# Patient Record
Sex: MALE | Race: WHITE | Employment: OTHER | URBAN - METROPOLITAN AREA
[De-identification: names, ages, dates, MRNs, and addresses within clinical notes are randomized per-mention and may not be internally consistent; named-entity substitution may affect disease eponyms.]

---

## 2019-07-13 ENCOUNTER — HOSPITAL ENCOUNTER (EMERGENCY)
Age: 84
Discharge: ANOTHER HOSPITAL | End: 2019-07-13
Attending: EMERGENCY MEDICINE
Payer: MEDICARE

## 2019-07-13 ENCOUNTER — APPOINTMENT (OUTPATIENT)
Dept: GENERAL RADIOLOGY | Age: 84
End: 2019-07-13
Attending: EMERGENCY MEDICINE
Payer: MEDICARE

## 2019-07-13 ENCOUNTER — HOSPITAL ENCOUNTER (INPATIENT)
Age: 84
LOS: 5 days | Discharge: HOME | DRG: 246 | End: 2019-07-18
Attending: INTERNAL MEDICINE | Admitting: INTERNAL MEDICINE
Payer: MEDICARE

## 2019-07-13 VITALS
BODY MASS INDEX: 21 KG/M2 | HEIGHT: 71 IN | OXYGEN SATURATION: 97 % | RESPIRATION RATE: 26 BRPM | SYSTOLIC BLOOD PRESSURE: 143 MMHG | HEART RATE: 78 BPM | WEIGHT: 150 LBS | DIASTOLIC BLOOD PRESSURE: 79 MMHG | TEMPERATURE: 97.5 F

## 2019-07-13 DIAGNOSIS — I21.4 NSTEMI (NON-ST ELEVATED MYOCARDIAL INFARCTION) (HCC): Primary | ICD-10-CM

## 2019-07-13 LAB
ANION GAP SERPL CALC-SCNC: 10 MMOL/L (ref 7–16)
APTT PPP: 62.9 SEC (ref 24.7–39.8)
BASOPHILS # BLD: 0 K/UL (ref 0–0.2)
BASOPHILS NFR BLD: 0 % (ref 0–2)
BUN SERPL-MCNC: 14 MG/DL (ref 8–23)
CALCIUM SERPL-MCNC: 9.3 MG/DL (ref 8.3–10.4)
CHLORIDE SERPL-SCNC: 110 MMOL/L (ref 98–107)
CO2 SERPL-SCNC: 25 MMOL/L (ref 21–32)
CREAT SERPL-MCNC: 1.21 MG/DL (ref 0.8–1.5)
DIFFERENTIAL METHOD BLD: ABNORMAL
EOSINOPHIL # BLD: 0.2 K/UL (ref 0–0.8)
EOSINOPHIL NFR BLD: 2 % (ref 0.5–7.8)
ERYTHROCYTE [DISTWIDTH] IN BLOOD BY AUTOMATED COUNT: 14.3 % (ref 11.9–14.6)
GLUCOSE SERPL-MCNC: 109 MG/DL (ref 65–100)
HCT VFR BLD AUTO: 38.1 % (ref 41.1–50.3)
HGB BLD-MCNC: 12.4 G/DL (ref 13.6–17.2)
IMM GRANULOCYTES # BLD AUTO: 0.2 K/UL (ref 0–0.5)
IMM GRANULOCYTES NFR BLD AUTO: 2 % (ref 0–5)
LYMPHOCYTES # BLD: 1.7 K/UL (ref 0.5–4.6)
LYMPHOCYTES NFR BLD: 21 % (ref 13–44)
MCH RBC QN AUTO: 32.8 PG (ref 26.1–32.9)
MCHC RBC AUTO-ENTMCNC: 32.5 G/DL (ref 31.4–35)
MCV RBC AUTO: 100.8 FL (ref 79.6–97.8)
MONOCYTES # BLD: 0.6 K/UL (ref 0.1–1.3)
MONOCYTES NFR BLD: 7 % (ref 4–12)
NEUTS SEG # BLD: 5.2 K/UL (ref 1.7–8.2)
NEUTS SEG NFR BLD: 67 % (ref 43–78)
NRBC # BLD: 0 K/UL (ref 0–0.2)
PLATELET # BLD AUTO: 252 K/UL (ref 150–450)
PMV BLD AUTO: 10.1 FL (ref 9.4–12.3)
POTASSIUM SERPL-SCNC: 3.8 MMOL/L (ref 3.5–5.1)
RBC # BLD AUTO: 3.78 M/UL (ref 4.23–5.6)
SODIUM SERPL-SCNC: 145 MMOL/L (ref 136–145)
TROPONIN I BLD-MCNC: 0.43 NG/ML (ref 0.02–0.05)
TROPONIN I SERPL-MCNC: 0.62 NG/ML (ref 0.02–0.05)
WBC # BLD AUTO: 7.8 K/UL (ref 4.3–11.1)

## 2019-07-13 PROCEDURE — 65660000000 HC RM CCU STEPDOWN

## 2019-07-13 PROCEDURE — 71045 X-RAY EXAM CHEST 1 VIEW: CPT

## 2019-07-13 PROCEDURE — 99285 EMERGENCY DEPT VISIT HI MDM: CPT | Performed by: EMERGENCY MEDICINE

## 2019-07-13 PROCEDURE — 77010033678 HC OXYGEN DAILY

## 2019-07-13 PROCEDURE — 93005 ELECTROCARDIOGRAM TRACING: CPT | Performed by: EMERGENCY MEDICINE

## 2019-07-13 PROCEDURE — 85730 THROMBOPLASTIN TIME PARTIAL: CPT

## 2019-07-13 PROCEDURE — 80048 BASIC METABOLIC PNL TOTAL CA: CPT

## 2019-07-13 PROCEDURE — 84484 ASSAY OF TROPONIN QUANT: CPT

## 2019-07-13 PROCEDURE — 74011250636 HC RX REV CODE- 250/636: Performed by: EMERGENCY MEDICINE

## 2019-07-13 PROCEDURE — 74011250637 HC RX REV CODE- 250/637: Performed by: EMERGENCY MEDICINE

## 2019-07-13 PROCEDURE — 94760 N-INVAS EAR/PLS OXIMETRY 1: CPT

## 2019-07-13 PROCEDURE — 96365 THER/PROPH/DIAG IV INF INIT: CPT | Performed by: EMERGENCY MEDICINE

## 2019-07-13 PROCEDURE — 85025 COMPLETE CBC W/AUTO DIFF WBC: CPT

## 2019-07-13 PROCEDURE — 74011250637 HC RX REV CODE- 250/637: Performed by: PHYSICIAN ASSISTANT

## 2019-07-13 PROCEDURE — 36415 COLL VENOUS BLD VENIPUNCTURE: CPT

## 2019-07-13 PROCEDURE — 74011250636 HC RX REV CODE- 250/636: Performed by: NURSE PRACTITIONER

## 2019-07-13 RX ORDER — NITROGLYCERIN 0.4 MG/1
0.4 TABLET SUBLINGUAL
Status: DISCONTINUED | OUTPATIENT
Start: 2019-07-13 | End: 2019-07-18 | Stop reason: HOSPADM

## 2019-07-13 RX ORDER — PANTOPRAZOLE SODIUM 40 MG/1
40 TABLET, DELAYED RELEASE ORAL
Status: DISCONTINUED | OUTPATIENT
Start: 2019-07-14 | End: 2019-07-18 | Stop reason: HOSPADM

## 2019-07-13 RX ORDER — SODIUM CHLORIDE 0.9 % (FLUSH) 0.9 %
5-40 SYRINGE (ML) INJECTION EVERY 8 HOURS
Status: DISCONTINUED | OUTPATIENT
Start: 2019-07-13 | End: 2019-07-18 | Stop reason: SDUPTHER

## 2019-07-13 RX ORDER — MORPHINE SULFATE 2 MG/ML
2 INJECTION, SOLUTION INTRAMUSCULAR; INTRAVENOUS
Status: DISCONTINUED | OUTPATIENT
Start: 2019-07-13 | End: 2019-07-18 | Stop reason: HOSPADM

## 2019-07-13 RX ORDER — ATORVASTATIN CALCIUM 10 MG/1
20 TABLET, FILM COATED ORAL
Status: DISCONTINUED | OUTPATIENT
Start: 2019-07-13 | End: 2019-07-18 | Stop reason: HOSPADM

## 2019-07-13 RX ORDER — SODIUM CHLORIDE 0.9 % (FLUSH) 0.9 %
5-40 SYRINGE (ML) INJECTION AS NEEDED
Status: DISCONTINUED | OUTPATIENT
Start: 2019-07-13 | End: 2019-07-18 | Stop reason: SDUPTHER

## 2019-07-13 RX ORDER — LISINOPRIL 5 MG/1
2.5 TABLET ORAL DAILY
Status: DISCONTINUED | OUTPATIENT
Start: 2019-07-13 | End: 2019-07-18 | Stop reason: HOSPADM

## 2019-07-13 RX ORDER — GUAIFENESIN 100 MG/5ML
324 LIQUID (ML) ORAL
Status: COMPLETED | OUTPATIENT
Start: 2019-07-13 | End: 2019-07-13

## 2019-07-13 RX ORDER — HEPARIN SODIUM 5000 [USP'U]/100ML
12-25 INJECTION, SOLUTION INTRAVENOUS
Status: DISCONTINUED | OUTPATIENT
Start: 2019-07-13 | End: 2019-07-13 | Stop reason: HOSPADM

## 2019-07-13 RX ORDER — HEPARIN SODIUM 5000 [USP'U]/100ML
12-25 INJECTION, SOLUTION INTRAVENOUS
Status: DISCONTINUED | OUTPATIENT
Start: 2019-07-13 | End: 2019-07-16

## 2019-07-13 RX ORDER — HEPARIN SODIUM 5000 [USP'U]/ML
60 INJECTION, SOLUTION INTRAVENOUS; SUBCUTANEOUS
Status: COMPLETED | OUTPATIENT
Start: 2019-07-13 | End: 2019-07-13

## 2019-07-13 RX ORDER — GUAIFENESIN 100 MG/5ML
81 LIQUID (ML) ORAL DAILY
Status: DISCONTINUED | OUTPATIENT
Start: 2019-07-14 | End: 2019-07-18 | Stop reason: HOSPADM

## 2019-07-13 RX ORDER — ONDANSETRON 2 MG/ML
4 INJECTION INTRAMUSCULAR; INTRAVENOUS
Status: DISCONTINUED | OUTPATIENT
Start: 2019-07-13 | End: 2019-07-18 | Stop reason: HOSPADM

## 2019-07-13 RX ORDER — METOPROLOL SUCCINATE 25 MG/1
12.5 TABLET, EXTENDED RELEASE ORAL 2 TIMES DAILY
Status: DISCONTINUED | OUTPATIENT
Start: 2019-07-13 | End: 2019-07-18 | Stop reason: HOSPADM

## 2019-07-13 RX ORDER — HEPARIN SODIUM 5000 [USP'U]/ML
35 INJECTION, SOLUTION INTRAVENOUS; SUBCUTANEOUS ONCE
Status: COMPLETED | OUTPATIENT
Start: 2019-07-13 | End: 2019-07-13

## 2019-07-13 RX ADMIN — HEPARIN SODIUM 2400 UNITS: 5000 INJECTION INTRAVENOUS; SUBCUTANEOUS at 22:11

## 2019-07-13 RX ADMIN — ATORVASTATIN CALCIUM 20 MG: 10 TABLET, FILM COATED ORAL at 22:13

## 2019-07-13 RX ADMIN — Medication 10 ML: at 22:17

## 2019-07-13 RX ADMIN — LISINOPRIL 2.5 MG: 5 TABLET ORAL at 18:05

## 2019-07-13 RX ADMIN — METOPROLOL SUCCINATE 12.5 MG: 25 TABLET, EXTENDED RELEASE ORAL at 22:12

## 2019-07-13 RX ADMIN — ASPIRIN 81 MG 324 MG: 81 TABLET ORAL at 14:26

## 2019-07-13 RX ADMIN — HEPARIN SODIUM 12 UNITS/KG/HR: 5000 INJECTION, SOLUTION INTRAVENOUS at 14:30

## 2019-07-13 RX ADMIN — HEPARIN SODIUM 4100 UNITS: 5000 INJECTION INTRAVENOUS; SUBCUTANEOUS at 14:27

## 2019-07-13 NOTE — PROGRESS NOTES
Pt arrived on unit, MD Mona Kuhn made aware of patient arrival. Patient oriented to room, denies any chest pain or shortness of breath. No signs of distress, patient oriented x4. Pt on 2L O2 for chest pain protocol and heparin gtt infusing. Call light in reach. Paged Gaurav MATAMOROS per MD Mona Kuhn request notify her of patient arrival.     Dual skin assessment completed on admission. Sacrum and heels intact. Scattered bruises noted. No other impairments.

## 2019-07-13 NOTE — PROGRESS NOTES
Bedside and Verbal shift change report given to Too Alonzo RN (oncoming nurse) by self Mike Andra nurse). Report included the following information SBAR, Kardex and MAR. Heparin gtt verified with oncoming RN.

## 2019-07-13 NOTE — PROGRESS NOTES
Verbal bedside report received from Augustine Ramos RN. Assumed care of patient. Heparin at 12 units/kg/hr IV drip verified at bedside with outgoing RN.

## 2019-07-13 NOTE — ED TRIAGE NOTES
Pt c/o chest pain yesterday that lasted 30 minutes. Pt states it occurred while he was playing tennis yesterday. Pt states he has a hiatal hernia. Pt states he took tums yesterday and the pain went away. Pt denies any current chest pain.

## 2019-07-13 NOTE — ED NOTES
TRANSFER - OUT REPORT:    Verbal report given to [address] [place] [address] on [address]  being transferred to [facility] for routine progression of care       Report consisted of patients Situation, Background, Assessment and   Recommendations(SBAR). Information from the following report(s) SBAR was reviewed with the receiving nurse. Lines:   Peripheral IV 07/13/19 Right Antecubital (Active)       Peripheral IV 07/13/19 Right Forearm (Active)   Site Assessment Clean, dry, & intact 7/13/2019  2:38 PM   Phlebitis Assessment 0 7/13/2019  2:38 PM   Infiltration Assessment 0 7/13/2019  2:38 PM        Opportunity for questions and clarification was provided.       Patient transported with:   ALS transport

## 2019-07-13 NOTE — PROGRESS NOTES
TRANSFER - IN REPORT:    Verbal report received from [facility]) on [employer]  being received from  ED(unit) for routine progression of care      Report consisted of patients Situation, Background, Assessment and   Recommendations(SBAR). Information from the following report(s) SBAR, Kardex and MAR was reviewed with the receiving nurse. Opportunity for questions and clarification was provided. Assessment completed upon patients arrival to unit and care assumed.

## 2019-07-13 NOTE — ED PROVIDER NOTES
HPI:  71-year-old male with no chronic medical problem other than hiatal hernia seen with chest pain yesterday. Stated he had just played tennis earlier that morning. [employer] [place] get some food. While he was eating he started having severe pain in the midsternal with nausea without vomiting. This feels very different than previous symptom of his hiatal hernia. It went away after approximate 45 minutes. He also took some Rolaids. Since then has not had recurrent symptoms. Denies any fever, cough. No legs swelling, recent travel. ROS  Constitutional: No fever, no chills  Skin: no rash  Eye: No vision changes  ENMT: No sore throat  Respiratory: No shortness of breath, no cough  Cardiovascular: + chest pain, no palpitations  Gastrointestinal: No vomiting, no nausea, no diarrhea, no abdominal pain  : No dysuria  MSK: No back pain, no muscle pain, no joint pain  Neuro: No headache, no change in mental status, no numbness, no tingling, no weakness  Psych:   Endocrine:   All other review of systems positive per history of present illness and the above otherwise negative or noncontributory. Visit Vitals  /85 (BP 1 Location: Left arm, BP Patient Position: Sitting)   Pulse 83   Temp 97.5 °F (36.4 °C)   Resp 17   Ht 5' 10.5\" (1.791 m)   Wt 68 kg (150 lb)   SpO2 97%   BMI 21.22 kg/m²     Past Medical History:   Diagnosis Date    Colon cancer ([facility] [address] 01/2012    Hiatal hernia      History reviewed. No pertinent surgical history. None         Adult Exam   General: alert, no acute distress  Head: normocephalic, atraumatic  ENT: moist mucous membranes  Neck: supple, non-tender; full range of motion  Cardiovascular: regular rate and rhythm, normal peripheral perfusion, no edema.  Equal radial pulses   Chest wall: no reproducible pain  Respiratory:  normal respirations; no wheezing, rales or rhonchi  Gastrointestinal: soft, non-tender; no rebound or guarding, no peritoneal signs, no distension  Back: non-tender, full range of motion  Musculoskeletal: normal range of motion, normal strength, no gross deformities  Neurological: alert and oriented x 4, no gross focal deficits; normal speech  Psychiatric: cooperative; appropriate mood and affect    MDM:  Lungs are clear. We'll obtain chest x-ray. EKG rate of 85 sinus rhythm with premature atrial complex with normal axis and interval.  No acute STEMI noted  No chest pain at this time. No prior heart disease. Low suspicion for dissection, PE, pneumonia, pneumothorax. Troponin is elevated at 0.43. We'll start on heparin bolus and heparin drip. We'll need to speak with cardiology for transfer to [facility] for further management. 2:27 PM  Spoke with Dr. Cory Benavides about the case. Agree with heparin. Would like patient to be transferred to [facility] as soon as possible for further management. Recent Results (from the past 12 hour(s))   CBC WITH AUTOMATED DIFF    Collection Time: 07/13/19  1:41 PM   Result Value Ref Range    WBC 7.8 4.3 - 11.1 K/uL    RBC 3.78 (L) 4.23 - 5.6 M/uL    HGB 12.4 (L) 13.6 - 17.2 g/dL    HCT 38.1 (L) 41.1 - 50.3 %    .8 (H) 79.6 - 97.8 FL    MCH 32.8 26.1 - 32.9 PG    MCHC 32.5 31.4 - 35.0 g/dL    RDW 14.3 11.9 - 14.6 %    PLATELET 264 747 - 996 K/uL    MPV 10.1 9.4 - 12.3 FL    ABSOLUTE NRBC 0.00 0.0 - 0.2 K/uL    DF AUTOMATED      NEUTROPHILS 67 43 - 78 %    LYMPHOCYTES 21 13 - 44 %    MONOCYTES 7 4.0 - 12.0 %    EOSINOPHILS 2 0.5 - 7.8 %    BASOPHILS 0 0.0 - 2.0 %    IMMATURE GRANULOCYTES 2 0.0 - 5.0 %    ABS. NEUTROPHILS 5.2 1.7 - 8.2 K/UL    ABS. LYMPHOCYTES 1.7 0.5 - 4.6 K/UL    ABS. MONOCYTES 0.6 0.1 - 1.3 K/UL    ABS. EOSINOPHILS 0.2 0.0 - 0.8 K/UL    ABS. BASOPHILS 0.0 0.0 - 0.2 K/UL    ABS. IMM.  GRANS. 0.2 0.0 - 0.5 K/UL   METABOLIC PANEL, BASIC    Collection Time: 07/13/19  1:41 PM   Result Value Ref Range    Sodium 145 136 - 145 mmol/L    Potassium 3.8 3.5 - 5.1 mmol/L    Chloride 110 (H) 98 - 107 mmol/L    CO2 25 21 - 32 mmol/L    Anion gap 10 7 - 16 mmol/L    Glucose 109 (H) 65 - 100 mg/dL    BUN 14 8 - 23 MG/DL    Creatinine 1.21 0.8 - 1.5 MG/DL    GFR est AA >60 >60 ml/min/1.73m2    GFR est non-AA >60 >60 ml/min/1.73m2    Calcium 9.3 8.3 - 10.4 MG/DL   POC TROPONIN-I    Collection Time: 07/13/19  1:47 PM   Result Value Ref Range    Troponin-I (POC) 0.43 (H) 0.02 - 0.05 ng/ml           Dragon voice recognition software was used to create this note. Although the note has been reviewed and corrected where necessary, additional errors may have been overlooked and remain in the text.

## 2019-07-13 NOTE — H&P
[facility] Cardiology History & Physical      Date of  Admission: 7/13/2019  3:40 PM     Primary Cardiologist: none  Referring Physician: Dr. Kris Love [facility] MD)  Admitting Physician: Dr. Bob Ríos    CC: Chest pain, elevated troponin    HPI:  Tammy Francois is a 80 y.o. WM with h/o hiatal hernia and hypertriglyceridemia who takes no medications daily. He occasionally has nausea with his hiatal hernia but otherwise has no complaints until yesterday while eating lunch. He developed nausea and pain in the center of his chest that lasted 30-40 minutes before he took tums and got relief. He denies radiation of CP, denies SOB, palpitations, vomiting, diaphoresis or syncope. He felt fine after that but after telling his son today, his son insisted he come to ER for evaluation. In the ER, his troponin was elevated at 0.43 and cardiology was asked to evaluate. Pt was started on heparin and transferred to [facility]. On arrival, Pt continues to deny CP today. He feels this pain yesterday was from his hiatal hernia. Past Medical History:   Diagnosis Date    Colon cancer ([facility] [address] 01/2012    Hiatal hernia       No past surgical history on file.     No Known Allergies   Social History     Socioeconomic History    Marital status:      Spouse name: Not on file    Number of children: Not on file    Years of education: Not on file    Highest education level: Not on file   Occupational History    Not on file   Social Needs    Financial resource strain: Not on file    Food insecurity:     Worry: Not on file     Inability: Not on file    Transportation needs:     Medical: Not on file     Non-medical: Not on file   Tobacco Use    Smoking status: Never Smoker    Smokeless tobacco: Never Used   Substance and Sexual Activity    Alcohol use: Never     Frequency: Never    Drug use: Never    Sexual activity: Not on file   Lifestyle    Physical activity:     Days per week: Not on file     Minutes per session: Not on file  Stress: Not on file   Relationships    Social connections:     Talks on phone: Not on file     Gets together: Not on file     Attends Jain service: Not on file     Active member of club or organization: Not on file     Attends meetings of clubs or organizations: Not on file     Relationship status: Not on file    Intimate partner violence:     Fear of current or ex partner: Not on file     Emotionally abused: Not on file     Physically abused: Not on file     Forced sexual activity: Not on file   Other Topics Concern    Not on file   Social History Narrative    Not on file     Review of symptoms:  General: no recent weight loss/gain, weakness, fatigue, fever or chills   Skin: no rashes, lumps, or other skin changes   HEENT: no headache, dizziness, lightheadedness, vision changes, hearing changes, tinnitus, vertigo, sinus pressure/pain, bleeding gums, sore throat, or hoarseness   Neck: no swollen glands, goiter, pain or stiffness   Respiratory: no cough, sputum, hemoptysis, dyspnea, wheezing   Cardiovascular: +chest pain or discomfort, no palpitations, dyspnea, orthopnea, paroxysmal nocturnal dyspnea, peripheral edema   Gastrointestinal: no trouble swallowing, heartburn, change of appetite, +nausea w/o vomiting, change in bowel habits, pain with defecation, rectal bleeding or black/tarry stools, hemorrhoids, constipation, diarrhea, abdominal pain, jaundice, liver or gallbladder problems   Urinary: no frequency, urgency , hematuria, burning/pain with urination, recent flank pain, polyuria, nocturia, or difficulty urinating   Peripheral Vascular: no claudication, leg cramps, prior DVTs, swelling of calves, legs, or feet, color change, or swelling with redness or tenderness   Musculoskeletal: no muscle or joint pain/stiffness, joint swelling, erythema of joints, or back pain   Psychiatric: no depression, mental disorders, or excessive stress   Neurological: no history of CVA, dizziness, no sensory or motor loss, seizures, syncope, tremors, numbness, tingling, no changes in mood, attention, or speech, no changes in orientation, memory, insight, or judgment. no headache, vertigo. Hematologic: no anemia, easy bruising or bleeding   Endocrine: no diabetes, thyroid problems, heat or cold intolerance, excessive sweating, polyuria, polydipsia      Subjective:   Physical Exam    Visit Vitals  /77 (BP 1 Location: Left arm, BP Patient Position: At rest)   Pulse 69   Temp 97.5 °F (36.4 °C)   Resp 20   Ht 5' 10.5\" (1.791 m)   Wt 68 kg (150 lb)   SpO2 99%   BMI 21.22 kg/m²     General Appearance:  Well developed, well nourished,alert and oriented x 3, and individual in no acute distress. Ears/Nose/Mouth/Throat:   Hearing grossly normal.         Neck: Supple. Chest:   Lungs clear to auscultation bilaterally. Cardiovascular:  Regular rate and rhythm, S1, S2 normal, no murmur. Abdomen:   Soft, non-tender, bowel sounds are active. Extremities: No edema bilaterally. Skin: Warm and dry.            Labs:   Recent Results (from the past 24 hour(s))   EKG, 12 LEAD, INITIAL    Collection Time: 07/13/19  1:08 PM   Result Value Ref Range    Ventricular Rate 85 BPM    Atrial Rate 85 BPM    P-R Interval 144 ms    QRS Duration 80 ms    Q-T Interval 366 ms    QTC Calculation (Bezet) 435 ms    Calculated P Axis 54 degrees    Calculated R Axis 17 degrees    Calculated T Axis 30 degrees    Diagnosis       Sinus rhythm with Premature atrial complexes  Nonspecific ST abnormality  Abnormal ECG  No previous ECGs available     CBC WITH AUTOMATED DIFF    Collection Time: 07/13/19  1:41 PM   Result Value Ref Range    WBC 7.8 4.3 - 11.1 K/uL    RBC 3.78 (L) 4.23 - 5.6 M/uL    HGB 12.4 (L) 13.6 - 17.2 g/dL    HCT 38.1 (L) 41.1 - 50.3 %    .8 (H) 79.6 - 97.8 FL    MCH 32.8 26.1 - 32.9 PG    MCHC 32.5 31.4 - 35.0 g/dL    RDW 14.3 11.9 - 14.6 %    PLATELET 850 436 - 065 K/uL    MPV 10.1 9.4 - 12.3 FL    ABSOLUTE NRBC 0.00 0.0 - 0.2 K/uL    DF AUTOMATED      NEUTROPHILS 67 43 - 78 %    LYMPHOCYTES 21 13 - 44 %    MONOCYTES 7 4.0 - 12.0 %    EOSINOPHILS 2 0.5 - 7.8 %    BASOPHILS 0 0.0 - 2.0 %    IMMATURE GRANULOCYTES 2 0.0 - 5.0 %    ABS. NEUTROPHILS 5.2 1.7 - 8.2 K/UL    ABS. LYMPHOCYTES 1.7 0.5 - 4.6 K/UL    ABS. MONOCYTES 0.6 0.1 - 1.3 K/UL    ABS. EOSINOPHILS 0.2 0.0 - 0.8 K/UL    ABS. BASOPHILS 0.0 0.0 - 0.2 K/UL    ABS. IMM.  GRANS. 0.2 0.0 - 0.5 K/UL   METABOLIC PANEL, BASIC    Collection Time: 07/13/19  1:41 PM   Result Value Ref Range    Sodium 145 136 - 145 mmol/L    Potassium 3.8 3.5 - 5.1 mmol/L    Chloride 110 (H) 98 - 107 mmol/L    CO2 25 21 - 32 mmol/L    Anion gap 10 7 - 16 mmol/L    Glucose 109 (H) 65 - 100 mg/dL    BUN 14 8 - 23 MG/DL    Creatinine 1.21 0.8 - 1.5 MG/DL    GFR est AA >60 >60 ml/min/1.73m2    GFR est non-AA >60 >60 ml/min/1.73m2    Calcium 9.3 8.3 - 10.4 MG/DL   POC TROPONIN-I    Collection Time: 07/13/19  1:47 PM   Result Value Ref Range    Troponin-I (POC) 0.43 (H) 0.02 - 0.05 ng/ml       Pt has been seen and examined by Dr. Anette Lopez and he agrees with the following assessment and plan:     Assessment/Plan:      1) Chest pain with elevated troponin concerning for NSTEMI- admit to telemetry, serial CE's, ASA, heparin, start low dose BB, ACE-I as BP tolerates, plan LHC and echo    2) Hiatal hernia- PPI    3) H/o hypertriglyceridemia- check lipid profile, start statin       Neeta Ferguson PA-C

## 2019-07-14 LAB
ANION GAP SERPL CALC-SCNC: 8 MMOL/L (ref 7–16)
APTT PPP: 123.9 SEC (ref 24.7–39.8)
APTT PPP: 56.4 SEC (ref 24.7–39.8)
APTT PPP: 64.8 SEC (ref 24.7–39.8)
ATRIAL RATE: 85 BPM
BUN SERPL-MCNC: 12 MG/DL (ref 8–23)
CALCIUM SERPL-MCNC: 8.5 MG/DL (ref 8.3–10.4)
CALCULATED P AXIS, ECG09: 54 DEGREES
CALCULATED R AXIS, ECG10: 17 DEGREES
CALCULATED T AXIS, ECG11: 30 DEGREES
CHLORIDE SERPL-SCNC: 110 MMOL/L (ref 98–107)
CHOLEST SERPL-MCNC: 152 MG/DL
CO2 SERPL-SCNC: 26 MMOL/L (ref 21–32)
CREAT SERPL-MCNC: 1.12 MG/DL (ref 0.8–1.5)
DIAGNOSIS, 93000: NORMAL
ERYTHROCYTE [DISTWIDTH] IN BLOOD BY AUTOMATED COUNT: 14.1 % (ref 11.9–14.6)
GLUCOSE SERPL-MCNC: 102 MG/DL (ref 65–100)
HCT VFR BLD AUTO: 35 % (ref 41.1–50.3)
HDLC SERPL-MCNC: 31 MG/DL (ref 40–60)
HDLC SERPL: 4.9 {RATIO}
HGB BLD-MCNC: 11.6 G/DL (ref 13.6–17.2)
LDLC SERPL CALC-MCNC: 98.2 MG/DL
LIPID PROFILE,FLP: ABNORMAL
MCH RBC QN AUTO: 33.1 PG (ref 26.1–32.9)
MCHC RBC AUTO-ENTMCNC: 33.1 G/DL (ref 31.4–35)
MCV RBC AUTO: 100 FL (ref 79.6–97.8)
NRBC # BLD: 0 K/UL (ref 0–0.2)
P-R INTERVAL, ECG05: 144 MS
PLATELET # BLD AUTO: 223 K/UL (ref 150–450)
PMV BLD AUTO: 10.2 FL (ref 9.4–12.3)
POTASSIUM SERPL-SCNC: 3.6 MMOL/L (ref 3.5–5.1)
Q-T INTERVAL, ECG07: 366 MS
QRS DURATION, ECG06: 80 MS
QTC CALCULATION (BEZET), ECG08: 435 MS
RBC # BLD AUTO: 3.5 M/UL (ref 4.23–5.6)
SODIUM SERPL-SCNC: 144 MMOL/L (ref 136–145)
TRIGL SERPL-MCNC: 114 MG/DL (ref 35–150)
TROPONIN I SERPL-MCNC: 0.5 NG/ML (ref 0.02–0.05)
TROPONIN I SERPL-MCNC: 0.58 NG/ML (ref 0.02–0.05)
VENTRICULAR RATE, ECG03: 85 BPM
VLDLC SERPL CALC-MCNC: 22.8 MG/DL (ref 6–23)
WBC # BLD AUTO: 6.7 K/UL (ref 4.3–11.1)

## 2019-07-14 PROCEDURE — 84484 ASSAY OF TROPONIN QUANT: CPT

## 2019-07-14 PROCEDURE — 65660000000 HC RM CCU STEPDOWN

## 2019-07-14 PROCEDURE — 85730 THROMBOPLASTIN TIME PARTIAL: CPT

## 2019-07-14 PROCEDURE — 74011250636 HC RX REV CODE- 250/636: Performed by: PHYSICIAN ASSISTANT

## 2019-07-14 PROCEDURE — 74011250637 HC RX REV CODE- 250/637: Performed by: PHYSICIAN ASSISTANT

## 2019-07-14 PROCEDURE — 93306 TTE W/DOPPLER COMPLETE: CPT

## 2019-07-14 PROCEDURE — 85027 COMPLETE CBC AUTOMATED: CPT

## 2019-07-14 PROCEDURE — 80048 BASIC METABOLIC PNL TOTAL CA: CPT

## 2019-07-14 PROCEDURE — 80061 LIPID PANEL: CPT

## 2019-07-14 PROCEDURE — 74011250636 HC RX REV CODE- 250/636: Performed by: INTERNAL MEDICINE

## 2019-07-14 PROCEDURE — 74011250636 HC RX REV CODE- 250/636: Performed by: NURSE PRACTITIONER

## 2019-07-14 PROCEDURE — 36415 COLL VENOUS BLD VENIPUNCTURE: CPT

## 2019-07-14 RX ORDER — HEPARIN SODIUM 5000 [USP'U]/ML
35 INJECTION, SOLUTION INTRAVENOUS; SUBCUTANEOUS ONCE
Status: COMPLETED | OUTPATIENT
Start: 2019-07-14 | End: 2019-07-14

## 2019-07-14 RX ADMIN — LISINOPRIL 2.5 MG: 5 TABLET ORAL at 08:50

## 2019-07-14 RX ADMIN — Medication 10 ML: at 21:21

## 2019-07-14 RX ADMIN — METOPROLOL SUCCINATE 12.5 MG: 25 TABLET, EXTENDED RELEASE ORAL at 08:48

## 2019-07-14 RX ADMIN — ATORVASTATIN CALCIUM 20 MG: 10 TABLET, FILM COATED ORAL at 21:20

## 2019-07-14 RX ADMIN — HEPARIN SODIUM 2350 UNITS: 5000 INJECTION INTRAVENOUS; SUBCUTANEOUS at 12:47

## 2019-07-14 RX ADMIN — HEPARIN SODIUM 14 UNITS/KG/HR: 5000 INJECTION, SOLUTION INTRAVENOUS at 19:29

## 2019-07-14 RX ADMIN — ASPIRIN 81 MG 81 MG: 81 TABLET ORAL at 08:48

## 2019-07-14 RX ADMIN — HEPARIN SODIUM 2350 UNITS: 5000 INJECTION INTRAVENOUS; SUBCUTANEOUS at 21:18

## 2019-07-14 RX ADMIN — METOPROLOL SUCCINATE 12.5 MG: 25 TABLET, EXTENDED RELEASE ORAL at 21:20

## 2019-07-14 RX ADMIN — Medication 10 ML: at 05:18

## 2019-07-14 RX ADMIN — PANTOPRAZOLE SODIUM 40 MG: 40 TABLET, DELAYED RELEASE ORAL at 08:50

## 2019-07-14 NOTE — PROGRESS NOTES
Verbal bedside report received from Frankie Fermin RN. Assumed care of patient. Heparin at 14 units/kg/hr IV drip verified at bedside with outgoing RN.

## 2019-07-14 NOTE — PROGRESS NOTES
Verbal bedside report given to chu Gomez RN. Patient's situation, background, assessment and recommendations provided. Opportunity for questions provided. Oncoming RN assumed care of patient. Heparin IV drip verified at bedside with oncoming RN.

## 2019-07-14 NOTE — PROGRESS NOTES
Bedside and Verbal shift change report given to self (oncoming nurse) by Too Alonzo RN (offgoing nurse). Report included the following information SBAR, Kardex and MAR. Heparin gtt verified, pt not complaining of any chest pain/shortness of breath.

## 2019-07-14 NOTE — PROGRESS NOTES
Problem: Falls - Risk of  Goal: *Absence of Falls  Description  Document Amanda Roberts Fall Risk and appropriate interventions in the flowsheet.   Outcome: Progressing Towards Goal  Note:   Fall Risk Interventions:  Mobility Interventions: Communicate number of staff needed for ambulation/transfer, Patient to call before getting OOB         Medication Interventions: Evaluate medications/consider consulting pharmacy, Patient to call before getting OOB    Elimination Interventions: Call light in reach, Urinal in reach

## 2019-07-14 NOTE — PROGRESS NOTES
Bedside and Verbal shift change report given to Rose Marie Stack RN (oncoming nurse) by self Can Primes nurse). Report included the following information SBAR, Kardex and MAR. Heparin Gtt verified.

## 2019-07-14 NOTE — PROGRESS NOTES
[facility] CARDIOLOGY PROGRESS NOTE           7/14/2019 9:22 AM    Admit Date: 7/13/2019      Subjective:    pain free     Review of Systems   Constitutional: Negative for fever. Respiratory: Negative for cough. Cardiovascular: Negative for chest pain. Genitourinary: Negative for dysuria. Musculoskeletal: Negative for myalgias. Skin: Negative for rash. Neurological: Positive for dizziness. Objective:      Vitals:    07/13/19 2212 07/14/19 0021 07/14/19 0534 07/14/19 0848   BP: 125/70 113/65 110/64 114/61   Pulse: 67 75 69 85   Resp:  18 18 16   Temp:  97.9 °F (36.6 °C) 98.3 °F (36.8 °C) 97.7 °F (36.5 °C)   SpO2:  96% 97% 97%   Weight:   66.7 kg (147 lb 1.6 oz)    Height:             Physical Exam   Constitutional: He is oriented to person, place, and time. He appears well-developed. HENT:   Head: Normocephalic and atraumatic. Eyes: Pupils are equal, round, and reactive to light. Neck: Normal range of motion. Cardiovascular: Normal rate. No murmur heard. Pulmonary/Chest: Effort normal.   Abdominal: Soft. He exhibits no distension. There is no tenderness. Musculoskeletal: He exhibits no edema. Neurological: He is alert and oriented to person, place, and time. No cranial nerve deficit. Skin: Skin is warm and dry. No rash noted. No erythema. Psychiatric: He has a normal mood and affect. His behavior is normal.       Data Review:   Recent Labs     07/14/19  0355 07/13/19  2338  07/13/19  1341     --   --  145   K 3.6  --   --  3.8   BUN 12  --   --  14   CREA 1.12  --   --  1.21   *  --   --  109*   WBC 6.7  --   --  7.8   HGB 11.6*  --   --  12.4*   HCT 35.0*  --   --  38.1*     --   --  252   TROIQ 0.50* 0.58*   < >  --    CHOL 152  --   --   --    LDLC 98.2  --   --   --    HDL 31*  --   --   --     < > = values in this interval not displayed.          Intake/Output Summary (Last 24 hours) at 7/14/2019 0922  Last data filed at 7/14/2019 5703  Gross per 24 hour   Intake 75 ml   Output 925 ml   Net -850 ml     Current Facility-Administered Medications   Medication Dose Route Frequency    sodium chloride (NS) flush 5-40 mL  5-40 mL IntraVENous Q8H    sodium chloride (NS) flush 5-40 mL  5-40 mL IntraVENous PRN    aspirin chewable tablet 81 mg  81 mg Oral DAILY    nitroglycerin (NITROSTAT) tablet 0.4 mg  0.4 mg SubLINGual Q5MIN PRN    morphine injection 2 mg  2 mg IntraVENous Q4H PRN    ondansetron (ZOFRAN) injection 4 mg  4 mg IntraVENous Q4H PRN    sodium chloride (NS) flush 5-40 mL  5-40 mL IntraVENous Q8H    sodium chloride (NS) flush 5-40 mL  5-40 mL IntraVENous PRN    pantoprazole (PROTONIX) tablet 40 mg  40 mg Oral ACB    atorvastatin (LIPITOR) tablet 20 mg  20 mg Oral QHS    metoprolol succinate (TOPROL-XL) XL tablet 12.5 mg  12.5 mg Oral BID    lisinopril (PRINIVIL, ZESTRIL) tablet 2.5 mg  2.5 mg Oral DAILY    heparin 25,000 units in dextrose 500 mL infusion  12-25 Units/kg/hr IntraVENous TITRATE           Assessment/Plan:     80-year-old male with hiatal hernia admitted for non-ST elevation myocardial infarction  1. Hypertension controlled  2. Non-ST elevated myocardial infarction currently on aspirin and statin and beta blocker heparin drip. Echocardiogram pending.             Mercedes Barbosa MD  7/14/2019 9:22 AM

## 2019-07-15 LAB
ANION GAP SERPL CALC-SCNC: 6 MMOL/L (ref 7–16)
APTT PPP: 158.5 SEC (ref 24.7–39.8)
BUN SERPL-MCNC: 17 MG/DL (ref 8–23)
CALCIUM SERPL-MCNC: 8 MG/DL (ref 8.3–10.4)
CHLORIDE SERPL-SCNC: 108 MMOL/L (ref 98–107)
CO2 SERPL-SCNC: 29 MMOL/L (ref 21–32)
CREAT SERPL-MCNC: 1.25 MG/DL (ref 0.8–1.5)
GLUCOSE SERPL-MCNC: 108 MG/DL (ref 65–100)
POTASSIUM SERPL-SCNC: 3.7 MMOL/L (ref 3.5–5.1)
SODIUM SERPL-SCNC: 143 MMOL/L (ref 136–145)

## 2019-07-15 PROCEDURE — 80048 BASIC METABOLIC PNL TOTAL CA: CPT

## 2019-07-15 PROCEDURE — 36415 COLL VENOUS BLD VENIPUNCTURE: CPT

## 2019-07-15 PROCEDURE — 4A023N7 MEASUREMENT OF CARDIAC SAMPLING AND PRESSURE, LEFT HEART, PERCUTANEOUS APPROACH: ICD-10-PCS | Performed by: INTERNAL MEDICINE

## 2019-07-15 PROCEDURE — 85730 THROMBOPLASTIN TIME PARTIAL: CPT

## 2019-07-15 PROCEDURE — 74011636320 HC RX REV CODE- 636/320: Performed by: INTERNAL MEDICINE

## 2019-07-15 PROCEDURE — B2111ZZ FLUOROSCOPY OF MULTIPLE CORONARY ARTERIES USING LOW OSMOLAR CONTRAST: ICD-10-PCS | Performed by: INTERNAL MEDICINE

## 2019-07-15 PROCEDURE — 99152 MOD SED SAME PHYS/QHP 5/>YRS: CPT

## 2019-07-15 PROCEDURE — 77030020263 HC SOL INJ SOD CL0.9% LFCR 1000ML

## 2019-07-15 PROCEDURE — 74011000250 HC RX REV CODE- 250: Performed by: INTERNAL MEDICINE

## 2019-07-15 PROCEDURE — 74011250636 HC RX REV CODE- 250/636: Performed by: INTERNAL MEDICINE

## 2019-07-15 PROCEDURE — 74011250636 HC RX REV CODE- 250/636

## 2019-07-15 PROCEDURE — 94760 N-INVAS EAR/PLS OXIMETRY 1: CPT

## 2019-07-15 PROCEDURE — 74011250637 HC RX REV CODE- 250/637: Performed by: PHYSICIAN ASSISTANT

## 2019-07-15 PROCEDURE — B2151ZZ FLUOROSCOPY OF LEFT HEART USING LOW OSMOLAR CONTRAST: ICD-10-PCS | Performed by: INTERNAL MEDICINE

## 2019-07-15 PROCEDURE — 93458 L HRT ARTERY/VENTRICLE ANGIO: CPT

## 2019-07-15 PROCEDURE — 65660000000 HC RM CCU STEPDOWN

## 2019-07-15 RX ORDER — SODIUM CHLORIDE 9 MG/ML
250 INJECTION, SOLUTION INTRAVENOUS CONTINUOUS
Status: DISPENSED | OUTPATIENT
Start: 2019-07-15 | End: 2019-07-15

## 2019-07-15 RX ORDER — LIDOCAINE HYDROCHLORIDE 10 MG/ML
1-20 INJECTION INFILTRATION; PERINEURAL ONCE
Status: COMPLETED | OUTPATIENT
Start: 2019-07-15 | End: 2019-07-15

## 2019-07-15 RX ORDER — HEPARIN SODIUM 200 [USP'U]/100ML
3 INJECTION, SOLUTION INTRAVENOUS CONTINUOUS
Status: DISCONTINUED | OUTPATIENT
Start: 2019-07-15 | End: 2019-07-18 | Stop reason: HOSPADM

## 2019-07-15 RX ORDER — MIDAZOLAM HYDROCHLORIDE 1 MG/ML
.5-5 INJECTION, SOLUTION INTRAMUSCULAR; INTRAVENOUS
Status: DISCONTINUED | OUTPATIENT
Start: 2019-07-15 | End: 2019-07-17 | Stop reason: SDUPTHER

## 2019-07-15 RX ORDER — FENTANYL CITRATE 50 UG/ML
25-100 INJECTION, SOLUTION INTRAMUSCULAR; INTRAVENOUS
Status: DISCONTINUED | OUTPATIENT
Start: 2019-07-15 | End: 2019-07-17 | Stop reason: SDUPTHER

## 2019-07-15 RX ORDER — SODIUM CHLORIDE 0.9 % (FLUSH) 0.9 %
5-40 SYRINGE (ML) INJECTION AS NEEDED
Status: DISCONTINUED | OUTPATIENT
Start: 2019-07-15 | End: 2019-07-18 | Stop reason: SDUPTHER

## 2019-07-15 RX ORDER — SODIUM CHLORIDE 0.9 % (FLUSH) 0.9 %
5-40 SYRINGE (ML) INJECTION EVERY 8 HOURS
Status: DISCONTINUED | OUTPATIENT
Start: 2019-07-15 | End: 2019-07-16

## 2019-07-15 RX ADMIN — SODIUM CHLORIDE 250 ML/HR: 900 INJECTION, SOLUTION INTRAVENOUS at 20:24

## 2019-07-15 RX ADMIN — FENTANYL CITRATE 50 MCG: 50 INJECTION, SOLUTION INTRAMUSCULAR; INTRAVENOUS at 13:27

## 2019-07-15 RX ADMIN — LIDOCAINE HYDROCHLORIDE 3 ML: 10 INJECTION, SOLUTION INFILTRATION; PERINEURAL at 13:28

## 2019-07-15 RX ADMIN — Medication 10 ML: at 05:16

## 2019-07-15 RX ADMIN — PANTOPRAZOLE SODIUM 40 MG: 40 TABLET, DELAYED RELEASE ORAL at 08:15

## 2019-07-15 RX ADMIN — Medication 5 ML: at 21:53

## 2019-07-15 RX ADMIN — METOPROLOL SUCCINATE 12.5 MG: 25 TABLET, EXTENDED RELEASE ORAL at 21:51

## 2019-07-15 RX ADMIN — LISINOPRIL 2.5 MG: 5 TABLET ORAL at 08:15

## 2019-07-15 RX ADMIN — HEPARIN SODIUM 2 ML: 10000 INJECTION, SOLUTION INTRAVENOUS; SUBCUTANEOUS at 13:41

## 2019-07-15 RX ADMIN — METOPROLOL SUCCINATE 12.5 MG: 25 TABLET, EXTENDED RELEASE ORAL at 08:15

## 2019-07-15 RX ADMIN — ASPIRIN 81 MG 81 MG: 81 TABLET ORAL at 08:15

## 2019-07-15 RX ADMIN — IOPAMIDOL 90 ML: 755 INJECTION, SOLUTION INTRAVENOUS at 13:52

## 2019-07-15 RX ADMIN — HEPARIN SODIUM 3 ML/HR: 200 INJECTION, SOLUTION INTRAVENOUS at 13:27

## 2019-07-15 RX ADMIN — ATORVASTATIN CALCIUM 20 MG: 10 TABLET, FILM COATED ORAL at 21:51

## 2019-07-15 RX ADMIN — MIDAZOLAM HYDROCHLORIDE 2 MG: 1 INJECTION, SOLUTION INTRAMUSCULAR; INTRAVENOUS at 13:27

## 2019-07-15 NOTE — PROGRESS NOTES
TRANSFER - IN REPORT:    Verbal report received from Unique Cohen RN on [address] being received from [address] [place] [address] for routine progression of care      Report consisted of patients Situation, Background, Assessment and Recommendations(SBAR). Information from the following report(s) SBAR, Kardex, Procedure Summary, Intake/Output, MAR, Recent Results, Med Rec Status and Cardiac Rhythm SR was reviewed with the receiving nurse. Opportunity for questions and clarification was provided. Assessment completed upon patients arrival to unit and care assumed.

## 2019-07-15 NOTE — PROGRESS NOTES
[facility] CARDIOLOGY PROGRESS NOTE           7/15/2019 7:42 AM    Admit Date: 7/13/2019      Subjective:   No CP or SOB, states needs to go home later today after cath bc needs to be in [facility] tomorrow for business, cath lab asked pt be put as priority per schedule but pt aware that schedule busy and dependent upon emergent cases. ROS:  Cardiovascular:  As noted above    Objective:      Vitals:    07/14/19 1637 07/14/19 2102 07/15/19 0103 07/15/19 0534   BP: 120/70 105/56 100/49 100/56   Pulse: 80 75 67 65   Resp: 16 16 16 16   Temp: 98.2 °F (36.8 °C) 98.2 °F (36.8 °C) 98 °F (36.7 °C) 97.9 °F (36.6 °C)   SpO2: 96% 97% 91% 94%   Weight:    66.5 kg (146 lb 11.2 oz)   Height:           Physical Exam:  General-No Acute Distress, RA   Neck- supple, no JVD  CV- regular rate and rhythm   Lung- clear bilaterally  Abd- soft, nontender, nondistended  Ext- no edema bilaterally. Skin- warm and dry    Data Review:   Recent Labs     07/15/19  0346 07/14/19  0355 07/13/19  2338  07/13/19  1341    144  --   --  145   K 3.7 3.6  --   --  3.8   BUN 17 12  --   --  14   CREA 1.25 1.12  --   --  1.21   * 102*  --   --  109*   WBC  --  6.7  --   --  7.8   HGB  --  11.6*  --   --  12.4*   HCT  --  35.0*  --   --  38.1*   PLT  --  223  --   --  252   TROIQ  --  0.50* 0.58*   < >  --    CHOL  --  152  --   --   --    LDLC  --  98.2  --   --   --    HDL  --  31*  --   --   --     < > = values in this interval not displayed. Echo:  -  Left ventricle: Systolic function was normal. Ejection fraction was  estimated in the range of 55 % to 60 %. There were no regional wall motion  abnormalities. -  Aortic valve: There was mild stenosis. Di: (0.57)    -  Mitral valve: There was mild annular calcification. There was mild  regurgitation. -  Tricuspid valve: There was mild regurgitation.     Assessment/Plan:   NSTEMI (non-ST elevated myocardial infarction) ([address] (7/13/2019)- [facility] today, echo w nml EF, trop max . 62 and decreased to .50, cont ASA, heparin, statin, ACE-I, BB    Miguel Baxter [place], [place]  7/15/2019 7:42 AM

## 2019-07-15 NOTE — PROCEDURES
[address] [facility]  CARDIAC CATH    Name:  Paula Maldonado  MR#:  [id number]  :  1931  ACCOUNT #:  [de-identified]  DATE OF SERVICE:  07/15/2019    PRIMARY CARDIOLOGIST:  None. PRIMARY CARE PHYSICIAN:  None. BRIEF HISTORY:  The patient is an 71-year-old gentleman with no prior significant past medical history who presented with non-ST-elevation myocardial infarction for cardiac catheterization. PREOPERATIVE DIAGNOSIS:  Non-ST-elevation myocardial infarction. POSTOPERATIVE DIAGNOSIS:  Severe three-vessel atherosclerotic coronary artery disease. PROCEDURES PERFORMED:  Bilateral selective coronary angiography and left ventriculography. SURGEON:  Jessica Hawley MD    ASSISTANT:  None. COMPLICATIONS:  None. ANESTHESIA:  Conscious sedation. Start time 22 [id number], end time 1351. MEDICATIONS:  2 mg Versed and 50 mcg fentanyl. MONITORING RN:  Estelle San    ESTIMATED BLOOD LOSS:  5cc    SPECIMENS REMOVED:  none    IMPLANTS:  none    PROCEDURE:  After informed consent, he was prepped and draped in usual sterile fashion. Right wrist was infiltrated with lidocaine. Right radial artery was accessed. A 6-Rwandan sheath was advanced. A 5-Rwandan Tiger catheter was utilized for left and right coronary artery injection, a 5-Rwandan angled pigtail for left ventriculography. Isovue contrast utilized. FINDINGS:  1. Left ventricle:  Normal left ventricular size. Normal left ventricular systolic function. Ejection fraction is 50% to 55%. There is no significant mitral regurgitation. No aortic valve gradient. Left ventricular end-diastolic pressure is measured at 16 mmHg. 2.  Left main:  Left main is large in size, has a 60% to 70% distal concentric stenosis, bifurcates into LAD and circumflex systems. 3.  Left anterior descending coronary artery: It is heavily calcified proximally. There is a large mid-vessel diagonal with a 70% to 80% stenosis just beyond that.   The LAD is diffusely diseased on the order of 70% to 90% then becomes moderately diffusely diseased in the apical portion on the order of 30% to 50%. 4.  Left circumflex coronary artery: It is a large dominant vessel. It has a 30% ostial stenosis. The mid left circumflex has an ulcerated plaque with associated 70% stenosis. Just prior to the first obtuse marginal, there is a concentric 50% stenosis. There is a 50% to 70% stenosis in the first obtuse marginal.  The ongoing left circumflex gives rise to a moderate-sized bifurcating second obtuse marginal with a 90% stenosis and a 95% to 99% stenosis in a small left posterior descending branch. 5.  Right coronary artery: It is a small nondominant vessel with a 99% proximal stenosis, fills by left-to-right collaterals and right-to-right collaterals. Successful hemostasis with pneumatic radial band. CONCLUSIONS:  1. Low-normal left ventricular systolic function. 2.  Complex three-vessel atherosclerotic coronary artery disease with associated bradycardia and mild to moderate hypotension upon engaging the left main. High-risk percutaneous coronary intervention is entertained. Would recommend use of Impella. Thank you for allowing us to participate in the care of this patient. For any questions or concerns, please feel free to contact me.       Jessica Gautam MD      [id number]  D:  07/15/2019 14:02  T:  07/15/2019 14:06  JOB #:  [id number]

## 2019-07-15 NOTE — PROGRESS NOTES
TRANSFER - OUT REPORT:    Verbal report given to keyana rn(name) on Alejandra Adjutant Sr  being transferred to cpru(unit) for routine progression of care       Report consisted of patients Situation, Background, Assessment and   Recommendations(SBAR). Information from the following report(s) SBAR was reviewed with the receiving nurse. Lines:   Peripheral IV 07/13/19 Right Antecubital (Active)   Site Assessment Clean, dry, & intact 7/15/2019 12:10 PM   Phlebitis Assessment 0 7/15/2019 12:10 PM   Infiltration Assessment 0 7/15/2019 12:10 PM   Dressing Status Clean, dry, & intact 7/15/2019 12:10 PM   Dressing Type Transparent;Tape 7/15/2019 12:10 PM   Hub Color/Line Status Flushed 7/15/2019  4:29 AM   Alcohol Cap Used No 7/15/2019  4:29 AM       Peripheral IV 07/13/19 Right Forearm (Active)   Site Assessment Clean, dry, & intact 7/15/2019 12:10 PM   Phlebitis Assessment 0 7/15/2019 12:10 PM   Infiltration Assessment 0 7/15/2019 12:10 PM   Dressing Status Clean, dry, & intact 7/15/2019 12:10 PM   Dressing Type Transparent;Tape 7/15/2019 12:10 PM   Hub Color/Line Status Infusing 7/15/2019  4:29 AM   Alcohol Cap Used No 7/15/2019  4:29 AM        Opportunity for questions and clarification was provided.       Patient transported with:   Registered Nurse   [facility] Dr Windy Maguire  cv consult  Right wrist tr band 11ml  No bleeding or hematoma  Versed 2mg  Fentanyl 50mcg

## 2019-07-15 NOTE — PROGRESS NOTES
Care Management Interventions  PCP Verified by CM: No(has gone to [place] clinic and provided with list of [facility] physicians for review)  Palliative Care Criteria Met (RRAT>21 & CHF Dx)?: No(RRAT 10 Dx NSTEMI )  Transition of Care Consult (CM Consult): Discharge Planning  Discharge Durable Medical Equipment: No(B/P cuff)  Physical Therapy Consult: No  Occupational Therapy Consult: No  Speech Therapy Consult: No  Current Support Network: Relative's Home(lives with his son )  Confirm Follow Up Transport: Self  Plan discussed with Pt/Family/Caregiver: Yes  Freedom of Choice Offered: Yes  Discharge Location  Discharge Placement: Home  Met with patient for d/c planning. Patient alert and oriented x 3, independent of ADL's and lives with his son in an apartment. He has B/P cuff but requires no DME for ambulation. He has needed transportation and is able to drive. He has [employer] and is a [place] [employer] and obtains medications through [place] or at [facility]. He states lost his insurance information and drivers license. CM notified Booker Prado from Patient Relations of information. He states does not have PCP and given copy of [facility] physicians and he also states has gone to [place] x 1. He states he is to have surgery for hernia soon. He voices no concerns for d/c and plans to return home with his son when medically stable. CM following.

## 2019-07-15 NOTE — PROGRESS NOTES
Bedside and Verbal shift change report given to self, RN (oncoming nurse) by Richa Caballero RN (offgoing nurse). Report included the following information SBAR, Kardex, Intake/Output, MAR and Recent Results. Right radial cath site visualized with oncoming RN. Dressing CDI.

## 2019-07-15 NOTE — CONSULTS
Hubbard Holter. MD Jody Ceron. MD Ellen Mariscal          7/13/2019 6/1/1931    REFERRING PHYSICIAN:  Dr. Vikki Mackay:  The patient is a 80 y.o. male who was admitted for NSTEMI (non-ST elevated myocardial infarction) ([facility] [I21.4]. He had some substernal chest pain that he attributed to hiatal hernia. He took 1 antacid and noted improvement of symptoms. On arrival to the ER, his troponin was elevated at 0.43. He was transferred to [facility]. He underwent cardiac catheterization today that showed severe multivessel disease including left main stenosis. He denies any episodes of chest pain prior to the one he had before presentation. He denies any dyspnea on exertion. He plays tennis routinely. ** No history of stroke, TIA, prior cardiac surgery, prior vascular surgery, anesthetic complication, lung disease, DVT or PE, GI bleeding   He had prior colon cancer and underwent partial colectomy. He states he recovered well from this. Past Medical History:   Diagnosis Date    Colon cancer ([facility] 01/2012    Hiatal hernia        No family history on file.     Social History     Socioeconomic History    Marital status:      Spouse name: Not on file    Number of children: Not on file    Years of education: Not on file    Highest education level: Not on file   Occupational History    Not on file   Social Needs    Financial resource strain: Not on file    Food insecurity:     Worry: Not on file     Inability: Not on file    Transportation needs:     Medical: Not on file     Non-medical: Not on file   Tobacco Use    Smoking status: Never Smoker    Smokeless tobacco: Never Used   Substance and Sexual Activity    Alcohol use: Never     Frequency: Never    Drug use: Never    Sexual activity: Not on file   Lifestyle    Physical activity:     Days per week: Not on file     Minutes per session: Not on file    Stress: Not on file Relationships    Social connections:     Talks on phone: Not on file     Gets together: Not on file     Attends Buddhist service: Not on file     Active member of club or organization: Not on file     Attends meetings of clubs or organizations: Not on file     Relationship status: Not on file    Intimate partner violence:     Fear of current or ex partner: Not on file     Emotionally abused: Not on file     Physically abused: Not on file     Forced sexual activity: Not on file   Other Topics Concern    Not on file   Social History Narrative    Not on file       No Known Allergies    No current facility-administered medications on file prior to encounter. No current outpatient medications on file prior to encounter. REVIEW OF SYSTEMS:  Review of Systems   Constitution: Negative for chills, fever, malaise/fatigue, weight gain and weight loss. HENT: Negative for ear pain, hearing loss, nosebleeds, sore throat and tinnitus. Eyes: Negative for blurred vision, vision loss in left eye and vision loss in right eye. Cardiovascular: Positive for chest pain. Negative for dyspnea on exertion, leg swelling, near-syncope, orthopnea, palpitations, paroxysmal nocturnal dyspnea and syncope. Respiratory: Negative for cough, hemoptysis, shortness of breath, sputum production and wheezing. Endocrine: Negative for cold intolerance, heat intolerance and polydipsia. Hematologic/Lymphatic: Does not bruise/bleed easily. Skin: Negative for color change and rash. Musculoskeletal: Negative for back pain, joint pain, joint swelling and myalgias. Gastrointestinal: Negative for abdominal pain, constipation, diarrhea, dysphagia, heartburn, hematemesis, melena, nausea and vomiting. Genitourinary: Negative for dysuria, frequency, hematuria and urgency. Neurological: Negative for difficulty with concentration, dizziness, headaches, light-headedness, numbness, paresthesias, seizures, vertigo and weakness. Psychiatric/Behavioral: Negative for altered mental status and depression. Physical Exam  Vitals:    07/15/19 1420 07/15/19 1430 07/15/19 1445 07/15/19 1500   BP: 120/65 113/57 111/59 130/58   Pulse: 74 65 61 (!) 56   Resp: 18      Temp:       SpO2: 95% 94% 92% 92%   Weight:       Height:           Physical Exam:  General: Well Developed, Well Nourished, No Acute Distress  HEENT: Normocephalic, pupils equal and round, no scleral icterus  Neck: supple, no JVD  Chest wall: No deformity  Heart: S1S2 with RRR without murmurs or gallops  Lungs: Clear throughout auscultation bilaterally without adventitious sounds  Vascular: Pulses are full and equal. There is no venous stasis disease. Abd: soft, nontender, nondistended, with good bowel sounds, no pulsatile masses  Ext: warm, no edema, calves supple/nontender, pulses 2+ bilaterally  Skin: warm and dry, no rashes, cyanosis, jaundice, ecchymoses or evidence of skin breakdown  Psychiatric: Normal mood and affect  Neurologic: Alert and oriented X 3, no focal deficit noted    Labs:   Recent Labs     07/15/19  0346 07/14/19  0355 07/13/19  2338  07/13/19  1347 07/13/19  1341    144  --   --   --  145   K 3.7 3.6  --   --   --  3.8   BUN 17 12  --   --   --  14   CREA 1.25 1.12  --   --   --  1.21   * 102*  --   --   --  109*   WBC  --  6.7  --   --   --  7.8   HGB  --  11.6*  --   --   --  12.4*   HCT  --  35.0*  --   --   --  38.1*   PLT  --  223  --   --   --  252   CHOL  --  152  --   --   --   --    HDL  --  31*  --   --   --   --    CHHD  --  4.9  --   --   --   --    LDLC  --  98.2  --   --   --   --    VLDL  --  22.8  --   --   --   --    TNIPOC  --   --   --   --  0.43*  --    TROIQ  --  0.50* 0.58*   < >  --   --     < > = values in this interval not displayed.        Lab Results   Component Value Date/Time    Cholesterol, total 152 07/14/2019 03:55 AM    HDL Cholesterol 31 (L) 07/14/2019 03:55 AM    LDL, calculated 98.2 07/14/2019 03:55 AM VLDL, calculated 22.8 07/14/2019 03:55 AM    Triglyceride 114 07/14/2019 03:55 AM    CHOL/HDL Ratio 4.9 07/14/2019 03:55 AM       Recent Labs     07/14/19  0355 07/13/19  2338 07/13/19  1820 07/13/19  1347   TNIPOC  --   --   --  0.43*   TROIQ 0.50* 0.58* 0.62*  --        Assessment:     Principal Problem:    NSTEMI (non-ST elevated myocardial infarction) ([facility] (7/13/2019)    CAD    History of colon cancer     Hiatal hernia    Plan:     Lokesh Quiles is to see preoperative teaching film that thoroughly discusses procedure, risks, and possible complications. Risks, benefits, and alternatives were discussed to include, but not limited to:     1. Bleeding  2. Arrhythmia   3. Infection including mediastinitis  4. Myocardial infarction  5. Need for reoperation  6. Renal failure  7. Respiratory failure  8. Stroke  9. Potential death    Dr. Viviana Cheney has personally viewed the cardiac catheterization films, echocardiogram, and labs. The mortality risk for CABG surgery is 2.02%. Pt would like to discuss with family and take more time to make a decision regarding surgery.        Piotr Rojas PA-C

## 2019-07-15 NOTE — PROGRESS NOTES
Problem: Falls - Risk of  Goal: *Absence of Falls  Description  Document Andrein Waiandrés Fall Risk and appropriate interventions in the flowsheet.   Outcome: Progressing Towards Goal  Note:   Fall Risk Interventions:  Mobility Interventions: Patient to call before getting OOB         Medication Interventions: Evaluate medications/consider consulting pharmacy, Patient to call before getting OOB    Elimination Interventions: Patient to call for help with toileting needs, Urinal in reach

## 2019-07-15 NOTE — PROGRESS NOTES
TRANSFER - IN REPORT:    Verbal report received from [facility] on [employer] Sr  being received from cath lab(unit) for routine progression of care      Report consisted of patients Situation, Background, Assessment and   Recommendations(SBAR). Information from the following report(s) Procedure Summary was reviewed with the receiving nurse. Opportunity for questions and clarification was provided. Assessment completed upon patients arrival to unit and care assumed.

## 2019-07-15 NOTE — PROGRESS NOTES
Bedside and Verbal shift change report given to self (oncoming nurse) by Sunil Bryan (offgoing nurse).  Report included the following information SBAR, Kardex, Intake/Output, Accordion, Recent Results, Med Rec Status and Cardiac Rhythm SR.

## 2019-07-15 NOTE — PROCEDURES
Brief Cardiac Procedure Note    Patient: Alba Marinelli MRN: [id number]  SSN: [id number]    YOB: 1931  Age: 80 y.o. Sex: male      Date of Procedure: 7/15/2019     Pre-procedure Diagnosis: NSTEMI    Post-procedure Diagnosis: Coronary Artery Disease    Procedure: Left Heart Catheterization    Brief Description of Procedure: See note    Performed By: Gordon Santos MD     Assistants: None    Anesthesia: Moderate Sedation    Estimated Blood Loss: Less than 10 mL      Specimens: None    Implants: None    Findings:   LV:  EF 50%  LM:  60% distal  LAD:  Diffuse 70% mid and distal, 90% large D1  LCx:  Dominant, 70% ulcerated plaque in mid, 80% OM1, 90% OM1, 99% (L)PDAS  RCA:  99% non-dominant     Complications: None    Recommendations: Continue medical therapy.     Signed By: Gordon Santos MD     July 15, 2019

## 2019-07-15 NOTE — PROGRESS NOTES
TRANSFER - OUT REPORT:    Verbal report given to [employer]) on [place] Agnieszka Sr  being transferred to tele(unit) for routine progression of care       Report consisted of patients Situation, Background, Assessment and   Recommendations(SBAR). Information from the following report(s) Procedure Summary was reviewed with the receiving nurse. Lines:   Peripheral IV 07/13/19 Right Antecubital (Active)   Site Assessment Clean, dry, & intact 7/15/2019 12:10 PM   Phlebitis Assessment 0 7/15/2019 12:10 PM   Infiltration Assessment 0 7/15/2019 12:10 PM   Dressing Status Clean, dry, & intact 7/15/2019 12:10 PM   Dressing Type Transparent;Tape 7/15/2019 12:10 PM   Hub Color/Line Status Flushed 7/15/2019  4:29 AM   Alcohol Cap Used No 7/15/2019  4:29 AM       Peripheral IV 07/13/19 Right Forearm (Active)   Site Assessment Clean, dry, & intact 7/15/2019 12:10 PM   Phlebitis Assessment 0 7/15/2019 12:10 PM   Infiltration Assessment 0 7/15/2019 12:10 PM   Dressing Status Clean, dry, & intact 7/15/2019 12:10 PM   Dressing Type Transparent;Tape 7/15/2019 12:10 PM   Hub Color/Line Status Infusing 7/15/2019  4:29 AM   Alcohol Cap Used No 7/15/2019  4:29 AM        Opportunity for questions and clarification was provided.       Patient transported with:   tech

## 2019-07-15 NOTE — PROGRESS NOTES
Verbal bedside report given to chu Ac RN. Patient's situation, background, assessment and recommendations provided. Opportunity for questions provided. Oncoming RN assumed care of patient. Heparin gtt at 14 units/kg/hr.

## 2019-07-16 LAB
ANION GAP SERPL CALC-SCNC: 5 MMOL/L (ref 7–16)
APTT PPP: 101.6 SEC (ref 24.7–39.8)
APTT PPP: 63.8 SEC (ref 24.7–39.8)
APTT PPP: 97.7 SEC (ref 24.7–39.8)
BUN SERPL-MCNC: 15 MG/DL (ref 8–23)
CALCIUM SERPL-MCNC: 8.5 MG/DL (ref 8.3–10.4)
CHLORIDE SERPL-SCNC: 110 MMOL/L (ref 98–107)
CO2 SERPL-SCNC: 28 MMOL/L (ref 21–32)
CREAT SERPL-MCNC: 1.17 MG/DL (ref 0.8–1.5)
GLUCOSE SERPL-MCNC: 102 MG/DL (ref 65–100)
POTASSIUM SERPL-SCNC: 3.8 MMOL/L (ref 3.5–5.1)
SODIUM SERPL-SCNC: 143 MMOL/L (ref 136–145)

## 2019-07-16 PROCEDURE — 77030020263 HC SOL INJ SOD CL0.9% LFCR 1000ML

## 2019-07-16 PROCEDURE — 74011250636 HC RX REV CODE- 250/636: Performed by: INTERNAL MEDICINE

## 2019-07-16 PROCEDURE — 74011250637 HC RX REV CODE- 250/637: Performed by: INTERNAL MEDICINE

## 2019-07-16 PROCEDURE — 36415 COLL VENOUS BLD VENIPUNCTURE: CPT

## 2019-07-16 PROCEDURE — 80048 BASIC METABOLIC PNL TOTAL CA: CPT

## 2019-07-16 PROCEDURE — 74011250637 HC RX REV CODE- 250/637: Performed by: PHYSICIAN ASSISTANT

## 2019-07-16 PROCEDURE — 74011250636 HC RX REV CODE- 250/636: Performed by: PHYSICIAN ASSISTANT

## 2019-07-16 PROCEDURE — 85730 THROMBOPLASTIN TIME PARTIAL: CPT

## 2019-07-16 PROCEDURE — 65660000000 HC RM CCU STEPDOWN

## 2019-07-16 RX ORDER — SODIUM CHLORIDE 9 MG/ML
50 INJECTION, SOLUTION INTRAVENOUS CONTINUOUS
Status: DISCONTINUED | OUTPATIENT
Start: 2019-07-16 | End: 2019-07-18 | Stop reason: HOSPADM

## 2019-07-16 RX ORDER — CLOPIDOGREL BISULFATE 75 MG/1
600 TABLET ORAL ONCE
Status: COMPLETED | OUTPATIENT
Start: 2019-07-16 | End: 2019-07-16

## 2019-07-16 RX ORDER — HEPARIN SODIUM 5000 [USP'U]/ML
35 INJECTION, SOLUTION INTRAVENOUS; SUBCUTANEOUS ONCE
Status: COMPLETED | OUTPATIENT
Start: 2019-07-16 | End: 2019-07-16

## 2019-07-16 RX ORDER — CLOPIDOGREL BISULFATE 75 MG/1
75 TABLET ORAL DAILY
Status: DISCONTINUED | OUTPATIENT
Start: 2019-07-17 | End: 2019-07-18 | Stop reason: HOSPADM

## 2019-07-16 RX ADMIN — Medication 5 ML: at 06:00

## 2019-07-16 RX ADMIN — METOPROLOL SUCCINATE 12.5 MG: 25 TABLET, EXTENDED RELEASE ORAL at 21:21

## 2019-07-16 RX ADMIN — METOPROLOL SUCCINATE 12.5 MG: 25 TABLET, EXTENDED RELEASE ORAL at 09:38

## 2019-07-16 RX ADMIN — ASPIRIN 81 MG 81 MG: 81 TABLET ORAL at 09:38

## 2019-07-16 RX ADMIN — CLOPIDOGREL BISULFATE 600 MG: 75 TABLET ORAL at 16:09

## 2019-07-16 RX ADMIN — ATORVASTATIN CALCIUM 20 MG: 10 TABLET, FILM COATED ORAL at 21:21

## 2019-07-16 RX ADMIN — PANTOPRAZOLE SODIUM 40 MG: 40 TABLET, DELAYED RELEASE ORAL at 09:38

## 2019-07-16 RX ADMIN — Medication 10 ML: at 14:28

## 2019-07-16 RX ADMIN — Medication 5 ML: at 21:23

## 2019-07-16 RX ADMIN — LISINOPRIL 2.5 MG: 5 TABLET ORAL at 09:39

## 2019-07-16 RX ADMIN — SODIUM CHLORIDE 50 ML/HR: 900 INJECTION, SOLUTION INTRAVENOUS at 16:09

## 2019-07-16 RX ADMIN — HEPARIN SODIUM 2350 UNITS: 5000 INJECTION, SOLUTION INTRAVENOUS; SUBCUTANEOUS at 10:28

## 2019-07-16 NOTE — PROGRESS NOTES
Bedside report completed and Iv checked completed and planned PTT at 0830. Will continue to monitor. Awaiting MD for plan of care.

## 2019-07-16 NOTE — PROGRESS NOTES
Bedside and Verbal shift change report given to self, RN (oncoming nurse) by Giorgio Rossi RN (offgoing nurse). Report included the following information SBAR, Kardex, Intake/Output, MAR and Recent Results. Family at bedside.  No needs voiced

## 2019-07-16 NOTE — PROGRESS NOTES
[facility] CARDIOLOGY PROGRESS NOTE           7/16/2019 2:20 PM    Admit Date: 7/13/2019      Subjective:   Patient denies CP. He has been seen by CTS and discussed risks. Patient is apprehensive about CABG. He wishes to consider PCI and medical therapy. ROS:  Cardiovascular:  As noted above    Objective:      Vitals:    07/16/19 0030 07/16/19 0442 07/16/19 0824 07/16/19 1245   BP: 107/65 104/57 112/65 129/72   Pulse: 65 66 73 79   Resp: 18 18 18 18   Temp: 98.6 °F (37 °C) 97.6 °F (36.4 °C) 98.4 °F (36.9 °C) 97.4 °F (36.3 °C)   SpO2: 94% 96% 94% 99%   Weight:  67.4 kg (148 lb 8 oz)     Height:           Physical Exam:  General-No Acute Distress  Neck- supple, no JVD  CV- regular rate and rhythm no MRG  Lung- clear bilaterally  Abd- soft, nontender, nondistended  Ext- no edema bilaterally. Skin- warm and dry    Data Review:   Recent Labs     07/16/19  0136 07/15/19  0346 07/14/19  0355  07/13/19  2338    143 144   < >  --    K 3.8 3.7 3.6   < >  --    BUN 15 17 12   < >  --    CREA 1.17 1.25 1.12   < >  --    * 108* 102*   < >  --    WBC  --   --  6.7  --   --    HGB  --   --  11.6*  --   --    HCT  --   --  35.0*  --   --    PLT  --   --  223  --   --    TROIQ  --   --  0.50*  --  0.58*   CHOL  --   --  152  --   --    LDLC  --   --  98.2  --   --    HDL  --   --  31*  --   --     < > = values in this interval not displayed. Assessment/Plan:     Principal Problem:    NSTEMI (non-ST elevated myocardial infarction) ([facility] (7/13/2019)    Will make NPO and plan PCI LM tomorrow. Will attempt medical therapy for diffuse pattern CAD on the LAD and Lcx. Will load with clopidogrel now. DC heparin. Plan RFA access and LFA acces in the event an impella is necessary. Patient is aware of risks and he is willing to proceed.         Sven Jordan MD  7/16/2019 2:20 PM

## 2019-07-16 NOTE — PROGRESS NOTES
Pt instructed to call for assistance . Reviewed diet limitations and family involved in conversations.

## 2019-07-16 NOTE — PROGRESS NOTES
Problem: Falls - Risk of  Goal: *Absence of Falls  Description  Document Kostas Prasad Fall Risk and appropriate interventions in the flowsheet.   Outcome: Progressing Towards Goal     Problem: Pain  Goal: *Control of Pain  Outcome: Progressing Towards Goal     Problem: Cath Lab Procedures: Post-Cath Day 1  Goal: Activity/Safety  Outcome: Progressing Towards Goal

## 2019-07-16 NOTE — PROGRESS NOTES
Bedside and Verbal shift change report given to Augustine Stephens RN (oncoming nurse) by self, RN (offgoing nurse). Report included the following information SBAR, Kardex, Intake/Output, MAR and Recent Results. Heparin gtt verified on MAR with oncoming RN. Right radial cath site visualized with oncoming RN. Dressing CDI.

## 2019-07-17 LAB
ANION GAP SERPL CALC-SCNC: 5 MMOL/L (ref 7–16)
ATRIAL RATE: 71 BPM
BASOPHILS # BLD: 0 K/UL (ref 0–0.2)
BASOPHILS NFR BLD: 1 % (ref 0–2)
BUN SERPL-MCNC: 13 MG/DL (ref 8–23)
CALCIUM SERPL-MCNC: 8.9 MG/DL (ref 8.3–10.4)
CALCULATED P AXIS, ECG09: 37 DEGREES
CALCULATED R AXIS, ECG10: 35 DEGREES
CALCULATED T AXIS, ECG11: 49 DEGREES
CHLORIDE SERPL-SCNC: 110 MMOL/L (ref 98–107)
CO2 SERPL-SCNC: 29 MMOL/L (ref 21–32)
CREAT SERPL-MCNC: 1.25 MG/DL (ref 0.8–1.5)
DIAGNOSIS, 93000: NORMAL
DIFFERENTIAL METHOD BLD: ABNORMAL
EOSINOPHIL # BLD: 0.2 K/UL (ref 0–0.8)
EOSINOPHIL NFR BLD: 3 % (ref 0.5–7.8)
ERYTHROCYTE [DISTWIDTH] IN BLOOD BY AUTOMATED COUNT: 14.2 % (ref 11.9–14.6)
GLUCOSE SERPL-MCNC: 106 MG/DL (ref 65–100)
HCT VFR BLD AUTO: 33.4 % (ref 41.1–50.3)
HGB BLD-MCNC: 11 G/DL (ref 13.6–17.2)
IMM GRANULOCYTES # BLD AUTO: 0.2 K/UL (ref 0–0.5)
IMM GRANULOCYTES NFR BLD AUTO: 2 % (ref 0–5)
LYMPHOCYTES # BLD: 1.6 K/UL (ref 0.5–4.6)
LYMPHOCYTES NFR BLD: 20 % (ref 13–44)
MCH RBC QN AUTO: 33 PG (ref 26.1–32.9)
MCHC RBC AUTO-ENTMCNC: 32.9 G/DL (ref 31.4–35)
MCV RBC AUTO: 100.3 FL (ref 79.6–97.8)
MONOCYTES # BLD: 0.7 K/UL (ref 0.1–1.3)
MONOCYTES NFR BLD: 9 % (ref 4–12)
NEUTS SEG # BLD: 5.1 K/UL (ref 1.7–8.2)
NEUTS SEG NFR BLD: 65 % (ref 43–78)
NRBC # BLD: 0.02 K/UL (ref 0–0.2)
P-R INTERVAL, ECG05: 142 MS
PLATELET # BLD AUTO: 233 K/UL (ref 150–450)
PMV BLD AUTO: 9.6 FL (ref 9.4–12.3)
POTASSIUM SERPL-SCNC: 4.2 MMOL/L (ref 3.5–5.1)
Q-T INTERVAL, ECG07: 378 MS
QRS DURATION, ECG06: 76 MS
QTC CALCULATION (BEZET), ECG08: 410 MS
RBC # BLD AUTO: 3.33 M/UL (ref 4.23–5.6)
SODIUM SERPL-SCNC: 144 MMOL/L (ref 136–145)
VENTRICULAR RATE, ECG03: 71 BPM
WBC # BLD AUTO: 7.8 K/UL (ref 4.3–11.1)

## 2019-07-17 PROCEDURE — 74011250637 HC RX REV CODE- 250/637: Performed by: INTERNAL MEDICINE

## 2019-07-17 PROCEDURE — 74011000250 HC RX REV CODE- 250: Performed by: INTERNAL MEDICINE

## 2019-07-17 PROCEDURE — C1769 GUIDE WIRE: HCPCS

## 2019-07-17 PROCEDURE — B240ZZ3 ULTRASONOGRAPHY OF SINGLE CORONARY ARTERY, INTRAVASCULAR: ICD-10-PCS | Performed by: INTERNAL MEDICINE

## 2019-07-17 PROCEDURE — C1894 INTRO/SHEATH, NON-LASER: HCPCS

## 2019-07-17 PROCEDURE — 93005 ELECTROCARDIOGRAM TRACING: CPT | Performed by: INTERNAL MEDICINE

## 2019-07-17 PROCEDURE — 74011250636 HC RX REV CODE- 250/636

## 2019-07-17 PROCEDURE — 74011250637 HC RX REV CODE- 250/637: Performed by: PHYSICIAN ASSISTANT

## 2019-07-17 PROCEDURE — C1725 CATH, TRANSLUMIN NON-LASER: HCPCS

## 2019-07-17 PROCEDURE — C1753 CATH, INTRAVAS ULTRASOUND: HCPCS

## 2019-07-17 PROCEDURE — 92928 PRQ TCAT PLMT NTRAC ST 1 LES: CPT

## 2019-07-17 PROCEDURE — 77030004559 HC CATH ANGI DX SUPT CARD -B

## 2019-07-17 PROCEDURE — 74011000258 HC RX REV CODE- 258: Performed by: INTERNAL MEDICINE

## 2019-07-17 PROCEDURE — 74011250636 HC RX REV CODE- 250/636: Performed by: INTERNAL MEDICINE

## 2019-07-17 PROCEDURE — 80048 BASIC METABOLIC PNL TOTAL CA: CPT

## 2019-07-17 PROCEDURE — 99152 MOD SED SAME PHYS/QHP 5/>YRS: CPT

## 2019-07-17 PROCEDURE — 92920 PRQ TRLUML C ANGIOP 1ART&/BR: CPT

## 2019-07-17 PROCEDURE — C1874 STENT, COATED/COV W/DEL SYS: HCPCS

## 2019-07-17 PROCEDURE — 65660000000 HC RM CCU STEPDOWN

## 2019-07-17 PROCEDURE — 99153 MOD SED SAME PHYS/QHP EA: CPT

## 2019-07-17 PROCEDURE — 77030013687 HC GD NDL BARD -B

## 2019-07-17 PROCEDURE — 36415 COLL VENOUS BLD VENIPUNCTURE: CPT

## 2019-07-17 PROCEDURE — 027137Z DILATION OF CORONARY ARTERY, TWO ARTERIES WITH FOUR OR MORE DRUG-ELUTING INTRALUMINAL DEVICES, PERCUTANEOUS APPROACH: ICD-10-PCS | Performed by: INTERNAL MEDICINE

## 2019-07-17 PROCEDURE — 74011636320 HC RX REV CODE- 636/320: Performed by: INTERNAL MEDICINE

## 2019-07-17 PROCEDURE — C1887 CATHETER, GUIDING: HCPCS

## 2019-07-17 PROCEDURE — 92978 ENDOLUMINL IVUS OCT C 1ST: CPT

## 2019-07-17 PROCEDURE — 77030012468 HC VLV BLEEDBK CNTRL ABBT -B

## 2019-07-17 PROCEDURE — C1760 CLOSURE DEV, VASC: HCPCS

## 2019-07-17 PROCEDURE — 85025 COMPLETE CBC W/AUTO DIFF WBC: CPT

## 2019-07-17 RX ORDER — SODIUM CHLORIDE 9 MG/ML
75 INJECTION, SOLUTION INTRAVENOUS CONTINUOUS
Status: DISCONTINUED | OUTPATIENT
Start: 2019-07-17 | End: 2019-07-18 | Stop reason: HOSPADM

## 2019-07-17 RX ORDER — CLOPIDOGREL BISULFATE 75 MG/1
300 TABLET ORAL ONCE
Status: DISCONTINUED | OUTPATIENT
Start: 2019-07-17 | End: 2019-07-17

## 2019-07-17 RX ORDER — MIDAZOLAM HYDROCHLORIDE 1 MG/ML
.5-2 INJECTION, SOLUTION INTRAMUSCULAR; INTRAVENOUS
Status: DISCONTINUED | OUTPATIENT
Start: 2019-07-17 | End: 2019-07-18 | Stop reason: HOSPADM

## 2019-07-17 RX ORDER — FENTANYL CITRATE 50 UG/ML
25-100 INJECTION, SOLUTION INTRAMUSCULAR; INTRAVENOUS
Status: DISCONTINUED | OUTPATIENT
Start: 2019-07-17 | End: 2019-07-18 | Stop reason: HOSPADM

## 2019-07-17 RX ORDER — LORAZEPAM 1 MG/1
1 TABLET ORAL
Status: DISCONTINUED | OUTPATIENT
Start: 2019-07-17 | End: 2019-07-18 | Stop reason: HOSPADM

## 2019-07-17 RX ORDER — HEPARIN SODIUM 200 [USP'U]/100ML
2 INJECTION, SOLUTION INTRAVENOUS CONTINUOUS
Status: ACTIVE | OUTPATIENT
Start: 2019-07-17 | End: 2019-07-17

## 2019-07-17 RX ORDER — BIVALIRUDIN 250 MG/5ML
0.75 INJECTION, POWDER, LYOPHILIZED, FOR SOLUTION INTRAVENOUS ONCE
Status: COMPLETED | OUTPATIENT
Start: 2019-07-17 | End: 2019-07-17

## 2019-07-17 RX ORDER — ACETAMINOPHEN 325 MG/1
650 TABLET ORAL
Status: DISCONTINUED | OUTPATIENT
Start: 2019-07-17 | End: 2019-07-18 | Stop reason: HOSPADM

## 2019-07-17 RX ORDER — SODIUM CHLORIDE 0.9 % (FLUSH) 0.9 %
5-40 SYRINGE (ML) INJECTION EVERY 8 HOURS
Status: DISCONTINUED | OUTPATIENT
Start: 2019-07-17 | End: 2019-07-18 | Stop reason: HOSPADM

## 2019-07-17 RX ORDER — SODIUM CHLORIDE 0.9 % (FLUSH) 0.9 %
5-40 SYRINGE (ML) INJECTION AS NEEDED
Status: DISCONTINUED | OUTPATIENT
Start: 2019-07-17 | End: 2019-07-18 | Stop reason: HOSPADM

## 2019-07-17 RX ORDER — MAG HYDROX/ALUMINUM HYD/SIMETH 200-200-20
30 SUSPENSION, ORAL (FINAL DOSE FORM) ORAL AS NEEDED
Status: DISCONTINUED | OUTPATIENT
Start: 2019-07-17 | End: 2019-07-18 | Stop reason: HOSPADM

## 2019-07-17 RX ORDER — LIDOCAINE HYDROCHLORIDE 10 MG/ML
2-20 INJECTION INFILTRATION; PERINEURAL
Status: DISCONTINUED | OUTPATIENT
Start: 2019-07-17 | End: 2019-07-18 | Stop reason: HOSPADM

## 2019-07-17 RX ADMIN — LISINOPRIL 2.5 MG: 5 TABLET ORAL at 08:46

## 2019-07-17 RX ADMIN — METOPROLOL SUCCINATE 12.5 MG: 25 TABLET, EXTENDED RELEASE ORAL at 21:26

## 2019-07-17 RX ADMIN — PANTOPRAZOLE SODIUM 40 MG: 40 TABLET, DELAYED RELEASE ORAL at 08:46

## 2019-07-17 RX ADMIN — CLOPIDOGREL BISULFATE 75 MG: 75 TABLET ORAL at 08:46

## 2019-07-17 RX ADMIN — LIDOCAINE HYDROCHLORIDE 5 ML: 10 INJECTION, SOLUTION INFILTRATION; PERINEURAL at 11:44

## 2019-07-17 RX ADMIN — IOPAMIDOL 160 ML: 755 INJECTION, SOLUTION INTRAVENOUS at 12:31

## 2019-07-17 RX ADMIN — Medication 5 ML: at 05:44

## 2019-07-17 RX ADMIN — METOPROLOL SUCCINATE 12.5 MG: 25 TABLET, EXTENDED RELEASE ORAL at 08:46

## 2019-07-17 RX ADMIN — Medication 5 ML: at 21:29

## 2019-07-17 RX ADMIN — BIVALIRUDIN 1.75 MG/KG/HR: 250 INJECTION, POWDER, LYOPHILIZED, FOR SOLUTION INTRAVENOUS at 11:49

## 2019-07-17 RX ADMIN — LIDOCAINE HYDROCHLORIDE 4 ML: 10 INJECTION, SOLUTION INFILTRATION; PERINEURAL at 11:47

## 2019-07-17 RX ADMIN — NITROGLYCERIN 0.1 MG: 200 INJECTION, SOLUTION INTRAVENOUS at 12:25

## 2019-07-17 RX ADMIN — HEPARIN SODIUM 2 ML/HR: 200 INJECTION, SOLUTION INTRAVENOUS at 11:40

## 2019-07-17 RX ADMIN — FENTANYL CITRATE 50 MCG: 50 INJECTION, SOLUTION INTRAMUSCULAR; INTRAVENOUS at 11:43

## 2019-07-17 RX ADMIN — BIVALIRUDIN 49 MG: 250 INJECTION, POWDER, LYOPHILIZED, FOR SOLUTION INTRAVENOUS at 11:49

## 2019-07-17 RX ADMIN — MIDAZOLAM HYDROCHLORIDE 2 MG: 1 INJECTION, SOLUTION INTRAMUSCULAR; INTRAVENOUS at 11:43

## 2019-07-17 RX ADMIN — ASPIRIN 81 MG 81 MG: 81 TABLET ORAL at 08:46

## 2019-07-17 RX ADMIN — Medication 10 ML: at 14:03

## 2019-07-17 RX ADMIN — ATORVASTATIN CALCIUM 20 MG: 10 TABLET, FILM COATED ORAL at 21:25

## 2019-07-17 NOTE — PROGRESS NOTES
TRANSFER - OUT REPORT:    Verbal report given to Belinda BERMUDEZ(name) on Denese Lesch Sr  being transferred to [facility](unit) for routine progression of care       Report consisted of patients Situation, Background, Assessment and   Recommendations(SBAR). Information from the following report(s) Procedure Summary was reviewed with the receiving nurse. Lines:   Peripheral IV 07/13/19 Right Antecubital (Active)   Site Assessment Clean, dry, & intact 7/17/2019  8:46 AM   Phlebitis Assessment 0 7/17/2019  8:46 AM   Infiltration Assessment 0 7/17/2019  8:46 AM   Dressing Status Clean, dry, & intact 7/17/2019  8:46 AM   Dressing Type Tape;Transparent 7/17/2019  8:46 AM   Hub Color/Line Status Patent 7/17/2019  8:46 AM   Alcohol Cap Used No 7/15/2019  4:29 AM       Peripheral IV 07/17/19 Left;Distal;Lower Forearm (Active)   Site Assessment Clean, dry, & intact 7/17/2019  8:46 AM   Phlebitis Assessment 0 7/17/2019  8:46 AM   Infiltration Assessment 0 7/17/2019  8:46 AM   Dressing Status Clean, dry, & intact 7/17/2019  8:46 AM   Dressing Type Tape;Transparent 7/17/2019  8:46 AM   Hub Color/Line Status Patent 7/17/2019  8:46 AM        Opportunity for questions and clarification was provided.       Patient transported with:   Registered Nurse

## 2019-07-17 NOTE — PROGRESS NOTES
TRANSFER - OUT REPORT:    Verbal report given to keyana rn(name) on Corina Ruiz  being transferred to [facility](unit) for routine progression of care       Report consisted of patients Situation, Background, Assessment and   Recommendations(SBAR). Information from the following report(s) Procedure Summary, MAR and Cardiac Rhythm NSR was reviewed with the receiving nurse. Lines:   Peripheral IV 07/13/19 Right Antecubital (Active)   Site Assessment Clean, dry, & intact 7/17/2019  8:46 AM   Phlebitis Assessment 0 7/17/2019  8:46 AM   Infiltration Assessment 0 7/17/2019  8:46 AM   Dressing Status Clean, dry, & intact 7/17/2019  8:46 AM   Dressing Type Tape;Transparent 7/17/2019  8:46 AM   Hub Color/Line Status Patent 7/17/2019  8:46 AM   Alcohol Cap Used No 7/15/2019  4:29 AM       Peripheral IV 07/17/19 Left;Distal;Lower Forearm (Active)   Site Assessment Clean, dry, & intact 7/17/2019  8:46 AM   Phlebitis Assessment 0 7/17/2019  8:46 AM   Infiltration Assessment 0 7/17/2019  8:46 AM   Dressing Status Clean, dry, & intact 7/17/2019  8:46 AM   Dressing Type Tape;Transparent 7/17/2019  8:46 AM   Hub Color/Line Status Patent 7/17/2019  8:46 AM        Opportunity for questions and clarification was provided.       Patient transported with:   Registered Nurse    [facility] PCI Dr Gloria Hassan  IVUS/POBA/Stent x1 L Main  Stent x3 LAD  Right femoral access - 7 fr sheath -8 fr angioseal placed - site C/D/I  Left femoral access - 6 fr sheath - 6 fr angioseal placed - site C/D/I  Angiomax on @ [phone]   Angiomax off @ [phone]

## 2019-07-17 NOTE — PROGRESS NOTES
TRANSFER - IN REPORT:    Verbal report received from Rashida Goins RN(name) on [employer]  being received from cath lab(unit) for routine progression of care      Report consisted of patients Situation, Background, Assessment and   Recommendations(SBAR). Information from the following report(s) Procedure Summary was reviewed with the receiving nurse. Opportunity for questions and clarification was provided. Assessment completed upon patients arrival to unit and care assumed.

## 2019-07-17 NOTE — PROCEDURES
[facility]  CARDIAC CATH    Name:  Stefanie Hernandez  MR#:  [id number]  :  1931  ACCOUNT #:  [de-identified]  DATE OF SERVICE:  2019    PRIMARY CARDIOLOGIST:  None. PRIMARY CARE PHYSICIAN:  None. BRIEF HISTORY:  The patient is an 80-year-old gentleman who was admitted with non-ST-elevation myocardial infarction, found to have severe distal left main diffuse pattern LAD disease, focal disease within the small second obtuse marginal, left dominant system demonstrating also a small subtotally occluded distal left PDA and a subtotally occluded small nondominant right coronary artery. He was evaluated for surgical revascularization and was felt to have poor targets due to severe diffuse LAD disease as well as small vessel circ disease and is now referred for high-risk PCI and stenting of the left main and consideration of PCI of the LAD. PREOPERATIVE DIAGNOSIS:  Non-ST-elevation myocardial infarction. POSTOPERATIVE DIAGNOSIS:  Non-ST-elevation myocardial infarction. PROCEDURES PERFORMED:  1. IVUS-guided PCI and stenting of left main. 2.  PCI and stenting of LAD. SURGEON:  Emily Galindo MD    ASSISTANT:  None. ANESTHESIA:  Conscious sedation. Start time 11:40, end time 12:40. MEDICATIONS:  2 mg of Versed and 50 mcg of fentanyl. MONITORING RN:  Jeanie Zaragoza    SPECIMENS REMOVED:  None. IMPLANTS:  1. Left main:  A 3.5 x 18 [place] drug-eluting stent post dilated to 4.0 mm.  2.  LAD:  A 2.25 x 38, a 2.25 x 38, a 2.75 x 18 [place] drug-eluting stents. COMPLICATIONS:  None. ESTIMATED BLOOD LOSS:  Less than 10 mL. PROCEDURE:  After informed consent, he was prepped and draped in usual sterile fashion. The right groin was infiltrated with lidocaine. The right femoral artery was accessed under ultrasound guidance and a 7-Romanian sheath was advanced.   Utilizing a 6-Romanian LIMA catheter, a left iliofemoral runoff was performed, and under direct angiographic imaging, the left femoral artery was accessed and a 6-Hungarian sheath was placed. This was placed in the event the patient developed any hemodynamic instability, we would proceed with left ventricular assist device implantation via the left femoral artery. Imaging demonstrated a large iliofemoral system adequate for Impella implantation. Following completion of access, a 7-Hungarian XB3.5 guide was utilized for intervention of the LAD and left main. CONTRAST:  Isovue. PERCUTANEOUS CORONARY INTERVENTION:  Lesion #1:  Left main. Pre-stenosis 80%, post stenosis 0%. DETAILS:  The patient was anticoagulated with Angiomax. A 7-Hungarian XB3.5 guide was utilized. This was associated with extensive dampening upon engagement of the left main and essentially no flow around the 7-Hungarian guide. A [place] wire was advanced down the LAD and a Runthrough wire into the left circumflex. Following this, the guide was backed out and IVUS was performed to assess size and degree of left main severity given the unusual nature angiographically. IVUS was performed and this demonstrated minimal luminal area of 5.0 mm2 suggesting clinically significant left main disease and we proceeded with intervention. On completion of IVUS, a 3.5 x 18 [place] drug-eluting stent was positioned from the proximal LAD into the left main extending near the ostium. This was inflated up to 16 atmospheres. This was post dilated with a 4.0 x 12 noncompliant Euphora balloon. Following this, IVUS was performed demonstrating excellent stent apposition throughout the LAD and left main. There was stenosis and significant compromise of the left circumflex and the Runthrough wire was then withdrawn. Repeat imaging demonstrates excellent stent apposition. IVUS confirms stent expansion and no compromise of the left circumflex system. Lesion #2:  LAD. Pre-stenosis 95%, post stenosis 0%.     DETAILS:  The patient had been anticoagulated with Angiomax. Utilizing the same guide and the Prowater wire, imaging was performed demonstrating severe diffuse mid, distal, and apical LAD disease. There appeared to be an area of normalcy in the transition from distal to apical and a 2.25 x 38 [place] drug-eluting stent was positioned from this point extending proximally followed by an additional 2.25 x 38 and a 2.75 x 18 [place] drug-eluting stents. This extended all the way back to the mid LAD just beyond the major diagonal.  The distal portions were post dilated with a 2.25, the mid portions with a 2.5, and the proximal portions with a 2.75. This yielded excellent angiographic result. The wire was removed and orthogonal views were obtained. Successful hemostasis of the 6-South African left femoral access with Angio-Seal closure device. Successful hemostasis of the 7-South African right femoral access with an 8-South African Angio-Seal closure device. CONCLUSIONS:  1. Successful PCI and stenting utilizing IVUS guidance to left main as described above. 2.  Extensive reconstruction of almost the entire LAD utilizing Simón drug-eluting stent x3 as described above. Thank you for allowing us to participate in the care of this patient. For any questions or concerns, please feel free to contact me.       MD MATHIEU [email or web address]  D:  07/17/2019 12:47  T:  07/17/2019 12:54  JOB #:  [id number]

## 2019-07-17 NOTE — PROGRESS NOTES
[facility] CARDIOLOGY PROGRESS NOTE           7/17/2019   Admit Date: 7/13/2019      Subjective:   Patient denies CP. He has been seen by CTS and discussed risks. Patient decided against CABG. He wishes to proceed with PCI and medical therapy. ROS:  Cardiovascular:  As noted above    Objective:      Vitals:    07/16/19 1632 07/16/19 2108 07/17/19 0031 07/17/19 0515   BP: 108/51 103/60 90/53 104/62   Pulse: 70 78 67 72   Resp: 18 18 18 18   Temp: 98 °F (36.7 °C) 98.2 °F (36.8 °C) 98.6 °F (37 °C) 98.4 °F (36.9 °C)   SpO2:  98% 97% 97%   Weight:    65.2 kg (143 lb 12.8 oz)   Height:           Physical Exam:  General-No Acute Distress  Neck- supple, no JVD  CV- regular rate and rhythm no MRG  Lung- clear bilaterally  Abd- soft, nontender, nondistended  Ext- no edema bilaterally. Skin- warm and dry    Data Review:   Recent Labs     07/17/19  0419 07/16/19  0136    143   K 4.2 3.8   BUN 13 15   CREA 1.25 1.17   * 102*   WBC 7.8  --    HGB 11.0*  --    HCT 33.4*  --      --        Assessment/Plan:     Principal Problem:    NSTEMI (non-ST elevated myocardial infarction) ([facility] (7/13/2019)  Plan PCI LM today and attempt medical therapy for diffuse pattern CAD on the LAD and Lcx. Plan RFA access and LFA acces in the event an impella is necessary. Patient is aware of risks and he is willing to proceed.         Sarina Wright MD  7/17/2019

## 2019-07-17 NOTE — PROGRESS NOTES
Problem: Falls - Risk of  Goal: *Absence of Falls  Description  Document [place]man Fall Risk and appropriate interventions in the flowsheet.   Outcome: Progressing Towards Goal     Problem: Pain  Goal: *Control of Pain  Outcome: Progressing Towards Goal     Problem: Cath Lab Procedures: Discharge Outcomes  Goal: *Stable cardiac rhythm  Outcome: Progressing Towards Goal

## 2019-07-17 NOTE — PROCEDURES
Brief Cardiac Procedure Note    Patient: Bowen Harrison "Dixie" [address] MRN: [id number]  SSN: [id number]    YOB: 1931  Age: 80 y.o. Sex: male      Date of Procedure: 7/17/2019     Pre-procedure Diagnosis: NSTEMI    Post-procedure Diagnosis: Coronary Artery Disease    Procedure: Left Heart Catheterization with Percutaneous Coronary Intervention    Brief Description of Procedure: See note    Performed By: Sarina Wright MD     Assistants: None    Anesthesia: Moderate Sedation    Estimated Blood Loss: Less than 10 mL      Specimens: None    Implants: None    Findings:   PCI LM 80-0%   IVUS - MLA 5.0mm2   3.5x18 [place]   4.0x12 NC Euphora   IVUS    PCI m/d LAD 95-0%   2.25x38 Simón   2.25x38 Simón   2.75x18 [place]   2.5x30 NC Euphora    Complications: None    Recommendations: Continue medical therapy.     Signed By: Sarina Wright MD     July 17, 2019

## 2019-07-17 NOTE — PROGRESS NOTES
TRANSFER - OUT REPORT:    Verbal report given to cath lab, RN on [address] "Dixie[address]  being transferred to cath lab for ordered procedure       Report consisted of patients Situation, Background, Assessment and Recommendations(SBAR). Information from the following report(s) SBAR, Kardex, Intake/Output, MAR and Recent Results was reviewed with the receiving nurse. Opportunity for questions and clarification was provided.

## 2019-07-17 NOTE — PROGRESS NOTES
Heart catheretization consent form signed by patient and placed in paper chart. 2nd Iv access obtained. Patient has been NPO since midnight. Patient voices understanding of NPO for ordered procedure today. Will continue to monitor.

## 2019-07-17 NOTE — PROGRESS NOTES
TRANSFER - IN REPORT:    Verbal report received from Hafsa Mcclain RN on [address] "Dixie[address] being received from cath lab for routine post - op      Report consisted of patients Situation, Background, Assessment and Recommendations(SBAR). Information from the following report(s) SBAR, Kardex, Procedure Summary, Intake/Output, MAR and Recent Results was reviewed with the receiving nurse. Opportunity for questions and clarification was provided. Assessment completed upon patients arrival to unit and care assumed. Bilateral groin sites assessed. Patient placed on tele and eagle monitor, blood pressure cycling every 15 minutes. Educated on bedrest and limited movement on legs, patient voiced understanding. Bed low and locked, call light within reach, son at bedside. With exception on bilateral groin sites skin integrity remains unchanged.

## 2019-07-17 NOTE — PROGRESS NOTES
Bedside and Verbal shift change report given to Shady Dominguez RN (oncoming nurse) by self, RN (offgoing nurse). Report included the following information SBAR, Kardex, Intake/Output, MAR and Recent Results.

## 2019-07-18 VITALS
HEIGHT: 71 IN | BODY MASS INDEX: 20.2 KG/M2 | RESPIRATION RATE: 17 BRPM | OXYGEN SATURATION: 94 % | TEMPERATURE: 97.7 F | HEART RATE: 71 BPM | SYSTOLIC BLOOD PRESSURE: 118 MMHG | DIASTOLIC BLOOD PRESSURE: 66 MMHG | WEIGHT: 144.3 LBS

## 2019-07-18 PROBLEM — I25.10 CAD (CORONARY ARTERY DISEASE): Status: ACTIVE | Noted: 2019-07-18

## 2019-07-18 LAB
ANION GAP SERPL CALC-SCNC: 8 MMOL/L (ref 7–16)
ATRIAL RATE: 69 BPM
BASOPHILS # BLD: 0 K/UL (ref 0–0.2)
BASOPHILS NFR BLD: 1 % (ref 0–2)
BUN SERPL-MCNC: 12 MG/DL (ref 8–23)
CALCIUM SERPL-MCNC: 8.8 MG/DL (ref 8.3–10.4)
CALCULATED P AXIS, ECG09: 68 DEGREES
CALCULATED R AXIS, ECG10: 64 DEGREES
CALCULATED T AXIS, ECG11: 64 DEGREES
CHLORIDE SERPL-SCNC: 109 MMOL/L (ref 98–107)
CO2 SERPL-SCNC: 25 MMOL/L (ref 21–32)
CREAT SERPL-MCNC: 1.12 MG/DL (ref 0.8–1.5)
DIAGNOSIS, 93000: NORMAL
DIFFERENTIAL METHOD BLD: ABNORMAL
EOSINOPHIL # BLD: 0.2 K/UL (ref 0–0.8)
EOSINOPHIL NFR BLD: 3 % (ref 0.5–7.8)
ERYTHROCYTE [DISTWIDTH] IN BLOOD BY AUTOMATED COUNT: 14.4 % (ref 11.9–14.6)
GLUCOSE SERPL-MCNC: 101 MG/DL (ref 65–100)
HCT VFR BLD AUTO: 33.8 % (ref 41.1–50.3)
HGB BLD-MCNC: 11.2 G/DL (ref 13.6–17.2)
IMM GRANULOCYTES # BLD AUTO: 0.2 K/UL (ref 0–0.5)
IMM GRANULOCYTES NFR BLD AUTO: 2 % (ref 0–5)
LYMPHOCYTES # BLD: 1.5 K/UL (ref 0.5–4.6)
LYMPHOCYTES NFR BLD: 19 % (ref 13–44)
MCH RBC QN AUTO: 33.3 PG (ref 26.1–32.9)
MCHC RBC AUTO-ENTMCNC: 33.1 G/DL (ref 31.4–35)
MCV RBC AUTO: 100.6 FL (ref 79.6–97.8)
MONOCYTES # BLD: 0.6 K/UL (ref 0.1–1.3)
MONOCYTES NFR BLD: 8 % (ref 4–12)
NEUTS SEG # BLD: 5.4 K/UL (ref 1.7–8.2)
NEUTS SEG NFR BLD: 68 % (ref 43–78)
NRBC # BLD: 0 K/UL (ref 0–0.2)
P-R INTERVAL, ECG05: 150 MS
PLATELET # BLD AUTO: 245 K/UL (ref 150–450)
PMV BLD AUTO: 10.5 FL (ref 9.4–12.3)
POTASSIUM SERPL-SCNC: 3.7 MMOL/L (ref 3.5–5.1)
Q-T INTERVAL, ECG07: 384 MS
QRS DURATION, ECG06: 74 MS
QTC CALCULATION (BEZET), ECG08: 411 MS
RBC # BLD AUTO: 3.36 M/UL (ref 4.23–5.6)
SODIUM SERPL-SCNC: 142 MMOL/L (ref 136–145)
VENTRICULAR RATE, ECG03: 69 BPM
WBC # BLD AUTO: 8 K/UL (ref 4.3–11.1)

## 2019-07-18 PROCEDURE — 74011250637 HC RX REV CODE- 250/637: Performed by: INTERNAL MEDICINE

## 2019-07-18 PROCEDURE — 93005 ELECTROCARDIOGRAM TRACING: CPT | Performed by: INTERNAL MEDICINE

## 2019-07-18 PROCEDURE — 85025 COMPLETE CBC W/AUTO DIFF WBC: CPT

## 2019-07-18 PROCEDURE — 74011250637 HC RX REV CODE- 250/637: Performed by: PHYSICIAN ASSISTANT

## 2019-07-18 PROCEDURE — 36415 COLL VENOUS BLD VENIPUNCTURE: CPT

## 2019-07-18 PROCEDURE — 80048 BASIC METABOLIC PNL TOTAL CA: CPT

## 2019-07-18 RX ORDER — NITROGLYCERIN 0.4 MG/1
0.4 TABLET SUBLINGUAL
Qty: 1 BOTTLE | Refills: 11 | Status: SHIPPED | OUTPATIENT
Start: 2019-07-18 | End: 2021-04-01 | Stop reason: SDUPTHER

## 2019-07-18 RX ORDER — METOPROLOL SUCCINATE 25 MG/1
12.5 TABLET, EXTENDED RELEASE ORAL 2 TIMES DAILY
Qty: 60 TAB | Refills: 11 | Status: SHIPPED | OUTPATIENT
Start: 2019-07-18 | End: 2019-07-23 | Stop reason: ALTCHOICE

## 2019-07-18 RX ORDER — LISINOPRIL 2.5 MG/1
2.5 TABLET ORAL DAILY
Qty: 30 TAB | Refills: 11 | Status: SHIPPED | OUTPATIENT
Start: 2019-07-18 | End: 2019-07-23 | Stop reason: ALTCHOICE

## 2019-07-18 RX ORDER — GUAIFENESIN 100 MG/5ML
81 LIQUID (ML) ORAL DAILY
Status: SHIPPED | COMMUNITY
Start: 2019-07-18 | End: 2021-02-22 | Stop reason: SDUPTHER

## 2019-07-18 RX ORDER — CLOPIDOGREL BISULFATE 75 MG/1
75 TABLET ORAL DAILY
Qty: 30 TAB | Refills: 11 | Status: SHIPPED | OUTPATIENT
Start: 2019-07-18 | End: 2019-07-18 | Stop reason: SDUPTHER

## 2019-07-18 RX ORDER — ATORVASTATIN CALCIUM 20 MG/1
20 TABLET, FILM COATED ORAL
Qty: 30 TAB | Refills: 11 | Status: SHIPPED | OUTPATIENT
Start: 2019-07-18 | End: 2021-04-01 | Stop reason: SDUPTHER

## 2019-07-18 RX ORDER — PANTOPRAZOLE SODIUM 40 MG/1
40 TABLET, DELAYED RELEASE ORAL
Qty: 30 TAB | Refills: 0 | Status: SHIPPED | OUTPATIENT
Start: 2019-07-18 | End: 2021-04-01 | Stop reason: SDUPTHER

## 2019-07-18 RX ORDER — CLOPIDOGREL BISULFATE 75 MG/1
75 TABLET ORAL DAILY
Qty: 30 TAB | Refills: 0 | Status: SHIPPED | OUTPATIENT
Start: 2019-07-18 | End: 2021-04-01 | Stop reason: SDUPTHER

## 2019-07-18 RX ADMIN — METOPROLOL SUCCINATE 12.5 MG: 25 TABLET, EXTENDED RELEASE ORAL at 08:28

## 2019-07-18 RX ADMIN — PANTOPRAZOLE SODIUM 40 MG: 40 TABLET, DELAYED RELEASE ORAL at 08:27

## 2019-07-18 RX ADMIN — ASPIRIN 81 MG 81 MG: 81 TABLET ORAL at 08:28

## 2019-07-18 RX ADMIN — CLOPIDOGREL BISULFATE 75 MG: 75 TABLET ORAL at 08:27

## 2019-07-18 RX ADMIN — Medication 10 ML: at 05:48

## 2019-07-18 RX ADMIN — LISINOPRIL 2.5 MG: 5 TABLET ORAL at 08:26

## 2019-07-18 NOTE — PROGRESS NOTES
Verbal bedside report received from Alejandrina Pacheco RN. Assumed care of patient. Bilateral groin sites visualized. Sites CDI with no bleeding or hematoma. Pt currently off bedrest, had ambulated and voided appropriately. Pt educated on the importance of limiting movements with bilateral legs and to call for assistance for ambulation.

## 2019-07-18 NOTE — PROGRESS NOTES
Cardiac Rehab: Spoke with patient regarding referral to cardiac rehab. Patient meets admission criteria based on NSTEMI with PCI (07/17/19). Written information about Cardiac Rehab given and reviewed with patient. Discussed lifestyle modifications to promote cardiac wellness. Patient indicated that he does not want to participate in the cardiac rehab program at this time stating he is moving to [place]. He is aware that we will forward his referral to a Cardiac Rehab of his choice in [facility]. He is aware that with his VA benefits, he could pursue VA authorization for Cardiac Rehab and participate at no cost him.  His  Cardiologist is Dr. Julian Pacheco.      Thank you,  LISET Covarrubias, RN  Cardiopulmonary Rehabilitation Nurse Liaison  [facility]

## 2019-07-18 NOTE — PROGRESS NOTES
Verbal bedside report given to chu Arrington RN. Patient's situation, background, assessment and recommendations provided. Opportunity for questions provided. Oncoming RN assumed care of patient. Bilateral groin sites visualized. Sites CDI with no bleeding or hematoma.

## 2019-07-18 NOTE — PROGRESS NOTES
[facility] CARDIOLOGY PROGRESS NOTE    7/18/2019 8:07 AM    Admit Date: 7/13/2019    Admit Diagnosis: NSTEMI (non-ST elevated myocardial infarction) ([facility] [I21.4]      Subjective:   Stable overnight without angina, CHF, or palpitations. Vitals stable and controlled. No other complaints overnight. Tolerating meds well. Labs stable, wants to go home. Objective:      Vitals:    07/17/19 2058 07/18/19 0057 07/18/19 0457 07/18/19 0801   BP: 102/58 116/61 105/68 118/66   Pulse: 82 65 81 71   Resp: 18 17 17 17   Temp: 98 °F (36.7 °C) 98.1 °F (36.7 °C) 97.9 °F (36.6 °C) 97.7 °F (36.5 °C)   SpO2: 92% 95% 93% 94%   Weight:   65.5 kg (144 lb 4.8 oz)    Height:           Physical Exam:  Neck- supple, no JVD  CV- regular rate and rhythm no MRG  Lung- clear bilaterally  Abd- soft, nontender, nondistended  Ext- no edema, RIGHT groin CDI  Skin- warm and dry    Data Review:   Recent Labs     07/18/19  0419 07/17/19  0419    144   K 3.7 4.2   BUN 12 13   CREA 1.12 1.25   * 106*   WBC 8.0 7.8   HGB 11.2* 11.0*   HCT 33.8* 33.4*    233       Assessment and Plan:     Principal Problem:    NSTEMI (non-ST elevated myocardial infarction) ([facility] (7/13/2019)- resolved, s/p PCI, doing well, ambulating room without angina, labs stable, ready to go home on DAPT and current meds. TC visit in outpatient setting 7-10 days. CAD- improved, s/p PCI- doing well, The importance of compliance with dual antiplatelet therapy was emphasized. The risk of non-compliance, including stent thrombosis, acute MI, acute LV systolic dysfunction, and death was discussed and understood. Dyslipidemia- statin with outpatient surveillance    Hypertension- stable, controlled, continue meds        A.  Tara Santoyo MD  [facility] Cardiology  Pager [phone]

## 2019-07-18 NOTE — DISCHARGE INSTRUCTIONS
DISCHARGE SUMMARY from Nurse    PATIENT INSTRUCTIONS:    After general anesthesia or intravenous sedation, for 24 hours or while taking prescription Narcotics:  · Limit your activities  · Do not drive and operate hazardous machinery  · Do not make important personal or business decisions  · Do  not drink alcoholic beverages  · If you have not urinated within 8 hours after discharge, please contact your surgeon on call. Report the following to your surgeon:  · Excessive pain, swelling, redness or odor of or around the surgical area  · Temperature over 100.5  · Nausea and vomiting lasting longer than 4 hours or if unable to take medications  · Any signs of decreased circulation or nerve impairment to extremity: change in color, persistent  numbness, tingling, coldness or increase pain  · Any questions    What to do at Home:  Recommended activity: you are being sent home on a low saturated fat, low cholesterol and low salt diet. advance activities as tolerated to the limit of fatigue or shortness of breath. avoid all heavy lifting, straining, stooping or squatting for 3-5 days. watch the cath site for bleeding/oozing; if seen, apply firm pressure with a clean cloth and call [facility] Cardiology at [phone]. watch for signs of infection which include: increasing area of redness, fever/hot to touch or purulent drainage at the catheterization site. Do not soak in a bathtub for 7-10 days, but please shower. Do not hesitate to call the office or return to the ER for immediate evaluation for any shortness of breath or chest pain not relieved by NTG. *  Please give a list of your current medications to your Primary Care Provider. *  Please update this list whenever your medications are discontinued, doses are      changed, or new medications (including over-the-counter products) are added. *  Please carry medication information at all times in case of emergency situations.     These are general instructions for a healthy lifestyle:    No smoking/ No tobacco products/ Avoid exposure to second hand smoke  Surgeon General's Warning:  Quitting smoking now greatly reduces serious risk to your health. Obesity, smoking, and sedentary lifestyle greatly increases your risk for illness    A healthy diet, regular physical exercise & weight monitoring are important for maintaining a healthy lifestyle    You may be retaining fluid if you have a history of heart failure or if you experience any of the following symptoms:  Weight gain of 3 pounds or more overnight or 5 pounds in a week, increased swelling in our hands or feet or shortness of breath while lying flat in bed. Please call your doctor as soon as you notice any of these symptoms; do not wait until your next office visit. The discharge information has been reviewed with the patient. The patient verbalized understanding. Discharge medications reviewed with the patient and appropriate educational materials and side effects teaching were provided. ___________________________________________________________________________________________________________________________________    Patient Education        Heart Attack: Care Instructions  Your Care Instructions    A heart attack (myocardial infarction, or MI) occurs when one or more of the coronary arteries, which supply the heart with oxygen-rich blood, is blocked. A blockage usually occurs when plaque inside the artery breaks open and a blood clot forms in the artery. After a heart attack, you may be worried about your future. Over the next several weeks, your heart will start to heal. Though it can be hard to break old habits, you can prevent another heart attack by making some lifestyle changes and by taking medicines. You may use this information for ideas about what to do at home to speed your recovery. Follow-up care is a key part of your treatment and safety.  Be sure to make and go to all appointments, and call your doctor if you are having problems. It's also a good idea to know your test results and keep a list of the medicines you take. How can you care for yourself at home? Activity    · Until your doctor says it is okay, do not do strenuous exercise. And do not lift, pull, or push anything heavy. Ask your doctor what types of activities are safe for you.     · If your doctor has not set you up with a cardiac rehabilitation (rehab) program, talk to him or her about whether that is right for you. Cardiac rehab includes supervised exercise. It also includes help with diet and lifestyle changes and emotional support. It may reduce your risk of future heart problems.     · Increase your activities slowly. Take short rest breaks when you get tired.     · If your doctor recommends it, get more exercise. Walking is a good choice. Bit by bit, increase the amount you walk every day. Try for at least 30 minutes on most days of the week. You also may want to swim, bike, or do other activities. Talk with your doctor before you start an exercise program to make sure it is safe for you.     · Ask your doctor when you can drive, go back to work, and do other daily activities again.     · You can have sex as soon as you feel ready for it. Often this means when you can easily walk around or climb stairs. Talk with your doctor if you have any concerns. If you are taking nitroglycerin, do not take erection-enhancing medicine such as sildenafil (Viagra), tadalafil (Cialis), or vardenafil (Levitra) .    Lifestyle changes    · Do not smoke. Smoking increases your risk of another heart attack. If you need help quitting, talk to your doctor about stop-smoking programs and medicines. These can increase your chances of quitting for good.     · Eat a heart-healthy diet that is low in saturated fat and salt, and is full of fruits, vegetables and whole-grains. Eat at least two servings of fish each week.  You may get more details about how to eat healthy. But these tips can help you get started.     · Stay at a healthy weight, or lose weight if you need to. Medicines    · Be safe with medicines. Take your medicines exactly as prescribed. Call your doctor if you think you are having a problem with your medicine. You will get more details on the specific medicines your doctor prescribes. Do not stop taking your medicine unless your doctor tells you to. Not taking your medicine might raise your risk of having another heart attack.     · You may need several medicines to help lower your risk of another heart attack. These include:  ? Blood pressure medicines such as angiotensin-converting enzyme (ACE) inhibitors, ARBs (angiotensin II receptor blockers), and beta-blockers. ? Cholesterol medicine called statins. ? Aspirin and other blood thinners. These prevent blood clots that can cause a heart attack.     · If your doctor has given you nitroglycerin, keep it with you at all times. If you have angina symptoms, such as chest pain or pressure, sit down and rest. Take the first dose of nitroglycerin as directed. If symptoms get worse or are not getting better within 5 minutes, call 911 right away. Stay on the phone. The emergency  will tell you what to do.     · Do not take any over-the-counter medicines, vitamins, or herbal products without talking to your doctor first.    Staying healthy    · Manage other health conditions such as high blood pressure and diabetes.     · Avoid colds and flu. Get a pneumococcal vaccine shot. If you have had one before, ask your doctor whether you need another dose. Get the flu vaccine every year. If you must be around people with colds or flu, wash your hands often.     · Be sure to tell your doctor about any angina symptoms you have had, even if they went away. Pay attention to your angina symptoms. Know what is typical for you and learn how to control it.  Know when to call for help.     · Talk to your family, friends, or a counselor about your feelings. It is normal to feel frightened, angry, hopeless, helpless, and even guilty. Talking openly about bad feelings can help you cope. If you have symptoms of depression, talk to your doctor. When should you call for help? Call 911 anytime you think you may need emergency care. For example, call if:    · You have symptoms of a heart attack. These may include:  ? Chest pain or pressure, or a strange feeling in the chest.  ? Sweating. ? Shortness of breath. ? Nausea or vomiting. ? Pain, pressure, or a strange feeling in the back, neck, jaw, or upper belly or in one or both shoulders or arms. ? Lightheadedness or sudden weakness. ? A fast or irregular heartbeat. After you call 911, the  may tell you to chew 1 adult-strength or 2 to 4 low-dose aspirin. Wait for an ambulance. Do not try to drive yourself.     · You have angina symptoms (such as chest pain or pressure) that do not go away with rest or are not getting better within 5 minutes after you take a dose of nitroglycerin.     · You passed out (lost consciousness).     · You feel like you are having another heart attack.    Call your doctor now or seek immediate medical care if:    · You are having angina symptoms, such as chest pain or pressure, more often than usual, or the symptoms are different or worse than usual.     · You have new or increased shortness of breath.     · You are dizzy or lightheaded, or you feel like you may faint.    Watch closely for changes in your health, and be sure to contact your doctor if you have any problems. Where can you learn more? Go to http://jessica-myra.info/. Enter [id number] in the search box to learn more about \"Heart Attack: Care Instructions. \"  Current as of: July 22, 2018  Content Version: 11.9  © 3790-4079 [employer].  Care instructions adapted under license by [employer] (which disclaims liability or warranty for this information). If you have questions about a medical condition or this instruction, always ask your healthcare professional. [place] [address] any warranty or liability for your use of this information.

## 2019-07-18 NOTE — PROGRESS NOTES
Patient given DC paperwork and [facility] paperwork. Patient unsure if he will be permanently living in [address] [place] [address] or [facility]. This RN stressed importance of taking plavix to patient and getting filled immediately. Patient and son verbalized understanding of DC instructions. All questions answered.   RXs and implant cards provided at time of discharge

## 2019-07-18 NOTE — DISCHARGE SUMMARY
[address] [facility] 121 Cardiology Discharge Summary     Patient ID:  Meri Espinal Sr  [id number]  57 y.o.  6/1/1931    Admit date: 7/13/2019    Discharge date:  7/18/2019    Admitting Physician: Angel Dillard MD     Discharge Physician: SATURNINO Emanuel/Dr. Alpa Knutson    Admission Diagnoses: NSTEMI (non-ST elevated myocardial infarction) [facility]) [I21.4]    Discharge Diagnoses:    Diagnosis    CAD (coronary artery disease)    NSTEMI (non-ST elevated myocardial infarction) [facility])     Transitional care follow up with [address] Julian Pacheco July 23 at 10:30[facility] [place] office    Cardiology Procedures this admission:  Diagnostic LHC, Left heart catheterization with PCI, echo   Consults: Cardiac Surgery    Hospital Course: Patient presented to the emergency department of [facility] with complaints of chest pain with nausea. Patient was evaluated and subsequently admitted for further cardiac evaluation and treatment. Cardiac enzymes were . 43. He was started on heparin. LHC was planned for 7-15-19. Patient underwent cardiac catheterization by Dr. Chacho Zamudio which showed multivessel disease. Patient tolerated the procedure well and returned to the telemetry floor for recovery. An echocardiogram was performed with report as follows:     -  Left ventricle: Systolic function was normal. Ejection fraction was  estimated in the range of 55 % to 60 %. There were no regional wall motion  abnormalities. -  Aortic valve: There was mild stenosis. Di: (0.57)    -  Mitral valve: There was mild annular calcification. There was mild  regurgitation. -  Tricuspid valve: There was mild regurgitation. Pt was seen by CTS and CABG discussed but pt elected to proceed with PCI. On 7-17-19 pt taken back to the cath lab with Dr Chacho Zamudio  and was found to have a 80% stenosis of the L main that was stented with a 3.5 x 18 Simón drug-eluting stent with 0% residual stenosis.   He had 95% stenosis of LAD stented with a 2.25 x 38 Orient drug-eluting stent was positioned from this point extending proximally followed by an additional 2.25 x 38 and a 2.75 x 18 Orient drug-eluting stents w 0% post stenosis. The morning of 7/18/2019 patient was up feeling well without any complaints of chest pain or shortness of breath. Patient's right femoral and R radial cath sites were clean, dry and intact without hematoma or bruit. Patient's labs were WNL w stable hgb 11.2 and no signs of acute bleeding. Patient was seen and examined by Dr. Eve Garcia and determined stable and ready for discharge. Patient was instructed on the importance of medication compliance including taking aspirin and plavix everyday without missing a dose. After receiving drug eluting stents, the patient will need to be on dual anti-platelet therapy for at least 1 year. Indication for treatment and risk of dual antiplatelet therapy was discussed with the patient and he/she understands to seek medical care immediately if any signs of bleeding. They were advised to minimize use of additional NSAIDS and use tylenol as needed for minor pain. For maximized medical therapy of CAD, patient will continue use of BB, ACE-I, and statin as well. The patient will have close transitional care follow up with [address] [place] [address] [address] Card[address] Dr. Versie Leventhal July 23 at 10:30[facility] office and has been referred to cardiac rehab. He was given script for plavix to get filled locally until [facility]. DISPOSITION: The patient is being discharged home in stable condition on a low saturated fat, low cholesterol and low salt diet. The patient is instructed to advance activities as tolerated to the limit of fatigue or shortness of breath. The patient is instructed to avoid all heavy lifting, straining, stooping or squatting for 3-5 days.  The patient is instructed to watch the cath site for bleeding/oozing; if seen, the patient is instructed to apply firm pressure with a clean cloth and call [address] Cardiology at [phone]. The patient is instructed to watch for signs of infection which include: increasing area of redness, fever/hot to touch or purulent drainage at the catheterization site. The patient is instructed not to soak in a bathtub for 7-10 days, but is cleared to shower. The patient is instructed to call the office or return to the ER for immediate evaluation for any shortness of breath or chest pain not relieved by NTG. Discharge Exam:   Visit Vitals  /66   Pulse 71   Temp 97.7 °F (36.5 °C)   Resp 17   Ht 5' 10.5\" (1.791 m)   Wt 65.5 kg (144 lb 4.8 oz)   SpO2 94%   BMI 20.41 kg/m²     Patient has been seen by Dr. Braden Kline: see his progress note for exam details.     Recent Results (from the past 24 hour(s))   EKG, 12 LEAD, INITIAL    Collection Time: 07/17/19  1:05 PM   Result Value Ref Range    Ventricular Rate 71 BPM    Atrial Rate 71 BPM    P-R Interval 142 ms    QRS Duration 76 ms    Q-T Interval 378 ms    QTC Calculation (Bezet) 410 ms    Calculated P Axis 37 degrees    Calculated R Axis 35 degrees    Calculated T Axis 49 degrees    Diagnosis       Sinus rhythm with Premature atrial complexes  Otherwise normal ECG  When compared with ECG of 13-JUL-2019 13:08,  No significant change was found  Confirmed by ELIZABET MATTHEWS (), HUONG SNEED ([id number]) on 7/17/2019 1:20:33 PM     CBC WITH AUTOMATED DIFF    Collection Time: 07/18/19  4:19 AM   Result Value Ref Range    WBC 8.0 4.3 - 11.1 K/uL    RBC 3.36 (L) 4.23 - 5.6 M/uL    HGB 11.2 (L) 13.6 - 17.2 g/dL    HCT 33.8 (L) 41.1 - 50.3 %    .6 (H) 79.6 - 97.8 FL    MCH 33.3 (H) 26.1 - 32.9 PG    MCHC 33.1 31.4 - 35.0 g/dL    RDW 14.4 11.9 - 14.6 %    PLATELET 884 576 - 419 K/uL    MPV 10.5 9.4 - 12.3 FL    ABSOLUTE NRBC 0.00 0.0 - 0.2 K/uL    DF AUTOMATED      NEUTROPHILS 68 43 - 78 %    LYMPHOCYTES 19 13 - 44 %    MONOCYTES 8 4.0 - 12.0 %    EOSINOPHILS 3 0.5 - 7.8 %    BASOPHILS 1 0.0 - 2.0 %    IMMATURE GRANULOCYTES 2 0.0 - 5.0 %    ABS. NEUTROPHILS 5.4 1.7 - 8.2 K/UL    ABS. LYMPHOCYTES 1.5 0.5 - 4.6 K/UL    ABS. MONOCYTES 0.6 0.1 - 1.3 K/UL    ABS. EOSINOPHILS 0.2 0.0 - 0.8 K/UL    ABS. BASOPHILS 0.0 0.0 - 0.2 K/UL    ABS. IMM. GRANS. 0.2 0.0 - 0.5 K/UL   METABOLIC PANEL, BASIC    Collection Time: 07/18/19  4:19 AM   Result Value Ref Range    Sodium 142 136 - 145 mmol/L    Potassium 3.7 3.5 - 5.1 mmol/L    Chloride 109 (H) 98 - 107 mmol/L    CO2 25 21 - 32 mmol/L    Anion gap 8 7 - 16 mmol/L    Glucose 101 (H) 65 - 100 mg/dL    BUN 12 8 - 23 MG/DL    Creatinine 1.12 0.8 - 1.5 MG/DL    GFR est AA >60 >60 ml/min/1.73m2    GFR est non-AA >60 >60 ml/min/1.73m2    Calcium 8.8 8.3 - 10.4 MG/DL   EKG, 12 LEAD, INITIAL    Collection Time: 07/18/19  6:46 AM   Result Value Ref Range    Ventricular Rate 69 BPM    Atrial Rate 69 BPM    P-R Interval 150 ms    QRS Duration 74 ms    Q-T Interval 384 ms    QTC Calculation (Bezet) 411 ms    Calculated P Axis 68 degrees    Calculated R Axis 64 degrees    Calculated T Axis 64 degrees    Diagnosis       Normal sinus rhythm  Normal ECG  When compared with ECG of 17-JUL-2019 13:05,  Premature atrial complexes are no longer Present  Confirmed by Isabella Herrmann ([id number]) on 7/18/2019 7:19:13 AM           Patient Instructions:   Current Discharge Medication List      START taking these medications    Details   aspirin 81 mg chewable tablet Take 1 Tab by mouth daily. clopidogrel (PLAVIX) 75 mg tab Take 1 Tab by mouth daily. Qty: 30 Tab, Refills: 11      atorvastatin (LIPITOR) 20 mg tablet Take 1 Tab by mouth nightly. Qty: 30 Tab, Refills: 11      lisinopril (PRINIVIL, ZESTRIL) 2.5 mg tablet Take 1 Tab by mouth daily. Qty: 30 Tab, Refills: 11      metoprolol succinate (TOPROL-XL) 25 mg XL tablet Take 0.5 Tabs by mouth two (2) times a day. Qty: 60 Tab, Refills: 11      nitroglycerin (NITROSTAT) 0.4 mg SL tablet 1 Tab by SubLINGual route every five (5) minutes as needed for Chest Pain.  Up to 3 doses.  Qty: 1 Bottle, Refills: 11      pantoprazole (PROTONIX) 40 mg tablet Take 1 Tab by mouth Daily (before breakfast).   Qty: 30 Tab, Refills: 0               Signed:  Augie Alfonso PA-C  7/18/2019  8:02 AM

## 2019-07-18 NOTE — PROGRESS NOTES
Problem: Falls - Risk of  Goal: *Absence of Falls  Description  Document Marta Sawyer Fall Risk and appropriate interventions in the flowsheet. Outcome: Progressing Towards Goal  Note:   Fall Risk Interventions:  Mobility Interventions: Communicate number of staff needed for ambulation/transfer, Patient to call before getting OOB         Medication Interventions: Teach patient to arise slowly, Patient to call before getting OOB    Elimination Interventions: Call light in reach, Urinal in reach, Patient to call for help with toileting needs          Problem: Cath Lab Procedures: Discharge Outcomes  Goal: *Pulses palpable, skin color within defined limits, skin temperature warm  Outcome: Progressing Towards Goal     Bilateral groin sites CDI with no bleeding or hematoma. Pedal pulses palpable.

## 2019-07-18 NOTE — PROGRESS NOTES
Care Management Interventions  PCP Verified by CM: No(has gone to [place] clinic and provided with list of [facility] physicians for review)  Palliative Care Criteria Met (RRAT>21 & CHF Dx)?: No(RRAT 10 Dx NSTEMI )  Mode of Transport at Discharge: Other (see comment)(son)  Transition of Care Consult (CM Consult): Discharge Planning  Discharge Durable Medical Equipment: No(B/P cuff)  Physical Therapy Consult: No  Occupational Therapy Consult: No  Speech Therapy Consult: No  Current Support Network: Relative's Home(lives with his son )  Confirm Follow Up Transport: Self  Plan discussed with Pt/Family/Caregiver: Yes  Freedom of Choice Offered: Yes  Discharge Location  Discharge Placement: Home  Patient ready for d/c today. He was seen prior to d/c and voices no concerns or needs. He declined home health states that he does not need it and is considering cardiac rehab. Patient to d/c home with his son.

## 2019-12-02 ENCOUNTER — APPOINTMENT (OUTPATIENT)
Dept: CT IMAGING | Age: 84
DRG: 871 | End: 2019-12-02
Attending: HOSPITALIST
Payer: MEDICARE

## 2019-12-02 ENCOUNTER — APPOINTMENT (OUTPATIENT)
Dept: GENERAL RADIOLOGY | Age: 84
DRG: 871 | End: 2019-12-02
Attending: EMERGENCY MEDICINE
Payer: MEDICARE

## 2019-12-02 ENCOUNTER — HOSPITAL ENCOUNTER (INPATIENT)
Age: 84
LOS: 35 days | Discharge: NURSING, REHAB OR LONG-TERM CARE | DRG: 871 | End: 2020-01-06
Attending: EMERGENCY MEDICINE | Admitting: HOSPITALIST
Payer: MEDICARE

## 2019-12-02 ENCOUNTER — APPOINTMENT (OUTPATIENT)
Dept: ULTRASOUND IMAGING | Age: 84
DRG: 871 | End: 2019-12-02
Attending: HOSPITALIST
Payer: MEDICARE

## 2019-12-02 ENCOUNTER — APPOINTMENT (OUTPATIENT)
Dept: CT IMAGING | Age: 84
DRG: 871 | End: 2019-12-02
Attending: EMERGENCY MEDICINE
Payer: MEDICARE

## 2019-12-02 DIAGNOSIS — R09.02 HYPOXIA: ICD-10-CM

## 2019-12-02 DIAGNOSIS — J18.9 PNEUMONIA OF BOTH LOWER LOBES DUE TO INFECTIOUS ORGANISM: ICD-10-CM

## 2019-12-02 DIAGNOSIS — S20.212A CONTUSION OF RIB ON LEFT SIDE, INITIAL ENCOUNTER: Primary | ICD-10-CM

## 2019-12-02 PROBLEM — R53.81 DEBILITY: Status: ACTIVE | Noted: 2019-12-02

## 2019-12-02 PROBLEM — A41.9 SEPSIS (HCC): Status: ACTIVE | Noted: 2019-12-02

## 2019-12-02 PROBLEM — N30.00 ACUTE CYSTITIS WITHOUT HEMATURIA: Status: ACTIVE | Noted: 2019-12-02

## 2019-12-02 PROBLEM — R65.10 SIRS (SYSTEMIC INFLAMMATORY RESPONSE SYNDROME) (HCC): Status: ACTIVE | Noted: 2019-12-02

## 2019-12-02 PROBLEM — N17.9 ACUTE RENAL FAILURE (ARF) (HCC): Status: ACTIVE | Noted: 2019-12-02

## 2019-12-02 PROBLEM — E86.0 DEHYDRATION: Status: ACTIVE | Noted: 2019-12-02

## 2019-12-02 LAB
ANION GAP SERPL CALC-SCNC: 7 MMOL/L (ref 7–16)
APPEARANCE UR: ABNORMAL
ATRIAL RATE: 227 BPM
BACTERIA URNS QL MICRO: ABNORMAL /HPF
BILIRUB UR QL: NEGATIVE
BUN SERPL-MCNC: 21 MG/DL (ref 8–23)
CALCIUM SERPL-MCNC: 9.9 MG/DL (ref 8.3–10.4)
CALCULATED R AXIS, ECG10: 26 DEGREES
CALCULATED T AXIS, ECG11: 59 DEGREES
CASTS URNS QL MICRO: ABNORMAL /LPF
CHLORIDE SERPL-SCNC: 105 MMOL/L (ref 98–107)
CK SERPL-CCNC: 98 U/L (ref 21–215)
CO2 SERPL-SCNC: 27 MMOL/L (ref 21–32)
COLOR UR: ABNORMAL
CREAT SERPL-MCNC: 1.58 MG/DL (ref 0.8–1.5)
D DIMER PPP FEU-MCNC: 1.37 UG/ML(FEU)
DIAGNOSIS, 93000: NORMAL
EPI CELLS #/AREA URNS HPF: 0 /HPF
ERYTHROCYTE [DISTWIDTH] IN BLOOD BY AUTOMATED COUNT: 16.8 % (ref 11.9–14.6)
GLUCOSE SERPL-MCNC: 128 MG/DL (ref 65–100)
GLUCOSE UR STRIP.AUTO-MCNC: NEGATIVE MG/DL
HCT VFR BLD AUTO: 42.6 % (ref 41.1–50.3)
HGB BLD-MCNC: 13.6 G/DL (ref 13.6–17.2)
HGB UR QL STRIP: ABNORMAL
KETONES UR QL STRIP.AUTO: NEGATIVE MG/DL
LACTATE BLD-SCNC: 1.22 MMOL/L (ref 0.5–1.9)
LEUKOCYTE ESTERASE UR QL STRIP.AUTO: ABNORMAL
MCH RBC QN AUTO: 30.8 PG (ref 26.1–32.9)
MCHC RBC AUTO-ENTMCNC: 31.9 G/DL (ref 31.4–35)
MCV RBC AUTO: 96.4 FL (ref 79.6–97.8)
NITRITE UR QL STRIP.AUTO: POSITIVE
NRBC # BLD: 0 K/UL (ref 0–0.2)
PH UR STRIP: 5 [PH] (ref 5–9)
PLATELET # BLD AUTO: 321 K/UL (ref 150–450)
PMV BLD AUTO: 10 FL (ref 9.4–12.3)
POTASSIUM SERPL-SCNC: 4.3 MMOL/L (ref 3.5–5.1)
PROCALCITONIN SERPL-MCNC: 0.07 NG/ML
PROT UR STRIP-MCNC: 30 MG/DL
Q-T INTERVAL, ECG07: 286 MS
QRS DURATION, ECG06: 76 MS
QTC CALCULATION (BEZET), ECG08: 407 MS
RBC # BLD AUTO: 4.42 M/UL (ref 4.23–5.6)
RBC #/AREA URNS HPF: ABNORMAL /HPF
SODIUM SERPL-SCNC: 139 MMOL/L (ref 136–145)
SP GR UR REFRACTOMETRY: 1.02 (ref 1–1.02)
TROPONIN I BLD-MCNC: 0.02 NG/ML (ref 0.02–0.05)
TROPONIN I BLD-MCNC: 0.03 NG/ML (ref 0.02–0.05)
TROPONIN I SERPL-MCNC: <0.02 NG/ML (ref 0.02–0.05)
TROPONIN I SERPL-MCNC: <0.02 NG/ML (ref 0.02–0.05)
UROBILINOGEN UR QL STRIP.AUTO: 0.2 EU/DL (ref 0.2–1)
VENTRICULAR RATE, ECG03: 122 BPM
WBC # BLD AUTO: 19.4 K/UL (ref 4.3–11.1)
WBC URNS QL MICRO: ABNORMAL /HPF

## 2019-12-02 PROCEDURE — 93005 ELECTROCARDIOGRAM TRACING: CPT | Performed by: HOSPITALIST

## 2019-12-02 PROCEDURE — 84484 ASSAY OF TROPONIN QUANT: CPT

## 2019-12-02 PROCEDURE — 85379 FIBRIN DEGRADATION QUANT: CPT

## 2019-12-02 PROCEDURE — 74011250636 HC RX REV CODE- 250/636: Performed by: EMERGENCY MEDICINE

## 2019-12-02 PROCEDURE — 87040 BLOOD CULTURE FOR BACTERIA: CPT

## 2019-12-02 PROCEDURE — 87186 SC STD MICRODIL/AGAR DIL: CPT

## 2019-12-02 PROCEDURE — 93005 ELECTROCARDIOGRAM TRACING: CPT | Performed by: EMERGENCY MEDICINE

## 2019-12-02 PROCEDURE — 84145 PROCALCITONIN (PCT): CPT

## 2019-12-02 PROCEDURE — 74011000258 HC RX REV CODE- 258: Performed by: EMERGENCY MEDICINE

## 2019-12-02 PROCEDURE — 70450 CT HEAD/BRAIN W/O DYE: CPT

## 2019-12-02 PROCEDURE — 96365 THER/PROPH/DIAG IV INF INIT: CPT | Performed by: EMERGENCY MEDICINE

## 2019-12-02 PROCEDURE — 36415 COLL VENOUS BLD VENIPUNCTURE: CPT

## 2019-12-02 PROCEDURE — 81001 URINALYSIS AUTO W/SCOPE: CPT

## 2019-12-02 PROCEDURE — 85027 COMPLETE CBC AUTOMATED: CPT

## 2019-12-02 PROCEDURE — 74011250637 HC RX REV CODE- 250/637: Performed by: HOSPITALIST

## 2019-12-02 PROCEDURE — 96375 TX/PRO/DX INJ NEW DRUG ADDON: CPT | Performed by: EMERGENCY MEDICINE

## 2019-12-02 PROCEDURE — 71250 CT THORAX DX C-: CPT

## 2019-12-02 PROCEDURE — 83605 ASSAY OF LACTIC ACID: CPT

## 2019-12-02 PROCEDURE — 87086 URINE CULTURE/COLONY COUNT: CPT

## 2019-12-02 PROCEDURE — 73030 X-RAY EXAM OF SHOULDER: CPT

## 2019-12-02 PROCEDURE — 65270000029 HC RM PRIVATE

## 2019-12-02 PROCEDURE — 99285 EMERGENCY DEPT VISIT HI MDM: CPT | Performed by: EMERGENCY MEDICINE

## 2019-12-02 PROCEDURE — 93970 EXTREMITY STUDY: CPT

## 2019-12-02 PROCEDURE — 80048 BASIC METABOLIC PNL TOTAL CA: CPT

## 2019-12-02 PROCEDURE — 87088 URINE BACTERIA CULTURE: CPT

## 2019-12-02 PROCEDURE — 96361 HYDRATE IV INFUSION ADD-ON: CPT | Performed by: EMERGENCY MEDICINE

## 2019-12-02 PROCEDURE — 82550 ASSAY OF CK (CPK): CPT

## 2019-12-02 PROCEDURE — 74011250636 HC RX REV CODE- 250/636: Performed by: HOSPITALIST

## 2019-12-02 PROCEDURE — 74011000302 HC RX REV CODE- 302: Performed by: HOSPITALIST

## 2019-12-02 PROCEDURE — 86580 TB INTRADERMAL TEST: CPT | Performed by: HOSPITALIST

## 2019-12-02 RX ORDER — SODIUM CHLORIDE 0.9 % (FLUSH) 0.9 %
5-40 SYRINGE (ML) INJECTION AS NEEDED
Status: DISCONTINUED | OUTPATIENT
Start: 2019-12-02 | End: 2020-01-06 | Stop reason: HOSPADM

## 2019-12-02 RX ORDER — NALOXONE HYDROCHLORIDE 0.4 MG/ML
0.4 INJECTION, SOLUTION INTRAMUSCULAR; INTRAVENOUS; SUBCUTANEOUS AS NEEDED
Status: DISCONTINUED | OUTPATIENT
Start: 2019-12-02 | End: 2020-01-06 | Stop reason: HOSPADM

## 2019-12-02 RX ORDER — HYDROMORPHONE HYDROCHLORIDE 1 MG/ML
0.2 INJECTION, SOLUTION INTRAMUSCULAR; INTRAVENOUS; SUBCUTANEOUS
Status: DISCONTINUED | OUTPATIENT
Start: 2019-12-02 | End: 2019-12-17

## 2019-12-02 RX ORDER — PANTOPRAZOLE SODIUM 40 MG/1
40 TABLET, DELAYED RELEASE ORAL
Status: DISCONTINUED | OUTPATIENT
Start: 2019-12-03 | End: 2019-12-04

## 2019-12-02 RX ORDER — SODIUM CHLORIDE 0.9 % (FLUSH) 0.9 %
5-40 SYRINGE (ML) INJECTION EVERY 8 HOURS
Status: DISCONTINUED | OUTPATIENT
Start: 2019-12-02 | End: 2020-01-06 | Stop reason: HOSPADM

## 2019-12-02 RX ORDER — NITROGLYCERIN 0.4 MG/1
0.4 TABLET SUBLINGUAL
Status: DISCONTINUED | OUTPATIENT
Start: 2019-12-02 | End: 2020-01-06 | Stop reason: HOSPADM

## 2019-12-02 RX ORDER — SODIUM CHLORIDE, SODIUM LACTATE, POTASSIUM CHLORIDE, CALCIUM CHLORIDE 600; 310; 30; 20 MG/100ML; MG/100ML; MG/100ML; MG/100ML
75 INJECTION, SOLUTION INTRAVENOUS CONTINUOUS
Status: DISPENSED | OUTPATIENT
Start: 2019-12-02 | End: 2019-12-04

## 2019-12-02 RX ORDER — ADHESIVE BANDAGE
30 BANDAGE TOPICAL DAILY PRN
Status: DISCONTINUED | OUTPATIENT
Start: 2019-12-02 | End: 2020-01-06 | Stop reason: HOSPADM

## 2019-12-02 RX ORDER — ACETAMINOPHEN 325 MG/1
650 TABLET ORAL
Status: DISCONTINUED | OUTPATIENT
Start: 2019-12-02 | End: 2020-01-06 | Stop reason: HOSPADM

## 2019-12-02 RX ORDER — GUAIFENESIN 100 MG/5ML
81 LIQUID (ML) ORAL DAILY
Status: DISCONTINUED | OUTPATIENT
Start: 2019-12-03 | End: 2019-12-27 | Stop reason: SDUPTHER

## 2019-12-02 RX ORDER — ATORVASTATIN CALCIUM 10 MG/1
20 TABLET, FILM COATED ORAL
Status: DISCONTINUED | OUTPATIENT
Start: 2019-12-02 | End: 2020-01-06 | Stop reason: HOSPADM

## 2019-12-02 RX ORDER — OXYCODONE AND ACETAMINOPHEN 7.5; 325 MG/1; MG/1
1 TABLET ORAL
Status: DISCONTINUED | OUTPATIENT
Start: 2019-12-02 | End: 2020-01-06 | Stop reason: HOSPADM

## 2019-12-02 RX ORDER — CLOPIDOGREL BISULFATE 75 MG/1
75 TABLET ORAL DAILY
Status: DISCONTINUED | OUTPATIENT
Start: 2019-12-03 | End: 2020-01-06 | Stop reason: HOSPADM

## 2019-12-02 RX ORDER — SODIUM CHLORIDE 0.9 % (FLUSH) 0.9 %
5-40 SYRINGE (ML) INJECTION EVERY 8 HOURS
Status: DISCONTINUED | OUTPATIENT
Start: 2019-12-02 | End: 2019-12-03

## 2019-12-02 RX ORDER — HEPARIN SODIUM 5000 [USP'U]/ML
5000 INJECTION, SOLUTION INTRAVENOUS; SUBCUTANEOUS EVERY 12 HOURS
Status: DISCONTINUED | OUTPATIENT
Start: 2019-12-02 | End: 2020-01-06 | Stop reason: HOSPADM

## 2019-12-02 RX ORDER — ONDANSETRON 2 MG/ML
4 INJECTION INTRAMUSCULAR; INTRAVENOUS
Status: DISCONTINUED | OUTPATIENT
Start: 2019-12-02 | End: 2020-01-06 | Stop reason: HOSPADM

## 2019-12-02 RX ORDER — ONDANSETRON 2 MG/ML
4 INJECTION INTRAMUSCULAR; INTRAVENOUS
Status: COMPLETED | OUTPATIENT
Start: 2019-12-02 | End: 2019-12-02

## 2019-12-02 RX ORDER — MORPHINE SULFATE 4 MG/ML
4 INJECTION INTRAVENOUS
Status: COMPLETED | OUTPATIENT
Start: 2019-12-02 | End: 2019-12-02

## 2019-12-02 RX ADMIN — TUBERCULIN PURIFIED PROTEIN DERIVATIVE 5 UNITS: 5 INJECTION, SOLUTION INTRADERMAL at 19:21

## 2019-12-02 RX ADMIN — AZITHROMYCIN 500 MG: 500 INJECTION, POWDER, LYOPHILIZED, FOR SOLUTION INTRAVENOUS at 14:40

## 2019-12-02 RX ADMIN — Medication 10 ML: at 22:31

## 2019-12-02 RX ADMIN — CEFTRIAXONE 1 G: 1 INJECTION, POWDER, FOR SOLUTION INTRAMUSCULAR; INTRAVENOUS at 13:31

## 2019-12-02 RX ADMIN — SODIUM CHLORIDE 500 ML: 900 INJECTION, SOLUTION INTRAVENOUS at 09:40

## 2019-12-02 RX ADMIN — ATORVASTATIN CALCIUM 20 MG: 10 TABLET, FILM COATED ORAL at 22:31

## 2019-12-02 RX ADMIN — MORPHINE SULFATE 4 MG: 4 INJECTION INTRAVENOUS at 09:41

## 2019-12-02 RX ADMIN — SODIUM CHLORIDE, SODIUM LACTATE, POTASSIUM CHLORIDE, AND CALCIUM CHLORIDE 75 ML/HR: 600; 310; 30; 20 INJECTION, SOLUTION INTRAVENOUS at 14:47

## 2019-12-02 RX ADMIN — ONDANSETRON 4 MG: 2 INJECTION INTRAMUSCULAR; INTRAVENOUS at 09:40

## 2019-12-02 RX ADMIN — HEPARIN SODIUM 5000 UNITS: 5000 INJECTION INTRAVENOUS; SUBCUTANEOUS at 19:21

## 2019-12-02 RX ADMIN — SODIUM CHLORIDE 1000 ML: 900 INJECTION, SOLUTION INTRAVENOUS at 13:00

## 2019-12-02 NOTE — PROGRESS NOTES
12/02/19 1708   Dual Skin Pressure Injury Assessment   Dual Skin Pressure Injury Assessment WDL   Second Care Provider (Based on [address] [place] [address]) Frederick Wood RN   Skin Integumentary   Skin Integumentary (WDL) WDL   Skin Color Appropriate for ethnicity   Skin Condition/Temp Dry; Warm   Skin Integrity Other (comment)  (scattered red rash to groin/ bilateral hips)   Turgor Epidermis thin w/ loss of subcut tissue   Hair Growth Present   Varicosities Absent    Pressure  Injury Documentation No Pressure Injury Noted-Pressure Ulcer Prevention Initiated   Wound Prevention and Protection Methods   Orientation of Wound Prevention Posterior   Location of Wound Prevention Sacrum/Coccyx   Dressing Present  No   Wound Offloading (Prevention Methods) Bed, pressure reduction mattress

## 2019-12-02 NOTE — PROGRESS NOTES
Urinalysis suggestive of UTI  Urine positive for nitrites and leukocyte esterase with 4+ bacteria   Urine culture ordered  Pt does meet sepsis criteria now.

## 2019-12-02 NOTE — H&P
HOSPITALIST H&P/CONSULT  NAME:  Caridad Curling Sr   Age:  80 y.o.  :   1931   MRN:   [id number]  PCP: None  Consulting MD:  Treatment Team: Attending Provider: Wyatt Meza MD; Primary Nurse: Javier Rodriguez RN  HPI:   Patient is an 80years old male with hx of hiatal hernia, colon cancer, dyslipidemia, CAD s/p DAPT presented to ER with left sided chest pain for past 3 days following a fall. Pt reported that he fell hitting his left chest on a cardboard box. Since the fall, he has noticed left sided chest pain, difficulty in using left arm due to pain in left shoulder. He denies heart burn, nausea/vomiting, head trauma, seizure like episode, urinary symptoms, diarrhea, chills, fever, abdominal pain, headache, palpitations. Pain is left sided, retrosternal, 10/10 severity, dull, intermittent, no aggravating or alleviating factors. ER work up showed WBC 19K, Cr 1.58 (baseline 1.12-1.2), HR >110 with sinus tachycardia on EKG. CT chest showed moderate sized hiatal hernia with minimal basilar atelectasis. Complete ROS done and is as stated in HPI or otherwise negative  Past Medical History:   Diagnosis Date    Colon cancer ([facility] [address] 2012    Hiatal hernia       Past surgical history:  Reviewed, none available. Prior to Admission Medications   Prescriptions Last Dose Informant Patient Reported? Taking?   aspirin 81 mg chewable tablet   Yes No   Sig: Take 1 Tab by mouth daily. atorvastatin (LIPITOR) 20 mg tablet   No No   Sig: Take 1 Tab by mouth nightly. clopidogrel (PLAVIX) 75 mg tab   No No   Sig: Take 1 Tab by mouth daily. nitroglycerin (NITROSTAT) 0.4 mg SL tablet   No No   Si Tab by SubLINGual route every five (5) minutes as needed for Chest Pain. Up to 3 doses. pantoprazole (PROTONIX) 40 mg tablet   No No   Sig: Take 1 Tab by mouth Daily (before breakfast).       Facility-Administered Medications: None     No Known Allergies   Social History     Tobacco Use    Smoking status: Never Smoker    Smokeless tobacco: Never Used   Substance Use Topics    Alcohol use: Never     Frequency: Never      No family history on file. Objective:     Visit Vitals  /56   Pulse (!) 103   Temp 97.8 °F (36.6 °C)   Resp 25   Ht 5' 11\" (1.803 m)   Wt 68.9 kg (152 lb)   SpO2 98%   BMI 21.20 kg/m²      Temp (24hrs), Av.8 °F (36.6 °C), Min:97.8 °F (36.6 °C), Max:97.8 °F (36.6 °C)    Oxygen Therapy  O2 Sat (%): 98 % (19 1344)  Pulse via Oximetry: 104 beats per minute (19 1344)  O2 Device: Nasal cannula (19 0950)  O2 Flow Rate (L/min): 2 l/min (19 0950)  Physical Exam:  General:    Alert, cooperative, no distress, appears stated age. Head:   Normocephalic, without obvious abnormality, atraumatic. Nose:  Nares normal. No drainage or sinus tenderness. Lungs:   Clear to auscultation bilaterally. No Wheezing or Rhonchi. No rales. Heart:   Regular rate and rhythm,  no murmur, rub or gallop. Abdomen:   Soft, non-tender. Not distended. Bowel sounds normal.   Extremities: No cyanosis. No edema. No clubbing  Skin:     Texture, turgor normal. No rashes or lesions.   Not Jaundiced  Neurologic: Alert and oriented x 3, no focal deficits   Data Review:   Recent Results (from the past 24 hour(s))   EKG, 12 LEAD, INITIAL    Collection Time: 19  9:34 AM   Result Value Ref Range    Ventricular Rate 122 BPM    Atrial Rate 227 BPM    QRS Duration 76 ms    Q-T Interval 286 ms    QTC Calculation (Bezet) 407 ms    Calculated R Axis 26 degrees    Calculated T Axis 59 degrees    Diagnosis       !! AGE AND GENDER SPECIFIC ECG ANALYSIS !!  !!! Poor data quality, interpretation may be adversely affected  Sinus tachycardia   Nonspecific ST abnormality  Abnormal ECG    Confirmed by ELIZABET MATTHEWS (), HUONG SNEED ([id number]) on 2019 12:39:53 PM     CBC W/O DIFF    Collection Time: 19  9:37 AM   Result Value Ref Range    WBC 19.4 (H) 4.3 - 11.1 K/uL    RBC 4.42 4.23 - 5.6 M/uL    HGB 13.6 13.6 - 17.2 g/dL    HCT 42.6 41.1 - 50.3 %    MCV 96.4 79.6 - 97.8 FL    MCH 30.8 26.1 - 32.9 PG    MCHC 31.9 31.4 - 35.0 g/dL    RDW 16.8 (H) 11.9 - 14.6 %    PLATELET 828 270 - 078 K/uL    MPV 10.0 9.4 - 12.3 FL    ABSOLUTE NRBC 0.00 0.0 - 0.2 K/uL   METABOLIC PANEL, BASIC    Collection Time: 12/02/19  9:37 AM   Result Value Ref Range    Sodium 139 136 - 145 mmol/L    Potassium 4.3 3.5 - 5.1 mmol/L    Chloride 105 98 - 107 mmol/L    CO2 27 21 - 32 mmol/L    Anion gap 7 7 - 16 mmol/L    Glucose 128 (H) 65 - 100 mg/dL    BUN 21 8 - 23 MG/DL    Creatinine 1.58 (H) 0.8 - 1.5 MG/DL    GFR est AA 54 (L) >60 ml/min/1.73m2    GFR est non-AA 44 (L) >60 ml/min/1.73m2    Calcium 9.9 8.3 - 10.4 MG/DL   TROPONIN I    Collection Time: 12/02/19  9:37 AM   Result Value Ref Range    Troponin-I, Qt. <0.02 (L) 0.02 - 0.05 NG/ML   POC TROPONIN-I    Collection Time: 12/02/19 12:50 PM   Result Value Ref Range    Troponin-I (POC) 0.02 0.02 - 0.05 ng/ml   POC TROPONIN-I    Collection Time: 12/02/19 12:54 PM   Result Value Ref Range    Troponin-I (POC) 0.03 0.02 - 0.05 ng/ml   POC LACTIC ACID    Collection Time: 12/02/19  1:17 PM   Result Value Ref Range    Lactic Acid (POC) 1.22 0.5 - 1.9 mmol/L     Imaging /Procedures /Studies   CT chest w contrast:    Impression:     1. Minimal bibasilar infiltrate/atelectasis. 2. Moderate sized hiatal hernia. Assessment and Plan: Active Hospital Problems    Diagnosis Date Noted    Dehydration 12/02/2019    Debility 12/02/2019    Acute renal failure (ARF) ([facility] [address] 12/02/2019    Sepsis ([facility] [address] 12/02/2019    Acute cystitis without hematuria 12/02/2019       PLAN  · Admit as inpatient for SIRS and acute renal failure  · No clear source of infection. Check PCT  · F/u with blood cultures, urinalysis  · Given IV rocephin and azithro in ER. Hold further antibiotics until infectious work up is done. LA normal in ER. · Left sided chest pain, musculoskeletal in nature, reproducible.  12 lead EKG negative for acute ischemic changes. Check troponin x 3 sets  · Pain control with prn opioids  · CT chest shows minimal atelectatic changes in bases L>>R, no obvious parenchymal infiltrates seen. Will encourage Incentive spirometry. · Check ECHO, dimer, venous doppler  · Start xopenex nebs prn  · IV LR @ 75 /hr. Recheck BMP in AM. YAMILEX likely pre renal from dehydration. · Continue other home meds as reconciled in [facility]  · Tachycardia, likely pain driven and dehydration or infection. Treat the underlying etiology.   · PT/OT eval  · DVT prophylaxis with heparin    Code Status: full  High risk    Anticipated discharge: >2MN    Signed By: Mello De Santiago MD     December 2, 2019

## 2019-12-02 NOTE — ED TRIAGE NOTES
Pt reports having left sided chest and back pain. Pt reports falling 3 days ago and hitting his left side on a cardboard box. Pt reports not being able to move his left arm normally. Pt has 10/10 pain in his left side. Pt has a hx of heart attack. Denies taking any pain meds for his pain. Denies any other cardiac hx.

## 2019-12-02 NOTE — ED NOTES
Pt placed in a gown, this RN placed pt on 2 L 02 NC per Dr. Augustine Rosario orders. Family at bedside, this RN will continue to monitor.

## 2019-12-02 NOTE — PROGRESS NOTES
TRANSFER - IN REPORT:    Verbal report received from [address] (name) on Janet Juarez  being received from ED(unit) for routine progression of care      Report consisted of patients Situation, Background, Assessment and   Recommendations(SBAR). Information from the following report(s) SBAR was reviewed with the receiving nurse. Opportunity for questions and clarification was provided. Assessment completed upon patients arrival to unit and care assumed.

## 2019-12-02 NOTE — ED PROVIDER NOTES
80-year-old gentleman presents with concerns about pain in his left shoulder, left posterior ribs, and upper back. He says it started about 3 days ago when he fell and hit a box. He said his pain was a little improved yesterday but then got worse again today. He says is not really able to move his left arm well. 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 He says this does not feel anything like his heart related issues. He said it is painful when he tries to take a deep breath or if he tries to move or reposition. No other associated symptoms. Elements of this note were created using speech recognition software. As such, errors of speech recognition may be present. Past Medical History:   Diagnosis Date    [facility]    Hiatal hernia        No past surgical history on file. No family history on file.     Social History     Socioeconomic History    Marital status:      Spouse name: Not on file    Number of children: Not on file    Years of education: Not on file    Highest education level: Not on file   Occupational History    Not on file   Social Needs    Financial resource strain: Not on file    Food insecurity:     Worry: Not on file     Inability: Not on file    Transportation needs:     Medical: Not on file     Non-medical: Not on file   Tobacco Use    Smoking status: Never Smoker    Smokeless tobacco: Never Used   Substance and Sexual Activity    Alcohol use: Never     Frequency: Never    Drug use: Never    Sexual activity: Not on file   Lifestyle    Physical activity:     Days per week: Not on file     Minutes per session: Not on file    Stress: Not on file   Relationships    Social connections:     Talks on phone: Not on file     Gets together: Not on file     Attends Anabaptist service: Not on file     Active member of club or organization: Not on file     Attends meetings of clubs or organizations: Not on file     Relationship status: Not on file    Intimate partner violence:     Fear of current or ex partner: Not on file     Emotionally abused: Not on file     Physically abused: Not on file     Forced sexual activity: Not on file   Other Topics Concern    Not on file   Social History Narrative    Not on file         ALLERGIES: Patient has no known allergies. Review of Systems   Constitutional: Negative for chills, diaphoresis and fever. HENT: Negative for congestion, rhinorrhea and sore throat. Eyes: Negative for redness and visual disturbance. Respiratory: Positive for shortness of breath. Negative for cough, chest tightness and wheezing. Cardiovascular: Negative for chest pain and palpitations. Gastrointestinal: Negative for abdominal pain, blood in stool, diarrhea, nausea and vomiting. Endocrine: Negative for polydipsia and polyuria. Genitourinary: Negative for dysuria and hematuria. Musculoskeletal: Positive for arthralgias and myalgias. Negative for neck stiffness. Skin: Positive for color change. Negative for rash. Allergic/Immunologic: Negative for environmental allergies and food allergies. Neurological: Negative for dizziness, weakness and headaches. Hematological: Negative for adenopathy. Does not bruise/bleed easily. Psychiatric/Behavioral: Negative for confusion and sleep disturbance. The patient is not nervous/anxious. Vitals:    12/02/19 0930   BP: 155/86   Pulse: (!) 126   Resp: 16   Temp: 97.8 °F (36.6 °C)   SpO2: 93%   Weight: 68.9 kg (152 lb)   Height: 5' 11\" (1.803 m)            Physical Exam  Vitals signs and nursing note reviewed. Constitutional:       General: He is not in acute distress. Appearance: He is well-developed. He is not toxic-appearing. HENT:      Head: Normocephalic and atraumatic. Eyes:      General: No scleral icterus. Right eye: No discharge. Left eye: No discharge.       Conjunctiva/sclera: Conjunctivae normal. Pupils: Pupils are equal, round, and reactive to light. Neck:      Musculoskeletal: Normal range of motion. No neck rigidity. Cardiovascular:      Rate and Rhythm: Normal rate and regular rhythm. Heart sounds: Normal heart sounds. Pulmonary:      Effort: Pulmonary effort is normal. No respiratory distress. Breath sounds: Normal breath sounds. No wheezing or rales. Chest:      Chest wall: No tenderness. Abdominal:      General: Bowel sounds are normal. There is no distension. Palpations: Abdomen is soft. Tenderness: There is no guarding or rebound. Musculoskeletal: Normal range of motion. General: No tenderness. Comments: Limited range of motion in the left shoulder   Lymphadenopathy:      Cervical: No cervical adenopathy. Skin:     General: Skin is warm and dry. Comments: Multiple bruises along his left anterior and left midline ribs   Neurological:      General: No focal deficit present. Mental Status: He is alert and oriented to person, place, and time. Psychiatric:         Mood and Affect: Mood normal.         Behavior: Behavior normal.          MDM  Number of Diagnoses or Management Options  Diagnosis management comments: I will get a CT of his chest to evaluate for rib fractures, pulmonary contusion, or other occult injury. I will also check a troponin and an EKG. We will get a left shoulder x-ray to look for shoulder or clavicle injury. I will treat his pain with some morphine. Chest CT shows a questionable small infiltrate but no significant pulmonary contusion or rib injury. Shoulder x-ray is negative. His blood work has been otherwise unremarkable. I will take him off of oxygen and monitor his oxygen level and check a repeat troponin. Patient's repeat troponin and lactic acid are negative. His oxygen level dropped to 88 and 89% while on room air.   Given that and his persistent tachycardia I think he needs to be admitted for antibiotics, oxygen, and pulmonary rehab. I will discussed the case with the hospitalist.    1:46 PM  I spoke with the hospitalist who kindly agreed to see the patient.          Procedures

## 2019-12-02 NOTE — ED NOTES
TRANSFER - OUT REPORT:    Verbal report given to Stefania Escobar RN (name) on [address]  being transferred to room 628 (unit) for routine progression of care       Report consisted of patients Situation, Background, Assessment and   Recommendations(SBAR). Information from the following report(s) ED Summary was reviewed with the receiving nurse. Lines:   Peripheral IV 12/02/19 Right Antecubital (Active)   Site Assessment Clean, dry, & intact 12/2/2019  9:51 AM   Phlebitis Assessment 0 12/2/2019  9:51 AM   Infiltration Assessment 0 12/2/2019  9:51 AM   Dressing Status Clean, dry, & intact 12/2/2019  9:51 AM   Dressing Type 4 X 4 12/2/2019  9:51 AM       Peripheral IV 12/02/19 Right Wrist (Active)   Site Assessment Clean, dry, & intact 12/2/2019  2:48 PM   Phlebitis Assessment 0 12/2/2019  2:48 PM   Infiltration Assessment 0 12/2/2019  2:48 PM   Dressing Status Clean, dry, & intact 12/2/2019  2:48 PM   Hub Color/Line Status Pink 12/2/2019  2:48 PM        Opportunity for questions and clarification was provided.       Patient transported with:   pt belongings, fluids, zithromax

## 2019-12-03 LAB
ANION GAP SERPL CALC-SCNC: 5 MMOL/L (ref 7–16)
ATRIAL RATE: 357 BPM
BASOPHILS # BLD: 0 K/UL (ref 0–0.2)
BASOPHILS NFR BLD: 0 % (ref 0–2)
BUN SERPL-MCNC: 19 MG/DL (ref 8–23)
CALCIUM SERPL-MCNC: 8.6 MG/DL (ref 8.3–10.4)
CALCULATED R AXIS, ECG10: 42 DEGREES
CALCULATED T AXIS, ECG11: 40 DEGREES
CHLORIDE SERPL-SCNC: 107 MMOL/L (ref 98–107)
CO2 SERPL-SCNC: 27 MMOL/L (ref 21–32)
CREAT SERPL-MCNC: 1.33 MG/DL (ref 0.8–1.5)
DIAGNOSIS, 93000: NORMAL
DIFFERENTIAL METHOD BLD: ABNORMAL
EOSINOPHIL # BLD: 0.1 K/UL (ref 0–0.8)
EOSINOPHIL NFR BLD: 1 % (ref 0.5–7.8)
ERYTHROCYTE [DISTWIDTH] IN BLOOD BY AUTOMATED COUNT: 16.6 % (ref 11.9–14.6)
GLUCOSE SERPL-MCNC: 124 MG/DL (ref 65–100)
HCT VFR BLD AUTO: 34.1 % (ref 41.1–50.3)
HGB BLD-MCNC: 11 G/DL (ref 13.6–17.2)
IMM GRANULOCYTES # BLD AUTO: 0.3 K/UL (ref 0–0.5)
IMM GRANULOCYTES NFR BLD AUTO: 2 % (ref 0–5)
LYMPHOCYTES # BLD: 1.3 K/UL (ref 0.5–4.6)
LYMPHOCYTES NFR BLD: 10 % (ref 13–44)
MCH RBC QN AUTO: 31 PG (ref 26.1–32.9)
MCHC RBC AUTO-ENTMCNC: 32.3 G/DL (ref 31.4–35)
MCV RBC AUTO: 96.1 FL (ref 79.6–97.8)
MM INDURATION POC: NORMAL (ref 0–5)
MONOCYTES # BLD: 1.1 K/UL (ref 0.1–1.3)
MONOCYTES NFR BLD: 8 % (ref 4–12)
NEUTS SEG # BLD: 10 K/UL (ref 1.7–8.2)
NEUTS SEG NFR BLD: 79 % (ref 43–78)
NRBC # BLD: 0 K/UL (ref 0–0.2)
PLATELET # BLD AUTO: 240 K/UL (ref 150–450)
PMV BLD AUTO: 10 FL (ref 9.4–12.3)
POTASSIUM SERPL-SCNC: 4.2 MMOL/L (ref 3.5–5.1)
PPD POC: NORMAL
Q-T INTERVAL, ECG07: 324 MS
QRS DURATION, ECG06: 84 MS
QTC CALCULATION (BEZET), ECG08: 400 MS
RBC # BLD AUTO: 3.55 M/UL (ref 4.23–5.6)
SODIUM SERPL-SCNC: 139 MMOL/L (ref 136–145)
TROPONIN I SERPL-MCNC: 0.02 NG/ML (ref 0.02–0.05)
TROPONIN I SERPL-MCNC: <0.02 NG/ML (ref 0.02–0.05)
VENTRICULAR RATE, ECG03: 92 BPM
WBC # BLD AUTO: 12.7 K/UL (ref 4.3–11.1)

## 2019-12-03 PROCEDURE — 77030020255 HC SOL INJ LR 1000ML BG

## 2019-12-03 PROCEDURE — 97161 PT EVAL LOW COMPLEX 20 MIN: CPT

## 2019-12-03 PROCEDURE — 85025 COMPLETE CBC W/AUTO DIFF WBC: CPT

## 2019-12-03 PROCEDURE — 93306 TTE W/DOPPLER COMPLETE: CPT

## 2019-12-03 PROCEDURE — 74011250636 HC RX REV CODE- 250/636: Performed by: HOSPITALIST

## 2019-12-03 PROCEDURE — 97530 THERAPEUTIC ACTIVITIES: CPT

## 2019-12-03 PROCEDURE — 74011000258 HC RX REV CODE- 258: Performed by: HOSPITALIST

## 2019-12-03 PROCEDURE — 80048 BASIC METABOLIC PNL TOTAL CA: CPT

## 2019-12-03 PROCEDURE — 36415 COLL VENOUS BLD VENIPUNCTURE: CPT

## 2019-12-03 PROCEDURE — 65270000029 HC RM PRIVATE

## 2019-12-03 PROCEDURE — 74011250637 HC RX REV CODE- 250/637: Performed by: HOSPITALIST

## 2019-12-03 PROCEDURE — 84484 ASSAY OF TROPONIN QUANT: CPT

## 2019-12-03 RX ADMIN — Medication 10 ML: at 05:15

## 2019-12-03 RX ADMIN — HEPARIN SODIUM 5000 UNITS: 5000 INJECTION INTRAVENOUS; SUBCUTANEOUS at 05:14

## 2019-12-03 RX ADMIN — Medication 10 ML: at 21:20

## 2019-12-03 RX ADMIN — HEPARIN SODIUM 5000 UNITS: 5000 INJECTION INTRAVENOUS; SUBCUTANEOUS at 16:59

## 2019-12-03 RX ADMIN — CEFTRIAXONE 2 G: 2 INJECTION, POWDER, FOR SOLUTION INTRAMUSCULAR; INTRAVENOUS at 12:11

## 2019-12-03 RX ADMIN — ATORVASTATIN CALCIUM 20 MG: 10 TABLET, FILM COATED ORAL at 21:19

## 2019-12-03 RX ADMIN — OXYCODONE HYDROCHLORIDE AND ACETAMINOPHEN 1 TABLET: 7.5; 325 TABLET ORAL at 05:21

## 2019-12-03 RX ADMIN — PANTOPRAZOLE SODIUM 40 MG: 40 TABLET, DELAYED RELEASE ORAL at 05:14

## 2019-12-03 RX ADMIN — ASPIRIN 81 MG 81 MG: 81 TABLET ORAL at 08:24

## 2019-12-03 RX ADMIN — CLOPIDOGREL BISULFATE 75 MG: 75 TABLET, FILM COATED ORAL at 08:24

## 2019-12-03 RX ADMIN — OXYCODONE HYDROCHLORIDE AND ACETAMINOPHEN 1 TABLET: 7.5; 325 TABLET ORAL at 16:59

## 2019-12-03 NOTE — PROGRESS NOTES
Interdisciplinary Rounds completed 12/3/19. Nursing, Case Management, Physician and PT present. Plan of care reviewed and updated. Pt family requesting short to long term placement. Care Management following closely.

## 2019-12-03 NOTE — PROGRESS NOTES
Hourly rounds done. Pt c/o pain, medicated per MAR. Denies nausea, vomiting. Weaned from 2 to 1 L O2 NC. All needs met at this time.

## 2019-12-03 NOTE — PROGRESS NOTES
Hospitalist Progress Note    Subjective:   Daily Progress Note: 12/3/2019 1:00 PM    Patient presented to ER 12/2 with complaints of left shoulder, posterior ribs, back and chest pain after falling and striking left side on a cardboard box three days PTA. Reports he is unable to move left arm normally and pain is 10/10. Increased pain with inspiration. Tachy to 126 on arrival.  Hypoxic to 88% requiring oxygen. WBC: 19, creatinine: 1.58 with baseline of 1.12-1.2. CT chest with moderate size hiatal hernia with basilar atelectasis. Found with UTI, meets sepsis criteria. Cultures pending, rocephin begun. 12/3:  Up in chair, confused. Repeats same questions. PT in. CM with referral for PHP, currently a patient at the [place]. Lives alone, per son is unsafe, son requesting short to long term placement. Complains of intense left posterior shoulder pain and generalized arthritic type pain. ROM left shoulder decreased with guarding. Concern for tear. Xray without acute findings.      ADDITIONAL HISTORY:  Colon cancer, hiatal hernia, MI, hyperlipidemia, dementia     Current Facility-Administered Medications   Medication Dose Route Frequency    sodium chloride (NS) flush 5-40 mL  5-40 mL IntraVENous Q8H    sodium chloride (NS) flush 5-40 mL  5-40 mL IntraVENous PRN    aspirin chewable tablet 81 mg  81 mg Oral DAILY    atorvastatin (LIPITOR) tablet 20 mg  20 mg Oral QHS    clopidogrel (PLAVIX) tablet 75 mg  75 mg Oral DAILY    nitroglycerin (NITROSTAT) tablet 0.4 mg  0.4 mg SubLINGual Q5MIN PRN    pantoprazole (PROTONIX) tablet 40 mg  40 mg Oral ACB    sodium chloride (NS) flush 5-40 mL  5-40 mL IntraVENous Q8H    sodium chloride (NS) flush 5-40 mL  5-40 mL IntraVENous PRN    tuberculin injection 5 Units  5 Units IntraDERMal ONCE    acetaminophen (TYLENOL) tablet 650 mg  650 mg Oral Q6H PRN    oxyCODONE-acetaminophen (PERCOCET 7.5) 7.5-325 mg per tablet 1 Tab  1 Tab Oral Q6H PRN    HYDROmorphone (PF) (DILAUDID) injection 0.2 mg  0.2 mg IntraVENous Q4H PRN    naloxone (NARCAN) injection 0.4 mg  0.4 mg IntraVENous PRN    ondansetron (ZOFRAN) injection 4 mg  4 mg IntraVENous Q4H PRN    magnesium hydroxide (MILK OF MAGNESIA) 400 mg/5 mL oral suspension 30 mL  30 mL Oral DAILY PRN    heparin (porcine) injection 5,000 Units  5,000 Units SubCUTAneous Q12H    lactated Ringers infusion  75 mL/hr IntraVENous CONTINUOUS    cefTRIAXone (ROCEPHIN) 2 g in 0.9% sodium chloride (MBP/ADV) 50 mL  2 g IntraVENous Q24H        Review of Systems  Patient is unable     Objective:     Visit Vitals  /65   Pulse 93   Temp 97.7 °F (36.5 °C)   Resp 18   Ht 5' 11\" (1.803 m)   Wt 68.9 kg (152 lb)   SpO2 90%   BMI 21.20 kg/m²    O2 Flow Rate (L/min): 1 l/min O2 Device: Room air    Temp (24hrs), Av.3 °F (36.8 °C), Min:97.7 °F (36.5 °C), Max:98.9 °F (37.2 °C)     1901 -  0700  In: 1050 [I.V.:1050]  Out: 450 [Urine:450]    General appearance: Awake, alert, confused. oriented to self and place. Testy, but remains basically cooperative. Complains of left shoulder pain, generalized arthritic pain. Anxious. Head: Normocephalic, without obvious abnormality, atraumatic  Neck: supple, symmetrical, trachea midline, no JVD  Lungs: clear to auscultation bilaterally. Complains of left upper posterior rib pain with palp. No abnormality visualized. Heart: Irregular rate and rhythm, S1, S2 normal, no murmur, click, rub or gallop  Abdomen: soft, non-tender. Bowel sounds normal. No masses,  no organomegaly  Extremities: left shoulder with pain and limited ROM, guarding. Pain worse with palp to posterior aspect. No skin disruption or ecchymosis. All other extremities normal, arthritic joints, otherwise atraumatic, no cyanosis or edema  Skin: Skin color, texture, turgor normal. No rashes or lesions  Neurologic: Grossly normal, no unilateral deficit.      Additional comments: Notes,orders, test results, vitals reviewed    Data Review  Recent Results (from the past 24 hour(s))   POC LACTIC ACID    Collection Time: 12/02/19  1:17 PM   Result Value Ref Range    Lactic Acid (POC) 1.22 0.5 - 1.9 mmol/L   URINALYSIS W/ RFLX MICROSCOPIC    Collection Time: 12/02/19  2:24 PM   Result Value Ref Range    Color RONALDO      Appearance CLOUDY      Specific gravity 1.023 1.001 - 1.023      pH (UA) 5.0 5.0 - 9.0      Protein 30 (A) NEG mg/dL    Glucose NEGATIVE  mg/dL    Ketone NEGATIVE  NEG mg/dL    Bilirubin NEGATIVE  NEG      Blood TRACE (A) NEG      Urobilinogen 0.2 0.2 - 1.0 EU/dL    Nitrites POSITIVE (A) NEG      Leukocyte Esterase MODERATE (A) NEG      WBC  0 /hpf    RBC 0-3 0 /hpf    Epithelial cells 0 0 /hpf    Bacteria 4+ (H) 0 /hpf    Casts 10-20 0 /lpf   CULTURE, URINE    Collection Time: 12/02/19  2:24 PM   Result Value Ref Range    Special Requests: NO SPECIAL REQUESTS      Culture result: (A)       >100,000 COLONIES/mL GRAM NEGATIVE RODS SUBCULTURE IN PROGRESS   CULTURE, BLOOD    Collection Time: 12/02/19  2:37 PM   Result Value Ref Range    Special Requests: RIGHT  FOREARM        Culture result: NO GROWTH AFTER 17 HOURS     CULTURE, BLOOD    Collection Time: 12/02/19  2:38 PM   Result Value Ref Range    Special Requests: RIGHT  FOREARM        Culture result: NO GROWTH AFTER 17 HOURS     EKG, 12 LEAD, SUBSEQUENT    Collection Time: 12/02/19  4:03 PM   Result Value Ref Range    Ventricular Rate 92 BPM    Atrial Rate 357 BPM    QRS Duration 84 ms    Q-T Interval 324 ms    QTC Calculation (Bezet) 400 ms    Calculated R Axis 42 degrees    Calculated T Axis 40 degrees    Diagnosis       !! AGE AND GENDER SPECIFIC ECG ANALYSIS !!   Atrial fibrillation  Low voltage QRS  Septal infarct , age undetermined  Abnormal ECG    Confirmed by ELIZABET MATTHEWS (), Lalitha Peñaloza ([id number]) on 12/3/2019 7:33:24 AM     TROPONIN I    Collection Time: 12/02/19  7:58 PM   Result Value Ref Range    Troponin-I, Qt. <0.02 (L) 0.02 - 0.05 NG/ML   CK Collection Time: 12/02/19  7:58 PM   Result Value Ref Range    CK 98 21 - 962 U/L   METABOLIC PANEL, BASIC    Collection Time: 12/03/19 12:28 AM   Result Value Ref Range    Sodium 139 136 - 145 mmol/L    Potassium 4.2 3.5 - 5.1 mmol/L    Chloride 107 98 - 107 mmol/L    CO2 27 21 - 32 mmol/L    Anion gap 5 (L) 7 - 16 mmol/L    Glucose 124 (H) 65 - 100 mg/dL    BUN 19 8 - 23 MG/DL    Creatinine 1.33 0.8 - 1.5 MG/DL    GFR est AA >60 >60 ml/min/1.73m2    GFR est non-AA 54 (L) >60 ml/min/1.73m2    Calcium 8.6 8.3 - 10.4 MG/DL   TROPONIN I    Collection Time: 12/03/19 12:28 AM   Result Value Ref Range    Troponin-I, Qt. <0.02 (L) 0.02 - 0.05 NG/ML   CBC WITH AUTOMATED DIFF    Collection Time: 12/03/19  5:40 AM   Result Value Ref Range    WBC 12.7 (H) 4.3 - 11.1 K/uL    RBC 3.55 (L) 4.23 - 5.6 M/uL    HGB 11.0 (L) 13.6 - 17.2 g/dL    HCT 34.1 (L) 41.1 - 50.3 %    MCV 96.1 79.6 - 97.8 FL    MCH 31.0 26.1 - 32.9 PG    MCHC 32.3 31.4 - 35.0 g/dL    RDW 16.6 (H) 11.9 - 14.6 %    PLATELET 070 162 - 878 K/uL    MPV 10.0 9.4 - 12.3 FL    ABSOLUTE NRBC 0.00 0.0 - 0.2 K/uL    DF AUTOMATED      NEUTROPHILS 79 (H) 43 - 78 %    LYMPHOCYTES 10 (L) 13 - 44 %    MONOCYTES 8 4.0 - 12.0 %    EOSINOPHILS 1 0.5 - 7.8 %    BASOPHILS 0 0.0 - 2.0 %    IMMATURE GRANULOCYTES 2 0.0 - 5.0 %    ABS. NEUTROPHILS 10.0 (H) 1.7 - 8.2 K/UL    ABS. LYMPHOCYTES 1.3 0.5 - 4.6 K/UL    ABS. MONOCYTES 1.1 0.1 - 1.3 K/UL    ABS. EOSINOPHILS 0.1 0.0 - 0.8 K/UL    ABS. BASOPHILS 0.0 0.0 - 0.2 K/UL    ABS. IMM. GRANS. 0.3 0.0 - 0.5 K/UL   TROPONIN I    Collection Time: 12/03/19  5:40 AM   Result Value Ref Range    Troponin-I, Qt. 0.02 0.02 - 0.05 NG/ML      12/2:  CT HEAD: No acute intracranial abnormality. Other incidental findings as above. 12/2:  LOWER EXTREMITY DOPPLER:  No evidence of lower extremity DVT. 12/2:  LEFT  SHOULDER XRAY: Chronic degenerative changes. 12/2:  CT CHEST:  Minimal bibasilar infiltrate/atelectasis.   Moderate sized hiatal hernia. 12/2:  ECHOCARDIOGRAM:     -  Left ventricle: Systolic function was normal. Ejection fraction was estimated in the range of 55 % to 60 %. There were no regional wall motion abnormalities.    -  Right ventricle: Estimated peak pressure was in the range of 30-35 mmHg.   -  Aortic valve: Leaflets exhibited calcification and reduced mobility. There was mild stenosis. The aortic valve area by VTI was 1.21 cm2 .   -  Mitral valve: There was moderate thickening of the anterior leaflet. There was mild regurgitation.   -  Tricuspid valve: There was mild regurgitation.   -  Aorta, systemic arteries: There was mild dilatation of the ascending aorta.   -  Pericardium: There was no pericardial effusion. 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    PT on board    History of colon cancer  History of MI     Care Plan discussed with: Patient and Nurse, no family here at present    Signed By: Lisset Cullen NP     December 3, 2019

## 2019-12-03 NOTE — PROGRESS NOTES
[employer] for [employer] received request to offer assistance with [address]. Spoke with nurse to ensure that patient was sufficiently alert and oriented to proceed with consult. Reviewed information with patient. Mr. Moncho Vizcaino declined needing assistance completing the Advance Medical Directive. Most of the visit was spent offering spiritual interventions, including empathic listening, affirmation of emotions & jamir, exploration of coping skills and bereavement support. I provided contact information in the event patient has further questions or needs assistance with the HCPOA. Chaplains remain available for support. Rev.  Dea Oglesby MDiv, BS  Board Certified

## 2019-12-03 NOTE — PROGRESS NOTES
Problem: Mobility Impaired (Adult and Pediatric)  Goal: *Acute Goals and Plan of Care (Insert Text)  Description  LTG:  (1.)Mr. Vahe Pfeiffer will move from supine to sit and sit to supine, scoot up and down and roll side to side INDEPENDENTLY with bed flat within 7 treatment day(s). (2.)Mr. Vahe Pfeiffer will transfer from bed to chair and chair to bed wt INDEPENDENTLY within 7 treatment day(s). (3.)Mr. Elinor Castro will ambulate INDEPENDENTLY for 500+ feet within 7 treatment day(s). (4.)Mr. Vahe Pfeiffer will ascend and descend 15 steps with SUPERVISION using handrail(s) within 7 days. ________________________________________________________________________________________________   Outcome: Progressing Towards Goal     PHYSICAL THERAPY: Initial Assessment and AM 12/3/2019  INPATIENT: PT Visit Days : 1  Payor: Betzy Farrell / Plan: [facility] [employer] MEDICARE CHOICE PPO/PFFS / Product Type: [employer] Mobility Care Medicare /       NAME/AGE/GENDER: Hudson Yusuf is a 80 y.o. male   PRIMARY DIAGNOSIS: SIRS (systemic inflammatory response syndrome) ([facility]) [R65.10]  Acute renal failure (ARF) ([facility] [address] [N17.9]  Dehydration [E86.0]  Debility [R53.81] Sepsis ([facility] [address] Sepsis ([facility] [address]        ICD-10: Treatment Diagnosis:    Generalized Muscle Weakness (M62.81)  Difficulty in walking, Not elsewhere classified (R26.2)  Other abnormalities of gait and mobility (R26.89)  History of falling (Z91.81)   Precaution/Allergies:  Patient has no known allergies. ASSESSMENT:     Mr. Vahe Pfeiffer is a pleasant 80year old male admitted from home for SIRS, acute renal failure, debility, sepsis who is seen for initial therapy evaluation this morning. He lives at home alone and is typically independent/active without use of any DME. Pt presents in supine; he is quite talkative and seems a little confused. Performs bed mobility and transfer to sitting with supervision. Fair+ to good seated balance at edge of bed.  LE assessment shows AROM WFL bilaterally with min weakness noted. SBA for sit-stand transfer. Demonstrates fair to fair+ standing balance during functional mobility and activities in room, then ambulates for an additional 250 ft in hallway with CGA/SBA. Demonstrates narrow base of support, lateral trunk sway, and decreased safety awareness, no major loss of balance noted or assistance needed. Verbal cues provided for gait safety (body mechanism, posture, speed, base of support). Returned to room and up to chair after activity with chair alarm on, needs in reach. Sukh Brown Sr appears to be functioning below his typical independent baseline with mobility, strength, balance, and activity tolerance and will need continued therapy during hospital stay to address deficits/maximize safety and independence with mobility. Dc needs TBD pending progress- possible rehab vs home health PT? This section established at most recent assessment   PROBLEM LIST (Impairments causing functional limitations):  Decreased Strength  Decreased ADL/Functional Activities  Decreased Transfer Abilities  Decreased Ambulation Ability/Technique  Decreased Balance  Increased Pain  Decreased Activity Tolerance  Decreased Cognition   INTERVENTIONS PLANNED: (Benefits and precautions of physical therapy have been discussed with the patient.)  Balance Exercise  Bed Mobility  Gait Training  Home Exercise Program (HEP)  Therapeutic Activites  Therapeutic Exercise/Strengthening  Transfer Training     TREATMENT PLAN: Frequency/Duration: 3 times a week for duration of hospital stay  Rehabilitation Potential For Stated Goals: Good     REHAB RECOMMENDATIONS (at time of discharge pending progress):    Placement: It is my opinion, based on this patient's performance to date, that Mr. Candace Broderick may benefit from participating in 1-2 additional therapy sessions in order to continue to assess for rehab potential and then make recommendation for disposition at discharge.  STR vs HH PT pending progress. Equipment:   tbd               HISTORY:   History of Present Injury/Illness (Reason for Referral):  Per H&P, \"Pt reported that he fell hitting his left chest on a cardboard box. Since the fall, he has noticed left sided chest pain, difficulty in using left arm due to pain in left shoulder. He denies heart burn, nausea/vomiting, head trauma, seizure like episode, urinary symptoms, diarrhea, chills, fever, abdominal pain, headache, palpitations. Pain is left sided, retrosternal, 10/10 severity, dull, intermittent, no aggravating or alleviating factors. ER work up showed WBC 19K, Cr 1.58 (baseline 1.12-1.2), HR >110 with sinus tachycardia on EKG. CT chest showed moderate sized hiatal hernia with minimal basilar atelectasis\"    Past Medical History/Comorbidities:   Mr. Elvin Lyman  has a past medical history of Colon cancer ([facility] [address] (01/2012) and Hiatal hernia. Mr. Elvin Lyman  has no past surgical history on file. Social History/Living Environment:   Home Environment: Private residence  One/Two Story Residence: Two story  # of Interior Steps: 24  Lift Chair Available: No  Living Alone: Yes  Support Systems: Child(margi), Family member(s)  Patient Expects to be Discharged to[de-identified] Private residence  Current DME Used/Available at Home: None  Tub or Shower Type: Shower  Prior Level of Function/Work/Activity:  Lives alone in two story home. Independent, active at baseline without use of any DME. Does not drive. Denies falls. Number of Personal Factors/Comorbidities that affect the Plan of Care: 1-2: MODERATE COMPLEXITY   EXAMINATION:   Most Recent Physical Functioning:   Gross Assessment:  AROM: Within functional limits  Strength: Generally decreased, functional  Coordination: Within functional limits               Posture:  Posture (WDL): Exceptions to WDL  Posture Assessment:  Forward head, Rounded shoulders  Balance:  Sitting: Impaired  Sitting - Static: Good (unsupported)  Sitting - Dynamic: Fair (occasional)(+)  Standing: Impaired  Standing - Static: Fair  Standing - Dynamic : Fair Bed Mobility:  Rolling: Supervision  Supine to Sit: Supervision  Scooting: Supervision  Wheelchair Mobility:     Transfers:  Sit to Stand: Contact guard assistance  Stand to Sit: Contact guard assistance  Bed to Chair: Contact guard assistance  Interventions: Verbal cues; Safety awareness training  Duration: 10 Minutes  Gait:     Base of Support: Narrowed  Speed/Estrellita: Pace decreased (<100 feet/min); Slow  Step Length: Left shortened;Right shortened  Gait Abnormalities: Trunk sway increased;Decreased step clearance  Distance (ft): 250 Feet (ft)  Assistive Device: (none)  Ambulation - Level of Assistance: Contact guard assistance  Interventions: Verbal cues; Safety awareness training      Body Structures Involved:  Muscles Body Functions Affected: Movement Related Activities and Participation Affected:  General Tasks and Demands  Mobility  Domestic Life  Community, Social and [place]   Number of elements that affect the Plan of Care: 4+: HIGH COMPLEXITY   CLINICAL PRESENTATION:   Presentation: Stable and uncomplicated: LOW COMPLEXITY   CLINICAL DECISION MAKIN [facility] Inpatient Short Form  How much difficulty does the patient currently have. .. Unable A Lot A Little None   1. Turning over in bed (including adjusting bedclothes, sheets and blankets)? [] 1   [] 2   [] 3   [x] 4   2. Sitting down on and standing up from a chair with arms ( e.g., wheelchair, bedside commode, etc.)   [] 1   [] 2   [] 3   [x] 4   3. Moving from lying on back to sitting on the side of the bed? [] 1   [] 2   [] 3   [x] 4   How much help from another person does the patient currently need. .. Total A Lot A Little None   4. Moving to and from a bed to a chair (including a wheelchair)? [] 1   [] 2   [x] 3   [] 4   5. Need to walk in hospital room? [] 1   [] 2   [x] 3   [] 4   6.   Climbing 3-5 steps with a railing? [] 1   [] 2   [x] 3   [] 4   © 2007, Trustees of [address] [place] [address] [address] [address], under license to [email or web address]. All rights reserved      Score:  Initial: 21 Most Recent: X (Date: -- )    Interpretation of Tool:  Represents activities that are increasingly more difficult (i.e. Bed mobility, Transfers, Gait). Medical Necessity:     Patient demonstrates   good   rehab potential due to higher previous functional level. Reason for Services/Other Comments:  Patient   continues to demonstrate capacity to improve strength, balance, activity tolerance which will   increase independence, decrease amount of assistance required from caregiver, and increase safety  . Use of outcome tool(s) and clinical judgement create a POC that gives a: Clear prediction of patient's progress: LOW COMPLEXITY            TREATMENT:   (In addition to Assessment/Re-Assessment sessions the following treatments were rendered)   Pre-treatment Symptoms/Complaints:  \"thank you\"  Pain: Initial:   Pain Intensity 1: 2  Pain Location 1: Arm, Flank  Pain Orientation 1: Left  Pain Intervention(s) 1: Repositioned  Post Session:  0/10     Therapeutic Activity: (  10 Minutes ):  Therapeutic activities including Bed transfers, Chair transfers, Ambulation on level ground, and standing functional activities (static/dynamic) to improve mobility, strength, balance, and activity tolerance . Required minimal Verbal cues; Safety awareness training to promote static and dynamic balance in standing and promote motor control of bilateral, lower extremity(s).      Braces/Orthotics/Lines/Etc:   IV  O2 Device: Room air  Treatment/Session Assessment:    Response to Treatment:  pt performs mobility with CGA/min A  Interdisciplinary Collaboration:   Physical Therapist  Registered Nurse  After treatment position/precautions:   Up in chair  Bed alarm/tab alert on  Bed/Chair-wheels locked  Bed in low position  Call light within reach   Compliance with Program/Exercises: Will assess as treatment progresses  Recommendations/Intent for next treatment session: \"Next visit will focus on advancements to more challenging activities and reduction in assistance provided\".   Total Treatment Duration:  PT Patient Time In/Time Out  Time In: 1030  Time Out: [address], DPT

## 2019-12-03 NOTE — PROGRESS NOTES
Hourly rounds completed, pt denies needs at this time. Pain meds given once. Pt still with some confusion.

## 2019-12-03 NOTE — PROGRESS NOTES
CM met with pt and pt's son to complete assessment. Pt's son verified address, emergency contact, and insurance. Pt does not currently have a PCP, CM has sent referral for pt to connect with a PCP. Pt has been scheduled for apt for primary care @11:00am with Luciano Woodruff NP @ [facility]. Pt is able to complete some ADL's such as bathing, cooking, and cleaning, however, son provides supportive care when he is able to. Pt's son confirmed no DME. Pt has no difficulty obtaining medications in the community. Pt's son provides transportation when needed. Pt's son expressed concerns about pt's safety due to pt's mobility and not having family support locally to assist with daily needs. Pt's son feels that pt's memory is also a  concern when completing ADL's. CM asked son if the pt is able to live with family or in his home due to concerns, however, pt's son states he is unable to provide the care he needs due to his daily job schedule and traveling. Pt's son feels that STR will be beneficial to assisting with discharge needs and would prefer referral sent to [facility] for STR. CM provided pt's son with a list of facilities insured by Medicare and provided contact information for VA long term care facility and VA contact number. CM informed pt's son that contacting facility can begin process of pursing long term care and inform [facility] of pt's needs. CM will follow therapy recommendations and send referral.     Care Management Interventions  PCP Verified by CM: No(Pt does not currently have a PCP. CM sent referral. )  Mode of Transport at Discharge: Other (see comment)  Transition of Care Consult (CM Consult): Discharge Planning  Discharge Durable Medical Equipment: No  Physical Therapy Consult: Yes  Occupational Therapy Consult: Yes  Speech Therapy Consult: No  Current Support Network:  Other(Pt lives with a friend. )  Confirm Follow Up Transport: Family(Son provides transportation when needed. )  Plan discussed with Pt/Family/Caregiver: Yes  Freedom of Choice Offered: Yes  [address] Provided?: Yes(CM provided contact information for [facility]  Discharge Location  Discharge Placement: Unable to determine at this time

## 2019-12-04 LAB
ANION GAP SERPL CALC-SCNC: 5 MMOL/L (ref 7–16)
BUN SERPL-MCNC: 18 MG/DL (ref 8–23)
CALCIUM SERPL-MCNC: 8.9 MG/DL (ref 8.3–10.4)
CHLORIDE SERPL-SCNC: 106 MMOL/L (ref 98–107)
CO2 SERPL-SCNC: 27 MMOL/L (ref 21–32)
CREAT SERPL-MCNC: 1.24 MG/DL (ref 0.8–1.5)
GLUCOSE SERPL-MCNC: 106 MG/DL (ref 65–100)
MAGNESIUM SERPL-MCNC: 2 MG/DL (ref 1.8–2.4)
MM INDURATION POC: 0 MM (ref 0–5)
POTASSIUM SERPL-SCNC: 4.2 MMOL/L (ref 3.5–5.1)
PPD POC: NORMAL
SODIUM SERPL-SCNC: 138 MMOL/L (ref 136–145)

## 2019-12-04 PROCEDURE — 65270000029 HC RM PRIVATE

## 2019-12-04 PROCEDURE — 36415 COLL VENOUS BLD VENIPUNCTURE: CPT

## 2019-12-04 PROCEDURE — 74011000258 HC RX REV CODE- 258: Performed by: HOSPITALIST

## 2019-12-04 PROCEDURE — 83735 ASSAY OF MAGNESIUM: CPT

## 2019-12-04 PROCEDURE — 74011250637 HC RX REV CODE- 250/637: Performed by: NURSE PRACTITIONER

## 2019-12-04 PROCEDURE — 80048 BASIC METABOLIC PNL TOTAL CA: CPT

## 2019-12-04 PROCEDURE — 74011250636 HC RX REV CODE- 250/636: Performed by: HOSPITALIST

## 2019-12-04 PROCEDURE — 74011250637 HC RX REV CODE- 250/637: Performed by: HOSPITALIST

## 2019-12-04 RX ORDER — CALCIUM CARBONATE 200(500)MG
200 TABLET,CHEWABLE ORAL
Status: DISCONTINUED | OUTPATIENT
Start: 2019-12-05 | End: 2020-01-06 | Stop reason: HOSPADM

## 2019-12-04 RX ORDER — FAMOTIDINE 20 MG/1
20 TABLET, FILM COATED ORAL 2 TIMES DAILY
Status: DISCONTINUED | OUTPATIENT
Start: 2019-12-04 | End: 2019-12-05

## 2019-12-04 RX ADMIN — OXYCODONE HYDROCHLORIDE AND ACETAMINOPHEN 1 TABLET: 7.5; 325 TABLET ORAL at 21:21

## 2019-12-04 RX ADMIN — ASPIRIN 81 MG 81 MG: 81 TABLET ORAL at 08:30

## 2019-12-04 RX ADMIN — ATORVASTATIN CALCIUM 20 MG: 10 TABLET, FILM COATED ORAL at 21:21

## 2019-12-04 RX ADMIN — PANTOPRAZOLE SODIUM 40 MG: 40 TABLET, DELAYED RELEASE ORAL at 05:42

## 2019-12-04 RX ADMIN — Medication 10 ML: at 05:41

## 2019-12-04 RX ADMIN — Medication 10 ML: at 13:17

## 2019-12-04 RX ADMIN — CLOPIDOGREL BISULFATE 75 MG: 75 TABLET, FILM COATED ORAL at 08:30

## 2019-12-04 RX ADMIN — HEPARIN SODIUM 5000 UNITS: 5000 INJECTION INTRAVENOUS; SUBCUTANEOUS at 05:41

## 2019-12-04 RX ADMIN — HEPARIN SODIUM 5000 UNITS: 5000 INJECTION INTRAVENOUS; SUBCUTANEOUS at 17:25

## 2019-12-04 RX ADMIN — Medication 10 ML: at 21:23

## 2019-12-04 RX ADMIN — CEFTRIAXONE 2 G: 2 INJECTION, POWDER, FOR SOLUTION INTRAMUSCULAR; INTRAVENOUS at 13:13

## 2019-12-04 RX ADMIN — FAMOTIDINE 20 MG: 20 TABLET ORAL at 21:21

## 2019-12-04 NOTE — PROGRESS NOTES
Interdisciplinary Rounds completed 12/4/19. Nursing, Case Management, Physician and PT present. Plan of care reviewed and updated. Care Management following closely to find a safe discharge.

## 2019-12-04 NOTE — PROGRESS NOTES
Nutrition  Reason for assessment: Consult for general nutrition management and supplements (Dai Toney NP)    Assessment:   Food/Nutrition Patient History:  Admitted with sepsis (UTI), dehydration, ARF, acute cystitis, AMS. PMH remarkable for HH, colon ca, DLP, CAD and mild dementia. Pt is alert, oriented to person, place and time at my visit. He is very conversant however rarely answers any questions presented to him. He speaks in circular segments about playing tennis, his shoulder pain, having a \"heart attack at the mall\" after which his son picked him up and he took some tums but \"needed surgery. \"  He lives alone but is unable to provide me with any quantifiable nutrition history and changes the subject when asked about the frequency of eating at home and/or meal prep. He does indicate he doesn't eat pork or beef. He consumed the majority of his am meal today without any noted difficulties per nursing. DIET REGULAR      Anthropometrics:Height: 5' 11\" (180.3 cm),  Weight: 68.9 kg (152 lb), source not specified , Body mass index is 21.2 kg/m². BMI class of Underweight (Age >65 and BMI <22)     Macronutrient needs: 69 kg Listed body weight  EER:  5527-7450 kcal /day (25-30 kcal/kg)  EPR:  69-86 grams protein/day (1-1.25 grams/kg)    Intake/Comparative Standards: Recorded meal(s): 100, 75, 35% plus report of ~75% this am. If maintained this pattern will potentially meet % of kcal and ~100% of protein needs    Nutrition Diagnosis:   Predicted inadequate oral intake related to poor recollection as evidenced by pt admitted with AMS, lives alone and unable to recall any quantifiable nutrition history. Intervention:  Meals and snacks: Continue current diet. Pt appears to be able to meet needs when foods are prepared and presented to him. Noted plan for SNF, anticipate pt would have more consistent po intake at a facility. Food prefs entered in nutrition services software.    Nutrition Supplement Therapy: Declines at this time. Coordination of Nutrition Care: Discussed with Angela Suresh RN. Discharge Nutrition Plan: Too soon to determine.      [place], [address], [place] [address]

## 2019-12-04 NOTE — PROGRESS NOTES
Hospitalist Progress Note    Subjective:   Daily Progress Note: 12/4/2019 6:45 PM    Patient presented to ER 12/2 with complaints of left shoulder, posterior ribs, back and chest pain after falling and striking left side on a cardboard box three days PTA. Reports he is unable to move left arm normally and pain is 10/10. Increased pain with inspiration. Tachy to 126 on arrival.  Hypoxic to 88% requiring oxygen. WBC: 19, creatinine: 1.58 with baseline of 1.12-1.2. CT chest with moderate size hiatal hernia with basilar atelectasis. Found with UTI, meets sepsis criteria. Cultures pending, rocephin begun.    12/3:  Up in chair, confused, argumentive and demanding. Forgetful. Repeats same questions. PT in. CM with referral for PHP, currently a patient at the [place]. Lives alone, per son is unsafe, son requesting short to long term placement. Complains of intense left posterior shoulder pain and generalized arthritic type pain. ROM left shoulder decreased with guarding. Concern for tear. Xray without acute findings.      12/4:  Continues to threaten to leave. States we are not doing any thing for him. Repeatedly informed he has a UTI we are treating. Dismisses this and wants his heartburn addressed. Continues to complain of left shoulder pain and inability to articulate it normally. Guarding same. Unable vs unwilling to straight arm raise, side arm raise or straighten.   Keeps elbow flexed at 90 degrees close to chest.     Current Facility-Administered Medications   Medication Dose Route Frequency    sodium chloride (NS) flush 5-40 mL  5-40 mL IntraVENous Q8H    sodium chloride (NS) flush 5-40 mL  5-40 mL IntraVENous PRN    aspirin chewable tablet 81 mg  81 mg Oral DAILY    atorvastatin (LIPITOR) tablet 20 mg  20 mg Oral QHS    clopidogrel (PLAVIX) tablet 75 mg  75 mg Oral DAILY    nitroglycerin (NITROSTAT) tablet 0.4 mg  0.4 mg SubLINGual Q5MIN PRN    pantoprazole (PROTONIX) tablet 40 mg  40 mg Oral ACB  sodium chloride (NS) flush 5-40 mL  5-40 mL IntraVENous PRN    acetaminophen (TYLENOL) tablet 650 mg  650 mg Oral Q6H PRN    oxyCODONE-acetaminophen (PERCOCET 7.5) 7.5-325 mg per tablet 1 Tab  1 Tab Oral Q6H PRN    HYDROmorphone (PF) (DILAUDID) injection 0.2 mg  0.2 mg IntraVENous Q4H PRN    naloxone (NARCAN) injection 0.4 mg  0.4 mg IntraVENous PRN    ondansetron (ZOFRAN) injection 4 mg  4 mg IntraVENous Q4H PRN    magnesium hydroxide (MILK OF MAGNESIA) 400 mg/5 mL oral suspension 30 mL  30 mL Oral DAILY PRN    heparin (porcine) injection 5,000 Units  5,000 Units SubCUTAneous Q12H    cefTRIAXone (ROCEPHIN) 2 g in 0.9% sodium chloride (MBP/ADV) 50 mL  2 g IntraVENous Q24H        Review of Systems  A comprehensive review of systems was negative except for that written in the HPI. Objective:     Visit Vitals  /79 (BP 1 Location: Right arm, BP Patient Position: At rest)   Pulse (!) 109   Temp 99.3 °F (37.4 °C)   Resp 16   Ht 5' 11\" (1.803 m)   Wt 68.9 kg (152 lb)   SpO2 92%   BMI 21.20 kg/m²    O2 Flow Rate (L/min): 1 l/min O2 Device: Room air    Temp (24hrs), Av.3 °F (36.8 °C), Min:97.8 °F (36.6 °C), Max:99.3 °F (37.4 °C)     1901 -  0700  In: 680 [P.O.:680]  Out: 800 [Urine:800]    General appearance: Awake, alert, confused. oriented to self and place. Testy, but remains basically cooperative. Complains of continued left shoulder pain, generalized arthritic pain. Anxious. Head: Normocephalic, without obvious abnormality, atraumatic  Neck: supple, symmetrical, trachea midline, no JVD  Lungs: clear to auscultation bilaterally. Complains of left upper posterior rib pain with palp. No abnormality visualized. Heart: Irregular rate and rhythm, S1, S2 normal, no murmur, click, rub or gallop  Abdomen: soft, non-tender. Bowel sounds normal. No masses,  no organomegaly  Extremities: left shoulder with pain and limited ROM, guarding. Pain worse with palp to posterior aspect.   No skin disruption or ecchymosis. All other extremities normal, arthritic joints, otherwise atraumatic, no cyanosis or edema  Skin: Skin color, texture, turgor normal. No rashes or lesions  Neurologic: Grossly normal, no unilateral deficit. Additional comments: Notes,orders, test results, vitals reviewed    Data Review  Recent Results (from the past 24 hour(s))   METABOLIC PANEL, BASIC    Collection Time: 12/04/19  6:02 AM   Result Value Ref Range    Sodium 138 136 - 145 mmol/L    Potassium 4.2 3.5 - 5.1 mmol/L    Chloride 106 98 - 107 mmol/L    CO2 27 21 - 32 mmol/L    Anion gap 5 (L) 7 - 16 mmol/L    Glucose 106 (H) 65 - 100 mg/dL    BUN 18 8 - 23 MG/DL    Creatinine 1.24 0.8 - 1.5 MG/DL    GFR est AA >60 >60 ml/min/1.73m2    GFR est non-AA 58 (L) >60 ml/min/1.73m2    Calcium 8.9 8.3 - 10.4 MG/DL   MAGNESIUM    Collection Time: 12/04/19  6:02 AM   Result Value Ref Range    Magnesium 2.0 1.8 - 2.4 mg/dL   PLEASE READ & DOCUMENT PPD TEST IN 48 HRS    Collection Time: 12/04/19  6:28 PM   Result Value Ref Range    PPD      mm Induration 0 0 - 5 mm     URINE CULTURE: PRELIM: >100,000 COLONIES GRAM NEGATIVE RODS. BLOOD CULTURES: 2/2:   NO GROWTH X 2 DAYS    12/2:  CT HEAD: No acute intracranial abnormality.  Other incidental findings as above.     12/2:  LOWER EXTREMITY DOPPLER:  No evidence of lower extremity DVT.    12/2:  LEFT  SHOULDER XRAY: Chronic degenerative changes.     12/2:  CT CHEST:  Minimal bibasilar infiltrate/atelectasis. Moderate sized hiatal hernia. 12/2:  ECHOCARDIOGRAM:     -  Left ventricle: Systolic function was normal. Ejection fraction was estimated in the range of 55 % to 60 %. There were no regional wall motion abnormalities.    -  Right ventricle: Estimated peak pressure was in the range of 30-35 mmHg.   -  Aortic valve: Leaflets exhibited calcification and reduced mobility. There was mild stenosis. The aortic valve area by VTI was 1.21 cm2 .   -  Mitral valve:  There was moderate thickening of the anterior leaflet. There was mild regurgitation.   -  Tricuspid valve: There was mild regurgitation.   -  Aorta, systemic arteries: There was mild dilatation of the ascending aorta.   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  Will get  MRI prior               PT on board     History of colon cancer  History of MI     Care Plan discussed with: Patient and Nurse  Have not seen son     Signed By: Yashira Lozano NP     December 4, 2019

## 2019-12-04 NOTE — PROGRESS NOTES
CM received a call from pt's son Colin Orr [phone] requesting an update regarding pt's discharge plan. CM informed pt's son awaiting insurance approval for pt to go to [facility]. CM will inform family when update has been received.

## 2019-12-04 NOTE — PROGRESS NOTES
Problem: Falls - Risk of  Goal: *Absence of Falls  Description  Document Дмитрий Garcia Fall Risk and appropriate interventions in the flowsheet. Outcome: Progressing Towards Goal  Note: Fall Risk Interventions:  Mobility Interventions: Bed/chair exit alarm, OT consult for ADLs, Patient to call before getting OOB              Elimination Interventions: Bed/chair exit alarm, Call light in reach, Stay With Me (per policy)    History of Falls Interventions: Bed/chair exit alarm, Consult care management for discharge planning         Problem: Patient Education: Go to Patient Education Activity  Goal: Patient/Family Education  Outcome: Progressing Towards Goal     Problem: Pressure Injury - Risk of  Goal: *Prevention of pressure injury  Description  Document Tyler Scale and appropriate interventions in the flowsheet.   Outcome: Progressing Towards Goal  Note: Pressure Injury Interventions:  Sensory Interventions: Assess changes in LOC    Moisture Interventions: Absorbent underpads    Activity Interventions: Increase time out of bed    Mobility Interventions: Float heels    Nutrition Interventions: Document food/fluid/supplement intake    Friction and Shear Interventions: Apply protective barrier, creams and emollients                Problem: Patient Education: Go to Patient Education Activity  Goal: Patient/Family Education  Outcome: Progressing Towards Goal     Problem: Urinary Tract Infection - Adult  Goal: *Absence of infection signs and symptoms  Outcome: Progressing Towards Goal     Problem: Patient Education: Go to Patient Education Activity  Goal: Patient/Family Education  Outcome: Progressing Towards Goal

## 2019-12-04 NOTE — PROGRESS NOTES
Hourly rounds completed. Patient with periodic confusion, but able to reorient. Having circular conversations. Remains in bed this shift. Matthews draining clear yellow urine. No needs at this time. Will continue to monitor and give report to oncoming RN.

## 2019-12-05 ENCOUNTER — APPOINTMENT (OUTPATIENT)
Dept: MRI IMAGING | Age: 84
DRG: 871 | End: 2019-12-05
Attending: EMERGENCY MEDICINE
Payer: MEDICARE

## 2019-12-05 LAB
ANION GAP SERPL CALC-SCNC: 5 MMOL/L (ref 7–16)
BACTERIA SPEC CULT: ABNORMAL
BASOPHILS # BLD: 0 K/UL (ref 0–0.2)
BASOPHILS NFR BLD: 0 % (ref 0–2)
BUN SERPL-MCNC: 15 MG/DL (ref 8–23)
CALCIUM SERPL-MCNC: 8.9 MG/DL (ref 8.3–10.4)
CHLORIDE SERPL-SCNC: 104 MMOL/L (ref 98–107)
CO2 SERPL-SCNC: 30 MMOL/L (ref 21–32)
CREAT SERPL-MCNC: 1.28 MG/DL (ref 0.8–1.5)
DIFFERENTIAL METHOD BLD: ABNORMAL
EOSINOPHIL # BLD: 0 K/UL (ref 0–0.8)
EOSINOPHIL NFR BLD: 0 % (ref 0.5–7.8)
ERYTHROCYTE [DISTWIDTH] IN BLOOD BY AUTOMATED COUNT: 15.9 % (ref 11.9–14.6)
GLUCOSE SERPL-MCNC: 130 MG/DL (ref 65–100)
HCT VFR BLD AUTO: 34.5 % (ref 41.1–50.3)
HGB BLD-MCNC: 11.1 G/DL (ref 13.6–17.2)
IMM GRANULOCYTES # BLD AUTO: 0.2 K/UL (ref 0–0.5)
IMM GRANULOCYTES NFR BLD AUTO: 2 % (ref 0–5)
LYMPHOCYTES # BLD: 0.6 K/UL (ref 0.5–4.6)
LYMPHOCYTES NFR BLD: 6 % (ref 13–44)
MCH RBC QN AUTO: 30.7 PG (ref 26.1–32.9)
MCHC RBC AUTO-ENTMCNC: 32.2 G/DL (ref 31.4–35)
MCV RBC AUTO: 95.3 FL (ref 79.6–97.8)
MM INDURATION POC: 0 MM (ref 0–5)
MONOCYTES # BLD: 0.7 K/UL (ref 0.1–1.3)
MONOCYTES NFR BLD: 6 % (ref 4–12)
NEUTS SEG # BLD: 9.9 K/UL (ref 1.7–8.2)
NEUTS SEG NFR BLD: 86 % (ref 43–78)
NRBC # BLD: 0 K/UL (ref 0–0.2)
PLATELET # BLD AUTO: 298 K/UL (ref 150–450)
PMV BLD AUTO: 10 FL (ref 9.4–12.3)
POTASSIUM SERPL-SCNC: 4.1 MMOL/L (ref 3.5–5.1)
PPD POC: NEGATIVE NEGATIVE
RBC # BLD AUTO: 3.62 M/UL (ref 4.23–5.6)
SERVICE CMNT-IMP: ABNORMAL
SODIUM SERPL-SCNC: 139 MMOL/L (ref 136–145)
WBC # BLD AUTO: 11.5 K/UL (ref 4.3–11.1)

## 2019-12-05 PROCEDURE — 97165 OT EVAL LOW COMPLEX 30 MIN: CPT

## 2019-12-05 PROCEDURE — 97530 THERAPEUTIC ACTIVITIES: CPT

## 2019-12-05 PROCEDURE — 73221 MRI JOINT UPR EXTREM W/O DYE: CPT

## 2019-12-05 PROCEDURE — 80048 BASIC METABOLIC PNL TOTAL CA: CPT

## 2019-12-05 PROCEDURE — 74011250636 HC RX REV CODE- 250/636: Performed by: HOSPITALIST

## 2019-12-05 PROCEDURE — 74011250637 HC RX REV CODE- 250/637: Performed by: HOSPITALIST

## 2019-12-05 PROCEDURE — 65270000029 HC RM PRIVATE

## 2019-12-05 PROCEDURE — 85025 COMPLETE CBC W/AUTO DIFF WBC: CPT

## 2019-12-05 PROCEDURE — 74011250637 HC RX REV CODE- 250/637: Performed by: NURSE PRACTITIONER

## 2019-12-05 PROCEDURE — 36415 COLL VENOUS BLD VENIPUNCTURE: CPT

## 2019-12-05 PROCEDURE — 74011000258 HC RX REV CODE- 258: Performed by: HOSPITALIST

## 2019-12-05 RX ORDER — HALOPERIDOL 5 MG/ML
2.5 INJECTION INTRAMUSCULAR
Status: DISCONTINUED | OUTPATIENT
Start: 2019-12-05 | End: 2020-01-06 | Stop reason: HOSPADM

## 2019-12-05 RX ORDER — FAMOTIDINE 10 MG/1
20 TABLET ORAL DAILY
Status: DISCONTINUED | OUTPATIENT
Start: 2019-12-06 | End: 2020-01-06 | Stop reason: HOSPADM

## 2019-12-05 RX ADMIN — CLOPIDOGREL BISULFATE 75 MG: 75 TABLET, FILM COATED ORAL at 09:05

## 2019-12-05 RX ADMIN — Medication 10 ML: at 21:22

## 2019-12-05 RX ADMIN — OXYCODONE HYDROCHLORIDE AND ACETAMINOPHEN 1 TABLET: 7.5; 325 TABLET ORAL at 21:21

## 2019-12-05 RX ADMIN — HEPARIN SODIUM 5000 UNITS: 5000 INJECTION INTRAVENOUS; SUBCUTANEOUS at 17:20

## 2019-12-05 RX ADMIN — CALCIUM CARBONATE (ANTACID) CHEW TAB 500 MG 200 MG: 500 CHEW TAB at 17:20

## 2019-12-05 RX ADMIN — Medication 10 ML: at 05:18

## 2019-12-05 RX ADMIN — ASPIRIN 81 MG 81 MG: 81 TABLET ORAL at 09:05

## 2019-12-05 RX ADMIN — ATORVASTATIN CALCIUM 20 MG: 10 TABLET, FILM COATED ORAL at 21:21

## 2019-12-05 RX ADMIN — FAMOTIDINE 20 MG: 20 TABLET ORAL at 09:05

## 2019-12-05 RX ADMIN — CEFTRIAXONE 2 G: 2 INJECTION, POWDER, FOR SOLUTION INTRAMUSCULAR; INTRAVENOUS at 12:03

## 2019-12-05 RX ADMIN — CALCIUM CARBONATE (ANTACID) CHEW TAB 500 MG 200 MG: 500 CHEW TAB at 12:03

## 2019-12-05 RX ADMIN — HEPARIN SODIUM 5000 UNITS: 5000 INJECTION INTRAVENOUS; SUBCUTANEOUS at 05:16

## 2019-12-05 RX ADMIN — CALCIUM CARBONATE (ANTACID) CHEW TAB 500 MG 200 MG: 500 CHEW TAB at 09:05

## 2019-12-05 NOTE — PROGRESS NOTES
Interdisciplinary Rounds completed 12/5/19. Nursing, Case Management, Physician and PT present. Plan of care reviewed and updated. Peer to Peer review.

## 2019-12-05 NOTE — PROGRESS NOTES
Physical Therapy Note:    Attempted to see patient  for physical therapy treatment session. Patient currently off of the floor (MRI). Will follow and re-attempt as schedule permits/patient available.     Thank you,  Markell Arambula, PT, DPT

## 2019-12-05 NOTE — PROGRESS NOTES
Hourly rounds completed. Patient is confused. Has tried to get out of bed unassisted a few times this shift. Bed alarm is activated. Patient is resting in bed at this time eating dinner, calm. No needs at this time. Will continue to monitor and give report to oncoming RN.

## 2019-12-05 NOTE — PROGRESS NOTES
Hospitalist Progress Note    Subjective:   Daily Progress Note: 12/5/2019 5:04 PM    Patient presented to ER 12/2 with complaints of left shoulder, posterior ribs, back and chest pain after falling and striking left side on a cardboard box three days PTA.  Reports he is unable to move left arm normally and pain is 10/10.  Increased pain with inspiration. Tachy to 126 on arrival.  Hypoxic to 88% requiring oxygen. WBC: 19, creatinine: 1.58 with baseline of 1.12-1. 2.  CT chest with moderate size hiatal hernia with basilar atelectasis. Found with UTI, meets sepsis criteria.  Cultures pending, rocephin begun.    12/3: Jessi Lasso in chair, confused, argumentive and demanding. Forgetful. Repeats same questions.   PT in. CM with referral for PHP, currently a patient at the [facility] alone, per son is unsafe, son requesting short to long term placement.  Complains of intense left posterior shoulder pain and generalized arthritic type pain. ROM left shoulder decreased with guarding.  Concern for tear. Xray without acute findings.    12/4:  Continues to threaten to leave. States we are not doing any thing for him. Repeatedly informed he has a UTI we are treating. Dismisses this and wants his heartburn addressed. Continues to complain of left shoulder pain and inability to articulate it normally. Guarding same. Unable vs unwilling to straight arm raise, side arm raise or straighten. Keeps elbow flexed at 90 degrees close to chest.     12/5:  Hematuria while in MRI. MRI left shoulder with chronic findings only. Ortho consult cancelled, can see OP given nothing acute. CM informs me patient's insurance denied STR. CM notifying son for alternate discharge plan. Patient is not able to care for himself. Patient remains sour but a little more cooperative. Have not seen son since assuming care for patient. Patient believes he is going home tomorrow. A little more oriented today, unclear baseline.      Current Facility-Administered Medications   Medication Dose Route Frequency    [START ON 2019] famotidine (PEPCID) tablet 20 mg  20 mg Oral DAILY    calcium carbonate (TUMS) chewable tablet 200 mg [elemental]  200 mg Oral TID WITH MEALS    sodium chloride (NS) flush 5-40 mL  5-40 mL IntraVENous Q8H    sodium chloride (NS) flush 5-40 mL  5-40 mL IntraVENous PRN    aspirin chewable tablet 81 mg  81 mg Oral DAILY    atorvastatin (LIPITOR) tablet 20 mg  20 mg Oral QHS    clopidogrel (PLAVIX) tablet 75 mg  75 mg Oral DAILY    nitroglycerin (NITROSTAT) tablet 0.4 mg  0.4 mg SubLINGual Q5MIN PRN    sodium chloride (NS) flush 5-40 mL  5-40 mL IntraVENous PRN    acetaminophen (TYLENOL) tablet 650 mg  650 mg Oral Q6H PRN    oxyCODONE-acetaminophen (PERCOCET 7.5) 7.5-325 mg per tablet 1 Tab  1 Tab Oral Q6H PRN    HYDROmorphone (PF) (DILAUDID) injection 0.2 mg  0.2 mg IntraVENous Q4H PRN    naloxone (NARCAN) injection 0.4 mg  0.4 mg IntraVENous PRN    ondansetron (ZOFRAN) injection 4 mg  4 mg IntraVENous Q4H PRN    magnesium hydroxide (MILK OF MAGNESIA) 400 mg/5 mL oral suspension 30 mL  30 mL Oral DAILY PRN    heparin (porcine) injection 5,000 Units  5,000 Units SubCUTAneous Q12H    cefTRIAXone (ROCEPHIN) 2 g in 0.9% sodium chloride (MBP/ADV) 50 mL  2 g IntraVENous Q24H        Review of Systems  Patient is unable. Objective:     Visit Vitals  /75 (BP 1 Location: Right arm, BP Patient Position: At rest)   Pulse 93   Temp 98.7 °F (37.1 °C)   Resp 17   Ht 5' 11\" (1.803 m)   Wt 68.9 kg (152 lb)   SpO2 92%   BMI 21.20 kg/m²    O2 Flow Rate (L/min): 1 l/min O2 Device: Room air    Temp (24hrs), Av.4 °F (36.9 °C), Min:97.7 °F (36.5 °C), Max:98.9 °F (37.2 °C)    General appearance: Awake, alert, less confused today. Seems to be oriented to self, place and situation. Testy with multiple complaints, but remains basically cooperative.  Complains of continued left shoulder pain, generalized arthritic pain.  Anxious.     Head: Normocephalic, without obvious abnormality, atraumatic  Neck: supple, symmetrical, trachea midline, no JVD  Lungs: clear to auscultation bilaterally.  Complains of left upper posterior rib pain with palp.  No abnormality visualized.    Heart: Irregular rate and rhythm, S1, S2 normal, no murmur, click, rub or gallop  Abdomen: soft, non-tender. Bowel sounds normal. No masses,  no organomegaly  Extremities: left shoulder with pain and limited ROM, guarding.  Pain worse with palp to posterior aspect.  No skin disruption or ecchymosis.  All other extremities normal, arthritic joints, otherwise atraumatic, no cyanosis or edema. Generalized weakness. Skin: Skin color, texture, turgor normal. No rashes or lesions  Neurologic: Grossly normal, no unilateral deficit.     Additional comments: Notes,orders, test results, vitals reviewed    Data Review  Recent Results (from the past 24 hour(s))   PLEASE READ & DOCUMENT PPD TEST IN 48 HRS    Collection Time: 12/04/19  6:28 PM   Result Value Ref Range    PPD      mm Induration 0 0 - 5 mm   CBC WITH AUTOMATED DIFF    Collection Time: 12/05/19  5:19 AM   Result Value Ref Range    WBC 11.5 (H) 4.3 - 11.1 K/uL    RBC 3.62 (L) 4.23 - 5.6 M/uL    HGB 11.1 (L) 13.6 - 17.2 g/dL    HCT 34.5 (L) 41.1 - 50.3 %    MCV 95.3 79.6 - 97.8 FL    MCH 30.7 26.1 - 32.9 PG    MCHC 32.2 31.4 - 35.0 g/dL    RDW 15.9 (H) 11.9 - 14.6 %    PLATELET 573 570 - 708 K/uL    MPV 10.0 9.4 - 12.3 FL    ABSOLUTE NRBC 0.00 0.0 - 0.2 K/uL    DF AUTOMATED      NEUTROPHILS 86 (H) 43 - 78 %    LYMPHOCYTES 6 (L) 13 - 44 %    MONOCYTES 6 4.0 - 12.0 %    EOSINOPHILS 0 (L) 0.5 - 7.8 %    BASOPHILS 0 0.0 - 2.0 %    IMMATURE GRANULOCYTES 2 0.0 - 5.0 %    ABS. NEUTROPHILS 9.9 (H) 1.7 - 8.2 K/UL    ABS. LYMPHOCYTES 0.6 0.5 - 4.6 K/UL    ABS. MONOCYTES 0.7 0.1 - 1.3 K/UL    ABS. EOSINOPHILS 0.0 0.0 - 0.8 K/UL    ABS. BASOPHILS 0.0 0.0 - 0.2 K/UL    ABS. IMM.  GRANS. 0.2 0.0 - 0.5 K/UL   METABOLIC PANEL, BASIC    Collection Time: 12/05/19  5:19 AM   Result Value Ref Range    Sodium 139 136 - 145 mmol/L    Potassium 4.1 3.5 - 5.1 mmol/L    Chloride 104 98 - 107 mmol/L    CO2 30 21 - 32 mmol/L    Anion gap 5 (L) 7 - 16 mmol/L    Glucose 130 (H) 65 - 100 mg/dL    BUN 15 8 - 23 MG/DL    Creatinine 1.28 0.8 - 1.5 MG/DL    GFR est AA >60 >60 ml/min/1.73m2    GFR est non-AA 56 (L) >60 ml/min/1.73m2    Calcium 8.9 8.3 - 10.4 MG/DL      12/2:  CT HEAD: No acute intracranial abnormality.  Other incidental findings as above.     12/2:  LOWER EXTREMITY DOPPLER:  No evidence of lower extremity DVT.    12/2:  LEFT  SHOULDER XRAY: Chronic degenerative changes.     12/2:  CT CHEST:  Minimal bibasilar infiltrate/atelectasis.  Moderate sized hiatal hernia.     12/5:  MRI SHOULDER LEFT:   Advanced osteoarthritis of the glenohumeral joint. The rotator cuff tendon is intact. Mild degenerative hypertrophy of the [facility] joint with a mild  subacromial/subdeltoid bursitis.     12/2:  ECHOCARDIOGRAM:     -  Left ventricle: Systolic function was normal. Ejection fraction was estimated in the range of 55 % to 60 %. There were no regional wall motion abnormalities.    -  Right ventricle: Estimated peak pressure was in the range of 30-35 mmHg.   -  Aortic valve: Leaflets exhibited calcification and reduced mobility. There was mild stenosis. The aortic valve area by VTI was 1.21 cm2 .   -  Mitral valve: There was moderate thickening of the anterior leaflet. There was mild regurgitation.   -  Tricuspid valve: There was mild regurgitation.   -  Aorta, systemic arteries:  There was mild dilatation of the ascending aorta.   -  Pericardium: There was no pericardial effusion    URINE CULTURE:    Culture result: Abnormal       Final   >100,000 COLONIES/mL ESCHERICHIA COLI    Susceptibility     Escherichia coli   Antibiotic Interpretation Value Method Comment   Trimeth-Sulfamethoxa Resistant >2/38 ug/mL LILI    Nitrofurantoin Susceptible <=16 ug/mL LILI Ampicillin ($) Resistant >16 ug/mL LILI    Gentamicin ($) Susceptible <=1 ug/mL LILI    Tobramycin ($) Susceptible 1 ug/mL LILI    Ampicillin/sulbactam ($) Resistant >16/8 ug/mL LILI    Cefazolin ($) Susceptible 8 ug/mL LILI    Ceftriaxone ($) Susceptible <=0.5 ug/mL LILI    Cefepime ($$) Susceptible <=0.5 ug/mL LILI    Piperacillin/Tazobac ($) Susceptible <=2/4 ug/mL LILI    Aztreonam ($$$$) Susceptible <=1 ug/mL LILI    Cefoxitin Susceptible <=4 ug/mL LILI      BLOOD CULTURES:  2/2:  NGTD    Assessment/Plan:   Sepsis due to E COLI UTI:  Resolved               Continue sepsis protocol              Continue rocephin, de-escalate to po on discharge              Final Blood and urine cultures above      Altered mental status:  Multifactorial              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 on head CT              Not safe to live alone per son              HV on board for short to long term placement/rehab              PPD, OT, PT consults      Dehydration:  Poor nutrition and illness              Continue IVF              Nutrition consult     Debility:  As above     Acute renal failure:  Monitor              IVF     Acute cystitis without hematuria              Continue rocephin              Monitor output     Fall with blunt trauma left upper ribs and left shoulder:              IPVWZ negative              Guarding left shoulder with limited arm           movement:   MRI as above with chronic findings              PT on board     History of colon cancer  History of MI      Care Plan discussed with: Patient, CM and Nurse    Signed By: Yves Smart NP     December 5, 2019

## 2019-12-05 NOTE — PROGRESS NOTES
Problem: Self Care Deficits Care Plan (Adult)  Goal: *Acute Goals and Plan of Care (Insert Text)  Description  1. Patient will complete lower body bathing and dressing with supervision and adaptive equipment as needed. 2. Patient will complete toileting with supervision. 3. Patient will tolerate 30 minutes of OT treatment with 2-3 rest breaks to increase activity tolerance for ADLs. 4. Patient will complete functional transfers with supervision and adaptive equipment as needed. 5. Patient will complete functional mobility for household distances with supervision and safe use of adaptive device to decrease risk for falls. 6. Patient will participate in 10 minutes of therapeutic exercises to increase strength for ADL/functional transfers. Timeframe: 7 visits      Outcome: Progressing Towards Goal     OCCUPATIONAL THERAPY: Initial Assessment, Daily Note, and AM 12/5/2019  INPATIENT: OT Visit Days: 1  Payor: Giorgi Kendrick / Plan: [facility] [employer] MEDICARE CHOICE PPO/PFFS / Product Type: Managed Care Medicare /      NAME/AGE/GENDER: Jameson Watt is a 80 y.o. male   PRIMARY DIAGNOSIS:  SIRS (systemic inflammatory response syndrome) ([facility] [address] [R65.10]  Acute renal failure (ARF) ([facility] [address] [N17.9]  Dehydration [E86.0]  Debility [R53.81] Sepsis ([facility] [address] Sepsis ([facility] [address]        ICD-10: Treatment Diagnosis:    Generalized Muscle Weakness (M62.81)  Other lack of cordination (R27.8)  History of falling (Z91.81)   Precautions/Allergies:     Patient has no known allergies. ASSESSMENT:     Mr. Brigida Nathan presents to the hospital with SIRS, ARF, dehydration, and debility. Pt lives alone at baseline and reports that typically he is independent with ADL and functional mobility. Pt reports that he recently had a fall at home over a tennis ball falling on his L side with pain in his L shoulder, ribcage, and L LE. Pt did not have any major complaints of shoulder pain and had it reclined behind his head in the bed.  Pt is confused with conversation. Pt thinks he is in [place], [place] and states he is 80years old. Pt is very talkative and likes to tell many stories. He was very pleasant throughout but lacks insight to safety and deficits. Pt needed additional cueing and minimal assistance for bed mobility. Once standing pt is very unsteady and a high fall risk. Pt holds to walls and had multiple losses of balance. Pt denies using a walker initially, but then eventually when placed a walker in front of him he did use it and appears slightly more steady. Pt will likely benefit from a rehab stay at discharge due to patient's confusion and high fall risk. Pt will benefit from OT services to address stated goals and plan of care. This section established at most recent assessment   PROBLEM LIST (Impairments causing functional limitations):  Decreased Strength  Decreased ADL/Functional Activities  Decreased Transfer Abilities  Decreased Ambulation Ability/Technique  Decreased Balance  Decreased Activity Tolerance  Increased Fatigue  Decreased Flexibility/Joint Mobility  Decreased [place] with Home Exercise Program  Decreased Cognition   INTERVENTIONS PLANNED: (Benefits and precautions of occupational therapy have been discussed with the patient.)  Activities of daily living training  Adaptive equipment training  Balance training  Clothing management  Cognitive training  Donning&doffing training  Neuromuscular re-eduation  Therapeutic activity  Therapeutic exercise     TREATMENT PLAN: Frequency/Duration: Follow patient 3 times per week to address above goals. Rehabilitation Potential For Stated Goals: Good     REHAB RECOMMENDATIONS (at time of discharge pending progress):    Placement:   It is my opinion, based on this patient's performance to date, that Mr. Tamika Salazar may benefit from intensive therapy at a [address] [place] [address] after discharge due to the functional deficits listed above that are likely to improve with skilled rehabilitation and concerns that he/she may be unsafe to be unsupervised at home due to risk for falls. Pt may also benefit from [facility]. Equipment:   TBD               OCCUPATIONAL PROFILE AND HISTORY:   History of Present Injury/Illness (Reason for Referral):  See H&P  Past Medical History/Comorbidities:   Mr. Tamika Salazar  has a past medical history of Colon cancer ([address] (01/2012) and Hiatal hernia. Mr. Tamika Salazar  has no past surgical history on file. Social History/Living Environment:   Home Environment: Private residence  One/Two Story Residence: Two story  # of Interior Steps: 24  Lift Chair Available: No  Living Alone: Yes  Support Systems: Child(margi), Family member(s)  Patient Expects to be Discharged to[de-identified] Private residence  Current DME Used/Available at Home: None  Tub or Shower Type: Shower  Prior Level of Function/Work/Activity:  Pt lives alone at baseline and reports that typically he is independent with ADL and functional mobility. Pt reports that he recently had a fall at home over a tennis ball falling on his L side with pain in his L shoulder, ribcage, and L LE  Personal Factors:          Social Background:  Lives alone        Past/Current Experience:  hx of falls        Overall Behavior:  confused; decrease insight to deficits; poor safety. Number of Personal Factors/Comorbidities that affect the Plan of Care: Extensive review of physical, cognitive, and psychosocial performance (3+):  HIGH COMPLEXITY   ASSESSMENT OF OCCUPATIONAL PERFORMANCE[de-identified]   Activities of Daily Living:   Basic ADLs (From Assessment) Complex ADLs (From Assessment)   Feeding: Supervision  Oral Facial Hygiene/Grooming: Stand-by assistance  Bathing: Moderate assistance  Upper Body Dressing: Minimum assistance  Lower Body Dressing: Moderate assistance  Toileting: Moderate assistance Instrumental ADL  Meal Preparation: Total assistance  Homemaking:  Total assistance   Grooming/Bathing/Dressing Activities of Daily Living Cognitive Retraining  Safety/Judgement: Decreased awareness of need for assistance;Decreased awareness of need for safety;Decreased insight into deficits; Fall prevention;Home safety                       Bed/Mat Mobility  Rolling: Minimum assistance  Supine to Sit: Minimum assistance  Sit to Stand: Minimum assistance  Stand to Sit: Minimum assistance  Bed to Chair: Moderate assistance  Scooting: Minimum assistance     Most Recent Physical Functioning:   Gross Assessment:  AROM: Generally decreased, functional(pt holding L shoulder behind his head in reclined position)  Strength: Generally decreased, functional  Coordination: Generally decreased, functional               Posture:  Posture (WDL): Exceptions to WDL  Posture Assessment:  Forward head, Rounded shoulders  Balance:  Sitting: Impaired  Sitting - Static: Good (unsupported)  Sitting - Dynamic: Fair (occasional)  Standing: Impaired  Standing - Static: Fair  Standing - Dynamic : Poor Bed Mobility:  Rolling: Minimum assistance  Supine to Sit: Minimum assistance  Scooting: Minimum assistance  Wheelchair Mobility:     Transfers:  Sit to Stand: Minimum assistance  Stand to Sit: Minimum assistance  Bed to Chair: Moderate assistance            Patient Vitals for the past 6 hrs:   BP BP Patient Position SpO2 Pulse   12/05/19 0732 160/81 At rest 93 % 95   12/05/19 1124 136/79 Sitting 93 % (!) 101       Mental Status  Neurologic State: Alert, Confused  Orientation Level: Disoriented to place, Disoriented to situation, Disoriented to time, Oriented to person  Cognition: Impaired decision making, Impulsive, Memory loss, Poor safety awareness  Perception: Appears intact  Perseveration: Perseverates during conversation  Safety/Judgement: Decreased awareness of need for assistance, Decreased awareness of need for safety, Decreased insight into deficits, Fall prevention, Home safety                          Physical Skills Involved:  Range of Motion  Balance  Strength  Activity Tolerance Cognitive Skills Affected (resulting in the inability to perform in a timely and safe manner):  Executive Function  Short Term Recall  Sustained Attention Psychosocial Skills Affected:  Habits/Routines  Self-Awareness   Number of elements that affect the Plan of Care: 5+:  HIGH COMPLEXITY   CLINICAL DECISION MAKIN[address] [place] [address] AM-PAC 6 Clicks   Daily Activity Inpatient Short Form  How much help from another person does the patient currently need. .. Total A Lot A Little None   1. Putting on and taking off regular lower body clothing? [] 1   [x] 2   [] 3   [] 4   2. Bathing (including washing, rinsing, drying)? [] 1   [x] 2   [] 3   [] 4   3. Toileting, which includes using toilet, bedpan or urinal?   [] 1   [x] 2   [] 3   [] 4   4. Putting on and taking off regular upper body clothing? [] 1   [] 2   [x] 3   [] 4   5. Taking care of personal grooming such as brushing teeth? [] 1   [] 2   [x] 3   [] 4   6. Eating meals? [] 1   [] 2   [x] 3   [] 4   © , Trustees of [address] [place] [address], under license to [employer]. All rights reserved      Score:  Initial: 15 Most Recent: X (Date: -- )    Interpretation of Tool:  Represents activities that are increasingly more difficult (i.e. Bed mobility, Transfers, Gait). Medical Necessity:     Patient demonstrates   good   rehab potential due to higher previous functional level. Reason for Services/Other Comments:  Patient continues to require skilled intervention due to   Decreased independence with ADL/functional transfers   . Use of outcome tool(s) and clinical judgement create a POC that gives a: LOW COMPLEXITY         TREATMENT:   (In addition to Assessment/Re-Assessment sessions the following treatments were rendered)     Pre-treatment Symptoms/Complaints:    Pain: Initial:   Pain Intensity 1: 0  Post Session:  0/10     Therapeutic Activity: (    10 minutes):   Therapeutic activities including Bed transfers, Chair transfers, and Ambulation on level ground to improve mobility, strength, balance, and coordination. Required moderate assistance   to promote static and dynamic balance in standing. N/a     Braces/Orthotics/Lines/Etc:   O2 Device: Room air  Treatment/Session Assessment:    Response to Treatment:  Pt very unsteady with activity. Interdisciplinary Collaboration:   Occupational Therapist  Registered Nurse  After treatment position/precautions:   Up in chair  Bed alarm/tab alert on  Bed/Chair-wheels locked  Call light within reach  RN notified   Compliance with Program/Exercises: Will assess as treatment progresses. Recommendations/Intent for next treatment session: \"Next visit will focus on advancements to more challenging activities and reduction in assistance provided\".   Total Treatment Duration:  OT Patient Time In/Time Out  Time In: 1040  Time Out: Ashish 145, OT

## 2019-12-05 NOTE — PROGRESS NOTES
CM was notified by BRAYAN PICKERING [facility] with [facility], the pt has been denied for STR at a SNF. CM will notify family and discuss discharge plan.

## 2019-12-05 NOTE — PROGRESS NOTES
Attempted to call nurse 3 times, unable to reach 6th floor.  Patient had incidence of hematuria in MRI when using urinal.

## 2019-12-05 NOTE — PROGRESS NOTES
RAVINDER received a call from Kane angulo with [facility] requesting end date for IV medication. RAVINDER spoke with Amy Reese NP, who informed CM the pt's end date for IV mediation is this day @ 1pm. RAVINDER informed Kane angulo of this information. Kane angulo stated that if a peer to peer would like to be done to support reasoning of pt needing SNF, it will need to be completed by 2:00pm [place] Time [phone] option 5.  RAVINDER will notify NP.

## 2019-12-05 NOTE — PROGRESS NOTES
Problem: Falls - Risk of  Goal: *Absence of Falls  Description  Document Dixon Engle Fall Risk and appropriate interventions in the flowsheet. Outcome: Progressing Towards Goal  Note: Fall Risk Interventions:  Mobility Interventions: Bed/chair exit alarm, OT consult for ADLs, Patient to call before getting OOB              Elimination Interventions: Bed/chair exit alarm, Call light in reach, Patient to call for help with toileting needs    History of Falls Interventions: Bed/chair exit alarm, Consult care management for discharge planning         Problem: Patient Education: Go to Patient Education Activity  Goal: Patient/Family Education  Outcome: Progressing Towards Goal     Problem: Pressure Injury - Risk of  Goal: *Prevention of pressure injury  Description  Document Tyler Scale and appropriate interventions in the flowsheet.   Outcome: Progressing Towards Goal  Note: Pressure Injury Interventions:  Sensory Interventions: Assess changes in LOC, Assess need for specialty bed    Moisture Interventions: Absorbent underpads, Apply protective barrier, creams and emollients    Activity Interventions: Increase time out of bed, Pressure redistribution bed/mattress(bed type)    Mobility Interventions: Float heels, HOB 30 degrees or less, Pressure redistribution bed/mattress (bed type)    Nutrition Interventions: Document food/fluid/supplement intake, Offer support with meals,snacks and hydration    Friction and Shear Interventions: Apply protective barrier, creams and emollients, Foam dressings/transparent film/skin sealants                Problem: Patient Education: Go to Patient Education Activity  Goal: Patient/Family Education  Outcome: Progressing Towards Goal     Problem: Urinary Tract Infection - Adult  Goal: *Absence of infection signs and symptoms  Outcome: Progressing Towards Goal     Problem: Patient Education: Go to Patient Education Activity  Goal: Patient/Family Education  Outcome: Progressing Towards Goal

## 2019-12-06 LAB
ANION GAP SERPL CALC-SCNC: 6 MMOL/L (ref 7–16)
BASOPHILS # BLD: 0.1 K/UL (ref 0–0.2)
BASOPHILS NFR BLD: 1 % (ref 0–2)
BUN SERPL-MCNC: 16 MG/DL (ref 8–23)
CALCIUM SERPL-MCNC: 9.1 MG/DL (ref 8.3–10.4)
CHLORIDE SERPL-SCNC: 103 MMOL/L (ref 98–107)
CO2 SERPL-SCNC: 29 MMOL/L (ref 21–32)
CREAT SERPL-MCNC: 1.3 MG/DL (ref 0.8–1.5)
DIFFERENTIAL METHOD BLD: ABNORMAL
EOSINOPHIL # BLD: 0.1 K/UL (ref 0–0.8)
EOSINOPHIL NFR BLD: 1 % (ref 0.5–7.8)
ERYTHROCYTE [DISTWIDTH] IN BLOOD BY AUTOMATED COUNT: 15.9 % (ref 11.9–14.6)
GLUCOSE SERPL-MCNC: 108 MG/DL (ref 65–100)
HCT VFR BLD AUTO: 33.9 % (ref 41.1–50.3)
HGB BLD-MCNC: 11 G/DL (ref 13.6–17.2)
IMM GRANULOCYTES # BLD AUTO: 0.4 K/UL (ref 0–0.5)
IMM GRANULOCYTES NFR BLD AUTO: 3 % (ref 0–5)
LYMPHOCYTES # BLD: 1.1 K/UL (ref 0.5–4.6)
LYMPHOCYTES NFR BLD: 8 % (ref 13–44)
MCH RBC QN AUTO: 31 PG (ref 26.1–32.9)
MCHC RBC AUTO-ENTMCNC: 32.4 G/DL (ref 31.4–35)
MCV RBC AUTO: 95.5 FL (ref 79.6–97.8)
MONOCYTES # BLD: 0.9 K/UL (ref 0.1–1.3)
MONOCYTES NFR BLD: 7 % (ref 4–12)
NEUTS SEG # BLD: 10.5 K/UL (ref 1.7–8.2)
NEUTS SEG NFR BLD: 81 % (ref 43–78)
NRBC # BLD: 0 K/UL (ref 0–0.2)
PLATELET # BLD AUTO: 315 K/UL (ref 150–450)
PMV BLD AUTO: 10.1 FL (ref 9.4–12.3)
POTASSIUM SERPL-SCNC: 4 MMOL/L (ref 3.5–5.1)
RBC # BLD AUTO: 3.55 M/UL (ref 4.23–5.6)
SODIUM SERPL-SCNC: 138 MMOL/L (ref 136–145)
WBC # BLD AUTO: 13 K/UL (ref 4.3–11.1)

## 2019-12-06 PROCEDURE — 74011250637 HC RX REV CODE- 250/637: Performed by: NURSE PRACTITIONER

## 2019-12-06 PROCEDURE — 74011250636 HC RX REV CODE- 250/636: Performed by: NURSE PRACTITIONER

## 2019-12-06 PROCEDURE — 74011000250 HC RX REV CODE- 250: Performed by: NURSE PRACTITIONER

## 2019-12-06 PROCEDURE — 74011250637 HC RX REV CODE- 250/637: Performed by: HOSPITALIST

## 2019-12-06 PROCEDURE — 80048 BASIC METABOLIC PNL TOTAL CA: CPT

## 2019-12-06 PROCEDURE — 74011250637 HC RX REV CODE- 250/637: Performed by: INTERNAL MEDICINE

## 2019-12-06 PROCEDURE — 97530 THERAPEUTIC ACTIVITIES: CPT

## 2019-12-06 PROCEDURE — 85025 COMPLETE CBC W/AUTO DIFF WBC: CPT

## 2019-12-06 PROCEDURE — 74011000258 HC RX REV CODE- 258: Performed by: HOSPITALIST

## 2019-12-06 PROCEDURE — 65270000029 HC RM PRIVATE

## 2019-12-06 PROCEDURE — 36415 COLL VENOUS BLD VENIPUNCTURE: CPT

## 2019-12-06 PROCEDURE — 74011250636 HC RX REV CODE- 250/636: Performed by: HOSPITALIST

## 2019-12-06 RX ORDER — FAMOTIDINE 20 MG/1
20 TABLET, FILM COATED ORAL DAILY
Qty: 30 TAB | Refills: 0 | Status: SHIPPED | OUTPATIENT
Start: 2019-12-07

## 2019-12-06 RX ORDER — CEFPODOXIME PROXETIL 100 MG/1
100 TABLET, FILM COATED ORAL 2 TIMES DAILY
Qty: 6 TAB | Refills: 0 | Status: SHIPPED | OUTPATIENT
Start: 2019-12-07 | End: 2019-12-07 | Stop reason: SDUPTHER

## 2019-12-06 RX ORDER — LIDOCAINE 4 G/100G
1 PATCH TOPICAL EVERY 24 HOURS
Status: DISCONTINUED | OUTPATIENT
Start: 2019-12-06 | End: 2020-01-06 | Stop reason: HOSPADM

## 2019-12-06 RX ADMIN — ASPIRIN 81 MG 81 MG: 81 TABLET ORAL at 08:25

## 2019-12-06 RX ADMIN — Medication 10 ML: at 14:15

## 2019-12-06 RX ADMIN — HEPARIN SODIUM 5000 UNITS: 5000 INJECTION INTRAVENOUS; SUBCUTANEOUS at 14:46

## 2019-12-06 RX ADMIN — HALOPERIDOL LACTATE 2.5 MG: 5 INJECTION INTRAMUSCULAR at 14:47

## 2019-12-06 RX ADMIN — CEFTRIAXONE 2 G: 2 INJECTION, POWDER, FOR SOLUTION INTRAMUSCULAR; INTRAVENOUS at 12:20

## 2019-12-06 RX ADMIN — CALCIUM CARBONATE (ANTACID) CHEW TAB 500 MG 200 MG: 500 CHEW TAB at 17:10

## 2019-12-06 RX ADMIN — CALCIUM CARBONATE (ANTACID) CHEW TAB 500 MG 200 MG: 500 CHEW TAB at 07:47

## 2019-12-06 RX ADMIN — ATORVASTATIN CALCIUM 20 MG: 10 TABLET, FILM COATED ORAL at 21:05

## 2019-12-06 RX ADMIN — Medication 10 ML: at 21:05

## 2019-12-06 RX ADMIN — CALCIUM CARBONATE (ANTACID) CHEW TAB 500 MG 200 MG: 500 CHEW TAB at 11:22

## 2019-12-06 RX ADMIN — HEPARIN SODIUM 5000 UNITS: 5000 INJECTION INTRAVENOUS; SUBCUTANEOUS at 05:23

## 2019-12-06 RX ADMIN — Medication 10 ML: at 05:23

## 2019-12-06 RX ADMIN — CLOPIDOGREL BISULFATE 75 MG: 75 TABLET, FILM COATED ORAL at 08:25

## 2019-12-06 RX ADMIN — FAMOTIDINE 20 MG: 20 TABLET, FILM COATED ORAL at 09:00

## 2019-12-06 NOTE — PROGRESS NOTES
Interdisciplinary Rounds completed 12/6/19. Nursing, Case Management, Physician and PT present. Plan of care reviewed and updated. Appeal discharge.

## 2019-12-06 NOTE — PROGRESS NOTES
Hospitalist Progress Note    Subjective:   Daily Progress Note: 12/6/2019 2:57 PM    Patient presented to ER 12/2 with complaints of left shoulder, posterior ribs, back and chest pain after falling and striking left side on a cardboard box three days PTA.  Reports he is unable to move left arm normally and pain is 10/10.  Increased pain with inspiration. Tachy to 126 on arrival.  Hypoxic to 88% requiring oxygen. WBC: 19, creatinine: 1.58 with baseline of 1.12-1. 2.  CT chest with moderate size hiatal hernia with basilar atelectasis. Found with UTI, meets sepsis criteria.  Cultures pending, rocephin begun.    12/3: Mike Brain in chair, confused, argumentive and demanding. Forgetful. Repeats same questions.   PT in. CM with referral for PHP, currently a patient at the [address] alone, per son is unsafe, son requesting short to long term placement.  Complains of intense left posterior shoulder pain and generalized arthritic type pain. ROM left shoulder decreased with guarding.  Concern for tear. Xray without acute findings.    12/4:  Continues to threaten to leave.  States we are not doing any thing for him. Repeatedly informed he has a UTI we are treating.  Dismisses this and wants his heartburn addressed. Continues to complain of left shoulder pain and inability to articulate it normally.  Guarding same.  Unable vs unwilling to straight arm raise, side arm raise or straighten.  Keeps elbow flexed at 90 degrees close to chest.    12/5:  Hematuria while in MRI. MRI left shoulder with chronic findings only. rtho consult cancelled, can see OP given nothing acute. CM informs me patient's insurance denied STR. CM notifying son for alternate discharge plan. Patient remains sour but a little more cooperative. Have not seen son since assuming care for patient. Patient believes he is going home tomorrow.  A little more oriented today, unclear baseline    12/6:  Son is appealing STR denial. In holding pattern due to insurance at this point. Urine culture below, will finish course of rocephin tomorrow. Current Facility-Administered Medications   Medication Dose Route Frequency    famotidine (PEPCID) tablet 20 mg  20 mg Oral DAILY    haloperidol lactate (HALDOL) injection 2.5 mg  2.5 mg IntraVENous Q6H PRN    calcium carbonate (TUMS) chewable tablet 200 mg [elemental]  200 mg Oral TID WITH MEALS    sodium chloride (NS) flush 5-40 mL  5-40 mL IntraVENous Q8H    sodium chloride (NS) flush 5-40 mL  5-40 mL IntraVENous PRN    aspirin chewable tablet 81 mg  81 mg Oral DAILY    atorvastatin (LIPITOR) tablet 20 mg  20 mg Oral QHS    clopidogrel (PLAVIX) tablet 75 mg  75 mg Oral DAILY    nitroglycerin (NITROSTAT) tablet 0.4 mg  0.4 mg SubLINGual Q5MIN PRN    sodium chloride (NS) flush 5-40 mL  5-40 mL IntraVENous PRN    acetaminophen (TYLENOL) tablet 650 mg  650 mg Oral Q6H PRN    oxyCODONE-acetaminophen (PERCOCET 7.5) 7.5-325 mg per tablet 1 Tab  1 Tab Oral Q6H PRN    HYDROmorphone (PF) (DILAUDID) injection 0.2 mg  0.2 mg IntraVENous Q4H PRN    naloxone (NARCAN) injection 0.4 mg  0.4 mg IntraVENous PRN    ondansetron (ZOFRAN) injection 4 mg  4 mg IntraVENous Q4H PRN    magnesium hydroxide (MILK OF MAGNESIA) 400 mg/5 mL oral suspension 30 mL  30 mL Oral DAILY PRN    heparin (porcine) injection 5,000 Units  5,000 Units SubCUTAneous Q12H    cefTRIAXone (ROCEPHIN) 2 g in 0.9% sodium chloride (MBP/ADV) 50 mL  2 g IntraVENous Q24H        Review of Systems  A comprehensive review of systems was negative except for that written in the HPI.     Objective:     Visit Vitals  /65 (BP 1 Location: Right arm, BP Patient Position: At rest)   Pulse (!) 106   Temp 98.1 °F (36.7 °C)   Resp 18   Ht 5' 11\" (1.803 m)   Wt 68.9 kg (152 lb)   SpO2 92%   BMI 21.20 kg/m²    O2 Flow Rate (L/min): 1 l/min O2 Device: Room air    Temp (24hrs), Av.6 °F (37 °C), Min:97.9 °F (36.6 °C), Max:99.1 °F (37.3 °C)    701 - 1900  In: 480 [P.O.:480]  Out: -     General appearance: Awake, alert, less confused today. Seems to be oriented to self, place and situation. Testy with multiple complaints, but remains basically cooperative.  Complains of continued left shoulder pain, generalized arthritic pain.  Anxious.  Wants to go home. Head: Normocephalic, without obvious abnormality, atraumatic  Neck: supple, symmetrical, trachea midline, no JVD  Lungs: clear to auscultation bilaterally.  Complains of left upper posterior rib pain with palp.  No abnormality visualized.    Heart: Irregular rate and rhythm, S1, S2 normal, no murmur, click, rub or gallop  Abdomen: soft, non-tender. Bowel sounds normal. No masses,  no organomegaly  Extremities: left shoulder with pain and limited ROM, guarding.  Pain worse with palp to posterior aspect.  No skin disruption or ecchymosis.  All other extremities normal, arthritic joints, otherwise atraumatic, no cyanosis or edema. Generalized weakness. Skin: Skin color, texture, turgor normal. No rashes or lesions  Neurologic: Grossly normal, no unilateral deficit.     Additional comments: Notes,orders, test results, vitals reviewed    Data Review  Recent Results (from the past 24 hour(s))   CBC WITH AUTOMATED DIFF    Collection Time: 12/06/19  5:49 AM   Result Value Ref Range    WBC 13.0 (H) 4.3 - 11.1 K/uL    RBC 3.55 (L) 4.23 - 5.6 M/uL    HGB 11.0 (L) 13.6 - 17.2 g/dL    HCT 33.9 (L) 41.1 - 50.3 %    MCV 95.5 79.6 - 97.8 FL    MCH 31.0 26.1 - 32.9 PG    MCHC 32.4 31.4 - 35.0 g/dL    RDW 15.9 (H) 11.9 - 14.6 %    PLATELET 494 723 - 661 K/uL    MPV 10.1 9.4 - 12.3 FL    ABSOLUTE NRBC 0.00 0.0 - 0.2 K/uL    DF AUTOMATED      NEUTROPHILS 81 (H) 43 - 78 %    LYMPHOCYTES 8 (L) 13 - 44 %    MONOCYTES 7 4.0 - 12.0 %    EOSINOPHILS 1 0.5 - 7.8 %    BASOPHILS 1 0.0 - 2.0 %    IMMATURE GRANULOCYTES 3 0.0 - 5.0 %    ABS. NEUTROPHILS 10.5 (H) 1.7 - 8.2 K/UL    ABS. LYMPHOCYTES 1.1 0.5 - 4.6 K/UL    ABS.  MONOCYTES 0.9 0.1 - 1.3 K/UL    ABS. EOSINOPHILS 0.1 0.0 - 0.8 K/UL    ABS. BASOPHILS 0.1 0.0 - 0.2 K/UL    ABS. IMM. GRANS. 0.4 0.0 - 0.5 K/UL   METABOLIC PANEL, BASIC    Collection Time: 12/06/19  5:49 AM   Result Value Ref Range    Sodium 138 136 - 145 mmol/L    Potassium 4.0 3.5 - 5.1 mmol/L    Chloride 103 98 - 107 mmol/L    CO2 29 21 - 32 mmol/L    Anion gap 6 (L) 7 - 16 mmol/L    Glucose 108 (H) 65 - 100 mg/dL    BUN 16 8 - 23 MG/DL    Creatinine 1.30 0.8 - 1.5 MG/DL    GFR est AA >60 >60 ml/min/1.73m2    GFR est non-AA 55 (L) >60 ml/min/1.73m2    Calcium 9.1 8.3 - 10.4 MG/DL       12/2:  CT HEAD: No acute intracranial abnormality.  Other incidental findings as above.     12/2:  LOWER EXTREMITY DOPPLER:  No evidence of lower extremity DVT.    12/2:  LEFT  SHOULDER XRAY: Chronic degenerative changes.     12/2:  CT CHEST:  Minimal bibasilar infiltrate/atelectasis.  Moderate sized hiatal hernia.     12/5:  MRI SHOULDER LEFT:   Advanced osteoarthritis of the glenohumeral joint. The rotator cuff tendon is intact. Mild degenerative hypertrophy of the [facility] joint with a mild  subacromial/subdeltoid bursitis.      12/2:  ECHOCARDIOGRAM:     -  Left ventricle: Systolic function was normal. Ejection fraction was estimated in the range of 55 % to 60 %. There were no regional wall motion abnormalities.    -  Right ventricle: Estimated peak pressure was in the range of 30-35 mmHg.   -  Aortic valve: Leaflets exhibited calcification and reduced mobility. There was mild stenosis. The aortic valve area by VTI was 1.21 cm2 .   -  Mitral valve: There was moderate thickening of the anterior leaflet. There was mild regurgitation.   -  Tricuspid valve: There was mild regurgitation.   -  Aorta, systemic arteries:  There was mild dilatation of the ascending aorta.   -  Pericardium: There was no pericardial effusion     URINE CULTURE:    Culture result: Abnormal          Final   >100,000 COLONIES/mL ESCHERICHIA COLI    Susceptibility      Escherichia coli Antibiotic Interpretation Value Method Comment   Trimeth-Sulfamethoxa Resistant >2/38 ug/mL LILI     Nitrofurantoin Susceptible <=16 ug/mL LILI     Ampicillin ($) Resistant >16 ug/mL LILI     Gentamicin ($) Susceptible <=1 ug/mL LILI     Tobramycin ($) Susceptible 1 ug/mL LILI     Ampicillin/sulbactam ($) Resistant >16/8 ug/mL LILI     Cefazolin ($) Susceptible 8 ug/mL LILI     Ceftriaxone ($) Susceptible <=0.5 ug/mL LILI     Cefepime ($$) Susceptible <=0.5 ug/mL LILI     Piperacillin/Tazobac ($) Susceptible <=2/4 ug/mL LILI     Aztreonam ($$$$) Susceptible <=1 ug/mL LILI     Cefoxitin Susceptible <=4 ug/mL LILI        BLOOD CULTURES:  2/2:  NGTD    Assessment/Plan:   Sepsis due to E COLI UTI:  Resolved               Continue sepsis protocol              Rocephin, last dose 1 pm tomorrow              Final Blood and urine cultures above    Continued leukocytosis:  Unclear etiology     Altered mental status:  Multifactorial              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 on head CT              Not safe to live alone per son              CM on board:  Refused for STR, son appealing    Discharge delay due to above               PPD, OT, PT consults      Dehydration:  Poor nutrition and illness:  Improved               encourage po intake               Nutrition consult     Debility:  As above     Acute renal failure:  Monitor              IVF     Acute cystitis with hematuria              Rocephin last dose 12/7              Monitor output     Fall with blunt trauma left upper ribs and left shoulder:              XIAMK negative              Guarding left shoulder with limited arm                   movement:   MRI as above with chronic findings              lido patches   Follow up with Ortho OP   PT on board     History of colon cancer  [facility]: Patient, care team, and Nurse    Signed By: [facility], NP     December 6, 2019

## 2019-12-06 NOTE — CONSULTS
Consult    Patient: Loreta Baez MRN: [id number]  SSN: [id number]    YOB: 1931  Age: 80 y.o. Sex: male      Subjective:      Loreta Baez is a 80 y.o. male who is complaining of left shoulder pain. He fell onto his left shoulder. He says he slipped on a tennis ball and fell onto some cardboard boxes. Feeling much better today. Yesterday he was unable to really use it much at all and he says he can move it around much better. He localizes most of the pain over the anterior superior aspect of the shoulder near the acromioclavicular joint. He has no previous history of surgery on his shoulder and no other problems. He denies any problems with his right upper extremity or bilateral lower extremities. Past Medical History:   Diagnosis Date    Colon cancer ([facility] [address] 01/2012    Hiatal hernia      No past surgical history on file.    FAMHX -No history of inflammatory arthritis   Social History     Tobacco Use    Smoking status: Never Smoker    Smokeless tobacco: Never Used   Substance Use Topics    Alcohol use: Never     Frequency: Never      Current Facility-Administered Medications   Medication Dose Route Frequency Provider Last Rate Last Dose    famotidine (PEPCID) tablet 20 mg  20 mg Oral DAILY Armen Mendoza MD        haloperidol lactate (HALDOL) injection 2.5 mg  2.5 mg IntraVENous Q6H PRN Yonas Peterson NP        calcium carbonate (TUMS) chewable tablet 200 mg [elemental]  200 mg Oral TID WITH MEALS Opal Peterson NP   200 mg at 12/06/19 0747    sodium chloride (NS) flush 5-40 mL  5-40 mL IntraVENous Q8H Troy Sanford MD   10 mL at 12/06/19 0523    sodium chloride (NS) flush 5-40 mL  5-40 mL IntraVENous PRN Troy Sanford MD        aspirin chewable tablet 81 mg  81 mg Oral DAILY Alexei Juares MD   81 mg at 12/05/19 0905    atorvastatin (LIPITOR) tablet 20 mg  20 mg Oral QHS Alexei Juares MD   20 mg at 12/05/19 2121    clopidogrel (PLAVIX) tablet 75 mg  75 mg Oral DAILY Brayden Mcdaniel MD   75 mg at 12/05/19 0905    nitroglycerin (NITROSTAT) tablet 0.4 mg  0.4 mg SubLINGual Q5MIN PRN Brayden Mcdaniel MD        sodium chloride (NS) flush 5-40 mL  5-40 mL IntraVENous PRN Brayden Mcdaniel MD        acetaminophen (TYLENOL) tablet 650 mg  650 mg Oral Q6H PRN Brayden Mcdaniel MD        oxyCODONE-acetaminophen (PERCOCET 7.5) 7.5-325 mg per tablet 1 Tab  1 Tab Oral Q6H PRN Brayden Mcdaniel MD   1 Tab at 12/05/19 2121    HYDROmorphone (PF) (DILAUDID) injection 0.2 mg  0.2 mg IntraVENous Q4H PRN Brayden Mcdaniel MD        [facility]) injection 0.4 mg  0.4 mg IntraVENous PRN Brayden Mcdaniel MD        ondansetron [facility]) injection 4 mg  4 mg IntraVENous Q4H PRN Brayden Mcdaniel MD        magnesium hydroxide (MILK OF MAGNESIA) 400 mg/5 mL oral suspension 30 mL  30 mL Oral DAILY PRN Brayden Mcdaniel MD        heparin (porcine) injection 5,000 Units  5,000 Units SubCUTAneous Q12H Brayden Mcdaniel MD   5,000 Units at 12/06/19 0523    cefTRIAXone (ROCEPHIN) 2 g in 0.9% sodium chloride (MBP/ADV) 50 mL  2 g IntraVENous Q24H Brayden Mcdaniel  mL/hr at 12/05/19 1203 2 g at 12/05/19 1203        No Known Allergies    Review of Systems:  A comprehensive review of systems was negative except for that written in the History of Present Illness. Objective:     Vitals:    12/05/19 1938 12/05/19 2304 12/06/19 0400 12/06/19 0729   BP: 126/70 128/68 122/74 158/85   Pulse: (!) 102 99 100 93   Resp: 18 18 18 18   Temp: 99.1 °F (37.3 °C) 98.7 °F (37.1 °C) 98.8 °F (37.1 °C) 97.9 °F (36.6 °C)   SpO2: 93% 95% 94% 91%   Weight:       Height:            Physical Exam:  Physical Exam:  General:  Alert, cooperative, no distress, appears stated age. Orientation he is alert and oriented to person at least   Eyes:  Conjunctivae/corneas clear. PERRL, EOMs intact. Fundi benign   Ears:  Normal TMs and external ear canals both ears.    Nose: Nares normal. Septum midline. Mucosa normal. No drainage or sinus tenderness. Mouth/Throat: Lips, mucosa, and tongue normal. Teeth and gums normal.   Neck: Supple, symmetrical, trachea midline, no adenopathy, thyroid: no enlargment/tenderness/nodules, no carotid bruit and no JVD. Back:   Symmetric, no curvature. ROM normal. No CVA tenderness. Lungs:   Clear to auscultation bilaterally. Heart:  Regular rate and rhythm, S1, S2 normal, no murmur, click, rub or gallop. Abdomen:   Soft, non-tender. Bowel sounds normal. No masses,  No organomegaly. No lymphadenopathy in all 4 extremities  Alignment-near normal alignment of the upper extremity and equal to opposite extremity  Range of motion- he has full range of motion of his left shoulder he does have some mild pain with extremes of range of motion  Vascular-distal pulses palpable in the upper extremity  Sensory/motor-deep tendon reflexes normal left upper extremity. Motor and sensory function intact. Stability- there is no evidence of any instability left shoulder  Tenderness to palpation over the acromial clavicular joint of his left shoulder  Skin- no open wounds  Gait-gait    Assessment:     Hospital Problems  Date Reviewed: 7/23/2019          Codes Class Noted POA    Dehydration ICD-10-CM: E86.0  ICD-9-CM: 276.51  12/2/2019 Unknown        Debility ICD-10-CM: R53.81  ICD-9-CM: 799.3  12/2/2019 Unknown        Acute renal failure (ARF) ([facility] ICD-10-CM: N17.9  ICD-9-CM: 584.9  12/2/2019 Unknown        * (Principal) Sepsis ([facility] ICD-10-CM: A41.9  ICD-9-CM: 038.9, 995.91  12/2/2019         Acute cystitis without hematuria ICD-10-CM: N30.00  ICD-9-CM: 595.0  12/2/2019 Unknown            Mild left acromial clavicular joint separation    Xrays and or studies:    X-rays of the left shoulder show no acute fractures or dislocations  Plan: This does appear to be something that can be treated nonoperatively.   I would offer him a sling but he seems to be using his shoulder very well I do not think he really needs this since he seems to be feeling so much better.   I do think it be reasonable if he wishes to see 1 of the sports medicine physicians for his left shoulder as an outpatient    Signed By: Vladislav Fuentes MD     December 6, 2019

## 2019-12-06 NOTE — PROGRESS NOTES
Problem: Mobility Impaired (Adult and Pediatric)  Goal: *Acute Goals and Plan of Care (Insert Text)  Description  LTG:  (1.)Mr. Arnold Leiva will move from supine to sit and sit to supine, scoot up and down and roll side to side INDEPENDENTLY with bed flat within 7 treatment day(s). (2.)Mr. Arnold Leiva will transfer from bed to chair and chair to bed wth INDEPENDENTLY within 7 treatment day(s). (3.)Mr. Elinor Castro will ambulate INDEPENDENTLY for 500+ feet within 7 treatment day(s). (4.)Mr. Arnold Leiva will ascend and descend 15 steps with SUPERVISION using handrail(s) within 7 days. ________________________________________________________________________________________________   Outcome: Progressing Towards Goal     PHYSICAL THERAPY: Daily Note and PM 12/6/2019  INPATIENT: PT Visit Days : 2  Payor: Zina Bravo / Plan: [facility] [employer] MEDICARE CHOICE PPO/PFFS / Product Type: [employer] Care Medicare /       NAME/AGE/GENDER: Dania White is a 80 y.o. male   PRIMARY DIAGNOSIS: SIRS (systemic inflammatory response syndrome) (HCC) [R65.10]  Acute renal failure (ARF) ([facility] [address] [N17.9]  Dehydration [E86.0]  Debility [R53.81] Sepsis ([facility] [address] Sepsis ([facility] [address]       ICD-10: Treatment Diagnosis:    · Generalized Muscle Weakness (M62.81)  · Difficulty in walking, Not elsewhere classified (R26.2)  · Other abnormalities of gait and mobility (R26.89)  · History of falling (Z91.81)   Precaution/Allergies:  Patient has no known allergies. ASSESSMENT:     Mr. Arnold Leiva is supine on arrival, confused in conversation. Pt oriented to self only. Pt on room air, states pain in L shoulder only when touching joint. Pt required MIN A to CGA for bed mobility and supine ot sit. Pt MIN A for transfers. Pt with unsteady static standing balance, required increased assist this date with transfers and ambulation into hallway. Pt used RW for ambulation 120', fluctuations in yvon, R knee buckling with ambulation at times.  Pt with decreased awareness for safety and use of RW, unsteady gait and need for MIN A with gait belt use. Pt returned to room, seated in chair with alarm donned, needs in reach. Pt fall risk at this time and safety concerns for returning home alone. PT to cont to follow for acute care needs. Pt would benefit from STR at discharge to continue to address balance, strength and ambulation due to functioning well below baseline level of independent. Per initial: a pleasant 80year old male admitted from home for SIRS, acute renal failure, debility, sepsis who is seen for initial therapy evaluation this morning. He lives at home alone and is typically independent/active without use of any DME. This section established at most recent assessment   PROBLEM LIST (Impairments causing functional limitations):  1. Decreased Strength  2. Decreased ADL/Functional Activities  3. Decreased Transfer Abilities  4. Decreased Ambulation Ability/Technique  5. Decreased Balance  6. Increased Pain  7. Decreased Activity Tolerance  8. Decreased Cognition   INTERVENTIONS PLANNED: (Benefits and precautions of physical therapy have been discussed with the patient.)  1. Balance Exercise  2. Bed Mobility  3. Gait Training  4. Home Exercise Program (HEP)  5. Therapeutic Activites  6. Therapeutic Exercise/Strengthening  7. Transfer Training     TREATMENT PLAN: Frequency/Duration: 3 times a week for duration of hospital stay  Rehabilitation Potential For Stated Goals: Good     REHAB RECOMMENDATIONS (at time of discharge pending progress):    Placement: It is my opinion, based on this patient's performance to date, that Mr. Keira Bess may benefit from intensive therapy at a [address] [place] [address] after discharge due to the functional deficits listed above that are likely to improve with skilled rehabilitation and concerns that he/she may be unsafe to be unsupervised at home due to safety concerns for home, fall risk with mobility.   Equipment:    tbd HISTORY:   History of Present Injury/Illness (Reason for Referral):  Per H&P, \"Pt reported that he fell hitting his left chest on a cardboard box. Since the fall, he has noticed left sided chest pain, difficulty in using left arm due to pain in left shoulder. He denies heart burn, nausea/vomiting, head trauma, seizure like episode, urinary symptoms, diarrhea, chills, fever, abdominal pain, headache, palpitations. Pain is left sided, retrosternal, 10/10 severity, dull, intermittent, no aggravating or alleviating factors. ER work up showed WBC 19K, Cr 1.58 (baseline 1.12-1.2), HR >110 with sinus tachycardia on EKG. CT chest showed moderate sized hiatal hernia with minimal basilar atelectasis\"    Past Medical History/Comorbidities:   Mr. Lovely Alejo  has a past medical history of Colon cancer ([facility] [address] (01/2012) and Hiatal hernia. Mr. Lovely Alejo  has no past surgical history on file. Social History/Living Environment:   Home Environment: Private residence  One/Two Story Residence: Two story  # of Interior Steps: 24  Lift Chair Available: No  Living Alone: Yes  Support Systems: Child(margi), Family member(s)  Patient Expects to be Discharged to[de-identified] Private residence  Current DME Used/Available at Home: None  Tub or Shower Type: Shower  Prior Level of Function/Work/Activity:  Lives alone in two story home. Independent, active at baseline without use of any DME. Does not drive. Denies falls. Number of Personal Factors/Comorbidities that affect the Plan of Care: 1-2: MODERATE COMPLEXITY   EXAMINATION:   Most Recent Physical Functioning:   Gross Assessment:                  Posture:  Posture Assessment: Forward head, Rounded shoulders  Balance:  Sitting: Impaired  Sitting - Static: Good (unsupported)  Sitting - Dynamic: Fair (occasional)  Standing: Impaired  Standing - Static: Constant support; Fair  Standing - Dynamic : Constant support;Poor Bed Mobility:  Supine to Sit: Minimum assistance  Sit to Supine: (left up in chair)  Scooting: Contact guard assistance  Wheelchair Mobility:     Transfers:  Sit to Stand: Minimum assistance  Stand to Sit: Minimum assistance  Bed to Chair: Minimum assistance  Gait:     Base of Support: Narrowed  Speed/Estrellita: Fluctuations  Step Length: Left shortened;Right shortened  Gait Abnormalities: Decreased step clearance(very unsteady, safety concerns)  Distance (ft): 120 Feet (ft)  Assistive Device: Gait belt;Walker, rolling  Ambulation - Level of Assistance: Minimal assistance  Interventions: Safety awareness training;Verbal cues      Body Structures Involved:  1. Muscles Body Functions Affected:  1. Movement Related Activities and Participation Affected:  1. General Tasks and Demands  2. Mobility  3. Domestic Life  4. Community, Social and [place] [place]   Number of elements that affect the Plan of Care: 4+: HIGH COMPLEXITY   CLINICAL PRESENTATION:   Presentation: Stable and uncomplicated: LOW COMPLEXITY   CLINICAL DECISION MAKIN [facility] Inpatient Short Form  How much difficulty does the patient currently have. .. Unable A Lot A Little None   1. Turning over in bed (including adjusting bedclothes, sheets and blankets)? [] 1   [] 2   [] 3   [x] 4   2. Sitting down on and standing up from a chair with arms ( e.g., wheelchair, bedside commode, etc.)   [] 1   [] 2   [] 3   [x] 4   3. Moving from lying on back to sitting on the side of the bed? [] 1   [] 2   [] 3   [x] 4   How much help from another person does the patient currently need. .. Total A Lot A Little None   4. Moving to and from a bed to a chair (including a wheelchair)? [] 1   [] 2   [x] 3   [] 4   5. Need to walk in hospital room? [] 1   [] 2   [x] 3   [] 4   6. Climbing 3-5 steps with a railing? [] 1   [] 2   [x] 3   [] 4   © , Trustees of [address] [place] [address] [address] [id number], under license to [employer].  All rights reserved      Score:  Initial: 21 Most Recent: X (Date: -- ) Interpretation of Tool:  Represents activities that are increasingly more difficult (i.e. Bed mobility, Transfers, Gait). Medical Necessity:     · Patient demonstrates   · good  ·  rehab potential due to higher previous functional level. Reason for Services/Other Comments:  · Patient   · continues to demonstrate capacity to improve strength, balance, activity tolerance which will   · increase independence, decrease amount of assistance required from caregiver, and increase safety  · . Use of outcome tool(s) and clinical judgement create a POC that gives a: Clear prediction of patient's progress: LOW COMPLEXITY            TREATMENT:   (In addition to Assessment/Re-Assessment sessions the following treatments were rendered)   Pre-treatment Symptoms/Complaints:  \"I am going home tomorrow\"  Pain: Initial:   Pain Intensity 1: 0(reports shoulder pain only when touching)  Post Session:  0/10 in chair     Therapeutic Activity: (   15 min ):  Therapeutic activities including Bed transfers, Chair transfers, Ambulation on level ground, posture, walker safety to improve mobility, strength, balance, and activity tolerance . Required minimal Safety awareness training;Verbal cues to promote static and dynamic balance in standing and promote motor control of bilateral, lower extremity(s). Braces/Orthotics/Lines/Etc:   · IV  · O2 Device: Room air  Treatment/Session Assessment:    · Response to Treatment:  pt performs mobility with CGA/min A, unsteady, fall risk  · Interdisciplinary Collaboration:   o Physical Therapist  o Registered Nurse  · After treatment position/precautions:   o Up in chair  o Bed alarm/tab alert on  o Bed/Chair-wheels locked  o Bed in low position  o Call light within reach   · Compliance with Program/Exercises: Will assess as treatment progresses  · Recommendations/Intent for next treatment session:   \"Next visit will focus on advancements to more challenging activities and reduction in assistance provided\".   Total Treatment Duration:  PT Patient Time In/Time Out  Time In: 1325  Time Out: [address] [place],

## 2019-12-06 NOTE — PROGRESS NOTES
CM contacted [facility] to determine if pt will receive denial documents to move forward with appeal process. [employer] Representative informed CM, the pt is able to contact [phone] to begin appeal process and is able to fax documents to [phone]. CM informed pt's son of this information and if pt's appeal with insurance has been denied, pt could potentially be responsible for services accrued from original discharge date. Pt's son verbalized understanding. Pt's son stated pt will wait until appeal process has been completed.

## 2019-12-07 PROBLEM — G93.41 ACUTE METABOLIC ENCEPHALOPATHY: Status: ACTIVE | Noted: 2019-12-07

## 2019-12-07 LAB
BACTERIA SPEC CULT: NORMAL
BACTERIA SPEC CULT: NORMAL
BASOPHILS # BLD: 0.1 K/UL (ref 0–0.2)
BASOPHILS NFR BLD: 1 % (ref 0–2)
DIFFERENTIAL METHOD BLD: ABNORMAL
EOSINOPHIL # BLD: 0.2 K/UL (ref 0–0.8)
EOSINOPHIL NFR BLD: 1 % (ref 0.5–7.8)
ERYTHROCYTE [DISTWIDTH] IN BLOOD BY AUTOMATED COUNT: 16.3 % (ref 11.9–14.6)
HCT VFR BLD AUTO: 30.6 % (ref 41.1–50.3)
HGB BLD-MCNC: 10.1 G/DL (ref 13.6–17.2)
IMM GRANULOCYTES # BLD AUTO: 0.5 K/UL (ref 0–0.5)
IMM GRANULOCYTES NFR BLD AUTO: 5 % (ref 0–5)
LYMPHOCYTES # BLD: 0.9 K/UL (ref 0.5–4.6)
LYMPHOCYTES NFR BLD: 8 % (ref 13–44)
MCH RBC QN AUTO: 31.3 PG (ref 26.1–32.9)
MCHC RBC AUTO-ENTMCNC: 33 G/DL (ref 31.4–35)
MCV RBC AUTO: 94.7 FL (ref 79.6–97.8)
MONOCYTES # BLD: 0.9 K/UL (ref 0.1–1.3)
MONOCYTES NFR BLD: 8 % (ref 4–12)
NEUTS SEG # BLD: 9.1 K/UL (ref 1.7–8.2)
NEUTS SEG NFR BLD: 79 % (ref 43–78)
NRBC # BLD: 0 K/UL (ref 0–0.2)
PLATELET # BLD AUTO: 323 K/UL (ref 150–450)
PMV BLD AUTO: 10 FL (ref 9.4–12.3)
RBC # BLD AUTO: 3.23 M/UL (ref 4.23–5.6)
SERVICE CMNT-IMP: NORMAL
SERVICE CMNT-IMP: NORMAL
WBC # BLD AUTO: 11.7 K/UL (ref 4.3–11.1)

## 2019-12-07 PROCEDURE — 74011250637 HC RX REV CODE- 250/637: Performed by: NURSE PRACTITIONER

## 2019-12-07 PROCEDURE — 85025 COMPLETE CBC W/AUTO DIFF WBC: CPT

## 2019-12-07 PROCEDURE — 65270000029 HC RM PRIVATE

## 2019-12-07 PROCEDURE — 94761 N-INVAS EAR/PLS OXIMETRY MLT: CPT

## 2019-12-07 PROCEDURE — 74011250636 HC RX REV CODE- 250/636: Performed by: HOSPITALIST

## 2019-12-07 PROCEDURE — 74011250637 HC RX REV CODE- 250/637: Performed by: INTERNAL MEDICINE

## 2019-12-07 PROCEDURE — 36415 COLL VENOUS BLD VENIPUNCTURE: CPT

## 2019-12-07 PROCEDURE — 74011250636 HC RX REV CODE- 250/636: Performed by: NURSE PRACTITIONER

## 2019-12-07 PROCEDURE — 74011000250 HC RX REV CODE- 250: Performed by: NURSE PRACTITIONER

## 2019-12-07 PROCEDURE — 74011250637 HC RX REV CODE- 250/637: Performed by: HOSPITALIST

## 2019-12-07 RX ORDER — NYSTATIN 100000 [USP'U]/G
POWDER TOPICAL 2 TIMES DAILY
Status: DISCONTINUED | OUTPATIENT
Start: 2019-12-07 | End: 2020-01-06 | Stop reason: HOSPADM

## 2019-12-07 RX ORDER — CEFPODOXIME PROXETIL 200 MG/1
200 TABLET, FILM COATED ORAL EVERY 12 HOURS
Status: COMPLETED | OUTPATIENT
Start: 2019-12-07 | End: 2019-12-09

## 2019-12-07 RX ADMIN — FAMOTIDINE 20 MG: 20 TABLET, FILM COATED ORAL at 08:00

## 2019-12-07 RX ADMIN — Medication 10 ML: at 05:22

## 2019-12-07 RX ADMIN — NYSTATIN: 100000 POWDER TOPICAL at 23:11

## 2019-12-07 RX ADMIN — CALCIUM CARBONATE (ANTACID) CHEW TAB 500 MG 200 MG: 500 CHEW TAB at 11:46

## 2019-12-07 RX ADMIN — ASPIRIN 81 MG 81 MG: 81 TABLET ORAL at 08:00

## 2019-12-07 RX ADMIN — CLOPIDOGREL BISULFATE 75 MG: 75 TABLET, FILM COATED ORAL at 08:00

## 2019-12-07 RX ADMIN — ATORVASTATIN CALCIUM 20 MG: 10 TABLET, FILM COATED ORAL at 22:55

## 2019-12-07 RX ADMIN — HALOPERIDOL LACTATE 2.5 MG: 5 INJECTION INTRAMUSCULAR at 02:00

## 2019-12-07 RX ADMIN — CALCIUM CARBONATE (ANTACID) CHEW TAB 500 MG 200 MG: 500 CHEW TAB at 07:59

## 2019-12-07 RX ADMIN — CALCIUM CARBONATE (ANTACID) CHEW TAB 500 MG 200 MG: 500 CHEW TAB at 16:43

## 2019-12-07 RX ADMIN — CEFPODOXIME PROXETIL 200 MG: 200 TABLET, FILM COATED ORAL at 10:32

## 2019-12-07 RX ADMIN — HEPARIN SODIUM 5000 UNITS: 5000 INJECTION INTRAVENOUS; SUBCUTANEOUS at 16:44

## 2019-12-07 RX ADMIN — HALOPERIDOL LACTATE 2.5 MG: 5 INJECTION INTRAMUSCULAR at 08:02

## 2019-12-07 RX ADMIN — Medication 10 ML: at 14:45

## 2019-12-07 RX ADMIN — CEFPODOXIME PROXETIL 200 MG: 200 TABLET, FILM COATED ORAL at 22:55

## 2019-12-07 RX ADMIN — HEPARIN SODIUM 5000 UNITS: 5000 INJECTION INTRAVENOUS; SUBCUTANEOUS at 05:22

## 2019-12-07 NOTE — PROGRESS NOTES
Hospitalist Progress Note    Patient: Brian Armenta MRN: [id number]  SSN: [id number]    YOB: 1931  Age: 80 y.o. Sex: male      Admit Date: 12/2/2019    LOS: 5 days     Subjective:     80year old CM with a PMH of CAD admitted on 12/2/19 with chest pain after a fall. He was found to have sepsis due to a UTI. Orthopedics looked at his shoulder and recommended PT/OT. He was evaluated by PT and SNF was recommended. He has had intermittent confusion during his stay. 12/7 - He is oriented, but seems mildly confused today. States he has to get home to complete some work. Denies pain. Denies CP/SOB. Review of systems negative except stated above. Objective:     Visit Vitals  /68 (BP 1 Location: Right arm, BP Patient Position: At rest)   Pulse 97   Temp 98 °F (36.7 °C)   Resp 16   Ht 5' 11\" (1.803 m)   Wt 68.9 kg (152 lb)   SpO2 93%   BMI 21.20 kg/m²      Oxygen Therapy  O2 Sat (%): 93 % (12/07/19 0757)  Pulse via Oximetry: 116 beats per minute (12/02/19 1432)  O2 Device: Room air (12/06/19 1325)  O2 Flow Rate (L/min): 1 l/min (12/03/19 0516)      Intake and Output:     Intake/Output Summary (Last 24 hours) at 12/7/2019 1044  Last data filed at 12/7/2019 0757  Gross per 24 hour   Intake 955 ml   Output --   Net 955 ml         Physical Exam:   GENERAL: alert, cooperative, no distress, appears stated age  EYE: conjunctivae/corneas clear. PERRL. THROAT & NECK: normal and no erythema or exudates noted. LUNG: clear to auscultation bilaterally  HEART: regular rate and rhythm, S1S2, no murmur, no JVD  ABDOMEN: soft, non-tender, non-distended. Bowel sounds normal.   EXTREMITIES:  No edema, 2+ pedal/radial pulses bilaterally  SKIN: no rash or abnormalities  NEUROLOGIC: A&Ox3, seems mildly confused. Cranial nerves 2-12 grossly intact.     Lab/Data Review:  Recent Results (from the past 24 hour(s))   CBC WITH AUTOMATED DIFF    Collection Time: 12/07/19  6:01 AM   Result Value Ref Range WBC 11.7 (H) 4.3 - 11.1 K/uL    RBC 3.23 (L) 4.23 - 5.6 M/uL    HGB 10.1 (L) 13.6 - 17.2 g/dL    HCT 30.6 (L) 41.1 - 50.3 %    MCV 94.7 79.6 - 97.8 FL    MCH 31.3 26.1 - 32.9 PG    MCHC 33.0 31.4 - 35.0 g/dL    RDW 16.3 (H) 11.9 - 14.6 %    PLATELET 215 059 - 196 K/uL    MPV 10.0 9.4 - 12.3 FL    ABSOLUTE NRBC 0.00 0.0 - 0.2 K/uL    DF AUTOMATED      NEUTROPHILS 79 (H) 43 - 78 %    LYMPHOCYTES 8 (L) 13 - 44 %    MONOCYTES 8 4.0 - 12.0 %    EOSINOPHILS 1 0.5 - 7.8 %    BASOPHILS 1 0.0 - 2.0 %    IMMATURE GRANULOCYTES 5 0.0 - 5.0 %    ABS. NEUTROPHILS 9.1 (H) 1.7 - 8.2 K/UL    ABS. LYMPHOCYTES 0.9 0.5 - 4.6 K/UL    ABS. MONOCYTES 0.9 0.1 - 1.3 K/UL    ABS. EOSINOPHILS 0.2 0.0 - 0.8 K/UL    ABS. BASOPHILS 0.1 0.0 - 0.2 K/UL    ABS. IMM. GRANS. 0.5 0.0 - 0.5 K/UL       Imaging:  Xr Shoulder Lt Ap/lat Min 2 V    Result Date: 12/2/2019  Clinical History: The patient is a 80years year old Male presenting with symptoms of pain following fall. Comparison:  none Findings: 3 views of the left shoulder were obtained. No fracture or dislocation is identified. There are chronic degenerative changes with glenohumeral joint space loss. Shallow respiratory changes are seen at the left lung base     Impression: Chronic degenerative changes. Mri Shoulder Lt Wo Cont    Result Date: 12/5/2019  MRI OF THE LEFT SHOULDER WITHOUT CONTRAST. Clinical Indication: Left shoulder pain after a fall Procedure: Multiplanar and multisequence MR images of the left shoulder were performed without gadolinium contrast. Comparison: No prior Findings: AC joint: Mild degenerative hypertrophy is noted of the AC joint. There is trace fluid signal the subacromial/subdeltoid bursa. Rotator cuff: The supraspinatus, infraspinatus, teres minor, and subscapularis tendons are intact. No rotator cuff tendon tear evident. Biceps labral complex: The long head of biceps tendon is intact. There is degenerative signal changes in the superior labrum.  The joint capsule in the axillary recess is intact. Glenohumeral joint: Marginal osteophytes are noted off the medial aspect of the humeral head-neck junction. There is full-thickness degenerative chondral loss diffusely over the articular surface the glenoid and chondral thinning along the humeral head. Evaluation of the labrum is limited by marked motion degradation on the axial sequence. There is no joint effusion. No abnormal soft tissue mass or fatty atrophy evident. IMPRESSION: 1. Advanced osteoarthritis of the glenohumeral joint. 2. The rotator cuff tendon is intact. 3. Mild degenerative [facility] with a mild subacromial/subdeltoid bursitis. Ct Head Wo Cont    Result Date: 12/2/2019  Examination: CT scan of the brain without contrast. History: rule out any intracranial pathology, 80 years Male  Patient with fall x 3 days ago Technique: 5 mm axial imaging of the brain from the posterior fossa to the vertex. All CT scans at this location are performed using dose modulation techniques as appropriate to a performed exam including the following: automated exposure control; adjustment of the mA and/or kV according to patient's size (this includes techniques or standardized protocols for targeted exams where dose is matched to indication/reason for exam; i.e. extremities or head); use of iterative reconstruction technique. Comparison:  None available Findings: Probably severe microvascular disease. The ventricles, sulci are age-appropriate. No intracranial hemorrhage or extra-axial collection is identified. No evidence of acute infarct. No mass effect or midline shift is present. Basal cisterns are intact. The visualized paranasal sinuses and mastoid air cells are clear. The orbits, bones, and soft tissues are normal in appearance. Impression:  No acute intracranial abnormality. Other incidental findings as above.     Ct Chest Wo Cont    Result Date: 12/2/2019  Noncontrast CT of the chest Clinical History: The patient is a 80years year old Male presenting with symptoms of Left rib pain, shortness of breath, multiple contusions after a fall Technique: Thin section axial images were obtained through the chest without intravenous contrast.  Coronal reformatted images were also provided for review. All CT scans at this facility are performed using dose reduction/dose modulation techniques, as appropriate the performed exam, including the following: Automated Exposure Control; Adjustment of the mA and/or kV according to patient size (this includes techniques or standardized protocols for targeted exams where dose is matched to indication/reason for exam); and Use of Iterative Reconstruction Technique. Total radiation dose: 371 mGy-cm Comparison:  None. Findings: Images through the lungs demonstrate no evidence of pulmonary nodule or mass. No effusions are identified. There is minimal bibasilar infiltrate/atelectasis. There is coronary artery atherosclerotic disease. No evidence of aortic aneurysmal dilatation is seen. The heart and mediastinum are grossly unremarkable. No significant hilar or mediastinal lymphadenopathy is demonstrated. There is a moderate-sized hiatal hernia Images chest wall structures as well as visualized portions of the upper abdomen are unremarkable in appearance. There are no acute osseous abnormalities. Impression: 1. Minimal bibasilar infiltrate/atelectasis. 2. Moderate sized hiatal hernia. Duplex Lower Ext Venous Bilat    Result Date: 12/2/2019  Clinical History: The patient is a 80years year old Male presenting with symptoms of r/o DVT Comparisons:  None Findings: The bilateral common femoral, superficial femoral, popliteal, and visualized calf veins are patent demonstrating normal compressibility, normal color flow, and normal response to distal augmentation. There is no evidence of DVT. Impression: No evidence of lower extremity DVT.  CPT code(s) U6888542     Results for orders placed or performed during the hospital encounter of 19   2D ECHO COMPLETE ADULT (TTE) W OR [address] [facility]  [address] [facility], [address] [facility]  [phone]    Transthoracic Echocardiogram  2D, M-mode, Doppler, and Color Doppler    Patient: Maribell Son  MR #: [id number]  : 1931  Age: 80 years  Gender: Male  Study date: 03-Dec-2019  Account #: [de-identified]  Height: 71 in  Weight: 151.6 lb  BSA: 1.88 mï¾²  Status:Routine  Location: 628  BP: 117/ 56    Allergies: NO KNOWN ALLERGENS    Sonographer:  DORON Rizo  Group:  [id number] Cardiology  Referring Physician:  Meir Wilson MD  Reading Physician:  Mychal Albert MD [facility]    INDICATIONS: Evaluate for worsening RVSP  *Patient scanned supine. Patient refused to lie on left side due to arm pain. PROCEDURE: This was a routine study. A transthoracic echocardiogram was  performed. The study included complete 2D imaging, M-mode, complete spectral  Doppler, and color Doppler. Image quality was adequate. LEFT VENTRICLE: Size was normal. Systolic function was normal. Ejection  fraction was estimated in the range of 55 % to 60 %. There were no regional  wall motion abnormalities. Wall thickness was normal. Left ventricular  diastolic function is indeterminate based on assessment criteria. Avg E/e':  15.7. RIGHT VENTRICLE: The size was normal. Systolic function was normal. Estimated  peak pressure was in the range of 30-35 mmHg. LEFT ATRIUM: Size was normal.    RIGHT ATRIUM: Not well visualized. SYSTEMIC VEINS: IVC: The inferior vena cava was not well visualized. AORTIC VALVE: Leaflets exhibited calcification and reduced mobility. The   aortic  valve area by VTI was 1.21 cm2. The peak velocity was 2.2 m/s. The mean  pressure gradient was 9 mmHg. The peak pressure gradient was 20 mmHg. DI:   0.48. There was mild stenosis.  There was trivial regurgitation. MITRAL VALVE: There was moderate thickening of the anterior leaflet. There   was  no evidence for stenosis. There was mild regurgitation. TRICUSPID VALVE: The valve structure was normal. There was no evidence for  stenosis. There was mild regurgitation. PULMONIC VALVE: Not well visualized. There was no evidence for stenosis. There  was no insufficiency. PERICARDIUM: There was no pericardial effusion. AORTA: The root exhibited normal size. There was mild dilatation of the  ascending aorta. SUMMARY:    -  Left ventricle: Systolic function was normal. Ejection fraction was  estimated in the range of 55 % to 60 %. There were no regional wall motion  abnormalities. -  Right ventricle: Estimated peak pressure was in the range of 30-35 mmHg. -  Aortic valve: Leaflets exhibited calcification and reduced mobility. There  was mild stenosis. The aortic valve area by VTI was 1.21 cm2.    -  Mitral valve: There was moderate thickening of the anterior leaflet. There  was mild regurgitation.    -  Tricuspid valve: There was mild regurgitation.    -  Aorta, systemic arteries: There was mild dilatation of the ascending   aorta. -  Pericardium: There was no pericardial effusion. SYSTEM MEASUREMENT TABLES    2D mode  AoR Diam (2D): 3.3 cm  LA Dimension (2D): 2.7 cm  Left Atrium Systolic Volume Index; Method of Disks, Biplane; 2D mode;: 18.1  ml/m2  IVS/LVPW (2D): 1  IVSd (2D): 1 cm  LVIDd (2D): 4.1 cm  LVIDs (2D): 2.5 cm  LVOT Area (2D): 2.5 cm2  LVPWd (2D): 1 cm  RVIDd (2D): 2.6 cm    Tissue Doppler Imaging  LV Peak [place] Tissue Maninder; Lateral MA (TDI): 4.4 cm/s  LV Peak [place] Tissue Maninder; Medial MA (TDI): 4.7 cm/s    Unspecified Scan Mode  Peak Grad; Mean; Antegrade Flow: 16 mm[Hg]  Vmax;  Antegrade Flow: 222 cm/s  LVOT Diam: 1.8 cm  MV Peak Maninder/LV Peak Tissue Maninder E-Wave; Lateral MA: 16.2  MV Peak Maninder/LV Peak Tissue Maninder E-Wave; Medial MA: 15.1    Prepared and signed by    Shekhar Marti Edith Heaton MD [facility]  Signed 03-Dec-2019 12:58:48         Cultures:   All Micro Results     Procedure Component Value Units Date/Time    CULTURE, BLOOD [[id number]] Collected:  12/02/19 1437    Order Status:  Completed Specimen:  Blood Updated:  12/07/19 0957     Special Requests: --        RIGHT  FOREARM       Culture result: NO GROWTH 5 DAYS       CULTURE, BLOOD [[id number]] Collected:  12/02/19 1438    Order Status:  Completed Specimen:  Blood Updated:  12/07/19 0957     Special Requests: --        RIGHT  FOREARM       Culture result: NO GROWTH 5 DAYS       CULTURE, URINE [[id number]]  (Abnormal)  (Susceptibility) Collected:  12/02/19 1424    Order Status:  Completed Specimen:  Cath Urine Updated:  12/05/19 0733     Special Requests: NO SPECIAL REQUESTS        Culture result:       >100,000 COLONIES/mL ESCHERICHIA COLI                Assessment/Plan:     Principal Problem:    Sepsis ([facility] [address] (12/2/2019)  - Slowly resolving --> HR >90 and WBC 11.7  - Change Ceftriaxone to Vantin for UTI  - Blood cultures NGTD    Active Problems:    Acute cystitis without hematuria (12/2/2019)  - Urine culture with E. Coli  - Change Ceftriaxone to Vantin based on sensitivities      Acute renal failure (ARF) ([facility] [address] (12/2/2019)  - Resolved  - Due to dehydration and UTI      Dehydration (12/2/2019)  - Now tolerated liquids and food  - Monitor for changes      Acute metabolic encephalopathy (61/8/4560)  - Seems to be slowly improving  - Due to UTI  - Still with mild confusion and little insight into acute illness      CAD (coronary artery disease) (7/18/2019)  - No acute CP  - Continue ASA/Plavix  - Continue Lipitor      Debility (12/2/2019)  - Patient weak and confused  - PT recommends SNF    Dispo - Awaiting approval for SNF due to weakness and intermittent confusion since patient lives alone    DIET REGULAR    DVT Prophylaxis: Heparin      Signed By: Evelina Noland DO     December 7, 2019

## 2019-12-07 NOTE — PROGRESS NOTES
Oxygen Qualifier       Room air: SpO2 with O2 and liter flow   Resting SpO2  94% Not needed   Ambulating SpO2  92% Not needed       Completed by:    Jus Sarabia, RT

## 2019-12-07 NOTE — PROGRESS NOTES
ORTH FRACTURE PROGRESS NOTE    2019  Admit Date:   2019    Post Op day: * No surgery found *    Subjective:    Abby Finn Sr No complaint of shoulder pain left    PT/OT:   Gait:  Gait  Base of Support: Narrowed  Speed/Estrellita: Fluctuations  Step Length: Left shortened, Right shortened  Gait Abnormalities: Decreased step clearance(very unsteady, safety concerns)  Ambulation - Level of Assistance: Minimal assistance  Distance (ft): 120 Feet (ft)  Assistive Device: Gait belt, Walker, rolling  Interventions: Safety awareness training, Verbal cues            Interventions: Safety awareness training, Verbal cues    Vital Signs:    Patient Vitals for the past 8 hrs:   BP Temp Pulse Resp SpO2   19 1202 126/74 98 °F (36.7 °C) 98 16 94 %   19 0757 116/68 98 °F (36.7 °C) 97 16 93 %     Temp (24hrs), Av.2 °F (36.8 °C), Min:98 °F (36.7 °C), Max:98.6 °F (37 °C)      Pain Control:   Pain Assessment  Pain Scale 1: Numeric (0 - 10)  Pain Intensity 1: 0  Pain Location 1: Back, Flank  Pain Orientation 1: Left  Pain Description 1: Aching  Pain Intervention(s) 1: Medication (see MAR)    Meds:    Current Facility-Administered Medications   Medication Dose Route Frequency    cefpodoxime (VANTIN) tablet 200 mg  200 mg Oral Q12H    lidocaine 4 % patch 1 Patch  1 Patch TransDERmal Q24H    famotidine (PEPCID) tablet 20 mg  20 mg Oral DAILY    haloperidol lactate (HALDOL) injection 2.5 mg  2.5 mg IntraVENous Q6H PRN    calcium carbonate (TUMS) chewable tablet 200 mg [elemental]  200 mg Oral TID WITH MEALS    sodium chloride (NS) flush 5-40 mL  5-40 mL IntraVENous Q8H    sodium chloride (NS) flush 5-40 mL  5-40 mL IntraVENous PRN    aspirin chewable tablet 81 mg  81 mg Oral DAILY    atorvastatin (LIPITOR) tablet 20 mg  20 mg Oral QHS    clopidogrel (PLAVIX) tablet 75 mg  75 mg Oral DAILY    nitroglycerin (NITROSTAT) tablet 0.4 mg  0.4 mg SubLINGual Q5MIN PRN    sodium chloride (NS) flush 5-40 mL  5-40 mL IntraVENous PRN    acetaminophen (TYLENOL) tablet 650 mg  650 mg Oral Q6H PRN    oxyCODONE-acetaminophen (PERCOCET 7.5) 7.5-325 mg per tablet 1 Tab  1 Tab Oral Q6H PRN    HYDROmorphone (PF) (DILAUDID) injection 0.2 mg  0.2 mg IntraVENous Q4H PRN    naloxone (NARCAN) injection 0.4 mg  0.4 mg IntraVENous PRN    ondansetron (ZOFRAN) injection 4 mg  4 mg IntraVENous Q4H PRN    magnesium hydroxide (MILK OF MAGNESIA) 400 mg/5 mL oral suspension 30 mL  30 mL Oral DAILY PRN    heparin (porcine) injection 5,000 Units  5,000 Units SubCUTAneous Q12H       LAB:    Recent Labs     12/07/19  0601   HCT 30.6*   HGB 10.1*       24 Hour Assessment Issues:    Disoriented (baseline)    Discharge Planning: SNF    Transfuse PRBC's:      Assessment & Physician's Comment:  Shoulder with little pain. 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 Consider steroid injection if symptoms worsen again. No specific treatment indicated.   Available if further treatment desired    Cony Gomez DO

## 2019-12-08 LAB
ALBUMIN SERPL-MCNC: 2.3 G/DL (ref 3.2–4.6)
ALBUMIN/GLOB SERPL: 0.5 {RATIO} (ref 1.2–3.5)
ALP SERPL-CCNC: 318 U/L (ref 50–136)
ALT SERPL-CCNC: 44 U/L (ref 12–65)
ANION GAP SERPL CALC-SCNC: 6 MMOL/L (ref 7–16)
AST SERPL-CCNC: 52 U/L (ref 15–37)
BILIRUB SERPL-MCNC: 1.2 MG/DL (ref 0.2–1.1)
BUN SERPL-MCNC: 19 MG/DL (ref 8–23)
CALCIUM SERPL-MCNC: 9 MG/DL (ref 8.3–10.4)
CHLORIDE SERPL-SCNC: 101 MMOL/L (ref 98–107)
CO2 SERPL-SCNC: 29 MMOL/L (ref 21–32)
CREAT SERPL-MCNC: 1.35 MG/DL (ref 0.8–1.5)
ERYTHROCYTE [DISTWIDTH] IN BLOOD BY AUTOMATED COUNT: 16.7 % (ref 11.9–14.6)
GLOBULIN SER CALC-MCNC: 4.5 G/DL (ref 2.3–3.5)
GLUCOSE SERPL-MCNC: 135 MG/DL (ref 65–100)
HCT VFR BLD AUTO: 33 % (ref 41.1–50.3)
HGB BLD-MCNC: 10.7 G/DL (ref 13.6–17.2)
MCH RBC QN AUTO: 30.9 PG (ref 26.1–32.9)
MCHC RBC AUTO-ENTMCNC: 32.4 G/DL (ref 31.4–35)
MCV RBC AUTO: 95.4 FL (ref 79.6–97.8)
NRBC # BLD: 0.02 K/UL (ref 0–0.2)
PLATELET # BLD AUTO: 403 K/UL (ref 150–450)
PMV BLD AUTO: 10 FL (ref 9.4–12.3)
POTASSIUM SERPL-SCNC: 4.1 MMOL/L (ref 3.5–5.1)
PROT SERPL-MCNC: 6.8 G/DL (ref 6.3–8.2)
RBC # BLD AUTO: 3.46 M/UL (ref 4.23–5.6)
SODIUM SERPL-SCNC: 136 MMOL/L (ref 136–145)
T4 FREE SERPL-MCNC: 1.3 NG/DL (ref 0.9–1.8)
TSH SERPL DL<=0.005 MIU/L-ACNC: 2.46 UIU/ML (ref 0.36–3.74)
VIT B12 SERPL-MCNC: 446 PG/ML (ref 193–986)
WBC # BLD AUTO: 14.9 K/UL (ref 4.3–11.1)

## 2019-12-08 PROCEDURE — 82607 VITAMIN B-12: CPT

## 2019-12-08 PROCEDURE — 74011250637 HC RX REV CODE- 250/637: Performed by: NURSE PRACTITIONER

## 2019-12-08 PROCEDURE — 85027 COMPLETE CBC AUTOMATED: CPT

## 2019-12-08 PROCEDURE — 74011250637 HC RX REV CODE- 250/637: Performed by: INTERNAL MEDICINE

## 2019-12-08 PROCEDURE — 74011000250 HC RX REV CODE- 250: Performed by: NURSE PRACTITIONER

## 2019-12-08 PROCEDURE — 84439 ASSAY OF FREE THYROXINE: CPT

## 2019-12-08 PROCEDURE — 65270000029 HC RM PRIVATE

## 2019-12-08 PROCEDURE — 74011250637 HC RX REV CODE- 250/637: Performed by: HOSPITALIST

## 2019-12-08 PROCEDURE — 84443 ASSAY THYROID STIM HORMONE: CPT

## 2019-12-08 PROCEDURE — 36415 COLL VENOUS BLD VENIPUNCTURE: CPT

## 2019-12-08 PROCEDURE — 80053 COMPREHEN METABOLIC PANEL: CPT

## 2019-12-08 PROCEDURE — 74011250636 HC RX REV CODE- 250/636: Performed by: HOSPITALIST

## 2019-12-08 RX ADMIN — NYSTATIN: 100000 POWDER TOPICAL at 09:00

## 2019-12-08 RX ADMIN — HEPARIN SODIUM 5000 UNITS: 5000 INJECTION INTRAVENOUS; SUBCUTANEOUS at 16:28

## 2019-12-08 RX ADMIN — HEPARIN SODIUM 5000 UNITS: 5000 INJECTION INTRAVENOUS; SUBCUTANEOUS at 08:37

## 2019-12-08 RX ADMIN — Medication 10 ML: at 21:14

## 2019-12-08 RX ADMIN — CALCIUM CARBONATE (ANTACID) CHEW TAB 500 MG 200 MG: 500 CHEW TAB at 08:32

## 2019-12-08 RX ADMIN — NYSTATIN: 100000 POWDER TOPICAL at 16:29

## 2019-12-08 RX ADMIN — CLOPIDOGREL BISULFATE 75 MG: 75 TABLET, FILM COATED ORAL at 08:32

## 2019-12-08 RX ADMIN — CALCIUM CARBONATE (ANTACID) CHEW TAB 500 MG 200 MG: 500 CHEW TAB at 11:42

## 2019-12-08 RX ADMIN — CEFPODOXIME PROXETIL 200 MG: 200 TABLET, FILM COATED ORAL at 21:15

## 2019-12-08 RX ADMIN — FAMOTIDINE 20 MG: 20 TABLET, FILM COATED ORAL at 08:32

## 2019-12-08 RX ADMIN — ATORVASTATIN CALCIUM 20 MG: 10 TABLET, FILM COATED ORAL at 21:15

## 2019-12-08 RX ADMIN — ASPIRIN 81 MG 81 MG: 81 TABLET ORAL at 08:32

## 2019-12-08 RX ADMIN — CEFPODOXIME PROXETIL 200 MG: 200 TABLET, FILM COATED ORAL at 08:32

## 2019-12-08 RX ADMIN — CALCIUM CARBONATE (ANTACID) CHEW TAB 500 MG 200 MG: 500 CHEW TAB at 17:00

## 2019-12-08 NOTE — PROGRESS NOTES
Patient continues with confusion. Pulled out IV at beginning of shift and attempting to get out of bed \"to get out of here\" New IV placed and one dose of Haldol given. Was helpful. Patient continued to be restless. Did not sleep this shift. Hourly rounds and needs met.  Report and Care transferred to [facility] KAPIL MAURICIO RN

## 2019-12-08 NOTE — PROGRESS NOTES
Hospitalist Progress Note    Patient: Maricarmen Shelby MRN: [id number]  SSN: [id number]    YOB: 1931  Age: 80 y.o. Sex: male      Admit Date: 12/2/2019    LOS: 6 days     Subjective:     80year old CM with a PMH of CAD admitted on 12/2/19 with chest pain after a fall. He was found to have sepsis due to a UTI. Orthopedics looked at his shoulder and recommended PT/OT. He was evaluated by PT and SNF was recommended. He has had intermittent confusion during his stay. 12/8 - He seems more confused today. Still asking to go home. Denies pain. Denies CP/SOB. Review of systems negative except stated above. Objective:     Visit Vitals  /75 (BP 1 Location: Right arm, BP Patient Position: At rest)   Pulse 86   Temp 98.2 °F (36.8 °C)   Resp 18   Ht 5' 11\" (1.803 m)   Wt 68.9 kg (152 lb)   SpO2 93%   BMI 21.20 kg/m²      Oxygen Therapy  O2 Sat (%): 93 % (12/08/19 1115)  Pulse via Oximetry: 116 beats per minute (12/02/19 1432)  O2 Device: Room air (12/07/19 1434)  O2 Flow Rate (L/min): 1 l/min (12/03/19 0516)      Intake and Output:   No intake or output data in the 24 hours ending 12/08/19 1339      Physical Exam:   GENERAL: alert, cooperative, no distress, appears stated age  EYE: conjunctivae/corneas clear. PERRL. THROAT & NECK: normal and no erythema or exudates noted. LUNG: clear to auscultation bilaterally  HEART: regular rate and rhythm, S1S2, no murmur, no JVD  ABDOMEN: soft, non-tender, non-distended. Bowel sounds normal.   EXTREMITIES:  No edema, 2+ pedal/radial pulses bilaterally  SKIN: no rash or abnormalities  NEUROLOGIC: A&Ox1-2, more confused. Cranial nerves 2-12 grossly intact.     Lab/Data Review:  Recent Results (from the past 24 hour(s))   CBC W/O DIFF    Collection Time: 12/08/19 12:17 PM   Result Value Ref Range    WBC 14.9 (H) 4.3 - 11.1 K/uL    RBC 3.46 (L) 4.23 - 5.6 M/uL    HGB 10.7 (L) 13.6 - 17.2 g/dL    HCT 33.0 (L) 41.1 - 50.3 %    MCV 95.4 79.6 - 97.8 FL MCH 30.9 26.1 - 32.9 PG    MCHC 32.4 31.4 - 35.0 g/dL    RDW 16.7 (H) 11.9 - 14.6 %    PLATELET 042 459 - 664 K/uL    MPV 10.0 9.4 - 12.3 FL    ABSOLUTE NRBC 0.02 0.0 - 0.2 K/uL   METABOLIC PANEL, COMPREHENSIVE    Collection Time: 12/08/19 12:17 PM   Result Value Ref Range    Sodium 136 136 - 145 mmol/L    Potassium 4.1 3.5 - 5.1 mmol/L    Chloride 101 98 - 107 mmol/L    CO2 29 21 - 32 mmol/L    Anion gap 6 (L) 7 - 16 mmol/L    Glucose 135 (H) 65 - 100 mg/dL    BUN 19 8 - 23 MG/DL    Creatinine 1.35 0.8 - 1.5 MG/DL    GFR est AA >60 >60 ml/min/1.73m2    GFR est non-AA 53 (L) >60 ml/min/1.73m2    Calcium 9.0 8.3 - 10.4 MG/DL    Bilirubin, total PENDING MG/DL    ALT (SGPT) PENDING U/L    AST (SGOT) 52 (H) 15 - 37 U/L    Alk. phosphatase PENDING U/L    Protein, total PENDING g/dL    Albumin 2.3 (L) 3.2 - 4.6 g/dL    Globulin PENDING g/dL    A-G Ratio PENDING         Imaging:  Xr Shoulder Lt Ap/lat Min 2 V    Result Date: 12/2/2019  Clinical History: The patient is a 80years year old Male presenting with symptoms of pain following fall. Comparison:  none Findings: 3 views of the left shoulder were obtained. No fracture or dislocation is identified. There are chronic degenerative changes with glenohumeral joint space loss. Shallow respiratory changes are seen at the left lung base     Impression: Chronic degenerative changes. Mri Shoulder Lt Wo Cont    Result Date: 12/5/2019  MRI OF THE LEFT SHOULDER WITHOUT CONTRAST. Clinical Indication: Left shoulder pain after a fall Procedure: Multiplanar and multisequence MR images of the left shoulder were performed without gadolinium contrast. Comparison: No prior Findings: AC joint: Mild degenerative hypertrophy is noted of the AC joint. There is trace fluid signal the subacromial/subdeltoid bursa. Rotator cuff: The supraspinatus, infraspinatus, teres minor, and subscapularis tendons are intact. No rotator cuff tendon tear evident. Biceps labral complex:  The long head of biceps tendon is intact. There is degenerative signal changes in the superior labrum. The joint capsule in the axillary recess is intact. Glenohumeral joint: Marginal osteophytes are noted off the medial aspect of the humeral head-neck junction. There is full-thickness degenerative chondral loss diffusely over the articular surface the glenoid and chondral thinning along the humeral head. Evaluation of the labrum is limited by marked motion degradation on the axial sequence. There is no joint effusion. No abnormal soft tissue mass or fatty atrophy evident. IMPRESSION: 1. Advanced osteoarthritis of the glenohumeral joint. 2. The rotator cuff tendon is intact. 3. Mild degenerative hypertrophy of the [facility] joint with a mild subacromial/subdeltoid bursitis. Ct Head Wo Cont    Result Date: 12/2/2019  Examination: CT scan of the brain without contrast. History: rule out any intracranial pathology, 80 years Male  Patient with fall x 3 days ago Technique: 5 mm axial imaging of the brain from the posterior fossa to the vertex. All CT scans at this location are performed using dose modulation techniques as appropriate to a performed exam including the following: automated exposure control; adjustment of the mA and/or kV according to patient's size (this includes techniques or standardized protocols for targeted exams where dose is matched to indication/reason for exam; i.e. extremities or head); use of iterative reconstruction technique. Comparison:  None available Findings: Probably severe microvascular disease. The ventricles, sulci are age-appropriate. No intracranial hemorrhage or extra-axial collection is identified. No evidence of acute infarct. No mass effect or midline shift is present. Basal cisterns are intact. The visualized paranasal sinuses and mastoid air cells are clear. The orbits, bones, and soft tissues are normal in appearance. Impression:  No acute intracranial abnormality.   Other incidental findings as above. Ct Chest Wo Cont    Result Date: 12/2/2019  Noncontrast CT of the chest Clinical History: The patient is a 80years year old Male presenting with symptoms of Left rib pain, shortness of breath, multiple contusions after a fall Technique: Thin section axial images were obtained through the chest without intravenous contrast.  Coronal reformatted images were also provided for review. All CT scans at this facility are performed using dose reduction/dose modulation techniques, as appropriate the performed exam, including the following: Automated Exposure Control; Adjustment of the mA and/or kV according to patient size (this includes techniques or standardized protocols for targeted exams where dose is matched to indication/reason for exam); and Use of Iterative Reconstruction Technique. Total radiation dose: 371 mGy-cm Comparison:  None. Findings: Images through the lungs demonstrate no evidence of pulmonary nodule or mass. No effusions are identified. There is minimal bibasilar infiltrate/atelectasis. There is coronary artery atherosclerotic disease. No evidence of aortic aneurysmal dilatation is seen. The heart and mediastinum are grossly unremarkable. No significant hilar or mediastinal lymphadenopathy is demonstrated. There is a moderate-sized hiatal hernia Images chest wall structures as well as visualized portions of the upper abdomen are unremarkable in appearance. There are no acute osseous abnormalities. Impression: 1. Minimal bibasilar infiltrate/atelectasis. 2. Moderate sized hiatal hernia. Duplex Lower Ext Venous Bilat    Result Date: 12/2/2019  Clinical History: The patient is a 80years year old Male presenting with symptoms of r/o DVT Comparisons:  None Findings: The bilateral common femoral, superficial femoral, popliteal, and visualized calf veins are patent demonstrating normal compressibility, normal color flow, and normal response to distal augmentation.  There is no evidence of DVT. Impression: No evidence of lower extremity DVT. CPT code(s) 53377     Results for orders placed or performed during the hospital encounter of 19   2D ECHO COMPLETE ADULT (TTE) W OR [address] [facility]  [address] [facility], [address] [facility] [place]  [phone]    Transthoracic Echocardiogram  2D, M-mode, Doppler, and Color Doppler    Patient: Rebecca Ch  MR #: [id number]  : 1931  Age: 80 years  Gender: Male  Study date: 03-Dec-2019  Account #: [de-identified]  Height: 71 in  Weight: 151.6 lb  BSA: 1.88 mï¾²  Status:Routine  Location: 628  BP: 117/ 56    Allergies: NO KNOWN ALLERGENS    Sonographer:  DORON Padilla  Group:  [facility]  Referring Physician:  Ana Ram MD  Reading Physician:  James Gaytan. MD [facility]    INDICATIONS: Evaluate for worsening RVSP  *Patient scanned supine. Patient refused to lie on left side due to arm pain. PROCEDURE: This was a routine study. A transthoracic echocardiogram was  performed. The study included complete 2D imaging, M-mode, complete spectral  Doppler, and color Doppler. Image quality was adequate. LEFT VENTRICLE: Size was normal. Systolic function was normal. Ejection  fraction was estimated in the range of 55 % to 60 %. There were no regional  wall motion abnormalities. Wall thickness was normal. Left ventricular  diastolic function is indeterminate based on assessment criteria. Avg E/e':  15.7. RIGHT VENTRICLE: The size was normal. Systolic function was normal. Estimated  peak pressure was in the range of 30-35 mmHg. LEFT ATRIUM: Size was normal.    RIGHT ATRIUM: Not well visualized. SYSTEMIC VEINS: IVC: The inferior vena cava was not well visualized. AORTIC VALVE: Leaflets exhibited calcification and reduced mobility. The   aortic  valve area by VTI was 1.21 cm2. The peak velocity was 2.2 m/s. The mean  pressure gradient was 9 mmHg.  The peak pressure gradient was 20 mmHg. DI:   0.48. There was mild stenosis. There was trivial regurgitation. MITRAL VALVE: There was moderate thickening of the anterior leaflet. There   was  no evidence for stenosis. There was mild regurgitation. TRICUSPID VALVE: The valve structure was normal. There was no evidence for  stenosis. There was mild regurgitation. PULMONIC VALVE: Not well visualized. There was no evidence for stenosis. There  was no insufficiency. PERICARDIUM: There was no pericardial effusion. AORTA: The root exhibited normal size. There was mild dilatation of the  ascending aorta. SUMMARY:    -  Left ventricle: Systolic function was normal. Ejection fraction was  estimated in the range of 55 % to 60 %. There were no regional wall motion  abnormalities. -  Right ventricle: Estimated peak pressure was in the range of 30-35 mmHg. -  Aortic valve: Leaflets exhibited calcification and reduced mobility. There  was mild stenosis. The aortic valve area by VTI was 1.21 cm2.    -  Mitral valve: There was moderate thickening of the anterior leaflet. There  was mild regurgitation.    -  Tricuspid valve: There was mild regurgitation.    -  Aorta, systemic arteries: There was mild dilatation of the ascending   aorta. -  Pericardium: There was no pericardial effusion. SYSTEM MEASUREMENT TABLES    2D mode  AoR Diam (2D): 3.3 cm  LA Dimension (2D): 2.7 cm  Left Atrium Systolic Volume Index; Method of Disks, Biplane; 2D mode;: 18.1  ml/m2  IVS/LVPW (2D): 1  IVSd (2D): 1 cm  LVIDd (2D): 4.1 cm  LVIDs (2D): 2.5 cm  LVOT Area (2D): 2.5 cm2  LVPWd (2D): 1 cm  RVIDd (2D): 2.6 cm    Tissue Doppler Imaging  LV Peak [place] Tissue Maninder; Lateral MA (TDI): 4.4 cm/s  LV Peak [place] Tissue Maninder; Medial MA (TDI): 4.7 cm/s    Unspecified Scan Mode  Peak Grad; Mean; Antegrade Flow: 16 mm[Hg]  Vmax;  Antegrade Flow: 222 cm/s  LVOT Diam: 1.8 cm  MV Peak Maninder/LV Peak Tissue Maninder E-Wave; Lateral MA: 16.2  MV Peak Maninder/LV Peak Tissue Maninder E-Wave; Medial MA: 15.1    Prepared and signed by    Evangelist Syed. MD [facility] [place]  Signed 03-Dec-2019 12:58:48         Cultures:   All Micro Results     Procedure Component Value Units Date/Time    CULTURE, BLOOD [[id number]] Collected:  12/02/19 1437    Order Status:  Completed Specimen:  Blood Updated:  12/07/19 0957     Special Requests: --        RIGHT  FOREARM       Culture result: NO GROWTH 5 DAYS       CULTURE, BLOOD [[id number]] Collected:  12/02/19 1438    Order Status:  Completed Specimen:  Blood Updated:  12/07/19 0957     Special Requests: --        RIGHT  FOREARM       Culture result: NO GROWTH 5 DAYS       CULTURE, URINE [[id number]]  (Abnormal)  (Susceptibility) Collected:  12/02/19 1424    Order Status:  Completed Specimen:  Cath Urine Updated:  12/05/19 0733     Special Requests: NO SPECIAL REQUESTS        Culture result:       >100,000 COLONIES/mL ESCHERICHIA COLI                Assessment/Plan:     Principal Problem:    Sepsis ([facility] [address] (12/2/2019)  - Slowly resolving --> HR 86 and WBC 11.7  - Continue Vantin for UTI  - Blood cultures NGTD    Active Problems:    Acute cystitis without hematuria (12/2/2019)  - Urine culture with E. Coli  - Continue Vantin for UTI      Acute renal failure (ARF) ([facility] [address] (12/2/2019)  - Resolved  - Due to dehydration and UTI      Dehydration (12/2/2019)  - Now tolerated liquids and food  - Monitor for changes      Acute metabolic encephalopathy (69/9/8284)  - Seems to be slowly improving  - Due to UTI  - Still with mild confusion and little insight into acute illness      CAD (coronary artery disease) (7/18/2019)  - No acute CP  - Continue ASA/Plavix  - Continue Lipitor      Debility (12/2/2019)  - Patient weak and confused  - PT recommends SNF    Dispo - Awaiting approval for SNF due to weakness and intermittent confusion since patient lives alone    DIET REGULAR    DVT Prophylaxis: Heparin      Signed By: Wellington Vivar DO     December 8, 2019

## 2019-12-09 ENCOUNTER — APPOINTMENT (OUTPATIENT)
Dept: GENERAL RADIOLOGY | Age: 84
DRG: 871 | End: 2019-12-09
Attending: INTERNAL MEDICINE
Payer: MEDICARE

## 2019-12-09 LAB
APPEARANCE UR: ABNORMAL
BACTERIA URNS QL MICRO: ABNORMAL /HPF
BILIRUB UR QL: ABNORMAL
CASTS URNS QL MICRO: ABNORMAL /LPF
COLOR UR: ABNORMAL
ERYTHROCYTE [DISTWIDTH] IN BLOOD BY AUTOMATED COUNT: 16.5 % (ref 11.9–14.6)
GLUCOSE UR STRIP.AUTO-MCNC: NEGATIVE MG/DL
HCT VFR BLD AUTO: 33.1 % (ref 41.1–50.3)
HGB BLD-MCNC: 10.9 G/DL (ref 13.6–17.2)
HGB UR QL STRIP: NEGATIVE
KETONES UR QL STRIP.AUTO: NEGATIVE MG/DL
LEUKOCYTE ESTERASE UR QL STRIP.AUTO: NEGATIVE
MCH RBC QN AUTO: 31.3 PG (ref 26.1–32.9)
MCHC RBC AUTO-ENTMCNC: 32.9 G/DL (ref 31.4–35)
MCV RBC AUTO: 95.1 FL (ref 79.6–97.8)
NITRITE UR QL STRIP.AUTO: NEGATIVE
NRBC # BLD: 0 K/UL (ref 0–0.2)
OTHER OBSERVATIONS,UCOM: ABNORMAL
PH UR STRIP: 5.5 [PH] (ref 5–9)
PLATELET # BLD AUTO: 417 K/UL (ref 150–450)
PMV BLD AUTO: 9.7 FL (ref 9.4–12.3)
PROT UR STRIP-MCNC: 30 MG/DL
RBC # BLD AUTO: 3.48 M/UL (ref 4.23–5.6)
RBC #/AREA URNS HPF: ABNORMAL /HPF
SP GR UR REFRACTOMETRY: 1.02 (ref 1–1.02)
UROBILINOGEN UR QL STRIP.AUTO: 0.2 EU/DL (ref 0.2–1)
WBC # BLD AUTO: 15.7 K/UL (ref 4.3–11.1)
WBC URNS QL MICRO: ABNORMAL /HPF

## 2019-12-09 PROCEDURE — 81001 URINALYSIS AUTO W/SCOPE: CPT

## 2019-12-09 PROCEDURE — 36415 COLL VENOUS BLD VENIPUNCTURE: CPT

## 2019-12-09 PROCEDURE — 71045 X-RAY EXAM CHEST 1 VIEW: CPT

## 2019-12-09 PROCEDURE — 74011000250 HC RX REV CODE- 250: Performed by: NURSE PRACTITIONER

## 2019-12-09 PROCEDURE — 65270000029 HC RM PRIVATE

## 2019-12-09 PROCEDURE — 74011250637 HC RX REV CODE- 250/637: Performed by: NURSE PRACTITIONER

## 2019-12-09 PROCEDURE — 97530 THERAPEUTIC ACTIVITIES: CPT

## 2019-12-09 PROCEDURE — 74011250637 HC RX REV CODE- 250/637: Performed by: INTERNAL MEDICINE

## 2019-12-09 PROCEDURE — 85027 COMPLETE CBC AUTOMATED: CPT

## 2019-12-09 PROCEDURE — 74011250636 HC RX REV CODE- 250/636: Performed by: HOSPITALIST

## 2019-12-09 PROCEDURE — 74011250637 HC RX REV CODE- 250/637: Performed by: HOSPITALIST

## 2019-12-09 PROCEDURE — 74011250636 HC RX REV CODE- 250/636: Performed by: NURSE PRACTITIONER

## 2019-12-09 RX ADMIN — CEFPODOXIME PROXETIL 200 MG: 200 TABLET, FILM COATED ORAL at 21:25

## 2019-12-09 RX ADMIN — CALCIUM CARBONATE (ANTACID) CHEW TAB 500 MG 200 MG: 500 CHEW TAB at 17:41

## 2019-12-09 RX ADMIN — CLOPIDOGREL BISULFATE 75 MG: 75 TABLET, FILM COATED ORAL at 08:00

## 2019-12-09 RX ADMIN — ASPIRIN 81 MG 81 MG: 81 TABLET ORAL at 08:00

## 2019-12-09 RX ADMIN — NYSTATIN: 100000 POWDER TOPICAL at 17:42

## 2019-12-09 RX ADMIN — CEFPODOXIME PROXETIL 200 MG: 200 TABLET, FILM COATED ORAL at 08:00

## 2019-12-09 RX ADMIN — HALOPERIDOL LACTATE 2.5 MG: 5 INJECTION INTRAMUSCULAR at 18:56

## 2019-12-09 RX ADMIN — ATORVASTATIN CALCIUM 20 MG: 10 TABLET, FILM COATED ORAL at 21:25

## 2019-12-09 RX ADMIN — CALCIUM CARBONATE (ANTACID) CHEW TAB 500 MG 200 MG: 500 CHEW TAB at 08:00

## 2019-12-09 RX ADMIN — Medication 10 ML: at 15:22

## 2019-12-09 RX ADMIN — FAMOTIDINE 20 MG: 20 TABLET, FILM COATED ORAL at 08:00

## 2019-12-09 RX ADMIN — Medication 10 ML: at 21:26

## 2019-12-09 RX ADMIN — HEPARIN SODIUM 5000 UNITS: 5000 INJECTION INTRAVENOUS; SUBCUTANEOUS at 05:27

## 2019-12-09 RX ADMIN — HEPARIN SODIUM 5000 UNITS: 5000 INJECTION INTRAVENOUS; SUBCUTANEOUS at 17:41

## 2019-12-09 RX ADMIN — NYSTATIN: 100000 POWDER TOPICAL at 09:50

## 2019-12-09 RX ADMIN — Medication 10 ML: at 05:28

## 2019-12-09 NOTE — PROGRESS NOTES
Patient consistently trying to jump out of bed all throughout shift. Patient up to recliner majority of the day but forgetful to call for assistance. Patient is very weak and requires assistance x1. Patient alert but confused this shift. Fall precautions in place. UA sent this shift. Hourly rounds performed this shift. Bed lowered and locked, side rails x2, and call light in reach. All patient needs are met at this time. Bedside shift report given to oncoming nurse.

## 2019-12-09 NOTE — PROGRESS NOTES
Interdisciplinary Rounds completed 12/9/19. Nursing, Case Management, Physician and PT present. Plan of care reviewed and updated. Hopefully insurance will approve rehab at discharge. Pt very weak.

## 2019-12-09 NOTE — PROGRESS NOTES
Problem: Mobility Impaired (Adult and Pediatric)  Goal: *Acute Goals and Plan of Care (Insert Text)  Description  LTG:  (1.)Mr. Griffin Schwartz will move from supine to sit and sit to supine, scoot up and down and roll side to side INDEPENDENTLY with bed flat within 7 treatment day(s). (2.)Mr. Griffin Schwartz will transfer from bed to chair and chair to bed wth INDEPENDENTLY within 7 treatment day(s). (3.)Mr. Elinor Castro will ambulate INDEPENDENTLY for 500+ feet within 7 treatment day(s). (4.)Mr. Griffin Schwartz will ascend and descend 15 steps with SUPERVISION using handrail(s) within 7 days. ________________________________________________________________________________________________   Outcome: Progressing Towards Goal     PHYSICAL THERAPY: Daily Note and PM 12/9/2019  INPATIENT: PT Visit Days : 3  Payor: Annette Simmonds / Plan: [facility] [employer] MEDICARE CHOICE PPO/PFFS / Product Type: [employer] Medicare /       NAME/AGE/GENDER: Sanjay Prajapati is a 80 y.o. male   PRIMARY DIAGNOSIS: SIRS (systemic inflammatory response syndrome) (HCC) [R65.10]  Acute renal failure (ARF) ([facility] [address] [N17.9]  Dehydration [E86.0]  Debility [R53.81] Sepsis ([facility] [address] Sepsis ([facility] [address]       ICD-10: Treatment Diagnosis:    · Generalized Muscle Weakness (M62.81)  · Difficulty in walking, Not elsewhere classified (R26.2)  · Other abnormalities of gait and mobility (R26.89)  · History of falling (Z91.81)   Precaution/Allergies:  Patient has no known allergies. ASSESSMENT:     Mr. Griffin Schwartz is in chair on arrival, chair alarm set, lap belt undone and on floor by pt. Pt with fall reported this am, pt recalls falling but unable to tell PT how he fell. Pt oriented to self only this afternoon, unable to state year or place, is able to state correct month. Pt required mod A for donning tennis shoes for ambulation, demo fair to fair - dynamic sitting balance in recliner with mobility. Pt MIN A transfers.  Pt with fluctuations in yvon with ambulation using RW. Pt unsteady gait, poor static and dynamic standing balance. Pt with R knee buckling at times during gait, steppage/shuffled gait today. Pt occasionally walking \"stiff leg\" on L, states pain in L foot with full stance phase but \"feels better\" when not allowing L LE to bend. When questioned regarding pain in L LE pt states no pain. Pt remains fall risk, safety concerns for returning home. Pt with decreased balance, transfers, ambulation, requiring increased assist with ADLs since hospital admission. Pt would benefit from discharge to STR to continue to address deficits, pt functioning well below baseline level of activity. Per initial: a pleasant 80year old male admitted from home for SIRS, acute renal failure, debility, sepsis who is seen for initial therapy evaluation this morning. He lives at home alone and is typically independent/active without use of any DME. This section established at most recent assessment   PROBLEM LIST (Impairments causing functional limitations):  1. Decreased Strength  2. Decreased ADL/Functional Activities  3. Decreased Transfer Abilities  4. Decreased Ambulation Ability/Technique  5. Decreased Balance  6. Increased Pain  7. Decreased Activity Tolerance  8. Decreased Cognition   INTERVENTIONS PLANNED: (Benefits and precautions of physical therapy have been discussed with the patient.)  1. Balance Exercise  2. Bed Mobility  3. Gait Training  4. Home Exercise Program (HEP)  5. Therapeutic Activites  6. Therapeutic Exercise/Strengthening  7. Transfer Training     TREATMENT PLAN: Frequency/Duration: 3 times a week for duration of hospital stay  Rehabilitation Potential For Stated Goals: Good     REHAB RECOMMENDATIONS (at time of discharge pending progress):    Placement:   It is my opinion, based on this patient's performance to date, that Mr. Janel Estrada may benefit from intensive therapy at [address] after discharge due to the functional deficits listed above that are likely to improve with skilled rehabilitation and concerns that he/she may be unsafe to be unsupervised at home due to safety concerns for home, fall risk with mobility. Equipment:    tbd               HISTORY:   History of Present Injury/Illness (Reason for Referral):  Per H&P, \"Pt reported that he fell hitting his left chest on a cardboard box. Since the fall, he has noticed left sided chest pain, difficulty in using left arm due to pain in left shoulder. He denies heart burn, nausea/vomiting, head trauma, seizure like episode, urinary symptoms, diarrhea, chills, fever, abdominal pain, headache, palpitations. Pain is left sided, retrosternal, 10/10 severity, dull, intermittent, no aggravating or alleviating factors. ER work up showed WBC 19K, Cr 1.58 (baseline 1.12-1.2), HR >110 with sinus tachycardia on EKG. CT chest showed moderate sized hiatal hernia with minimal basilar atelectasis\"    Past Medical History/Comorbidities:   Mr. Wynonia Spurling  has a past medical history of Colon cancer ([facility] [address] (01/2012) and Hiatal hernia. Mr. Wynonia Spurling  has no past surgical history on file. Social History/Living Environment:   Home Environment: Private residence  One/Two Story Residence: Two story  # of Interior Steps: 24  Lift Chair Available: No  Living Alone: Yes  Support Systems: Child(margi), Family member(s)  Patient Expects to be Discharged to[de-identified] Private residence  Current DME Used/Available at Home: None  Tub or Shower Type: Shower  Prior Level of Function/Work/Activity:  Lives alone in two story home. Independent, active at baseline without use of any DME. Does not drive. Denies falls. Number of Personal Factors/Comorbidities that affect the Plan of Care: 1-2: MODERATE COMPLEXITY   EXAMINATION:   Most Recent Physical Functioning:   Gross Assessment:                  Posture:  Posture Assessment:  Forward head, Rounded shoulders  Balance:  Sitting: Impaired  Sitting - Static: Good (unsupported)  Sitting - Dynamic: Fair (occasional)  Standing: Impaired  Standing - Static: Constant support; Fair  Standing - Dynamic : Constant support;Fair;Poor Bed Mobility:  Rolling: (up in chair)  Sit to Supine: (left up in chair)  Scooting: Stand-by assistance  Wheelchair Mobility:     Transfers:  Sit to Stand: Minimum assistance(from recliner)  Stand to Sit: Minimum assistance  Gait:     Base of Support: Narrowed  Speed/Estrellita: Fluctuations  Step Length: Left shortened;Right shortened  Gait Abnormalities: Decreased step clearance; Steppage gait(R knee buckling at times, L LE \"stiff leg\" walking at times)  Distance (ft): 200 Feet (ft)(unsteady, poor balance, fall risk, directional cues)  Assistive Device: Gait belt;Walker, rolling  Ambulation - Level of Assistance: Minimal assistance  Interventions: Safety awareness training; Tactile cues; Verbal cues      Body Structures Involved:  1. Muscles Body Functions Affected:  1. Movement Related Activities and Participation Affected:  1. General Tasks and Demands  2. Mobility  3. Domestic Life  4. Community, Social and [place]   Number of elements that affect the Plan of Care: 4+: HIGH COMPLEXITY   CLINICAL PRESENTATION:   Presentation: Stable and uncomplicated: LOW COMPLEXITY   CLINICAL DECISION MAKIN [facility] Mobility Inpatient Short Form  How much difficulty does the patient currently have. .. Unable A Lot A Little None   1. Turning over in bed (including adjusting bedclothes, sheets and blankets)? [] 1   [] 2   [] 3   [x] 4   2. Sitting down on and standing up from a chair with arms ( e.g., wheelchair, bedside commode, etc.)   [] 1   [] 2   [] 3   [x] 4   3. Moving from lying on back to sitting on the side of the bed? [] 1   [] 2   [] 3   [x] 4   How much help from another person does the patient currently need. .. Total A Lot A Little None   4. Moving to and from a bed to a chair (including a wheelchair)? [] 1   [] 2   [x] 3   [] 4   5. Need to walk in hospital room? [] 1   [] 2   [x] 3   [] 4   6. Climbing 3-5 steps with a railing? [] 1   [] 2   [x] 3   [] 4   © 2007, Trustees of [address] [place] [address] [address] [id number], under license to [employer]. All rights reserved      Score:  Initial: 21 Most Recent: X (Date: -- )    Interpretation of Tool:  Represents activities that are increasingly more difficult (i.e. Bed mobility, Transfers, Gait). Medical Necessity:     · Patient demonstrates   · good  ·  rehab potential due to higher previous functional level. Reason for Services/Other Comments:  · Patient   · continues to demonstrate capacity to improve strength, balance, activity tolerance which will   · increase independence, decrease amount of assistance required from caregiver, and increase safety  · . Use of outcome tool(s) and clinical judgement create a POC that gives a: Clear prediction of patient's progress: LOW COMPLEXITY            TREATMENT:   (In addition to Assessment/Re-Assessment sessions the following treatments were rendered)   Pre-treatment Symptoms/Complaints:  \"I am at [facility]\"  Pain: Initial:   Pain Intensity 1: 0  Post Session:  0/10 in chair     Therapeutic Activity: (   23 min ):  Therapeutic activities including Bed transfers, Chair transfers, Ambulation on level ground, posture, walker safety, donning B shoes with mod A support to improve mobility, strength, balance, and activity tolerance . Required minimal Safety awareness training; Tactile cues; Verbal cues to promote static and dynamic balance in standing and promote motor control of bilateral, lower extremity(s).      Braces/Orthotics/Lines/Etc:   · IV  · O2 Device: Room air  Treatment/Session Assessment:    · Response to Treatment:  unsteady, fall risk, confused  · Interdisciplinary Collaboration:   o Physical Therapist  o Registered Nurse  · After treatment position/precautions:   o Up in chair  o Bed alarm/tab alert on  o Bed/Chair-wheels locked  o Bed in low position  o Call light within reach  o lap belt donned   · Compliance with Program/Exercises: Will assess as treatment progresses  · Recommendations/Intent for next treatment session: \"Next visit will focus on advancements to more challenging activities and reduction in assistance provided\".   Total Treatment Duration:  PT Patient Time In/Time Out  Time In: 1317  Time Out: [address] [place],

## 2019-12-09 NOTE — PROGRESS NOTES
Spoke with Patient's son, Harrison Covington in regards to patient's fall this morning. Son verbalized concern that patient is not stable on his feet as he was prior to admission and asking about patient going to rehab. Case management on board.

## 2019-12-09 NOTE — PROGRESS NOTES
Post Fall Documentation      Gonsalo Fontanez witnessed/unwitnessed fall occurred on 12/9/19 at [facility]. The answers to the following questions summarize the fall: In the patient's own words,:  · What were you attempting to do when you fell? Walking out of bathroom  · Do you know why you fell? To weak  · Do you have any pain/discomfort or any other complaints? no  · Which part of your body made contact with the floor or other object?  hips    Nurse:  Modesto Callejas Was this an assisted fall? yes   Was fall witnessed? No   If witnessed, what part of the body made contact with the floor or other object?  n/a   Patients mental status after the fall/when found: Alert and oriented   Any apparent injury:  No apparent injury   Immediate interventions for injury/suspected injury? No interventions needed   Patient assisted back to bed? Assist X2   Name of provider notified and time, any comments? Dr Brenda Meza Name of family member notified and time: . .. Immediate VS and physical assessment documented in flow sheets. Neuro assessment every hour x 4 (for potential head injury or unwitnessed fall) documented in flow sheets.       Loi Berg RN

## 2019-12-09 NOTE — PROGRESS NOTES
Hospitalist Progress Note    Patient: Sheila Garcia MRN: [id number]  SSN: [id number]    YOB: 1931  Age: 80 y.o. Sex: male      Admit Date: 12/2/2019    LOS: 7 days     Subjective:     80year old CM with a PMH of CAD admitted on 12/2/19 with chest pain after a fall. He was found to have sepsis due to a UTI. Orthopedics looked at his shoulder and recommended PT/OT. He was evaluated by PT and SNF was recommended. He has had intermittent confusion during his stay. 12/9 - He seems more confused today. Maureen Shan coming out of bathroom this AM. Denies pain. Denies CP/SOB. Review of systems negative except stated above. Objective:     Visit Vitals  /80 (BP 1 Location: Right arm, BP Patient Position: Sitting)   Pulse 98   Temp 97.5 °F (36.4 °C)   Resp 15   Ht 5' 11\" (1.803 m)   Wt 68.9 kg (152 lb)   SpO2 94%   BMI 21.20 kg/m²      Oxygen Therapy  O2 Sat (%): 94 % (12/09/19 0746)  Pulse via Oximetry: 116 beats per minute (12/02/19 1432)  O2 Device: Room air (12/07/19 1434)  O2 Flow Rate (L/min): 1 l/min (12/03/19 0516)      Intake and Output:   No intake or output data in the 24 hours ending 12/09/19 0944      Physical Exam:   GENERAL: alert, cooperative, no distress, appears stated age  EYE: conjunctivae/corneas clear. PERRL. THROAT & NECK: normal and no erythema or exudates noted. LUNG: clear to auscultation bilaterally  HEART: regular rate and rhythm, S1S2, no murmur, no JVD  ABDOMEN: soft, non-tender, non-distended. Bowel sounds normal.   EXTREMITIES:  No edema, 2+ pedal/radial pulses bilaterally  SKIN: no rash or abnormalities  NEUROLOGIC: A&Ox3, confused about situation and previous conversations. Cranial nerves 2-12 grossly intact.     Lab/Data Review:  Recent Results (from the past 24 hour(s))   VITAMIN B12    Collection Time: 12/08/19 12:17 PM   Result Value Ref Range    Vitamin B12 446 193 - 986 pg/mL   TSH 3RD GENERATION    Collection Time: 12/08/19 12:17 PM   Result Value Ref Range    TSH 2.460 0.358 - 3.740 uIU/mL   T4, FREE    Collection Time: 12/08/19 12:17 PM   Result Value Ref Range    T4, Free 1.3 0.9 - 1.8 NG/DL   CBC W/O DIFF    Collection Time: 12/08/19 12:17 PM   Result Value Ref Range    WBC 14.9 (H) 4.3 - 11.1 K/uL    RBC 3.46 (L) 4.23 - 5.6 M/uL    HGB 10.7 (L) 13.6 - 17.2 g/dL    HCT 33.0 (L) 41.1 - 50.3 %    MCV 95.4 79.6 - 97.8 FL    MCH 30.9 26.1 - 32.9 PG    MCHC 32.4 31.4 - 35.0 g/dL    RDW 16.7 (H) 11.9 - 14.6 %    PLATELET 600 283 - 875 K/uL    MPV 10.0 9.4 - 12.3 FL    ABSOLUTE NRBC 0.02 0.0 - 0.2 K/uL   METABOLIC PANEL, COMPREHENSIVE    Collection Time: 12/08/19 12:17 PM   Result Value Ref Range    Sodium 136 136 - 145 mmol/L    Potassium 4.1 3.5 - 5.1 mmol/L    Chloride 101 98 - 107 mmol/L    CO2 29 21 - 32 mmol/L    Anion gap 6 (L) 7 - 16 mmol/L    Glucose 135 (H) 65 - 100 mg/dL    BUN 19 8 - 23 MG/DL    Creatinine 1.35 0.8 - 1.5 MG/DL    GFR est AA >60 >60 ml/min/1.73m2    GFR est non-AA 53 (L) >60 ml/min/1.73m2    Calcium 9.0 8.3 - 10.4 MG/DL    Bilirubin, total 1.2 (H) 0.2 - 1.1 MG/DL    ALT (SGPT) 44 12 - 65 U/L    AST (SGOT) 52 (H) 15 - 37 U/L    Alk. phosphatase 318 (H) 50 - 136 U/L    Protein, total 6.8 6.3 - 8.2 g/dL    Albumin 2.3 (L) 3.2 - 4.6 g/dL    Globulin 4.5 (H) 2.3 - 3.5 g/dL    A-G Ratio 0.5 (L) 1.2 - 3.5     CBC W/O DIFF    Collection Time: 12/09/19  8:09 AM   Result Value Ref Range    WBC 15.7 (H) 4.3 - 11.1 K/uL    RBC 3.48 (L) 4.23 - 5.6 M/uL    HGB 10.9 (L) 13.6 - 17.2 g/dL    HCT 33.1 (L) 41.1 - 50.3 %    MCV 95.1 79.6 - 97.8 FL    MCH 31.3 26.1 - 32.9 PG    MCHC 32.9 31.4 - 35.0 g/dL    RDW 16.5 (H) 11.9 - 14.6 %    PLATELET 086 032 - 931 K/uL    MPV 9.7 9.4 - 12.3 FL    ABSOLUTE NRBC 0.00 0.0 - 0.2 K/uL       Imaging:  Xr Shoulder Lt Ap/lat Min 2 V    Result Date: 12/2/2019  Clinical History: The patient is a 80years year old Male presenting with symptoms of pain following fall.  Comparison:  none Findings: 3 views of the left shoulder were obtained. No fracture or dislocation is identified. There are chronic degenerative changes with glenohumeral joint space loss. Shallow respiratory changes are seen at the left lung base     Impression: Chronic degenerative changes. Mri Shoulder Lt Wo Cont    Result Date: 12/5/2019  MRI OF THE LEFT SHOULDER WITHOUT CONTRAST. Clinical Indication: Left shoulder pain after a fall Procedure: Multiplanar and multisequence MR images of the left shoulder were performed without gadolinium contrast. Comparison: No prior Findings: AC joint: Mild degenerative hypertrophy is noted of the AC joint. There is trace fluid signal the subacromial/subdeltoid bursa. Rotator cuff: The supraspinatus, infraspinatus, teres minor, and subscapularis tendons are intact. No rotator cuff tendon tear evident. Biceps labral complex: The long head of biceps tendon is intact. There is degenerative signal changes in the superior labrum. The joint capsule in the axillary recess is intact. Glenohumeral joint: Marginal osteophytes are noted off the medial aspect of the humeral head-neck junction. There is full-thickness degenerative chondral loss diffusely over the articular surface the glenoid and chondral thinning along the humeral head. Evaluation of the labrum is limited by marked motion degradation on the axial sequence. There is no joint effusion. No abnormal soft tissue mass or fatty atrophy evident. IMPRESSION: 1. Advanced osteoarthritis of the glenohumeral joint. 2. The rotator cuff tendon is intact. 3. Mild degenerative hypertrophy of the [facility] joint with a mild subacromial/subdeltoid bursitis. Ct Head Wo Cont    Result Date: 12/2/2019  Examination: CT scan of the brain without contrast. History: rule out any intracranial pathology, 80 years Male  Patient with fall x 3 days ago Technique: 5 mm axial imaging of the brain from the posterior fossa to the vertex.   All CT scans at this location are performed using dose modulation techniques as appropriate to a performed exam including the following: automated exposure control; adjustment of the mA and/or kV according to patient's size (this includes techniques or standardized protocols for targeted exams where dose is matched to indication/reason for exam; i.e. extremities or head); use of iterative reconstruction technique. Comparison:  None available Findings: Probably severe microvascular disease. The ventricles, sulci are age-appropriate. No intracranial hemorrhage or extra-axial collection is identified. No evidence of acute infarct. No mass effect or midline shift is present. Basal cisterns are intact. The visualized paranasal sinuses and mastoid air cells are clear. The orbits, bones, and soft tissues are normal in appearance. Impression:  No acute intracranial abnormality. Other incidental findings as above. Ct Chest Wo Cont    Result Date: 12/2/2019  Noncontrast CT of the chest Clinical History: The patient is a 80years year old Male presenting with symptoms of Left rib pain, shortness of breath, multiple contusions after a fall Technique: Thin section axial images were obtained through the chest without intravenous contrast.  Coronal reformatted images were also provided for review. All CT scans at this facility are performed using dose reduction/dose modulation techniques, as appropriate the performed exam, including the following: Automated Exposure Control; Adjustment of the mA and/or kV according to patient size (this includes techniques or standardized protocols for targeted exams where dose is matched to indication/reason for exam); and Use of Iterative Reconstruction Technique. Total radiation dose: 371 mGy-cm Comparison:  None. Findings: Images through the lungs demonstrate no evidence of pulmonary nodule or mass. No effusions are identified. There is minimal bibasilar infiltrate/atelectasis. There is coronary artery atherosclerotic disease.  No evidence of aortic aneurysmal dilatation is seen. The heart and mediastinum are grossly unremarkable. No significant hilar or mediastinal lymphadenopathy is demonstrated. There is a moderate-sized hiatal hernia Images chest wall structures as well as visualized portions of the upper abdomen are unremarkable in appearance. There are no acute osseous abnormalities. Impression: 1. Minimal bibasilar infiltrate/atelectasis. 2. Moderate sized hiatal hernia. Duplex Lower Ext Venous Bilat    Result Date: 2019  Clinical History: The patient is a 80years year old Male presenting with symptoms of r/o DVT Comparisons:  None Findings: The bilateral common femoral, superficial femoral, popliteal, and visualized calf veins are patent demonstrating normal compressibility, normal color flow, and normal response to distal augmentation. There is no evidence of DVT. Impression: No evidence of lower extremity DVT. CPT code(s) 37016     Results for orders placed or performed during the hospital encounter of 19   2D ECHO COMPLETE ADULT (TTE) W OR [address] [facility] [address] [place] [address] [facility]  [phone]    Transthoracic Echocardiogram  2D, M-mode, Doppler, and Color Doppler    Patient: Nicky Cabezas  MR #: [id number]  : 1931  Age: 80 years  Gender: Male  Study date: 03-Dec-2019  Account #: [de-identified]  Height: 71 in  Weight: 151.6 lb  BSA: 1.88 mï¾²  Status:Routine  Location: [facility]  BP: 117/ 56    Allergies: NO KNOWN ALLERGENS    Sonographer:  DORON Brumfield  Group:  [facility]  Referring Physician:  Erendira Leiva MD  Reading Physician:  Adam De Los Santos. MD [facility] [place]    INDICATIONS: Evaluate for worsening RVSP  *Patient scanned supine. Patient refused to lie on left side due to arm pain. PROCEDURE: This was a routine study. A transthoracic echocardiogram was  performed.  The study included complete 2D imaging, M-mode, complete spectral  Doppler, and color Doppler. Image quality was adequate. LEFT VENTRICLE: Size was normal. Systolic function was normal. Ejection  fraction was estimated in the range of 55 % to 60 %. There were no regional  wall motion abnormalities. Wall thickness was normal. Left ventricular  diastolic function is indeterminate based on assessment criteria. Avg E/e':  15.7. RIGHT VENTRICLE: The size was normal. Systolic function was normal. Estimated  peak pressure was in the range of 30-35 mmHg. LEFT ATRIUM: Size was normal.    RIGHT ATRIUM: Not well visualized. SYSTEMIC VEINS: IVC: The inferior vena cava was not well visualized. AORTIC VALVE: Leaflets exhibited calcification and reduced mobility. The   aortic  valve area by VTI was 1.21 cm2. The peak velocity was 2.2 m/s. The mean  pressure gradient was 9 mmHg. The peak pressure gradient was 20 mmHg. DI:   0.48. There was mild stenosis. There was trivial regurgitation. MITRAL VALVE: There was moderate thickening of the anterior leaflet. There   was  no evidence for stenosis. There was mild regurgitation. TRICUSPID VALVE: The valve structure was normal. There was no evidence for  stenosis. There was mild regurgitation. PULMONIC VALVE: Not well visualized. There was no evidence for stenosis. There  was no insufficiency. PERICARDIUM: There was no pericardial effusion. AORTA: The root exhibited normal size. There was mild dilatation of the  ascending aorta. SUMMARY:    -  Left ventricle: Systolic function was normal. Ejection fraction was  estimated in the range of 55 % to 60 %. There were no regional wall motion  abnormalities. -  Right ventricle: Estimated peak pressure was in the range of 30-35 mmHg. -  Aortic valve: Leaflets exhibited calcification and reduced mobility. There  was mild stenosis. The aortic valve area by VTI was 1.21 cm2.    -  Mitral valve: There was moderate thickening of the anterior leaflet. There  was mild regurgitation.    -  Tricuspid valve: There was mild regurgitation.    -  Aorta, systemic arteries: There was mild dilatation of the ascending   aorta. -  Pericardium: There was no pericardial effusion. SYSTEM MEASUREMENT TABLES    2D mode  AoR Diam (2D): 3.3 cm  LA Dimension (2D): 2.7 cm  Left Atrium Systolic Volume Index; Method of Disks, Biplane; 2D mode;: 18.1  ml/m2  IVS/LVPW (2D): 1  IVSd (2D): 1 cm  LVIDd (2D): 4.1 cm  LVIDs (2D): 2.5 cm  LVOT Area (2D): 2.5 cm2  LVPWd (2D): 1 cm  RVIDd (2D): 2.6 cm    Tissue Doppler Imaging  LV Peak [place] Tissue Maninder; Lateral MA (TDI): 4.4 cm/s  LV Peak [place] Tissue Maninder; Medial MA (TDI): 4.7 cm/s    Unspecified Scan Mode  Peak Grad; Mean; Antegrade Flow: 16 mm[Hg]  Vmax; Antegrade Flow: 222 cm/s  LVOT Diam: 1.8 cm  MV Peak Maninder/LV Peak Tissue Maninder E-Wave; Lateral MA: 16.2  MV Peak Maninder/LV Peak Tissue Maninder E-Wave; Medial MA: 15.1    Prepared and signed by    Memo Farah. MD [facility]  Signed 03-Dec-2019 12:58:48         Cultures:   All Micro Results     Procedure Component Value Units Date/Time    CULTURE, BLOOD [[id number]] Collected:  12/02/19 1437    Order Status:  Completed Specimen:  Blood Updated:  12/07/19 0957     Special Requests: --        RIGHT  FOREARM       Culture result: NO GROWTH 5 DAYS       CULTURE, BLOOD [[id number]] Collected:  12/02/19 1438    Order Status:  Completed Specimen:  Blood Updated:  12/07/19 0957     Special Requests: --        RIGHT  FOREARM       Culture result: NO GROWTH 5 DAYS       CULTURE, URINE [[id number]]  (Abnormal)  (Susceptibility) Collected:  12/02/19 1424    Order Status:  Completed Specimen:  Cath Urine Updated:  12/05/19 0733     Special Requests: NO SPECIAL REQUESTS        Culture result:       >100,000 COLONIES/mL ESCHERICHIA COLI                Assessment/Plan:     Principal Problem:    Sepsis ([address] (12/2/2019)  - WBC going up x 2 days  - Will repeat CXR and UA  - Continue Vantin for UTI - EOT 12/16 (14 days course for complicated male UTI)  - Blood cultures NGTD    Active Problems:    Acute cystitis without hematuria (12/2/2019)  - Urine culture with E. Coli  - Continue Vantin for UTI - EOT 12/16      Acute renal failure (ARF) ([facility] [address] [address] (12/2/2019)  - Resolved  - Due to dehydration and UTI      Dehydration (12/2/2019)  - Now tolerated liquids and food  - Monitor for changes      Acute metabolic encephalopathy (67/3/3641)  - Is waxing and waning  - A&Ox3 this AM, but confused to situation  - ? Due to UTI vs underlying undiagnosed dementia  - Still with mild confusion and little insight into acute illness      CAD (coronary artery disease) (7/18/2019)  - No acute CP  - Continue ASA/Plavix  - Continue Lipitor      Debility (12/2/2019)  - Patient weak and confused  - PT recommends SNF    Dispo - Awaiting approval for SNF due to weakness and intermittent confusion since patient lives alone. I personally do not feel he would be safe at home at this time, and neither does PT.     DIET REGULAR    DVT Prophylaxis: Heparin      Signed By: Yris Jain DO     December 9, 2019

## 2019-12-10 PROCEDURE — 74011250636 HC RX REV CODE- 250/636: Performed by: HOSPITALIST

## 2019-12-10 PROCEDURE — 74011000250 HC RX REV CODE- 250: Performed by: NURSE PRACTITIONER

## 2019-12-10 PROCEDURE — 74011250637 HC RX REV CODE- 250/637: Performed by: NURSE PRACTITIONER

## 2019-12-10 PROCEDURE — 65270000029 HC RM PRIVATE

## 2019-12-10 PROCEDURE — 74011250637 HC RX REV CODE- 250/637: Performed by: HOSPITALIST

## 2019-12-10 PROCEDURE — 97530 THERAPEUTIC ACTIVITIES: CPT

## 2019-12-10 PROCEDURE — 74011250637 HC RX REV CODE- 250/637: Performed by: INTERNAL MEDICINE

## 2019-12-10 RX ADMIN — HEPARIN SODIUM 5000 UNITS: 5000 INJECTION INTRAVENOUS; SUBCUTANEOUS at 05:03

## 2019-12-10 RX ADMIN — HEPARIN SODIUM 5000 UNITS: 5000 INJECTION INTRAVENOUS; SUBCUTANEOUS at 17:39

## 2019-12-10 RX ADMIN — ATORVASTATIN CALCIUM 20 MG: 10 TABLET, FILM COATED ORAL at 21:43

## 2019-12-10 RX ADMIN — NYSTATIN: 100000 POWDER TOPICAL at 09:06

## 2019-12-10 RX ADMIN — Medication 10 ML: at 21:43

## 2019-12-10 RX ADMIN — NYSTATIN: 100000 POWDER TOPICAL at 17:41

## 2019-12-10 RX ADMIN — ASPIRIN 81 MG 81 MG: 81 TABLET ORAL at 08:56

## 2019-12-10 RX ADMIN — CLOPIDOGREL BISULFATE 75 MG: 75 TABLET, FILM COATED ORAL at 08:56

## 2019-12-10 RX ADMIN — CALCIUM CARBONATE (ANTACID) CHEW TAB 500 MG 200 MG: 500 CHEW TAB at 09:06

## 2019-12-10 RX ADMIN — Medication 10 ML: at 05:02

## 2019-12-10 RX ADMIN — CALCIUM CARBONATE (ANTACID) CHEW TAB 500 MG 200 MG: 500 CHEW TAB at 17:38

## 2019-12-10 RX ADMIN — Medication 10 ML: at 13:35

## 2019-12-10 RX ADMIN — FAMOTIDINE 20 MG: 20 TABLET, FILM COATED ORAL at 08:56

## 2019-12-10 RX ADMIN — CALCIUM CARBONATE (ANTACID) CHEW TAB 500 MG 200 MG: 500 CHEW TAB at 13:34

## 2019-12-10 NOTE — PROGRESS NOTES
Progress Note    Patient: Jameson Watt MRN: [id number]  SSN: [id number]    YOB: 1931  Age: 80 y.o. Sex: male      Admit Date: 12/2/2019    LOS: 8 days     Subjective:     PMH of CAD     Admitted due to chest pain after a fall. No acute cardiac events. Found to have sepsis with UTI. Patient is not deemed safe to be discharged to live by himself. Son is trying to work with [employer] to find placement solution for patient. Objective:     Vitals:    12/09/19 2325 12/10/19 0400 12/10/19 0803 12/10/19 1155   BP: 121/69 99/63 97/59 115/66   Pulse: 94 85 88 84   Resp: 18 18 18 17   Temp: 98.7 °F (37.1 °C) 98.6 °F (37 °C) 98.6 °F (37 °C) 98.8 °F (37.1 °C)   SpO2: 92% 91% 92% 92%   Weight:       Height:            Intake and Output:  Current Shift: No intake/output data recorded. Last three shifts: 12/08 1901 - 12/10 0700  In: 5 [P.O.:720]  Out: 100 [Urine:100]    Physical Exam:     General:                    The patient is a pleasantly confused male in no acute distress. Head:                                   Normocephalic/atraumatic. Eyes:                                   No palpebral pallor or scleral icterus. ENT:                                    External auricular and nasal exam within normal limits. Mucous membranes are moist.  Neck:                                   Supple, non-tender, no JVD. Lungs:                       Clear to auscultation bilaterally without wheezes or crackles. No respiratory distress or accessory muscle use. Heart:                                  Regular rate and rhythm, without murmurs, rubs, or gallops. Abdomen:                  Soft, non-tender, non-distended with normoactive bowel sounds. Genitourinary:           No tenderness over the bladder or bilateral CVAs. Extremities:               Without clubbing, cyanosis, or edema.   Skin: Normal color, texture, and turgor. No rashes, lesions, or jaundice. Pulses:                      Radial and dorsalis pedis pulses present 2+ bilaterally. Capillary refill <2s. Neurologic:                CN II-XII grossly intact and symmetrical.                                               Moving all four extremities well with no focal deficits. Psychiatric:               pleasantly confused. Lab/Data Review:  CBC W/O DIFF [[id number]] (Order [id number])   Lab   Date: 12/9/2019 Department: [facility] Released By/Authorizing: Demond Bullard DO (auto-released)   Component Value Flag Ref Range Units Status   WBC 15.7  High   4.3 - 11.1 K/uL Final   RBC 3.48  Low   4.23 - 5.6 M/uL Final   HGB 10.9  Low   13.6 - 17.2 g/dL Final   HCT 33.1  Low   41.1 - 50.3 % Final   MCV 95.1   79.6 - 97.8 FL Final   MCH 31.3   26.1 - 32.9 PG Final   MCHC 32.9   31.4 - 35.0 g/dL Final   RDW 16.5  High   11.9 - 14.6 % Final   PLATELET 932   914 - 450 K/uL Final   MPV 9.7   9.4 - 12.3 FL Final   ABSOLUTE NRBC 0.00   0.0 - 0.2 K/uL Final   Comment:   **Note: Absolute NRBC parameter is now reported with Hemogram**     METABOLIC PANEL, COMPREHENSIVE [[id number]] (Order [id number])   Lab   Date: 12/8/2019 Department: [facility] Released By/Authorizing: Demond Bullard DO (auto-released)   Component Value Flag Ref Range Units Status   Sodium 136   136 - 145 mmol/L Final   Potassium 4.1   3.5 - 5.1 mmol/L Final   Chloride 101   98 - 107 mmol/L Final   CO2 29   21 - 32 mmol/L Final   Anion gap 6  Low   7 - 16 mmol/L Final   Glucose 135  High   65 - 100 mg/dL Final   Comment:   47 - 60 mg/dl Consistent with, but not fully diagnostic of hypoglycemia.    101 - 125 mg/dl Impaired fasting glucose/consistent with pre-diabetes mellitus   > 126 mg/dl Fasting glucose consistent with overt diabetes mellitus    BUN 19   8 - 23 MG/DL Final   Creatinine 1.35   0.8 - 1.5 MG/DL Final   GFR est AA >60   >60 ml/min/1.73m2 Final   GFR est non-AA 53  Low   >60 ml/min/1.73m2 Final   Comment:   (NOTE)   Estimated GFR is calculated using the Modification of Diet in Renal   Disease (MDRD) Study equation, reported for both  Americans   (GFRAA) and non- Americans (GFRNA), and normalized to 1.73m2   body surface area. The physician must decide which value applies to   the patient. The MDRD study equation should only be used in   individuals age 25 or older. It has not been validated for the   following: pregnant women, patients with serious comorbid conditions,   or on certain medications, or persons with extremes of body size,   muscle mass, or nutritional status. Calcium 9.0   8.3 - 10.4 MG/DL Final   Bilirubin, total 1.2  High   0.2 - 1.1 MG/DL Final   ALT (SGPT) 44   12 - 65 U/L Final   AST (SGOT) 52  High   15 - 37 U/L Final   Alk.  phosphatase 318  High   50 - 136 U/L Final   Protein, total 6.8   6.3 - 8.2 g/dL Final   Albumin 2.3  Low   3.2 - 4.6 g/dL Final   Globulin 4.5  High   2.3 - 3.5 g/dL Final   A-G Ratio 0.5  Low   1.2 - 3.5   Final     XR chest   12-9-2019  Impressions: Stable portable chest       Current Facility-Administered Medications:     [START ON 12/12/2019] tuberculin injection 5 Units, 5 Units, IntraDERMal, ONCE, Lila Cordero MD    nystatin (MYCOSTATIN) 100,000 unit/gram powder, , Topical, BID, Milton Bautista E, DO    lidocaine 4 % patch 1 Patch, 1 Patch, TransDERmal, Q24H, Opal Peterson NP, 1 Patch at 12/09/19 1743    famotidine (PEPCID) tablet 20 mg, 20 mg, Oral, DAILY, Armen Mendoza MD, 20 mg at 12/10/19 0949    haloperidol lactate (HALDOL) injection 2.5 mg, 2.5 mg, IntraVENous, Q6H PRN, Opal Peterson NP, 2.5 mg at 12/09/19 1856    calcium carbonate (TUMS) chewable tablet 200 mg [elemental], 200 mg, Oral, TID WITH MEALS, Opal Peterson NP, 200 mg at 12/10/19 1334    sodium chloride (NS) flush 5-40 mL, 5-40 mL, IntraVENous, Q8H, Nae Sainz MD, 10 mL at 12/10/19 1335    sodium chloride (NS) flush 5-40 mL, 5-40 mL, IntraVENous, PRN, Hyacinth Hassan MD    aspirin chewable tablet 81 mg, 81 mg, Oral, DAILY, Tim Beebe MD, 81 mg at 12/10/19 0856    atorvastatin (LIPITOR) tablet 20 mg, 20 mg, Oral, QHS, Tim Beebe MD, 20 mg at 12/09/19 2125    clopidogrel (PLAVIX) tablet 75 mg, 75 mg, Oral, DAILY, Tim Beebe MD, 75 mg at 12/10/19 0856    nitroglycerin (NITROSTAT) tablet 0.4 mg, 0.4 mg, SubLINGual, Q5MIN PRN, Tim Beebe MD    sodium chloride (NS) flush 5-40 mL, 5-40 mL, IntraVENous, PRN, Tim Beebe MD    acetaminophen (TYLENOL) tablet 650 mg, 650 mg, Oral, Q6H PRN, Tim Beebe MD    oxyCODONE-acetaminophen (PERCOCET 7.5) 7.5-325 mg per tablet 1 Tab, 1 Tab, Oral, Q6H PRN, Tim Beebe MD, 1 Tab at 12/05/19 2121    HYDROmorphone (PF) (DILAUDID) injection 0.2 mg, 0.2 mg, IntraVENous, Q4H PRN, Tim Beebe MD    [facility]) injection 0.4 mg, 0.4 mg, IntraVENous, PRN, Tim Beebe MD    ondansetron [facility]) injection 4 mg, 4 mg, IntraVENous, Q4H PRN, Tim Beebe MD    magnesium hydroxide (MILK OF MAGNESIA) 400 mg/5 mL oral suspension 30 mL, 30 mL, Oral, DAILY PRN, Tim Beebe MD    heparin (porcine) injection 5,000 Units, 5,000 Units, SubCUTAneous, Q12H, Tim Beebe MD, 5,000 Units at 12/10/19 0503      Assessment:     Principal Problem:    Sepsis ([address] (12/2/2019)    Active Problems:    CAD (coronary artery disease) (7/18/2019)      Dehydration (12/2/2019)      Debility (12/2/2019)      Acute renal failure (ARF) ([facility] [address] (12/2/2019)      Acute cystitis without hematuria (12/2/2019)      Acute metabolic encephalopathy (96/9/0440)        Plan:     Sepsis   UTI   Continue current Vantin until 12-. Urine culture with E. Coli. Acute metabolic encephalopathy   With confusion still. UTI aggravating dementia?      Debility Continue physical therapy    CAD   Continue aspirin, Lipitor. I have discussed the plan of care with patient and care team.    DVT prophylaxis : heparin SC     Disposition plan : CM is helping with placement.        Signed By: Walter Smith MD     December 10, 2019

## 2019-12-10 NOTE — PROGRESS NOTES
CM contacted [facility] and spoke with Pricila Dunham, who informed CM of fax number to send clinical documents. CM fax updated clinical documents to support reasoning of pt needing STR. CM also had MD complete documents requested by pt's son. CM will notify pt's daughter.

## 2019-12-10 NOTE — PROGRESS NOTES
December 10, 2019             Admit Date: 2019  Admit Diagnosis: SIRS (systemic inflammatory response syndrome) ([facility]) [R65.10]  Acute renal failure (ARF) ([facility] [N17.9]  Dehydration [E86.0]  Debility [R53.81]       Principle Problem: Sepsis ([facility] Subjective: Both shoulders are doing better now, No SOB, No Chest Pain, No Nausea or Vomiting     Objective:   Vital Signs are Stable, No Acute Distress, Alert and Oriented,  Neurovascular exam is normal.     Assessment / Plan :  Patient Active Problem List   Diagnosis Code    NSTEMI (non-ST elevated myocardial infarction) ([facility] I21.4    CAD (coronary artery disease) I25.10    Dehydration E86.0    Debility R53.81    Acute renal failure (ARF) ([facility] N17.9    Sepsis ([facility] A41.9    Acute cystitis without hematuria N30.00    Acute metabolic encephalopathy L74.07      Patient Vitals for the past 8 hrs:   BP Temp Pulse Resp SpO2   12/10/19 0803 97/59 98.6 °F (37 °C) 88 18 92 %   12/10/19 0400 99/63 98.6 °F (37 °C) 85 18 91 %    Temp (24hrs), Av.4 °F (36.9 °C), Min:97.7 °F (36.5 °C), Max:98.8 °F (37.1 °C)    Body mass index is 21.2 kg/m². Lab Results   Component Value Date/Time    HGB 10.9 (L) 2019 08:09 AM      No orthopedic surgery indicated at this time. Ortho Trauma service will sign off.  Patient can follow up with one of the shoulder providers at [address] By: SATURNINO Varner  12/10/2019,  9:43 AM

## 2019-12-10 NOTE — PROGRESS NOTES
CM spoke with Brayan Plummer with the [facility], who informed CM the  with [place], who is assisting this pt. Document has been completed to assist pt with [facility] with the [facility]. CM fax referral to [employer]. Awaiting a response from pt's appeal. Awaiting response from [place] services. CM will provide pt's son with document.

## 2019-12-10 NOTE — PROGRESS NOTES
Problem: Self Care Deficits Care Plan (Adult)  Goal: *Acute Goals and Plan of Care (Insert Text)  Description  1. Patient will complete lower body bathing and dressing with supervision and adaptive equipment as needed. 2. Patient will complete toileting with supervision. 3. Patient will tolerate 30 minutes of OT treatment with 2-3 rest breaks to increase activity tolerance for ADLs. 4. Patient will complete functional transfers with supervision and adaptive equipment as needed. 5. Patient will complete functional mobility for household distances with supervision and safe use of adaptive device to decrease risk for falls. 6. Patient will participate in 10 minutes of therapeutic exercises to increase strength for ADL/functional transfers. Timeframe: 7 visits      Outcome: Progressing Towards Goal     OCCUPATIONAL THERAPY: Daily Note and PM 12/10/2019  INPATIENT: OT Visit Days: 2  Payor: Codey Dela Cruz / Plan: [facility] [employer] MEDICARE CHOICE PPO/PFFS / Product Type: Managed Care Medicare /      NAME/AGE/GENDER: Arthur Vincent is a 80 y.o. male   PRIMARY DIAGNOSIS:  SIRS (systemic inflammatory response syndrome) ([facility] [R65.10]  Acute renal failure (ARF) ([facility] [N17.9]  Dehydration [E86.0]  Debility [R53.81] Sepsis ([facility] Sepsis ([facility]       ICD-10: Treatment Diagnosis:    · Generalized Muscle Weakness (M62.81)  · Other lack of cordination (R27.8)  · History of falling (Z91.81)   Precautions/Allergies:     Patient has no known allergies. ASSESSMENT:     Mr. Tamika Salazar presents supine in the bed with lap belt in place. Pt has been confused and sustained a fall yesterday coming out the bathroom. Pt is alert and pleasant this afternoon. Pt was performing \"exercies\" with his legs in the bed upon arrival. Pt remains a little confused but appears slightly less confused than previous visit. Pt did follow commands better today to slow down and was agreeable to using the walker today.  Pt required minimal assistance to sit edge of the bed and was able to don shoes edge of bed and tie shoelaces with minimal assistance. Pt stood with minimal assistance and completed functional mobility in the room and out into the hallway with CGA to minimal assistance. Pt didn't experience any buckling or major losses of balance with use of the walker today. Pt followed cues for walker management and looking forward with functional mobility. Pt has no c/o pain in his LEs and pt states his shoulder is \"much better\". Pt was left supine in the bed with lap belt back in place and alarm on. Pt demonstrated some improvement from initial evaluation. Pt to continue per plan of care. This section established at most recent assessment   PROBLEM LIST (Impairments causing functional limitations):  1. Decreased Strength  2. Decreased ADL/Functional Activities  3. Decreased Transfer Abilities  4. Decreased Ambulation Ability/Technique  5. Decreased Balance  6. Decreased Activity Tolerance  7. Increased Fatigue  8. Decreased Flexibility/Joint Mobility  9. Decreased [place] with Home Exercise Program  10. Decreased Cognition   INTERVENTIONS PLANNED: (Benefits and precautions of occupational therapy have been discussed with the patient.)  1. Activities of daily living training  2. Adaptive equipment training  3. Balance training  4. Clothing management  5. Cognitive training  6. Donning&doffing training  7. Neuromuscular re-eduation  8. Therapeutic activity  9. Therapeutic exercise     TREATMENT PLAN: Frequency/Duration: Follow patient 3 times per week to address above goals. Rehabilitation Potential For Stated Goals: Good     REHAB RECOMMENDATIONS (at time of discharge pending progress):    Placement:   It is my opinion, based on this patient's performance to date, that Mr. Hilaria Torres may benefit from intensive therapy at a [address] [place] [address] after discharge due to the functional deficits listed above that are likely to improve with skilled rehabilitation and concerns that he/she may be unsafe to be unsupervised at home due to risk for falls. Pt may also benefit from [facility]. Equipment:    TBD               OCCUPATIONAL PROFILE AND HISTORY:   History of Present Injury/Illness (Reason for Referral):  See H&P  Past Medical History/Comorbidities:   Mr. Kenneth Steen  has a past medical history of Colon cancer ([address] (01/2012) and Hiatal hernia. Mr. Kenneth Steen  has no past surgical history on file. Social History/Living Environment:   Home Environment: Private residence  One/Two Story Residence: Two story  # of Interior Steps: 24  Lift Chair Available: No  Living Alone: Yes  Support Systems: Child(margi), Family member(s)  Patient Expects to be Discharged to[de-identified] Private residence  Current DME Used/Available at Home: None  Tub or Shower Type: Shower  Prior Level of Function/Work/Activity:  Pt lives alone at baseline and reports that typically he is independent with ADL and functional mobility. Pt reports that he recently had a fall at home over a tennis ball falling on his L side with pain in his L shoulder, ribcage, and L LE  Personal Factors:          Social Background:  Lives alone        Past/Current Experience:  hx of falls        Overall Behavior:  confused; decrease insight to deficits; poor safety. Number of Personal Factors/Comorbidities that affect the Plan of Care: Extensive review of physical, cognitive, and psychosocial performance (3+):  HIGH COMPLEXITY   ASSESSMENT OF OCCUPATIONAL PERFORMANCE[de-identified]   Activities of Daily Living:   Basic ADLs (From Assessment) Complex ADLs (From Assessment)   Feeding: Supervision  Oral Facial Hygiene/Grooming: Stand-by assistance  Bathing: Moderate assistance  Upper Body Dressing: Minimum assistance  Lower Body Dressing: Moderate assistance  Toileting: Moderate assistance Instrumental ADL  Meal Preparation: Total assistance  Homemaking:  Total assistance   Grooming/Bathing/Dressing Activities of Daily Living     Cognitive Retraining  Safety/Judgement: Fall prevention                     Lower Body Dressing Assistance  Shoes with Cloth Laces: Minimum assistance Bed/Mat Mobility  Rolling: Supervision  Supine to Sit: Minimum assistance  Sit to Supine: Stand-by assistance  Sit to Stand: Minimum assistance  Stand to Sit: Minimum assistance  Bed to Chair: Contact guard assistance  Scooting: Contact guard assistance  Cues: Tactile cues provided;Verbal cues provided;Visual cues provided     Most Recent Physical Functioning:   Gross Assessment:  AROM: Generally decreased, functional(pt holding L shoulder behind his head in reclined position)  Strength: Generally decreased, functional  Coordination: Generally decreased, functional               Posture:  Posture (WDL): Exceptions to WDL  Posture Assessment: Forward head, Rounded shoulders  Balance:  Sitting: Impaired  Sitting - Static: Good (unsupported)  Sitting - Dynamic: Fair (occasional)(+)  Standing: Impaired; With support  Standing - Static: Fair  Standing - Dynamic : Fair Bed Mobility:  Rolling: Supervision  Supine to Sit: Minimum assistance  Sit to Supine: Stand-by assistance  Scooting: Contact guard assistance  Wheelchair Mobility:     Transfers:  Sit to Stand: Minimum assistance  Stand to Sit: Minimum assistance  Bed to Chair: Contact guard assistance            Patient Vitals for the past 6 hrs:   BP BP Patient Position SpO2 Pulse   12/10/19 1155 115/66 At rest 92 % 84       Mental Status  Neurologic State: Alert  Orientation Level: Disoriented to situation, Disoriented to time, Oriented to person  Cognition: Decreased attention/concentration, Follows commands, Poor safety awareness  Perception: Appears intact  Perseveration: No perseveration noted  Safety/Judgement: Fall prevention                          Physical Skills Involved:  1. Range of Motion  2. Balance  3. Strength  4.  Activity Tolerance Cognitive Skills Affected (resulting in the inability to perform in a timely and safe manner):  1. Executive Function  2. Short Term Recall  3. Sustained Attention Psychosocial Skills Affected:  1. Habits/Routines  2. Self-Awareness   Number of elements that affect the Plan of Care: 5+:  HIGH COMPLEXITY   CLINICAL DECISION MAKIN[address] [place] [address] AM-PAC 6 Clicks   Daily Activity Inpatient Short Form  How much help from another person does the patient currently need. .. Total A Lot A Little None   1. Putting on and taking off regular lower body clothing? [] 1   [x] 2   [] 3   [] 4   2. Bathing (including washing, rinsing, drying)? [] 1   [x] 2   [] 3   [] 4   3. Toileting, which includes using toilet, bedpan or urinal?   [] 1   [x] 2   [] 3   [] 4   4. Putting on and taking off regular upper body clothing? [] 1   [] 2   [x] 3   [] 4   5. Taking care of personal grooming such as brushing teeth? [] 1   [] 2   [x] 3   [] 4   6. Eating meals? [] 1   [] 2   [x] 3   [] 4   © , Trustees of [address] [place] [address], under license to [employer]. All rights reserved      Score:  Initial: 15 Most Recent: X (Date: -- )    Interpretation of Tool:  Represents activities that are increasingly more difficult (i.e. Bed mobility, Transfers, Gait). Medical Necessity:     · Patient demonstrates   · good  ·  rehab potential due to higher previous functional level. Reason for Services/Other Comments:  · Patient continues to require skilled intervention due to   · Decreased independence with ADL/functional transfers   · . Use of outcome tool(s) and clinical judgement create a POC that gives a: LOW COMPLEXITY         TREATMENT:   (In addition to Assessment/Re-Assessment sessions the following treatments were rendered)     Pre-treatment Symptoms/Complaints:    Pain: Initial:   Pain Intensity 1: 0  Post Session:  0/10   Therapeutic Activity: (21 minutes):   Therapeutic activities including Bed transfers, Chair transfers and Ambulation on level ground to improve mobility, strength, balance and coordination. Required minimal assistance   to promote static and dynamic balance in standing. Braces/Orthotics/Lines/Etc:   · O2 Device: Room air  Treatment/Session Assessment:    · Response to Treatment:  Pt very unsteady with activity. · Interdisciplinary Collaboration:   o Occupational Therapist  o Registered Nurse  · After treatment position/precautions:   o Supine in bed  o Bed alarm/tab alert on  o Bed/Chair-wheels locked  o Call light within reach  o RN notified   · Compliance with Program/Exercises: Will assess as treatment progresses. · Recommendations/Intent for next treatment session: \"Next visit will focus on advancements to more challenging activities and reduction in assistance provided\".   Total Treatment Duration:  OT Patient Time In/Time Out  Time In: 1523  Time Out: [address], OT

## 2019-12-11 PROCEDURE — 74011250637 HC RX REV CODE- 250/637: Performed by: INTERNAL MEDICINE

## 2019-12-11 PROCEDURE — 74011250636 HC RX REV CODE- 250/636: Performed by: HOSPITALIST

## 2019-12-11 PROCEDURE — 74011250637 HC RX REV CODE- 250/637: Performed by: NURSE PRACTITIONER

## 2019-12-11 PROCEDURE — 74011000250 HC RX REV CODE- 250: Performed by: NURSE PRACTITIONER

## 2019-12-11 PROCEDURE — 65270000029 HC RM PRIVATE

## 2019-12-11 PROCEDURE — 74011250637 HC RX REV CODE- 250/637: Performed by: HOSPITALIST

## 2019-12-11 RX ORDER — CEFPODOXIME PROXETIL 200 MG/1
200 TABLET, FILM COATED ORAL EVERY 12 HOURS
Status: COMPLETED | OUTPATIENT
Start: 2019-12-11 | End: 2019-12-16

## 2019-12-11 RX ADMIN — HEPARIN SODIUM 5000 UNITS: 5000 INJECTION INTRAVENOUS; SUBCUTANEOUS at 17:48

## 2019-12-11 RX ADMIN — CEFPODOXIME PROXETIL 200 MG: 200 TABLET, FILM COATED ORAL at 11:14

## 2019-12-11 RX ADMIN — HEPARIN SODIUM 5000 UNITS: 5000 INJECTION INTRAVENOUS; SUBCUTANEOUS at 05:24

## 2019-12-11 RX ADMIN — Medication 10 ML: at 05:25

## 2019-12-11 RX ADMIN — NYSTATIN: 100000 POWDER TOPICAL at 18:00

## 2019-12-11 RX ADMIN — ASPIRIN 81 MG 81 MG: 81 TABLET ORAL at 08:51

## 2019-12-11 RX ADMIN — CLOPIDOGREL BISULFATE 75 MG: 75 TABLET, FILM COATED ORAL at 08:51

## 2019-12-11 RX ADMIN — ATORVASTATIN CALCIUM 20 MG: 10 TABLET, FILM COATED ORAL at 21:25

## 2019-12-11 RX ADMIN — Medication 10 ML: at 14:00

## 2019-12-11 RX ADMIN — Medication 10 ML: at 21:25

## 2019-12-11 RX ADMIN — FAMOTIDINE 20 MG: 20 TABLET, FILM COATED ORAL at 08:51

## 2019-12-11 RX ADMIN — CALCIUM CARBONATE (ANTACID) CHEW TAB 500 MG 200 MG: 500 CHEW TAB at 17:47

## 2019-12-11 RX ADMIN — CALCIUM CARBONATE (ANTACID) CHEW TAB 500 MG 200 MG: 500 CHEW TAB at 11:18

## 2019-12-11 RX ADMIN — CALCIUM CARBONATE (ANTACID) CHEW TAB 500 MG 200 MG: 500 CHEW TAB at 08:51

## 2019-12-11 RX ADMIN — NYSTATIN: 100000 POWDER TOPICAL at 09:00

## 2019-12-11 RX ADMIN — CEFPODOXIME PROXETIL 200 MG: 200 TABLET, FILM COATED ORAL at 21:25

## 2019-12-11 RX ADMIN — Medication 10 ML: at 05:24

## 2019-12-11 NOTE — PROGRESS NOTES
Nutrition follow-up:    Assessment:   Food/Nutrition Patient History:  Admitted with sepsis (UTI), dehydration, ARF, acute cystitis, AMS. PMH remarkable for HH, colon ca, DLP, CAD and mild dementia. Pt is up to chair alert and oriented to person and place at my visit. He is difficult to obtain any pertinent information from as he frequently changes the subject without answering questions. 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 He varies from tearful to laughter throughout visit. He is unable to provide any reliable history. He is currently pending VA placement secondary unable to care for himself at home. DIET REGULAR      Anthropometrics:Height: 5' 11\" (180.3 cm),  Weight: 68.9 kg (152 lb), source not specified , Body mass index is 21.2 kg/m². BMI class of Underweight (Age >65 and BMI <22)     Macronutrient needs: 69 kg Listed body weight  EER:  5890-4055 kcal /day (25-30 kcal/kg)  EPR:  69-86 grams protein/day (1-1.25 grams/kg)    Intake/Comparative Standards: Recorded meal(s): %,  Pt potentially is meeting ~75% estimated needs for kcal and protein. Nutrition Diagnosis:   Predicted inadequate oral intake related to poor recollection as evidenced by pt admitted with AMS, lives alone and unable to recall any quantifiable nutrition history. Intervention:  Meals and snacks: Continue current diet. Pt is selecting meals with PDA. Nutrition Supplement Therapy: Ensure Enlive once daily. Coordination of Nutrition Care: Discussed with Dimas Reddy RN. Discharge Nutrition Plan: Too soon to determine.       [place], [place], [address]

## 2019-12-11 NOTE — PROGRESS NOTES
CM was notified by pt's son that Jazmine Morin is needing HPOA documents to resume appeal process. Pt's son stated he is needing two witnesses and a notary to approve HPOA papers. CM informed pt's son to speak with the business office to assist with this process.

## 2019-12-11 NOTE — PROGRESS NOTES
RAVINDER received a VM from Professor Mckenna Conner 192 [phone], who stated pt does not qualify for VA service because pt is not serviced connected within the [place].  also stated with pt and pt's family Cristal, this process could potentially take up to 3-6 months. CM did fax all paperwork to [address] [place] to assist pt's family with this process. CM attempted to call  to see if fax was received. RAVINDER was unable to reach . RAVINDER also contacted Betina Goldsmith with [facility], who stated pt was denied for STR at [facility] due to pt's present level could be met at a lower level of care. This information was regarding pre-cert that was denied on 12/4.  was also informed that CM is unable to begin another pre-cert due to pt having an open appeal case. [employer] representative also stated CM cannot begin another appeal.  According to pt's son, appeal was placed when information was given on 12/6/19. RAVINDER was unable to receive update regarding pt's appeal. RAVINDER notified RAVINDER's supervisor to assist with this case.

## 2019-12-11 NOTE — PROGRESS NOTES
Progress Note    Patient: Grace Lenz MRN: [id number]  SSN: [id number]    YOB: 1931  Age: 80 y.o. Sex: male      Admit Date: 12/2/2019    LOS: 9 days     Subjective:     PMH of CAD     Admitted due to chest pain after a fall. No acute cardiac events. Found to have sepsis with UTI for which he has been treated. Patient is not deemed safe to be discharged to live by himself. Son is trying to work with [employer] to find placement solution for patient. Documents have been submitted with the appeal from the son with the help of . Objective:     Vitals:    12/10/19 1931 12/10/19 2336 12/11/19 0426 12/11/19 0844   BP: 111/66 122/70 116/71 122/62   Pulse: 90 86 84 88   Resp: 18 18 18 18   Temp: 98.9 °F (37.2 °C) 97.9 °F (36.6 °C) 98.1 °F (36.7 °C) 97.7 °F (36.5 °C)   SpO2: 94% 91% 92% 93%   Weight:       Height:            Intake and Output:  Current Shift: No intake/output data recorded. Last three shifts: No intake/output data recorded. Physical Exam:     General:                    The patient is a pleasantly confused male in no acute distress. He asked a lot of questions. He is otherwise comfortable and eating well. Head:                                   Normocephalic/atraumatic. Eyes:                                   No palpebral pallor or scleral icterus. ENT:                                    External auricular and nasal exam within normal limits. Mucous membranes are moist.  Neck:                                   Supple, non-tender, no JVD. Lungs:                       Clear to auscultation bilaterally without wheezes or crackles. No respiratory distress or accessory muscle use. Heart:                                  Regular rate and rhythm, without murmurs, rubs, or gallops.   Abdomen:                  Soft, non-tender, non-distended with normoactive bowel sounds. Genitourinary:           No tenderness over the bladder or bilateral CVAs. Extremities:               Without clubbing, cyanosis, or edema. Skin:                                    Normal color, texture, and turgor. No rashes, lesions, or jaundice. Pulses:                      Radial and dorsalis pedis pulses present 2+ bilaterally. Capillary refill <2s. Neurologic:                CN II-XII grossly intact and symmetrical.                                               Moving all four extremities well with no focal deficits. Psychiatric:               pleasantly confused. Lab/Data Review:  CBC W/O DIFF [[id number]] (Order [id number])   Lab   Date: 12/9/2019 Department: [facility] Released By/Authorizing: Janelle Denise DO (auto-released)   Component Value Flag Ref Range Units Status   WBC 15.7  High   4.3 - 11.1 K/uL Final   RBC 3.48  Low   4.23 - 5.6 M/uL Final   HGB 10.9  Low   13.6 - 17.2 g/dL Final   HCT 33.1  Low   41.1 - 50.3 % Final   MCV 95.1   79.6 - 97.8 FL Final   MCH 31.3   26.1 - 32.9 PG Final   MCHC 32.9   31.4 - 35.0 g/dL Final   RDW 16.5  High   11.9 - 14.6 % Final   PLATELET 743   189 - 450 K/uL Final   MPV 9.7   9.4 - 12.3 FL Final   ABSOLUTE NRBC 0.00   0.0 - 0.2 K/uL Final   Comment:   **Note: Absolute NRBC parameter is now reported with Hemogram**     METABOLIC PANEL, COMPREHENSIVE [[id number]] (Order [id number])   Lab   Date: 12/8/2019 Department: [facility] Released By/Authorizing: Janelle Denise DO (auto-released)   Component Value Flag Ref Range Units Status   Sodium 136   136 - 145 mmol/L Final   Potassium 4.1   3.5 - 5.1 mmol/L Final   Chloride 101   98 - 107 mmol/L Final   CO2 29   21 - 32 mmol/L Final   Anion gap 6  Low   7 - 16 mmol/L Final   Glucose 135  High   65 - 100 mg/dL Final   Comment:   47 - 60 mg/dl Consistent with, but not fully diagnostic of hypoglycemia.    101 - 125 mg/dl Impaired fasting glucose/consistent with pre-diabetes mellitus   > 126 mg/dl Fasting glucose consistent with overt diabetes mellitus    BUN 19   8 - 23 MG/DL Final   Creatinine 1.35   0.8 - 1.5 MG/DL Final   GFR est AA >60   >60 ml/min/1.73m2 Final   GFR est non-AA 53  Low   >60 ml/min/1.73m2 Final   Comment:   (NOTE)   Estimated GFR is calculated using the Modification of Diet in Renal   Disease (MDRD) Study equation, reported for both  Americans   (GFRAA) and non- Americans (GFRNA), and normalized to 1.73m2   body surface area. The physician must decide which value applies to   the patient. The MDRD study equation should only be used in   individuals age 25 or older. It has not been validated for the   following: pregnant women, patients with serious comorbid conditions,   or on certain medications, or persons with extremes of body size,   muscle mass, or nutritional status. Calcium 9.0   8.3 - 10.4 MG/DL Final   Bilirubin, total 1.2  High   0.2 - 1.1 MG/DL Final   ALT (SGPT) 44   12 - 65 U/L Final   AST (SGOT) 52  High   15 - 37 U/L Final   Alk.  phosphatase 318  High   50 - 136 U/L Final   Protein, total 6.8   6.3 - 8.2 g/dL Final   Albumin 2.3  Low   3.2 - 4.6 g/dL Final   Globulin 4.5  High   2.3 - 3.5 g/dL Final   A-G Ratio 0.5  Low   1.2 - 3.5   Final     XR chest   12-9-2019  Impressions: Stable portable chest       Current Facility-Administered Medications:     [START ON 12/12/2019] tuberculin injection 5 Units, 5 Units, IntraDERMal, ONCE, Lila Cordero MD    nystatin (MYCOSTATIN) 100,000 unit/gram powder, , Topical, BID, Milton Bautista E, DO    lidocaine 4 % patch 1 Patch, 1 Patch, TransDERmal, Q24H, Opal Peterson NP, 1 Patch at 12/10/19 1739    famotidine (PEPCID) tablet 20 mg, 20 mg, Oral, DAILY, Eric Calabrese MD, 20 mg at 12/11/19 0851    haloperidol lactate (HALDOL) injection 2.5 mg, 2.5 mg, IntraVENous, Q6H PRN, Opal Peterson, NP, 2.5 mg at 12/09/19 1856    calcium carbonate (TUMS) chewable tablet 200 mg [elemental], 200 mg, Oral, TID WITH MEALS, Bree Peterson, QUEENIE, 200 mg at 12/11/19 0851    sodium chloride (NS) flush 5-40 mL, 5-40 mL, IntraVENous, Q8H, Guadalupe Castano MD, 10 mL at 12/11/19 0525    sodium chloride (NS) flush 5-40 mL, 5-40 mL, IntraVENous, PRN, Cathlyn Dakin Kyra Brigham, MD    aspirin chewable tablet 81 mg, 81 mg, Oral, DAILY, Tiffanie Franz MD, 81 mg at 12/11/19 0851    atorvastatin (LIPITOR) tablet 20 mg, 20 mg, Oral, QHS, Tiffanie Franz MD, 20 mg at 12/10/19 2143    clopidogrel (PLAVIX) tablet 75 mg, 75 mg, Oral, DAILY, Tiffanie Franz MD, 75 mg at 12/11/19 0851    nitroglycerin (NITROSTAT) tablet 0.4 mg, 0.4 mg, SubLINGual, Q5MIN PRN, Tiffanie Franz MD    sodium chloride (NS) flush 5-40 mL, 5-40 mL, IntraVENous, PRN, Tiffanie Franz MD    acetaminophen (TYLENOL) tablet 650 mg, 650 mg, Oral, Q6H PRN, Tiffanie Franz MD    oxyCODONE-acetaminophen (PERCOCET 7.5) 7.5-325 mg per tablet 1 Tab, 1 Tab, Oral, Q6H PRN, Tiffanie Franz MD, 1 Tab at 12/05/19 2121    HYDROmorphone (PF) (DILAUDID) injection 0.2 mg, 0.2 mg, IntraVENous, Q4H PRN, Tiffanie Franz MD    naloxone [facility]) injection 0.4 mg, 0.4 mg, IntraVENous, PRN, Tiffanie Franz MD    ondansetron [facility]) injection 4 mg, 4 mg, IntraVENous, Q4H PRN, Tiffanie Franz MD    magnesium hydroxide (MILK OF MAGNESIA) 400 mg/5 mL oral suspension 30 mL, 30 mL, Oral, DAILY PRN, Tiffanie Franz MD    heparin (porcine) injection 5,000 Units, 5,000 Units, SubCUTAneous, Q12H, Tiffanie Franz MD, 5,000 Units at 12/11/19 0524      Assessment:     Principal Problem:    Sepsis ([facility] (12/2/2019)    Active Problems:    CAD (coronary artery disease) (7/18/2019)      Dehydration (12/2/2019)      Debility (12/2/2019)      Acute renal failure (ARF) ([facility] (12/2/2019)      Acute cystitis without hematuria (12/2/2019)      Acute metabolic encephalopathy (25/9/2075)        Plan:     Sepsis   UTI Continue current Vantin until 12-. Urine culture with E. Coli. Acute metabolic encephalopathy   With confusion still. UTI aggravating dementia? Likely this is his new baseline for his mentation. Debility   Continue physical therapy    CAD   Continue aspirin, Lipitor. I have discussed the plan of care with patient. DVT prophylaxis : heparin SC     Disposition plan : CM is helping with placement. Waiting for response from [place] system.        Signed By: Rika Maravilla MD     December 11, 2019

## 2019-12-12 PROCEDURE — 65270000029 HC RM PRIVATE

## 2019-12-12 PROCEDURE — 86580 TB INTRADERMAL TEST: CPT | Performed by: INTERNAL MEDICINE

## 2019-12-12 PROCEDURE — 74011250637 HC RX REV CODE- 250/637: Performed by: INTERNAL MEDICINE

## 2019-12-12 PROCEDURE — 74011000302 HC RX REV CODE- 302: Performed by: INTERNAL MEDICINE

## 2019-12-12 PROCEDURE — 74011250636 HC RX REV CODE- 250/636: Performed by: HOSPITALIST

## 2019-12-12 PROCEDURE — 74011250637 HC RX REV CODE- 250/637: Performed by: HOSPITALIST

## 2019-12-12 PROCEDURE — 97530 THERAPEUTIC ACTIVITIES: CPT

## 2019-12-12 PROCEDURE — 74011000250 HC RX REV CODE- 250: Performed by: NURSE PRACTITIONER

## 2019-12-12 PROCEDURE — 74011250637 HC RX REV CODE- 250/637: Performed by: NURSE PRACTITIONER

## 2019-12-12 RX ADMIN — ASPIRIN 81 MG 81 MG: 81 TABLET ORAL at 09:08

## 2019-12-12 RX ADMIN — ATORVASTATIN CALCIUM 20 MG: 10 TABLET, FILM COATED ORAL at 21:12

## 2019-12-12 RX ADMIN — CALCIUM CARBONATE (ANTACID) CHEW TAB 500 MG 200 MG: 500 CHEW TAB at 16:58

## 2019-12-12 RX ADMIN — Medication 10 ML: at 21:12

## 2019-12-12 RX ADMIN — HEPARIN SODIUM 5000 UNITS: 5000 INJECTION INTRAVENOUS; SUBCUTANEOUS at 05:35

## 2019-12-12 RX ADMIN — TUBERCULIN PURIFIED PROTEIN DERIVATIVE 5 UNITS: 5 INJECTION, SOLUTION INTRADERMAL at 09:00

## 2019-12-12 RX ADMIN — NYSTATIN: 100000 POWDER TOPICAL at 17:04

## 2019-12-12 RX ADMIN — CEFPODOXIME PROXETIL 200 MG: 200 TABLET, FILM COATED ORAL at 21:12

## 2019-12-12 RX ADMIN — FAMOTIDINE 20 MG: 20 TABLET, FILM COATED ORAL at 09:08

## 2019-12-12 RX ADMIN — NYSTATIN: 100000 POWDER TOPICAL at 09:08

## 2019-12-12 RX ADMIN — CEFPODOXIME PROXETIL 200 MG: 200 TABLET, FILM COATED ORAL at 09:08

## 2019-12-12 RX ADMIN — CALCIUM CARBONATE (ANTACID) CHEW TAB 500 MG 200 MG: 500 CHEW TAB at 09:08

## 2019-12-12 RX ADMIN — Medication 10 ML: at 05:35

## 2019-12-12 RX ADMIN — HEPARIN SODIUM 5000 UNITS: 5000 INJECTION INTRAVENOUS; SUBCUTANEOUS at 17:04

## 2019-12-12 RX ADMIN — CLOPIDOGREL BISULFATE 75 MG: 75 TABLET, FILM COATED ORAL at 09:08

## 2019-12-12 RX ADMIN — CALCIUM CARBONATE (ANTACID) CHEW TAB 500 MG 200 MG: 500 CHEW TAB at 12:37

## 2019-12-12 RX ADMIN — Medication 10 ML: at 14:00

## 2019-12-12 NOTE — PROGRESS NOTES
Problem: Mobility Impaired (Adult and Pediatric)  Goal: *Acute Goals and Plan of Care (Insert Text)  Description  LTG:  (1.)Mr. Lovely Alejo will move from supine to sit and sit to supine, scoot up and down and roll side to side INDEPENDENTLY with bed flat within 7 treatment day(s). (2.)Mr. Lovely Alejo will transfer from bed to chair and chair to bed wt INDEPENDENTLY within 7 treatment day(s). (3.)Mr. Elinor Castro will ambulate INDEPENDENTLY for 500+ feet within 7 treatment day(s). (4.)Mr. Lovely Alejo will ascend and descend 15 steps with SUPERVISION using handrail(s) within 7 days. ________________________________________________________________________________________________   Outcome: Progressing Towards Goal     PHYSICAL THERAPY: Daily Note and AM 12/12/2019  INPATIENT: PT Visit Days : 4  Payor: León Clark / Plan: [facility] [employer] MEDICARE CHOICE PPO/PFFS / Product Type: [employer] Medicare /       NAME/AGE/GENDER: Zenia Elias is a 80 y.o. male   PRIMARY DIAGNOSIS: SIRS (systemic inflammatory response syndrome) (HCC) [R65.10]  Acute renal failure (ARF) ([facility] [N17.9]  Dehydration [E86.0]  Debility [R53.81] Sepsis ([facility]epsis ([facility]       ICD-10: Treatment Diagnosis:    · Generalized Muscle Weakness (M62.81)  · Difficulty in walking, Not elsewhere classified (R26.2)  · Other abnormalities of gait and mobility (R26.89)  · History of falling (Z91.81)   Precaution/Allergies:  Patient has no known allergies. ASSESSMENT:     Mr. Lovely Alejo presents in supine without complaints; agreeable to therapy. He continues to be quite confused during session today which limits mobility at times. CGA for bed mobility/transfer to sitting. Demonstrates fair seated balance at edge of bed requiring assistance for seated dynamic functional tasks. Min handheld assist for sit-stand transfer. Fair standing balance noted with however much better with walker than without.  Pt performs mobility/ambulation in room and hallway with walker, CGA/min A, and frequent cueing for gait safety, posture, base of support, mechanics. Pt demonstrates slow pace with lateral trunk sway and some impulsivity noted, letting go of walker with one (or both) hands at times and turning/rotating himself frequently leading to more impaired balance. Returned to room and back to bed after activity at pt request; positioned comfortably with needs in reach, bed alarm on. Pt progressing slowly with mobility and biggest limitation seems to be cognitive status. Pt would benefit from discharge to STR to continue to address deficits, pt functioning well below baseline level of activity. Per initial: a pleasant 80year old male admitted from home for SIRS, acute renal failure, debility, sepsis who is seen for initial therapy evaluation this morning. He lives at home alone and is typically independent/active without use of any DME. This section established at most recent assessment   PROBLEM LIST (Impairments causing functional limitations):  1. Decreased Strength  2. Decreased ADL/Functional Activities  3. Decreased Transfer Abilities  4. Decreased Ambulation Ability/Technique  5. Decreased Balance  6. Increased Pain  7. Decreased Activity Tolerance  8. Decreased Cognition   INTERVENTIONS PLANNED: (Benefits and precautions of physical therapy have been discussed with the patient.)  1. Balance Exercise  2. Bed Mobility  3. Gait Training  4. Home Exercise Program (HEP)  5. Therapeutic Activites  6. Therapeutic Exercise/Strengthening  7. Transfer Training     TREATMENT PLAN: Frequency/Duration: 3 times a week for duration of hospital stay  Rehabilitation Potential For Stated Goals: Good     REHAB RECOMMENDATIONS (at time of discharge pending progress):    Placement:   It is my opinion, based on this patient's performance to date, that Mr. Wynonia Spurling may benefit from intensive therapy at a [address] [place] [address] after discharge due to the functional deficits listed above that are likely to improve with skilled rehabilitation and concerns that he/she may be unsafe to be unsupervised at home due to safety concerns for home, fall risk with mobility. Equipment:    tbd               HISTORY:   History of Present Injury/Illness (Reason for Referral):  Per H&P, \"Pt reported that he fell hitting his left chest on a cardboard box. Since the fall, he has noticed left sided chest pain, difficulty in using left arm due to pain in left shoulder. He denies heart burn, nausea/vomiting, head trauma, seizure like episode, urinary symptoms, diarrhea, chills, fever, abdominal pain, headache, palpitations. Pain is left sided, retrosternal, 10/10 severity, dull, intermittent, no aggravating or alleviating factors. ER work up showed WBC 19K, Cr 1.58 (baseline 1.12-1.2), HR >110 with sinus tachycardia on EKG. CT chest showed moderate sized hiatal hernia with minimal basilar atelectasis\"    Past Medical History/Comorbidities:   Mr. Vita Nash  has a past medical history of Colon cancer ([facility] (01/2012) and Hiatal hernia. Mr. Vita Nash  has no past surgical history on file. Social History/Living Environment:   Home Environment: Private residence  One/Two Story Residence: Two story  # of Interior Steps: 24  Lift Chair Available: No  Living Alone: Yes  Support Systems: Child(margi), Family member(s)  Patient Expects to be Discharged to[de-identified] Private residence  Current DME Used/Available at Home: None  Tub or Shower Type: Shower  Prior Level of Function/Work/Activity:  Lives alone in two story home. Independent, active at baseline without use of any DME. Does not drive. Denies falls.       Number of Personal Factors/Comorbidities that affect the Plan of Care: 1-2: MODERATE COMPLEXITY   EXAMINATION:   Most Recent Physical Functioning:   Gross Assessment:  AROM: Within functional limits  Strength: Generally decreased, functional  Coordination: Within functional limits               Posture:  Posture (WDL): Exceptions to WDL  Posture Assessment: Forward head, Rounded shoulders  Balance:  Sitting: Impaired  Sitting - Static: Good (unsupported)  Sitting - Dynamic: Fair (occasional)  Standing: Impaired  Standing - Static: Fair  Standing - Dynamic : Fair Bed Mobility:  Supine to Sit: Contact guard assistance  Sit to Supine: Minimum assistance  Scooting: Contact guard assistance  Wheelchair Mobility:     Transfers:  Sit to Stand: Minimum assistance  Stand to Sit: Minimum assistance  Bed to Chair: Minimum assistance  Interventions: Verbal cues; Safety awareness training; Tactile cues  Duration: 15 Minutes  Gait:     Base of Support: Center of gravity altered  Speed/Estrellita: Pace decreased (<100 feet/min); Slow  Step Length: Left shortened;Right shortened  Gait Abnormalities: Trunk sway increased;Decreased step clearance; Path deviations  Distance (ft): 120 Feet (ft)  Assistive Device: Walker, rolling;Gait belt  Ambulation - Level of Assistance: Contact guard assistance;Minimal assistance  Interventions: Verbal cues; Safety awareness training; Tactile cues;Manual cues      Body Structures Involved:  1. Muscles Body Functions Affected:  1. Movement Related Activities and Participation Affected:  1. General Tasks and Demands  2. Mobility  3. Domestic Life  4. Community, Social and [place]   Number of elements that affect the Plan of Care: 4+: HIGH COMPLEXITY   CLINICAL PRESENTATION:   Presentation: Stable and uncomplicated: LOW COMPLEXITY   CLINICAL DECISION MAKIN [facility] Inpatient Short Form  How much difficulty does the patient currently have. .. Unable A Lot A Little None   1. Turning over in bed (including adjusting bedclothes, sheets and blankets)? [] 1   [] 2   [] 3   [x] 4   2. Sitting down on and standing up from a chair with arms ( e.g., wheelchair, bedside commode, etc.)   [] 1   [] 2   [] 3   [x] 4   3.   Moving from lying on back to sitting on the side of the bed?   [] 1   [] 2   [] 3   [x] 4   How much help from another person does the patient currently need. .. Total A Lot A Little None   4. Moving to and from a bed to a chair (including a wheelchair)? [] 1   [] 2   [x] 3   [] 4   5. Need to walk in hospital room? [] 1   [] 2   [x] 3   [] 4   6. Climbing 3-5 steps with a railing? [] 1   [] 2   [x] 3   [] 4   © 2007, Trustees of [address] [place] [address] [address] [address], under license to [employer]. All rights reserved      Score:  Initial: 21 Most Recent: X (Date: -- )    Interpretation of Tool:  Represents activities that are increasingly more difficult (i.e. Bed mobility, Transfers, Gait). Medical Necessity:     · Patient demonstrates   · good  ·  rehab potential due to higher previous functional level. Reason for Services/Other Comments:  · Patient   · continues to demonstrate capacity to improve strength, balance, activity tolerance which will   · increase independence, decrease amount of assistance required from caregiver, and increase safety  · . Use of outcome tool(s) and clinical judgement create a POC that gives a: Clear prediction of patient's progress: LOW COMPLEXITY            TREATMENT:   (In addition to Assessment/Re-Assessment sessions the following treatments were rendered)   Pre-treatment Symptoms/Complaints:  \"can you turn the tv on for me? \"  Pain: Initial:   Pain Intensity 1: 0  Pain Location 1: Arm, Flank  Pain Orientation 1: Left  Pain Intervention(s) 1: Repositioned  Post Session:  No complaints, comfortable in bed     Therapeutic Activity: (  15 Minutes ):  Therapeutic activities including Bed mobility, seated and standing static/dynamic activities, Ambulation on level ground in room and hallway working on gait safety, posture, walker management to improve mobility, strength, balance, and activity tolerance . Required minimal Verbal cues; Safety awareness training; Tactile cues;Manual cues to promote static and dynamic balance in standing and promote motor control of bilateral, lower extremity(s). Braces/Orthotics/Lines/Etc:   · none  Treatment/Session Assessment:    · Response to Treatment:  Unchanged; unsteady, fall risk, confused  · Interdisciplinary Collaboration:   o Physical Therapist  o Registered Nurse  · After treatment position/precautions:   o Supine in bed  o Bed alarm/tab alert on  o Bed/Chair-wheels locked  o Bed in low position  o Call light within reach   · Compliance with Program/Exercises: Will assess as treatment progresses  · Recommendations/Intent for next treatment session: \"Next visit will focus on advancements to more challenging activities and reduction in assistance provided\".   Total Treatment Duration:  PT Patient Time In/Time Out  Time In: 1642  Time Out: 1636 [facility], [facility]

## 2019-12-12 NOTE — PROGRESS NOTES
CM met with pt, pt's son , and CM supervisor at bedside to discuss pt's discharge plan. Pt's son informed CM and CM's supervisor that HPOA papers have been obtained and completed to assist pt with appeal process. CM requested that pt's son provide POA documents to place in pt's chart. CM will consult [employer] to assist pt and pt's son with applying for Medicaid. CM's supervisor informed pt's son that applying for Medicaid can assist pt with obtaining long term care. Pt's son was receptive to this information. Pt's son stated he will also follow up with [employer] to receive an update regarding appeal process and inform CM of update. CM provided pt's son with a resource known as [address] [place] [address] documents that were requested per son . CM will continue to follow pt.

## 2019-12-12 NOTE — PROGRESS NOTES
CM will meet with pt's son Dory Soliman today at 2:00pm, to discuss updates regarding pt's appeal and discharge plan. RAVINDER has notified CM's supervisor to be in attendance during this conversation to assist with discharge needs. CM remains available.

## 2019-12-12 NOTE — PROGRESS NOTES
Progress Note    Patient: Octavio Lau MRN: [id number]  SSN: [id number]    YOB: 1931  Age: 80 y.o. Sex: male      Admit Date: 12/2/2019    LOS: 10 days     Subjective:     PMH of CAD     Admitted due to chest pain after a fall. No acute cardiac events. Found to have sepsis with UTI for which he has been treated. Patient is not deemed safe to be discharged to live by himself. Son is trying to work with [employer] to find placement solution for patient. Documents have been submitted with the appeal from the son with the help of . This morning, patient is eating breakfast.  He is answering question well but is demented. Objective:     Vitals:    12/11/19 2002 12/12/19 0004 12/12/19 0306 12/12/19 0748   BP: 116/48 123/70 118/72 123/70   Pulse: 99 (!) 102 100 88   Resp: 18 18 18 20   Temp: 98.1 °F (36.7 °C) 98 °F (36.7 °C) 97.9 °F (36.6 °C) 97.7 °F (36.5 °C)   SpO2: 92% 94% 93% 95%   Weight:       Height:            Intake and Output:  Current Shift: 12/12 0701 - 12/12 1900  In: 240 [P.O.:240]  Out: -   Last three shifts: No intake/output data recorded. Physical Exam:     General:                    The patient is a pleasantly confused male in no acute distress. He asked a lot of questions. He is otherwise comfortable and eating well. Head:                                   Normocephalic/atraumatic. Eyes:                                   No palpebral pallor or scleral icterus. ENT:                                    External auricular and nasal exam within normal limits. Mucous membranes are moist.  Neck:                                   Supple, non-tender, no JVD. Lungs:                       Clear to auscultation bilaterally without wheezes or crackles. No respiratory distress or accessory muscle use.   Heart:                                  Regular rate and rhythm, without murmurs, rubs, or gallops. Abdomen:                  Soft, non-tender, non-distended with normoactive bowel sounds. Genitourinary:           No tenderness over the bladder or bilateral CVAs. Extremities:               Without clubbing, cyanosis, or edema. Skin:                                    Normal color, texture, and turgor. No rashes, lesions, or jaundice. Pulses:                      Radial and dorsalis pedis pulses present 2+ bilaterally. Capillary refill <2s. Neurologic:                CN II-XII grossly intact and symmetrical.                                               Moving all four extremities well with no focal deficits. Psychiatric:               pleasantly confused.      Lab/Data Review:  CBC W/O DIFF [[id number]] (Order [id number])   Lab   Date: 12/9/2019 Department: [facility] Released By/Authorizing: Janelle Denise DO (auto-released)   Component Value Flag Ref Range Units Status   WBC 15.7  High   4.3 - 11.1 K/uL Final   RBC 3.48  Low   4.23 - 5.6 M/uL Final   HGB 10.9  Low   13.6 - 17.2 g/dL Final   HCT 33.1  Low   41.1 - 50.3 % Final   MCV 95.1   79.6 - 97.8 FL Final   MCH 31.3   26.1 - 32.9 PG Final   MCHC 32.9   31.4 - 35.0 g/dL Final   RDW 16.5  High   11.9 - 14.6 % Final   PLATELET 006   683 - 450 K/uL Final   MPV 9.7   9.4 - 12.3 FL Final   ABSOLUTE NRBC 0.00   0.0 - 0.2 K/uL Final   Comment:   **Note: Absolute NRBC parameter is now reported with Hemogram**     METABOLIC PANEL, COMPREHENSIVE [[id number]] (Order [id number])   Lab   Date: 12/8/2019 Department: [facility] Released By/Authorizing: Janelle Denise DO (auto-released)   Component Value Flag Ref Range Units Status   Sodium 136   136 - 145 mmol/L Final   Potassium 4.1   3.5 - 5.1 mmol/L Final   Chloride 101   98 - 107 mmol/L Final   CO2 29   21 - 32 mmol/L Final   Anion gap 6  Low   7 - 16 mmol/L Final   Glucose 135  High   65 - 100 mg/dL Final Comment:   47 - 60 mg/dl Consistent with, but not fully diagnostic of hypoglycemia. 101 - 125 mg/dl Impaired fasting glucose/consistent with pre-diabetes mellitus   > 126 mg/dl Fasting glucose consistent with overt diabetes mellitus    BUN 19   8 - 23 MG/DL Final   Creatinine 1.35   0.8 - 1.5 MG/DL Final   GFR est AA >60   >60 ml/min/1.73m2 Final   GFR est non-AA 53  Low   >60 ml/min/1.73m2 Final   Comment:   (NOTE)   Estimated GFR is calculated using the Modification of Diet in Renal   Disease (MDRD) Study equation, reported for both  Americans   (GFRAA) and non- Americans (GFRNA), and normalized to 1.73m2   body surface area. The physician must decide which value applies to   the patient. The MDRD study equation should only be used in   individuals age 25 or older. It has not been validated for the   following: pregnant women, patients with serious comorbid conditions,   or on certain medications, or persons with extremes of body size,   muscle mass, or nutritional status. Calcium 9.0   8.3 - 10.4 MG/DL Final   Bilirubin, total 1.2  High   0.2 - 1.1 MG/DL Final   ALT (SGPT) 44   12 - 65 U/L Final   AST (SGOT) 52  High   15 - 37 U/L Final   Alk.  phosphatase 318  High   50 - 136 U/L Final   Protein, total 6.8   6.3 - 8.2 g/dL Final   Albumin 2.3  Low   3.2 - 4.6 g/dL Final   Globulin 4.5  High   2.3 - 3.5 g/dL Final   A-G Ratio 0.5  Low   1.2 - 3.5   Final     XR chest   12-9-2019  Impressions: Stable portable chest       Current Facility-Administered Medications:     cefpodoxime (VANTIN) tablet 200 mg, 200 mg, Oral, Q12H, Lila Cordero MD, 200 mg at 12/11/19 2125    tuberculin injection 5 Units, 5 Units, IntraDERMal, ONCE, Lila Cordero MD    nystatin (MYCOSTATIN) 100,000 unit/gram powder, , Topical, BID, Milton Bautista E,     lidocaine 4 % patch 1 Patch, 1 Patch, TransDERmal, Q24H, Opal Peterson NP, 1 Patch at 12/11/19 1745    famotidine (PEPCID) tablet 20 mg, 20 mg, Oral, DAILY, Maurice Lowe MD, 20 mg at 12/11/19 2916    haloperidol lactate (HALDOL) injection 2.5 mg, 2.5 mg, IntraVENous, Q6H PRN, Deo Peterson NP, 2.5 mg at 12/09/19 1856    calcium carbonate (TUMS) chewable tablet 200 mg [elemental], 200 mg, Oral, TID WITH MEALS, Deo Peterson NP, 200 mg at 12/11/19 1747    sodium chloride (NS) flush 5-40 mL, 5-40 mL, IntraVENous, Q8H, Elizabeth Martinez MD, 10 mL at 12/12/19 0535    sodium chloride (NS) flush 5-40 mL, 5-40 mL, IntraVENous, PRN, Elizabeth Martinez MD    aspirin chewable tablet 81 mg, 81 mg, Oral, DAILY, Francisco Bobo MD, 81 mg at 12/11/19 0851    atorvastatin (LIPITOR) tablet 20 mg, 20 mg, Oral, QHS, Francisco Bobo MD, 20 mg at 12/11/19 2125    clopidogrel (PLAVIX) tablet 75 mg, 75 mg, Oral, DAILY, Francisco Bobo MD, 75 mg at 12/11/19 0851    nitroglycerin (NITROSTAT) tablet 0.4 mg, 0.4 mg, SubLINGual, Q5MIN PRN, Francisco Bobo MD    sodium chloride (NS) flush 5-40 mL, 5-40 mL, IntraVENous, PRN, Francisco Bobo MD    acetaminophen (TYLENOL) tablet 650 mg, 650 mg, Oral, Q6H PRN, Francisco Bobo MD    oxyCODONE-acetaminophen (PERCOCET 7.5) 7.5-325 mg per tablet 1 Tab, 1 Tab, Oral, Q6H PRN, Francisco Bobo MD, 1 Tab at 12/05/19 2121    HYDROmorphone (PF) (DILAUDID) injection 0.2 mg, 0.2 mg, IntraVENous, Q4H PRN, Francisco Bobo MD    [facility]) injection 0.4 mg, 0.4 mg, IntraVENous, PRN, Francisco Bobo MD    ondansetron [facility]) injection 4 mg, 4 mg, IntraVENous, Q4H PRN, Francisco Bobo MD    magnesium hydroxide (MILK OF MAGNESIA) 400 mg/5 mL oral suspension 30 mL, 30 mL, Oral, DAILY PRN, Francisco Bobo MD    heparin (porcine) injection 5,000 Units, 5,000 Units, SubCUTAneous, Q12H, Francisco Bobo MD, 5,000 Units at 12/12/19 0535      Assessment:     Principal Problem:    Sepsis ([address] (12/2/2019)    Active Problems:    CAD (coronary artery disease) (7/18/2019)      Dehydration (12/2/2019)      Debility (12/2/2019)      Acute renal failure (ARF) ([facility] [address] (12/2/2019)      Acute cystitis without hematuria (12/2/2019)      Acute metabolic encephalopathy (02/3/0149)        Plan:     Sepsis on admission  UTI   Continue current Vantin until 12-. Urine culture with E. Coli. Acute metabolic encephalopathy   Dementia. Debility   Continue physical therapy    CAD   Continue aspirin, Lipitor. I have discussed the plan of care with patient. DVT prophylaxis : heparin SC     Disposition plan : CM is helping with placement. Waiting for response from [place] system especially with the appeal process.        Signed By: Venessa Fiore MD     December 12, 2019

## 2019-12-12 NOTE — PROGRESS NOTES
Hourly rounds completed throughout this shift. Pt remained calm in his bed most of the shift. Bed low and locked. Bed alarm turned on. Pt resting in bed; denies needs at this time. Will continue to monitor and report to oncoming night shift nurse.

## 2019-12-13 LAB
MM INDURATION POC: 0 MM (ref 0–5)
PPD POC: NEGATIVE NEGATIVE

## 2019-12-13 PROCEDURE — 74011250637 HC RX REV CODE- 250/637: Performed by: NURSE PRACTITIONER

## 2019-12-13 PROCEDURE — 65270000029 HC RM PRIVATE

## 2019-12-13 PROCEDURE — 97530 THERAPEUTIC ACTIVITIES: CPT

## 2019-12-13 PROCEDURE — 74011250636 HC RX REV CODE- 250/636: Performed by: HOSPITALIST

## 2019-12-13 PROCEDURE — 74011000250 HC RX REV CODE- 250: Performed by: NURSE PRACTITIONER

## 2019-12-13 PROCEDURE — 74011250637 HC RX REV CODE- 250/637: Performed by: HOSPITALIST

## 2019-12-13 PROCEDURE — 74011250637 HC RX REV CODE- 250/637: Performed by: INTERNAL MEDICINE

## 2019-12-13 PROCEDURE — 97535 SELF CARE MNGMENT TRAINING: CPT

## 2019-12-13 RX ADMIN — NYSTATIN 1 G: 100000 POWDER TOPICAL at 10:13

## 2019-12-13 RX ADMIN — Medication 10 ML: at 05:05

## 2019-12-13 RX ADMIN — HEPARIN SODIUM 5000 UNITS: 5000 INJECTION INTRAVENOUS; SUBCUTANEOUS at 17:34

## 2019-12-13 RX ADMIN — ATORVASTATIN CALCIUM 20 MG: 10 TABLET, FILM COATED ORAL at 22:17

## 2019-12-13 RX ADMIN — CALCIUM CARBONATE (ANTACID) CHEW TAB 500 MG 200 MG: 500 CHEW TAB at 08:00

## 2019-12-13 RX ADMIN — CLOPIDOGREL BISULFATE 75 MG: 75 TABLET, FILM COATED ORAL at 09:00

## 2019-12-13 RX ADMIN — Medication 10 ML: at 14:00

## 2019-12-13 RX ADMIN — CEFPODOXIME PROXETIL 200 MG: 200 TABLET, FILM COATED ORAL at 22:17

## 2019-12-13 RX ADMIN — NYSTATIN 1 G: 100000 POWDER TOPICAL at 17:37

## 2019-12-13 RX ADMIN — Medication 10 ML: at 22:17

## 2019-12-13 RX ADMIN — CALCIUM CARBONATE (ANTACID) CHEW TAB 500 MG 200 MG: 500 CHEW TAB at 12:00

## 2019-12-13 RX ADMIN — HEPARIN SODIUM 5000 UNITS: 5000 INJECTION INTRAVENOUS; SUBCUTANEOUS at 05:04

## 2019-12-13 RX ADMIN — FAMOTIDINE 20 MG: 20 TABLET, FILM COATED ORAL at 09:00

## 2019-12-13 RX ADMIN — CEFPODOXIME PROXETIL 200 MG: 200 TABLET, FILM COATED ORAL at 09:00

## 2019-12-13 RX ADMIN — ASPIRIN 81 MG 81 MG: 81 TABLET ORAL at 09:00

## 2019-12-13 RX ADMIN — CALCIUM CARBONATE (ANTACID) CHEW TAB 500 MG 200 MG: 500 CHEW TAB at 17:34

## 2019-12-13 NOTE — PROGRESS NOTES
CM was notified by Ashu Sainz, that pt's son brought POA papers to have copied for pt's chart. CM continues to follow.

## 2019-12-13 NOTE — PROGRESS NOTES
CM met with pt's son this day to assist him with connecting to a [facility] representative. CM helped pt's son locate [facility] office to begin Medicaid process. CM awaiting updates regarding pt's appeal with [facility] and  Information regarding Medicaid application. CM continues to follow.

## 2019-12-13 NOTE — PROGRESS NOTES
Progress Note    Patient: Hank Kidd MRN: [id number]  SSN: [id number]    YOB: 1931  Age: 80 y.o. Sex: male      Admit Date: 12/2/2019    LOS: 11 days     Subjective:     PMH of CAD     Admitted due to chest pain after a fall. No acute cardiac events. Found to have sepsis with UTI for which he has been treated. Patient is not deemed safe to be discharged to live by himself. Son is trying to work with [employer] to find placement solution for patient. Documents have been submitted with the appeal from the son with the help of . This morning, patient is eating breakfast well. He is answering question well and appears to be more relevant in conversation. Objective:     Vitals:    12/12/19 1957 12/12/19 2316 12/13/19 0514 12/13/19 0847   BP: 114/55 114/55 112/53 105/59   Pulse: 88 87 89 95   Resp: 19 17 18 18   Temp: 98.9 °F (37.2 °C) 98.7 °F (37.1 °C) 98.3 °F (36.8 °C) 98.1 °F (36.7 °C)   SpO2: 92% 93% 91% 94%   Weight:       Height:            Intake and Output:  Current Shift: No intake/output data recorded. Last three shifts: 12/11 1901 - 12/13 0700  In: 480 [P.O.:480]  Out: -     Physical Exam:     General:                    The patient is a pleasantly and occasionally confused male in no acute distress. He asked a lot of questions. He is otherwise comfortable and eating well. Head:                                   Normocephalic/atraumatic. Eyes:                                   No palpebral pallor or scleral icterus. ENT:                                    External auricular and nasal exam within normal limits. Mucous membranes are moist.  Neck:                                   Supple, non-tender, no JVD. Lungs:                       Clear to auscultation bilaterally without wheezes or crackles. No respiratory distress or accessory muscle use.   Heart: Regular rate and rhythm, without murmurs, rubs, or gallops. Abdomen:                  Soft, non-tender, non-distended with normoactive bowel sounds. Genitourinary:           No tenderness over the bladder or bilateral CVAs. Extremities:               Without clubbing, cyanosis, or edema. Skin:                                    Normal color, texture, and turgor. No rashes, lesions, or jaundice. Pulses:                      Radial and dorsalis pedis pulses present 2+ bilaterally. Capillary refill <2s. Neurologic:                CN II-XII grossly intact and symmetrical.                                               Moving all four extremities well with no focal deficits. Psychiatric:               pleasantly confused.      Lab/Data Review:  CBC W/O DIFF [[id number]] (Order [id number])   Lab   Date: 12/9/2019 Department: [facility] Released By/Authorizing: Elsie King DO (auto-released)   Component Value Flag Ref Range Units Status   WBC 15.7  High   4.3 - 11.1 K/uL Final   RBC 3.48  Low   4.23 - 5.6 M/uL Final   HGB 10.9  Low   13.6 - 17.2 g/dL Final   HCT 33.1  Low   41.1 - 50.3 % Final   MCV 95.1   79.6 - 97.8 FL Final   MCH 31.3   26.1 - 32.9 PG Final   MCHC 32.9   31.4 - 35.0 g/dL Final   RDW 16.5  High   11.9 - 14.6 % Final   PLATELET 111   604 - 450 K/uL Final   MPV 9.7   9.4 - 12.3 FL Final   ABSOLUTE NRBC 0.00   0.0 - 0.2 K/uL Final   Comment:   **Note: Absolute NRBC parameter is now reported with Hemogram**     METABOLIC PANEL, COMPREHENSIVE [[id number]] (Order [id number])   Lab   Date: 12/8/2019 Department: [facility] Released By/Authorizing: Elsie King DO (auto-released)   Component Value Flag Ref Range Units Status   Sodium 136   136 - 145 mmol/L Final   Potassium 4.1   3.5 - 5.1 mmol/L Final   Chloride 101   98 - 107 mmol/L Final   CO2 29   21 - 32 mmol/L Final   Anion gap 6  Low   7 - 16 mmol/L Final Glucose 135  High   65 - 100 mg/dL Final   Comment:   47 - 60 mg/dl Consistent with, but not fully diagnostic of hypoglycemia. 101 - 125 mg/dl Impaired fasting glucose/consistent with pre-diabetes mellitus   > 126 mg/dl Fasting glucose consistent with overt diabetes mellitus    BUN 19   8 - 23 MG/DL Final   Creatinine 1.35   0.8 - 1.5 MG/DL Final   GFR est AA >60   >60 ml/min/1.73m2 Final   GFR est non-AA 53  Low   >60 ml/min/1.73m2 Final   Comment:   (NOTE)   Estimated GFR is calculated using the Modification of Diet in Renal   Disease (MDRD) Study equation, reported for both  Americans   (GFRAA) and non- Americans (GFRNA), and normalized to 1.73m2   body surface area. The physician must decide which value applies to   the patient. The MDRD study equation should only be used in   individuals age 25 or older. It has not been validated for the   following: pregnant women, patients with serious comorbid conditions,   or on certain medications, or persons with extremes of body size,   muscle mass, or nutritional status. Calcium 9.0   8.3 - 10.4 MG/DL Final   Bilirubin, total 1.2  High   0.2 - 1.1 MG/DL Final   ALT (SGPT) 44   12 - 65 U/L Final   AST (SGOT) 52  High   15 - 37 U/L Final   Alk.  phosphatase 318  High   50 - 136 U/L Final   Protein, total 6.8   6.3 - 8.2 g/dL Final   Albumin 2.3  Low   3.2 - 4.6 g/dL Final   Globulin 4.5  High   2.3 - 3.5 g/dL Final   A-G Ratio 0.5  Low   1.2 - 3.5   Final     XR chest   12-9-2019  Impressions: Stable portable chest       Current Facility-Administered Medications:     cefpodoxime (VANTIN) tablet 200 mg, 200 mg, Oral, Q12H, Lila Cordero MD, 200 mg at 12/12/19 2112    nystatin (MYCOSTATIN) 100,000 unit/gram powder, , Topical, BID, Milton Bautista, DO    lidocaine 4 % patch 1 Patch, 1 Patch, TransDERmal, Q24H, Opal Peterson, NP, 1 Patch at 12/12/19 1703    famotidine (PEPCID) tablet 20 mg, 20 mg, Oral, DAILY, Juan Hunter MD, 20 mg at 12/12/19 0908    haloperidol lactate (HALDOL) injection 2.5 mg, 2.5 mg, IntraVENous, Q6H PRN, Opal Peterosn NP, 2.5 mg at 12/09/19 1856    calcium carbonate (TUMS) chewable tablet 200 mg [elemental], 200 mg, Oral, TID WITH MEALS, Jennifer Peterson, QUEENIE, 200 mg at 12/12/19 1658    sodium chloride (NS) flush 5-40 mL, 5-40 mL, IntraVENous, Q8H, Vu Naranjo MD, 10 mL at 12/13/19 0505    sodium chloride (NS) flush 5-40 mL, 5-40 mL, IntraVENous, PRN, Vu Naranjo MD    aspirin chewable tablet 81 mg, 81 mg, Oral, DAILY, Deanne Dutton MD, 81 mg at 12/12/19 0908    atorvastatin (LIPITOR) tablet 20 mg, 20 mg, Oral, QHS, Deanne Dutton MD, 20 mg at 12/12/19 2112    clopidogrel (PLAVIX) tablet 75 mg, 75 mg, Oral, DAILY, Deanne Dutton MD, 75 mg at 12/12/19 0908    nitroglycerin (NITROSTAT) tablet 0.4 mg, 0.4 mg, SubLINGual, Q5MIN PRN, Deanne Dutton MD    sodium chloride (NS) flush 5-40 mL, 5-40 mL, IntraVENous, PRN, Deanne Dutton MD    acetaminophen (TYLENOL) tablet 650 mg, 650 mg, Oral, Q6H PRN, Deanne Dutton MD    oxyCODONE-acetaminophen (PERCOCET 7.5) 7.5-325 mg per tablet 1 Tab, 1 Tab, Oral, Q6H PRN, Deanne Dutton MD, 1 Tab at 12/05/19 2121    HYDROmorphone (PF) (DILAUDID) injection 0.2 mg, 0.2 mg, IntraVENous, Q4H PRN, Deanne Dutton MD    [facility]) injection 0.4 mg, 0.4 mg, IntraVENous, PRN, Deanne Dutton MD    ondansetron [facility]) injection 4 mg, 4 mg, IntraVENous, Q4H PRN, Deanne Dutton MD    magnesium hydroxide (MILK OF MAGNESIA) 400 mg/5 mL oral suspension 30 mL, 30 mL, Oral, DAILY PRN, Deanne Dutton MD    heparin (porcine) injection 5,000 Units, 5,000 Units, SubCUTAneous, Q12H, Deanne Dutton MD, 5,000 Units at 12/13/19 9518      Assessment:     Principal Problem:    Sepsis ([address] (12/2/2019)    Active Problems:    CAD (coronary artery disease) (7/18/2019)      Dehydration (12/2/2019)      Debility (12/2/2019)      Acute renal failure (ARF) ([facility] [address] (12/2/2019)      Acute cystitis without hematuria (12/2/2019)      Acute metabolic encephalopathy (85/8/7680)        Plan:     Sepsis on admission  UTI   Continue current Vantin until 12-. Urine culture with E. Coli. Acute metabolic encephalopathy   Dementia. Debility   Continue physical therapy  Appears to be more oriented today. CAD   Continue aspirin, Lipitor. I have discussed the plan of care with patient. DVT prophylaxis : heparin SC     Disposition plan : CM is helping with placement. Waiting for placement decision.     Signed By: Constance Fox MD     December 13, 2019

## 2019-12-13 NOTE — PROGRESS NOTES
Interdisciplinary Rounds completed 12/13/19. Nursing, Case Management, Physician and PT present. Plan of care reviewed and updated.

## 2019-12-13 NOTE — PROGRESS NOTES
Problem: Self Care Deficits Care Plan (Adult)  Goal: *Acute Goals and Plan of Care (Insert Text)  Description  1. Patient will complete lower body bathing and dressing with supervision and adaptive equipment as needed. 2. Patient will complete toileting with supervision. 3. Patient will tolerate 30 minutes of OT treatment with 2-3 rest breaks to increase activity tolerance for ADLs. 4. Patient will complete functional transfers with supervision and adaptive equipment as needed. 5. Patient will complete functional mobility for household distances with supervision and safe use of adaptive device to decrease risk for falls. 6. Patient will participate in 10 minutes of therapeutic exercises to increase strength for ADL/functional transfers. Timeframe: 7 visits      Outcome: Progressing Towards Goal     OCCUPATIONAL THERAPY: Daily Note and PM 12/13/2019  INPATIENT: OT Visit Days: 3  Payor: Archana Plana / Plan: [facility] [employer] MEDICARE CHOICE PPO/PFFS / Product Type: Managed Care Medicare /      NAME/AGE/GENDER: Adele Loja is a 80 y.o. male   PRIMARY DIAGNOSIS:  SIRS (systemic inflammatory response syndrome) (HCC) [R65.10]  Acute renal failure (ARF) ([facility] [address] [N17.9]  Dehydration [E86.0]  Debility [R53.81] Sepsis ([facility] [address] Sepsis ([facility] [address]       ICD-10: Treatment Diagnosis:    · Generalized Muscle Weakness (M62.81)  · Other lack of cordination (R27.8)  · History of falling (Z91.81)   Precautions/Allergies:     Patient has no known allergies. ASSESSMENT:     Mr. Zuly Ji Sr was supine in the bed upon arrival and pt appears confused. Pt is disoriented to place/time/situation but tends to talk around orientation questions. Pt needs moderate to maximal cues to stay on task. Pt is very forgetful today. Pt needed constant reminders that he was up to brush his teeth. Pt doesn't follow commands well in the bathroom for turning and safely performing task. Pt demonstrated poor balance today and was very unsteady.  Pt had poor management of the walker in tight spaces. Pt completed functional mobility but appeared weaker and more unsteady than previous session. Pt returned to supine and needed assistance for rolling and scooting up in the bed. Alarm was in place at end of session. Pt was appreciative of care. Pt to continue per plan of care. This section established at most recent assessment   PROBLEM LIST (Impairments causing functional limitations):  1. Decreased Strength  2. Decreased ADL/Functional Activities  3. Decreased Transfer Abilities  4. Decreased Ambulation Ability/Technique  5. Decreased Balance  6. Decreased Activity Tolerance  7. Increased Fatigue  8. Decreased Flexibility/Joint Mobility  9. Decreased Fountain with Home Exercise Program  10. Decreased Cognition   INTERVENTIONS PLANNED: (Benefits and precautions of occupational therapy have been discussed with the patient.)  1. Activities of daily living training  2. Adaptive equipment training  3. Balance training  4. Clothing management  5. Cognitive training  6. Donning&doffing training  7. Neuromuscular re-eduation  8. Therapeutic activity  9. Therapeutic exercise     TREATMENT PLAN: Frequency/Duration: Follow patient 3 times per week to address above goals. Rehabilitation Potential For Stated Goals: Good     REHAB RECOMMENDATIONS (at time of discharge pending progress):    Placement: It is my opinion, based on this patient's performance to date, that Mr. Natty Chauhan may benefit from intensive therapy at a [address] [place] [address] after discharge due to the functional deficits listed above that are likely to improve with skilled rehabilitation and concerns that he/she may be unsafe to be unsupervised at home due to risk for falls. Pt may also benefit from [facility].    Equipment:    TBD               OCCUPATIONAL PROFILE AND HISTORY:   History of Present Injury/Illness (Reason for Referral):  See H&P  Past Medical History/Comorbidities:   Mr. Natty Chauhan  has a past medical history of Colon cancer ([facility] [address] (01/2012) and Hiatal hernia. Mr. Janel Estrada  has no past surgical history on file. Social History/Living Environment:   Home Environment: Private residence  One/Two Story Residence: Two story  # of Interior Steps: 24  Lift Chair Available: No  Living Alone: Yes  Support Systems: Child(margi), Family member(s)  Patient Expects to be Discharged to[de-identified] Private residence  Current DME Used/Available at Home: None  Tub or Shower Type: Shower  Prior Level of Function/Work/Activity:  Pt lives alone at baseline and reports that typically he is independent with ADL and functional mobility. Pt reports that he recently had a fall at home over a tennis ball falling on his L side with pain in his L shoulder, ribcage, and L LE  Personal Factors:          Social Background:  Lives alone        Past/Current Experience:  hx of falls        Overall Behavior:  confused; decrease insight to deficits; poor safety. Number of Personal Factors/Comorbidities that affect the Plan of Care: Extensive review of physical, cognitive, and psychosocial performance (3+):  HIGH COMPLEXITY   ASSESSMENT OF OCCUPATIONAL PERFORMANCE[de-identified]   Activities of Daily Living:   Basic ADLs (From Assessment) Complex ADLs (From Assessment)   Feeding: Supervision  Oral Facial Hygiene/Grooming: Stand-by assistance  Bathing: Moderate assistance  Upper Body Dressing: Minimum assistance  Lower Body Dressing: Moderate assistance  Toileting: Moderate assistance Instrumental ADL  Meal Preparation: Total assistance  Homemaking: Total assistance   Grooming/Bathing/Dressing Activities of Daily Living   Grooming  Brushing Teeth: Minimum assistance(maximal verbal cues) Cognitive Retraining  Safety/Judgement: Decreased awareness of need for assistance;Decreased awareness of need for safety; Fall prevention;Home safety; Lack of insight into deficits                 Functional Transfers  Bathroom Mobility: Moderate assistance     Bed/Mat Mobility  Rolling: Minimum assistance  Supine to Sit: Minimum assistance  Sit to Supine: Stand-by assistance  Sit to Stand: Minimum assistance  Stand to Sit: Minimum assistance  Bed to Chair: Minimum assistance  Scooting: Supervision     Most Recent Physical Functioning:   Gross Assessment:  AROM: Generally decreased, functional(pt holding L shoulder behind his head in reclined position)  Strength: Generally decreased, functional  Coordination: Generally decreased, functional               Posture:  Posture (WDL): Exceptions to WDL  Posture Assessment: Forward head, Rounded shoulders  Balance:  Sitting: Impaired  Sitting - Static: Fair (occasional)  Sitting - Dynamic: Fair (occasional)  Standing: Impaired  Standing - Static: Fair  Standing - Dynamic : Poor Bed Mobility:  Rolling: Minimum assistance  Supine to Sit: Minimum assistance  Sit to Supine: Stand-by assistance  Scooting: Supervision  Wheelchair Mobility:     Transfers:  Sit to Stand: Minimum assistance  Stand to Sit: Minimum assistance  Bed to Chair: Minimum assistance            Patient Vitals for the past 6 hrs:   BP BP Patient Position SpO2 Pulse   12/13/19 1230 108/53 At rest;Head of bed elevated (Comment degrees) 92 % 95       Mental Status  Neurologic State: Alert, Confused  Orientation Level: Disoriented to place, Disoriented to situation, Disoriented to time  Cognition: Decreased attention/concentration, Decreased command following, Impaired decision making, Impulsive, Poor safety awareness  Perception: Appears intact  Perseveration: Perseverates during conversation  Safety/Judgement: Decreased awareness of need for assistance, Decreased awareness of need for safety, Fall prevention, Home safety, Lack of insight into deficits                          Physical Skills Involved:  1. Range of Motion  2. Balance  3. Strength  4. Activity Tolerance Cognitive Skills Affected (resulting in the inability to perform in a timely and safe manner):  1.  Executive Function  2. Short Term Recall  3. Sustained Attention Psychosocial Skills Affected:  1. Habits/Routines  2. Self-Awareness   Number of elements that affect the Plan of Care: 5+:  HIGH COMPLEXITY   CLINICAL DECISION MAKIN[address] [place] [address] AM-PAC 6 Clicks   Daily Activity Inpatient Short Form  How much help from another person does the patient currently need. .. Total A Lot A Little None   1. Putting on and taking off regular lower body clothing? [] 1   [x] 2   [] 3   [] 4   2. Bathing (including washing, rinsing, drying)? [] 1   [x] 2   [] 3   [] 4   3. Toileting, which includes using toilet, bedpan or urinal?   [] 1   [x] 2   [] 3   [] 4   4. Putting on and taking off regular upper body clothing? [] 1   [] 2   [x] 3   [] 4   5. Taking care of personal grooming such as brushing teeth? [] 1   [] 2   [x] 3   [] 4   6. Eating meals? [] 1   [] 2   [x] 3   [] 4   © , Trustees of [address] [place] [address], under license to [employer]. All rights reserved      Score:  Initial: 15 Most Recent: X (Date: -- )    Interpretation of Tool:  Represents activities that are increasingly more difficult (i.e. Bed mobility, Transfers, Gait). Medical Necessity:     · Patient demonstrates   · good  ·  rehab potential due to higher previous functional level. Reason for Services/Other Comments:  · Patient continues to require skilled intervention due to   · Decreased independence with ADL/functional transfers   · . Use of outcome tool(s) and clinical judgement create a POC that gives a: LOW COMPLEXITY         TREATMENT:   (In addition to Assessment/Re-Assessment sessions the following treatments were rendered)     Pre-treatment Symptoms/Complaints:    Pain: Initial:   Pain Intensity 1: 0  Post Session:  0/10   Self Care: (15): Procedure(s) (per grid) utilized to improve and/or restore self-care/home management as related to grooming.  Required moderate verbal cueing to facilitate activities of daily living skills and compensatory activities. Therapeutic Activity: (    11): Therapeutic activities including Bed transfers, Chair transfers and Ambulation on level ground to improve mobility, strength, balance and coordination. Required minimal assistance   to promote static and dynamic balance in standing.     n/a      Braces/Orthotics/Lines/Etc:   · O2 Device: Room air  Treatment/Session Assessment:    · Response to Treatment:  Pt very unsteady with activity. · Interdisciplinary Collaboration:   o Occupational Therapist  o Registered Nurse  · After treatment position/precautions:   o Supine in bed  o Bed alarm/tab alert on  o Bed/Chair-wheels locked  o Call light within reach  o RN notified   · Compliance with Program/Exercises: Will assess as treatment progresses. · Recommendations/Intent for next treatment session: \"Next visit will focus on advancements to more challenging activities and reduction in assistance provided\".   Total Treatment Duration:  OT Patient Time In/Time Out  Time In: 1503  Time Out: 21 Sapna Rick OT

## 2019-12-14 LAB
MM INDURATION POC: 0 MM (ref 0–5)
PPD POC: NEGATIVE NEGATIVE

## 2019-12-14 PROCEDURE — 74011000250 HC RX REV CODE- 250: Performed by: NURSE PRACTITIONER

## 2019-12-14 PROCEDURE — 74011250637 HC RX REV CODE- 250/637: Performed by: HOSPITALIST

## 2019-12-14 PROCEDURE — 74011250637 HC RX REV CODE- 250/637: Performed by: INTERNAL MEDICINE

## 2019-12-14 PROCEDURE — 65270000029 HC RM PRIVATE

## 2019-12-14 PROCEDURE — 74011250637 HC RX REV CODE- 250/637: Performed by: NURSE PRACTITIONER

## 2019-12-14 PROCEDURE — 74011250636 HC RX REV CODE- 250/636: Performed by: HOSPITALIST

## 2019-12-14 RX ADMIN — CLOPIDOGREL BISULFATE 75 MG: 75 TABLET, FILM COATED ORAL at 08:44

## 2019-12-14 RX ADMIN — CALCIUM CARBONATE (ANTACID) CHEW TAB 500 MG 200 MG: 500 CHEW TAB at 18:07

## 2019-12-14 RX ADMIN — CEFPODOXIME PROXETIL 200 MG: 200 TABLET, FILM COATED ORAL at 23:00

## 2019-12-14 RX ADMIN — ASPIRIN 81 MG 81 MG: 81 TABLET ORAL at 08:44

## 2019-12-14 RX ADMIN — Medication 10 ML: at 05:24

## 2019-12-14 RX ADMIN — NYSTATIN: 100000 POWDER TOPICAL at 08:44

## 2019-12-14 RX ADMIN — HEPARIN SODIUM 5000 UNITS: 5000 INJECTION INTRAVENOUS; SUBCUTANEOUS at 05:15

## 2019-12-14 RX ADMIN — Medication 10 ML: at 23:00

## 2019-12-14 RX ADMIN — FAMOTIDINE 20 MG: 20 TABLET, FILM COATED ORAL at 08:44

## 2019-12-14 RX ADMIN — CEFPODOXIME PROXETIL 200 MG: 200 TABLET, FILM COATED ORAL at 08:44

## 2019-12-14 RX ADMIN — Medication 10 ML: at 14:00

## 2019-12-14 RX ADMIN — ATORVASTATIN CALCIUM 20 MG: 10 TABLET, FILM COATED ORAL at 23:00

## 2019-12-14 RX ADMIN — NYSTATIN: 100000 POWDER TOPICAL at 18:00

## 2019-12-14 RX ADMIN — CALCIUM CARBONATE (ANTACID) CHEW TAB 500 MG 200 MG: 500 CHEW TAB at 08:44

## 2019-12-14 RX ADMIN — HEPARIN SODIUM 5000 UNITS: 5000 INJECTION INTRAVENOUS; SUBCUTANEOUS at 18:07

## 2019-12-14 NOTE — PROGRESS NOTES
Progress Note    Patient: Christiano Sommers MRN: [id number]  SSN: [id number]    YOB: 1931  Age: 80 y.o. Sex: male      Admit Date: 12/2/2019    LOS: 12 days     Subjective:     PMH of CAD     Admitted due to chest pain after a fall. No acute cardiac events. Found to have sepsis with UTI for which he has been treated. Patient is not deemed safe to be discharged to live by himself. Son is trying to work with [employer] to find placement solution for patient. Documents have been submitted with the appeal from the son with the help of . This morning, patient seems alert and oriented. Objective:     Vitals:    12/13/19 2002 12/14/19 0012 12/14/19 0516 12/14/19 0738   BP: 111/50 105/51 125/62 118/67   Pulse: 91 86 82 86   Resp: 18 18 18 17   Temp: 99 °F (37.2 °C) 98.7 °F (37.1 °C) 97.9 °F (36.6 °C) 98.1 °F (36.7 °C)   SpO2: 91% 95% 92% 90%   Weight:       Height:            Intake and Output:  Current Shift: 12/14 0701 - 12/14 1900  In: 300 [P.O.:300]  Out: -   Last three shifts: 12/12 1901 - 12/14 0700  In: 720 [P.O.:720]  Out: -     Physical Exam:     General:                    The patient is a pleasantly and occasionally confused male in no acute distress. He is otherwise comfortable and eating well. Head:                                   Normocephalic/atraumatic. Eyes:                                   No palpebral pallor or scleral icterus. ENT:                                    External auricular and nasal exam within normal limits. Mucous membranes are moist.  Neck:                                   Supple, non-tender, no JVD. Lungs:                       Clear to auscultation bilaterally without wheezes or crackles. No respiratory distress or accessory muscle use.   Heart:                                  Regular rate and rhythm, without murmurs, rubs, or gallops. Abdomen:                  Soft, non-tender, non-distended with normoactive bowel sounds. Genitourinary:           No tenderness over the bladder or bilateral CVAs. Extremities:               Without clubbing, cyanosis, or edema. Skin:                                    Normal color, texture, and turgor. No rashes, lesions, or jaundice. Pulses:                      Radial and dorsalis pedis pulses present 2+ bilaterally. Capillary refill <2s. Neurologic:                CN II-XII grossly intact and symmetrical.                                               Moving all four extremities well with no focal deficits. Psychiatric:               pleasantly confused.      Lab/Data Review:  CBC W/O DIFF [[id number]] (Order [id number])   Lab   Date: 12/9/2019 [facility] Med S[facility] Released By/Authorizing: Oliverio Workman DO (auto-released)   Component Value Flag Ref Range Units Status   WBC 15.7  High   4.3 - 11.1 K/uL Final   RBC 3.48  Low   4.23 - 5.6 M/uL Final   HGB 10.9  Low   13.6 - 17.2 g/dL Final   HCT 33.1  Low   41.1 - 50.3 % Final   MCV 95.1   79.6 - 97.8 FL Final   MCH 31.3   26.1 - 32.9 PG Final   MCHC 32.9   31.4 - 35.0 g/dL Final   RDW 16.5  High   11.9 - 14.6 % Final   PLATELET 213   653 - 450 K/uL Final   MPV 9.7   9.4 - 12.3 FL Final   ABSOLUTE NRBC 0.00   0.0 - 0.2 K/uL Final   Comment:   **Note: Absolute NRBC parameter is now reported with Hemogram**     METABOLIC PANEL, COMPREHENSIVE [[id number]] (Order [id number])   Lab   Date: 12/8/2019 [facility] Med [facility] Released By/Authorizing: Oliverio Workman DO (auto-released)   Component Value Flag Ref Range Units Status   Sodium 136   136 - 145 mmol/L Final   Potassium 4.1   3.5 - 5.1 mmol/L Final   Chloride 101   98 - 107 mmol/L Final   CO2 29   21 - 32 mmol/L Final   Anion gap 6  Low   7 - 16 mmol/L Final   Glucose 135  High   65 - 100 mg/dL Final   Comment:   47 - 60 mg/dl Consistent with, but not fully diagnostic of hypoglycemia. 101 - 125 mg/dl Impaired fasting glucose/consistent with pre-diabetes mellitus   > 126 mg/dl Fasting glucose consistent with overt diabetes mellitus    BUN 19   8 - 23 MG/DL Final   Creatinine 1.35   0.8 - 1.5 MG/DL Final   GFR est AA >60   >60 ml/min/1.73m2 Final   GFR est non-AA 53  Low   >60 ml/min/1.73m2 Final   Comment:   (NOTE)   Estimated GFR is calculated using the Modification of Diet in Renal   Disease (MDRD) Study equation, reported for both  Americans   (GFRAA) and non- Americans (GFRNA), and normalized to 1.73m2   body surface area. The physician must decide which value applies to   the patient. The MDRD study equation should only be used in   individuals age 25 or older. It has not been validated for the   following: pregnant women, patients with serious comorbid conditions,   or on certain medications, or persons with extremes of body size,   muscle mass, or nutritional status. Calcium 9.0   8.3 - 10.4 MG/DL Final   Bilirubin, total 1.2  High   0.2 - 1.1 MG/DL Final   ALT (SGPT) 44   12 - 65 U/L Final   AST (SGOT) 52  High   15 - 37 U/L Final   Alk.  phosphatase 318  High   50 - 136 U/L Final   Protein, total 6.8   6.3 - 8.2 g/dL Final   Albumin 2.3  Low   3.2 - 4.6 g/dL Final   Globulin 4.5  High   2.3 - 3.5 g/dL Final   A-G Ratio 0.5  Low   1.2 - 3.5   Final     XR chest   12-9-2019  Impressions: Stable portable chest       Current Facility-Administered Medications:     cefpodoxime (VANTIN) tablet 200 mg, 200 mg, Oral, Q12H, Lila Cordero MD, 200 mg at 12/14/19 0844    nystatin (MYCOSTATIN) 100,000 unit/gram powder, , Topical, BID, Milton Bautista,     lidocaine 4 % patch 1 Patch, 1 Patch, TransDERmal, Q24H, Opal Peterson NP, 1 Patch at 12/13/19 1737    famotidine (PEPCID) tablet 20 mg, 20 mg, Oral, DAILY, Georgette Thomas MD, 20 mg at 12/14/19 0844    haloperidol lactate (HALDOL) injection 2.5 mg, 2.5 mg, IntraVENous, Q6H PRN, Opal Peterson NP, 2.5 mg at 12/09/19 1856    calcium carbonate (TUMS) chewable tablet 200 mg [elemental], 200 mg, Oral, TID WITH MEALS, Daina Peterson, NP, 200 mg at 12/14/19 0844    sodium chloride (NS) flush 5-40 mL, 5-40 mL, IntraVENous, Q8H, Cierra Boyd MD, 10 mL at 12/14/19 0524    sodium chloride (NS) flush 5-40 mL, 5-40 mL, IntraVENous, PRN, Cierra Boyd MD    aspirin chewable tablet 81 mg, 81 mg, Oral, DAILY, Juanita Daniel MD, 81 mg at 12/14/19 0844    atorvastatin (LIPITOR) tablet 20 mg, 20 mg, Oral, QHS, Juanita Daniel MD, 20 mg at 12/13/19 2217    clopidogrel (PLAVIX) tablet 75 mg, 75 mg, Oral, DAILY, Juanita Daniel MD, 75 mg at 12/14/19 0844    nitroglycerin (NITROSTAT) tablet 0.4 mg, 0.4 mg, SubLINGual, Q5MIN PRN, Juanita Daniel MD    sodium chloride (NS) flush 5-40 mL, 5-40 mL, IntraVENous, PRN, Juanita Daniel MD    acetaminophen (TYLENOL) tablet 650 mg, 650 mg, Oral, Q6H PRN, Juanita Daniel MD    oxyCODONE-acetaminophen (PERCOCET 7.5) 7.5-325 mg per tablet 1 Tab, 1 Tab, Oral, Q6H PRN, Juanita Daniel MD, 1 Tab at 12/05/19 2121    HYDROmorphone (PF) (DILAUDID) injection 0.2 mg, 0.2 mg, IntraVENous, Q4H PRN, Juanita Daniel MD    [facility]) injection 0.4 mg, 0.4 mg, IntraVENous, PRN, Juanita Daniel MD    ondansetron [facility]) injection 4 mg, 4 mg, IntraVENous, Q4H PRN, Juanita Daniel MD    magnesium hydroxide (MILK OF MAGNESIA) 400 mg/5 mL oral suspension 30 mL, 30 mL, Oral, DAILY PRN, Juanita Daniel MD    heparin (porcine) injection 5,000 Units, 5,000 Units, SubCUTAneous, Q12H, Juanita Daniel MD, 5,000 Units at 12/14/19 0515      Assessment:     Principal Problem:    Sepsis ([facility] (12/2/2019)    Active Problems:    CAD (coronary artery disease) (7/18/2019)      Dehydration (12/2/2019)      Debility (12/2/2019)      Acute renal failure (ARF) ([facility] (12/2/2019)      Acute cystitis without hematuria (12/2/2019)      Acute metabolic encephalopathy (84/5/7007)        Plan:     Sepsis on admission  UTI   Continue current Vantin until 12-. Urine culture shows E. Coli. Acute metabolic encephalopathy   Dementia. Debility   Continue physical therapy  Appears to be more oriented gradually. CAD   Continue aspirin, Lipitor. I have discussed the plan of care with patient. DVT prophylaxis : heparin SC     Disposition plan : CM is helping with placement. Waiting for placement decision.     Signed By: Alvarez Chatterjee MD     December 14, 2019

## 2019-12-15 LAB
MM INDURATION POC: 0 NEGATIVE (ref 0–5)
PPD POC: NEGATIVE NEGATIVE

## 2019-12-15 PROCEDURE — 74011250637 HC RX REV CODE- 250/637: Performed by: HOSPITALIST

## 2019-12-15 PROCEDURE — 74011250637 HC RX REV CODE- 250/637: Performed by: INTERNAL MEDICINE

## 2019-12-15 PROCEDURE — 65270000029 HC RM PRIVATE

## 2019-12-15 PROCEDURE — 74011250636 HC RX REV CODE- 250/636: Performed by: HOSPITALIST

## 2019-12-15 PROCEDURE — 74011250637 HC RX REV CODE- 250/637: Performed by: NURSE PRACTITIONER

## 2019-12-15 PROCEDURE — 74011000250 HC RX REV CODE- 250: Performed by: NURSE PRACTITIONER

## 2019-12-15 RX ADMIN — CALCIUM CARBONATE (ANTACID) CHEW TAB 500 MG 200 MG: 500 CHEW TAB at 16:51

## 2019-12-15 RX ADMIN — Medication 10 ML: at 05:40

## 2019-12-15 RX ADMIN — CLOPIDOGREL BISULFATE 75 MG: 75 TABLET, FILM COATED ORAL at 08:20

## 2019-12-15 RX ADMIN — ATORVASTATIN CALCIUM 20 MG: 10 TABLET, FILM COATED ORAL at 21:36

## 2019-12-15 RX ADMIN — NYSTATIN: 100000 POWDER TOPICAL at 08:21

## 2019-12-15 RX ADMIN — CALCIUM CARBONATE (ANTACID) CHEW TAB 500 MG 200 MG: 500 CHEW TAB at 08:20

## 2019-12-15 RX ADMIN — Medication 10 ML: at 14:53

## 2019-12-15 RX ADMIN — FAMOTIDINE 20 MG: 20 TABLET, FILM COATED ORAL at 08:20

## 2019-12-15 RX ADMIN — NYSTATIN: 100000 POWDER TOPICAL at 17:04

## 2019-12-15 RX ADMIN — CEFPODOXIME PROXETIL 200 MG: 200 TABLET, FILM COATED ORAL at 21:36

## 2019-12-15 RX ADMIN — ASPIRIN 81 MG 81 MG: 81 TABLET ORAL at 08:20

## 2019-12-15 RX ADMIN — CALCIUM CARBONATE (ANTACID) CHEW TAB 500 MG 200 MG: 500 CHEW TAB at 12:24

## 2019-12-15 RX ADMIN — CEFPODOXIME PROXETIL 200 MG: 200 TABLET, FILM COATED ORAL at 08:20

## 2019-12-15 RX ADMIN — HEPARIN SODIUM 5000 UNITS: 5000 INJECTION INTRAVENOUS; SUBCUTANEOUS at 17:03

## 2019-12-15 RX ADMIN — HEPARIN SODIUM 5000 UNITS: 5000 INJECTION INTRAVENOUS; SUBCUTANEOUS at 05:40

## 2019-12-15 RX ADMIN — Medication 10 ML: at 21:36

## 2019-12-15 NOTE — PROGRESS NOTES
END OF SHIFT NOTE:    INTAKE/OUTPUT  12/13 0701 - 12/14 0700  In: 720 [P.O.:720]  Out: -   Voiding: YES  Catheter: NO  Drain:      Flatus: Patient does have flatus present. Stool:  0 occurrences. Characteristics:  Stool Assessment  Stool Color: Brown  Stool Appearance: Soft  Stool Amount: Small  Stool Source/Status: Rectum    Emesis: 0 occurrences. Characteristics:        VITAL SIGNS  Patient Vitals for the past 12 hrs:   Temp Pulse Resp BP SpO2   12/14/19 1436 97.7 °F (36.5 °C) 91 17 130/74 100 %   12/14/19 1117 97.7 °F (36.5 °C) 83 16 125/74 93 %   12/14/19 0738 98.1 °F (36.7 °C) 86 17 118/67 90 %       Pain Assessment  Pain Intensity 1: 0 (12/14/19 1536)  Pain Location 1: Back, Flank  Pain Intervention(s) 1: Medication (see MAR)  Patient Stated Pain Goal: 0    Ambulating  Yes with assistance. Bed alarm activated at all times. Patient denies having pain this shift. Shift report given to oncoming nurse at the bedside.     Mike Luis RN

## 2019-12-15 NOTE — PROGRESS NOTES
Progress Note    Patient: Gail Galeazzi MRN: [id number]  SSN: [id number]    YOB: 1931  Age: 80 y.o. Sex: male      Admit Date: 12/2/2019    LOS: 13 days     Subjective:     PMH of CAD     Admitted due to chest pain after a fall. No acute cardiac events. Found to have sepsis with UTI for which he has been treated. Patient is not deemed safe to be discharged to live by himself. Son is trying to work with [employer] to find placement solution for patient. Documents have been submitted with the appeal from the son with the help of . Patient is resting well this morning. No new complaints. Objective:     Vitals:    12/14/19 2015 12/14/19 2353 12/15/19 0416 12/15/19 0758   BP: 107/54 130/71 112/78 105/65   Pulse: 93 93 91 74   Resp: 18 18 18 18   Temp: 98.2 °F (36.8 °C) 98.7 °F (37.1 °C) 98.1 °F (36.7 °C) 98.9 °F (37.2 °C)   SpO2: 95% 90% 92% 93%   Weight:       Height:            Intake and Output:  Current Shift: No intake/output data recorded. Last three shifts: 12/13 1901 - 12/15 0700  In: 540 [P.O.:540]  Out: -     Physical Exam:     General:                    The patient is a pleasantly and occasionally confused male in no acute distress. He is otherwise comfortable and eating well. Head:                                   Normocephalic/atraumatic. Eyes:                                   No palpebral pallor or scleral icterus. ENT:                                    External auricular and nasal exam within normal limits. Mucous membranes are moist.  Neck:                                   Supple, non-tender, no JVD. Lungs:                       Clear to auscultation bilaterally without wheezes or crackles. No respiratory distress or accessory muscle use.   Heart:                                  Regular rate and rhythm, without murmurs, rubs, or gallops. Abdomen:                  Soft, non-tender, non-distended with normoactive bowel sounds. Genitourinary:           No tenderness over the bladder or bilateral CVAs. Extremities:               Without clubbing, cyanosis, or edema. Skin:                                    Normal color, texture, and turgor. No rashes, lesions, or jaundice. Pulses:                      Radial and dorsalis pedis pulses present 2+ bilaterally. Capillary refill <2s. Neurologic:                CN II-XII grossly intact and symmetrical.                                               Moving all four extremities well with no focal deficits. Psychiatric:               pleasantly confused.      Lab/Data Review:  CBC W/O DIFF [[id number]] (Order [id number])   Lab   Date: 12/9/2019 Department: [facility] Released By/Authorizing: Christel Boxer, DO (auto-released)   Component Value Flag Ref Range Units Status   WBC 15.7  High   4.3 - 11.1 K/uL Final   RBC 3.48  Low   4.23 - 5.6 M/uL Final   HGB 10.9  Low   13.6 - 17.2 g/dL Final   HCT 33.1  Low   41.1 - 50.3 % Final   MCV 95.1   79.6 - 97.8 FL Final   MCH 31.3   26.1 - 32.9 PG Final   MCHC 32.9   31.4 - 35.0 g/dL Final   RDW 16.5  High   11.9 - 14.6 % Final   PLATELET 780   694 - 450 K/uL Final   MPV 9.7   9.4 - 12.3 FL Final   ABSOLUTE NRBC 0.00   0.0 - 0.2 K/uL Final   Comment:   **Note: Absolute NRBC parameter is now reported with Hemogram**     METABOLIC PANEL, COMPREHENSIVE [[id number]] (Order [id number])   Lab   Date: 12/8/2019 Department: [facility] Released By/Authorizing: Christel Boxer, DO (auto-released)   Component Value Flag Ref Range Units Status   Sodium 136   136 - 145 mmol/L Final   Potassium 4.1   3.5 - 5.1 mmol/L Final   Chloride 101   98 - 107 mmol/L Final   CO2 29   21 - 32 mmol/L Final   Anion gap 6  Low   7 - 16 mmol/L Final   Glucose 135  High   65 - 100 mg/dL Final   Comment:   47 - 60 mg/dl Consistent with, but not fully diagnostic of hypoglycemia. 101 - 125 mg/dl Impaired fasting glucose/consistent with pre-diabetes mellitus   > 126 mg/dl Fasting glucose consistent with overt diabetes mellitus    BUN 19   8 - 23 MG/DL Final   Creatinine 1.35   0.8 - 1.5 MG/DL Final   GFR est AA >60   >60 ml/min/1.73m2 Final   GFR est non-AA 53  Low   >60 ml/min/1.73m2 Final   Comment:   (NOTE)   Estimated GFR is calculated using the Modification of Diet in Renal   Disease (MDRD) Study equation, reported for both  Americans   (GFRAA) and non- Americans (GFRNA), and normalized to 1.73m2   body surface area. The physician must decide which value applies to   the patient. The MDRD study equation should only be used in   individuals age 25 or older. It has not been validated for the   following: pregnant women, patients with serious comorbid conditions,   or on certain medications, or persons with extremes of body size,   muscle mass, or nutritional status. Calcium 9.0   8.3 - 10.4 MG/DL Final   Bilirubin, total 1.2  High   0.2 - 1.1 MG/DL Final   ALT (SGPT) 44   12 - 65 U/L Final   AST (SGOT) 52  High   15 - 37 U/L Final   Alk.  phosphatase 318  High   50 - 136 U/L Final   Protein, total 6.8   6.3 - 8.2 g/dL Final   Albumin 2.3  Low   3.2 - 4.6 g/dL Final   Globulin 4.5  High   2.3 - 3.5 g/dL Final   A-G Ratio 0.5  Low   1.2 - 3.5   Final     XR chest   12-9-2019  Impressions: Stable portable chest       Current Facility-Administered Medications:     cefpodoxime (VANTIN) tablet 200 mg, 200 mg, Oral, Q12H, Lila Cordero MD, 200 mg at 12/15/19 0820    nystatin (MYCOSTATIN) 100,000 unit/gram powder, , Topical, BID, Milton Bautista, DO    lidocaine 4 % patch 1 Patch, 1 Patch, TransDERmal, Q24H, Opal Peterson NP, 1 Patch at 12/14/19 1813    famotidine (PEPCID) tablet 20 mg, 20 mg, Oral, DAILY, Ollie Mahoney MD, 20 mg at 12/15/19 0820    haloperidol lactate (HALDOL) injection 2.5 mg, 2.5 mg, IntraVENous, Q6H PRN, Opal Peterson NP, 2.5 mg at 12/09/19 1856    calcium carbonate (TUMS) chewable tablet 200 mg [elemental], 200 mg, Oral, TID WITH MEALS, Erik Peterson NP, 200 mg at 12/15/19 0820    sodium chloride (NS) flush 5-40 mL, 5-40 mL, IntraVENous, Q8H, Sebastián Neff MD, 10 mL at 12/15/19 0540    sodium chloride (NS) flush 5-40 mL, 5-40 mL, IntraVENous, PRN, Sebastián Neff MD    aspirin chewable tablet 81 mg, 81 mg, Oral, DAILY, Alpesh Sage MD, 81 mg at 12/15/19 0820    atorvastatin (LIPITOR) tablet 20 mg, 20 mg, Oral, QHS, Alpesh Sage MD, 20 mg at 12/14/19 2300    clopidogrel (PLAVIX) tablet 75 mg, 75 mg, Oral, DAILY, Alpesh Sage MD, 75 mg at 12/15/19 0820    nitroglycerin (NITROSTAT) tablet 0.4 mg, 0.4 mg, SubLINGual, Q5MIN PRN, Alpesh Sage MD    sodium chloride (NS) flush 5-40 mL, 5-40 mL, IntraVENous, PRN, Alpesh Sage MD    acetaminophen (TYLENOL) tablet 650 mg, 650 mg, Oral, Q6H PRN, Alpesh Sage MD    oxyCODONE-acetaminophen (PERCOCET 7.5) 7.5-325 mg per tablet 1 Tab, 1 Tab, Oral, Q6H PRN, Alpesh Sage MD, 1 Tab at 12/05/19 2121    HYDROmorphone (PF) (DILAUDID) injection 0.2 mg, 0.2 mg, IntraVENous, Q4H PRN, Alpesh Sage MD    [facility]) injection 0.4 mg, 0.4 mg, IntraVENous, PRN, Alpesh Sage MD    ondansetron [facility]) injection 4 mg, 4 mg, IntraVENous, Q4H PRN, Alpesh Sage MD    magnesium hydroxide (MILK OF MAGNESIA) 400 mg/5 mL oral suspension 30 mL, 30 mL, Oral, DAILY PRN, Alpesh Sage MD    heparin (porcine) injection 5,000 Units, 5,000 Units, SubCUTAneous, Q12H, Alpesh Sage MD, 5,000 Units at 12/15/19 0540      Assessment:     Principal Problem:    Sepsis ([facility] (12/2/2019)    Active Problems:    CAD (coronary artery disease) (7/18/2019)      Dehydration (12/2/2019)      Debility (12/2/2019)      Acute renal failure (ARF) ([facility] (12/2/2019)      Acute cystitis without hematuria (12/2/2019)      Acute metabolic encephalopathy (09/3/3716)        Plan:     Sepsis on admission  UTI   Continue current Vantin until 12-. Urine culture shows E. Coli. Acute metabolic encephalopathy   Dementia. Debility   Continue physical therapy  Appears to be more oriented gradually. CAD   Continue aspirin, Lipitor. I have discussed the plan of care with patient. DVT prophylaxis : heparin SC     Disposition plan : CM is helping with placement. Waiting for placement decision.     Signed By: Jessica Stephen MD     December 15, 2019

## 2019-12-16 PROCEDURE — 97530 THERAPEUTIC ACTIVITIES: CPT

## 2019-12-16 PROCEDURE — 74011250637 HC RX REV CODE- 250/637: Performed by: NURSE PRACTITIONER

## 2019-12-16 PROCEDURE — 74011250636 HC RX REV CODE- 250/636: Performed by: HOSPITALIST

## 2019-12-16 PROCEDURE — 94760 N-INVAS EAR/PLS OXIMETRY 1: CPT

## 2019-12-16 PROCEDURE — 74011250637 HC RX REV CODE- 250/637: Performed by: HOSPITALIST

## 2019-12-16 PROCEDURE — 97110 THERAPEUTIC EXERCISES: CPT

## 2019-12-16 PROCEDURE — 74011250637 HC RX REV CODE- 250/637: Performed by: INTERNAL MEDICINE

## 2019-12-16 PROCEDURE — 65270000029 HC RM PRIVATE

## 2019-12-16 PROCEDURE — 74011000250 HC RX REV CODE- 250: Performed by: NURSE PRACTITIONER

## 2019-12-16 RX ADMIN — Medication 10 ML: at 05:34

## 2019-12-16 RX ADMIN — CALCIUM CARBONATE (ANTACID) CHEW TAB 500 MG 200 MG: 500 CHEW TAB at 12:14

## 2019-12-16 RX ADMIN — ATORVASTATIN CALCIUM 20 MG: 10 TABLET, FILM COATED ORAL at 21:51

## 2019-12-16 RX ADMIN — CEFPODOXIME PROXETIL 200 MG: 200 TABLET, FILM COATED ORAL at 10:00

## 2019-12-16 RX ADMIN — CALCIUM CARBONATE (ANTACID) CHEW TAB 500 MG 200 MG: 500 CHEW TAB at 10:00

## 2019-12-16 RX ADMIN — CLOPIDOGREL BISULFATE 75 MG: 75 TABLET, FILM COATED ORAL at 10:00

## 2019-12-16 RX ADMIN — ASPIRIN 81 MG 81 MG: 81 TABLET ORAL at 10:00

## 2019-12-16 RX ADMIN — CALCIUM CARBONATE (ANTACID) CHEW TAB 500 MG 200 MG: 500 CHEW TAB at 16:57

## 2019-12-16 RX ADMIN — NYSTATIN: 100000 POWDER TOPICAL at 10:00

## 2019-12-16 RX ADMIN — NYSTATIN: 100000 POWDER TOPICAL at 17:02

## 2019-12-16 RX ADMIN — HEPARIN SODIUM 5000 UNITS: 5000 INJECTION INTRAVENOUS; SUBCUTANEOUS at 17:00

## 2019-12-16 RX ADMIN — HEPARIN SODIUM 5000 UNITS: 5000 INJECTION INTRAVENOUS; SUBCUTANEOUS at 05:33

## 2019-12-16 RX ADMIN — Medication 10 ML: at 13:09

## 2019-12-16 RX ADMIN — FAMOTIDINE 20 MG: 20 TABLET, FILM COATED ORAL at 10:00

## 2019-12-16 RX ADMIN — CEFPODOXIME PROXETIL 200 MG: 200 TABLET, FILM COATED ORAL at 21:51

## 2019-12-16 NOTE — PROGRESS NOTES
Interdisciplinary team rounds were held 12/16/2019 with the following team members:Care Management, Nursing and Physician   Plan of Care options were discussed with the team.

## 2019-12-16 NOTE — PROGRESS NOTES
END OF SHIFT NOTE:    INTAKE/OUTPUT  12/15 0701 - 12/16 0700  In: 840 [P.O.:840]  Out: - at this time is spoke to pt and she is to enter number of wet briefs during shift. Voiding: YES  Catheter: NO  Color: clear  Drain:              DIET  regular    Flatus: Patient does have flatus present. Stool:  0 occurrences. Characteristics:  Stool Assessment  Stool Color: Brown  Stool Appearance: Soft  Stool Amount: Small  Stool Source/Status: Rectum    Ambulating  Yes    Emesis: 0 occurrences.     Characteristics:          VITAL SIGNS  Patient Vitals for the past 12 hrs:   Temp Pulse Resp BP SpO2   12/16/19 1650 98.3 °F (36.8 °C) 86 18 125/74 91 %   12/16/19 1200 99.3 °F (37.4 °C) 91 18 120/68 94 %   12/16/19 0900 -- -- -- -- 93 %   12/16/19 0804 98.1 °F (36.7 °C) 84 18 120/64 92 %       Pain Assessment  Pain Intensity 1: 0 (12/16/19 1145)  Pain Location 1: Back, Flank  Pain Intervention(s) 1: Medication (see MAR)  Patient Stated Pain Goal: 0            Rima Cueva RN

## 2019-12-16 NOTE — PROGRESS NOTES
Progress Note    Patient: Gonsalo Fontanez MRN: [id number]  SSN: [id number]    YOB: 1931  Age: 80 y.o. Sex: male      Admit Date: 12/2/2019    LOS: 14 days     Subjective:     PMH of CAD     Admitted due to chest pain after a fall. No acute cardiac events. Found to have sepsis with UTI for which he has been treated. Patient is not deemed safe to be discharged to live by himself. Son is trying to work with [employer] to find placement solution for patient. Documents have been submitted with the appeal from the son with the help of . Patient is resting well this morning. No new complaints. He most of the time can help himself with feeding. He appears confused from time to time. Objective:     Vitals:    12/15/19 1929 12/15/19 2345 12/16/19 0413 12/16/19 0804   BP: 114/59 120/65 121/66 120/64   Pulse: 91 91 83 84   Resp: 18 18 18 18   Temp: 98.6 °F (37 °C) 98.4 °F (36.9 °C) 98.6 °F (37 °C) 98.1 °F (36.7 °C)   SpO2: 93% 90% 94% 92%   Weight:       Height:            Intake and Output:  Current Shift: No intake/output data recorded. Last three shifts: 12/14 1901 - 12/16 0700  In: 840 [P.O.:840]  Out: -     Physical Exam:     General:                    The patient is a pleasantly and occasionally confused male in no acute distress. He is otherwise comfortable. Head:                                   Normocephalic/atraumatic. Eyes:                                   No palpebral pallor or scleral icterus. ENT:                                    External auricular and nasal exam within normal limits. Mucous membranes are moist.  Neck:                                   Supple, non-tender, no JVD. Lungs:                       Clear to auscultation bilaterally without wheezes or crackles. No respiratory distress or accessory muscle use.   Heart: Regular rate and rhythm, without murmurs, rubs, or gallops. Abdomen:                  Soft, non-tender, non-distended with normoactive bowel sounds. Genitourinary:           No tenderness over the bladder or bilateral CVAs. Extremities:               Without clubbing, cyanosis, or edema. Skin:                                    Normal color, texture, and turgor. No rashes, lesions, or jaundice. Pulses:                      Radial and dorsalis pedis pulses present 2+ bilaterally. Capillary refill <2s. Neurologic:                CN II-XII grossly intact and symmetrical.                                               Moving all four extremities well with no focal deficits. Psychiatric:               pleasantly confused.      Lab/Data Review:  CBC W/O DIFF [[id number]] (Order [id number])   Lab   Date: 12/9/2019 Department: [facility] Released By/Authorizing: Humza Jon DO (auto-released)   Component Value Flag Ref Range Units Status   WBC 15.7  High   4.3 - 11.1 K/uL Final   RBC 3.48  Low   4.23 - 5.6 M/uL Final   HGB 10.9  Low   13.6 - 17.2 g/dL Final   HCT 33.1  Low   41.1 - 50.3 % Final   MCV 95.1   79.6 - 97.8 FL Final   MCH 31.3   26.1 - 32.9 PG Final   MCHC 32.9   31.4 - 35.0 g/dL Final   RDW 16.5  High   11.9 - 14.6 % Final   PLATELET 864   958 - 450 K/uL Final   MPV 9.7   9.4 - 12.3 FL Final   ABSOLUTE NRBC 0.00   0.0 - 0.2 K/uL Final   Comment:   **Note: Absolute NRBC parameter is now reported with Hemogram**     METABOLIC PANEL, COMPREHENSIVE [[id number]] (Order [id number])   Lab   Date: 12/8/2019 Department: [facility] Released By/Authorizing: Humza Jon DO (auto-released)   Component Value Flag Ref Range Units Status   Sodium 136   136 - 145 mmol/L Final   Potassium 4.1   3.5 - 5.1 mmol/L Final   Chloride 101   98 - 107 mmol/L Final   CO2 29   21 - 32 mmol/L Final   Anion gap 6  Low   7 - 16 mmol/L Final   Glucose 135  High   65 - 100 mg/dL Final   Comment:   47 - 60 mg/dl Consistent with, but not fully diagnostic of hypoglycemia. 101 - 125 mg/dl Impaired fasting glucose/consistent with pre-diabetes mellitus   > 126 mg/dl Fasting glucose consistent with overt diabetes mellitus    BUN 19   8 - 23 MG/DL Final   Creatinine 1.35   0.8 - 1.5 MG/DL Final   GFR est AA >60   >60 ml/min/1.73m2 Final   GFR est non-AA 53  Low   >60 ml/min/1.73m2 Final   Comment:   (NOTE)   Estimated GFR is calculated using the Modification of Diet in Renal   Disease (MDRD) Study equation, reported for both  Americans   (GFRAA) and non- Americans (GFRNA), and normalized to 1.73m2   body surface area. The physician must decide which value applies to   the patient. The MDRD study equation should only be used in   individuals age 25 or older. It has not been validated for the   following: pregnant women, patients with serious comorbid conditions,   or on certain medications, or persons with extremes of body size,   muscle mass, or nutritional status. Calcium 9.0   8.3 - 10.4 MG/DL Final   Bilirubin, total 1.2  High   0.2 - 1.1 MG/DL Final   ALT (SGPT) 44   12 - 65 U/L Final   AST (SGOT) 52  High   15 - 37 U/L Final   Alk.  phosphatase 318  High   50 - 136 U/L Final   Protein, total 6.8   6.3 - 8.2 g/dL Final   Albumin 2.3  Low   3.2 - 4.6 g/dL Final   Globulin 4.5  High   2.3 - 3.5 g/dL Final   A-G Ratio 0.5  Low   1.2 - 3.5   Final     XR chest   12-9-2019  Impressions: Stable portable chest       Current Facility-Administered Medications:     cefpodoxime (VANTIN) tablet 200 mg, 200 mg, Oral, Q12H, Lila Cordero MD, 200 mg at 12/15/19 2136    nystatin (MYCOSTATIN) 100,000 unit/gram powder, , Topical, BID, Milton Bautista E, DO    lidocaine 4 % patch 1 Patch, 1 Patch, TransDERmal, Q24H, Opal Peterson, NP, 1 Patch at 12/15/19 1708    famotidine (PEPCID) tablet 20 mg, 20 mg, Oral, DAILY, Soledad Meadow, MD, 20 mg at 12/15/19 1000   haloperidol lactate (HALDOL) injection 2.5 mg, 2.5 mg, IntraVENous, Q6H PRN, Opal Peterson NP, 2.5 mg at 12/09/19 1856    calcium carbonate (TUMS) chewable tablet 200 mg [elemental], 200 mg, Oral, TID WITH MEALS, Stefanie Peterson, QUEENIE, 200 mg at 12/15/19 1651    sodium chloride (NS) flush 5-40 mL, 5-40 mL, IntraVENous, Q8H, Ramona Hoffman MD, 10 mL at 12/16/19 0534    sodium chloride (NS) flush 5-40 mL, 5-40 mL, IntraVENous, PRN, Ramona Hoffman MD    aspirin chewable tablet 81 mg, 81 mg, Oral, DAILY, Claudine Burgos MD, 81 mg at 12/15/19 0820    atorvastatin (LIPITOR) tablet 20 mg, 20 mg, Oral, QHS, Claudine Burgos MD, 20 mg at 12/15/19 2136    clopidogrel (PLAVIX) tablet 75 mg, 75 mg, Oral, DAILY, Claudine Burgos MD, 75 mg at 12/15/19 0820    nitroglycerin (NITROSTAT) tablet 0.4 mg, 0.4 mg, SubLINGual, Q5MIN PRN, Claudine Burgos MD    sodium chloride (NS) flush 5-40 mL, 5-40 mL, IntraVENous, PRN, Claudine Burgos MD    acetaminophen (TYLENOL) tablet 650 mg, 650 mg, Oral, Q6H PRN, Claudine Burgos MD    oxyCODONE-acetaminophen (PERCOCET 7.5) 7.5-325 mg per tablet 1 Tab, 1 Tab, Oral, Q6H PRN, Claudine Burgos MD, 1 Tab at 12/05/19 2121    HYDROmorphone (PF) (DILAUDID) injection 0.2 mg, 0.2 mg, IntraVENous, Q4H PRN, Claudine Burgos MD    [facility]) injection 0.4 mg, 0.4 mg, IntraVENous, PRN, Claudine Burgos MD    ondansetron [facility]) injection 4 mg, 4 mg, IntraVENous, Q4H PRN, Claudine Burgos MD    magnesium hydroxide (MILK OF MAGNESIA) 400 mg/5 mL oral suspension 30 mL, 30 mL, Oral, DAILY PRN, Claudine Burgos MD    heparin (porcine) injection 5,000 Units, 5,000 Units, SubCUTAneous, Q12H, Claudine Burgos MD, 5,000 Units at 12/16/19 0533      Assessment:     Principal Problem:    Sepsis ([facility] (12/2/2019)    Active Problems:    CAD (coronary artery disease) (7/18/2019)      Dehydration (12/2/2019)      Debility (12/2/2019)      Acute renal failure (ARF) ([facility] (12/2/2019)      Acute cystitis without hematuria (12/2/2019)      Acute metabolic encephalopathy (83/5/6146)        Plan:     Sepsis on admission  UTI   Continue current Vantin until 12-. Urine culture shows E. Coli. Acute metabolic encephalopathy   Dementia. Debility   Continue physical therapy  Appears to be more oriented gradually. However symptoms fluctuate from days to days. CAD   Continue aspirin, Lipitor. I have discussed the plan of care with patient. DVT prophylaxis : heparin SC     Disposition plan : CM is helping with placement. Waiting for placement decision. [facility].     Signed By: Sarah Harvey MD     December 16, 2019

## 2019-12-16 NOTE — PROGRESS NOTES
CM received voicemail from pt's son, stating no updates regarding pt's appeal. Pt's son stated Medicaid application has been completed. Awaiting approval from Medicaid.

## 2019-12-16 NOTE — PROGRESS NOTES
[facility] to request an update regarding pt's appeal process and if pt's application for Medicaid has been completed. CM was unable to reach pt's son. CM left voicemail. CM continues to follow.

## 2019-12-16 NOTE — PROGRESS NOTES
Follow-up visit to convey care and concern. Mr. Carlota Sanchez seemed confused.      Jeimy Ureña 68  Board Certified

## 2019-12-16 NOTE — PROGRESS NOTES
Pt much more alert this am but still only oriented to person. Wants to go home    Hourly rounds performed through shift, pt denies needs at this time. Bed in low position and call light/ personal items within reach. Will continue to monitor and report to day shift nurse.

## 2019-12-17 LAB
ANION GAP SERPL CALC-SCNC: 4 MMOL/L (ref 7–16)
BUN SERPL-MCNC: 25 MG/DL (ref 8–23)
CALCIUM SERPL-MCNC: 9.4 MG/DL (ref 8.3–10.4)
CHLORIDE SERPL-SCNC: 103 MMOL/L (ref 98–107)
CO2 SERPL-SCNC: 32 MMOL/L (ref 21–32)
CREAT SERPL-MCNC: 1.35 MG/DL (ref 0.8–1.5)
DIFFERENTIAL METHOD BLD: ABNORMAL
EOSINOPHIL # BLD: 0.2 K/UL (ref 0–0.8)
EOSINOPHIL NFR BLD MANUAL: 2 % (ref 1–8)
ERYTHROCYTE [DISTWIDTH] IN BLOOD BY AUTOMATED COUNT: 16.3 % (ref 11.9–14.6)
GLUCOSE SERPL-MCNC: 123 MG/DL (ref 65–100)
HCT VFR BLD AUTO: 35.1 % (ref 41.1–50.3)
HGB BLD-MCNC: 10.8 G/DL (ref 13.6–17.2)
LYMPHOCYTES # BLD: 0.8 K/UL (ref 0.5–4.6)
LYMPHOCYTES NFR BLD MANUAL: 9 % (ref 16–44)
MCH RBC QN AUTO: 29.7 PG (ref 26.1–32.9)
MCHC RBC AUTO-ENTMCNC: 30.8 G/DL (ref 31.4–35)
MCV RBC AUTO: 96.4 FL (ref 79.6–97.8)
METAMYELOCYTES NFR BLD MANUAL: 2 %
MONOCYTES # BLD: 0.2 K/UL (ref 0.1–1.3)
MONOCYTES NFR BLD MANUAL: 2 % (ref 3–9)
MYELOCYTES NFR BLD MANUAL: 2 %
NEUTS BAND NFR BLD MANUAL: 3 % (ref 0–10)
NEUTS SEG # BLD: 7.4 K/UL (ref 1.7–8.2)
NEUTS SEG NFR BLD MANUAL: 75 % (ref 47–75)
NRBC # BLD: 0 K/UL (ref 0–0.2)
PLATELET # BLD AUTO: 564 K/UL (ref 150–450)
PLATELET COMMENTS,PCOM: ADEQUATE
PMV BLD AUTO: 9.6 FL (ref 9.4–12.3)
POTASSIUM SERPL-SCNC: 4.4 MMOL/L (ref 3.5–5.1)
PROMYELOCYTES NFR BLD MANUAL: 5 %
RBC # BLD AUTO: 3.64 M/UL (ref 4.23–5.6)
RBC MORPH BLD: ABNORMAL
RBC MORPH BLD: ABNORMAL
SODIUM SERPL-SCNC: 139 MMOL/L (ref 136–145)
WBC # BLD AUTO: 8.6 K/UL (ref 4.3–11.1)

## 2019-12-17 PROCEDURE — 80048 BASIC METABOLIC PNL TOTAL CA: CPT

## 2019-12-17 PROCEDURE — 74011250636 HC RX REV CODE- 250/636: Performed by: HOSPITALIST

## 2019-12-17 PROCEDURE — 74011250637 HC RX REV CODE- 250/637: Performed by: NURSE PRACTITIONER

## 2019-12-17 PROCEDURE — 85025 COMPLETE CBC W/AUTO DIFF WBC: CPT

## 2019-12-17 PROCEDURE — 74011250637 HC RX REV CODE- 250/637: Performed by: INTERNAL MEDICINE

## 2019-12-17 PROCEDURE — 74011250637 HC RX REV CODE- 250/637: Performed by: HOSPITALIST

## 2019-12-17 PROCEDURE — 36415 COLL VENOUS BLD VENIPUNCTURE: CPT

## 2019-12-17 PROCEDURE — 65270000029 HC RM PRIVATE

## 2019-12-17 PROCEDURE — 74011000250 HC RX REV CODE- 250: Performed by: NURSE PRACTITIONER

## 2019-12-17 RX ADMIN — CLOPIDOGREL BISULFATE 75 MG: 75 TABLET, FILM COATED ORAL at 09:31

## 2019-12-17 RX ADMIN — CALCIUM CARBONATE (ANTACID) CHEW TAB 500 MG 200 MG: 500 CHEW TAB at 09:31

## 2019-12-17 RX ADMIN — ASPIRIN 81 MG 81 MG: 81 TABLET ORAL at 09:31

## 2019-12-17 RX ADMIN — CALCIUM CARBONATE (ANTACID) CHEW TAB 500 MG 200 MG: 500 CHEW TAB at 11:50

## 2019-12-17 RX ADMIN — NYSTATIN: 100000 POWDER TOPICAL at 09:31

## 2019-12-17 RX ADMIN — HEPARIN SODIUM 5000 UNITS: 5000 INJECTION INTRAVENOUS; SUBCUTANEOUS at 04:18

## 2019-12-17 RX ADMIN — HEPARIN SODIUM 5000 UNITS: 5000 INJECTION INTRAVENOUS; SUBCUTANEOUS at 17:13

## 2019-12-17 RX ADMIN — ATORVASTATIN CALCIUM 20 MG: 10 TABLET, FILM COATED ORAL at 21:45

## 2019-12-17 RX ADMIN — FAMOTIDINE 20 MG: 20 TABLET, FILM COATED ORAL at 09:31

## 2019-12-17 RX ADMIN — CALCIUM CARBONATE (ANTACID) CHEW TAB 500 MG 200 MG: 500 CHEW TAB at 17:13

## 2019-12-17 RX ADMIN — Medication 10 ML: at 14:14

## 2019-12-17 NOTE — PROGRESS NOTES
Hourly rounds completed. All needs met. No complaints stated. Pt rested well during the shift. Will give report to the oncoming day shift nurse.

## 2019-12-17 NOTE — PROGRESS NOTES
CM met with pt's two sons, who requested updates regarding appeal with [employer]. CM informed family , CM has not received any updates regarding pt's appeal and is unable to receive information regarding pt's appeal . CM informed pt's father, CM is unable to start a new pre-cert until the appeal has been determined. CM informed pt's family to contact [facility] to identify status of appeal, and if possible, see if appeal can be stopped to request a  New pre-cert with updated clinical information. CM awaiting update regarding pt's status of appeal from pt's family.

## 2019-12-17 NOTE — PROGRESS NOTES
Progress Note    Patient: Chanelle Fagan MRN: [id number]  SSN: [id number]    YOB: 1931  Age: 80 y.o. Sex: male      Admit Date: 12/2/2019    LOS: 15 days     Subjective:   F/U sepsis secondary to UTI    87yo admitted 12/2/19 for left chest pain after a fall. Hx of colon cancer, HLD, CAD s/p DAPT. Patient met sepsis criteria on arrival. Found to have UTI. Urine culture growing e coli. WBC 19.4 on arrival. TSH normal. ECHO performed due to fall. ECHO showed EF 55%, RV pressure 30-35mmHg. No labs since 12/9  No chest pain or SOB. States he wants to go home.    Current Facility-Administered Medications   Medication Dose Route Frequency    nystatin (MYCOSTATIN) 100,000 unit/gram powder   Topical BID    lidocaine 4 % patch 1 Patch  1 Patch TransDERmal Q24H    famotidine (PEPCID) tablet 20 mg  20 mg Oral DAILY    haloperidol lactate (HALDOL) injection 2.5 mg  2.5 mg IntraVENous Q6H PRN    calcium carbonate (TUMS) chewable tablet 200 mg [elemental]  200 mg Oral TID WITH MEALS    sodium chloride (NS) flush 5-40 mL  5-40 mL IntraVENous Q8H    sodium chloride (NS) flush 5-40 mL  5-40 mL IntraVENous PRN    aspirin chewable tablet 81 mg  81 mg Oral DAILY    atorvastatin (LIPITOR) tablet 20 mg  20 mg Oral QHS    clopidogrel (PLAVIX) tablet 75 mg  75 mg Oral DAILY    nitroglycerin (NITROSTAT) tablet 0.4 mg  0.4 mg SubLINGual Q5MIN PRN    sodium chloride (NS) flush 5-40 mL  5-40 mL IntraVENous PRN    acetaminophen (TYLENOL) tablet 650 mg  650 mg Oral Q6H PRN    oxyCODONE-acetaminophen (PERCOCET 7.5) 7.5-325 mg per tablet 1 Tab  1 Tab Oral Q6H PRN    HYDROmorphone (PF) (DILAUDID) injection 0.2 mg  0.2 mg IntraVENous Q4H PRN    naloxone (NARCAN) injection 0.4 mg  0.4 mg IntraVENous PRN    ondansetron (ZOFRAN) injection 4 mg  4 mg IntraVENous Q4H PRN    magnesium hydroxide (MILK OF MAGNESIA) 400 mg/5 mL oral suspension 30 mL  30 mL Oral DAILY PRN    heparin (porcine) injection 5,000 Units  5,000 Units SubCUTAneous Q12H       Objective:     Vitals:    12/16/19 2308 12/17/19 0339 12/17/19 0808 12/17/19 1243   BP: 115/68 123/71 100/57 105/60   Pulse: 81 82 81 84   Resp: 16 18 18 18   Temp: 98 °F (36.7 °C) 97.7 °F (36.5 °C) 97.6 °F (36.4 °C) 97.5 °F (36.4 °C)   SpO2: 91% 91% 91% 92%   Weight:       Height:             Intake and Output:  Current Shift: No intake/output data recorded. Last three shifts: 12/15 1901 - 12/17 0700  In: 720 [P.O.:720]  Out: -     Physical Exam:   General:  Alert, cooperative, no distress. Eyes:  Conjunctivae/corneas clear. Ears:  Normal TMs and external ear canals both ears. Nose: Nares normal. Septum midline. Mouth/Throat: Lips, mucosa, and tongue normal.    Neck:  no JVD. Back:   deferred   Lungs:   Clear to auscultation bilaterally. Heart:  Regular rate and rhythm, S1, S2 normal   Abdomen:   Soft, non-tender. Bowel sounds normal.   Extremities: Extremities normal, atraumatic, no cyanosis or edema. Pulses: 2+ and symmetric all extremities. Skin: Skin color, texture, turgor normal. No rashes or lesions   Lymph nodes: Cervical, supraclavicular, and axillary nodes normal.   Neurologic: Limited ROM in bed. Cannot tell me where he is located or the year. Confused at times during interview. Can tell me who the president is. Lab/Data Review:    No results found for this or any previous visit (from the past 24 hour(s)). Assessment/ Plan:     Principal Problem:    Sepsis ([address] (12/2/2019)    Active Problems:    CAD (coronary artery disease) (7/18/2019)      Dehydration (12/2/2019)      Debility (12/2/2019)      Acute renal failure (ARF) ([address] (12/2/2019)      Acute cystitis without hematuria (12/2/2019)      Acute metabolic encephalopathy (15/4/0849)    Sepsis secondary to UTI - S/p Vantin    Acute metabolic encephalopathy - Likely from UTI. Patient I am suspecting has underlying dementia undiagnosed. Atrophy seen on CT head.      Chest pain - No further episodes. Troponin negative X3. Hx of CAD, can continue ASA/plavix. Statin    Order labs     Dc once have bed for rehab. Family state to case management they are unable to take patient home safely.      DVT prophylaxis - heparin    Signed By: Yg Noriega,      December 17, 2019

## 2019-12-17 NOTE — PROGRESS NOTES
Interdisciplinary Rounds completed 12/17/19. Nursing, Case Management, Physician and PT present. Plan of care reviewed and updated. CM continues to diligently work with family to find a safe discharge.

## 2019-12-18 PROBLEM — E87.3 METABOLIC ALKALOSIS: Status: ACTIVE | Noted: 2019-12-18

## 2019-12-18 LAB
AMMONIA PLAS-SCNC: 30 UMOL/L (ref 11–32)
EST. AVERAGE GLUCOSE BLD GHB EST-MCNC: 114 MG/DL
HBA1C MFR BLD: 5.6 % (ref 4.8–6)
MAGNESIUM SERPL-MCNC: 1.9 MG/DL (ref 1.8–2.4)
PHOSPHATE SERPL-MCNC: 2.9 MG/DL (ref 2.3–3.7)
VIT B12 SERPL-MCNC: 995 PG/ML (ref 193–986)

## 2019-12-18 PROCEDURE — 83036 HEMOGLOBIN GLYCOSYLATED A1C: CPT

## 2019-12-18 PROCEDURE — 84100 ASSAY OF PHOSPHORUS: CPT

## 2019-12-18 PROCEDURE — 74011250637 HC RX REV CODE- 250/637: Performed by: HOSPITALIST

## 2019-12-18 PROCEDURE — 74011250636 HC RX REV CODE- 250/636: Performed by: FAMILY MEDICINE

## 2019-12-18 PROCEDURE — 74011250637 HC RX REV CODE- 250/637: Performed by: INTERNAL MEDICINE

## 2019-12-18 PROCEDURE — 82140 ASSAY OF AMMONIA: CPT

## 2019-12-18 PROCEDURE — 74011000250 HC RX REV CODE- 250: Performed by: NURSE PRACTITIONER

## 2019-12-18 PROCEDURE — 36415 COLL VENOUS BLD VENIPUNCTURE: CPT

## 2019-12-18 PROCEDURE — 65270000029 HC RM PRIVATE

## 2019-12-18 PROCEDURE — 97530 THERAPEUTIC ACTIVITIES: CPT

## 2019-12-18 PROCEDURE — 82607 VITAMIN B-12: CPT

## 2019-12-18 PROCEDURE — 74011250636 HC RX REV CODE- 250/636: Performed by: HOSPITALIST

## 2019-12-18 PROCEDURE — 77030020263 HC SOL INJ SOD CL0.9% LFCR 1000ML

## 2019-12-18 PROCEDURE — 74011250637 HC RX REV CODE- 250/637: Performed by: NURSE PRACTITIONER

## 2019-12-18 PROCEDURE — 83735 ASSAY OF MAGNESIUM: CPT

## 2019-12-18 RX ORDER — SODIUM CHLORIDE 9 MG/ML
75 INJECTION, SOLUTION INTRAVENOUS CONTINUOUS
Status: DISCONTINUED | OUTPATIENT
Start: 2019-12-18 | End: 2019-12-19

## 2019-12-18 RX ORDER — SODIUM CHLORIDE 9 MG/ML
50 INJECTION, SOLUTION INTRAVENOUS CONTINUOUS
Status: DISCONTINUED | OUTPATIENT
Start: 2019-12-18 | End: 2019-12-18

## 2019-12-18 RX ADMIN — CLOPIDOGREL BISULFATE 75 MG: 75 TABLET, FILM COATED ORAL at 08:34

## 2019-12-18 RX ADMIN — CALCIUM CARBONATE (ANTACID) CHEW TAB 500 MG 200 MG: 500 CHEW TAB at 16:19

## 2019-12-18 RX ADMIN — ASPIRIN 81 MG 81 MG: 81 TABLET ORAL at 08:34

## 2019-12-18 RX ADMIN — HEPARIN SODIUM 5000 UNITS: 5000 INJECTION INTRAVENOUS; SUBCUTANEOUS at 18:00

## 2019-12-18 RX ADMIN — HEPARIN SODIUM 5000 UNITS: 5000 INJECTION INTRAVENOUS; SUBCUTANEOUS at 05:02

## 2019-12-18 RX ADMIN — ATORVASTATIN CALCIUM 20 MG: 10 TABLET, FILM COATED ORAL at 22:30

## 2019-12-18 RX ADMIN — CALCIUM CARBONATE (ANTACID) CHEW TAB 500 MG 200 MG: 500 CHEW TAB at 08:32

## 2019-12-18 RX ADMIN — CALCIUM CARBONATE (ANTACID) CHEW TAB 500 MG 200 MG: 500 CHEW TAB at 12:00

## 2019-12-18 RX ADMIN — SODIUM CHLORIDE 75 ML/HR: 900 INJECTION, SOLUTION INTRAVENOUS at 17:19

## 2019-12-18 RX ADMIN — Medication 10 ML: at 13:36

## 2019-12-18 RX ADMIN — Medication 10 ML: at 22:30

## 2019-12-18 RX ADMIN — NYSTATIN: 100000 POWDER TOPICAL at 08:37

## 2019-12-18 RX ADMIN — FAMOTIDINE 20 MG: 20 TABLET, FILM COATED ORAL at 08:34

## 2019-12-18 NOTE — PROGRESS NOTES
Hourly rounds completed. All needs met. Pt rested well during the shift. Will give report to the oncoming day shift nurse.

## 2019-12-18 NOTE — PROGRESS NOTES
DECO specialist informed CM, pt's son submitted all paperwork for medicaid application and Medicaid is pending.

## 2019-12-18 NOTE — PROGRESS NOTES
Nutrition follow-up:    Assessment:   Food/Nutrition Patient History:  Admitted with sepsis (UTI), dehydration, ARF, acute cystitis, AMS. PMH remarkable for HH, colon ca, DLP, CAD and mild dementia. CM working with patient family re: placement/appeals. Pt lying in bed with visitor at bedside at today's visit. He is fixated on wanting to go home and asks that I \"report back if you hear that secret. \"  Pt reports he eats what he wants, states at times he eats less as things aren't prepared \"exactly how I like them. \"      DIET REGULAR  DIET NUTRITIONAL SUPPLEMENTS Dinner; Ensure Estefani      Anthropometrics:Height: 5' 11\" (180.3 cm),  Weight: 68 kg (149 lb 14.4 oz), Weight Source: Standing scale (comment), Body mass index is 20.91 kg/m². BMI class of Underweight (Age >65 and BMI <22)   Last 3 Recorded Weights in this Encounter    12/02/19 0930 12/18/19 0422   Weight: 68.9 kg (152 lb) 68 kg (149 lb 14.4 oz)      Macronutrient needs: 69 kg Listed body weight  EER:  1400-3465 kcal /day (25-30 kcal/kg)  EPR:  69-86 grams protein/day (1-1.25 grams/kg)    Intake/Comparative Standards: Recorded meal(s): %,  Pt potentially is meeting ~% estimated needs for kcal and protein. Intervention:  Meals and snacks: Continue current diet. Pt is selecting meals with PDA. Reinforced the ability to pick alternates if he does not like the meal option. Nutrition Supplement Therapy: Continue Ensure Enlive once daily. Discharge Nutrition Plan: If pt continues with variable po would benefit from continuing ensure enlive once daily.      [place], [address], [address]

## 2019-12-18 NOTE — PROGRESS NOTES
CM contacted [facility] to request an update regarding pt's Medicaid application. CM was unable to reach [address] [place] [address]. CM left voicemail. CM contacted pt's son Danna to obtain update regarding pt's appeal. Pt's son stated [facility] \"should provide me with a final decision today, or tomorrow at the latest.\" Pt's son stated he did inform [employer] the appeal has been open over 5 days. Pt's son stated he will inform CM when an update is received. CM continues to follow.

## 2019-12-18 NOTE — PROGRESS NOTES
Progress Note    Patient: Octavio Lau MRN: [id number]  SSN: [id number]    YOB: 1931  Age: 80 y.o. Sex: male      Admit Date: 12/2/2019    LOS: 16 days     Subjective:   F/U sepsis secondary to UTI    87yo admitted 12/2/19 for left chest pain after a fall. Hx of colon cancer, HLD, CAD s/p DAPT. Patient met sepsis criteria on arrival. Found to have UTI. Urine culture growing e coli. Treated with appropriate days of Vantin. WBC 19.4 on arrival. TSH normal. ECHO performed due to fall. ECHO showed EF 55%, RV pressure 30-35mmHg. No chest pain or SOB. Is telling me [birth year] birthday. Admits he knows he is confused.     Current Facility-Administered Medications   Medication Dose Route Frequency    0.9% sodium chloride infusion  50 mL/hr IntraVENous CONTINUOUS    nystatin (MYCOSTATIN) 100,000 unit/gram powder   Topical BID    lidocaine 4 % patch 1 Patch  1 Patch TransDERmal Q24H    famotidine (PEPCID) tablet 20 mg  20 mg Oral DAILY    haloperidol lactate (HALDOL) injection 2.5 mg  2.5 mg IntraVENous Q6H PRN    calcium carbonate (TUMS) chewable tablet 200 mg [elemental]  200 mg Oral TID WITH MEALS    sodium chloride (NS) flush 5-40 mL  5-40 mL IntraVENous Q8H    sodium chloride (NS) flush 5-40 mL  5-40 mL IntraVENous PRN    aspirin chewable tablet 81 mg  81 mg Oral DAILY    atorvastatin (LIPITOR) tablet 20 mg  20 mg Oral QHS    clopidogrel (PLAVIX) tablet 75 mg  75 mg Oral DAILY    nitroglycerin (NITROSTAT) tablet 0.4 mg  0.4 mg SubLINGual Q5MIN PRN    sodium chloride (NS) flush 5-40 mL  5-40 mL IntraVENous PRN    acetaminophen (TYLENOL) tablet 650 mg  650 mg Oral Q6H PRN    oxyCODONE-acetaminophen (PERCOCET 7.5) 7.5-325 mg per tablet 1 Tab  1 Tab Oral Q6H PRN    naloxone (NARCAN) injection 0.4 mg  0.4 mg IntraVENous PRN    ondansetron (ZOFRAN) injection 4 mg  4 mg IntraVENous Q4H PRN    magnesium hydroxide (MILK OF MAGNESIA) 400 mg/5 mL oral suspension 30 mL  30 mL Oral DAILY PRN    heparin (porcine) injection 5,000 Units  5,000 Units SubCUTAneous Q12H       Objective:     Vitals:    12/17/19 2341 12/18/19 0422 12/18/19 0826 12/18/19 1212   BP: 111/63 131/75 109/64 125/75   Pulse: 78 81 94 83   Resp: 14 15 16 16   Temp: 97.9 °F (36.6 °C) 97.7 °F (36.5 °C) 97.6 °F (36.4 °C) 97.4 °F (36.3 °C)   SpO2: 93% 93% 92% 94%   Weight:  68 kg (149 lb 14.4 oz)     Height:             Intake and Output:  Current Shift: No intake/output data recorded. Last three shifts: No intake/output data recorded. Physical Exam:   General:  Alert, cooperative, no distress. Tearful when telling me the story of his grandchild who passed away   Eyes:  Conjunctivae/corneas clear. Ears:  Normal TMs and external ear canals both ears. Nose: Nares normal. Septum midline. Mouth/Throat: Lips, mucosa, and tongue normal.    Neck:  no JVD. Back:   deferred   Lungs:   Clear to auscultation bilaterally. Heart:  Regular rate and rhythm, S1, S2 normal   Abdomen:   Soft, non-tender. Bowel sounds normal.   Extremities: Extremity, atraumatic, no cyanosis or edema. Muscle wasting noted    Pulses: 2+ and symmetric all extremities. Skin: Skin color, texture, turgor normal. No rashes or lesions   Lymph nodes: Cervical, supraclavicular, and axillary nodes normal.   Neurologic: Limited ROM in bed. Cannot tell me where he is located or the year. Confused at times during interview. Lab/Data Review:    No results found for this or any previous visit (from the past 24 hour(s)).     Assessment/ Plan:     Principal Problem:    Sepsis ([facility] (12/2/2019)    Active Problems:    CAD (coronary artery disease) (7/18/2019)      Dehydration (12/2/2019)      Debility (12/2/2019)      Acute renal failure (ARF) ([address] (12/2/2019)      Acute cystitis without hematuria (12/2/2019)      Acute metabolic encephalopathy (00/1/8056)      Metabolic alkalosis (39/93/8989)    Sepsis secondary to UTI - S/p Vantin    Acute metabolic encephalopathy - Likely from UTI. Patient I am suspecting has underlying dementia undiagnosed. Atrophy seen on CT head along with severe microvascular disease. TSH normal. Order B12, A1C, magnesium, phosphorus and ammonia level. Chest pain - No further episodes. Troponin negative X3. Hx of CAD, can continue ASA/plavix. Statin    Metabolic alkalosis - Patient appears to not have good oral intake. Order NS 1L over 15 hours. Dc once have bed for rehab. Family state to case management they are unable to take patient home safely.      DVT prophylaxis - heparin    Signed By: Paige Louise DO     December 18, 2019

## 2019-12-18 NOTE — PROGRESS NOTES
Problem: Mobility Impaired (Adult and Pediatric)  Goal: *Acute Goals and Plan of Care (Insert Text)  Description  LTG:  (1.)Mr. Lovely Alejo will move from supine to sit and sit to supine, scoot up and down and roll side to side INDEPENDENTLY with bed flat within 7 treatment day(s). (2.)Mr. Lovely Alejo will transfer from bed to chair and chair to bed wth INDEPENDENTLY within 7 treatment day(s). (3.)Mr. Elinor Castro will ambulate INDEPENDENTLY for 500+ feet within 7 treatment day(s). (4.)Mr. Lovely Alejo will ascend and descend 15 steps with SUPERVISION using handrail(s) within 7 days. ________________________________________________________________________________________________   Outcome: Progressing Towards Goal     PHYSICAL THERAPY: Daily Note and PM 12/18/2019  INPATIENT: PT Visit Days : 6  Payor: León Clark / Plan: [id number] PPO/PFFS / Product Type: [employer] Medicare /       NAME/AGE/GENDER: Zenia Elias is a 80 y.o. male   PRIMARY DIAGNOSIS: SIRS (systemic inflammatory response syndrome) ([facility]) [R65.10]  Acute renal failure (ARF) ([facility] [N17.9]  Dehydration [E86.0]  Debility [R53.81] Sepsis ([facility]       ICD-10: Treatment Diagnosis:    · Generalized Muscle Weakness (M62.81)  · Difficulty in walking, Not elsewhere classified (R26.2)  · Other abnormalities of gait and mobility (R26.89)  · History of falling (Z91.81)   Precaution/Allergies:  Patient has no known allergies. ASSESSMENT:     Mr. Makenna Nathan Sr supine on arrival, agreeable to therapy. Pt requires cues for redirection to stay on task. Pt performed bed mobility with CGA/min assist and worked on donning his pants and his shoes. Pt does require a little assistance for donning his pants. Pt stood and was able to ambulate about 250' using rolling walker and CGA. Pt with unsafe decisions with ambulation, fluctuations in yvon. Pt worked on standing balance activities and a few exercises as below.   Pt continues to require min/CGA for mobility. Pt is making slow progress towards goals. PT to cont to follow for acute care needs, pt making slow progress toward goals, unsafe to return home alone, would need 24/7 assist.       Per initial: a pleasant 80year old male admitted from home for SIRS, acute renal failure, debility, sepsis who is seen for initial therapy evaluation this morning. He lives at home alone and is typically independent/active without use of any DME. This section established at most recent assessment   PROBLEM LIST (Impairments causing functional limitations):  1. Decreased Strength  2. Decreased ADL/Functional Activities  3. Decreased Transfer Abilities  4. Decreased Ambulation Ability/Technique  5. Decreased Balance  6. Increased Pain  7. Decreased Activity Tolerance  8. Decreased Cognition   INTERVENTIONS PLANNED: (Benefits and precautions of physical therapy have been discussed with the patient.)  1. Balance Exercise  2. Bed Mobility  3. Gait Training  4. Home Exercise Program (HEP)  5. Therapeutic Activites  6. Therapeutic Exercise/Strengthening  7. Transfer Training     TREATMENT PLAN: Frequency/Duration: 3 times a week for duration of hospital stay  Rehabilitation Potential For Stated Goals: Good     REHAB RECOMMENDATIONS (at time of discharge pending progress):    Placement: It is my opinion, based on this patient's performance to date, that Mr. Kenneth Steen may benefit from intensive therapy at a [address] [place] [address] after discharge due to the functional deficits listed above that are likely to improve with skilled rehabilitation and concerns that he/she may be unsafe to be unsupervised at home due to safety concerns for home, fall risk with mobility. Equipment:    tbd               HISTORY:   History of Present Injury/Illness (Reason for Referral):  Per H&P, \"Pt reported that he fell hitting his left chest on a cardboard box.  Since the fall, he has noticed left sided chest pain, difficulty in using left arm due to pain in left shoulder. He denies heart burn, nausea/vomiting, head trauma, seizure like episode, urinary symptoms, diarrhea, chills, fever, abdominal pain, headache, palpitations. Pain is left sided, retrosternal, 10/10 severity, dull, intermittent, no aggravating or alleviating factors. ER work up showed WBC 19K, Cr 1.58 (baseline 1.12-1.2), HR >110 with sinus tachycardia on EKG. CT chest showed moderate sized hiatal hernia with minimal basilar atelectasis\"    Past Medical History/Comorbidities:   Mr. Tian Denise  has a past medical history of Colon cancer ([facility] (01/2012) and Hiatal hernia. Mr. Tian Denise  has no past surgical history on file. Social History/Living Environment:   Home Environment: Private residence  One/Two Story Residence: Two story  # of Interior Steps: 24  Lift Chair Available: No  Living Alone: Yes  Support Systems: Child(margi), Family member(s)  Patient Expects to be Discharged to[de-identified] Private residence  Current DME Used/Available at Home: None  Tub or Shower Type: Shower  Prior Level of Function/Work/Activity:  Lives alone in two story home. Independent, active at baseline without use of any DME. Does not drive. Denies falls.       Number of Personal Factors/Comorbidities that affect the Plan of Care: 1-2: MODERATE COMPLEXITY   EXAMINATION:   Most Recent Physical Functioning:   Gross Assessment:                  Posture:     Balance:  Sitting - Static: Good (unsupported)  Sitting - Dynamic: Fair (occasional)(+)  Standing - Static: Fair(+)  Standing - Dynamic : Fair Bed Mobility:  Supine to Sit: Contact guard assistance;Minimum assistance  Scooting: Supervision  Wheelchair Mobility:     Transfers:  Sit to Stand: Contact guard assistance  Stand to Sit: Contact guard assistance  Gait:     Base of Support: Center of gravity altered;Narrowed  Speed/Estrellita: Fluctuations  Step Length: Left shortened;Right shortened  Gait Abnormalities: Decreased step clearance  Distance (ft): 250 Feet (ft)(unsteady at times, poor safety decisions with mobility)  Assistive Device: Walker, rolling  Ambulation - Level of Assistance: Contact guard assistance  Interventions: Safety awareness training;Verbal cues      Body Structures Involved:  1. Muscles Body Functions Affected:  1. Movement Related Activities and Participation Affected:  1. General Tasks and Demands  2. Mobility  3. Domestic Life  4. Community, Social and [place]   Number of elements that affect the Plan of Care: 4+: HIGH COMPLEXITY   CLINICAL PRESENTATION:   Presentation: Stable and uncomplicated: LOW COMPLEXITY   CLINICAL DECISION MAKIN [facility] Mobility Inpatient Short Form  How much difficulty does the patient currently have. .. Unable A Lot A Little None   1. Turning over in bed (including adjusting bedclothes, sheets and blankets)? [] 1   [] 2   [] 3   [x] 4   2. Sitting down on and standing up from a chair with arms ( e.g., wheelchair, bedside commode, etc.)   [] 1   [] 2   [] 3   [x] 4   3. Moving from lying on back to sitting on the side of the bed? [] 1   [] 2   [] 3   [x] 4   How much help from another person does the patient currently need. .. Total A Lot A Little None   4. Moving to and from a bed to a chair (including a wheelchair)? [] 1   [] 2   [x] 3   [] 4   5. Need to walk in hospital room? [] 1   [] 2   [x] 3   [] 4   6. Climbing 3-5 steps with a railing? [] 1   [] 2   [x] 3   [] 4   © , Trustees of [address] [place] [id number], under license to [employer]. All rights reserved      Score:  Initial: 21 Most Recent: X (Date: -- )    Interpretation of Tool:  Represents activities that are increasingly more difficult (i.e. Bed mobility, Transfers, Gait). Medical Necessity:     · Patient demonstrates   · good  ·  rehab potential due to higher previous functional level.   Reason for Services/Other Comments:  · Patient   · continues to demonstrate capacity to improve strength, balance, activity tolerance which will   · increase independence, decrease amount of assistance required from caregiver, and increase safety  · . Use of outcome tool(s) and clinical judgement create a POC that gives a: Clear prediction of patient's progress: LOW COMPLEXITY            TREATMENT:      Pre-treatment Symptoms/Complaints:  Pt joking around throughout session. Pain: Initial:      Post Session:  0/10 post session in chair     Therapeutic Activity: (    40 minutes): Therapeutic activities including Bed mobility, seated and standing static/dynamic activities, donning shoes and pants, Ambulation on level ground in room and hallway working on gait safety, posture, walker management to improve mobility, strength, balance, and activity tolerance . Required minimal Safety awareness training;Verbal cues to promote static and dynamic balance in standing and promote motor control of bilateral, lower extremity(s). Therapeutic Exercise: (  0  minutes):  Exercises per grid below to improve mobility, strength, balance and coordination. Required minimal verbal and tactile cues to promote proper body alignment, promote proper body posture and promote proper body mechanics. Progressed repetitions as indicated. Date:  12/16/19 Date:   Date:     Activity/Exercise Parameters Parameters Parameters   Seated marching X 20 B A     Seated LAQ X 20 B A     Seated AP X 20 B A     Mini squats from chair  X 10 B A                             Braces/Orthotics/Lines/Etc:   · none  Treatment/Session Assessment:    · Response to Treatment:  Unchanged; unsteady, fall risk, confused  · Interdisciplinary Collaboration:   o Physical Therapy Assistant  o Registered Nurse  · After treatment position/precautions:   o Up in chair  o Bed alarm/tab alert on  o Bed/Chair-wheels locked  o Bed in low position  o Call light within reach  o RN notified   · Compliance with Program/Exercises:  Will assess as treatment progresses  · Recommendations/Intent for next treatment session: \"Next visit will focus on advancements to more challenging activities and reduction in assistance provided\".   Total Treatment Duration:  PT Patient Time In/Time Out  Time In: 1530  Time Out: 3 Sapna Monzon, LEONARDA

## 2019-12-18 NOTE — PROGRESS NOTES
END OF SHIFT NOTE:    INTAKE/OUTPUT  12/16 0701 - 12/17 0700  In: 720 [P.O.:720]  Out: -   Voiding: YES  Catheter: NO  Color: clear  Drain:              DIET  regular    Flatus: Patient does have flatus present. Stool:  1 occurrences. Characteristics:  Stool Assessment  Stool Color: Brown  Stool Appearance: Soft  Stool Amount: Small  Stool Source/Status: Rectum    Ambulating  Yes    Emesis: 0 occurrences.     Characteristics:          VITAL SIGNS  Patient Vitals for the past 12 hrs:   Temp Pulse Resp BP SpO2   12/17/19 1728 97.8 °F (36.6 °C) 88 18 114/75 96 %   12/17/19 1243 97.5 °F (36.4 °C) 84 18 105/60 92 %   12/17/19 0808 97.6 °F (36.4 °C) 81 18 100/57 91 %       Pain Assessment  Pain Intensity 1: 0 (12/17/19 1100)  Pain Location 1: Back, Flank  Pain Intervention(s) 1: Medication (see MAR)  Patient Stated Pain Goal: 0            Joan Salas RN

## 2019-12-19 LAB
ANION GAP SERPL CALC-SCNC: 3 MMOL/L (ref 7–16)
BUN SERPL-MCNC: 22 MG/DL (ref 8–23)
CALCIUM SERPL-MCNC: 8.8 MG/DL (ref 8.3–10.4)
CHLORIDE SERPL-SCNC: 110 MMOL/L (ref 98–107)
CO2 SERPL-SCNC: 29 MMOL/L (ref 21–32)
CREAT SERPL-MCNC: 1.29 MG/DL (ref 0.8–1.5)
GLUCOSE SERPL-MCNC: 98 MG/DL (ref 65–100)
POTASSIUM SERPL-SCNC: 4.6 MMOL/L (ref 3.5–5.1)
SODIUM SERPL-SCNC: 142 MMOL/L (ref 136–145)

## 2019-12-19 PROCEDURE — 74011250636 HC RX REV CODE- 250/636: Performed by: FAMILY MEDICINE

## 2019-12-19 PROCEDURE — 36415 COLL VENOUS BLD VENIPUNCTURE: CPT

## 2019-12-19 PROCEDURE — 74011250637 HC RX REV CODE- 250/637: Performed by: NURSE PRACTITIONER

## 2019-12-19 PROCEDURE — 65270000029 HC RM PRIVATE

## 2019-12-19 PROCEDURE — 97110 THERAPEUTIC EXERCISES: CPT

## 2019-12-19 PROCEDURE — 74011250636 HC RX REV CODE- 250/636: Performed by: HOSPITALIST

## 2019-12-19 PROCEDURE — 80048 BASIC METABOLIC PNL TOTAL CA: CPT

## 2019-12-19 PROCEDURE — 74011250637 HC RX REV CODE- 250/637: Performed by: INTERNAL MEDICINE

## 2019-12-19 PROCEDURE — 74011000250 HC RX REV CODE- 250: Performed by: NURSE PRACTITIONER

## 2019-12-19 PROCEDURE — 97530 THERAPEUTIC ACTIVITIES: CPT

## 2019-12-19 PROCEDURE — 74011250637 HC RX REV CODE- 250/637: Performed by: HOSPITALIST

## 2019-12-19 RX ADMIN — CLOPIDOGREL BISULFATE 75 MG: 75 TABLET, FILM COATED ORAL at 08:46

## 2019-12-19 RX ADMIN — Medication 10 ML: at 21:57

## 2019-12-19 RX ADMIN — ATORVASTATIN CALCIUM 20 MG: 10 TABLET, FILM COATED ORAL at 21:54

## 2019-12-19 RX ADMIN — NYSTATIN: 100000 POWDER TOPICAL at 08:46

## 2019-12-19 RX ADMIN — CALCIUM CARBONATE (ANTACID) CHEW TAB 500 MG 200 MG: 500 CHEW TAB at 08:46

## 2019-12-19 RX ADMIN — Medication 10 ML: at 15:00

## 2019-12-19 RX ADMIN — HEPARIN SODIUM 5000 UNITS: 5000 INJECTION INTRAVENOUS; SUBCUTANEOUS at 17:35

## 2019-12-19 RX ADMIN — CALCIUM CARBONATE (ANTACID) CHEW TAB 500 MG 200 MG: 500 CHEW TAB at 11:54

## 2019-12-19 RX ADMIN — FAMOTIDINE 20 MG: 20 TABLET, FILM COATED ORAL at 08:46

## 2019-12-19 RX ADMIN — ASPIRIN 81 MG 81 MG: 81 TABLET ORAL at 08:46

## 2019-12-19 RX ADMIN — Medication 10 ML: at 05:39

## 2019-12-19 RX ADMIN — NYSTATIN: 100000 POWDER TOPICAL at 17:36

## 2019-12-19 RX ADMIN — CALCIUM CARBONATE (ANTACID) CHEW TAB 500 MG 200 MG: 500 CHEW TAB at 17:34

## 2019-12-19 RX ADMIN — SODIUM CHLORIDE 75 ML/HR: 900 INJECTION, SOLUTION INTRAVENOUS at 05:38

## 2019-12-19 RX ADMIN — HEPARIN SODIUM 5000 UNITS: 5000 INJECTION INTRAVENOUS; SUBCUTANEOUS at 05:34

## 2019-12-19 NOTE — PROGRESS NOTES
CM was notified by pt's son, that [facility] never received HPOA docuements sent by pt's son Sam Porter. Pt's son informed CM that he would send a paper requesting appeal be stopped, and would request CM to complete a new pre-cert. Pt's son stated he is unable to receive accurate information from [facility]. CM contacted Kim Montez with [employer], to identify if pt may be considered for a long term medicaid bed at [facility] if pt's application for medicaid is approved. Pt's son Sam Porter stated he provided facility with application. CM notified Kim Montez, awaiting to see if pt may qualify for a Medicaid bed with Medicaid pending. RAVINDER continues to follow.

## 2019-12-19 NOTE — PROGRESS NOTES
Progress Note    Patient: Grace Lenz MRN: [id number]  SSN: [id number]    YOB: 1931  Age: 80 y.o. Sex: male      Admit Date: 12/2/2019    LOS: 17 days     Subjective:   F/U sepsis secondary to UTI    87yo admitted 12/2/19 for left chest pain after a fall. Hx of colon cancer, HLD, CAD s/p DAPT. Patient met sepsis criteria on arrival. Found to have UTI. Urine culture growing e coli. Treated with appropriate days of Vantin. WBC 19.4 on arrival. TSH normal. ECHO performed due to fall. ECHO showed EF 55%, RV pressure 30-35mmHg. Patient with intermittent confusion. Atrophy seen on CT head along with severe microvascular disease. Ammonia, electrolytes normal. Vitamin B12 elevated. A1C 5.6. No chest pain or SOB.  Normal conversation today about politics   Current Facility-Administered Medications   Medication Dose Route Frequency    nystatin (MYCOSTATIN) 100,000 unit/gram powder   Topical BID    lidocaine 4 % patch 1 Patch  1 Patch TransDERmal Q24H    famotidine (PEPCID) tablet 20 mg  20 mg Oral DAILY    haloperidol lactate (HALDOL) injection 2.5 mg  2.5 mg IntraVENous Q6H PRN    calcium carbonate (TUMS) chewable tablet 200 mg [elemental]  200 mg Oral TID WITH MEALS    sodium chloride (NS) flush 5-40 mL  5-40 mL IntraVENous Q8H    sodium chloride (NS) flush 5-40 mL  5-40 mL IntraVENous PRN    aspirin chewable tablet 81 mg  81 mg Oral DAILY    atorvastatin (LIPITOR) tablet 20 mg  20 mg Oral QHS    clopidogrel (PLAVIX) tablet 75 mg  75 mg Oral DAILY    nitroglycerin (NITROSTAT) tablet 0.4 mg  0.4 mg SubLINGual Q5MIN PRN    sodium chloride (NS) flush 5-40 mL  5-40 mL IntraVENous PRN    acetaminophen (TYLENOL) tablet 650 mg  650 mg Oral Q6H PRN    oxyCODONE-acetaminophen (PERCOCET 7.5) 7.5-325 mg per tablet 1 Tab  1 Tab Oral Q6H PRN    naloxone (NARCAN) injection 0.4 mg  0.4 mg IntraVENous PRN    ondansetron (ZOFRAN) injection 4 mg  4 mg IntraVENous Q4H PRN    magnesium hydroxide (MILK OF MAGNESIA) 400 mg/5 mL oral suspension 30 mL  30 mL Oral DAILY PRN    heparin (porcine) injection 5,000 Units  5,000 Units SubCUTAneous Q12H       Objective:     Vitals:    12/18/19 1926 12/18/19 2247 12/19/19 0454 12/19/19 0944   BP: 116/71 124/62 125/69 124/74   Pulse: 95 86 79 88   Resp: 16 18 17 16   Temp: 97.6 °F (36.4 °C) 97.6 °F (36.4 °C) 98 °F (36.7 °C) 97.8 °F (36.6 °C)   SpO2: 95% 94% 93% 95%   Weight:       Height:             Intake and Output:  Current Shift: No intake/output data recorded. Last three shifts: 12/17 1901 - 12/19 0700  In: 240 [P.O.:240]  Out: -     Physical Exam:   General:  Alert, cooperative, no distress. Eyes:  Conjunctivae/corneas clear. Ears:  Normal TMs and external ear canals both ears. Nose: Nares normal. Septum midline. Mouth/Throat: Lips, mucosa, and tongue normal.    Neck:  no JVD. Back:   deferred   Lungs:   Clear to auscultation bilaterally. Heart:  Regular rate and rhythm, S1, S2 normal   Abdomen:   Soft, non-tender. Bowel sounds normal.   Extremities: Extremity, atraumatic, no cyanosis or edema. Muscle wasting noted    Pulses: 2+ and symmetric all extremities. Skin: Skin color, texture, turgor normal. No rashes or lesions   Lymph nodes: Cervical, supraclavicular, and axillary nodes normal.   Neurologic: Limited ROM in bed.         Lab/Data Review:    Recent Results (from the past 24 hour(s))   VITAMIN B12    Collection Time: 12/18/19  3:46 PM   Result Value Ref Range    Vitamin B12 995 (H) 193 - 986 pg/mL   AMMONIA    Collection Time: 12/18/19  3:46 PM   Result Value Ref Range    Ammonia 30 11 - 32 UMOL/L   HEMOGLOBIN A1C WITH EAG    Collection Time: 12/18/19  3:46 PM   Result Value Ref Range    Hemoglobin A1c 5.6 4.8 - 6.0 %    Est. average glucose 114 mg/dL   MAGNESIUM    Collection Time: 12/18/19  3:46 PM   Result Value Ref Range    Magnesium 1.9 1.8 - 2.4 mg/dL   PHOSPHORUS    Collection Time: 12/18/19  3:46 PM   Result Value Ref Range    Phosphorus 2.9 2.3 - 3.7 MG/DL   METABOLIC PANEL, BASIC    Collection Time: 12/19/19  6:04 AM   Result Value Ref Range    Sodium 142 136 - 145 mmol/L    Potassium 4.6 3.5 - 5.1 mmol/L    Chloride 110 (H) 98 - 107 mmol/L    CO2 29 21 - 32 mmol/L    Anion gap 3 (L) 7 - 16 mmol/L    Glucose 98 65 - 100 mg/dL    BUN 22 8 - 23 MG/DL    Creatinine 1.29 0.8 - 1.5 MG/DL    GFR est AA >60 >60 ml/min/1.73m2    GFR est non-AA 56 (L) >60 ml/min/1.73m2    Calcium 8.8 8.3 - 10.4 MG/DL       Assessment/ Plan:     Principal Problem:    Sepsis ([address] (12/2/2019)    Active Problems:    CAD (coronary artery disease) (7/18/2019)      Dehydration (12/2/2019)      Debility (12/2/2019)      Acute renal failure (ARF) (HCC) (12/2/2019)      Acute cystitis without hematuria (12/2/2019)      Acute metabolic encephalopathy (46/4/6187)      Metabolic alkalosis (68/78/1430)    Sepsis secondary to UTI - S/p Vantin    Acute metabolic encephalopathy - Likely from UTI. Patient I am suspecting has underlying dementia undiagnosed. Atrophy seen on CT head along with severe microvascular disease. TSH normal. B12, A1C, magnesium, phosphorus and ammonia level benign. Chest pain - No further episodes. Troponin negative X3. Hx of CAD, can continue ASA/plavix. Statin    Metabolic alkalosis - Resolved    Dc once have bed for rehab. Family state to case management they are unable to take patient home safely.      DVT prophylaxis - heparin    Signed By: Devora Novak DO     December 19, 2019

## 2019-12-19 NOTE — PROGRESS NOTES
END OF SHIFT NOTE:    INTAKE/OUTPUT  No intake/output data recorded. Voiding: YES  Catheter: NO  Color: clear  Drain:              DIET  regular    Flatus: Patient does have flatus present. Stool:  1 occurrences. -per pt report  Characteristics:  Stool Assessment  Stool Color: Brown  Stool Appearance: Soft  Stool Amount: Small  Stool Source/Status: Rectum    Ambulating  Yes    Emesis: 0 occurrences.     Characteristics:          VITAL SIGNS  Patient Vitals for the past 12 hrs:   Temp Pulse Resp BP SpO2   12/18/19 1926 97.6 °F (36.4 °C) 95 16 116/71 95 %   12/18/19 1629 97.8 °F (36.6 °C) 90 16 103/63 93 %   12/18/19 1212 97.4 °F (36.3 °C) 83 16 125/75 94 %   12/18/19 0826 97.6 °F (36.4 °C) 94 16 109/64 92 %       Pain Assessment  Pain Intensity 1: 0 (12/18/19 1100)  Pain Location 1: Back, Flank  Pain Intervention(s) 1: Medication (see MAR)  Patient Stated Pain Goal: 0            Tab Abraham RN

## 2019-12-19 NOTE — PROGRESS NOTES
Problem: Self Care Deficits Care Plan (Adult)  Goal: *Acute Goals and Plan of Care (Insert Text)  Description  1. Patient will complete lower body bathing and dressing with supervision and adaptive equipment as needed. 2. Patient will complete toileting with supervision. 3. Patient will tolerate 30 minutes of OT treatment with 2-3 rest breaks to increase activity tolerance for ADLs. 4. Patient will complete functional transfers with supervision and adaptive equipment as needed. 5. Patient will complete functional mobility for household distances with supervision and safe use of adaptive device to decrease risk for falls. 6. Patient will participate in 10 minutes of therapeutic exercises to increase strength for ADL/functional transfers. Timeframe: 7 visits      Outcome: Progressing Towards Goal     OCCUPATIONAL THERAPY: Daily Note and PM    12/19/2019  INPATIENT: OT Visit Days: 4  Payor: Noreen Robertson / Plan: [employer] CHOICE PPO/PFFS / Product Type: Managed Care Medicare /      NAME/AGE/GENDER: Froylan Steiner is a 80 y.o. male   PRIMARY DIAGNOSIS:  SIRS (systemic inflammatory response syndrome) (HCC) [R65.10]  Acute renal failure (ARF) ([facility] [N17.9]  Dehydration [E86.0]  Debility [R53.81] Sepsis ([facility] [address] Sepsis ([facility] [address]       ICD-10: Treatment Diagnosis:    · Generalized Muscle Weakness (M62.81)  · Other lack of cordination (R27.8)  · History of falling (Z91.81)   Precautions/Allergies:     Patient has no known allergies. ASSESSMENT:     Mr. Mell Nunes presents in supine upon arrival. Pt transferred to sitting with min a and completed LB dressing with SBA. Pt stood with CGA and a rolling walker and completed functional mobility in the room and in the hallway with SBA. Pt was a little impulsive at times, but with no balance issues while using the walker. Pt completed 2 full laps in the hallway with rest breaks while talking to other staff members.  Pt returned to his room and perseverated on conversation and then completed exercises listed in the following grid. Good effort. Continue POC. This section established at most recent assessment   PROBLEM LIST (Impairments causing functional limitations):  1. Decreased Strength  2. Decreased ADL/Functional Activities  3. Decreased Transfer Abilities  4. Decreased Ambulation Ability/Technique  5. Decreased Balance  6. Decreased Activity Tolerance  7. Increased Fatigue  8. Decreased Flexibility/Joint Mobility  9. Decreased [place] with Home Exercise Program  10. Decreased Cognition   INTERVENTIONS PLANNED: (Benefits and precautions of occupational therapy have been discussed with the patient.)  1. Activities of daily living training  2. Adaptive equipment training  3. Balance training  4. Clothing management  5. Cognitive training  6. Donning&doffing training  7. Neuromuscular re-eduation  8. Therapeutic activity  9. Therapeutic exercise     TREATMENT PLAN: Frequency/Duration: Follow patient 3 times per week to address above goals. Rehabilitation Potential For Stated Goals: Good     REHAB RECOMMENDATIONS (at time of discharge pending progress):    Placement: It is my opinion, based on this patient's performance to date, that Mr. Morris Rucker may benefit from intensive therapy at [address] after discharge due to the functional deficits listed above that are likely to improve with skilled rehabilitation and concerns that he/she may be unsafe to be unsupervised at home due to risk for falls. Pt may also benefit from [facility]. Equipment:    TBD               OCCUPATIONAL PROFILE AND HISTORY:   History of Present Injury/Illness (Reason for Referral):  See H&P  Past Medical History/Comorbidities:   Mr. Morris Rucker  has a past medical history of Colon cancer ([facility] [address] (01/2012) and Hiatal hernia. Mr. Morris Rucker  has no past surgical history on file.   Social History/Living Environment:   Home Environment: Private residence  One/Two [place] Residence: Two story  # of Interior Steps: 24  Lift Chair Available: No  Living Alone: Yes  Support Systems: Child(margi), Family member(s)  Patient Expects to be Discharged to[de-identified] Private residence  Current DME Used/Available at Home: None  Tub or Shower Type: Shower  Prior Level of Function/Work/Activity:  Pt lives alone at baseline and reports that typically he is independent with ADL and functional mobility. Pt reports that he recently had a fall at home over a tennis ball falling on his L side with pain in his L shoulder, ribcage, and L LE  Personal Factors:          Social Background:  Lives alone        Past/Current Experience:  hx of falls        Overall Behavior:  confused; decrease insight to deficits; poor safety. Number of Personal Factors/Comorbidities that affect the Plan of Care: Extensive review of physical, cognitive, and psychosocial performance (3+):  HIGH COMPLEXITY   ASSESSMENT OF OCCUPATIONAL PERFORMANCE[de-identified]   Activities of Daily Living:   Basic ADLs (From Assessment) Complex ADLs (From Assessment)   Feeding: Supervision  Oral Facial Hygiene/Grooming: Stand-by assistance  Bathing: Moderate assistance  Upper Body Dressing: Minimum assistance  Lower Body Dressing: Moderate assistance  Toileting: Moderate assistance Instrumental ADL  Meal Preparation: Total assistance  Homemaking: Total assistance   Grooming/Bathing/Dressing Activities of Daily Living                           Lower Body Dressing Assistance  Pants With Button/Zipper: Stand-by assistance  Shoes with Cloth Laces: Stand-by assistance Bed/Mat Mobility  Supine to Sit: Minimum assistance  Sit to Stand: Stand-by assistance;Contact guard assistance  Stand to Sit: Stand-by assistance;Contact guard assistance  Scooting: Supervision     Most Recent Physical Functioning:   Gross Assessment:                  Posture:  Posture (WDL): Exceptions to WDL  Posture Assessment:  Forward head, Rounded shoulders  Balance:  Sitting: Impaired  Sitting - Static: Good (unsupported)  Sitting - Dynamic: Good (unsupported)  Standing: Impaired  Standing - Static: Fair(+)  Standing - Dynamic : Fair(+) Bed Mobility:  Supine to Sit: Minimum assistance  Scooting: Supervision  Wheelchair Mobility:     Transfers:  Sit to Stand: Stand-by assistance;Contact guard assistance  Stand to Sit: Stand-by assistance;Contact guard assistance            Patient Vitals for the past 6 hrs:   BP BP Patient Position SpO2 Pulse   19 1149 125/75 Sitting; At rest 94 % 76       Mental Status  Neurologic State: Alert, Confused  Orientation Level: Oriented to person, Oriented to place  Cognition: Follows commands  Perception: Appears intact  Perseveration: Perseverates during conversation  Safety/Judgement: Decreased awareness of need for safety, Decreased insight into deficits, Fall prevention                          Physical Skills Involved:  1. Range of Motion  2. Balance  3. Strength  4. Activity Tolerance Cognitive Skills Affected (resulting in the inability to perform in a timely and safe manner):  1. Executive Function  2. Short Term Recall  3. Sustained Attention Psychosocial Skills Affected:  1. Habits/Routines  2. Self-Awareness   Number of elements that affect the Plan of Care: 5+:  HIGH COMPLEXITY   CLINICAL DECISION MAKIN [facility] [address] [address] AM-PAC 6 Clicks   Daily Activity Inpatient Short Form  How much help from another person does the patient currently need. .. Total A Lot A Little None   1. Putting on and taking off regular lower body clothing? [] 1   [x] 2   [] 3   [] 4   2. Bathing (including washing, rinsing, drying)? [] 1   [x] 2   [] 3   [] 4   3. Toileting, which includes using toilet, bedpan or urinal?   [] 1   [x] 2   [] 3   [] 4   4. Putting on and taking off regular upper body clothing? [] 1   [] 2   [x] 3   [] 4   5. Taking care of personal grooming such as brushing teeth? [] 1   [] 2   [x] 3   [] 4   6. Eating meals?    [] 1 [] 2   [x] 3   [] 4   © 2007, Trustees of [address] [place] [address] [address] [address], under license to [employer]. All rights reserved      Score:  Initial: 15 Most Recent: X (Date: -- )    Interpretation of Tool:  Represents activities that are increasingly more difficult (i.e. Bed mobility, Transfers, Gait). Medical Necessity:     · Patient demonstrates   · good  ·  rehab potential due to higher previous functional level. Reason for Services/Other Comments:  · Patient continues to require skilled intervention due to   · Decreased independence with ADL/functional transfers   · . Use of outcome tool(s) and clinical judgement create a POC that gives a: LOW COMPLEXITY         TREATMENT:   (In addition to Assessment/Re-Assessment sessions the following treatments were rendered)     Pre-treatment Symptoms/Complaints:    Pain: Initial:   Pain Intensity 1: 0  Post Session:  0/10   Therapeutic Activity: (30 minutes): Therapeutic activities including Bed transfers, LB dressing and static/dynamic standing to improve mobility, strength and balance. Required minimal verbal cues to promote dynamic balance in standing. Therapeutic Exercise: (13 minutes):  Exercises per grid below to improve mobility and strength. Required minimal visual and verbal cues to promote proper body posture and promote proper body mechanics. Progressed resistance, range and repetitions as indicated.    Date:  12/19/19 Date:   Date:     Activity/Exercise Parameters Parameters Parameters   Shoulder flexion/extension 2 sets of 25 reps with red theraband     Shoulder horizontal add/abb 2 sets of 25 reps with red theraband     Punches 2 sets of 25 reps with red theraband     Elbow flexion/extension 2 sets of 25 reps with red theraband     Tricep extension 2 sets of 25 reps with red theraband                               Braces/Orthotics/Lines/Etc:   · O2 Device: Room air  Treatment/Session Assessment:    · Response to Treatment:  Perseverates on talking  · Interdisciplinary Collaboration:   o Certified Occupational Therapy Assistant  o Registered Nurse  · After treatment position/precautions:   o Supine in bed  o Bed alarm/tab alert on  o Bed/Chair-wheels locked  o Call light within reach  o RN notified   · Compliance with Program/Exercises: Will assess as treatment progresses. · Recommendations/Intent for next treatment session: \"Next visit will focus on advancements to more challenging activities and reduction in assistance provided\".   Total Treatment Duration:  OT Patient Time In/Time Out  Time In: 1500  Time Out: Sayda Leiva

## 2019-12-20 PROCEDURE — 97164 PT RE-EVAL EST PLAN CARE: CPT

## 2019-12-20 PROCEDURE — 74011250637 HC RX REV CODE- 250/637: Performed by: INTERNAL MEDICINE

## 2019-12-20 PROCEDURE — 65270000029 HC RM PRIVATE

## 2019-12-20 PROCEDURE — 74011250637 HC RX REV CODE- 250/637: Performed by: HOSPITALIST

## 2019-12-20 PROCEDURE — 74011250636 HC RX REV CODE- 250/636: Performed by: HOSPITALIST

## 2019-12-20 PROCEDURE — 74011250637 HC RX REV CODE- 250/637: Performed by: NURSE PRACTITIONER

## 2019-12-20 PROCEDURE — 97530 THERAPEUTIC ACTIVITIES: CPT

## 2019-12-20 PROCEDURE — 74011000250 HC RX REV CODE- 250: Performed by: NURSE PRACTITIONER

## 2019-12-20 RX ADMIN — NYSTATIN: 100000 POWDER TOPICAL at 17:30

## 2019-12-20 RX ADMIN — Medication 10 ML: at 21:17

## 2019-12-20 RX ADMIN — Medication 10 ML: at 14:58

## 2019-12-20 RX ADMIN — CALCIUM CARBONATE (ANTACID) CHEW TAB 500 MG 200 MG: 500 CHEW TAB at 09:15

## 2019-12-20 RX ADMIN — CLOPIDOGREL BISULFATE 75 MG: 75 TABLET, FILM COATED ORAL at 09:16

## 2019-12-20 RX ADMIN — FAMOTIDINE 20 MG: 20 TABLET, FILM COATED ORAL at 09:15

## 2019-12-20 RX ADMIN — CALCIUM CARBONATE (ANTACID) CHEW TAB 500 MG 200 MG: 500 CHEW TAB at 17:28

## 2019-12-20 RX ADMIN — CALCIUM CARBONATE (ANTACID) CHEW TAB 500 MG 200 MG: 500 CHEW TAB at 12:21

## 2019-12-20 RX ADMIN — Medication 10 ML: at 04:42

## 2019-12-20 RX ADMIN — ASPIRIN 81 MG 81 MG: 81 TABLET ORAL at 09:16

## 2019-12-20 RX ADMIN — NYSTATIN: 100000 POWDER TOPICAL at 09:16

## 2019-12-20 RX ADMIN — HEPARIN SODIUM 5000 UNITS: 5000 INJECTION INTRAVENOUS; SUBCUTANEOUS at 17:28

## 2019-12-20 RX ADMIN — HEPARIN SODIUM 5000 UNITS: 5000 INJECTION INTRAVENOUS; SUBCUTANEOUS at 04:41

## 2019-12-20 RX ADMIN — ATORVASTATIN CALCIUM 20 MG: 10 TABLET, FILM COATED ORAL at 21:17

## 2019-12-20 NOTE — PROGRESS NOTES
Problem: Mobility Impaired (Adult and Pediatric)  Goal: *Acute Goals and Plan of Care (Insert Text)  Description  LTG: (reviewed and updated 12/20/19)  (1.)Mr. Ken Trevino will move from supine to sit and sit to supine, scoot up and down and roll side to side INDEPENDENTLY with bed flat within 7 treatment day(s). (2.)Mr. Ken Trevino will transfer from bed to chair and chair to bed INDEPENDENTLY within 7 treatment day(s). (3.)Mr. Ken Trevino will ambulate INDEPENDENTLY for 500+ feet demonstrating good balance within 7 treatment day(s). (4.)Mr. Ken Trevino will ascend and descend 15 steps with SUPERVISION using handrail(s) within 7 days. (5.)Mr. Ken Trevino will demonstrate independence with seated and standing exercises per HEP within 7 days. ________________________________________________________________________________________________   Outcome: Progressing Towards Goal     PHYSICAL THERAPY: Re-evaluation and AM 12/20/2019  INPATIENT: PT Visit Days : 1  Payor: Jed Ugarte / Plan: [facility] [employer] MEDICARE CHOICE PPO/PFFS / Product Type: IM[employer] Medicare /       NAME/AGE/GENDER: Cassandra Garay is a 80 y.o. male   PRIMARY DIAGNOSIS: SIRS (systemic inflammatory response syndrome) (HCC) [R65.10]  Acute renal failure (ARF) ([facility] [N17.9]  Dehydration [E86.0]  Debility [R53.81] Sepsis ([facility] Sepsis ([facility]       ICD-10: Treatment Diagnosis:    · Generalized Muscle Weakness (M62.81)  · Difficulty in walking, Not elsewhere classified (R26.2)  · Other abnormalities of gait and mobility (R26.89)  · History of falling (Z91.81)   Precaution/Allergies:  Patient has no known allergies. ASSESSMENT:     Mr. Ken Trevino is seen for therapy reassessment today. He presents in supine, quite confused, and is agreeable to mobility and activity. Worked on bed mobility, seated balance and functional activities, and transfers. Unsteady gait without assistive device and poor safety awareness.  With walker, pt ambulates in room and hallway demonstrating narrow base of support and with accelerated/fluctuating gait pace. Pt needs heavy cueing for gait safety, body mechanics, and sequencing. He returned to room and was assisted to chair after activity with chair alarm on, needs in reach, and lunch set up. Maricarmen Shelby is making slow progress towards therapy goals and is limited by cognitive status. Unsafe to return home alone and will need 24/7 assist. Goals updated. This section established at most recent assessment   PROBLEM LIST (Impairments causing functional limitations):  1. Decreased Strength  2. Decreased ADL/Functional Activities  3. Decreased Transfer Abilities  4. Decreased Ambulation Ability/Technique  5. Decreased Balance  6. Increased Pain  7. Decreased Activity Tolerance  8. Decreased Cognition   INTERVENTIONS PLANNED: (Benefits and precautions of physical therapy have been discussed with the patient.)  1. Balance Exercise  2. Bed Mobility  3. Gait Training  4. Home Exercise Program (HEP)  5. Therapeutic Activites  6. Therapeutic Exercise/Strengthening  7. Transfer Training     TREATMENT PLAN: Frequency/Duration: 3 times a week for duration of hospital stay  Rehabilitation Potential For Stated Goals: Good     REHAB RECOMMENDATIONS (at time of discharge pending progress):    Placement: It is my opinion, based on this patient's performance to date, that Mr. Natty Chauhan may benefit from intensive therapy at a [address] [place] [address] after discharge due to the functional deficits listed above that are likely to improve with skilled rehabilitation and concerns that he/she may be unsafe to be unsupervised at home due to safety concerns for home, fall risk with mobility. Equipment:    tbd               HISTORY:   History of Present Injury/Illness (Reason for Referral):  Per H&P, \"Pt reported that he fell hitting his left chest on a cardboard box.  Since the fall, he has noticed left sided chest pain, difficulty in using left arm due to pain in left shoulder. He denies heart burn, nausea/vomiting, head trauma, seizure like episode, urinary symptoms, diarrhea, chills, fever, abdominal pain, headache, palpitations. Pain is left sided, retrosternal, 10/10 severity, dull, intermittent, no aggravating or alleviating factors. ER work up showed WBC 19K, Cr 1.58 (baseline 1.12-1.2), HR >110 with sinus tachycardia on EKG. CT chest showed moderate sized hiatal hernia with minimal basilar atelectasis\"    Past Medical History/Comorbidities:   Mr. Oliver Onofre  has a past medical history of Colon cancer ([facility] [address] (01/2012) and Hiatal hernia. Mr. Oliver Onofre  has no past surgical history on file. Social History/Living Environment:   Home Environment: Private residence  One/Two Story Residence: Two story  # of Interior Steps: 24  Lift Chair Available: No  Living Alone: Yes  Support Systems: Child(margi), Family member(s)  Patient Expects to be Discharged to[de-identified] Private residence  Current DME Used/Available at Home: None  Tub or Shower Type: Shower  Prior Level of Function/Work/Activity:  Lives alone in two story home. Independent, active at baseline without use of any DME. Does not drive. Denies falls. Number of Personal Factors/Comorbidities that affect the Plan of Care: 1-2: MODERATE COMPLEXITY   EXAMINATION:   Most Recent Physical Functioning:   Gross Assessment:  AROM: Within functional limits  Strength: Generally decreased, functional  Coordination: Generally decreased, functional               Posture:  Posture (WDL): Exceptions to WDL  Posture Assessment:  Forward head, Rounded shoulders, Trunk flexion  Balance:  Sitting: Intact  Standing: Impaired  Standing - Static: Fair  Standing - Dynamic : Fair Bed Mobility:  Rolling: Stand-by assistance  Supine to Sit: Stand-by assistance  Scooting: Stand-by assistance  Wheelchair Mobility:     Transfers:  Sit to Stand: Contact guard assistance  Stand to Sit: Contact guard assistance  Bed to Chair: Contact guard assistance;Minimum assistance  Interventions: Verbal cues; Visual cues; Safety awareness training; Tactile cues;Manual cues  Duration: 10 Minutes  Gait:     Base of Support: Narrowed; Center of gravity altered  Speed/Estrellita: Accelerated; Fluctuations  Step Length: Left shortened;Right shortened  Gait Abnormalities: Trunk sway increased;Decreased step clearance; Path deviations  Distance (ft): 250 Feet (ft)  Assistive Device: Walker, rolling  Ambulation - Level of Assistance: Contact guard assistance;Minimal assistance  Interventions: Verbal cues; Visual/Demos; Safety awareness training; Tactile cues;Manual cues      Body Structures Involved:  1. Muscles Body Functions Affected:  1. Movement Related Activities and Participation Affected:  1. General Tasks and Demands  2. Mobility  3. Domestic Life  4. Community, Social and [place]   Number of elements that affect the Plan of Care: 4+: HIGH COMPLEXITY   CLINICAL PRESENTATION:   Presentation: Stable and uncomplicated: LOW COMPLEXITY   CLINICAL DECISION MAKIN [facility] Short Form  How much difficulty does the patient currently have. .. Unable A Lot A Little None   1. Turning over in bed (including adjusting bedclothes, sheets and blankets)? [] 1   [] 2   [] 3   [x] 4   2. Sitting down on and standing up from a chair with arms ( e.g., wheelchair, bedside commode, etc.)   [] 1   [] 2   [] 3   [x] 4   3. Moving from lying on back to sitting on the side of the bed? [] 1   [] 2   [] 3   [x] 4   How much help from another person does the patient currently need. .. Total A Lot A Little None   4. Moving to and from a bed to a chair (including a wheelchair)? [] 1   [] 2   [x] 3   [] 4   5. Need to walk in hospital room? [] 1   [] 2   [x] 3   [] 4   6. Climbing 3-5 steps with a railing? [] 1   [x] 2   [] 3   [] 4   © , Trustees of [address] [place] [address] [address] [address], under license to [employer].  All rights reserved      Score:  Initial: 21 Most Recent: 20 (Date: 12/20/19 )    Interpretation of Tool:  Represents activities that are increasingly more difficult (i.e. Bed mobility, Transfers, Gait). Medical Necessity:     · Patient demonstrates   · good  ·  rehab potential due to higher previous functional level. Reason for Services/Other Comments:  · Patient   · continues to demonstrate capacity to improve strength, balance, activity tolerance which will   · increase independence, decrease amount of assistance required from caregiver, and increase safety  · . Use of outcome tool(s) and clinical judgement create a POC that gives a: Clear prediction of patient's progress: LOW COMPLEXITY            TREATMENT:      Pre-treatment Symptoms/Complaints:  Talkative and pleasant, joking  Pain: Initial:   Pain Intensity 1: 0  Pain Location 1: Arm, Flank  Pain Orientation 1: Left  Pain Intervention(s) 1: Repositioned  Post Session:  0/10 post session in chair     PT Reassessment Table:   Initial Physical Functioning: Most Recent Physical Functioning:   Gross Assessment:  AROM: Within functional limits  Strength: Generally decreased, functional  Coordination: Within functional limits Gross Assessment:   AROM: Within functional limits  Strength: Generally decreased, functional  Coordination: Generally decreased, functional   Posture:   Posture (WDL): Exceptions to WDL  Posture Assessment: Forward head, Rounded shoulders Posture:   Posture (WDL): Exceptions to WDL  Posture Assessment:  Forward head;Rounded shoulders;Trunk flexion   Balance:   Sitting: Impaired  Sitting - Static: Good (unsupported)  Sitting - Dynamic: Fair (occasional)(+)  Standing: Impaired  Standing - Static: Fair  Standing - Dynamic : Fair Balance:   Sitting: Intact  Standing: Impaired  Standing - Static: Fair  Standing - Dynamic : Fair   Bed Mobility:   Rolling: Supervision  Supine to Sit: Supervision  Sit to Supine: (left up in chair)  Scooting: Supervision Bed Mobility:   Rolling: Stand-by assistance  Supine to Sit: Stand-by assistance  Scooting: Stand-by assistance   Transfers:   Sit to Stand: Contact guard assistance  Stand to Sit: Contact guard assistance  Bed to Chair: Contact guard assistance  Interventions: Verbal cues, Safety awareness training  Duration: 10 Minutes Transfers:   Sit to Stand: Contact guard assistance  Stand to Sit: Contact guard assistance  Bed to Chair: Contact guard assistance;Minimum assistance  Interventions: Verbal cues; Visual cues; Safety awareness training; Tactile cues;Manual cues  Duration: 10 Minutes   Gait:   Base of Support: Narrowed  Speed/Estrellita: Pace decreased (<100 feet/min), Slow  Step Length: Left shortened, Right shortened  Gait Abnormalities: Trunk sway increased, Decreased step clearance  Ambulation - Level of Assistance: Contact guard assistance  Distance (ft): 250 Feet (ft)  Assistive Device: (none)  Interventions: Verbal cues, Safety awareness training Gait:   Base of Support: Narrowed; Center of gravity altered  Speed/Estrellita: Accelerated; Fluctuations  Step Length: Left shortened;Right shortened  Gait Abnormalities: Trunk sway increased;Decreased step clearance; Path deviations  Ambulation - Level of Assistance: Contact guard assistance;Minimal assistance  Distance (ft): 250 Feet (ft)  Assistive Device: Walker, rolling  Interventions: Verbal cues; Visual/Demos; Safety awareness training; Tactile cues;Manual cues       Therapeutic Activity: (  10 Minutes ):  Therapeutic activities including Bed mobility, seated and standing static/dynamic activities, donning shoes and pants, Ambulation on level ground in room and hallway working on gait safety, posture, walker management to improve mobility, strength, balance, and activity tolerance . Required minimal Verbal cues; Visual/Demos; Safety awareness training; Tactile cues;Manual cues to promote static and dynamic balance in standing and promote motor control of bilateral, lower extremity(s). Date:  12/16/19 Date:   Date:     Activity/Exercise Parameters Parameters Parameters   Seated marching X 20 B A     Seated LAQ X 20 B A     Seated AP X 20 B A     Mini squats from chair  X 10 B A                             Braces/Orthotics/Lines/Etc:   · none  Treatment/Session Assessment:    · Response to Treatment:  No complaints  · Interdisciplinary Collaboration:   o Physical Therapist  o Registered Nurse  · After treatment position/precautions:   o Up in chair  o Bed alarm/tab alert on  o Bed/Chair-wheels locked  o Bed in low position  o Call light within reach  o RN notified   · Compliance with Program/Exercises: Will assess as treatment progresses  · Recommendations/Intent for next treatment session: \"Next visit will focus on advancements to more challenging activities and reduction in assistance provided\".   Total Treatment Duration:  PT Patient Time In/Time Out  Time In: 1148  Time Out: [address], DPT

## 2019-12-20 NOTE — PROGRESS NOTES
Progress Note    Patient: Ambika Keene MRN: [id number]  SSN: [id number]    YOB: 1931  Age: 80 y.o. Sex: male      Admit Date: 12/2/2019    LOS: 18 days     Subjective:   F/U sepsis secondary to UTI    87yo admitted 12/2/19 for left chest pain after a fall. Hx of colon cancer, HLD, CAD s/p DAPT. Patient met sepsis criteria on arrival. Found to have UTI. Urine culture growing e coli. Treated with appropriate days of Vantin. WBC 19.4 on arrival. TSH normal. ECHO performed due to fall. ECHO showed EF 55%, RV pressure 30-35mmHg. Patient with intermittent confusion. Atrophy seen on CT head along with severe microvascular disease. Ammonia, electrolytes normal. Vitamin B12 elevated. A1C 5.6. No chest pain or SOB. Working with PT and putting on his shoes.     Current Facility-Administered Medications   Medication Dose Route Frequency    nystatin (MYCOSTATIN) 100,000 unit/gram powder   Topical BID    lidocaine 4 % patch 1 Patch  1 Patch TransDERmal Q24H    famotidine (PEPCID) tablet 20 mg  20 mg Oral DAILY    haloperidol lactate (HALDOL) injection 2.5 mg  2.5 mg IntraVENous Q6H PRN    calcium carbonate (TUMS) chewable tablet 200 mg [elemental]  200 mg Oral TID WITH MEALS    sodium chloride (NS) flush 5-40 mL  5-40 mL IntraVENous Q8H    sodium chloride (NS) flush 5-40 mL  5-40 mL IntraVENous PRN    aspirin chewable tablet 81 mg  81 mg Oral DAILY    atorvastatin (LIPITOR) tablet 20 mg  20 mg Oral QHS    clopidogrel (PLAVIX) tablet 75 mg  75 mg Oral DAILY    nitroglycerin (NITROSTAT) tablet 0.4 mg  0.4 mg SubLINGual Q5MIN PRN    sodium chloride (NS) flush 5-40 mL  5-40 mL IntraVENous PRN    acetaminophen (TYLENOL) tablet 650 mg  650 mg Oral Q6H PRN    oxyCODONE-acetaminophen (PERCOCET 7.5) 7.5-325 mg per tablet 1 Tab  1 Tab Oral Q6H PRN    naloxone (NARCAN) injection 0.4 mg  0.4 mg IntraVENous PRN    ondansetron (ZOFRAN) injection 4 mg  4 mg IntraVENous Q4H PRN    magnesium hydroxide (MILK OF MAGNESIA) 400 mg/5 mL oral suspension 30 mL  30 mL Oral DAILY PRN    heparin (porcine) injection 5,000 Units  5,000 Units SubCUTAneous Q12H       Objective:     Vitals:    12/19/19 2343 12/20/19 0459 12/20/19 0721 12/20/19 1108   BP: 129/72 136/70 134/73 130/68   Pulse: 79 72 75 76   Resp: 16 18 18 20   Temp: 98 °F (36.7 °C) 98.2 °F (36.8 °C) 98.1 °F (36.7 °C) 98.1 °F (36.7 °C)   SpO2: 94% 91% 93% 96%   Weight:       Height:             Intake and Output:  Current Shift: 12/20 0701 - 12/20 1900  In: 240 [P.O.:240]  Out: -   Last three shifts: 12/18 1901 - 12/20 0700  In: 480 [P.O.:480]  Out: -     Physical Exam:   General:  Alert, cooperative, no distress. Eyes:  Conjunctivae/corneas clear. Ears:  Normal TMs and external ear canals both ears. Nose: Nares normal. Septum midline. Mouth/Throat: Lips, mucosa, and tongue normal.    Neck:  no JVD. Back:   deferred   Lungs:   Clear to auscultation bilaterally. Heart:  Regular rate and rhythm, S1, S2 normal   Abdomen:   Soft, non-tender. Bowel sounds normal.   Extremities:  Muscle wasting noted    Pulses: 2+ and symmetric all extremities. Skin: Skin color, texture, turgor normal. No rashes or lesions   Lymph nodes: Cervical, supraclavicular, and axillary nodes normal.   Neurologic: Limited ROM in bed. Lab/Data Review:    No results found for this or any previous visit (from the past 24 hour(s)). Assessment/ Plan:     Principal Problem:    Sepsis ([address] (12/2/2019)    Active Problems:    CAD (coronary artery disease) (7/18/2019)      Dehydration (12/2/2019)      Debility (12/2/2019)      Acute renal failure (ARF) ([address] (12/2/2019)      Acute cystitis without hematuria (12/2/2019)      Acute metabolic encephalopathy (79/1/0663)      Metabolic alkalosis (53/75/1399)    Sepsis secondary to UTI - S/p Vantin    Acute metabolic encephalopathy - Likely from UTI. Patient I am suspecting has underlying dementia undiagnosed.  Atrophy seen on CT head along with severe microvascular disease. TSH normal. B12, A1C, magnesium, phosphorus and ammonia level benign. Chest pain - No further episodes. Troponin negative X3. Hx of CAD, can continue ASA/plavix. Statin    Metabolic alkalosis - Resolved    Dc once have bed for rehab. Family state to case management they are unable to take patient home safely.      DVT prophylaxis - heparin    Signed By: Gary Merida DO     December 20, 2019

## 2019-12-20 NOTE — PROGRESS NOTES
CM received a call from pt's son requesting status update. CM informed pt's son , CM is awaiting update from [address] with [facility] to determine if pt will qualify for a Medicaid bed. CM contacted Jayant Ram to obtain an status update. Jayant Ram informed CM the pt's son is requesting pt to have STR and to transition with long term care. RAVINDER informed Oscar Srivastava is needing appeal to be stopped, per pt's son request,  to began a new pre-cert. Jayant Ram stated she will try to assist with this process and will provide CM with an update when received. CM will inform supervisor of where case stands regarding pt needing placement. CM awaiting approval to begin new pre-cert. CM contacted pt's son to update him of this information. CM continues to follow.

## 2019-12-20 NOTE — PROGRESS NOTES
Pt remains pleasantly confused at present time and cooperative. No change in assessment. Hourly rounds completed, all needs met this shift. Voices no complaints. Will continue to monitor until night shift nurse takes over.

## 2019-12-21 PROCEDURE — 74011250637 HC RX REV CODE- 250/637: Performed by: INTERNAL MEDICINE

## 2019-12-21 PROCEDURE — 74011250636 HC RX REV CODE- 250/636: Performed by: HOSPITALIST

## 2019-12-21 PROCEDURE — 65270000029 HC RM PRIVATE

## 2019-12-21 PROCEDURE — 74011250637 HC RX REV CODE- 250/637: Performed by: NURSE PRACTITIONER

## 2019-12-21 PROCEDURE — 74011000250 HC RX REV CODE- 250: Performed by: NURSE PRACTITIONER

## 2019-12-21 PROCEDURE — 74011250637 HC RX REV CODE- 250/637: Performed by: HOSPITALIST

## 2019-12-21 RX ADMIN — NYSTATIN: 100000 POWDER TOPICAL at 09:44

## 2019-12-21 RX ADMIN — HEPARIN SODIUM 5000 UNITS: 5000 INJECTION INTRAVENOUS; SUBCUTANEOUS at 17:18

## 2019-12-21 RX ADMIN — FAMOTIDINE 20 MG: 20 TABLET, FILM COATED ORAL at 09:44

## 2019-12-21 RX ADMIN — Medication 10 ML: at 13:49

## 2019-12-21 RX ADMIN — CLOPIDOGREL BISULFATE 75 MG: 75 TABLET, FILM COATED ORAL at 09:44

## 2019-12-21 RX ADMIN — Medication 10 ML: at 21:15

## 2019-12-21 RX ADMIN — CALCIUM CARBONATE (ANTACID) CHEW TAB 500 MG 200 MG: 500 CHEW TAB at 17:17

## 2019-12-21 RX ADMIN — HEPARIN SODIUM 5000 UNITS: 5000 INJECTION INTRAVENOUS; SUBCUTANEOUS at 05:26

## 2019-12-21 RX ADMIN — CALCIUM CARBONATE (ANTACID) CHEW TAB 500 MG 200 MG: 500 CHEW TAB at 09:44

## 2019-12-21 RX ADMIN — NYSTATIN: 100000 POWDER TOPICAL at 17:18

## 2019-12-21 RX ADMIN — ATORVASTATIN CALCIUM 20 MG: 10 TABLET, FILM COATED ORAL at 21:15

## 2019-12-21 RX ADMIN — ASPIRIN 81 MG 81 MG: 81 TABLET ORAL at 09:44

## 2019-12-21 RX ADMIN — CALCIUM CARBONATE (ANTACID) CHEW TAB 500 MG 200 MG: 500 CHEW TAB at 11:17

## 2019-12-21 RX ADMIN — Medication 10 ML: at 05:31

## 2019-12-21 NOTE — PROGRESS NOTES
Progress Note    Patient: Edson Robbins MRN: [id number]  SSN: [id number]    YOB: 1931  Age: 80 y.o. Sex: male      Admit Date: 12/2/2019    LOS: 19 days     Subjective:   F/U sepsis secondary to UTI    89yo admitted 12/2/19 for left chest pain after a fall. Hx of colon cancer, HLD, CAD s/p DAPT. Patient met sepsis criteria on arrival. Found to have UTI. Urine culture growing e coli. Treated with appropriate days of Vantin. WBC 19.4 on arrival. TSH normal. ECHO performed due to fall. ECHO showed EF 55%, RV pressure 30-35mmHg. Patient with intermittent confusion. Atrophy seen on CT head along with severe microvascular disease. Ammonia, electrolytes normal. Vitamin B12 elevated. A1C 5.6. Determined patient has dementia causing confusion. Patient lives along and has two sons to whom both state they cannot take care of him. Looking into rehab with potential long term placement. No chest pain or SOB. States he does not understand why he is still waiting for rehab. States his sons want his money.    Current Facility-Administered Medications   Medication Dose Route Frequency    nystatin (MYCOSTATIN) 100,000 unit/gram powder   Topical BID    lidocaine 4 % patch 1 Patch  1 Patch TransDERmal Q24H    famotidine (PEPCID) tablet 20 mg  20 mg Oral DAILY    haloperidol lactate (HALDOL) injection 2.5 mg  2.5 mg IntraVENous Q6H PRN    calcium carbonate (TUMS) chewable tablet 200 mg [elemental]  200 mg Oral TID WITH MEALS    sodium chloride (NS) flush 5-40 mL  5-40 mL IntraVENous Q8H    sodium chloride (NS) flush 5-40 mL  5-40 mL IntraVENous PRN    aspirin chewable tablet 81 mg  81 mg Oral DAILY    atorvastatin (LIPITOR) tablet 20 mg  20 mg Oral QHS    clopidogrel (PLAVIX) tablet 75 mg  75 mg Oral DAILY    nitroglycerin (NITROSTAT) tablet 0.4 mg  0.4 mg SubLINGual Q5MIN PRN    sodium chloride (NS) flush 5-40 mL  5-40 mL IntraVENous PRN    acetaminophen (TYLENOL) tablet 650 mg  650 mg Oral Q6H PRN    oxyCODONE-acetaminophen (PERCOCET 7.5) 7.5-325 mg per tablet 1 Tab  1 Tab Oral Q6H PRN    naloxone (NARCAN) injection 0.4 mg  0.4 mg IntraVENous PRN    ondansetron (ZOFRAN) injection 4 mg  4 mg IntraVENous Q4H PRN    magnesium hydroxide (MILK OF MAGNESIA) 400 mg/5 mL oral suspension 30 mL  30 mL Oral DAILY PRN    heparin (porcine) injection 5,000 Units  5,000 Units SubCUTAneous Q12H       Objective:     Vitals:    12/20/19 2025 12/21/19 0018 12/21/19 0436 12/21/19 0657   BP: 130/74 126/74 122/77 133/74   Pulse: 72 76 80 80   Resp: 18 18 18 20   Temp: 97.8 °F (36.6 °C) 97.9 °F (36.6 °C) 98 °F (36.7 °C) 97.8 °F (36.6 °C)   SpO2: 95% 90%  92%   Weight:       Height:             Intake and Output:  Current Shift: No intake/output data recorded. Last three shifts: 12/19 1901 - 12/21 0700  In: 720 [P.O.:720]  Out: -     Physical Exam:   General:  Alert, cooperative, no distress, tearful at times when talking about his limited freedom now. Eyes:  Conjunctivae/corneas clear. Ears:  Normal TMs and external ear canals both ears. Nose: Nares normal. Septum midline. Mouth/Throat: Lips, mucosa, and tongue normal.    Neck:  no JVD. Back:   deferred   Lungs:   Clear to auscultation bilaterally. Heart:  Regular rate and rhythm, S1, S2 normal   Abdomen:   Soft, non-tender. Bowel sounds normal.   Extremities:  Muscle wasting noted    Pulses: 2+ and symmetric all extremities. Skin: Skin color, texture, turgor normal. No rashes or lesions   Lymph nodes: Cervical, supraclavicular, and axillary nodes normal.   Neurologic: Moving around in bed. Telling me he is 80years old. States if we fought he would win        Lab/Data Review:    No results found for this or any previous visit (from the past 24 hour(s)).     Assessment/ Plan:     Principal Problem:    Sepsis ([address] (12/2/2019)    Active Problems:    CAD (coronary artery disease) (7/18/2019)      Dehydration (12/2/2019)      Debility (12/2/2019) Acute renal failure (ARF) ([facility] (12/2/2019)      Acute cystitis without hematuria (12/2/2019)      Acute metabolic encephalopathy (28/0/9700)      Metabolic alkalosis (81/23/8524)    Sepsis secondary to UTI - S/p Vantin    Acute metabolic encephalopathy - Likely from UTI and baseline dementia. Atrophy seen on CT head along with severe microvascular disease. TSH normal. B12, A1C, magnesium, phosphorus and ammonia level benign. I am afraid if patient does not live with a caregiver, he will attempt to drive on his own or do something too physical he can no longer do due to his ability to being so active in the past. This puts not only himself at danger but others as well. Unable to have as fast reflexes or judgement as he once had. Since family cannot care for him, long term placement needed. Chest pain - No further episodes. Troponin negative X3. Hx of CAD, can continue ASA/plavix. Statin    Metabolic alkalosis - Resolved    Dc once have bed for rehab. Family state to case management they are unable to take patient home safely.      DVT prophylaxis - heparin    Signed By: Gary Merida,      December 21, 2019

## 2019-12-21 NOTE — PROGRESS NOTES
Pt has been pleasantly confused on and off this shift. Hourly rounds performed during this shift; all needs met at this time. Bed in low/locked position and call light, personal items within reach.

## 2019-12-21 NOTE — PROGRESS NOTES
Patient was calm  Family just left  Very friendly    Encouraged    Jarad Bowens, staff [address], [id number] [facility]  /   [email or web address]

## 2019-12-21 NOTE — PROGRESS NOTES
Confused at present. Resting in bed watching TV with bed alarm on. Had quiet shift without c/o pain,participated with physical therapy and up in chair. Hourly rounds completed, all needs met this shift. Will continue to monitor until night shift nurse takes over.

## 2019-12-22 PROCEDURE — 74011250637 HC RX REV CODE- 250/637: Performed by: NURSE PRACTITIONER

## 2019-12-22 PROCEDURE — 65270000029 HC RM PRIVATE

## 2019-12-22 PROCEDURE — 74011250637 HC RX REV CODE- 250/637: Performed by: INTERNAL MEDICINE

## 2019-12-22 PROCEDURE — 74011000250 HC RX REV CODE- 250: Performed by: NURSE PRACTITIONER

## 2019-12-22 PROCEDURE — 74011250637 HC RX REV CODE- 250/637: Performed by: HOSPITALIST

## 2019-12-22 PROCEDURE — 74011250636 HC RX REV CODE- 250/636: Performed by: HOSPITALIST

## 2019-12-22 RX ADMIN — ASPIRIN 81 MG 81 MG: 81 TABLET ORAL at 08:44

## 2019-12-22 RX ADMIN — Medication 10 ML: at 22:00

## 2019-12-22 RX ADMIN — ATORVASTATIN CALCIUM 20 MG: 10 TABLET, FILM COATED ORAL at 21:26

## 2019-12-22 RX ADMIN — NYSTATIN: 100000 POWDER TOPICAL at 17:00

## 2019-12-22 RX ADMIN — HEPARIN SODIUM 5000 UNITS: 5000 INJECTION INTRAVENOUS; SUBCUTANEOUS at 17:00

## 2019-12-22 RX ADMIN — NYSTATIN: 100000 POWDER TOPICAL at 08:45

## 2019-12-22 RX ADMIN — FAMOTIDINE 20 MG: 20 TABLET, FILM COATED ORAL at 08:44

## 2019-12-22 RX ADMIN — Medication 10 ML: at 14:38

## 2019-12-22 RX ADMIN — CALCIUM CARBONATE (ANTACID) CHEW TAB 500 MG 200 MG: 500 CHEW TAB at 11:09

## 2019-12-22 RX ADMIN — Medication 10 ML: at 05:58

## 2019-12-22 RX ADMIN — CALCIUM CARBONATE (ANTACID) CHEW TAB 500 MG 200 MG: 500 CHEW TAB at 17:00

## 2019-12-22 RX ADMIN — CLOPIDOGREL BISULFATE 75 MG: 75 TABLET, FILM COATED ORAL at 08:44

## 2019-12-22 RX ADMIN — CALCIUM CARBONATE (ANTACID) CHEW TAB 500 MG 200 MG: 500 CHEW TAB at 08:44

## 2019-12-22 RX ADMIN — HEPARIN SODIUM 5000 UNITS: 5000 INJECTION INTRAVENOUS; SUBCUTANEOUS at 06:00

## 2019-12-22 NOTE — PROGRESS NOTES
Progress Note    Patient: Edson Robbins MRN: [id number]  SSN: [id number]    YOB: 1931  Age: 80 y.o. Sex: male      Admit Date: 12/2/2019    LOS: 20 days     Subjective:   F/U sepsis secondary to UTI    87yo admitted 12/2/19 for left chest pain after a fall. Hx of colon cancer, HLD, CAD s/p DAPT. Patient met sepsis criteria on arrival. Found to have UTI. Urine culture growing e coli. Treated with appropriate days of Vantin. WBC 19.4 on arrival. TSH normal. ECHO performed due to fall. ECHO showed EF 55%, RV pressure 30-35mmHg. Patient with intermittent confusion. Atrophy seen on CT head along with severe microvascular disease. Ammonia, electrolytes normal. Vitamin B12 elevated. A1C 5.6. Determined patient has dementia causing confusion. Patient lives alone and has two sons to whom both state they cannot take care of him. Looking into rehab with potential long term placement. No concerns this AM. He is sleeping.    Current Facility-Administered Medications   Medication Dose Route Frequency    nystatin (MYCOSTATIN) 100,000 unit/gram powder   Topical BID    lidocaine 4 % patch 1 Patch  1 Patch TransDERmal Q24H    famotidine (PEPCID) tablet 20 mg  20 mg Oral DAILY    haloperidol lactate (HALDOL) injection 2.5 mg  2.5 mg IntraVENous Q6H PRN    calcium carbonate (TUMS) chewable tablet 200 mg [elemental]  200 mg Oral TID WITH MEALS    sodium chloride (NS) flush 5-40 mL  5-40 mL IntraVENous Q8H    sodium chloride (NS) flush 5-40 mL  5-40 mL IntraVENous PRN    aspirin chewable tablet 81 mg  81 mg Oral DAILY    atorvastatin (LIPITOR) tablet 20 mg  20 mg Oral QHS    clopidogrel (PLAVIX) tablet 75 mg  75 mg Oral DAILY    nitroglycerin (NITROSTAT) tablet 0.4 mg  0.4 mg SubLINGual Q5MIN PRN    sodium chloride (NS) flush 5-40 mL  5-40 mL IntraVENous PRN    acetaminophen (TYLENOL) tablet 650 mg  650 mg Oral Q6H PRN    oxyCODONE-acetaminophen (PERCOCET 7.5) 7.5-325 mg per tablet 1 Tab  1 Tab Oral Q6H PRN    naloxone (NARCAN) injection 0.4 mg  0.4 mg IntraVENous PRN    ondansetron (ZOFRAN) injection 4 mg  4 mg IntraVENous Q4H PRN    magnesium hydroxide (MILK OF MAGNESIA) 400 mg/5 mL oral suspension 30 mL  30 mL Oral DAILY PRN    heparin (porcine) injection 5,000 Units  5,000 Units SubCUTAneous Q12H       Objective:     Vitals:    12/21/19 2023 12/21/19 2357 12/22/19 0445 12/22/19 0715   BP: 110/73 129/72 128/75 103/54   Pulse: 90 81 76 86   Resp: 18 18 16 17   Temp: 98 °F (36.7 °C) 97.5 °F (36.4 °C) 97.8 °F (36.6 °C) 97.9 °F (36.6 °C)   SpO2: 96% 97% 95% 94%   Weight:       Height:             Intake and Output:  Current Shift: No intake/output data recorded. Last three shifts: No intake/output data recorded. Physical Exam:   General:  Alert, cooperative, sleepy    Eyes:  Conjunctivae/corneas clear. Ears:  Normal TMs and external ear canals both ears. Nose: Nares normal. Septum midline. Mouth/Throat: Lips, mucosa, and tongue normal.    Neck:  no JVD. Back:   deferred   Lungs:   Clear to auscultation bilaterally. Heart:  Regular rate and rhythm, S1, S2 normal   Abdomen:   Soft, non-tender. Bowel sounds normal.   Extremities:  Muscle wasting noted    Pulses: 2+ and symmetric all extremities. Skin: Skin color, texture, turgor normal. No rashes or lesions   Lymph nodes: Cervical, supraclavicular, and axillary nodes normal.   Neurologic: Laying down calm       Lab/Data Review:    No results found for this or any previous visit (from the past 24 hour(s)).     Assessment/ Plan:     Principal Problem:    Sepsis ([facility] [address] (12/2/2019)    Active Problems:    CAD (coronary artery disease) (7/18/2019)      Dehydration (12/2/2019)      Debility (12/2/2019)      Acute renal failure (ARF) ([address] (12/2/2019)      Acute cystitis without hematuria (12/2/2019)      Acute metabolic encephalopathy (05/0/2943)      Metabolic alkalosis (56/24/2789)    Sepsis secondary to UTI - S/p Vantin    Acute metabolic encephalopathy - Likely from UTI and baseline dementia. Atrophy seen on CT head along with severe microvascular disease. TSH normal. B12, A1C, magnesium, phosphorus and ammonia level benign. I am afraid if patient does not live with a caregiver, he will attempt to drive on his own or do something too physical he can no longer do due to his ability to being so active in the past. This puts not only himself at danger but others as well. Unable to have as fast reflexes or judgement as he once had. Since family cannot care for him, long term placement needed. Chest pain - No further episodes. Troponin negative X3. Hx of CAD, can continue ASA/plavix. Statin    Metabolic alkalosis - Resolved    Dc once have bed for rehab. Family state to case management they are unable to take patient home safely.      DVT prophylaxis - heparin    Signed By: Radha Rose DO     December 22, 2019

## 2019-12-22 NOTE — PROGRESS NOTES
Patient was calm  Alert  No family present     had met patient yesterday and this was a continuance of care    Very nice man  We started talking about tennis - we knew some people in common and talked about our game    He enjoyed talking    Will follow closely    Jonathan Rene, staff Jeimy [address], [address] [facility]  /   [email or web address]

## 2019-12-22 NOTE — PROGRESS NOTES
Uneventful shift. Pt has been pleasantly confused and rested well this shift. Hourly rounds performed during this shift; all needs met at this time. Bed in low/locked position and call light, personal items within reach.

## 2019-12-23 PROBLEM — G93.41 ACUTE METABOLIC ENCEPHALOPATHY: Status: RESOLVED | Noted: 2019-12-07 | Resolved: 2019-12-23

## 2019-12-23 PROBLEM — I21.4 NSTEMI (NON-ST ELEVATED MYOCARDIAL INFARCTION) (HCC): Status: RESOLVED | Noted: 2019-07-13 | Resolved: 2019-12-23

## 2019-12-23 PROBLEM — A41.9 SEPSIS (HCC): Status: RESOLVED | Noted: 2019-12-02 | Resolved: 2019-12-23

## 2019-12-23 PROBLEM — F03.90 DEMENTIA (HCC): Chronic | Status: ACTIVE | Noted: 2019-12-23

## 2019-12-23 PROBLEM — E87.3 METABOLIC ALKALOSIS: Status: RESOLVED | Noted: 2019-12-18 | Resolved: 2019-12-23

## 2019-12-23 PROBLEM — R53.81 DEBILITY: Chronic | Status: ACTIVE | Noted: 2019-12-02

## 2019-12-23 PROBLEM — I25.10 CAD (CORONARY ARTERY DISEASE): Chronic | Status: ACTIVE | Noted: 2019-07-18

## 2019-12-23 PROBLEM — N17.9 ACUTE RENAL FAILURE (ARF) (HCC): Status: RESOLVED | Noted: 2019-12-02 | Resolved: 2019-12-23

## 2019-12-23 PROBLEM — E86.0 DEHYDRATION: Status: RESOLVED | Noted: 2019-12-02 | Resolved: 2019-12-23

## 2019-12-23 PROBLEM — N30.00 ACUTE CYSTITIS WITHOUT HEMATURIA: Status: RESOLVED | Noted: 2019-12-02 | Resolved: 2019-12-23

## 2019-12-23 LAB
ANION GAP SERPL CALC-SCNC: 3 MMOL/L (ref 7–16)
BUN SERPL-MCNC: 22 MG/DL (ref 8–23)
CALCIUM SERPL-MCNC: 9.9 MG/DL (ref 8.3–10.4)
CHLORIDE SERPL-SCNC: 108 MMOL/L (ref 98–107)
CO2 SERPL-SCNC: 31 MMOL/L (ref 21–32)
CREAT SERPL-MCNC: 1.39 MG/DL (ref 0.8–1.5)
ERYTHROCYTE [DISTWIDTH] IN BLOOD BY AUTOMATED COUNT: 16.9 % (ref 11.9–14.6)
GLUCOSE SERPL-MCNC: 103 MG/DL (ref 65–100)
HCT VFR BLD AUTO: 37.8 % (ref 41.1–50.3)
HGB BLD-MCNC: 11.8 G/DL (ref 13.6–17.2)
MCH RBC QN AUTO: 30.6 PG (ref 26.1–32.9)
MCHC RBC AUTO-ENTMCNC: 31.2 G/DL (ref 31.4–35)
MCV RBC AUTO: 97.9 FL (ref 79.6–97.8)
NRBC # BLD: 0 K/UL (ref 0–0.2)
PLATELET # BLD AUTO: 405 K/UL (ref 150–450)
PMV BLD AUTO: 9.5 FL (ref 9.4–12.3)
POTASSIUM SERPL-SCNC: 4.4 MMOL/L (ref 3.5–5.1)
RBC # BLD AUTO: 3.86 M/UL (ref 4.23–5.6)
SODIUM SERPL-SCNC: 142 MMOL/L (ref 136–145)
WBC # BLD AUTO: 8.2 K/UL (ref 4.3–11.1)

## 2019-12-23 PROCEDURE — 85027 COMPLETE CBC AUTOMATED: CPT

## 2019-12-23 PROCEDURE — 36415 COLL VENOUS BLD VENIPUNCTURE: CPT

## 2019-12-23 PROCEDURE — 74011000250 HC RX REV CODE- 250: Performed by: NURSE PRACTITIONER

## 2019-12-23 PROCEDURE — 65270000029 HC RM PRIVATE

## 2019-12-23 PROCEDURE — 74011250637 HC RX REV CODE- 250/637: Performed by: INTERNAL MEDICINE

## 2019-12-23 PROCEDURE — 80048 BASIC METABOLIC PNL TOTAL CA: CPT

## 2019-12-23 PROCEDURE — 74011250637 HC RX REV CODE- 250/637: Performed by: HOSPITALIST

## 2019-12-23 PROCEDURE — 74011250637 HC RX REV CODE- 250/637: Performed by: NURSE PRACTITIONER

## 2019-12-23 PROCEDURE — 74011250636 HC RX REV CODE- 250/636: Performed by: HOSPITALIST

## 2019-12-23 RX ADMIN — CALCIUM CARBONATE (ANTACID) CHEW TAB 500 MG 200 MG: 500 CHEW TAB at 17:13

## 2019-12-23 RX ADMIN — CLOPIDOGREL BISULFATE 75 MG: 75 TABLET, FILM COATED ORAL at 08:33

## 2019-12-23 RX ADMIN — CALCIUM CARBONATE (ANTACID) CHEW TAB 500 MG 200 MG: 500 CHEW TAB at 08:33

## 2019-12-23 RX ADMIN — ATORVASTATIN CALCIUM 20 MG: 10 TABLET, FILM COATED ORAL at 21:28

## 2019-12-23 RX ADMIN — HEPARIN SODIUM 5000 UNITS: 5000 INJECTION INTRAVENOUS; SUBCUTANEOUS at 17:13

## 2019-12-23 RX ADMIN — CALCIUM CARBONATE (ANTACID) CHEW TAB 500 MG 200 MG: 500 CHEW TAB at 11:32

## 2019-12-23 RX ADMIN — Medication 10 ML: at 13:22

## 2019-12-23 RX ADMIN — FAMOTIDINE 20 MG: 20 TABLET, FILM COATED ORAL at 08:33

## 2019-12-23 RX ADMIN — NYSTATIN: 100000 POWDER TOPICAL at 08:34

## 2019-12-23 RX ADMIN — Medication 10 ML: at 21:29

## 2019-12-23 RX ADMIN — ASPIRIN 81 MG 81 MG: 81 TABLET ORAL at 08:33

## 2019-12-23 RX ADMIN — HEPARIN SODIUM 5000 UNITS: 5000 INJECTION INTRAVENOUS; SUBCUTANEOUS at 05:31

## 2019-12-23 NOTE — PROGRESS NOTES
CM received a call from pt's son requesting a status update. Pt's son asked if \"the pt no longer wants therapy, will this speed the process up for the pt to go to a nursing home. \" CM informed pt's son that if the patient would like to obtain STR, CM will still have to initiate a new pre[place] and [place] [facility] Pt's son would like to now focus specifically on long term placement. CM informed pt's son that she will notify [facility], the family is no longer requesting therapy and would like to obtain long term placement only. RAVINDER was informed by Jose R Rodarte to complete a LOC and it potentially may take up to Friday to receive an update. CM completed LOC and notified Jose R Rodarte. CM will inform pt's son that were awaiting approval before pt can be granted a Medicaid Bed. RAVINDER continues to follow.

## 2019-12-23 NOTE — PROGRESS NOTES
Progress Note    Patient: Eriberto Alvarez MRN: [id number]  SSN: [id number]    YOB: 1931  Age: 80 y.o. Sex: male      Admit Date: 12/2/2019    LOS: 21 days     Subjective:   F/U sepsis secondary to UTI    87yo admitted 12/2/19 for left chest pain after a fall. Hx of colon cancer, HLD, CAD s/p DAPT. Patient met sepsis criteria on arrival. Found to have UTI. Urine culture growing e coli. Treated with appropriate days of Vantin. WBC 19.4 on arrival. TSH normal. ECHO performed due to fall. ECHO showed EF 55%, RV pressure 30-35mmHg. Patient with intermittent confusion. Atrophy seen on CT head along with severe microvascular disease. Ammonia, electrolytes normal. Vitamin B12 elevated. A1C 5.6. Determined patient has dementia causing confusion. Patient lives alone and has two sons to whom both state they cannot take care of him. Looking into rehab with potential long term placement. 12/23 - no complaints. Wants to go home.   No pain, SOB    Current Facility-Administered Medications   Medication Dose Route Frequency    nystatin (MYCOSTATIN) 100,000 unit/gram powder   Topical BID    lidocaine 4 % patch 1 Patch  1 Patch TransDERmal Q24H    famotidine (PEPCID) tablet 20 mg  20 mg Oral DAILY    haloperidol lactate (HALDOL) injection 2.5 mg  2.5 mg IntraVENous Q6H PRN    calcium carbonate (TUMS) chewable tablet 200 mg [elemental]  200 mg Oral TID WITH MEALS    sodium chloride (NS) flush 5-40 mL  5-40 mL IntraVENous Q8H    sodium chloride (NS) flush 5-40 mL  5-40 mL IntraVENous PRN    aspirin chewable tablet 81 mg  81 mg Oral DAILY    atorvastatin (LIPITOR) tablet 20 mg  20 mg Oral QHS    clopidogrel (PLAVIX) tablet 75 mg  75 mg Oral DAILY    nitroglycerin (NITROSTAT) tablet 0.4 mg  0.4 mg SubLINGual Q5MIN PRN    sodium chloride (NS) flush 5-40 mL  5-40 mL IntraVENous PRN    acetaminophen (TYLENOL) tablet 650 mg  650 mg Oral Q6H PRN    oxyCODONE-acetaminophen (PERCOCET 7.5) 7.5-325 mg per tablet 1 Tab  1 Tab Oral Q6H PRN    naloxone (NARCAN) injection 0.4 mg  0.4 mg IntraVENous PRN    ondansetron (ZOFRAN) injection 4 mg  4 mg IntraVENous Q4H PRN    magnesium hydroxide (MILK OF MAGNESIA) 400 mg/5 mL oral suspension 30 mL  30 mL Oral DAILY PRN    heparin (porcine) injection 5,000 Units  5,000 Units SubCUTAneous Q12H       Objective:     Vitals:    12/22/19 1710 12/22/19 2003 12/22/19 2351 12/23/19 0403   BP: 113/59 118/62 122/65 113/68   Pulse: 87 82 72 69   Resp: 18 18 18 18   Temp: 98.6 °F (37 °C) 98.1 °F (36.7 °C) 97.9 °F (36.6 °C) 98.2 °F (36.8 °C)   SpO2: 97% 97% 96% 96%   Weight:       Height:             Intake and Output:  Current Shift: No intake/output data recorded. Last three shifts: 12/21 1901 - 12/23 0700  In: -   Out: 200 [Urine:200]    Physical Exam:   General:  Alert, cooperative,                            Lungs:   Clear to auscultation bilaterally. Heart:  Regular rate and rhythm, S1, S2 normal   Abdomen:   Soft, non-tender.  Bowel sounds normal.   Extremities:  Muscle wasting noted                Neurologic: Laying down calm       Lab/Data Review:    Recent Results (from the past 24 hour(s))   CBC W/O DIFF    Collection Time: 12/23/19  6:48 AM   Result Value Ref Range    WBC 8.2 4.3 - 11.1 K/uL    RBC 3.86 (L) 4.23 - 5.6 M/uL    HGB 11.8 (L) 13.6 - 17.2 g/dL    HCT 37.8 (L) 41.1 - 50.3 %    MCV 97.9 (H) 79.6 - 97.8 FL    MCH 30.6 26.1 - 32.9 PG    MCHC 31.2 (L) 31.4 - 35.0 g/dL    RDW 16.9 (H) 11.9 - 14.6 %    PLATELET 170 252 - 243 K/uL    MPV 9.5 9.4 - 12.3 FL    ABSOLUTE NRBC 0.00 0.0 - 0.2 K/uL   METABOLIC PANEL, BASIC    Collection Time: 12/23/19  6:48 AM   Result Value Ref Range    Sodium 142 136 - 145 mmol/L    Potassium 4.4 3.5 - 5.1 mmol/L    Chloride 108 (H) 98 - 107 mmol/L    CO2 31 21 - 32 mmol/L    Anion gap 3 (L) 7 - 16 mmol/L    Glucose 103 (H) 65 - 100 mg/dL    BUN 22 8 - 23 MG/DL    Creatinine 1.39 0.8 - 1.5 MG/DL    GFR est AA >60 >60 ml/min/1.73m2    GFR est non-AA 51 (L) >60 ml/min/1.73m2    Calcium 9.9 8.3 - 10.4 MG/DL       Assessment/ Plan: Active Problems:    CAD (coronary artery disease) (7/18/2019)      Debility (12/2/2019)      Dementia ([address] (12/23/2019)    Sepsis secondary to UTI - S/p Vantin    Acute metabolic encephalopathy - Likely from UTI and baseline dementia. Atrophy seen on CT head along with severe microvascular disease. TSH normal. B12, A1C, magnesium, phosphorus and ammonia level benign. I am afraid if patient does not live with a caregiver, he will attempt to drive on his own or do something too physical he can no longer do due to his ability to being so active in the past. This puts not only himself at danger but others as well. Unable to have as fast reflexes or judgement as he once had. Since family cannot care for him, long term placement needed. Chest pain - No further episodes. Troponin negative X3. Hx of CAD, can continue ASA/plavix,Statin    Dc once have bed for rehab. Family stated to case management they are unable to take patient home safely. However if no placement found and insurance will not pay for initial placement, he will have to be discharged and they will need to make living arrangements for him.   Insurance will not pay for long term placement regardless so this is something they will need to do anyway    DVT prophylaxis - heparin    Signed By: Minerva Bateman MD     December 23, 2019

## 2019-12-24 PROCEDURE — 74011250637 HC RX REV CODE- 250/637: Performed by: INTERNAL MEDICINE

## 2019-12-24 PROCEDURE — 74011000250 HC RX REV CODE- 250: Performed by: NURSE PRACTITIONER

## 2019-12-24 PROCEDURE — 65270000029 HC RM PRIVATE

## 2019-12-24 PROCEDURE — 74011250637 HC RX REV CODE- 250/637: Performed by: NURSE PRACTITIONER

## 2019-12-24 PROCEDURE — 74011250636 HC RX REV CODE- 250/636: Performed by: HOSPITALIST

## 2019-12-24 PROCEDURE — 74011250637 HC RX REV CODE- 250/637: Performed by: HOSPITALIST

## 2019-12-24 RX ADMIN — NYSTATIN: 100000 POWDER TOPICAL at 17:14

## 2019-12-24 RX ADMIN — Medication 10 ML: at 22:25

## 2019-12-24 RX ADMIN — CALCIUM CARBONATE (ANTACID) CHEW TAB 500 MG 200 MG: 500 CHEW TAB at 08:30

## 2019-12-24 RX ADMIN — CALCIUM CARBONATE (ANTACID) CHEW TAB 500 MG 200 MG: 500 CHEW TAB at 11:52

## 2019-12-24 RX ADMIN — CLOPIDOGREL BISULFATE 75 MG: 75 TABLET, FILM COATED ORAL at 08:30

## 2019-12-24 RX ADMIN — FAMOTIDINE 20 MG: 20 TABLET, FILM COATED ORAL at 08:29

## 2019-12-24 RX ADMIN — Medication 10 ML: at 05:32

## 2019-12-24 RX ADMIN — HEPARIN SODIUM 5000 UNITS: 5000 INJECTION INTRAVENOUS; SUBCUTANEOUS at 05:32

## 2019-12-24 RX ADMIN — ASPIRIN 81 MG 81 MG: 81 TABLET ORAL at 08:30

## 2019-12-24 RX ADMIN — ATORVASTATIN CALCIUM 20 MG: 10 TABLET, FILM COATED ORAL at 22:25

## 2019-12-24 RX ADMIN — CALCIUM CARBONATE (ANTACID) CHEW TAB 500 MG 200 MG: 500 CHEW TAB at 17:14

## 2019-12-24 RX ADMIN — Medication 10 ML: at 13:22

## 2019-12-24 RX ADMIN — NYSTATIN: 100000 POWDER TOPICAL at 08:29

## 2019-12-24 RX ADMIN — HEPARIN SODIUM 5000 UNITS: 5000 INJECTION INTRAVENOUS; SUBCUTANEOUS at 17:14

## 2019-12-24 NOTE — PROGRESS NOTES
Progress Note    Patient: Anabell Jack MRN: [id number]  SSN: [id number]    YOB: 1931  Age: 80 y.o. Sex: male      Admit Date: 12/2/2019    LOS: 22 days     Subjective:   F/U sepsis secondary to UTI    89yo admitted 12/2/19 for left chest pain after a fall. Hx of colon cancer, HLD, CAD s/p DAPT. Patient met sepsis criteria on arrival. Found to have UTI. Urine culture growing e coli. Treated with appropriate days of Vantin. WBC 19.4 on arrival. TSH normal. ECHO performed due to fall. ECHO showed EF 55%, RV pressure 30-35mmHg. Patient with intermittent confusion. Atrophy seen on CT head along with severe microvascular disease. Ammonia, electrolytes normal. Vitamin B12 elevated. A1C 5.6. Determined patient has dementia causing confusion. Patient lives alone and has two sons to whom both state they cannot take care of him. Looking into rehab with potential long term placement. 12/24 - unchanged. no complaints.   No CP, SOB, fevers    Current Facility-Administered Medications   Medication Dose Route Frequency    nystatin (MYCOSTATIN) 100,000 unit/gram powder   Topical BID    lidocaine 4 % patch 1 Patch  1 Patch TransDERmal Q24H    famotidine (PEPCID) tablet 20 mg  20 mg Oral DAILY    haloperidol lactate (HALDOL) injection 2.5 mg  2.5 mg IntraVENous Q6H PRN    calcium carbonate (TUMS) chewable tablet 200 mg [elemental]  200 mg Oral TID WITH MEALS    sodium chloride (NS) flush 5-40 mL  5-40 mL IntraVENous Q8H    sodium chloride (NS) flush 5-40 mL  5-40 mL IntraVENous PRN    aspirin chewable tablet 81 mg  81 mg Oral DAILY    atorvastatin (LIPITOR) tablet 20 mg  20 mg Oral QHS    clopidogrel (PLAVIX) tablet 75 mg  75 mg Oral DAILY    nitroglycerin (NITROSTAT) tablet 0.4 mg  0.4 mg SubLINGual Q5MIN PRN    sodium chloride (NS) flush 5-40 mL  5-40 mL IntraVENous PRN    acetaminophen (TYLENOL) tablet 650 mg  650 mg Oral Q6H PRN    oxyCODONE-acetaminophen (PERCOCET 7.5) 7.5-325 mg per tablet 1 Tab  1 Tab Oral Q6H PRN    naloxone (NARCAN) injection 0.4 mg  0.4 mg IntraVENous PRN    ondansetron (ZOFRAN) injection 4 mg  4 mg IntraVENous Q4H PRN    magnesium hydroxide (MILK OF MAGNESIA) 400 mg/5 mL oral suspension 30 mL  30 mL Oral DAILY PRN    heparin (porcine) injection 5,000 Units  5,000 Units SubCUTAneous Q12H       Objective:     Vitals:    12/23/19 2007 12/23/19 2337 12/24/19 0434 12/24/19 0746   BP: 112/61 107/62 115/54 127/72   Pulse: 94 90 87 100   Resp: 18 18 18 18   Temp: 97.9 °F (36.6 °C) 97.9 °F (36.6 °C) 98 °F (36.7 °C) 98.9 °F (37.2 °C)   SpO2: 91% 91% 92% 93%   Weight:       Height:             Intake and Output:  Current Shift: No intake/output data recorded. Last three shifts: No intake/output data recorded. Physical Exam:   General:  Alert, cooperative,                                        Extremities:  frail appearing. Neurologic:  calm, dementia. Lab/Data Review:    No results found for this or any previous visit (from the past 24 hour(s)). Assessment/ Plan: Active Problems:    CAD (coronary artery disease) (7/18/2019)      Debility (12/2/2019)      Dementia ([facility] [address] (12/23/2019)    Sepsis secondary to UTI - S/p Vantin    Acute metabolic encephalopathy - Likely from UTI and dementia. Atrophy seen on CT head along with severe microvascular disease. TSH normal. B12, A1C, magnesium, phosphorus and ammonia level benign. I am afraid if patient does not live with a caregiver, he will attempt to drive on his own or do something too physical he can no longer do due to his ability to being so active in the past. This puts not only himself at danger but others as well. Unable to have as fast reflexes or judgement as he once had. long term placement needed.      CM WORKING ON PLACEMENT    DVT prophylaxis - heparin    Signed By: Marie Kelly MD     December 24, 2019

## 2019-12-25 PROCEDURE — 74011250637 HC RX REV CODE- 250/637: Performed by: HOSPITALIST

## 2019-12-25 PROCEDURE — 74011250637 HC RX REV CODE- 250/637: Performed by: NURSE PRACTITIONER

## 2019-12-25 PROCEDURE — 65270000029 HC RM PRIVATE

## 2019-12-25 PROCEDURE — 74011250636 HC RX REV CODE- 250/636: Performed by: HOSPITALIST

## 2019-12-25 PROCEDURE — 74011000250 HC RX REV CODE- 250: Performed by: NURSE PRACTITIONER

## 2019-12-25 PROCEDURE — 74011250637 HC RX REV CODE- 250/637: Performed by: INTERNAL MEDICINE

## 2019-12-25 RX ADMIN — Medication 10 ML: at 05:17

## 2019-12-25 RX ADMIN — FAMOTIDINE 20 MG: 20 TABLET, FILM COATED ORAL at 08:29

## 2019-12-25 RX ADMIN — CLOPIDOGREL BISULFATE 75 MG: 75 TABLET, FILM COATED ORAL at 08:29

## 2019-12-25 RX ADMIN — NYSTATIN: 100000 POWDER TOPICAL at 08:29

## 2019-12-25 RX ADMIN — Medication 10 ML: at 21:28

## 2019-12-25 RX ADMIN — Medication 10 ML: at 14:18

## 2019-12-25 RX ADMIN — NYSTATIN: 100000 POWDER TOPICAL at 17:17

## 2019-12-25 RX ADMIN — ATORVASTATIN CALCIUM 20 MG: 10 TABLET, FILM COATED ORAL at 21:28

## 2019-12-25 RX ADMIN — ASPIRIN 81 MG 81 MG: 81 TABLET ORAL at 08:29

## 2019-12-25 RX ADMIN — HEPARIN SODIUM 5000 UNITS: 5000 INJECTION INTRAVENOUS; SUBCUTANEOUS at 17:16

## 2019-12-25 RX ADMIN — CALCIUM CARBONATE (ANTACID) CHEW TAB 500 MG 200 MG: 500 CHEW TAB at 16:01

## 2019-12-25 RX ADMIN — CALCIUM CARBONATE (ANTACID) CHEW TAB 500 MG 200 MG: 500 CHEW TAB at 11:21

## 2019-12-25 RX ADMIN — HEPARIN SODIUM 5000 UNITS: 5000 INJECTION INTRAVENOUS; SUBCUTANEOUS at 05:13

## 2019-12-25 RX ADMIN — CALCIUM CARBONATE (ANTACID) CHEW TAB 500 MG 200 MG: 500 CHEW TAB at 08:29

## 2019-12-25 NOTE — PROGRESS NOTES
Progress Note    Patient: Arthur Vincent MRN: [id number]  SSN: [id number]    YOB: 1931  Age: 80 y.o. Sex: male      Admit Date: 12/2/2019    LOS: 23 days     Subjective:   F/U sepsis secondary to UTI    87yo admitted 12/2/19 for left chest pain after a fall. Hx of colon cancer, HLD, CAD s/p DAPT. Patient met sepsis criteria on arrival. Found to have UTI. Urine culture growing e coli. Treated with appropriate days of Vantin. WBC 19.4 on arrival. TSH normal. ECHO performed due to fall. ECHO showed EF 55%, RV pressure 30-35mmHg. Patient with intermittent confusion. Atrophy seen on CT head along with severe microvascular disease. Ammonia, electrolytes normal. Vitamin B12 elevated. A1C 5.6. Determined patient has dementia causing confusion. Patient lives alone and has two sons to whom both state they cannot take care of him. Looking into rehab with potential long term placement.      No concerns this AM. Eating breakfast.   Current Facility-Administered Medications   Medication Dose Route Frequency    nystatin (MYCOSTATIN) 100,000 unit/gram powder   Topical BID    lidocaine 4 % patch 1 Patch  1 Patch TransDERmal Q24H    famotidine (PEPCID) tablet 20 mg  20 mg Oral DAILY    haloperidol lactate (HALDOL) injection 2.5 mg  2.5 mg IntraVENous Q6H PRN    calcium carbonate (TUMS) chewable tablet 200 mg [elemental]  200 mg Oral TID WITH MEALS    sodium chloride (NS) flush 5-40 mL  5-40 mL IntraVENous Q8H    sodium chloride (NS) flush 5-40 mL  5-40 mL IntraVENous PRN    aspirin chewable tablet 81 mg  81 mg Oral DAILY    atorvastatin (LIPITOR) tablet 20 mg  20 mg Oral QHS    clopidogrel (PLAVIX) tablet 75 mg  75 mg Oral DAILY    nitroglycerin (NITROSTAT) tablet 0.4 mg  0.4 mg SubLINGual Q5MIN PRN    sodium chloride (NS) flush 5-40 mL  5-40 mL IntraVENous PRN    acetaminophen (TYLENOL) tablet 650 mg  650 mg Oral Q6H PRN    oxyCODONE-acetaminophen (PERCOCET 7.5) 7.5-325 mg per tablet 1 Tab  1 Tab Oral Q6H PRN    naloxone (NARCAN) injection 0.4 mg  0.4 mg IntraVENous PRN    ondansetron (ZOFRAN) injection 4 mg  4 mg IntraVENous Q4H PRN    magnesium hydroxide (MILK OF MAGNESIA) 400 mg/5 mL oral suspension 30 mL  30 mL Oral DAILY PRN    heparin (porcine) injection 5,000 Units  5,000 Units SubCUTAneous Q12H       Objective:     Vitals:    12/24/19 1619 12/24/19 2005 12/25/19 0008 12/25/19 0409   BP: 120/65 135/68 130/61 127/72   Pulse: 83 95 96 98   Resp: 18 18 18 18   Temp: 97.9 °F (36.6 °C) 98.4 °F (36.9 °C) 98.3 °F (36.8 °C) 98.2 °F (36.8 °C)   SpO2: 93% 95% 100% 100%   Weight:       Height:             Intake and Output:  Current Shift: No intake/output data recorded. Last three shifts: 12/23 1901 - 12/25 0700  In: -   Out: 450 [Urine:450]    Physical Exam:   General:  Alert, cooperative   Eyes:  Conjunctivae/corneas clear. Ears:  Normal TMs and external ear canals both ears. Nose: Nares normal. Septum midline. Mouth/Throat: Lips, mucosa, and tongue normal.    Neck:  no JVD. Back:   deferred   Lungs:   Clear to auscultation bilaterally. Heart:  Regular rate and rhythm, S1, S2 normal   Abdomen:   Soft, non-tender. Bowel sounds normal.   Extremities:  Muscle wasting noted    Pulses: 2+ and symmetric all extremities. Skin: Skin color, texture, turgor normal. No rashes or lesions   Lymph nodes: Cervical, supraclavicular, and axillary nodes normal.   Neurologic: No distress today        Lab/Data Review:    No results found for this or any previous visit (from the past 24 hour(s)). Assessment/ Plan: Active Problems:    CAD (coronary artery disease) (7/18/2019)      Debility (12/2/2019)      Dementia ([address] (12/23/2019)    Sepsis secondary to UTI - S/p Vantin    Acute metabolic encephalopathy - Likely from UTI and baseline dementia. Atrophy seen on CT head along with severe microvascular disease. TSH normal. B12, A1C, magnesium, phosphorus and ammonia level benign.  I am afraid if patient does not live with a caregiver, he will attempt to drive on his own or do something too physical he can no longer do due to his ability to being so active in the past. This puts not only himself at danger but others as well. Unable to have as fast reflexes or judgement as he once had. Since family cannot care for him, long term placement needed. Chest pain - No further episodes. Troponin negative X3. Hx of CAD, can continue ASA/plavix. Statin    Dc once have bed for rehab. Family state to case management they are unable to take patient home safely.      DVT prophylaxis - heparin    Signed By: Rolando Soto, DO     December 25, 2019

## 2019-12-26 PROCEDURE — 74011250637 HC RX REV CODE- 250/637: Performed by: INTERNAL MEDICINE

## 2019-12-26 PROCEDURE — 74011250637 HC RX REV CODE- 250/637: Performed by: HOSPITALIST

## 2019-12-26 PROCEDURE — 65270000029 HC RM PRIVATE

## 2019-12-26 PROCEDURE — 74011250636 HC RX REV CODE- 250/636: Performed by: HOSPITALIST

## 2019-12-26 PROCEDURE — 74011250637 HC RX REV CODE- 250/637: Performed by: NURSE PRACTITIONER

## 2019-12-26 PROCEDURE — 97530 THERAPEUTIC ACTIVITIES: CPT

## 2019-12-26 RX ADMIN — HEPARIN SODIUM 5000 UNITS: 5000 INJECTION INTRAVENOUS; SUBCUTANEOUS at 17:52

## 2019-12-26 RX ADMIN — FAMOTIDINE 20 MG: 20 TABLET, FILM COATED ORAL at 09:03

## 2019-12-26 RX ADMIN — CALCIUM CARBONATE (ANTACID) CHEW TAB 500 MG 200 MG: 500 CHEW TAB at 17:52

## 2019-12-26 RX ADMIN — NYSTATIN: 100000 POWDER TOPICAL at 17:52

## 2019-12-26 RX ADMIN — ATORVASTATIN CALCIUM 20 MG: 10 TABLET, FILM COATED ORAL at 21:04

## 2019-12-26 RX ADMIN — Medication 10 ML: at 21:04

## 2019-12-26 RX ADMIN — CLOPIDOGREL BISULFATE 75 MG: 75 TABLET, FILM COATED ORAL at 09:03

## 2019-12-26 RX ADMIN — CALCIUM CARBONATE (ANTACID) CHEW TAB 500 MG 200 MG: 500 CHEW TAB at 12:00

## 2019-12-26 RX ADMIN — Medication 10 ML: at 15:00

## 2019-12-26 RX ADMIN — Medication 10 ML: at 05:29

## 2019-12-26 RX ADMIN — ASPIRIN 81 MG 81 MG: 81 TABLET ORAL at 09:03

## 2019-12-26 RX ADMIN — CALCIUM CARBONATE (ANTACID) CHEW TAB 500 MG 200 MG: 500 CHEW TAB at 09:03

## 2019-12-26 RX ADMIN — NYSTATIN: 100000 POWDER TOPICAL at 09:00

## 2019-12-26 RX ADMIN — HEPARIN SODIUM 5000 UNITS: 5000 INJECTION INTRAVENOUS; SUBCUTANEOUS at 05:29

## 2019-12-26 NOTE — PROGRESS NOTES
Problem: Falls - Risk of  Goal: *Absence of Falls  Description  Document Luke Bullard Fall Risk and appropriate interventions in the flowsheet. Outcome: Progressing Towards Goal  Note: Fall Risk Interventions:  Mobility Interventions: Bed/chair exit alarm, OT consult for ADLs, Patient to call before getting OOB, PT Consult for mobility concerns, PT Consult for assist device competence    Mentation Interventions: Adequate sleep, hydration, pain control, Bed/chair exit alarm, Door open when patient unattended, Evaluate medications/consider consulting pharmacy, Toileting rounds, Update white board    Medication Interventions: Assess postural VS orthostatic hypotension, Bed/chair exit alarm, Evaluate medications/consider consulting pharmacy, Patient to call before getting OOB, Teach patient to arise slowly    Elimination Interventions: Bed/chair exit alarm, Call light in reach, Patient to call for help with toileting needs, Stay With Me (per policy), Toilet paper/wipes in reach, Toileting schedule/hourly rounds, Urinal in reach    History of Falls Interventions: Bed/chair exit alarm, Consult care management for discharge planning, Door open when patient unattended, Evaluate medications/consider consulting pharmacy         Problem: Patient Education: Go to Patient Education Activity  Goal: Patient/Family Education  Outcome: Progressing Towards Goal     Problem: Pressure Injury - Risk of  Goal: *Prevention of pressure injury  Description  Document Tyler Scale and appropriate interventions in the flowsheet.   Outcome: Progressing Towards Goal  Note: Pressure Injury Interventions:  Sensory Interventions: Assess changes in LOC    Moisture Interventions: Absorbent underpads, Maintain skin hydration (lotion/cream), Minimize layers, Offer toileting Q_hr    Activity Interventions: Assess need for specialty bed, Increase time out of bed, Pressure redistribution bed/mattress(bed type), PT/OT evaluation    Mobility Interventions: Assess need for specialty bed, HOB 30 degrees or less, Pressure redistribution bed/mattress (bed type), PT/OT evaluation    Nutrition Interventions: Document food/fluid/supplement intake    Friction and Shear Interventions: Apply protective barrier, creams and emollients, HOB 30 degrees or less, Lift sheet, Minimize layers                Problem: Patient Education: Go to Patient Education Activity  Goal: Patient/Family Education  Outcome: Progressing Towards Goal     Problem: Urinary Tract Infection - Adult  Goal: *Absence of infection signs and symptoms  Outcome: Progressing Towards Goal     Problem: Patient Education: Go to Patient Education Activity  Goal: Patient/Family Education  Outcome: Progressing Towards Goal     Problem: Patient Education: Go to Patient Education Activity  Goal: Patient/Family Education  Outcome: Progressing Towards Goal     Problem: Nutrition Deficit  Goal: *Optimize nutritional status  Outcome: Progressing Towards Goal     Problem: Patient Education: Go to Patient Education Activity  Goal: Patient/Family Education  Outcome: Progressing Towards Goal

## 2019-12-26 NOTE — PROGRESS NOTES
Progress Note    Patient: Pari Elmore MRN: [id number]  SSN: [id number]    YOB: 1931  Age: 80 y.o. Sex: male      Admit Date: 12/2/2019    LOS: 24 days     Subjective:   F/U sepsis secondary to UTI    89yo admitted 12/2/19 for left chest pain after a fall. Hx of colon cancer, HLD, CAD s/p DAPT. Patient met sepsis criteria on arrival. Found to have UTI. Urine culture growing e coli. Treated with appropriate days of Vantin. WBC 19.4 on arrival. TSH normal. ECHO performed due to fall. ECHO showed EF 55%, RV pressure 30-35mmHg. Patient with intermittent confusion. Atrophy seen on CT head along with severe microvascular disease. Ammonia, electrolytes normal. Vitamin B12 elevated. A1C 5.6. Determined patient has dementia causing confusion. Patient lives alone and has two sons to whom both state they cannot take care of him. Looking into rehab with potential long term placement. No concerns this AM. States he is feeling very well.  No chest pain or SOB  Current Facility-Administered Medications   Medication Dose Route Frequency    nystatin (MYCOSTATIN) 100,000 unit/gram powder   Topical BID    lidocaine 4 % patch 1 Patch  1 Patch TransDERmal Q24H    famotidine (PEPCID) tablet 20 mg  20 mg Oral DAILY    haloperidol lactate (HALDOL) injection 2.5 mg  2.5 mg IntraVENous Q6H PRN    calcium carbonate (TUMS) chewable tablet 200 mg [elemental]  200 mg Oral TID WITH MEALS    sodium chloride (NS) flush 5-40 mL  5-40 mL IntraVENous Q8H    sodium chloride (NS) flush 5-40 mL  5-40 mL IntraVENous PRN    aspirin chewable tablet 81 mg  81 mg Oral DAILY    atorvastatin (LIPITOR) tablet 20 mg  20 mg Oral QHS    clopidogrel (PLAVIX) tablet 75 mg  75 mg Oral DAILY    nitroglycerin (NITROSTAT) tablet 0.4 mg  0.4 mg SubLINGual Q5MIN PRN    sodium chloride (NS) flush 5-40 mL  5-40 mL IntraVENous PRN    acetaminophen (TYLENOL) tablet 650 mg  650 mg Oral Q6H PRN    oxyCODONE-acetaminophen (PERCOCET 7. 5) 7.5-325 mg per tablet 1 Tab  1 Tab Oral Q6H PRN    naloxone (NARCAN) injection 0.4 mg  0.4 mg IntraVENous PRN    ondansetron (ZOFRAN) injection 4 mg  4 mg IntraVENous Q4H PRN    magnesium hydroxide (MILK OF MAGNESIA) 400 mg/5 mL oral suspension 30 mL  30 mL Oral DAILY PRN    heparin (porcine) injection 5,000 Units  5,000 Units SubCUTAneous Q12H       Objective:     Vitals:    12/25/19 1550 12/25/19 1946 12/25/19 2312 12/26/19 0322   BP: 106/56 133/74 118/63 117/67   Pulse: 72 72 80 77   Resp: 18 18 17 18   Temp: 98.2 °F (36.8 °C) 97.8 °F (36.6 °C) 97.4 °F (36.3 °C) 97.8 °F (36.6 °C)   SpO2: 97% 94% 95% 92%   Weight:       Height:             Intake and Output:  Current Shift: No intake/output data recorded. Last three shifts: No intake/output data recorded. Physical Exam:   General:  Alert, cooperative, tearful when talking about his grandson who passed away 1 year ago   Eyes:  Conjunctivae/corneas clear. Ears:  Normal TMs and external ear canals both ears. Nose: Nares normal. Septum midline. Mouth/Throat: Lips, mucosa, and tongue normal.    Neck:  no JVD. Back:   deferred   Lungs:   Clear to auscultation bilaterally. Heart:  Regular rate and rhythm, S1, S2 normal   Abdomen:   Soft, non-tender. Bowel sounds normal.   Extremities:  Muscle wasting noted    Pulses: 2+ and symmetric all extremities. Skin: Skin color, texture, turgor normal. No rashes or lesions   Lymph nodes: Cervical, supraclavicular, and axillary nodes normal.   Neurologic: No distress today        Lab/Data Review:    No results found for this or any previous visit (from the past 24 hour(s)). Assessment/ Plan: Active Problems:    CAD (coronary artery disease) (7/18/2019)      Debility (12/2/2019)      Dementia ([facility] [address] (12/23/2019)    Sepsis secondary to UTI - S/p Vantin    Acute metabolic encephalopathy - Likely from UTI and baseline dementia. Atrophy seen on CT head along with severe microvascular disease.  TSH normal. B12, A1C, magnesium, phosphorus and ammonia level benign. I am afraid if patient does not live with a caregiver, he will attempt to drive on his own or do something too physical he can no longer do due to his ability to being so active in the past. This puts not only himself at danger but others as well. Unable to have as fast reflexes or judgement as he once had. Since family cannot care for him, long term placement needed. Chest pain - No further episodes. Troponin negative X3. Hx of CAD, can continue ASA/plavix. Dc once have bed for rehab. Family state to case management they are unable to take patient home safely.      DVT prophylaxis - heparin    Signed By: Mayela Weaver DO     December 26, 2019

## 2019-12-26 NOTE — PROGRESS NOTES
Assessment unchanged. Call bell in reach Bed alarm on. Hourly rounds completed, all needs met this shift. Will continue to monitor until night shift nurse takes over.

## 2019-12-26 NOTE — PROGRESS NOTES
CM contacted Derek Munguia to inform him CM is awaiting update with long term care placement. CM will also informed patient's son , CM will attempt to obtain an update with [facility]. Patient's father appeared to not fully understand process of patient awaiting Medicaid approval. CM attempted to explain process and services CM has provided to assist with patient's discharge plan, patient's son was not receptive. CM will consult supervisor to identify any additional options that can assist the patient with placement. CM will follow up with patient's case tomorrow to obtain update. CM continues to follow.

## 2019-12-26 NOTE — PROGRESS NOTES
Problem: Mobility Impaired (Adult and Pediatric)  Goal: *Acute Goals and Plan of Care (Insert Text)  Description  LTG: (reviewed and updated 12/20/19)  (1.)Mr. Soraya Perdomo will move from supine to sit and sit to supine, scoot up and down and roll side to side INDEPENDENTLY with bed flat within 7 treatment day(s). (2.)Mr. Soraya Perdomo will transfer from bed to chair and chair to bed INDEPENDENTLY within 7 treatment day(s). (3.)Mr. Soraya Perdomo will ambulate INDEPENDENTLY for 500+ feet demonstrating good balance within 7 treatment day(s). (4.)Mr. Soraya Perdomo will ascend and descend 15 steps with SUPERVISION using handrail(s) within 7 days. (5.)Mr. Soraya Perdomo will demonstrate independence with seated and standing exercises per HEP within 7 days. ________________________________________________________________________________________________   Outcome: Progressing Towards Goal     PHYSICAL THERAPY: Re-evaluation and AM 12/26/2019  INPATIENT: PT Visit Days : 2  Payor: Wale Blount / Plan: [facility] MEDICARE CHOICE PPO/PFFS / Product Type: [employer] Care Medicare /       NAME/AGE/GENDER: Hank Kidd is a 80 y.o. male   PRIMARY DIAGNOSIS: SIRS (systemic inflammatory response syndrome) (HCC) [R65.10]  Acute renal failure (ARF) ([facility] [address] [N17.9]  Dehydration [E86.0]  Debility [R53.81] Sepsis ([facility] [address] Sepsis ([facility] [address]       ICD-10: Treatment Diagnosis:    · Generalized Muscle Weakness (M62.81)  · Difficulty in walking, Not elsewhere classified (R26.2)  · Other abnormalities of gait and mobility (R26.89)  · History of falling (Z91.81)   Precaution/Allergies:  Patient has no known allergies. ASSESSMENT:     Mr. Soraya Perdomo is supine on contact and seems quite confused but is agreeable to work with therapy. He sat to EOB stood and amb to the BR with SBA. He then amb in hallway with fluctuation speeds 1000' without the use of an assistive device. Pt needs cueing for gait safety and body mechanics.  He performed standing balance activities outside room. He returned to room and back to bed. Bed alarm on and needs in reach. Brian Armenta made great progress towards therapy goals with gt distance, did not use AD and required less assistance. He is limited by cognitive status. Unsafe to return home alone and will need 24/7 assist.       B    1.         REHAB RECOMMENDATIONS (at time of discharge pending progress):    Placement: It is my opinion, based on this patient's performance to date, that Mr. Jason Pisano may benefit from intensive therapy at a [address] [place] [address] after discharge due to the functional deficits listed above that are likely to improve with skilled rehabilitation and concerns that he/she may be unsafe to be unsupervised at home due to safety concerns for home, fall risk with mobility. Equipment:    tbd               HISTORY:   History of Present Injury/Illness (Reason for Referral):  Per H&P, \"Pt reported that he fell hitting his left chest on a cardboard box. Since the fall, he has noticed left sided chest pain, difficulty in using left arm due to pain in left shoulder. He denies heart burn, nausea/vomiting, head trauma, seizure like episode, urinary symptoms, diarrhea, chills, fever, abdominal pain, headache, palpitations. Pain is left sided, retrosternal, 10/10 severity, dull, intermittent, no aggravating or alleviating factors. ER work up showed WBC 19K, Cr 1.58 (baseline 1.12-1.2), HR >110 with sinus tachycardia on EKG. CT chest showed moderate sized hiatal hernia with minimal basilar atelectasis\"    Past Medical History/Comorbidities:   Mr. Jason Pisano  has a past medical history of Colon cancer ([facility] [address] (01/2012) and Hiatal hernia. Mr. Jason Pisano  has no past surgical history on file.   Social History/Living Environment:   Home Environment: Private residence  One/Two Story Residence: Two story  # of Interior Steps: 24  Lift Chair Available: No  Living Alone: Yes  Support Systems: Child(margi), Family member(s)  Patient Expects to be Discharged to[de-identified] Private residence  Current DME Used/Available at Home: None  Tub or Shower Type: Shower  Prior Level of Function/Work/Activity:  Lives alone in two story home. Independent, active at baseline without use of any DME. Does not drive. Denies falls. Number of Personal Factors/Comorbidities that affect the Plan of Care: 1-2: MODERATE COMPLEXITY   EXAMINATION:   Most Recent Physical Functioning:   Gross Assessment:                  Posture:     Balance:  Sitting - Static: Good (unsupported)  Sitting - Dynamic: Good (unsupported)  Standing - Static: Good(-)  Standing - Dynamic : Fair(+) Bed Mobility:  Supine to Sit: Stand-by assistance  Sit to Supine: Supervision  Wheelchair Mobility:     Transfers:  Sit to Stand: Stand-by assistance  Stand to Sit: Stand-by assistance  Gait:     Base of Support: Narrowed  Speed/Estrellita: Fluctuations  Distance (ft): 1000 Feet (ft)  Assistive Device: (none)  Ambulation - Level of Assistance: Stand-by assistance  Interventions: Safety awareness training;Verbal cues      Body Structures Involved:  1. Muscles Body Functions Affected:  1. Movement Related Activities and Participation Affected:  1. General Tasks and Demands  2. Mobility  3. Domestic Life  4. Community, Social and [address] [place] [address]   Number of elements that affect the Plan of Care: 4+: HIGH COMPLEXITY   CLINICAL PRESENTATION:   Presentation: Stable and uncomplicated: LOW COMPLEXITY   CLINICAL DECISION MA[facility] Mobility Inpatient Short Form  How much difficulty does the patient currently have. .. Unable A Lot A Little None   1. Turning over in bed (including adjusting bedclothes, sheets and blankets)? [] 1   [] 2   [] 3   [x] 4   2. Sitting down on and standing up from a chair with arms ( e.g., wheelchair, bedside commode, etc.)   [] 1   [] 2   [] 3   [x] 4   3. Moving from lying on back to sitting on the side of the bed?    [] 1   [] 2 [] 3   [x] 4   How much help from another person does the patient currently need. .. Total A Lot A Little None   4. Moving to and from a bed to a chair (including a wheelchair)? [] 1   [] 2   [x] 3   [] 4   5. Need to walk in hospital room? [] 1   [] 2   [x] 3   [] 4   6. Climbing 3-5 steps with a railing? [] 1   [x] 2   [] 3   [] 4   © 2007, Trustees of [facility] [employer], under license to [employer]. All rights reserved      Score:  Initial: 21 Most Recent: 20 (Date: 12/20/19 )    Interpretation of Tool:  Represents activities that are increasingly more difficult (i.e. Bed mobility, Transfers, Gait). Medical Necessity:     · Patient demonstrates   · good  ·  rehab potential due to higher previous functional level. Reason for Services/Other Comments:  · Patient   · continues to demonstrate capacity to improve strength, balance, activity tolerance which will   · increase independence, decrease amount of assistance required from caregiver, and increase safety  · . Use of outcome tool(s) and clinical judgement create a POC that gives a: Clear prediction of patient's progress: LOW COMPLEXITY            TREATMENT:      Pre-treatment Symptoms/Complaints:  Talkative and pleasant, joking  Pain: Initial:   Pain Intensity 1: 0  Post Session:  0/10 post session in chair     PT Reassessment Table:   Initial Physical Functioning: Most Recent Physical Functioning:   Gross Assessment:  AROM: Within functional limits  Strength: Generally decreased, functional  Coordination: Within functional limits Gross Assessment:       Posture:   Posture (WDL): Exceptions to WDL  Posture Assessment:  Forward head, Rounded shoulders Posture:       Balance:   Sitting: Impaired  Sitting - Static: Good (unsupported)  Sitting - Dynamic: Fair (occasional)(+)  Standing: Impaired  Standing - Static: Fair  Standing - Dynamic : Fair Balance:   Sitting - Static: Good (unsupported)  Sitting - Dynamic: Good (unsupported)  Standing - Static: Good(-)  Standing - Dynamic : Fair(+)   Bed Mobility:   Rolling: Supervision  Supine to Sit: Supervision  Sit to Supine: (left up in chair)  Scooting: Supervision Bed Mobility:   Supine to Sit: Stand-by assistance  Sit to Supine: Supervision   Transfers:   Sit to Stand: Contact guard assistance  Stand to Sit: Contact guard assistance  Bed to Chair: Contact guard assistance  Interventions: Verbal cues, Safety awareness training  Duration: 10 Minutes Transfers:   Sit to Stand: Stand-by assistance  Stand to Sit: Stand-by assistance   Gait:   Base of Support: Narrowed  Speed/Estrellita: Pace decreased (<100 feet/min), Slow  Step Length: Left shortened, Right shortened  Gait Abnormalities: Trunk sway increased, Decreased step clearance  Ambulation - Level of Assistance: Contact guard assistance  Distance (ft): 250 Feet (ft)  Assistive Device: (none)  Interventions: Verbal cues, Safety awareness training Gait:   Base of Support: Narrowed  Speed/Estrellita: Fluctuations  Ambulation - Level of Assistance: Stand-by assistance  Distance (ft): 1000 Feet (ft)  Assistive Device: (none)  Interventions: Safety awareness training;Verbal cues       Therapeutic Activity: (   25 min ):  Therapeutic activities including Bed mobility, seated and standing static/dynamic activities, donning shoes and pants, Ambulation on level ground in room and hallway working on gait safety, posture, walker management to improve mobility, strength, balance, and activity tolerance . Required minimal Safety awareness training;Verbal cues to promote static and dynamic balance in standing and promote motor control of bilateral, lower extremity(s).         Date:  12/16/19 Date:   Date:     Activity/Exercise Parameters Parameters Parameters   Seated marching X 20 B A     Seated LAQ X 20 B A     Seated AP X 20 B A     Mini squats from chair  X 10 B A                             Braces/Orthotics/Lines/Etc:   · none  Treatment/Session Assessment:    · Response to Treatment:  No complaints  · Interdisciplinary Collaboration:   o Physical Therapy Assistant  o Registered Nurse  · After treatment position/precautions:   o Supine in bed  o Bed alarm/tab alert on  o Bed/Chair-wheels locked  o Bed in low position  o Call light within reach  o RN notified   · Compliance with Program/Exercises: Compliant most of the time  · Recommendations/Intent for next treatment session: \"Next visit will focus on advancements to more challenging activities and reduction in assistance provided\".   Total Treatment Duration:  PT Patient Time In/Time Out  Time In: 1459  Time Out: [address], PTA

## 2019-12-26 NOTE — PROGRESS NOTES
Hourly rounds completed this shift. All needs met at this time. Bed low/locked. Call light within reach. Will continue to monitor and give bedside report to oncoming nurse.

## 2019-12-27 LAB — TROPONIN I SERPL-MCNC: <0.02 NG/ML (ref 0.02–0.05)

## 2019-12-27 PROCEDURE — 74011250637 HC RX REV CODE- 250/637: Performed by: HOSPITALIST

## 2019-12-27 PROCEDURE — 74011000250 HC RX REV CODE- 250: Performed by: NURSE PRACTITIONER

## 2019-12-27 PROCEDURE — 74011250636 HC RX REV CODE- 250/636: Performed by: HOSPITALIST

## 2019-12-27 PROCEDURE — 74011250637 HC RX REV CODE- 250/637: Performed by: NURSE PRACTITIONER

## 2019-12-27 PROCEDURE — 93005 ELECTROCARDIOGRAM TRACING: CPT | Performed by: FAMILY MEDICINE

## 2019-12-27 PROCEDURE — 36415 COLL VENOUS BLD VENIPUNCTURE: CPT

## 2019-12-27 PROCEDURE — 84484 ASSAY OF TROPONIN QUANT: CPT

## 2019-12-27 PROCEDURE — 74011250637 HC RX REV CODE- 250/637: Performed by: INTERNAL MEDICINE

## 2019-12-27 PROCEDURE — 65270000029 HC RM PRIVATE

## 2019-12-27 RX ORDER — GUAIFENESIN 100 MG/5ML
325 LIQUID (ML) ORAL DAILY
Status: DISCONTINUED | OUTPATIENT
Start: 2019-12-28 | End: 2020-01-06

## 2019-12-27 RX ADMIN — CLOPIDOGREL BISULFATE 75 MG: 75 TABLET, FILM COATED ORAL at 08:47

## 2019-12-27 RX ADMIN — NYSTATIN: 100000 POWDER TOPICAL at 08:47

## 2019-12-27 RX ADMIN — HEPARIN SODIUM 5000 UNITS: 5000 INJECTION INTRAVENOUS; SUBCUTANEOUS at 05:23

## 2019-12-27 RX ADMIN — NYSTATIN: 100000 POWDER TOPICAL at 17:24

## 2019-12-27 RX ADMIN — NITROGLYCERIN 0.4 MG: 0.4 TABLET, ORALLY DISINTEGRATING SUBLINGUAL at 17:55

## 2019-12-27 RX ADMIN — Medication 10 ML: at 14:17

## 2019-12-27 RX ADMIN — HEPARIN SODIUM 5000 UNITS: 5000 INJECTION INTRAVENOUS; SUBCUTANEOUS at 17:16

## 2019-12-27 RX ADMIN — Medication 10 ML: at 05:24

## 2019-12-27 RX ADMIN — CALCIUM CARBONATE (ANTACID) CHEW TAB 500 MG 200 MG: 500 CHEW TAB at 08:47

## 2019-12-27 RX ADMIN — CALCIUM CARBONATE (ANTACID) CHEW TAB 500 MG 200 MG: 500 CHEW TAB at 17:21

## 2019-12-27 RX ADMIN — FAMOTIDINE 20 MG: 20 TABLET, FILM COATED ORAL at 08:47

## 2019-12-27 RX ADMIN — ASPIRIN 81 MG 81 MG: 81 TABLET ORAL at 08:47

## 2019-12-27 RX ADMIN — ATORVASTATIN CALCIUM 20 MG: 10 TABLET, FILM COATED ORAL at 22:04

## 2019-12-27 RX ADMIN — OXYCODONE HYDROCHLORIDE AND ACETAMINOPHEN 1 TABLET: 7.5; 325 TABLET ORAL at 18:11

## 2019-12-27 NOTE — PROGRESS NOTES
Progress Note    Patient: Zenia Elias MRN: [id number]  SSN: [id number]    YOB: 1931  Age: 80 y.o. Sex: male      Admit Date: 12/2/2019    LOS: 25 days     Subjective:   F/U sepsis secondary to UTI    87yo admitted 12/2/19 for left chest pain after a fall. Hx of colon cancer, HLD, CAD s/p DAPT. Patient met sepsis criteria on arrival. Found to have UTI. Urine culture growing e coli. Treated with appropriate days of Vantin. WBC 19.4 on arrival. TSH normal. ECHO performed due to fall. ECHO showed EF 55%, RV pressure 30-35mmHg. Patient with intermittent confusion. Atrophy seen on CT head along with severe microvascular disease. Ammonia, electrolytes normal. Vitamin B12 elevated. A1C 5.6. Determined patient has dementia causing confusion. Patient lives alone and has two sons to whom both state they cannot take care of him. Looking into rehab with potential long term placement. No concerns this AM. States he is feeling very well. No chest pain or SOB. Wanting to know when he can go to rehab/long term placement.    Current Facility-Administered Medications   Medication Dose Route Frequency    nystatin (MYCOSTATIN) 100,000 unit/gram powder   Topical BID    lidocaine 4 % patch 1 Patch  1 Patch TransDERmal Q24H    famotidine (PEPCID) tablet 20 mg  20 mg Oral DAILY    haloperidol lactate (HALDOL) injection 2.5 mg  2.5 mg IntraVENous Q6H PRN    calcium carbonate (TUMS) chewable tablet 200 mg [elemental]  200 mg Oral TID WITH MEALS    sodium chloride (NS) flush 5-40 mL  5-40 mL IntraVENous Q8H    sodium chloride (NS) flush 5-40 mL  5-40 mL IntraVENous PRN    aspirin chewable tablet 81 mg  81 mg Oral DAILY    atorvastatin (LIPITOR) tablet 20 mg  20 mg Oral QHS    clopidogrel (PLAVIX) tablet 75 mg  75 mg Oral DAILY    nitroglycerin (NITROSTAT) tablet 0.4 mg  0.4 mg SubLINGual Q5MIN PRN    sodium chloride (NS) flush 5-40 mL  5-40 mL IntraVENous PRN    acetaminophen (TYLENOL) tablet 650 mg  650 mg Oral Q6H PRN    oxyCODONE-acetaminophen (PERCOCET 7.5) 7.5-325 mg per tablet 1 Tab  1 Tab Oral Q6H PRN    naloxone (NARCAN) injection 0.4 mg  0.4 mg IntraVENous PRN    ondansetron (ZOFRAN) injection 4 mg  4 mg IntraVENous Q4H PRN    magnesium hydroxide (MILK OF MAGNESIA) 400 mg/5 mL oral suspension 30 mL  30 mL Oral DAILY PRN    heparin (porcine) injection 5,000 Units  5,000 Units SubCUTAneous Q12H       Objective:     Vitals:    12/26/19 1616 12/26/19 1938 12/26/19 2253 12/27/19 0314   BP: 125/75 129/76 122/77 103/61   Pulse: 81 90 94 81   Resp: 17 18 18 17   Temp: 97.8 °F (36.6 °C) 98 °F (36.7 °C) 97.6 °F (36.4 °C) 97.8 °F (36.6 °C)   SpO2: 96% 94% 93% 94%   Weight:       Height:             Intake and Output:  Current Shift: No intake/output data recorded. Last three shifts: 12/25 1901 - 12/27 0700  In: 480 [P.O.:480]  Out: -     Physical Exam:   General:  Alert, cooperative   Eyes:  Conjunctivae/corneas clear. Ears:  Normal TMs and external ear canals both ears. Nose: Nares normal. Septum midline. Mouth/Throat: Lips, mucosa, and tongue normal.    Neck:  no JVD. Back:   deferred   Lungs:   Clear to auscultation bilaterally. Heart:  Regular rate and rhythm, S1, S2 normal   Abdomen:   Soft, non-tender. Bowel sounds normal.   Extremities:  Muscle wasting noted    Pulses: 2+ and symmetric all extremities. Skin: Skin color, texture, turgor normal. No rashes or lesions   Lymph nodes: Cervical, supraclavicular, and axillary nodes normal.   Neurologic: No distress today        Lab/Data Review:    No results found for this or any previous visit (from the past 24 hour(s)). Assessment/ Plan: Active Problems:    CAD (coronary artery disease) (7/18/2019)      Debility (12/2/2019)      Dementia ([address] (12/23/2019)    Sepsis secondary to UTI - S/p Vantin    Acute metabolic encephalopathy - Likely from UTI and baseline dementia. Atrophy seen on CT head along with severe microvascular disease. TSH normal. B12, A1C, magnesium, phosphorus and ammonia level benign. I am afraid if patient does not live with a caregiver, he will attempt to drive on his own or do something too physical he can no longer do due to his ability to being so active in the past. This puts not only himself at danger but others as well. Unable to have as fast reflexes or judgement as he once had. Since family cannot care for him, long term placement needed. Chest pain - No further episodes. Troponin negative X3. Hx of CAD, can continue ASA/plavix. Update@ 18:07- Called about patient having anterior chest wall pain radiating to right lateral chest. Patient states the pain is worse than when he had a MI recently. Pain described as pressure. Tums helped relieve chest pain. Mild tenderness to palpation at left lateral chest. No acute changes on EKG. Troponin pending. Patient in no acute distress. Give STAT high dose ASA Possible musculoskeletal component so will give one time dose percocet as well. Heart - RRR, no murmur    Dc once have bed for rehab. Family state to case management they are unable to take patient home safely.      DVT prophylaxis - heparin    Signed By: Isabella Judd,      December 27, 2019

## 2019-12-27 NOTE — PROGRESS NOTES
Problem: Falls - Risk of  Goal: *Absence of Falls  Description  Document Mimi Higgins Fall Risk and appropriate interventions in the flowsheet. Outcome: Progressing Towards Goal  Note: Fall Risk Interventions:  Mobility Interventions: Bed/chair exit alarm, OT consult for ADLs, Patient to call before getting OOB, PT Consult for mobility concerns, PT Consult for assist device competence    Mentation Interventions: Bed/chair exit alarm, Adequate sleep, hydration, pain control, Door open when patient unattended, Evaluate medications/consider consulting pharmacy, More frequent rounding, Toileting rounds, Reorient patient, Update white board    Medication Interventions: Bed/chair exit alarm, Evaluate medications/consider consulting pharmacy, Patient to call before getting OOB, Teach patient to arise slowly    Elimination Interventions: Bed/chair exit alarm, Call light in reach, Patient to call for help with toileting needs, Stay With Me (per policy), Toilet paper/wipes in reach, Toileting schedule/hourly rounds, Urinal in reach    History of Falls Interventions: Bed/chair exit alarm, Consult care management for discharge planning, Door open when patient unattended, Evaluate medications/consider consulting pharmacy, Investigate reason for fall         Problem: Patient Education: Go to Patient Education Activity  Goal: Patient/Family Education  Outcome: Progressing Towards Goal     Problem: Pressure Injury - Risk of  Goal: *Prevention of pressure injury  Description  Document Tyler Scale and appropriate interventions in the flowsheet.   Outcome: Progressing Towards Goal  Note: Pressure Injury Interventions:  Sensory Interventions: Assess changes in LOC, Assess need for specialty bed, Avoid rigorous massage over bony prominences, Check visual cues for pain, Discuss PT/OT consult with provider, Keep linens dry and wrinkle-free, Maintain/enhance activity level, Minimize linen layers, Pressure redistribution bed/mattress (bed Unless I am covering h im today, send it to his staff.    type)    Moisture Interventions: Absorbent underpads, Apply protective barrier, creams and emollients, Limit adult briefs, Maintain skin hydration (lotion/cream), Minimize layers, Moisture barrier, Offer toileting Q_hr    Activity Interventions: Assess need for specialty bed, Increase time out of bed, Pressure redistribution bed/mattress(bed type), PT/OT evaluation    Mobility Interventions: Assess need for specialty bed, Float heels, HOB 30 degrees or less, Pressure redistribution bed/mattress (bed type), PT/OT evaluation    Nutrition Interventions: Document food/fluid/supplement intake    Friction and Shear Interventions: Apply protective barrier, creams and emollients, HOB 30 degrees or less, Lift sheet, Minimize layers                Problem: Patient Education: Go to Patient Education Activity  Goal: Patient/Family Education  Outcome: Progressing Towards Goal     Problem: Urinary Tract Infection - Adult  Goal: *Absence of infection signs and symptoms  Outcome: Progressing Towards Goal     Problem: Patient Education: Go to Patient Education Activity  Goal: Patient/Family Education  Outcome: Progressing Towards Goal     Problem: Nutrition Deficit  Goal: *Optimize nutritional status  Outcome: Progressing Towards Goal     Problem: Patient Education: Go to Patient Education Activity  Goal: Patient/Family Education  Outcome: Progressing Towards Goal

## 2019-12-27 NOTE — PROGRESS NOTES
CM met with one of the pt's son, and CM supervisor to discuss discharge plan. CM provided information regarding the patient's discharge plan, and identified  Resources that have been provided to assist the patient. CM also informed the pt's son, were awaiting approval for LOC and awaiting update with [facility], to identify if the patient will be accepted with a medicaid bed with medicaid pending. CM also assured the patient's son, understood 's explanation of long term placement and where the patient is currently with his discharge plan. CM supervisor provided insight regarding Medicaid process and provided additional support to assist with discharge planning. CM provided pt's son with address of the facility, and informed pt's son that a update will be provided to the family once received. Pt's son was receptive. CM continues to follow patient.

## 2019-12-27 NOTE — PROGRESS NOTES
Pt decided to take Tums this evening. He is complaining of chest pain. VSS are BP-146/73 HR-101 O2-99 RR-20 Temp 97. 6. will make MD aware.

## 2019-12-28 LAB
ATRIAL RATE: 110 BPM
CALCULATED P AXIS, ECG09: 58 DEGREES
CALCULATED R AXIS, ECG10: 30 DEGREES
CALCULATED T AXIS, ECG11: 60 DEGREES
DIAGNOSIS, 93000: NORMAL
P-R INTERVAL, ECG05: 138 MS
Q-T INTERVAL, ECG07: 340 MS
QRS DURATION, ECG06: 84 MS
QTC CALCULATION (BEZET), ECG08: 460 MS
VENTRICULAR RATE, ECG03: 110 BPM

## 2019-12-28 PROCEDURE — 74011250636 HC RX REV CODE- 250/636: Performed by: HOSPITALIST

## 2019-12-28 PROCEDURE — 74011250637 HC RX REV CODE- 250/637: Performed by: NURSE PRACTITIONER

## 2019-12-28 PROCEDURE — 74011250637 HC RX REV CODE- 250/637: Performed by: FAMILY MEDICINE

## 2019-12-28 PROCEDURE — 74011250637 HC RX REV CODE- 250/637: Performed by: INTERNAL MEDICINE

## 2019-12-28 PROCEDURE — 74011250637 HC RX REV CODE- 250/637: Performed by: HOSPITALIST

## 2019-12-28 PROCEDURE — 65270000029 HC RM PRIVATE

## 2019-12-28 RX ADMIN — CLOPIDOGREL BISULFATE 75 MG: 75 TABLET, FILM COATED ORAL at 09:26

## 2019-12-28 RX ADMIN — Medication 10 ML: at 21:47

## 2019-12-28 RX ADMIN — NYSTATIN: 100000 POWDER TOPICAL at 09:30

## 2019-12-28 RX ADMIN — HEPARIN SODIUM 5000 UNITS: 5000 INJECTION INTRAVENOUS; SUBCUTANEOUS at 05:16

## 2019-12-28 RX ADMIN — ATORVASTATIN CALCIUM 20 MG: 10 TABLET, FILM COATED ORAL at 21:47

## 2019-12-28 RX ADMIN — FAMOTIDINE 20 MG: 20 TABLET, FILM COATED ORAL at 09:26

## 2019-12-28 RX ADMIN — HEPARIN SODIUM 5000 UNITS: 5000 INJECTION INTRAVENOUS; SUBCUTANEOUS at 17:35

## 2019-12-28 RX ADMIN — CALCIUM CARBONATE (ANTACID) CHEW TAB 500 MG 200 MG: 500 CHEW TAB at 17:35

## 2019-12-28 RX ADMIN — ASPIRIN 81 MG 324 MG: 81 TABLET ORAL at 09:26

## 2019-12-28 RX ADMIN — CALCIUM CARBONATE (ANTACID) CHEW TAB 500 MG 200 MG: 500 CHEW TAB at 09:26

## 2019-12-28 RX ADMIN — CALCIUM CARBONATE (ANTACID) CHEW TAB 500 MG 200 MG: 500 CHEW TAB at 12:33

## 2019-12-28 NOTE — PROGRESS NOTES
Hourly rounds completed. All needs met. Pt stated \"he felt better this morning than yesterday\". Will give report to the oncoming day shift nurse.

## 2019-12-28 NOTE — PROGRESS NOTES
Progress Note    Patient: Nadege Sun MRN: [id number]  SSN: [id number]    YOB: 1931  Age: 80 y.o. Sex: male      Admit Date: 12/2/2019    LOS: 26 days     Subjective:   F/U sepsis secondary to UTI    87yo admitted 12/2/19 for left chest pain after a fall. Hx of colon cancer, HLD, CAD s/p DAPT. Patient met sepsis criteria on arrival. Found to have UTI. Urine culture growing e coli. Treated with appropriate days of Vantin. WBC 19.4 on arrival. TSH normal. ECHO performed due to fall. ECHO showed EF 55%, RV pressure 30-35mmHg. Patient with intermittent confusion. Atrophy seen on CT head along with severe microvascular disease. Ammonia, electrolytes normal. Vitamin B12 elevated. A1C 5.6. Determined patient has dementia causing confusion. Patient lives alone and has two sons to whom both state they cannot take care of him. Looking into rehab with potential long term placement. 12/27/19 episode of chest pain relieved by Tums. EKG normal. Troponin normal.     No concerns this AM. States he is feeling very well. No chest pain or SOB.     Current Facility-Administered Medications   Medication Dose Route Frequency    aspirin chewable tablet 324 mg  324 mg Oral DAILY    nystatin (MYCOSTATIN) 100,000 unit/gram powder   Topical BID    lidocaine 4 % patch 1 Patch  1 Patch TransDERmal Q24H    famotidine (PEPCID) tablet 20 mg  20 mg Oral DAILY    haloperidol lactate (HALDOL) injection 2.5 mg  2.5 mg IntraVENous Q6H PRN    calcium carbonate (TUMS) chewable tablet 200 mg [elemental]  200 mg Oral TID WITH MEALS    sodium chloride (NS) flush 5-40 mL  5-40 mL IntraVENous Q8H    sodium chloride (NS) flush 5-40 mL  5-40 mL IntraVENous PRN    atorvastatin (LIPITOR) tablet 20 mg  20 mg Oral QHS    clopidogrel (PLAVIX) tablet 75 mg  75 mg Oral DAILY    nitroglycerin (NITROSTAT) tablet 0.4 mg  0.4 mg SubLINGual Q5MIN PRN    sodium chloride (NS) flush 5-40 mL  5-40 mL IntraVENous PRN    acetaminophen (TYLENOL) tablet 650 mg  650 mg Oral Q6H PRN    oxyCODONE-acetaminophen (PERCOCET 7.5) 7.5-325 mg per tablet 1 Tab  1 Tab Oral Q6H PRN    naloxone (NARCAN) injection 0.4 mg  0.4 mg IntraVENous PRN    ondansetron (ZOFRAN) injection 4 mg  4 mg IntraVENous Q4H PRN    magnesium hydroxide (MILK OF MAGNESIA) 400 mg/5 mL oral suspension 30 mL  30 mL Oral DAILY PRN    heparin (porcine) injection 5,000 Units  5,000 Units SubCUTAneous Q12H       Objective:     Vitals:    12/27/19 1948 12/27/19 2308 12/28/19 0438 12/28/19 0812   BP: 122/75 116/72 122/75 158/79   Pulse: 89 78 80 65   Resp: 18 19 19 20   Temp: 98 °F (36.7 °C) 98.2 °F (36.8 °C) 98 °F (36.7 °C) 98.1 °F (36.7 °C)   SpO2: 95% 94% 94% 96%   Weight:       Height:             Intake and Output:  Current Shift: No intake/output data recorded. Last three shifts: No intake/output data recorded. Physical Exam:   General:  Alert, cooperative, making jokes. Eyes:  Conjunctivae/corneas clear. Ears:  Normal TMs and external ear canals both ears. Nose: Nares normal. Septum midline. Mouth/Throat: Lips, mucosa, and tongue normal.    Neck:  no JVD. Back:   deferred   Lungs:   Clear to auscultation bilaterally. Heart:  Regular rate and rhythm, S1, S2 normal   Abdomen:   Soft, non-tender. Bowel sounds normal.   Extremities:  Muscle wasting noted    Pulses: 2+ and symmetric all extremities.    Skin: Skin color, texture, turgor normal. No rashes or lesions   Lymph nodes: Cervical, supraclavicular, and axillary nodes normal.   Neurologic: No distress today        Lab/Data Review:    Recent Results (from the past 24 hour(s))   EKG, 12 LEAD, INITIAL    Collection Time: 12/27/19  5:58 PM   Result Value Ref Range    Ventricular Rate 110 BPM    Atrial Rate 110 BPM    P-R Interval 138 ms    QRS Duration 84 ms    Q-T Interval 340 ms    QTC Calculation (Bezet) 460 ms    Calculated P Axis 58 degrees    Calculated R Axis 30 degrees    Calculated T Axis 60 degrees    Diagnosis       Sinus tachycardia  Otherwise normal ECG  When compared with ECG of 02-DEC-2019 16:03,  Sinus rhythm has replaced Atrial fibrillation  Criteria for Septal infarct are no longer Present  Confirmed by Keily Martinez MD (), HEIDI FAJARDO ([id number]) on 12/28/2019 8:27:40 AM     TROPONIN I    Collection Time: 12/27/19  6:15 PM   Result Value Ref Range    Troponin-I, Qt. <0.02 (L) 0.02 - 0.05 NG/ML       Assessment/ Plan: Active Problems:    CAD (coronary artery disease) (7/18/2019)      Debility (12/2/2019)      Dementia ([facility] [address] (12/23/2019)    Sepsis secondary to UTI - S/p Vantin    Acute metabolic encephalopathy - Likely from UTI and baseline dementia. Atrophy seen on CT head along with severe microvascular disease. TSH normal. B12, A1C, magnesium, phosphorus and ammonia level benign. I am afraid if patient does not live with a caregiver, he will attempt to drive on his own or do something too physical he can no longer do due to his ability to being so active in the past. This puts not only himself at danger but others as well. Unable to have as fast reflexes or judgement as he once had. Since family cannot care for him, long term placement needed. Chest pain - No further episodes. Troponin negative. Hx of CAD, can continue ASA/plavix. Has had recent PCI. Dc once have bed for rehab. Family state to case management they are unable to take patient home safely.      DVT prophylaxis - heparin    Signed By: Khanh Mata DO     December 28, 2019

## 2019-12-28 NOTE — PROGRESS NOTES
Assessments unchanged. Call bell in reach. All needs met this shift. Will continue to monitor until night shift nurse comes on.

## 2019-12-29 PROCEDURE — 74011250637 HC RX REV CODE- 250/637: Performed by: HOSPITALIST

## 2019-12-29 PROCEDURE — 74011250637 HC RX REV CODE- 250/637: Performed by: FAMILY MEDICINE

## 2019-12-29 PROCEDURE — 74011250636 HC RX REV CODE- 250/636: Performed by: HOSPITALIST

## 2019-12-29 PROCEDURE — 65270000029 HC RM PRIVATE

## 2019-12-29 PROCEDURE — 74011250637 HC RX REV CODE- 250/637: Performed by: INTERNAL MEDICINE

## 2019-12-29 PROCEDURE — 74011250637 HC RX REV CODE- 250/637: Performed by: NURSE PRACTITIONER

## 2019-12-29 RX ADMIN — CALCIUM CARBONATE (ANTACID) CHEW TAB 500 MG 200 MG: 500 CHEW TAB at 17:48

## 2019-12-29 RX ADMIN — CLOPIDOGREL BISULFATE 75 MG: 75 TABLET, FILM COATED ORAL at 09:26

## 2019-12-29 RX ADMIN — Medication 10 ML: at 17:55

## 2019-12-29 RX ADMIN — NYSTATIN: 100000 POWDER TOPICAL at 09:00

## 2019-12-29 RX ADMIN — Medication 10 ML: at 22:34

## 2019-12-29 RX ADMIN — ATORVASTATIN CALCIUM 20 MG: 10 TABLET, FILM COATED ORAL at 22:34

## 2019-12-29 RX ADMIN — ASPIRIN 81 MG 324 MG: 81 TABLET ORAL at 09:25

## 2019-12-29 RX ADMIN — FAMOTIDINE 20 MG: 20 TABLET, FILM COATED ORAL at 09:26

## 2019-12-29 RX ADMIN — CALCIUM CARBONATE (ANTACID) CHEW TAB 500 MG 200 MG: 500 CHEW TAB at 11:55

## 2019-12-29 RX ADMIN — HEPARIN SODIUM 5000 UNITS: 5000 INJECTION INTRAVENOUS; SUBCUTANEOUS at 17:51

## 2019-12-29 RX ADMIN — CALCIUM CARBONATE (ANTACID) CHEW TAB 500 MG 200 MG: 500 CHEW TAB at 09:25

## 2019-12-29 RX ADMIN — NYSTATIN: 100000 POWDER TOPICAL at 18:00

## 2019-12-29 NOTE — PROGRESS NOTES
Hourly rounds completed. All needs met. No complaints during the shift. Will give report to the oncoming day shift nurse.

## 2019-12-29 NOTE — PROGRESS NOTES
Progress Note    Patient: Sheila Garcia MRN: [id number]  SSN: [id number]    YOB: 1931  Age: 80 y.o. Sex: male      Admit Date: 12/2/2019    LOS: 27 days     Subjective:   F/U sepsis secondary to UTI    87yo admitted 12/2/19 for left chest pain after a fall. Hx of colon cancer, HLD, CAD s/p DAPT. Patient met sepsis criteria on arrival. Found to have UTI. Urine culture growing e coli. Treated with appropriate days of Vantin. WBC 19.4 on arrival. TSH normal. ECHO performed due to fall. ECHO showed EF 55%, RV pressure 30-35mmHg. Patient with intermittent confusion. Atrophy seen on CT head along with severe microvascular disease. Ammonia, electrolytes normal. Vitamin B12 elevated. A1C 5.6. Determined patient has dementia causing confusion. Patient lives alone and has two sons to whom both state they cannot take care of him. Looking into rehab with potential long term placement. 12/29/19:  Pt is doing well, reports feeling well.  He is eager to know when he can get out of the hospital.  No fever, cough, SOB, chest pain, nausea/vomiting, diarrhea    ROS: 10 point ROS negative except what mentioned above    Current Facility-Administered Medications   Medication Dose Route Frequency    aspirin chewable tablet 324 mg  324 mg Oral DAILY    nystatin (MYCOSTATIN) 100,000 unit/gram powder   Topical BID    lidocaine 4 % patch 1 Patch  1 Patch TransDERmal Q24H    famotidine (PEPCID) tablet 20 mg  20 mg Oral DAILY    haloperidol lactate (HALDOL) injection 2.5 mg  2.5 mg IntraVENous Q6H PRN    calcium carbonate (TUMS) chewable tablet 200 mg [elemental]  200 mg Oral TID WITH MEALS    sodium chloride (NS) flush 5-40 mL  5-40 mL IntraVENous Q8H    sodium chloride (NS) flush 5-40 mL  5-40 mL IntraVENous PRN    atorvastatin (LIPITOR) tablet 20 mg  20 mg Oral QHS    clopidogrel (PLAVIX) tablet 75 mg  75 mg Oral DAILY    nitroglycerin (NITROSTAT) tablet 0.4 mg  0.4 mg SubLINGual Q5MIN PRN    sodium chloride (NS) flush 5-40 mL  5-40 mL IntraVENous PRN    acetaminophen (TYLENOL) tablet 650 mg  650 mg Oral Q6H PRN    oxyCODONE-acetaminophen (PERCOCET 7.5) 7.5-325 mg per tablet 1 Tab  1 Tab Oral Q6H PRN    naloxone (NARCAN) injection 0.4 mg  0.4 mg IntraVENous PRN    ondansetron (ZOFRAN) injection 4 mg  4 mg IntraVENous Q4H PRN    magnesium hydroxide (MILK OF MAGNESIA) 400 mg/5 mL oral suspension 30 mL  30 mL Oral DAILY PRN    heparin (porcine) injection 5,000 Units  5,000 Units SubCUTAneous Q12H       Objective:     Vitals:    12/28/19 1543 12/28/19 1917 12/28/19 2322 12/29/19 0315   BP: 145/78 140/78 136/72 134/70   Pulse: 78 88 80 77   Resp: 20 18 18 18   Temp: 98.8 °F (37.1 °C) 98.4 °F (36.9 °C) 98.4 °F (36.9 °C) 98.3 °F (36.8 °C)   SpO2: 98% 96% 97% 97%   Weight:       Height:             Intake and Output:  Current Shift: No intake/output data recorded. Last three shifts: 12/27 1901 - 12/29 0700  In: -   Out: 700 [Urine:700]    Physical Exam:   General:  Alert, cooperative, making jokes. Eyes:  Conjunctivae/corneas clear. Ears:  Normal TMs and external ear canals both ears. Nose: Nares normal. Septum midline. Mouth/Throat: Lips, mucosa, and tongue normal.    Neck:  no JVD. Back:   deferred   Lungs:   Clear to auscultation bilaterally. Heart:  Regular rate and rhythm, S1, S2 normal   Abdomen:   Soft, non-tender. Bowel sounds normal.   Extremities:  Muscle wasting noted    Pulses: 2+ and symmetric all extremities. Skin: Skin color, texture, turgor normal. No rashes or lesions   Lymph nodes: Cervical, supraclavicular, and axillary nodes normal.   Neurologic: No distress today        Lab/Data Review:    No results found for this or any previous visit (from the past 24 hour(s)). Assessment/ Plan:      Active Problems:    CAD (coronary artery disease) (7/18/2019)      Debility (12/2/2019)      Dementia ([address] (12/23/2019)    Sepsis secondary to UTI - S/p Vantin    Acute metabolic encephalopathy - Likely from UTI and baseline dementia. Atrophy seen on CT head along with severe microvascular disease. TSH normal. B12, A1C, magnesium, phosphorus and ammonia level benign. I am afraid if patient does not live with a caregiver, he will attempt to drive on his own or do something too physical he can no longer do due to his ability to being so active in the past. This puts not only himself at danger but others as well. Unable to have as fast reflexes or judgement as he once had. Since family cannot care for him, long term placement needed. Chest pain - No further episodes. Troponin negative. Hx of CAD, can continue ASA/plavix. Has had recent PCI. Pt is medically stable for discharge. He is awaiting placement.   No medical needs currently    DVT prophylaxis - heparin    Signed By: Kriss George MD     December 29, 2019

## 2019-12-29 NOTE — PROGRESS NOTES
Assessments unchanged. All needs met this shift. Will continue to monitor until night shift nurse comes on.

## 2019-12-30 PROCEDURE — 74011250637 HC RX REV CODE- 250/637: Performed by: HOSPITALIST

## 2019-12-30 PROCEDURE — 74011250637 HC RX REV CODE- 250/637: Performed by: NURSE PRACTITIONER

## 2019-12-30 PROCEDURE — 74011250637 HC RX REV CODE- 250/637: Performed by: FAMILY MEDICINE

## 2019-12-30 PROCEDURE — 97530 THERAPEUTIC ACTIVITIES: CPT

## 2019-12-30 PROCEDURE — 74011000250 HC RX REV CODE- 250: Performed by: NURSE PRACTITIONER

## 2019-12-30 PROCEDURE — 74011250636 HC RX REV CODE- 250/636: Performed by: HOSPITALIST

## 2019-12-30 PROCEDURE — 74011250637 HC RX REV CODE- 250/637: Performed by: INTERNAL MEDICINE

## 2019-12-30 PROCEDURE — 65270000029 HC RM PRIVATE

## 2019-12-30 RX ADMIN — ATORVASTATIN CALCIUM 20 MG: 10 TABLET, FILM COATED ORAL at 22:17

## 2019-12-30 RX ADMIN — FAMOTIDINE 20 MG: 20 TABLET, FILM COATED ORAL at 08:31

## 2019-12-30 RX ADMIN — CLOPIDOGREL BISULFATE 75 MG: 75 TABLET, FILM COATED ORAL at 08:31

## 2019-12-30 RX ADMIN — NYSTATIN: 100000 POWDER TOPICAL at 08:31

## 2019-12-30 RX ADMIN — HEPARIN SODIUM 5000 UNITS: 5000 INJECTION INTRAVENOUS; SUBCUTANEOUS at 05:37

## 2019-12-30 RX ADMIN — CALCIUM CARBONATE (ANTACID) CHEW TAB 500 MG 200 MG: 500 CHEW TAB at 08:31

## 2019-12-30 RX ADMIN — Medication 10 ML: at 13:41

## 2019-12-30 RX ADMIN — CALCIUM CARBONATE (ANTACID) CHEW TAB 500 MG 200 MG: 500 CHEW TAB at 16:39

## 2019-12-30 RX ADMIN — CALCIUM CARBONATE (ANTACID) CHEW TAB 500 MG 200 MG: 500 CHEW TAB at 11:14

## 2019-12-30 RX ADMIN — ASPIRIN 81 MG 324 MG: 81 TABLET ORAL at 08:31

## 2019-12-30 RX ADMIN — NYSTATIN: 100000 POWDER TOPICAL at 17:37

## 2019-12-30 RX ADMIN — HEPARIN SODIUM 5000 UNITS: 5000 INJECTION INTRAVENOUS; SUBCUTANEOUS at 17:38

## 2019-12-30 RX ADMIN — Medication 10 ML: at 05:37

## 2019-12-30 NOTE — PROGRESS NOTES
Hourly rounds completed. Pt sleeping. No c/o pain. Call light in reach. Will give report to oncoming RN.      Peripheral IV 12/08/19 Anterior;Right Forearm (Active)   Site Assessment Clean, dry, & intact 12/30/2019  4:54 AM   Phlebitis Assessment 0 12/30/2019  4:54 AM   Infiltration Assessment 0 12/30/2019  4:54 AM   Dressing Status Clean, dry, & intact 12/30/2019  4:54 AM   Dressing Type Tape;Transparent 12/30/2019  4:54 AM   Hub Color/Line Status Flushed 12/30/2019  4:54 AM   Alcohol Cap Used No 12/30/2019  4:54 AM

## 2019-12-30 NOTE — PROGRESS NOTES
CM screened chart to assist with discharge planning. CM awaiting update regarding pt's LOC application. CM continues to follow pt.

## 2019-12-30 NOTE — PROGRESS NOTES
Progress Note    Patient: Christiano Sommers MRN: [id number]  SSN: [id number]    YOB: 1931  Age: 80 y.o. Sex: male      Admit Date: 12/2/2019    LOS: 28 days     Subjective:   F/U sepsis secondary to UTI    89yo admitted 12/2/19 for left chest pain after a fall. Hx of colon cancer, HLD, CAD s/p DAPT. Patient met sepsis criteria on arrival. Found to have UTI. Urine culture growing e coli. Treated with appropriate days of Vantin. WBC 19.4 on arrival. TSH normal. ECHO performed due to fall. ECHO showed EF 55%, RV pressure 30-35mmHg. Patient with intermittent confusion. Atrophy seen on CT head along with severe microvascular disease. Ammonia, electrolytes normal. Vitamin B12 elevated. A1C 5.6. Determined patient has dementia causing confusion. Patient lives alone and has two sons to whom both state they cannot take care of him. Looking into rehab with potential long term placement. 12/30/19:  Pt is doing well, reports feeling well. He is eager to know when he can get out of the hospital.  No fever, cough, SOB. Told him he can step outside with a CNA to have a breath of fresh air every day.     ROS: 10 point ROS negative except what mentioned above    Current Facility-Administered Medications   Medication Dose Route Frequency    aspirin chewable tablet 324 mg  324 mg Oral DAILY    nystatin (MYCOSTATIN) 100,000 unit/gram powder   Topical BID    lidocaine 4 % patch 1 Patch  1 Patch TransDERmal Q24H    famotidine (PEPCID) tablet 20 mg  20 mg Oral DAILY    haloperidol lactate (HALDOL) injection 2.5 mg  2.5 mg IntraVENous Q6H PRN    calcium carbonate (TUMS) chewable tablet 200 mg [elemental]  200 mg Oral TID WITH MEALS    sodium chloride (NS) flush 5-40 mL  5-40 mL IntraVENous Q8H    sodium chloride (NS) flush 5-40 mL  5-40 mL IntraVENous PRN    atorvastatin (LIPITOR) tablet 20 mg  20 mg Oral QHS    clopidogrel (PLAVIX) tablet 75 mg  75 mg Oral DAILY    nitroglycerin (NITROSTAT) tablet 0.4 mg  0.4 mg SubLINGual Q5MIN PRN    sodium chloride (NS) flush 5-40 mL  5-40 mL IntraVENous PRN    acetaminophen (TYLENOL) tablet 650 mg  650 mg Oral Q6H PRN    oxyCODONE-acetaminophen (PERCOCET 7.5) 7.5-325 mg per tablet 1 Tab  1 Tab Oral Q6H PRN    naloxone (NARCAN) injection 0.4 mg  0.4 mg IntraVENous PRN    ondansetron (ZOFRAN) injection 4 mg  4 mg IntraVENous Q4H PRN    magnesium hydroxide (MILK OF MAGNESIA) 400 mg/5 mL oral suspension 30 mL  30 mL Oral DAILY PRN    heparin (porcine) injection 5,000 Units  5,000 Units SubCUTAneous Q12H       Objective:     Vitals:    12/29/19 1947 12/29/19 2353 12/30/19 0410 12/30/19 0746   BP: 141/87 142/89 118/69 116/73   Pulse: 86 86 83 86   Resp: 18 18 18 17   Temp: 98.3 °F (36.8 °C) 98.3 °F (36.8 °C) 98.2 °F (36.8 °C) 98.4 °F (36.9 °C)   SpO2: 93% 96% 92% 94%   Weight:       Height:             Intake and Output:  Current Shift: 12/30 0701 - 12/30 1900  In: 240 [P.O.:240]  Out: -   Last three shifts: 12/28 1901 - 12/30 0700  In: 480 [P.O.:480]  Out: 925 [Urine:925]    Physical Exam:   General:  Alert, cooperative    Eyes:  Conjunctivae/corneas clear. Ears:  Normal TMs and external ear canals both ears. Nose: Nares normal. Septum midline. Mouth/Throat: Lips, mucosa, and tongue normal.    Neck:  no JVD. Back:   deferred   Lungs:   Clear to auscultation bilaterally. Heart:  Regular rate and rhythm, S1, S2 normal   Abdomen:   Soft, non-tender. Bowel sounds normal.   Extremities:  Muscle wasting noted    Pulses: 2+ and symmetric all extremities. Skin: Skin color, texture, turgor normal. No rashes or lesions       Neurologic:  Nonfocal exam.  Moves all extremities       Lab/Data Review:    No results found for this or any previous visit (from the past 24 hour(s)). Assessment/ Plan:      Active Problems:    CAD (coronary artery disease) (7/18/2019)      Debility (12/2/2019)      Dementia ([facility] [address] (12/23/2019)    Sepsis secondary to UTI - S/p antibiotic. Acute metabolic encephalopathy - Likely from UTI and baseline dementia. Atrophy seen on CT head along with severe microvascular disease. TSH normal. B12, A1C, magnesium, phosphorus and ammonia level benign. I am afraid if patient does not live with a caregiver, he will attempt to drive on his own or do something too physical he can no longer do due to his ability to being so active in the past. This puts not only himself at danger but others as well. Unable to have as fast reflexes or judgement as he once had. Since family cannot care for him, long term placement needed. Chest pain - No further episodes. Troponin negative. Hx of CAD, can continue ASA/plavix. Has had recent PCI. Continue appropriate home meds. Pt is medically stable for discharge. He is awaiting placement.       DVT prophylaxis - heparin    Signed By: Nessa Obando MD     December 30, 2019

## 2019-12-30 NOTE — PROGRESS NOTES
Problem: Mobility Impaired (Adult and Pediatric)  Goal: *Acute Goals and Plan of Care  Description  LTG: (reviewed and updated 12/20/19)  (1.)Mr. Sandoval Christine will move from supine to sit and sit to supine, scoot up and down and roll side to side INDEPENDENTLY with bed flat within 7 treatment day(s). (2.)Mr. Sandoval Christine will transfer from bed to chair and chair to bed INDEPENDENTLY within 7 treatment day(s). (3.)Mr. Sandoval Christine will ambulate INDEPENDENTLY for 1500+ feet demonstrating good balance within 7 treatment day(s). (4.)Mr. Sandoval Christine will ascend and descend 15 steps with SUPERVISION using handrail(s) within 7 days. (5.)Mr. Sandoval Christine will demonstrate independence with seated and standing exercises per HEP within 7 days. ________________________________________________________________________________________________   Outcome: Progressing Towards Goal     PHYSICAL THERAPY: Daily Note and AM 12/30/2019  INPATIENT: PT Visit Days : 3  Payor: Kat Lincoln / [facility] / Product Type: [facility] Care Medicare /       NAME/AGE/GENDER: John Florian is a 80 y.o. male   PRIMARY DIAGNOSIS: SIRS (systemic inflammatory response syndrome) ([facility]) [R65.10]  Acute renal failure (ARF) ([facility] [N17.9]  Dehydration [E86.0]  Debility [R53.81] Sepsis ([facility] Sepsis ([facility]       ICD-10: Treatment Diagnosis:    · Generalized Muscle Weakness (M62.81)  · Difficulty in walking, Not elsewhere classified (R26.2)  · Other abnormalities of gait and mobility (R26.89)  · History of falling (Z91.81)   Precaution/Allergies:  Patient has no known allergies. ASSESSMENT:     Mr. Sandoval Christine is supine on contact and is very agreeable to work with therapy. Supine > sit with SBA. Able to perform dynamic and static standing activities with SBA to perform dressing - did have initial poor standing balance with first attempt, but able to correct with minimal assist from PT.   Pt performed static and dynamic standing balance activites in room with CGA/SBA. Pt ambulated x1200 ft in hallway with no use of assistive device. Upon returning to room pt sitting up in chair, alarm activated for safety, all needs within reach, RN Notified. Gonsalo Fontanez continues to make great progress towards therapy goals with gt distance, did not use AD and seemed to have improved balance control even with distractions. Does need to have stair climbing and higher level balance activities addressed. He is limited by cognitive status. Unsafe to return home alone and will need 24/7 assist.     This section established at most recent assessment   PROBLEM LIST (Impairments causing functional limitations):  1. Decreased Strength  2. Decreased ADL/Functional Activities  3. Decreased Transfer Abilities  4. Decreased Ambulation Ability/Technique  5. Decreased Balance  6. Increased Pain  7. Decreased Activity Tolerance  8. Decreased Cognition   INTERVENTIONS PLANNED: (Benefits and precautions of physical therapy have been discussed with the patient.)  1. Balance Exercise  2. Bed Mobility  3. Gait Training  4. Home Exercise Program (HEP)  5. Therapeutic Activites  6. Therapeutic Exercise/Strengthening  7. Transfer Training     TREATMENT PLAN: Frequency/Duration: 3 times a week for duration of hospital stay  Rehabilitation Potential For Stated Goals: Good     REHAB RECOMMENDATIONS (at time of discharge pending progress):    Placement: It is my opinion, based on this patient's performance to date, that Mr. Gilmer Mccoy may benefit from intensive therapy at a [address] [place] [address] after discharge due to the functional deficits listed above that are likely to improve with skilled rehabilitation and concerns that he/she may be unsafe to be unsupervised at home due to safety concerns for home, fall risk with mobility.   Equipment:    tbd               HISTORY:   History of Present Injury/Illness (Reason for Referral):  Per H&P, \"Pt reported that he fell hitting his left chest on a cardboard box. Since the fall, he has noticed left sided chest pain, difficulty in using left arm due to pain in left shoulder. He denies heart burn, nausea/vomiting, head trauma, seizure like episode, urinary symptoms, diarrhea, chills, fever, abdominal pain, headache, palpitations. Pain is left sided, retrosternal, 10/10 severity, dull, intermittent, no aggravating or alleviating factors. ER work up showed WBC 19K, Cr 1.58 (baseline 1.12-1.2), HR >110 with sinus tachycardia on EKG. CT chest showed moderate sized hiatal hernia with minimal basilar atelectasis\"  Past Medical History/Comorbidities:   Mr. Oliver Onofre  has a past medical history of Colon cancer ([facility] (01/2012) and Hiatal hernia. Mr. Oliver Onofre  has no past surgical history on file. Social History/Living Environment:   Home Environment: Private residence  One/Two Story Residence: Two story  # of Interior Steps: 24  Lift Chair Available: No  Living Alone: Yes  Support Systems: Child(margi), Family member(s)  Patient Expects to be Discharged to[de-identified] Private residence  Current DME Used/Available at Home: None  Tub or Shower Type: Shower  Prior Level of Function/Work/Activity:  Lives alone in two story home. Independent, active at baseline without use of any DME. Does not drive. Denies falls.     Number of Personal Factors/Comorbidities that affect the Plan of Care: 1-2: MODERATE COMPLEXITY   EXAMINATION:   Most Recent Physical Functioning:   Gross Assessment:                  Posture:     Balance:  Sitting: Intact  Sitting - Static: Good (unsupported)  Standing: Impaired  Standing - Static: Good  Standing - Dynamic : Fair(+) Bed Mobility:  Rolling: Supervision  Supine to Sit: Stand-by assistance  Scooting: Stand-by assistance  Wheelchair Mobility:     Transfers:  Sit to Stand: Stand-by assistance  Stand to Sit: Stand-by assistance  Bed to Chair: Stand-by assistance  Interventions: Safety awareness training;Verbal cues  Duration: 15 Minutes  Gait:     Base of Support: Center of gravity altered  Step Length: Left shortened;Right shortened  Gait Abnormalities: Trunk sway increased;Decreased step clearance; Path deviations  Distance (ft): 1200 Feet (ft)  Assistive Device: (none)  Ambulation - Level of Assistance: Stand-by assistance  Interventions: Safety awareness training;Verbal cues; Tactile cues      Body Structures Involved:  1. Muscles Body Functions Affected:  1. Movement Related Activities and Participation Affected:  1. General Tasks and Demands  2. Mobility  3. Domestic Life  4. Community, Social and [place]   Number of elements that affect the Plan of Care: 4+: HIGH COMPLEXITY   CLINICAL PRESENTATION:   Presentation: Stable and uncomplicated: LOW COMPLEXITY   CLINICAL DECISION MAKIN [facility] Inpatient Short Form  How much difficulty does the patient currently have. .. Unable A Lot A Little None   1. Turning over in bed (including adjusting bedclothes, sheets and blankets)? [] 1   [] 2   [] 3   [x] 4   2. Sitting down on and standing up from a chair with arms ( e.g., wheelchair, bedside commode, etc.)   [] 1   [] 2   [] 3   [x] 4   3. Moving from lying on back to sitting on the side of the bed? [] 1   [] 2   [] 3   [x] 4   How much help from another person does the patient currently need. .. Total A Lot A Little None   4. Moving to and from a bed to a chair (including a wheelchair)? [] 1   [] 2   [x] 3   [] 4   5. Need to walk in hospital room? [] 1   [] 2   [x] 3   [] 4   6. Climbing 3-5 steps with a railing? [] 1   [x] 2   [] 3   [] 4   © , Trustees of [address] [place] [address] [address] [address], under license to [employer]. All rights reserved      Score:  Initial: 21 Most Recent: 20 (Date: 19 )    Interpretation of Tool:  Represents activities that are increasingly more difficult (i.e. Bed mobility, Transfers, Gait).     Medical Necessity:     · Patient demonstrates · good  ·  rehab potential due to higher previous functional level. Reason for Services/Other Comments:  · Patient   · continues to demonstrate capacity to improve strength, balance, activity tolerance which will   · increase independence, decrease amount of assistance required from caregiver, and increase safety  · . Use of outcome tool(s) and clinical judgement create a POC that gives a: Clear prediction of patient's progress: LOW COMPLEXITY        TREATMENT:      Pre-treatment Symptoms/Complaints:  Talkative and pleasant, telling jokes  Pain: Initial:   Pain Intensity 1: 0  Post Session:  0/10 post session in chair     Therapeutic Activity: (  15 Minutes ):  Therapeutic activities including bed mobility, sit <> stand transfers, sitting and standing static/dynamic activities, donning shoes and pants, ambulation on level ground in room and hallway working on safety to improve mobility, strength, balance, and activity tolerance . Required minimal Safety awareness training;Verbal cues; Tactile cues to promote static and dynamic balance in standing and promote coordination of bilateral, lower extremity(s). Date:  12/16/19 Date:   Date:     Activity/Exercise Parameters Parameters Parameters   Seated marching X 20 B A     Seated LAQ X 20 B A     Seated AP X 20 B A     Mini squats from chair  X 10 B A                         Braces/Orthotics/Lines/Etc:   · none  Treatment/Session Assessment:    · Response to Treatment:  No complaints, works well with therapy  · Interdisciplinary Collaboration:   o Physical Therapist  o Registered Nurse  · After treatment position/precautions:   o Up in chair  o Bed alarm/tab alert on  o Bed/Chair-wheels locked  o Bed in low position  o Call light within reach  o RN notified   · Compliance with Program/Exercises: Will assess as treatment progresses  · Recommendations/Intent for next treatment session:   \"Next visit will focus on advancements to more challenging activities and reduction in assistance provided\".     Total Treatment Duration:  PT Patient Time In/Time Out  Time In: 1125  Time Out: [address]

## 2019-12-30 NOTE — PROGRESS NOTES
Problem: Falls - Risk of  Goal: *Absence of Falls  Description  Document Tessy Peña Fall Risk and appropriate interventions in the flowsheet. Outcome: Progressing Towards Goal  Note: Fall Risk Interventions:  Mobility Interventions: Communicate number of staff needed for ambulation/transfer, Patient to call before getting OOB, PT Consult for mobility concerns, Bed/chair exit alarm    Mentation Interventions: Bed/chair exit alarm, Adequate sleep, hydration, pain control, Door open when patient unattended, Reorient patient    Medication Interventions: Evaluate medications/consider consulting pharmacy, Patient to call before getting OOB, Bed/chair exit alarm    Elimination Interventions: Bed/chair exit alarm, Call light in reach, Toilet paper/wipes in reach    History of Falls Interventions: Door open when patient unattended, Evaluate medications/consider consulting pharmacy, Investigate reason for fall, Room close to nurse's station, Bed/chair exit alarm         Problem: Pressure Injury - Risk of  Goal: *Prevention of pressure injury  Description  Document Tyler Scale and appropriate interventions in the flowsheet.   Outcome: Progressing Towards Goal  Note: Pressure Injury Interventions:  Sensory Interventions: Assess changes in LOC, Check visual cues for pain, Maintain/enhance activity level, Pressure redistribution bed/mattress (bed type)    Moisture Interventions: Limit adult briefs, Minimize layers    Activity Interventions: Increase time out of bed, PT/OT evaluation    Mobility Interventions: PT/OT evaluation, Pressure redistribution bed/mattress (bed type)    Nutrition Interventions: Document food/fluid/supplement intake    Friction and Shear Interventions: HOB 30 degrees or less      Problem: Nutrition Deficit  Goal: *Optimize nutritional status  Outcome: Progressing Towards Goal   Problem: Urinary Tract Infection - Adult  Goal: *Absence of infection signs and symptoms  Outcome: Resolved/Met

## 2019-12-30 NOTE — PROGRESS NOTES
Problem: Self Care Deficits Care Plan (Adult)  Goal: *Acute Goals and Plan of Care (Insert Text)  Description  1. Patient will complete lower body bathing and dressing with supervision and adaptive equipment as needed. 2. Patient will complete toileting with supervision. 3. Patient will tolerate 30 minutes of OT treatment with 2-3 rest breaks to increase activity tolerance for ADLs. 4. Patient will complete functional transfers with supervision and adaptive equipment as needed. 5. Patient will complete functional mobility for household distances with supervision and safe use of adaptive device to decrease risk for falls. 6. Patient will participate in 10 minutes of therapeutic exercises to increase strength for ADL/functional transfers. Timeframe: 7 visits      Outcome: Progressing Towards Goal     OCCUPATIONAL THERAPY: Daily Note and PM    12/30/2019  INPATIENT: OT Visit Days: 5  Payor: Lawson Call / Plan: [facility] MEDICARE CHOICE PPO/PFFS / Product Type: Managed Care Medicare /      NAME/AGE/GENDER: Cecelia Burrows is a 80 y.o. male   PRIMARY DIAGNOSIS:  [facility] [R65.10]  Acute renal failure (ARF) ([facility] [N17.9]  Dehydration [E86.0]  Debility [R53.81] Sepsis ([facility] Sepsis ([facility]       ICD-10: Treatment Diagnosis:    · Generalized Muscle Weakness (M62.81)  · Other lack of cordination (R27.8)  · History of falling (Z91.81)   Precautions/Allergies:     Patient has no known allergies. ASSESSMENT:     Mr. Brigida Weston presents up in the chair upon arrival. Pt stood with SBA and completed four laps in the hallway exceeding house hold distances. Pt need verbal cues to pace himself at times and has some decreased insight into deficits. Good effort. Continue OT POC. This section established at most recent assessment   PROBLEM LIST (Impairments causing functional limitations):  1. Decreased Strength  2.  Decreased ADL/Functional Activities  3. Decreased Transfer Abilities  4. Decreased Ambulation Ability/Technique  5. Decreased Balance  6. Decreased Activity Tolerance  7. Increased Fatigue  8. Decreased Flexibility/Joint Mobility  9. Decreased [place] with Home Exercise Program  10. Decreased Cognition   INTERVENTIONS PLANNED: (Benefits and precautions of occupational therapy have been discussed with the patient.)  1. Activities of daily living training  2. Adaptive equipment training  3. Balance training  4. Clothing management  5. Cognitive training  6. Donning&doffing training  7. Neuromuscular re-eduation  8. Therapeutic activity  9. Therapeutic exercise     TREATMENT PLAN: Frequency/Duration: Follow patient 3 times per week to address above goals. Rehabilitation Potential For Stated Goals: Good     REHAB RECOMMENDATIONS (at time of discharge pending progress):    Placement: It is my opinion, based on this patient's performance to date, that Mr. Natty Chauhan may benefit from intensive therapy at [address] after discharge due to the functional deficits listed above that are likely to improve with skilled rehabilitation and concerns that he/she may be unsafe to be unsupervised at home due to risk for falls. Pt may also benefit from [facility]. Equipment:    TBD               OCCUPATIONAL PROFILE AND HISTORY:   History of Present Injury/Illness (Reason for Referral):  See H&P  Past Medical History/Comorbidities:   Mr. Natty Chauhan  has a past medical history of Colon cancer ([facility] (01/2012) and Hiatal hernia. Mr. Natty Chauhan  has no past surgical history on file.   Social History/Living Environment:   Home Environment: Private residence  One/Two Story Residence: Two story  # of Interior Steps: 24  Lift Chair Available: No  Living Alone: Yes  Support Systems: Child(margi), Family member(s)  Patient Expects to be Discharged to[de-identified] Private residence  Current DME Used/Available at Home: None  Tub or Shower Type: Shower  Prior Level of Function/Work/Activity:  Pt lives alone at baseline and reports that typically he is independent with ADL and functional mobility. Pt reports that he recently had a fall at home over a tennis ball falling on his L side with pain in his L shoulder, ribcage, and L LE  Personal Factors:          Social Background:  Lives alone        Past/Current Experience:  hx of falls        Overall Behavior:  confused; decrease insight to deficits; poor safety. Number of Personal Factors/Comorbidities that affect the Plan of Care: Extensive review of physical, cognitive, and psychosocial performance (3+):  HIGH COMPLEXITY   ASSESSMENT OF OCCUPATIONAL PERFORMANCE[de-identified]   Activities of Daily Living:   Basic ADLs (From Assessment) Complex ADLs (From Assessment)   Feeding: Supervision  Oral Facial Hygiene/Grooming: Stand-by assistance  Bathing: Moderate assistance  Upper Body Dressing: Minimum assistance  Lower Body Dressing: Moderate assistance  Toileting: Moderate assistance Instrumental ADL  Meal Preparation: Total assistance  Homemaking: Total assistance   Grooming/Bathing/Dressing Activities of Daily Living                             Bed/Mat Mobility  Rolling: Supervision  Supine to Sit: Stand-by assistance  Sit to Stand: Stand-by assistance  Stand to Sit: Stand-by assistance  Bed to Chair: Stand-by assistance  Scooting: Stand-by assistance     Most Recent Physical Functioning:   Gross Assessment:                  Posture:  Posture (WDL): Exceptions to WDL  Posture Assessment:  Forward head, Rounded shoulders, Trunk flexion  Balance:  Sitting: Intact  Sitting - Static: Good (unsupported)  Standing: Impaired  Standing - Static: Good  Standing - Dynamic : Fair(+) Bed Mobility:  Rolling: Supervision  Supine to Sit: Stand-by assistance  Scooting: Stand-by assistance  Wheelchair Mobility:     Transfers:  Sit to Stand: Stand-by assistance  Stand to Sit: Stand-by assistance  Bed to Chair: Stand-by assistance  Interventions: Safety awareness training;Verbal cues  Duration: 15 Minutes            Patient Vitals for the past 6 hrs:   BP BP Patient Position SpO2 Pulse   19 1124 128/77 Head of bed elevated (Comment degrees) 93 % 82       Mental Status  Neurologic State: Alert, Appropriate for age  Orientation Level: Oriented X4  Cognition: Appropriate for age attention/concentration, Appropriate safety awareness, Follows commands  Perception: Appears intact  Perseveration: Perseverates during conversation  Safety/Judgement: Decreased awareness of need for safety, Decreased insight into deficits, Fall prevention                          Physical Skills Involved:  1. Range of Motion  2. Balance  3. Strength  4. Activity Tolerance Cognitive Skills Affected (resulting in the inability to perform in a timely and safe manner):  1. Executive Function  2. Short Term Recall  3. Sustained Attention Psychosocial Skills Affected:  1. Habits/Routines  2. Self-Awareness   Number of elements that affect the Plan of Care: 5+:  HIGH COMPLEXITY   CLINICAL DECISION MAKIN[address] [place] [id number] AM-PAC 6 Clicks   Daily Activity Inpatient Short Form  How much help from another person does the patient currently need. .. Total A Lot A Little None   1. Putting on and taking off regular lower body clothing? [] 1   [x] 2   [] 3   [] 4   2. Bathing (including washing, rinsing, drying)? [] 1   [x] 2   [] 3   [] 4   3. Toileting, which includes using toilet, bedpan or urinal?   [] 1   [x] 2   [] 3   [] 4   4. Putting on and taking off regular upper body clothing? [] 1   [] 2   [x] 3   [] 4   5. Taking care of personal grooming such as brushing teeth? [] 1   [] 2   [x] 3   [] 4   6. Eating meals? [] 1   [] 2   [x] 3   [] 4   © , Trustees of [address] [place] [address], under license to [employer].  All rights reserved      Score:  Initial: 15 Most Recent: X (Date: -- )    Interpretation of Tool:  Represents activities that are increasingly more difficult (i.e. Bed mobility, Transfers, Gait). Medical Necessity:     · Patient demonstrates   · good  ·  rehab potential due to higher previous functional level. Reason for Services/Other Comments:  · Patient continues to require skilled intervention due to   · Decreased independence with ADL/functional transfers   · . Use of outcome tool(s) and clinical judgement create a POC that gives a: LOW COMPLEXITY         TREATMENT:   (In addition to Assessment/Re-Assessment sessions the following treatments were rendered)     Pre-treatment Symptoms/Complaints:    Pain: Initial:   Pain Intensity 1: 0  Post Session:  0/10   Therapeutic Activity: (25 minutes): Therapeutic activities including Ambulation on level ground to improve mobility, strength and balance. Required minimal Safety awareness training;Verbal cues; Tactile cues to promote static and dynamic balance in standing. Date:  12/19/19 Date:   Date:     Activity/Exercise Parameters Parameters Parameters   Shoulder flexion/extension 2 sets of 25 reps with red theraband     Shoulder horizontal add/abb 2 sets of 25 reps with red theraband     Punches 2 sets of 25 reps with red theraband     Elbow flexion/extension 2 sets of 25 reps with red theraband     Tricep extension 2 sets of 25 reps with red theraband                               Braces/Orthotics/Lines/Etc:   · O2 Device: Room air  Treatment/Session Assessment:    · Response to Treatment:  Perseverates on talking  · Interdisciplinary Collaboration:   o Certified Occupational Therapy Assistant  o Registered Nurse  · After treatment position/precautions:   o Up in chair  o Bed/Chair-wheels locked  o Call light within reach  o RN notified   · Compliance with Program/Exercises: Will assess as treatment progresses. · Recommendations/Intent for next treatment session: \"Next visit will focus on advancements to more challenging activities and reduction in assistance provided\".   Total Treatment Duration:  OT Patient Time In/Time Out  Time In: 1300  Time Out: [facility] Jenni Rawls

## 2019-12-31 PROCEDURE — 65270000029 HC RM PRIVATE

## 2019-12-31 PROCEDURE — 74011250637 HC RX REV CODE- 250/637: Performed by: NURSE PRACTITIONER

## 2019-12-31 PROCEDURE — 74011250637 HC RX REV CODE- 250/637: Performed by: FAMILY MEDICINE

## 2019-12-31 PROCEDURE — 74011250636 HC RX REV CODE- 250/636: Performed by: HOSPITALIST

## 2019-12-31 PROCEDURE — 74011250637 HC RX REV CODE- 250/637: Performed by: HOSPITALIST

## 2019-12-31 PROCEDURE — 74011250637 HC RX REV CODE- 250/637: Performed by: INTERNAL MEDICINE

## 2019-12-31 RX ADMIN — Medication 10 ML: at 13:57

## 2019-12-31 RX ADMIN — HEPARIN SODIUM 5000 UNITS: 5000 INJECTION INTRAVENOUS; SUBCUTANEOUS at 17:20

## 2019-12-31 RX ADMIN — CLOPIDOGREL BISULFATE 75 MG: 75 TABLET, FILM COATED ORAL at 09:18

## 2019-12-31 RX ADMIN — NYSTATIN: 100000 POWDER TOPICAL at 17:22

## 2019-12-31 RX ADMIN — NYSTATIN: 100000 POWDER TOPICAL at 09:19

## 2019-12-31 RX ADMIN — ASPIRIN 81 MG 324 MG: 81 TABLET ORAL at 09:19

## 2019-12-31 RX ADMIN — CALCIUM CARBONATE (ANTACID) CHEW TAB 500 MG 200 MG: 500 CHEW TAB at 09:18

## 2019-12-31 RX ADMIN — ATORVASTATIN CALCIUM 20 MG: 10 TABLET, FILM COATED ORAL at 22:14

## 2019-12-31 RX ADMIN — FAMOTIDINE 20 MG: 20 TABLET, FILM COATED ORAL at 09:18

## 2019-12-31 RX ADMIN — HEPARIN SODIUM 5000 UNITS: 5000 INJECTION INTRAVENOUS; SUBCUTANEOUS at 05:31

## 2019-12-31 RX ADMIN — CALCIUM CARBONATE (ANTACID) CHEW TAB 500 MG 200 MG: 500 CHEW TAB at 17:19

## 2019-12-31 NOTE — PROGRESS NOTES
CM received a call from [facility] with LOC, requesting additional clinical documentation for pt to obtain LOC. CM will fax update clinical documentation and PASARR form.

## 2019-12-31 NOTE — PROGRESS NOTES
Patient requires supervision with transferring. Patient requires supervision with ambulation. Patient requires partial bathing assistance. Patient requires partial assistance with dressing. Patient requires partial assistance with toileting. Patient requires supervision for eating. Continence of bowl/bladder with supervision for transfer. Per nursing staff, PT/OT staff.

## 2020-01-01 PROCEDURE — 74011250637 HC RX REV CODE- 250/637: Performed by: NURSE PRACTITIONER

## 2020-01-01 PROCEDURE — 74011250636 HC RX REV CODE- 250/636: Performed by: HOSPITALIST

## 2020-01-01 PROCEDURE — 74011250637 HC RX REV CODE- 250/637: Performed by: FAMILY MEDICINE

## 2020-01-01 PROCEDURE — 65270000029 HC RM PRIVATE

## 2020-01-01 PROCEDURE — 74011250637 HC RX REV CODE- 250/637: Performed by: INTERNAL MEDICINE

## 2020-01-01 PROCEDURE — 74011250637 HC RX REV CODE- 250/637: Performed by: HOSPITALIST

## 2020-01-01 RX ADMIN — HEPARIN SODIUM 5000 UNITS: 5000 INJECTION INTRAVENOUS; SUBCUTANEOUS at 18:13

## 2020-01-01 RX ADMIN — CLOPIDOGREL BISULFATE 75 MG: 75 TABLET, FILM COATED ORAL at 08:25

## 2020-01-01 RX ADMIN — ATORVASTATIN CALCIUM 20 MG: 10 TABLET, FILM COATED ORAL at 23:18

## 2020-01-01 RX ADMIN — CALCIUM CARBONATE (ANTACID) CHEW TAB 500 MG 200 MG: 500 CHEW TAB at 18:13

## 2020-01-01 RX ADMIN — ASPIRIN 81 MG 324 MG: 81 TABLET ORAL at 08:25

## 2020-01-01 RX ADMIN — FAMOTIDINE 20 MG: 20 TABLET, FILM COATED ORAL at 08:26

## 2020-01-01 RX ADMIN — CALCIUM CARBONATE (ANTACID) CHEW TAB 500 MG 200 MG: 500 CHEW TAB at 08:25

## 2020-01-01 NOTE — PROGRESS NOTES
Nutrition follow-up:    Assessment:   Food/Nutrition Patient History:  Admitted with sepsis (UTI), dehydration, ARF, acute cystitis, AMS. PMH remarkable for HH, colon ca, DLP, CAD and mild dementia. Intermittently confused. Remains hospitalized at this time as case management is attempting to secure safe placement. Pt is up to chair eating lunch at RD visit. He is alert and oriented to person, place and time. He is pleasant and compliment everyone checking on him and the care her is receiving as \"he waits to get a place to go. \"  He orders meals with the PDA and selects from options periodically. He recalls drinking ensure, no servings in room. He denies any issues with po intake at this time. DIET REGULAR  DIET NUTRITIONAL SUPPLEMENTS Dinner; Ensure Estefani      Anthropometrics:Height: 5' 11\" (180.3 cm),  Weight: 68 kg (149 lb 14.4 oz), Weight Source: Standing scale (comment), Body mass index is 20.91 kg/m². BMI class of Underweight (Age >65 and BMI <22)   Last 3 Recorded Weights in this Encounter    12/02/19 0930 12/18/19 0422   Weight: 68.9 kg (152 lb) 68 kg (149 lb 14.4 oz)      Macronutrient needs: 69 kg Admission weight  EER:  0758-8385 kcal /day (25-30 kcal/kg)  EPR:  69-86 grams protein/day (1-1.25 grams/kg)    Intake/Comparative Standards: Recorded meal(s): % with majority of meals recorded at 50-75%,  Pt continues to potentially meet ~% estimated needs for kcal and protein based on recorded data and RD observation of trays. Intervention:  Meals and snacks: Continue current diet. Nutrition Supplement Therapy: Continue Ensure Enlive once daily. Discharge Nutrition Plan: If pt continues with variable po would benefit from continuing ensure enlive once daily.      [place], [address], [facility]

## 2020-01-01 NOTE — PROGRESS NOTES
Pt rested quietly in room all day. Rounds performed. All needs met. Will continue to monitor until oncoming RN takes over.

## 2020-01-02 PROCEDURE — 74011250637 HC RX REV CODE- 250/637: Performed by: FAMILY MEDICINE

## 2020-01-02 PROCEDURE — 74011250636 HC RX REV CODE- 250/636: Performed by: HOSPITALIST

## 2020-01-02 PROCEDURE — 97530 THERAPEUTIC ACTIVITIES: CPT

## 2020-01-02 PROCEDURE — 74011250637 HC RX REV CODE- 250/637: Performed by: INTERNAL MEDICINE

## 2020-01-02 PROCEDURE — 65270000029 HC RM PRIVATE

## 2020-01-02 PROCEDURE — 74011250637 HC RX REV CODE- 250/637: Performed by: HOSPITALIST

## 2020-01-02 PROCEDURE — 74011250637 HC RX REV CODE- 250/637: Performed by: NURSE PRACTITIONER

## 2020-01-02 RX ADMIN — CALCIUM CARBONATE (ANTACID) CHEW TAB 500 MG 200 MG: 500 CHEW TAB at 10:40

## 2020-01-02 RX ADMIN — CALCIUM CARBONATE (ANTACID) CHEW TAB 500 MG 200 MG: 500 CHEW TAB at 18:19

## 2020-01-02 RX ADMIN — CLOPIDOGREL BISULFATE 75 MG: 75 TABLET, FILM COATED ORAL at 10:40

## 2020-01-02 RX ADMIN — ATORVASTATIN CALCIUM 20 MG: 10 TABLET, FILM COATED ORAL at 21:51

## 2020-01-02 RX ADMIN — NYSTATIN: 100000 POWDER TOPICAL at 10:44

## 2020-01-02 RX ADMIN — ASPIRIN 81 MG 324 MG: 81 TABLET ORAL at 10:40

## 2020-01-02 RX ADMIN — FAMOTIDINE 20 MG: 20 TABLET, FILM COATED ORAL at 10:42

## 2020-01-02 RX ADMIN — HEPARIN SODIUM 5000 UNITS: 5000 INJECTION INTRAVENOUS; SUBCUTANEOUS at 18:19

## 2020-01-02 NOTE — PROGRESS NOTES
RAVINDER contacted Margarita Swartz with Medicaid to receive an update regarding pt's LOC. RAVINDER was unsuccessful at reaching Margarita Lazore, however, CM left voicemail. CM continues to follow pt for discharge planning.

## 2020-01-02 NOTE — PROGRESS NOTES
Hospitalist Progress Note    Subjective:   Daily Progress Note: 1/1/2020 9:26 PM    Patient presented to ER 12/2 with complaints of left shoulder, posterior ribs, back and chest pain after falling and striking left side on a cardboard box three days PTA.  Unable to move left arm normally and pain is 10/10.  Tachy to 126, Hypoxic to 88%. WBC: 19, creatinine: 1.58 with baseline of 1.12-1. 2.  CT chest with moderate size hiatal hernia with basilar atelectasis. Found with E coli UTI, sepsis. Treated w rocephin and vantin. Intermittently confused. Atrophy seen on CT head along with severe microvascular disease. A1c: 5.6. Patient lives alone and has two sons to whom both state they cannot take care of him. Looking into rehab with potential long term placement.     12/31:  CM still attempting to secure safe placement. Patient seems to be resigned to going somewhere other than home on discharge. 1/1:  Pleasant and conversant. Oriented to time, place, person but requires a great deal of assist to complete ADLs. Unsteady on fett at times.     Current Facility-Administered Medications   Medication Dose Route Frequency    aspirin chewable tablet 324 mg  324 mg Oral DAILY    nystatin (MYCOSTATIN) 100,000 unit/gram powder   Topical BID    lidocaine 4 % patch 1 Patch  1 Patch TransDERmal Q24H    famotidine (PEPCID) tablet 20 mg  20 mg Oral DAILY    haloperidol lactate (HALDOL) injection 2.5 mg  2.5 mg IntraVENous Q6H PRN    calcium carbonate (TUMS) chewable tablet 200 mg [elemental]  200 mg Oral TID WITH MEALS    sodium chloride (NS) flush 5-40 mL  5-40 mL IntraVENous Q8H    sodium chloride (NS) flush 5-40 mL  5-40 mL IntraVENous PRN    atorvastatin (LIPITOR) tablet 20 mg  20 mg Oral QHS    clopidogrel (PLAVIX) tablet 75 mg  75 mg Oral DAILY    nitroglycerin (NITROSTAT) tablet 0.4 mg  0.4 mg SubLINGual Q5MIN PRN    sodium chloride (NS) flush 5-40 mL  5-40 mL IntraVENous PRN    acetaminophen (TYLENOL) tablet 650 mg  650 mg Oral Q6H PRN    oxyCODONE-acetaminophen (PERCOCET 7.5) 7.5-325 mg per tablet 1 Tab  1 Tab Oral Q6H PRN    naloxone (NARCAN) injection 0.4 mg  0.4 mg IntraVENous PRN    ondansetron (ZOFRAN) injection 4 mg  4 mg IntraVENous Q4H PRN    magnesium hydroxide (MILK OF MAGNESIA) 400 mg/5 mL oral suspension 30 mL  30 mL Oral DAILY PRN    heparin (porcine) injection 5,000 Units  5,000 Units SubCUTAneous Q12H      Review of Systems  A comprehensive review of systems was negative except for that written in the HPI. Objective:     Visit Vitals  /65 (BP 1 Location: Right arm, BP Patient Position: Head of bed elevated (Comment degrees); At rest)   Pulse 88   Temp 97.9 °F (36.6 °C)   Resp 18   Ht 5' 11\" (1.803 m)   Wt 68 kg (149 lb 14.4 oz)   SpO2 97%   BMI 20.91 kg/m²    O2 Flow Rate (L/min): 1 l/min O2 Device: Room air    Temp (24hrs), Av.1 °F (36.7 °C), Min:97.9 °F (36.6 °C), Max:98.2 °F (36.8 °C)     0701 -  1900  In: 840 [P.O.:840]  Out: -     General appearance: Oriented and alert, cooperative, denies need. Head: Normocephalic, without obvious abnormality, atraumatic  Eyes: conjunctivae/corneas clear. PERRL  Neck: supple, symmetrical, trachea midline, and no JVD  Lungs: clear to auscultation bilaterally  Heart: regular rate and rhythm, S1, S2 normal, no murmur, click, rub or gallop  Abdomen: soft, non-tender.  Bowel sounds normal. No masses,  no organomegaly  Extremities: extremities normal, atraumatic, no cyanosis or edema  Skin: Skin color, texture, turgor normal. No rashes or lesions  Neurologic: Grossly normal     Additional comments: Notes,orders, test results, vitals reviewed     Assessment/Plan:   Sepsis due to E COLI UTI:  Resolved               Completed rocephin and vantin      Altered mental status:  Multifactorial              Baseline mild dementia with severe               microvascular disease on head CT              Not safe to live alone               CM on board:  arduous insurance process              Discharge delay due to above               PPD, OT, PT consults      Dehydration:  Poor nutrition and illness:  Improved               encourage po intake               Nutrition consult     Debility:  As above     Acute renal failure:  improved      Acute cystitis with hematuria:  Resolved      Fall with blunt trauma left upper ribs and left shoulder:  Improved               WZKJR negative              lido patches              Follow up with Ortho OP              PT on board     History of colon cancer  History of MI     Discharge when plan in place      Care Plan discussed with: Patient and Nurse    Signed By: Bao Norton NP     January 1, 2020

## 2020-01-02 NOTE — PROGRESS NOTES
Hourly rounds completed. All needs met. No complaints during the shift. Pt rested well during the shift. Will give report to the oncoming day shift nurse.

## 2020-01-02 NOTE — PROGRESS NOTES
Rounds performed this shift. All needs met. Bed low/locked and call bell within reach. Will continue to monitor until next nurse comes on shift.

## 2020-01-02 NOTE — PROGRESS NOTES
Problem: Mobility Impaired (Adult and Pediatric)  Goal: *Acute Goals and Plan of Care  Description  LTG: (reviewed and updated 12/20/19)  (1.)Mr. Nellie Odell will move from supine to sit and sit to supine, scoot up and down and roll side to side INDEPENDENTLY with bed flat within 7 treatment day(s). (2.)Mr. Nellie Odell will transfer from bed to chair and chair to bed INDEPENDENTLY within 7 treatment day(s). (3.)Mr. Nellie Odell will ambulate INDEPENDENTLY for 1500+ feet demonstrating good balance within 7 treatment day(s). (4.)Mr. Nellie Odell will ascend and descend 15 steps with SUPERVISION using handrail(s) within 7 days. (5.)Mr. Nellie Odell will demonstrate independence with seated and standing exercises per HEP within 7 days. ________________________________________________________________________________________________   Outcome: Progressing Towards Goal     PHYSICAL THERAPY: Daily Note and AM 1/2/2020  INPATIENT: PT Visit Days : 4  Payor: Sumaya Yu / Plan: [facility] [employer] MEDICARE CHOICE PPO/PFFS / Product Type: [employer] Care Medicare /       NAME/AGE/GENDER: Pari Elmore is a 80 y.o. male   PRIMARY DIAGNOSIS: SIRS (systemic inflammatory response syndrome) (HCC) [R65.10]  Acute renal failure (ARF) ([facility] [N17.9]  Dehydration [E86.0]  Debility [R53.81] Sepsis ([facility] [address] Sepsis ([facility] [address]       ICD-10: Treatment Diagnosis:    · Generalized Muscle Weakness (M62.81)  · Difficulty in walking, Not elsewhere classified (R26.2)  · Other abnormalities of gait and mobility (R26.89)  · History of falling (Z91.81)   Precaution/Allergies:  Patient has no known allergies. ASSESSMENT:     Mr. Nellie Odell presents in supine without complaints, happy to work with therapy and walk in hallway. Transfers to sitting with SBA and worked on seated/standing functional activities and dressing. ambulates in room and hallway without use of any DME.  Demonstrates narrow base of support at times with occasional lateral trunk sway and mild balance deficits. No major loss of balance however does demonstrate decreased safety awareness and attention to task. Pt left sitting up in bed without complaints and was very appreciative of therapy session. Yohana Reyes continues to make great progress towards therapy goals with gt distance, did not use AD and seemed to have improved balance control. Does need to have stair climbing and higher level balance activities addressed. He is limited by cognitive status. Unsafe to return home alone and will need 24/7 assist.     This section established at most recent assessment   PROBLEM LIST (Impairments causing functional limitations):  1. Decreased Strength  2. Decreased ADL/Functional Activities  3. Decreased Transfer Abilities  4. Decreased Ambulation Ability/Technique  5. Decreased Balance  6. Increased Pain  7. Decreased Activity Tolerance  8. Decreased Cognition   INTERVENTIONS PLANNED: (Benefits and precautions of physical therapy have been discussed with the patient.)  1. Balance Exercise  2. Bed Mobility  3. Gait Training  4. Home Exercise Program (HEP)  5. Therapeutic Activites  6. Therapeutic Exercise/Strengthening  7. Transfer Training     TREATMENT PLAN: Frequency/Duration: 3 times a week for duration of hospital stay  Rehabilitation Potential For Stated Goals: Good     REHAB RECOMMENDATIONS (at time of discharge pending progress):    Placement: It is my opinion, based on this patient's performance to date, that Mr. Kenneth Steen may benefit from intensive therapy at a [address] [place] [address] after discharge due to the functional deficits listed above that are likely to improve with skilled rehabilitation and concerns that he/she may be unsafe to be unsupervised at home due to safety concerns for home, fall risk with mobility.   Equipment:    tbd               HISTORY:   History of Present Injury/Illness (Reason for Referral):  Per H&P, \"Pt reported that he fell hitting his left chest on a cardboard box. Since the fall, he has noticed left sided chest pain, difficulty in using left arm due to pain in left shoulder. He denies heart burn, nausea/vomiting, head trauma, seizure like episode, urinary symptoms, diarrhea, chills, fever, abdominal pain, headache, palpitations. Pain is left sided, retrosternal, 10/10 severity, dull, intermittent, no aggravating or alleviating factors. ER work up showed WBC 19K, Cr 1.58 (baseline 1.12-1.2), HR >110 with sinus tachycardia on EKG. CT chest showed moderate sized hiatal hernia with minimal basilar atelectasis\"  Past Medical History/Comorbidities:   Mr. Lovely Alejo  has a past medical history of Colon cancer ([facility] [address] (01/2012) and Hiatal hernia. Mr. Lovely Alejo  has no past surgical history on file. Social History/Living Environment:   Home Environment: Private residence  One/Two Story Residence: Two story  # of Interior Steps: 24  Lift Chair Available: No  Living Alone: Yes  Support Systems: Child(margi), Family member(s)  Patient Expects to be Discharged to[de-identified] Private residence  Current DME Used/Available at Home: None  Tub or Shower Type: Shower  Prior Level of Function/Work/Activity:  Lives alone in two story home. Independent, active at baseline without use of any DME. Does not drive. Denies falls. Number of Personal Factors/Comorbidities that affect the Plan of Care: 1-2: MODERATE COMPLEXITY   EXAMINATION:   Most Recent Physical Functioning:   Gross Assessment:  AROM: Within functional limits  Strength: Generally decreased, functional  Coordination: Generally decreased, functional               Posture:  Posture (WDL): Exceptions to WDL  Posture Assessment:  Forward head, Rounded shoulders, Trunk flexion  Balance:  Sitting: Intact  Standing: Impaired  Standing - Static: Good  Standing - Dynamic : Fair(+) Bed Mobility:  Rolling: Supervision  Supine to Sit: Supervision  Scooting: Supervision  Wheelchair Mobility:     Transfers:  Sit to Stand: Stand-by assistance  Stand to Sit: Stand-by assistance  Interventions: Verbal cues; Safety awareness training  Duration: 17 Minutes  Gait:     Base of Support: Center of gravity altered;Narrowed  Speed/Estrellita: Fluctuations  Step Length: Left shortened;Right shortened  Gait Abnormalities: Trunk sway increased  Distance (ft): 1200 Feet (ft)  Assistive Device: (none)  Ambulation - Level of Assistance: Stand-by assistance  Interventions: Verbal cues; Safety awareness training      Body Structures Involved:  1. Muscles Body Functions Affected:  1. Movement Related Activities and Participation Affected:  1. General Tasks and Demands  2. Mobility  3. Domestic Life  4. Community, Social and [place]   Number of elements that affect the Plan of Care: 4+: HIGH COMPLEXITY   CLINICAL PRESENTATION:   Presentation: Stable and uncomplicated: LOW COMPLEXITY   CLINICAL DECISION MAKIN [facility] Inpatient Short Form  How much difficulty does the patient currently have. .. Unable A Lot A Little None   1. Turning over in bed (including adjusting bedclothes, sheets and blankets)? [] 1   [] 2   [] 3   [x] 4   2. Sitting down on and standing up from a chair with arms ( e.g., wheelchair, bedside commode, etc.)   [] 1   [] 2   [] 3   [x] 4   3. Moving from lying on back to sitting on the side of the bed? [] 1   [] 2   [] 3   [x] 4   How much help from another person does the patient currently need. .. Total A Lot A Little None   4. Moving to and from a bed to a chair (including a wheelchair)? [] 1   [] 2   [x] 3   [] 4   5. Need to walk in hospital room? [] 1   [] 2   [x] 3   [] 4   6. Climbing 3-5 steps with a railing? [] 1   [x] 2   [] 3   [] 4   © , Trustees of [employer], under license to [employer].  All rights reserved      Score:  Initial: 21 Most Recent: 20 (Date: 19 )    Interpretation of Tool:  Represents activities that are increasingly more difficult (i.e. Bed mobility, Transfers, Gait). Medical Necessity:     · Patient demonstrates   · good  ·  rehab potential due to higher previous functional level. Reason for Services/Other Comments:  · Patient   · continues to demonstrate capacity to improve strength, balance, activity tolerance which will   · increase independence, decrease amount of assistance required from caregiver, and increase safety  · . Use of outcome tool(s) and clinical judgement create a POC that gives a: Clear prediction of patient's progress: LOW COMPLEXITY        TREATMENT:      Pre-treatment Symptoms/Complaints:  \"thank you for the walk\"  Pain: Initial:   Pain Intensity 1: 0  Pain Location 1: Arm, Flank  Pain Orientation 1: Left  Pain Intervention(s) 1: Repositioned  Post Session:  0/10 no complaints     Therapeutic Activity: (  17 Minutes ):  Therapeutic activities including bed mobility, sit <> stand transfers, sitting and standing static/dynamic activities, donning shoes and pants, ambulation on level ground in room and hallway working on safety to improve mobility, strength, balance, and activity tolerance . Required minimal Verbal cues; Safety awareness training to promote static and dynamic balance in standing and promote coordination of bilateral, lower extremity(s). Date:  12/16/19 Date:   Date:     Activity/Exercise Parameters Parameters Parameters   Seated marching X 20 B A     Seated LAQ X 20 B A     Seated AP X 20 B A     Mini squats from chair  X 10 B A                         Braces/Orthotics/Lines/Etc:   · none  Treatment/Session Assessment:    · Response to Treatment:  Progressing well  · Interdisciplinary Collaboration:   o Physical Therapist  o Registered Nurse  · After treatment position/precautions:   o Bed/Chair-wheels locked  o Bed in low position  o Call light within reach  o RN notified  o sitting up in bed   · Compliance with Program/Exercises:  Will assess as treatment progresses  · Recommendations/Intent for next treatment session: \"Next visit will focus on advancements to more challenging activities and reduction in assistance provided\".     Total Treatment Duration:  PT Patient Time In/Time Out  Time In: 1114  Time Out: [address] [facility]

## 2020-01-03 PROCEDURE — 74011250637 HC RX REV CODE- 250/637: Performed by: HOSPITALIST

## 2020-01-03 PROCEDURE — 65270000029 HC RM PRIVATE

## 2020-01-03 PROCEDURE — 74011250637 HC RX REV CODE- 250/637: Performed by: INTERNAL MEDICINE

## 2020-01-03 PROCEDURE — 74011250637 HC RX REV CODE- 250/637: Performed by: FAMILY MEDICINE

## 2020-01-03 PROCEDURE — 74011250637 HC RX REV CODE- 250/637: Performed by: NURSE PRACTITIONER

## 2020-01-03 RX ADMIN — NYSTATIN: 100000 POWDER TOPICAL at 09:21

## 2020-01-03 RX ADMIN — Medication 10 ML: at 13:06

## 2020-01-03 RX ADMIN — ATORVASTATIN CALCIUM 20 MG: 10 TABLET, FILM COATED ORAL at 21:14

## 2020-01-03 RX ADMIN — FAMOTIDINE 20 MG: 20 TABLET, FILM COATED ORAL at 09:20

## 2020-01-03 RX ADMIN — ASPIRIN 81 MG 324 MG: 81 TABLET ORAL at 09:22

## 2020-01-03 RX ADMIN — NYSTATIN: 100000 POWDER TOPICAL at 17:22

## 2020-01-03 RX ADMIN — CLOPIDOGREL BISULFATE 75 MG: 75 TABLET, FILM COATED ORAL at 09:20

## 2020-01-03 RX ADMIN — CALCIUM CARBONATE (ANTACID) CHEW TAB 500 MG 200 MG: 500 CHEW TAB at 17:20

## 2020-01-03 RX ADMIN — CALCIUM CARBONATE (ANTACID) CHEW TAB 500 MG 200 MG: 500 CHEW TAB at 09:20

## 2020-01-03 RX ADMIN — CALCIUM CARBONATE (ANTACID) CHEW TAB 500 MG 200 MG: 500 CHEW TAB at 12:49

## 2020-01-03 RX ADMIN — Medication 10 ML: at 21:14

## 2020-01-03 NOTE — PROGRESS NOTES
CM received fax from [employer], with [employer], who confirmed the patient , meets the requirements to receive long term care at Skilled [facility]. CM informed Janel Sharpe with [facility], who confirmed pt can discharge to [facility] 1/4/19 for long term care. Pt's room number is 301 D and report line is [phone]. CM notified MD. Tahira Walton notified family. CM will notify weekend staff. Pt can discharge to facility tomorrow if medically stable. CM continues to follow pt.

## 2020-01-03 NOTE — PROGRESS NOTES
Hourly rounds completed. All needs met. No complaints. Pt rested well during the shift. Gave report to the oncoming day shift nurse.

## 2020-01-03 NOTE — PROGRESS NOTES
Hospitalist Progress Note    Subjective:   Daily Progress Note: 1/2/2020 4104    Patient presented to ER 12/2 with complaints of left shoulder, posterior ribs, back and chest pain after falling and striking left side on a cardboard box three days PTA.  Unable to move left arm normally and pain is 10/10.  Tachy to 126, Hypoxic to 88%.  WBC: 19, creatinine: 1.58 with baseline of 1.12-1. 2.  CT chest with moderate size hiatal hernia with basilar atelectasis. Found with E coli UTI, sepsis. Treated w rocephin and vantin. Intermittently confused. Atrophy seen on CT head along with severe microvascular disease.  A1c: 5.6.  Patient lives alone and has two sons to whom both state they cannot take care of him. Looking into rehab with potential long term placement.     12/31:  CM still attempting to secure safe placement.  Patient seems to be resigned to going somewhere other than home on discharge.      1/2:  Sleepy today. Not pleased at  Being roused for rounds.  No complaints.      Current Facility-Administered Medications   Medication Dose Route Frequency    aspirin chewable tablet 324 mg  324 mg Oral DAILY    nystatin (MYCOSTATIN) 100,000 unit/gram powder   Topical BID    lidocaine 4 % patch 1 Patch  1 Patch TransDERmal Q24H    famotidine (PEPCID) tablet 20 mg  20 mg Oral DAILY    haloperidol lactate (HALDOL) injection 2.5 mg  2.5 mg IntraVENous Q6H PRN    calcium carbonate (TUMS) chewable tablet 200 mg [elemental]  200 mg Oral TID WITH MEALS    sodium chloride (NS) flush 5-40 mL  5-40 mL IntraVENous Q8H    sodium chloride (NS) flush 5-40 mL  5-40 mL IntraVENous PRN    atorvastatin (LIPITOR) tablet 20 mg  20 mg Oral QHS    clopidogrel (PLAVIX) tablet 75 mg  75 mg Oral DAILY    nitroglycerin (NITROSTAT) tablet 0.4 mg  0.4 mg SubLINGual Q5MIN PRN    sodium chloride (NS) flush 5-40 mL  5-40 mL IntraVENous PRN    acetaminophen (TYLENOL) tablet 650 mg  650 mg Oral Q6H PRN    oxyCODONE-acetaminophen (PERCOCET 7.5) 7.5-325 mg per tablet 1 Tab  1 Tab Oral Q6H PRN    naloxone (NARCAN) injection 0.4 mg  0.4 mg IntraVENous PRN    ondansetron (ZOFRAN) injection 4 mg  4 mg IntraVENous Q4H PRN    magnesium hydroxide (MILK OF MAGNESIA) 400 mg/5 mL oral suspension 30 mL  30 mL Oral DAILY PRN    heparin (porcine) injection 5,000 Units  5,000 Units SubCUTAneous Q12H        Review of Systems  Refuses    Objective:     Visit Vitals  /74   Pulse 79   Temp 98.1 °F (36.7 °C)   Resp 18   Ht 5' 11\" (1.803 m)   Wt 68 kg (149 lb 14.4 oz)   SpO2 95%   BMI 20.91 kg/m²    O2 Flow Rate (L/min): 1 l/min O2 Device: Room air    Temp (24hrs), Av.1 °F (36.7 °C), Min:98.1 °F (36.7 °C), Max:98.2 °F (36.8 °C)     General appearance: Sleepy. Irritated at arousal.      Head: Normocephalic, without obvious abnormality, atraumatic  Eyes: conjunctivae/corneas clear. PERRL  Neck: supple, symmetrical, trachea midline, and no JVD  Lungs: clear to auscultation bilaterally  Heart: regular rate and rhythm, S1, S2 normal, no murmur, click, rub or gallop  Abdomen: soft, non-tender.  Bowel sounds normal. No masses,  no organomegaly  Extremities: extremities normal, atraumatic, no cyanosis or edema  Skin: Skin color, texture, turgor normal. No rashes or lesions  Neurologic: Grossly normal     Additional comments: Notes,orders, test results, vitals reviewed     Assessment/Plan:   Sepsis due to E COLI UTI:  Resolved               Completed rocephin and vantin      Altered mental status:  Multifactorial              Baseline mild dementia with severe               microvascular disease on head CT              Not safe to live alone               CM on board:  arduous insurance process              Discharge delay due to above               PPD, OT, PT consults      Dehydration:  Poor nutrition and illness:  Improved               encourage po intake               Nutrition consult     Debility:  As above     Acute renal failure:  improved      Acute cystitis with hematuria:  Resolved      Fall with blunt trauma left upper ribs and left shoulder:  Improved               Xrays negative              lido patches              Follow up with Ortho OP              PT on board     History of colon cancer  History of MI     Discharge when plan in [facility] discussed with: Patient, CM, and Nurse    Signed By: Lizabeth Burns NP     January 2, 2020

## 2020-01-03 NOTE — PROGRESS NOTES
Hospitalist Progress Note     Admit Date:  2019  9:25 AM   Name:  Reshma Kauffman Sr   Age:  80 y.o.  :  1931   MRN:  [id number]   PCP:  Machelle Husain NP  Treatment Team: Attending Provider: Fam Powers MD; Utilization Review: Thompson Villafuerte RN; Hospitalist: Wang Saucedo NP; Care Manager: Kimber Chavis; Tech: Urbano Parada    HPI:   CC: Fall at home    Patient is a 71-year-old male who presented to the emergency department on  with complaints of left shoulder pain as well as posterior ribs back and chest pain. He apparently fell and struck the left side of a cardboard box 3 days previously. Initially patient was rating his pain as a 10 out of 10 and was not able to move his left arm. He was tachycardic up in the 120s and hypoxic in the 80s. His WBC was up some at 19 his creatinine was 1.58 which was over his baseline. CT chest showed a hiatal hernia with bibasilar atelectasis he was also found to have E. coli growing in his urine. He was treated with Rocephin and Vantin. Patient's A1c is 5.6,  atrophy was seen on a head CT scan with severe microvascular disease. Patient's pneumonia was treated with Rocephin and azithromycin followed by p.o. Vantin. Subjective:     By January 3 patient doing well, denies any pain or motion limitation. Patient apparently lives alone and has 2 sons both of them state they cannot take care of him. Case management started working on discharge planning for him and it was finally determined that he needed to obtain Medicaid so that he could go to a long-term care bed at a nursing facility. It is been confirmed that patient can transfer to [facility] on  for long-term care. There is a number in the chart for report as well as the room number that he will be going to. Patient's vital signs are stable and he is medically ready for transfer to a long-term care facility.     Objective:     Patient Vitals for the past 24 hrs:   Temp Pulse Resp BP SpO2   01/03/20 1503 98.1 °F (36.7 °C) 70 18 126/72 96 %   01/03/20 1114 98 °F (36.7 °C) 72 19 132/78 94 %   01/03/20 0754 98.1 °F (36.7 °C) 79 18 139/74 95 %   01/03/20 0446 98.2 °F (36.8 °C) 75 18 122/75 93 %   01/02/20 2313 98.2 °F (36.8 °C) 75 18 134/76 92 %   01/02/20 2012 98.2 °F (36.8 °C) 85 18 115/66 95 %     Oxygen Therapy  O2 Sat (%): 96 % (01/03/20 1503)  Pulse via Oximetry: 93 beats per minute (12/18/19 0422)  O2 Device: Room air (01/02/20 1132)  O2 Flow Rate (L/min): 1 l/min (12/03/19 0516)  No intake or output data in the 24 hours ending 01/03/20 1801      REVIEW OF SYSTEMS: Comprehensive ROS performed and negative except as stated in HPI. Physical Examination:  General:    Well nourished. Awake and alert. Head:  Normocephalic, atraumatic  Eyes:  Extraocular movements intact, normal sclera  CV:   RRR. No  Murmurs, clicks, or gallops  Lungs:   Unlabored, no cyanosis  Abdomen:   Soft, nondistended, nontender. Extremities: Warm and dry. No cyanosis or edema. Skin:     No rashes or jaundice. Neuro:  No gross focal deficits  Psych:  Mood and affect appropriate    Data Review:  I have reviewed all labs, meds, telemetry events, and studies from the last 24 hours. No results found for this or any previous visit (from the past 24 hour(s)).      All Micro Results     Procedure Component Value Units Date/Time    CULTURE, BLOOD [[id number]] Collected:  12/02/19 1437    Order Status:  Completed Specimen:  Blood Updated:  12/07/19 0957     Special Requests: --        RIGHT  FOREARM       Culture result: NO GROWTH 5 DAYS       CULTURE, BLOOD [[id number]] Collected:  12/02/19 1438    Order Status:  Completed Specimen:  Blood Updated:  12/07/19 0957     Special Requests: --        RIGHT  FOREARM       Culture result: NO GROWTH 5 DAYS       CULTURE, URINE [[id number]]  (Abnormal)  (Susceptibility) Collected:  12/02/19 1424    Order Status:  Completed Specimen:  Cath Urine Updated:  12/05/19 2599     Special Requests: NO SPECIAL REQUESTS        Culture result:       >100,000 COLONIES/mL ESCHERICHIA COLI                Current Meds:  Current Facility-Administered Medications   Medication Dose Route Frequency    aspirin chewable tablet 324 mg  324 mg Oral DAILY    nystatin (MYCOSTATIN) 100,000 unit/gram powder   Topical BID    lidocaine 4 % patch 1 Patch  1 Patch TransDERmal Q24H    famotidine (PEPCID) tablet 20 mg  20 mg Oral DAILY    haloperidol lactate (HALDOL) injection 2.5 mg  2.5 mg IntraVENous Q6H PRN    calcium carbonate (TUMS) chewable tablet 200 mg [elemental]  200 mg Oral TID WITH MEALS    sodium chloride (NS) flush 5-40 mL  5-40 mL IntraVENous Q8H    sodium chloride (NS) flush 5-40 mL  5-40 mL IntraVENous PRN    atorvastatin (LIPITOR) tablet 20 mg  20 mg Oral QHS    clopidogrel (PLAVIX) tablet 75 mg  75 mg Oral DAILY    nitroglycerin (NITROSTAT) tablet 0.4 mg  0.4 mg SubLINGual Q5MIN PRN    sodium chloride (NS) flush 5-40 mL  5-40 mL IntraVENous PRN    acetaminophen (TYLENOL) tablet 650 mg  650 mg Oral Q6H PRN    oxyCODONE-acetaminophen (PERCOCET 7.5) 7.5-325 mg per tablet 1 Tab  1 Tab Oral Q6H PRN    naloxone (NARCAN) injection 0.4 mg  0.4 mg IntraVENous PRN    ondansetron (ZOFRAN) injection 4 mg  4 mg IntraVENous Q4H PRN    magnesium hydroxide (MILK OF MAGNESIA) 400 mg/5 mL oral suspension 30 mL  30 mL Oral DAILY PRN    heparin (porcine) injection 5,000 Units  5,000 Units SubCUTAneous Q12H       Diet:  DIET REGULAR  DIET NUTRITIONAL SUPPLEMENTS    Body mass index is 20.91 kg/m². Other Studies (last 24 hours):  No results found.     Assessment and Plan:     Hospital Problems as of 1/3/2020 Date Reviewed: 7/23/2019          Codes Class Noted - Resolved POA    Dementia ([facility] [address] (Chronic) ICD-10-CM: F03.90  ICD-9-CM: 294.20  12/23/2019 - Present Yes        Debility (Chronic) ICD-10-CM: R53.81  ICD-9-CM: 799.3  12/2/2019 - Present Yes CAD (coronary artery disease) (Chronic) ICD-10-CM: I25.10  ICD-9-CM: 414.00  7/18/2019 - Present Yes        RESOLVED: Metabolic alkalosis [facility]-92-QL: E87.3  ICD-9-CM: 276.3  12/18/2019 - 12/23/2019 Yes        RESOLVED: Acute metabolic encephalopathy [facility]-43-RC: G93.41  ICD-9-CM: 348.31  12/7/2019 - 12/23/2019 Yes        RESOLVED: Dehydration ICD-10-CM: E86.0  ICD-9-CM: 276.51  12/2/2019 - 12/23/2019 Yes        RESOLVED: Acute renal failure (ARF) (HCC) ICD-10-CM: N17.9  ICD-9-CM: 584.9  12/2/2019 - 12/23/2019 Yes        * (Principal) RESOLVED: Sepsis ([facility] [address] ICD-10-CM: A41.9  ICD-9-CM: 038.9, 995.91  12/2/2019 - 12/23/2019 Yes        RESOLVED: Acute cystitis without hematuria ICD-10-CM: N30.00  ICD-9-CM: 595.0  12/2/2019 - 12/23/2019 Yes              A/P:    Sepsis due to E COLI UTI:  Resolved               Completed rocephin and vantin      Altered mental status:  Multifactorial              Baseline mild dementia with severe               microvascular disease on head CT              Not safe to live alone               CM on board:  arduous insurance process              Discharge delay due to above               PPD, OT, PT consults      Dehydration:  Poor nutrition and illness:  Improved               encourage po intake               Nutrition consult     Debility:  As above     Acute renal failure:  improved      Acute cystitis with hematuria:  Resolved      Fall with blunt trauma left upper ribs and left shoulder:  Improved               Xrays negative              lido patches              Follow up with Ortho OP              PT on board     History of colon cancer  History of MI    DC planning/Dispo: Discharge to Po and set on January 4  DVT ppx: SQ H    Code status: Full  Medical decision maker: None on file    Case reviewed with supervising physician - Kaye Dennison, DO    Signed:  MARJORIE Loya

## 2020-01-04 PROCEDURE — 74011250637 HC RX REV CODE- 250/637: Performed by: FAMILY MEDICINE

## 2020-01-04 PROCEDURE — 74011250637 HC RX REV CODE- 250/637: Performed by: NURSE PRACTITIONER

## 2020-01-04 PROCEDURE — 65270000029 HC RM PRIVATE

## 2020-01-04 PROCEDURE — 74011250637 HC RX REV CODE- 250/637: Performed by: HOSPITALIST

## 2020-01-04 PROCEDURE — 97535 SELF CARE MNGMENT TRAINING: CPT

## 2020-01-04 PROCEDURE — 74011250636 HC RX REV CODE- 250/636: Performed by: HOSPITALIST

## 2020-01-04 PROCEDURE — 97530 THERAPEUTIC ACTIVITIES: CPT

## 2020-01-04 PROCEDURE — 74011250637 HC RX REV CODE- 250/637: Performed by: INTERNAL MEDICINE

## 2020-01-04 RX ADMIN — FAMOTIDINE 20 MG: 20 TABLET, FILM COATED ORAL at 09:00

## 2020-01-04 RX ADMIN — NYSTATIN: 100000 POWDER TOPICAL at 18:00

## 2020-01-04 RX ADMIN — ATORVASTATIN CALCIUM 20 MG: 10 TABLET, FILM COATED ORAL at 20:58

## 2020-01-04 RX ADMIN — ASPIRIN 81 MG 324 MG: 81 TABLET ORAL at 09:45

## 2020-01-04 RX ADMIN — Medication 10 ML: at 13:28

## 2020-01-04 RX ADMIN — CALCIUM CARBONATE (ANTACID) CHEW TAB 500 MG 200 MG: 500 CHEW TAB at 08:00

## 2020-01-04 RX ADMIN — NYSTATIN: 100000 POWDER TOPICAL at 09:46

## 2020-01-04 RX ADMIN — CLOPIDOGREL BISULFATE 75 MG: 75 TABLET, FILM COATED ORAL at 09:45

## 2020-01-04 RX ADMIN — Medication 5 ML: at 21:00

## 2020-01-04 RX ADMIN — CALCIUM CARBONATE (ANTACID) CHEW TAB 500 MG 200 MG: 500 CHEW TAB at 16:34

## 2020-01-04 RX ADMIN — CALCIUM CARBONATE (ANTACID) CHEW TAB 500 MG 200 MG: 500 CHEW TAB at 12:00

## 2020-01-04 RX ADMIN — Medication 10 ML: at 05:25

## 2020-01-04 RX ADMIN — HEPARIN SODIUM 5000 UNITS: 5000 INJECTION INTRAVENOUS; SUBCUTANEOUS at 05:24

## 2020-01-04 NOTE — PROGRESS NOTES
END OF SHIFT NOTE:    INTAKE/OUTPUT  No intake/output data recorded. Voiding: YES  Catheter: NO  Color: clear  Drain:              DIET  regular    Flatus: Patient does have flatus present. Stool:  1 occurrences. Characteristics:  Stool Assessment  Stool Color: Brown  Stool Appearance: Soft  Stool Amount: Small  Stool Source/Status: Rectum    Ambulating  Yes    Emesis: 0 occurrences.     Characteristics:          VITAL SIGNS  Patient Vitals for the past 12 hrs:   Temp Pulse Resp BP SpO2   01/03/20 1503 98.1 °F (36.7 °C) 70 18 126/72 96 %   01/03/20 1114 98 °F (36.7 °C) 72 19 132/78 94 %   01/03/20 0754 98.1 °F (36.7 °C) 79 18 139/74 95 %       Pain Assessment  Pain Intensity 1: 0 (01/03/20 1100)  Pain Location 1: Chest  Pain Intervention(s) 1: Medication (see MAR)  Patient Stated Pain Goal: 0            Chema Morgan RN

## 2020-01-04 NOTE — PROGRESS NOTES
Problem: Self Care Deficits Care Plan (Adult)  Goal: *Acute Goals and Plan of Care (Insert Text)  Description  1. Patient will complete lower body bathing and dressing with supervision and adaptive equipment as needed. 2. Patient will complete toileting with supervision. 3. Patient will tolerate 30 minutes of OT treatment with 2-3 rest breaks to increase activity tolerance for ADLs. 4. Patient will complete functional transfers with supervision and adaptive equipment as needed. 5. Patient will complete functional mobility for household distances with supervision and safe use of adaptive device to decrease risk for falls. 6. Patient will participate in 10 minutes of therapeutic exercises to increase strength for ADL/functional transfers. Timeframe: 7 visits      Outcome: Progressing Towards Goal     OCCUPATIONAL THERAPY: Daily Note and PM    1/4/2020  INPATIENT: OT Visit Days: 6  Payor: Amber Quinn / Plan: [facility] [employer] MEDICARE CHOICE PPO/PFFS / Product Type: Managed Care Medicare /      NAME/AGE/GENDER: Sheila Garcia is a 80 y.o. male   PRIMARY DIAGNOSIS:  SIRS (systemic inflammatory response syndrome) ([facility] [address] [R65.10]  Acute renal failure (ARF) ([facility] [address] [N17.9]  Dehydration [E86.0]  Debility [R53.81] Sepsis ([facility] [address] Sepsis ([facility] [address]       ICD-10: Treatment Diagnosis:    · Generalized Muscle Weakness (M62.81)  · Other lack of cordination (R27.8)  · History of falling (Z91.81)   Precautions/Allergies:     Patient has no known allergies. ASSESSMENT:     Mr. Susan Hunter presents up in supine upon arrival and transferred to sitting with supervision. Pt completed LB dressing and toileting with SBA. Pt's RN okayed for pt to go outside, so therapist completed functional mobility with pt with SBA throughout the hospital and outside to the garden. Pt returned to room and left seated edge of bed with belongings in reach. Good effort. Continue POC.     This section established at most recent assessment   PROBLEM LIST (Impairments causing functional limitations):  1. Decreased Strength  2. Decreased ADL/Functional Activities  3. Decreased Transfer Abilities  4. Decreased Ambulation Ability/Technique  5. Decreased Balance  6. Decreased Activity Tolerance  7. Increased Fatigue  8. Decreased Flexibility/Joint Mobility  9. Decreased [place] with Home Exercise Program  10. Decreased Cognition   INTERVENTIONS PLANNED: (Benefits and precautions of occupational therapy have been discussed with the patient.)  1. Activities of daily living training  2. Adaptive equipment training  3. Balance training  4. Clothing management  5. Cognitive training  6. Donning&doffing training  7. Neuromuscular re-eduation  8. Therapeutic activity  9. Therapeutic exercise     TREATMENT PLAN: Frequency/Duration: Follow patient 3 times per week to address above goals. Rehabilitation Potential For Stated Goals: Good     REHAB RECOMMENDATIONS (at time of discharge pending progress):    Placement: It is my opinion, based on this patient's performance to date, that Mr. Oliver Onofre may benefit from intensive therapy at [address] after discharge due to the functional deficits listed above that are likely to improve with skilled rehabilitation and concerns that he/she may be unsafe to be unsupervised at home due to risk for falls. Pt may also benefit from [facility]. Equipment:    TBD               OCCUPATIONAL PROFILE AND HISTORY:   History of Present Injury/Illness (Reason for Referral):  See H&P  Past Medical History/Comorbidities:   Mr. Oliver Onofre  has a past medical history of Colon cancer ([facility] [address] (01/2012) and Hiatal hernia. Mr. Oliver Onofre  has no past surgical history on file.   Social History/Living Environment:   Home Environment: Private residence  One/Two Story Residence: Two story  # of Interior Steps: 24  Lift Chair Available: No  Living Alone: Yes  Support Systems: Child(margi), Family member(s)  Patient Expects to be Discharged to[de-identified] Private residence  Current DME Used/Available at Home: None  Tub or Shower Type: Shower  Prior Level of Function/Work/Activity:  Pt lives alone at baseline and reports that typically he is independent with ADL and functional mobility. Pt reports that he recently had a fall at home over a tennis ball falling on his L side with pain in his L shoulder, ribcage, and L LE  Personal Factors:          Social Background:  Lives alone        Past/Current Experience:  hx of falls        Overall Behavior:  confused; decrease insight to deficits; poor safety. Number of Personal Factors/Comorbidities that affect the Plan of Care: Extensive review of physical, cognitive, and psychosocial performance (3+):  HIGH COMPLEXITY   ASSESSMENT OF OCCUPATIONAL PERFORMANCE[de-identified]   Activities of Daily Living:   Basic ADLs (From Assessment) Complex ADLs (From Assessment)   Feeding: Supervision  Oral Facial Hygiene/Grooming: Stand-by assistance  Bathing: Moderate assistance  Upper Body Dressing: Minimum assistance  Lower Body Dressing: Moderate assistance  Toileting: Moderate assistance Instrumental ADL  Meal Preparation: Total assistance  Homemaking: Total assistance   Grooming/Bathing/Dressing Activities of Daily Living                           Lower Body Dressing Assistance  Pants With Button/Zipper: Independent  Shoes with Cloth Laces: Stand-by assistance Bed/Mat Mobility  Rolling: Supervision  Supine to Sit: Supervision  Sit to Stand: Stand-by assistance  Stand to Sit: Stand-by assistance  Scooting: Supervision     Most Recent Physical Functioning:   Gross Assessment:                  Posture:  Posture (WDL): Exceptions to WDL  Posture Assessment:  Forward head, Rounded shoulders, Trunk flexion  Balance:  Standing: Impaired  Standing - Static: Good  Standing - Dynamic : Fair(+) Bed Mobility:  Rolling: Supervision  Supine to Sit: Supervision  Scooting: Supervision  Wheelchair Mobility:     Transfers:  Sit to Stand: Stand-by assistance  Stand to Sit: Stand-by assistance            Patient Vitals for the past 6 hrs:   BP SpO2 Pulse   01/04/20 1200 110/67 94 % 81       Mental Status  Neurologic State: Alert, Appropriate for age, Eyes open spontaneously  Orientation Level: Oriented to place, Oriented to person, Oriented to situation  Cognition: Memory loss  Perception: Appears intact  Perseveration: Perseverates during conversation  Safety/Judgement: Decreased awareness of need for safety, Decreased insight into deficits, Fall prevention                          Physical Skills Involved:  1. Range of Motion  2. Balance  3. Strength  4. Activity Tolerance Cognitive Skills Affected (resulting in the inability to perform in a timely and safe manner):  1. Executive Function  2. Short Term Recall  3. Sustained Attention Psychosocial Skills Affected:  1. Habits/Routines  2. Self-Awareness   Number of elements that affect the Plan of Care: 5+:  HIGH COMPLEXITY   CLINICAL DECISION MAKING:   [facility] AM-PAC 6 Clicks   Daily Activity Inpatient Short Form  How much help from another person does the patient currently need. .. Total A Lot A Little None   1. Putting on and taking off regular lower body clothing? [] 1   [x] 2   [] 3   [] 4   2. Bathing (including washing, rinsing, drying)? [] 1   [x] 2   [] 3   [] 4   3. Toileting, which includes using toilet, bedpan or urinal?   [] 1   [x] 2   [] 3   [] 4   4. Putting on and taking off regular upper body clothing? [] 1   [] 2   [x] 3   [] 4   5. Taking care of personal grooming such as brushing teeth? [] 1   [] 2   [x] 3   [] 4   6. Eating meals? [] 1   [] 2   [x] 3   [] 4   © 2007, Trustees of [employer], under license to [employer]. All rights reserved      Score:  Initial: 15 Most Recent: X (Date: -- )    Interpretation of Tool:  Represents activities that are increasingly more difficult (i.e. Bed mobility, Transfers, Gait).     Medical Necessity:     · Patient demonstrates   · good  ·  rehab potential due to higher previous functional level. Reason for Services/Other Comments:  · Patient continues to require skilled intervention due to   · Decreased independence with ADL/functional transfers   · . Use of outcome tool(s) and clinical judgement create a POC that gives a: LOW COMPLEXITY         TREATMENT:   (In addition to Assessment/Re-Assessment sessions the following treatments were rendered)     Pre-treatment Symptoms/Complaints:    Pain: Initial:   Pain Intensity 1: 0  Post Session:  0/10   Therapeutic Activity: (25 minutes): Therapeutic activities including Ambulation on level ground to improve mobility, strength and balance. Required SBA to promote static and dynamic balance in standing. Self Care: (13 minutes): Procedure(s) (per grid) utilized to improve and/or restore self-care/home management as related to dressing and toileting. Required no verbal and   cueing to facilitate activities of daily living skills. Date:  12/19/19 Date:   Date:     Activity/Exercise Parameters Parameters Parameters   Shoulder flexion/extension 2 sets of 25 reps with red theraband     Shoulder horizontal add/abb 2 sets of 25 reps with red theraband     Punches 2 sets of 25 reps with red theraband     Elbow flexion/extension 2 sets of 25 reps with red theraband     Tricep extension 2 sets of 25 reps with red theraband                               Braces/Orthotics/Lines/Etc:   · O2 Device: Room air  Treatment/Session Assessment:    · Response to Treatment:  Perseverates on talking  · Interdisciplinary Collaboration:   o Certified Occupational Therapy Assistant  o Registered Nurse  · After treatment position/precautions:   o Bed/Chair-wheels locked  o Call light within reach  o RN notified  o Side rails x 2  o edge of bed   · Compliance with Program/Exercises: Will assess as treatment progresses. · Recommendations/Intent for next treatment session:   \"Next visit will focus on advancements to more challenging activities and reduction in assistance provided\".   Total Treatment Duration:  OT Patient Time In/Time Out  Time In: 1413  Time Out: [address] [address]itesh Jha

## 2020-01-04 NOTE — PROGRESS NOTES
Problem: Falls - Risk of  Goal: *Absence of Falls  Description  Document [place] Fall Risk and appropriate interventions in the flowsheet. Outcome: Progressing Towards Goal  Note: Fall Risk Interventions:  Mobility Interventions: Bed/chair exit alarm, OT consult for ADLs, Patient to call before getting OOB, PT Consult for mobility concerns, PT Consult for assist device competence    Mentation Interventions: Adequate sleep, hydration, pain control, Bed/chair exit alarm, Door open when patient unattended, Evaluate medications/consider consulting pharmacy, More frequent rounding, Toileting rounds, Update white board    Medication Interventions: Bed/chair exit alarm, Evaluate medications/consider consulting pharmacy, Patient to call before getting OOB, Teach patient to arise slowly    Elimination Interventions: Bed/chair exit alarm, Call light in reach, Patient to call for help with toileting needs, Stay With Me (per policy), Toilet paper/wipes in reach, Toileting schedule/hourly rounds, Urinal in reach    History of Falls Interventions: Bed/chair exit alarm, Consult care management for discharge planning, Door open when patient unattended, Evaluate medications/consider consulting pharmacy, Investigate reason for fall         Problem: Patient Education: Go to Patient Education Activity  Goal: Patient/Family Education  Outcome: Progressing Towards Goal     Problem: Pressure Injury - Risk of  Goal: *Prevention of pressure injury  Description  Document Tyler Scale and appropriate interventions in the flowsheet.   Outcome: Progressing Towards Goal  Note: Pressure Injury Interventions:  Sensory Interventions: Assess changes in LOC, Assess need for specialty bed, Check visual cues for pain, Discuss PT/OT consult with provider, Keep linens dry and wrinkle-free, Maintain/enhance activity level, Minimize linen layers, Pressure redistribution bed/mattress (bed type)    Moisture Interventions: Absorbent underpads, Apply protective barrier, creams and emollients, Assess need for specialty bed, Check for incontinence Q2 hours and as needed, Limit adult briefs, Maintain skin hydration (lotion/cream), Minimize layers, Moisture barrier, Offer toileting Q_hr    Activity Interventions: Assess need for specialty bed, Increase time out of bed, Pressure redistribution bed/mattress(bed type), PT/OT evaluation    Mobility Interventions: Assess need for specialty bed, HOB 30 degrees or less, Pressure redistribution bed/mattress (bed type), PT/OT evaluation    Nutrition Interventions: Document food/fluid/supplement intake    Friction and Shear Interventions: Apply protective barrier, creams and emollients, HOB 30 degrees or less, Lift sheet, Minimize layers                Problem: Patient Education: Go to Patient Education Activity  Goal: Patient/Family Education  Outcome: Progressing Towards Goal     Problem: Patient Education: Go to Patient Education Activity  Goal: Patient/Family Education  Outcome: Progressing Towards Goal     Problem: Patient Education: Go to Patient Education Activity  Goal: Patient/Family Education  Outcome: Progressing Towards Goal     Problem: Nutrition Deficit  Goal: *Optimize nutritional status  Outcome: Progressing Towards Goal     Problem: Patient Education: Go to Patient Education Activity  Goal: Patient/Family Education  Outcome: Progressing Towards Goal

## 2020-01-04 NOTE — PROGRESS NOTES
Hourly rounds completed this shift. All needs met at this time. Pt laying in bed resting quietly. Bed low/locked. Call light within reach. Will continue to monitor and give bedside report to oncoming nurse.

## 2020-01-04 NOTE — PROGRESS NOTES
Hospitalist Progress Note    Subjective:   Daily Progress Note: 1/4/2020 3:51 PM     Patient presented to ER 12/2 with complaints of left shoulder, posterior ribs, back and chest pain after falling and striking left side on a cardboard box three days PTA.  Unable to move left arm normally and pain is 10/10.  Tachy to 126, Hypoxic to 88%.  WBC: 19, creatinine: 1.58 with baseline of 1.12-1. 2.  CT chest with moderate size hiatal hernia with basilar atelectasis. Found with E coli UTI, sepsis. Treated w rocephin and vantin. Intermittently confused. Atrophy seen on CT head along with severe microvascular disease.  A1c: 5.6.  Patient lives alone and has two sons to whom both state they cannot take care of him. Looking into rehab with potential long term placement.     12/31:  CM still attempting to secure safe placement.  Patient seems to be resigned to going somewhere other than home on discharge.       1/4:  Alert, conversant, pleasant today. Concerned he is unable to remember why he is here and can't go home. Reminded again sons and CM attempting to find him a place where he can get assistance. Not upset.       Current Facility-Administered Medications   Medication Dose Route Frequency    aspirin chewable tablet 324 mg  324 mg Oral DAILY    nystatin (MYCOSTATIN) 100,000 unit/gram powder   Topical BID    lidocaine 4 % patch 1 Patch  1 Patch TransDERmal Q24H    famotidine (PEPCID) tablet 20 mg  20 mg Oral DAILY    haloperidol lactate (HALDOL) injection 2.5 mg  2.5 mg IntraVENous Q6H PRN    calcium carbonate (TUMS) chewable tablet 200 mg [elemental]  200 mg Oral TID WITH MEALS    sodium chloride (NS) flush 5-40 mL  5-40 mL IntraVENous Q8H    sodium chloride (NS) flush 5-40 mL  5-40 mL IntraVENous PRN    atorvastatin (LIPITOR) tablet 20 mg  20 mg Oral QHS    clopidogrel (PLAVIX) tablet 75 mg  75 mg Oral DAILY    nitroglycerin (NITROSTAT) tablet 0.4 mg  0.4 mg SubLINGual Q5MIN PRN    sodium chloride (NS) flush 5-40 mL  5-40 mL IntraVENous PRN    acetaminophen (TYLENOL) tablet 650 mg  650 mg Oral Q6H PRN    oxyCODONE-acetaminophen (PERCOCET 7.5) 7.5-325 mg per tablet 1 Tab  1 Tab Oral Q6H PRN    naloxone (NARCAN) injection 0.4 mg  0.4 mg IntraVENous PRN    ondansetron (ZOFRAN) injection 4 mg  4 mg IntraVENous Q4H PRN    magnesium hydroxide (MILK OF MAGNESIA) 400 mg/5 mL oral suspension 30 mL  30 mL Oral DAILY PRN    heparin (porcine) injection 5,000 Units  5,000 Units SubCUTAneous Q12H      Review of Systems  Patient is unable. Objective:     Visit Vitals  /63   Pulse 87   Temp 98 °F (36.7 °C)   Resp 17   Ht 5' 11\" (1.803 m)   Wt 68 kg (149 lb 14.4 oz)   SpO2 95%   BMI 20.91 kg/m²    O2 Flow Rate (L/min): 1 l/min O2 Device: Room air    Temp (24hrs), Av.1 °F (36.7 °C), Min:97.8 °F (36.6 °C), Max:98.4 °F (36.9 °C)     07 -  1900  In: 240 [P.O.:240]  Out: -     General appearance: Alert, forgetful, conversant and pleasant. Dressed in street clothes. Did well with self grooming.    Radha Montes, without obvious abnormality, atraumatic  Eyes: conjunctivae/corneas clear. PERRL  Neck: supple, symmetrical, trachea midline, and no JVD  Lungs: clear to auscultation bilaterally  Heart: regular rate and rhythm, S1, S2 normal, no murmur, click, rub or gallop  Abdomen: soft, non-tender.  Bowel sounds normal. No masses,  no organomegaly  Extremities: extremities normal, atraumatic, no cyanosis or edema  Skin: Skin color, texture, turgor normal. 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 on head CT              Not safe to live alone               CM on board:  arduous insurance process              Discharge delay due to above               PPD, OT, PT consults      Dehydration:  Poor nutrition and illness:  Improved               Encourage po intake               Nutrition consult     Debility:  As above     Acute renal failure:  improved      Acute cystitis with hematuria:  Resolved      Fall with blunt trauma left upper ribs and left shoulder:  Improved               Xrays negative              lido patches              Follow up with Ortho OP              PT on board     History of colon cancer  History of MI     Discharge when plan for housing in [facility] discussed with: Patient and Nurse    Signed By: Grant Macias NP     January 4, 2020

## 2020-01-05 PROCEDURE — 74011250637 HC RX REV CODE- 250/637: Performed by: HOSPITALIST

## 2020-01-05 PROCEDURE — 74011250637 HC RX REV CODE- 250/637: Performed by: NURSE PRACTITIONER

## 2020-01-05 PROCEDURE — 74011250637 HC RX REV CODE- 250/637: Performed by: FAMILY MEDICINE

## 2020-01-05 PROCEDURE — 65270000029 HC RM PRIVATE

## 2020-01-05 PROCEDURE — 74011250637 HC RX REV CODE- 250/637: Performed by: INTERNAL MEDICINE

## 2020-01-05 PROCEDURE — 97530 THERAPEUTIC ACTIVITIES: CPT

## 2020-01-05 PROCEDURE — 74011250636 HC RX REV CODE- 250/636: Performed by: HOSPITALIST

## 2020-01-05 RX ADMIN — CALCIUM CARBONATE (ANTACID) CHEW TAB 500 MG 200 MG: 500 CHEW TAB at 16:27

## 2020-01-05 RX ADMIN — NYSTATIN: 100000 POWDER TOPICAL at 18:51

## 2020-01-05 RX ADMIN — CALCIUM CARBONATE (ANTACID) CHEW TAB 500 MG 200 MG: 500 CHEW TAB at 12:07

## 2020-01-05 RX ADMIN — NYSTATIN: 100000 POWDER TOPICAL at 09:00

## 2020-01-05 RX ADMIN — ASPIRIN 81 MG 324 MG: 81 TABLET ORAL at 09:00

## 2020-01-05 RX ADMIN — HEPARIN SODIUM 5000 UNITS: 5000 INJECTION INTRAVENOUS; SUBCUTANEOUS at 05:25

## 2020-01-05 RX ADMIN — CALCIUM CARBONATE (ANTACID) CHEW TAB 500 MG 200 MG: 500 CHEW TAB at 08:00

## 2020-01-05 RX ADMIN — Medication 10 ML: at 13:33

## 2020-01-05 RX ADMIN — CLOPIDOGREL BISULFATE 75 MG: 75 TABLET, FILM COATED ORAL at 09:00

## 2020-01-05 RX ADMIN — Medication 10 ML: at 22:24

## 2020-01-05 RX ADMIN — ATORVASTATIN CALCIUM 20 MG: 10 TABLET, FILM COATED ORAL at 20:45

## 2020-01-05 RX ADMIN — Medication 10 ML: at 05:25

## 2020-01-05 RX ADMIN — FAMOTIDINE 20 MG: 20 TABLET, FILM COATED ORAL at 09:00

## 2020-01-05 NOTE — PROGRESS NOTES
Problem: Mobility Impaired (Adult and Pediatric)  Goal: *Acute Goals and Plan of Care  Description  LTG: (reviewed and updated 12/20/19)  (1.)Mr. Kenneth Steen will move from supine to sit and sit to supine, scoot up and down and roll side to side INDEPENDENTLY with bed flat within 7 treatment day(s). (2.)Mr. Kenneth Steen will transfer from bed to chair and chair to bed INDEPENDENTLY within 7 treatment day(s). (3.)Mr. Kenneth Steen will ambulate INDEPENDENTLY for 1500+ feet demonstrating good balance within 7 treatment day(s). Goal met 01/05/20  (4.)Mr. Kenneth Steen will ascend and descend 15 steps with SUPERVISION using handrail(s) within 7 days. (5.)Mr. Kenneth Steen will demonstrate independence with seated and standing exercises per HEP within 7 days. ________________________________________________________________________________________________   Outcome: Progressing Towards Goal     PHYSICAL THERAPY: Daily Note and AM 1/5/2020  INPATIENT: PT Visit Days : 5  Payor: Wen Michele / Plan: [id number] PPO/PFFS / Product Type: [facility] Care Medicare /       NAME/AGE/GENDER: Yohana Reyes is a 80 y.o. male   PRIMARY DIAGNOSIS: SIRS (systemic inflammatory response syndrome) (HCC) [R65.10]  Acute renal failure (ARF) ([facility] [address] [N17.9]  Dehydration [E86.0]  Debility [R53.81] Sepsis ([facility] [address] Sepsis ([facility] [address]       ICD-10: Treatment Diagnosis:    · Generalized Muscle Weakness (M62.81)  · Difficulty in walking, Not elsewhere classified (R26.2)  · Other abnormalities of gait and mobility (R26.89)  · History of falling (Z91.81)   Precaution/Allergies:  Patient has no known allergies. ASSESSMENT:     Mr. Kenneth Steen presents in sitting in the recliner fully dressed and agreeable to therapy. Patient states that he will need to put his shoes on and then he will be ready. Patient is able to don his shoes independently. Standing balance is good.   Gait training without an assistive device x 1500 feet with fluctuating yvon. Patient will often stop and talk with staff, and he is quite the talker. Patient is returned to the room to the recliner with needs within reach and was very appreciative of therapy session. Brian Armenta continues to make great progress towards therapy goals. Patient is pleasant and cooperative. Family seeking rehab at discharge. Will continue PT efforts.     This section established at most recent assessment   PROBLEM LIST (Impairments causing functional limitations):  1. Decreased Strength  2. Decreased ADL/Functional Activities  3. Decreased Transfer Abilities  4. Decreased Ambulation Ability/Technique  5. Decreased Balance  6. Increased Pain  7. Decreased Activity Tolerance  8. Decreased Cognition   INTERVENTIONS PLANNED: (Benefits and precautions of physical therapy have been discussed with the patient.)  1. Balance Exercise  2. Bed Mobility  3. Gait Training  4. Home Exercise Program (HEP)  5. Therapeutic Activites  6. Therapeutic Exercise/Strengthening  7. Transfer Training     TREATMENT PLAN: Frequency/Duration: 3 times a week for duration of hospital stay  Rehabilitation Potential For Stated Goals: Good     REHAB RECOMMENDATIONS (at time of discharge pending progress):    Placement: It is my opinion, based on this patient's performance to date, that Mr. Jason Pisano may benefit from intensive therapy at [address] after discharge due to the functional deficits listed above that are likely to improve with skilled rehabilitation and concerns that he/she may be unsafe to be unsupervised at home due to safety concerns for home, fall risk with mobility. Equipment:    tbd               HISTORY:   History of Present Injury/Illness (Reason for Referral):  Per H&P, \"Pt reported that he fell hitting his left chest on a cardboard box. Since the fall, he has noticed left sided chest pain, difficulty in using left arm due to pain in left shoulder.   He denies heart burn, nausea/vomiting, head trauma, seizure like episode, urinary symptoms, diarrhea, chills, fever, abdominal pain, headache, palpitations. Pain is left sided, retrosternal, 10/10 severity, dull, intermittent, no aggravating or alleviating factors. ER work up showed WBC 19K, Cr 1.58 (baseline 1.12-1.2), HR >110 with sinus tachycardia on EKG. CT chest showed moderate sized hiatal hernia with minimal basilar atelectasis\"  Past Medical History/Comorbidities:   Mr. Kenneth Steen  has a past medical history of Colon cancer ([facility] [address] (01/2012) and Hiatal hernia. Mr. Kenneth Steen  has no past surgical history on file. Social History/Living Environment:   Home Environment: Private residence  One/Two Story Residence: Two story  # of Interior Steps: 24  Lift Chair Available: No  Living Alone: Yes  Support Systems: Child(margi), Family member(s)  Patient Expects to be Discharged to[de-identified] Private residence  Current DME Used/Available at Home: None  Tub or Shower Type: Shower  Prior Level of Function/Work/Activity:  Lives alone in two story home. Independent, active at baseline without use of any DME. Does not drive. Denies falls. Number of Personal Factors/Comorbidities that affect the Plan of Care: 1-2: MODERATE COMPLEXITY   EXAMINATION:   Most Recent Physical Functioning:   Gross Assessment:                  Posture:     Balance:  Sitting: Intact  Sitting - Static: Good (unsupported)  Sitting - Dynamic: Good (unsupported)  Standing: Intact  Standing - Static: Good  Standing - Dynamic : Fair Bed Mobility:     Wheelchair Mobility:     Transfers:  Sit to Stand: Supervision  Stand to Sit: Supervision  Gait:     Speed/Estrellita: Fluctuations  Distance (ft): 1500 Feet (ft)  Assistive Device: Other (comment)(no assistive device used)      Body Structures Involved:  1. Muscles Body Functions Affected:  1. Movement Related Activities and Participation Affected:  1. General Tasks and Demands  2. Mobility  3. Domestic Life  4.  Community, Social and Civic Life   Number of elements that affect the Plan of Care: 4+: HIGH COMPLEXITY   CLINICAL PRESENTATION:   Presentation: Stable and uncomplicated: LOW COMPLEXITY   CLINICAL DECISION MAKIN \[facility] [place] [facility]\"" [address] AM-PAC 6 Clicks   Basic Mobility Inpatient Short Form  How much difficulty does the patient currently have. .. Unable A Lot A Little None   1. Turning over in bed (including adjusting bedclothes, sheets and blankets)? [] 1   [] 2   [] 3   [x] 4   2. Sitting down on and standing up from a chair with arms ( e.g., wheelchair, bedside commode, etc.)   [] 1   [] 2   [] 3   [x] 4   3. Moving from lying on back to sitting on the side of the bed? [] 1   [] 2   [] 3   [x] 4   How much help from another person does the patient currently need. .. Total A Lot A Little None   4. Moving to and from a bed to a chair (including a wheelchair)? [] 1   [] 2   [x] 3   [] 4   5. Need to walk in hospital room? [] 1   [] 2   [x] 3   [] 4   6. Climbing 3-5 steps with a railing? [] 1   [x] 2   [] 3   [] 4   © , Trustees of [employer] \[facility] [place] [facility]\"" [address] [address], under license to [employer] All rights reserved      Score:  Initial: 21 Most Recent: 20 (Date: 19 )    Interpretation of Tool:  Represents activities that are increasingly more difficult (i.e. Bed mobility, Transfers, Gait). Medical Necessity:     · Patient demonstrates   · good  ·  rehab potential due to higher previous functional level. Reason for Services/Other Comments:  · Patient   · continues to demonstrate capacity to improve strength, balance, activity tolerance which will   · increase independence, decrease amount of assistance required from caregiver, and increase safety  · . Use of outcome tool(s) and clinical judgement create a POC that gives a: Clear prediction of patient's progress: LOW COMPLEXITY        TREATMENT:      Pre-treatment Symptoms/Complaints:   \"Hello\"  Pain: Initial:   Pain Intensity 1: 0  Post Session:  0/10 no complaints Therapeutic Activity: (    24 minutes): Therapeutic activities including  sit <> stand transfers, sitting and standing static/dynamic activities, donning shoes and ambulation on level ground in room and hallway working on safety to improve mobility, strength, balance, and activity tolerance . Required supervision   to promote static and dynamic balance in standing and promote coordination of bilateral, lower extremity(s). Date:  12/16/19 Date:   Date:     Activity/Exercise Parameters Parameters Parameters   Seated marching X 20 B A     Seated LAQ X 20 B A     Seated AP X 20 B A     Mini squats from chair  X 10 B A                         Braces/Orthotics/Lines/Etc:   · none  Treatment/Session Assessment:    · Response to Treatment:  Progressing well  · Interdisciplinary Collaboration:   o Physical Therapy Assistant  o Registered Nurse  · After treatment position/precautions:   o Up in chair  o Bed/Chair-wheels locked  o Call light within reach  o RN notified   · Compliance with Program/Exercises: Will assess as treatment progresses  · Recommendations/Intent for next treatment session: \"Next visit will focus on advancements to more challenging activities and reduction in assistance provided\".     Total Treatment Duration:  PT Patient Time In/Time Out  Time In: 1051  Time Out: 200    Nisha Lozada, PTA

## 2020-01-05 NOTE — PROGRESS NOTES
END OF SHIFT NOTE:    INTAKE/OUTPUT  No intake/output data recorded. Voiding: YES  Catheter: NO  Color: clear  Drain:              DIET  regular    Flatus: Patient does have flatus present. Stool:  1 occurrences. Characteristics:  Stool Assessment  Stool Color: Brown  Stool Appearance: Soft  Stool Amount: Small  Stool Source/Status: Rectum    Ambulating  Yes    Emesis: 0 occurrences.     Characteristics:          VITAL SIGNS  Patient Vitals for the past 12 hrs:   Temp Pulse Resp BP SpO2   01/04/20 1527 98 °F (36.7 °C) 87 17 113/63 95 %   01/04/20 1200 97.8 °F (36.6 °C) 81 17 110/67 94 %   01/04/20 0753 98.4 °F (36.9 °C) 76 17 108/57 98 %       Pain Assessment  Pain Intensity 1: 0 (01/04/20 1500)  Pain Location 1: Chest  Pain Intervention(s) 1: Medication (see MAR)  Patient Stated Pain Goal: 0            Lawerance No, RN

## 2020-01-06 VITALS
HEIGHT: 71 IN | RESPIRATION RATE: 16 BRPM | WEIGHT: 149.9 LBS | DIASTOLIC BLOOD PRESSURE: 66 MMHG | TEMPERATURE: 98 F | SYSTOLIC BLOOD PRESSURE: 108 MMHG | OXYGEN SATURATION: 94 % | HEART RATE: 79 BPM | BODY MASS INDEX: 20.98 KG/M2

## 2020-01-06 LAB
ALBUMIN SERPL-MCNC: 2.6 G/DL (ref 3.2–4.6)
ALBUMIN/GLOB SERPL: 0.7 {RATIO} (ref 1.2–3.5)
ALP SERPL-CCNC: 101 U/L (ref 50–136)
ALT SERPL-CCNC: 33 U/L (ref 12–65)
ANION GAP SERPL CALC-SCNC: 6 MMOL/L (ref 7–16)
AST SERPL-CCNC: 15 U/L (ref 15–37)
BASOPHILS # BLD: 0 K/UL (ref 0–0.2)
BASOPHILS NFR BLD: 1 % (ref 0–2)
BILIRUB SERPL-MCNC: 0.8 MG/DL (ref 0.2–1.1)
BUN SERPL-MCNC: 15 MG/DL (ref 8–23)
CALCIUM SERPL-MCNC: 9.4 MG/DL (ref 8.3–10.4)
CHLORIDE SERPL-SCNC: 110 MMOL/L (ref 98–107)
CO2 SERPL-SCNC: 27 MMOL/L (ref 21–32)
CREAT SERPL-MCNC: 1.19 MG/DL (ref 0.8–1.5)
DIFFERENTIAL METHOD BLD: ABNORMAL
EOSINOPHIL # BLD: 0.2 K/UL (ref 0–0.8)
EOSINOPHIL NFR BLD: 3 % (ref 0.5–7.8)
ERYTHROCYTE [DISTWIDTH] IN BLOOD BY AUTOMATED COUNT: 18.2 % (ref 11.9–14.6)
GLOBULIN SER CALC-MCNC: 4 G/DL (ref 2.3–3.5)
GLUCOSE SERPL-MCNC: 100 MG/DL (ref 65–100)
HCT VFR BLD AUTO: 34.2 % (ref 41.1–50.3)
HGB BLD-MCNC: 11 G/DL (ref 13.6–17.2)
IMM GRANULOCYTES # BLD AUTO: 0.2 K/UL (ref 0–0.5)
IMM GRANULOCYTES NFR BLD AUTO: 3 % (ref 0–5)
LYMPHOCYTES # BLD: 1.7 K/UL (ref 0.5–4.6)
LYMPHOCYTES NFR BLD: 27 % (ref 13–44)
MAGNESIUM SERPL-MCNC: 1.8 MG/DL (ref 1.8–2.4)
MCH RBC QN AUTO: 31.4 PG (ref 26.1–32.9)
MCHC RBC AUTO-ENTMCNC: 32.2 G/DL (ref 31.4–35)
MCV RBC AUTO: 97.7 FL (ref 79.6–97.8)
MONOCYTES # BLD: 0.4 K/UL (ref 0.1–1.3)
MONOCYTES NFR BLD: 7 % (ref 4–12)
NEUTS SEG # BLD: 3.6 K/UL (ref 1.7–8.2)
NEUTS SEG NFR BLD: 59 % (ref 43–78)
NRBC # BLD: 0 K/UL (ref 0–0.2)
PLATELET # BLD AUTO: 219 K/UL (ref 150–450)
PMV BLD AUTO: 10.1 FL (ref 9.4–12.3)
POTASSIUM SERPL-SCNC: 4.2 MMOL/L (ref 3.5–5.1)
PROT SERPL-MCNC: 6.6 G/DL (ref 6.3–8.2)
RBC # BLD AUTO: 3.5 M/UL (ref 4.23–5.6)
SODIUM SERPL-SCNC: 143 MMOL/L (ref 136–145)
WBC # BLD AUTO: 6 K/UL (ref 4.3–11.1)

## 2020-01-06 PROCEDURE — 80053 COMPREHEN METABOLIC PANEL: CPT

## 2020-01-06 PROCEDURE — 74011250637 HC RX REV CODE- 250/637: Performed by: HOSPITALIST

## 2020-01-06 PROCEDURE — 83735 ASSAY OF MAGNESIUM: CPT

## 2020-01-06 PROCEDURE — 85025 COMPLETE CBC W/AUTO DIFF WBC: CPT

## 2020-01-06 PROCEDURE — 74011250637 HC RX REV CODE- 250/637: Performed by: NURSE PRACTITIONER

## 2020-01-06 PROCEDURE — 74011250637 HC RX REV CODE- 250/637: Performed by: INTERNAL MEDICINE

## 2020-01-06 PROCEDURE — 36415 COLL VENOUS BLD VENIPUNCTURE: CPT

## 2020-01-06 PROCEDURE — 74011250636 HC RX REV CODE- 250/636: Performed by: HOSPITALIST

## 2020-01-06 RX ORDER — GUAIFENESIN 100 MG/5ML
81 LIQUID (ML) ORAL DAILY
Status: DISCONTINUED | OUTPATIENT
Start: 2020-01-06 | End: 2020-01-06 | Stop reason: HOSPADM

## 2020-01-06 RX ADMIN — FAMOTIDINE 20 MG: 20 TABLET, FILM COATED ORAL at 10:02

## 2020-01-06 RX ADMIN — Medication 10 ML: at 06:43

## 2020-01-06 RX ADMIN — ASPIRIN 81 MG 81 MG: 81 TABLET ORAL at 10:01

## 2020-01-06 RX ADMIN — ASPIRIN 81 MG 81 MG: 81 TABLET ORAL at 12:31

## 2020-01-06 RX ADMIN — HEPARIN SODIUM 5000 UNITS: 5000 INJECTION INTRAVENOUS; SUBCUTANEOUS at 06:42

## 2020-01-06 RX ADMIN — CALCIUM CARBONATE (ANTACID) CHEW TAB 500 MG 200 MG: 500 CHEW TAB at 12:32

## 2020-01-06 RX ADMIN — CLOPIDOGREL BISULFATE 75 MG: 75 TABLET, FILM COATED ORAL at 10:01

## 2020-01-06 RX ADMIN — CALCIUM CARBONATE (ANTACID) CHEW TAB 500 MG 200 MG: 500 CHEW TAB at 10:01

## 2020-01-06 NOTE — PROGRESS NOTES
Discharged to [facility] with [address] via stretcher via [facility]. All paper work sent with pt.

## 2020-01-06 NOTE — PROGRESS NOTES
CM received a call from patient son stating he was told patient would be discharged to the LTC facility on 1/4/2020. Patient son Mitzy Verduzco stated patient was not discharged and he did not receive a call as to why patient was not discharged. Patient stated he came to see patient 1/5/2020 and spoke with patient and stated patient seemed ready to discharge and he is ready for patient to discharge. CM explained to Mitzy Verduzco we would speak to the physician to get discharge plan, patient can still discharge to [address] [place] [address]. CM will reach out to son with the discharge time.

## 2020-01-06 NOTE — PROGRESS NOTES
Patient was calm  Eating breakfast    Spent time talking with patient  He always enjoys visits    Will follow closely thru his stay    Des Garcia, staff Jeimy bass [address], [facility]  /   [email or web address]

## 2020-01-06 NOTE — PROGRESS NOTES
Hospitalist Progress Note    Subjective:   Daily Progress Note: 1/5/2020 1238    Patient presented to ER 12/2 with complaints of left shoulder, posterior ribs, back and chest pain after falling and striking left side on a cardboard box three days PTA.  Unable to move left arm normally and pain is 10/10.  Tachy to 126, Hypoxic to 88%.  WBC: 19, creatinine: 1.58 with baseline of 1.12-1. 2.  CT chest with moderate size hiatal hernia with basilar atelectasis. Found with E coli UTI, sepsis. Treated w rocephin and vantin. Intermittently confused. Atrophy seen on CT head along with severe microvascular disease.  A1c: 5.6.  Patient lives alone and has two sons to whom both state they cannot take care of him. Looking into rehab with potential long term placement.     12/31:  CM still attempting to secure safe placement.  Patient seems to be resigned to going somewhere other than home on discharge.       1/5:  Alert, conversant, pleasant today. Reports son visited today. PT in, ambulated 1500 ft without assistive device.       Current Facility-Administered Medications   Medication Dose Route Frequency    aspirin chewable tablet 324 mg  324 mg Oral DAILY    nystatin (MYCOSTATIN) 100,000 unit/gram powder   Topical BID    lidocaine 4 % patch 1 Patch  1 Patch TransDERmal Q24H    famotidine (PEPCID) tablet 20 mg  20 mg Oral DAILY    haloperidol lactate (HALDOL) injection 2.5 mg  2.5 mg IntraVENous Q6H PRN    calcium carbonate (TUMS) chewable tablet 200 mg [elemental]  200 mg Oral TID WITH MEALS    sodium chloride (NS) flush 5-40 mL  5-40 mL IntraVENous Q8H    sodium chloride (NS) flush 5-40 mL  5-40 mL IntraVENous PRN    atorvastatin (LIPITOR) tablet 20 mg  20 mg Oral QHS    clopidogrel (PLAVIX) tablet 75 mg  75 mg Oral DAILY    nitroglycerin (NITROSTAT) tablet 0.4 mg  0.4 mg SubLINGual Q5MIN PRN    sodium chloride (NS) flush 5-40 mL  5-40 mL IntraVENous PRN    acetaminophen (TYLENOL) tablet 650 mg  650 mg Oral Q6H PRN    oxyCODONE-acetaminophen (PERCOCET 7.5) 7.5-325 mg per tablet 1 Tab  1 Tab Oral Q6H PRN    naloxone (NARCAN) injection 0.4 mg  0.4 mg IntraVENous PRN    ondansetron (ZOFRAN) injection 4 mg  4 mg IntraVENous Q4H PRN    magnesium hydroxide (MILK OF MAGNESIA) 400 mg/5 mL oral suspension 30 mL  30 mL Oral DAILY PRN    heparin (porcine) injection 5,000 Units  5,000 Units SubCUTAneous Q12H        Review of Systems  A comprehensive review of systems was negative except for that written in the HPI. Objective:     Visit Vitals  /73 (BP 1 Location: Right arm, BP Patient Position: At rest)   Pulse 82   Temp 97.4 °F (36.3 °C)   Resp 18   Ht 5' 11\" (1.803 m)   Wt 68 kg (149 lb 14.4 oz)   SpO2 96%   BMI 20.91 kg/m²    O2 Flow Rate (L/min): 1 l/min O2 Device: Room air    Temp (24hrs), Av.9 °F (36.6 °C), Min:97.4 °F (36.3 °C), Max:98.4 °F (36.9 °C)    701 -  1900  In: 0762 [P.O.:1075]  Out: -     General appearance: Alert, forgetful, conversant and pleasant. Dressed in street clothes. Did well with self grooming, walked with PT.        Head: Normocephalic, without obvious abnormality, atraumatic  Eyes: conjunctivae/corneas clear. PERRL  Neck: supple, symmetrical, trachea midline, and no JVD  Lungs: clear to auscultation bilaterally  Heart: regular rate and rhythm, S1, S2 normal, no murmur, click, rub or gallop  Abdomen: soft, non-tender.  Bowel sounds normal. No masses,  no organomegaly  Extremities: extremities normal, atraumatic, no cyanosis or edema  Skin: Skin color, texture, turgor normal. 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 on head CT              Not safe to live alone               CM on board:  arduous insurance process              Discharge delay due to above               PPD, OT, PT consults      Dehydration:  Poor nutrition and illness:  Improved               Encourage po intake               Nutrition consult     Debility:  As above     Acute renal failure:  improved      Acute cystitis with hematuria:  Resolved      Fall with blunt trauma left upper ribs and left shoulder:  Improved               TFRVC negative              lido patches              Follow up with Ortho OP              PT on board     History of colon cancer  History of MI     Labs tomorrow, has not had labs since 12/27    Discharge when plan for housing in [address] [place] [address] discussed with: Patient and Nurse    Signed By: Natty Covarrubias NP     January 5, 2020

## 2020-01-06 NOTE — PROGRESS NOTES
Report called to [facility] with [address] given by SBAR. Denies questions.

## 2020-01-06 NOTE — PROGRESS NOTES
[facility] with [address]. Pt's room number is 301 D and report line is [phone]. Pt will be established with a  PCP within the facility. CM arranged [address] to pick pt up at 1:00pm. CM will notify floor nurse. CM will notify Family. No additional needs voiced at this time. CM remains available if any needs shall arise. Care Management Interventions  PCP Verified by CM: No(Pt does not currently have a PCP. CM sent referral. )  Mode of Transport at Discharge: Other (see comment)  Transition of Care Consult (CM Consult): Discharge Planning  Discharge Durable Medical Equipment: No  Physical Therapy Consult: Yes  Occupational Therapy Consult: Yes  Speech Therapy Consult: No  Current Support Network: Other(Pt lives with a friend. )  Confirm Follow Up Transport: Family(Son provides transportation when needed. )  The Plan for Transition of Care is Related to the Following Treatment Goals : Pt to [facility] with [address]. The Patient and/or Patient Representative was Provided with a Choice of Provider and Agrees with the Discharge Plan?: Yes  Name of the Patient Representative Who was Provided with a Choice of Provider and Agrees with the Discharge Plan: Patient's son Vimal Hensley provided with a Choice of Provider and Agrees with the Discharge Plan.    Freedom of Choice List was Provided with Basic Dialogue that Supports the Patient's Individualized Plan of Care/Goals, Treatment Preferences and Shares the Quality Data Associated with the Providers?: Yes   Resource Information Provided?: Yes(CM provided contact information for [address]  Discharge Location  Discharge Placement: [facility] with [address]. )

## 2020-01-06 NOTE — PROGRESS NOTES
Hourly rounds completed this shift. All needs met at this time. Will continue to monitor & give report to oncoming RN.

## 2020-01-06 NOTE — DISCHARGE SUMMARY
Hospitalist Discharge Summary     Admit Date:  2019  9:25 AM   Name:  Eduardo Villalobos Sr   Age:  80 y.o.  :  1931   MRN:  [id number]   PCP:  Mami Bui NP  Treatment Team: Attending Provider: Anshul Winchester MD; Utilization Review: Lilibeth Allison RN; Hospitalist: Chucky Meadows NP; Care Manager: Daniel Garcia; Tech: Zelalemravendonavonbriseyda Viviana    Problem List for this Hospitalization:  Hospital Problems as of 2020 Date Reviewed: 2019          Codes Class Noted - Resolved POA    Dementia ([facility] (Chronic) ICD-10-CM: F03.90  ICD-9-CM: 294.20  2019 - Present Yes        Debility (Chronic) ICD-10-CM: R53.81  ICD-9-CM: 799.3  2019 - Present Yes        CAD (coronary artery disease) (Chronic) ICD-10-CM: I25.10  ICD-9-CM: 414.00  2019 - Present Yes        RESOLVED: Metabolic alkalosis CQM-00-ZY: E87.3  ICD-9-CM: 276.3  2019 - 2019 Yes        RESOLVED: Acute metabolic encephalopathy YWX-70-IR: G93.41  ICD-9-CM: 348.31  2019 - 2019 Yes        RESOLVED: Dehydration ICD-10-CM: E86.0  ICD-9-CM: 276.51  2019 - 2019 Yes        RESOLVED: Acute renal failure (ARF) ([facility] ICD-10-CM: N17.9  ICD-9-CM: 584.9  2019 - 2019 Yes        * (Principal) RESOLVED: Sepsis ([facility] ICD-10-CM: A41.9  ICD-9-CM: 038.9, 995.91  2019 - 2019 Yes        RESOLVED: Acute cystitis without hematuria ICD-10-CM: N30.00  ICD-9-CM: 595.0  2019 - 2019 Yes              Hospital Course:  Patient is an 80years old male with hx of hiatal hernia, colon cancer, dyslipidemia, CAD s/p DAPT presented to ER with left sided chest pain for past 3 days following a fall. Pt reported that he fell hitting his left chest on a cardboard box. Since the fall, he has noticed left sided chest pain, difficulty in using left arm due to pain in left shoulder. ER work up showed WBC 19K, Cr 1.58 (baseline 1.12-1.2), HR >110 with sinus tachycardia on EKG.  CT chest showed moderate sized hiatal hernia with minimal basilar atelectasis. Sepsis due to E COLI UTI:  Resolved               Completed rocephin and vantin      Altered mental status:  Multifactorial              Baseline mild dementia with severe microvascular disease on head CT              Not safe to live alone               CM on board:  arduous insurance process              Discharge delay due to above               PPD, OT, PT consults      Dehydration:  Poor nutrition and illness:               Encourage po intake               Nutrition consult      Debility:  PT/OT     Acute renal failure: resolved     Acute cystitis with hematuria:  Resolved      Fall with blunt trauma left upper ribs and left shoulder:  Improved               Xrays negative              PT    Disposition: Long Term Care  Activity: PT/OT Eval and Treat  Diet: DIET REGULAR  DIET NUTRITIONAL SUPPLEMENTS Dinner; Ensure Jens Torres  Code Status: Full Code    Follow up instructions, discharge meds at bottom of this note. Plan was discussed with pt, son. All questions answered. Patient was stable at time of discharge. Patient will call a physician or return if any concerns. Discharge summary was sent to PCP electronically via \"Comm Mgt\" link in [facility], if possible. Diagnostic Imaging/Tests:   Xr Shoulder Lt Ap/lat Min 2 V    Result Date: 12/2/2019  Clinical History: The patient is a 80years year old Male presenting with symptoms of pain following fall. Comparison:  none Findings: 3 views of the left shoulder were obtained. No fracture or dislocation is identified. There are chronic degenerative changes with glenohumeral joint space loss. Shallow respiratory changes are seen at the left lung base     Impression: Chronic degenerative changes.      Ct Head Wo Cont    Result Date: 12/2/2019  Examination: CT scan of the brain without contrast. History: rule out any intracranial pathology, 80 years Male  Patient with fall x 3 days ago Technique: 5 mm axial imaging of the brain from the posterior fossa to the vertex. All CT scans at this location are performed using dose modulation techniques as appropriate to a performed exam including the following: automated exposure control; adjustment of the mA and/or kV according to patient's size (this includes techniques or standardized protocols for targeted exams where dose is matched to indication/reason for exam; i.e. extremities or head); use of iterative reconstruction technique. Comparison:  None available Findings: Probably severe microvascular disease. The ventricles, sulci are age-appropriate. No intracranial hemorrhage or extra-axial collection is identified. No evidence of acute infarct. No mass effect or midline shift is present. Basal cisterns are intact. The visualized paranasal sinuses and mastoid air cells are clear. The orbits, bones, and soft tissues are normal in appearance. Impression:  No acute intracranial abnormality. Other incidental findings as above. Ct Chest Wo Cont    Result Date: 12/2/2019  Noncontrast CT of the chest Clinical History: The patient is a 80years year old Male presenting with symptoms of Left rib pain, shortness of breath, multiple contusions after a fall Technique: Thin section axial images were obtained through the chest without intravenous contrast.  Coronal reformatted images were also provided for review. All CT scans at this facility are performed using dose reduction/dose modulation techniques, as appropriate the performed exam, including the following: Automated Exposure Control; Adjustment of the mA and/or kV according to patient size (this includes techniques or standardized protocols for targeted exams where dose is matched to indication/reason for exam); and Use of Iterative Reconstruction Technique. Total radiation dose: 371 mGy-cm Comparison:  None. Findings: Images through the lungs demonstrate no evidence of pulmonary nodule or mass.  No effusions are identified. There is minimal bibasilar infiltrate/atelectasis. There is coronary artery atherosclerotic disease. No evidence of aortic aneurysmal dilatation is seen. The heart and mediastinum are grossly unremarkable. No significant hilar or mediastinal lymphadenopathy is demonstrated. There is a moderate-sized hiatal hernia Images chest wall structures as well as visualized portions of the upper abdomen are unremarkable in appearance. There are no acute osseous abnormalities. Impression: 1. Minimal bibasilar infiltrate/atelectasis. 2. Moderate sized hiatal hernia. Duplex Lower Ext Venous Bilat    Result Date: 2019  Clinical History: The patient is a 80years year old Male presenting with symptoms of r/o DVT Comparisons:  None Findings: The bilateral common femoral, superficial femoral, popliteal, and visualized calf veins are patent demonstrating normal compressibility, normal color flow, and normal response to distal augmentation. There is no evidence of DVT. Impression: No evidence of lower extremity DVT. CPT code(s) [id number]       Echocardiogram results:  Results for orders placed or performed during the hospital encounter of 19   2D ECHO COMPLETE ADULT (TTE) W OR 1400 [facility] [address] [place] [address], [address] [place] [address]  [phone]    Transthoracic Echocardiogram  2D, M-mode, Doppler, and Color Doppler    Patient: Lebron Paredes  MR #: [id number]  : 1931  Age: 80 years  Gender: Male  Study date: 03-Dec-2019  Account #: [de-identified]  Height: 71 in  Weight: 151.6 lb  BSA: 1.88 mï¾²  Status:Routine  Location: 628  BP: 117/ 56    Allergies: NO KNOWN ALLERGENS    Sonographer:  DORON Harrison  Group:  [facility]  Referring Physician:  Hilario Kolb MD  Reading Physician:  Lauro Covarrubias. MD [facility]    INDICATIONS: Evaluate for worsening RVSP  *Patient scanned supine.  Patient refused to lie on left side due to arm pain. PROCEDURE: This was a routine study. A transthoracic echocardiogram was  performed. The study included complete 2D imaging, M-mode, complete spectral  Doppler, and color Doppler. Image quality was adequate. LEFT VENTRICLE: Size was normal. Systolic function was normal. Ejection  fraction was estimated in the range of 55 % to 60 %. There were no regional  wall motion abnormalities. Wall thickness was normal. Left ventricular  diastolic function is indeterminate based on assessment criteria. Avg E/e':  15.7. RIGHT VENTRICLE: The size was normal. Systolic function was normal. Estimated  peak pressure was in the range of 30-35 mmHg. LEFT ATRIUM: Size was normal.    RIGHT ATRIUM: Not well visualized. SYSTEMIC VEINS: IVC: The inferior vena cava was not well visualized. AORTIC VALVE: Leaflets exhibited calcification and reduced mobility. The   aortic  valve area by VTI was 1.21 cm2. The peak velocity was 2.2 m/s. The mean  pressure gradient was 9 mmHg. The peak pressure gradient was 20 mmHg. DI:   0.48. There was mild stenosis. There was trivial regurgitation. MITRAL VALVE: There was moderate thickening of the anterior leaflet. There   was  no evidence for stenosis. There was mild regurgitation. TRICUSPID VALVE: The valve structure was normal. There was no evidence for  stenosis. There was mild regurgitation. PULMONIC VALVE: Not well visualized. There was no evidence for stenosis. There  was no insufficiency. PERICARDIUM: There was no pericardial effusion. AORTA: The root exhibited normal size. There was mild dilatation of the  ascending aorta. SUMMARY:    -  Left ventricle: Systolic function was normal. Ejection fraction was  estimated in the range of 55 % to 60 %. There were no regional wall motion  abnormalities. -  Right ventricle: Estimated peak pressure was in the range of 30-35 mmHg. -  Aortic valve: Leaflets exhibited calcification and reduced mobility. There  was mild stenosis. The aortic valve area by VTI was 1.21 cm2.    -  Mitral valve: There was moderate thickening of the anterior leaflet. There  was mild regurgitation.    -  Tricuspid valve: There was mild regurgitation.    -  Aorta, systemic arteries: There was mild dilatation of the ascending   aorta. -  Pericardium: There was no pericardial effusion. SYSTEM MEASUREMENT TABLES    2D mode  AoR Diam (2D): 3.3 cm  LA Dimension (2D): 2.7 cm  Left Atrium Systolic Volume Index; Method of Disks, Biplane; 2D mode;: 18.1  ml/m2  IVS/LVPW (2D): 1  IVSd (2D): 1 cm  LVIDd (2D): 4.1 cm  LVIDs (2D): 2.5 cm  LVOT Area (2D): 2.5 cm2  LVPWd (2D): 1 cm  RVIDd (2D): 2.6 cm    Tissue Doppler Imaging  LV Peak Early Hall Tissue Maninder; Lateral MA (TDI): 4.4 cm/s  LV Peak Early Hall Tissue Maninder; Medial MA (TDI): 4.7 cm/s    Unspecified Scan Mode  Peak Grad; Mean; Antegrade Flow: 16 mm[Hg]  Vmax; Antegrade Flow: 222 cm/s  LVOT Diam: 1.8 cm  MV Peak Maninder/LV Peak Tissue Maninder E-Wave; Lateral MA: 16.2  MV Peak Maninder/LV Peak Tissue Maninder E-Wave; Medial MA: 15.1    Prepared and signed by    Dylan Montilla.  MD [facility]  Signed 03-Dec-2019 12:58:48         Procedures done this admission:  * No surgery found *    All Micro Results     Procedure Component Value Units Date/Time    CULTURE, BLOOD [[id number]] Collected:  12/02/19 1437    Order Status:  Completed Specimen:  Blood Updated:  12/07/19 0957     Special Requests: --        RIGHT  FOREARM       Culture result: NO GROWTH 5 DAYS       CULTURE, BLOOD [[id number]] Collected:  12/02/19 1438    Order Status:  Completed Specimen:  Blood Updated:  12/07/19 0957     Special Requests: --        RIGHT  FOREARM       Culture result: NO GROWTH 5 DAYS       CULTURE, URINE [[id number]]  (Abnormal)  (Susceptibility) Collected:  12/02/19 1424    Order Status:  Completed Specimen:  Cath Urine Updated:  12/05/19 0733     Special Requests: NO SPECIAL REQUESTS        Culture result:       >100,000 COLONIES/mL ESCHERICHIA COLI                Labs: Results:       BMP, Mg, Phos Recent Labs     01/06/20  0642      K 4.2   *   CO2 27   AGAP 6*   BUN 15   CREA 1.19   CA 9.4      MG 1.8      CBC Recent Labs     01/06/20  0642   WBC 6.0   RBC 3.50*   HGB 11.0*   HCT 34.2*      GRANS 59   LYMPH 27   EOS 3   MONOS 7   BASOS 1   IG 3   ANEU 3.6   ABL 1.7   RAMIN 0.2   ABM 0.4   ABB 0.0   AIG 0.2      LFT Recent Labs     01/06/20  0642   SGOT 15   ALT 33      TP 6.6   ALB 2.6*   GLOB 4.0*   AGRAT 0.7*      Cardiac Testing Lab Results   Component Value Date/Time    CK 98 12/02/2019 07:58 PM    Troponin-I, Qt. <0.02 (L) 12/27/2019 06:15 PM    Troponin-I, Qt. 0.02 12/03/2019 05:40 AM    Troponin-I, Qt. <0.02 (L) 12/03/2019 12:28 AM      Coagulation Tests Lab Results   Component Value Date/Time    aPTT 101.6 (H) 07/16/2019 03:45 PM    aPTT 63.8 (H) 07/16/2019 08:43 AM    aPTT 97.7 (H) 07/16/2019 01:36 AM      A1c Lab Results   Component Value Date/Time    Hemoglobin A1c 5.6 12/18/2019 03:46 PM      Lipid Panel Lab Results   Component Value Date/Time    Cholesterol, total 152 07/14/2019 03:55 AM    HDL Cholesterol 31 (L) 07/14/2019 03:55 AM    LDL, calculated 98.2 07/14/2019 03:55 AM    VLDL, calculated 22.8 07/14/2019 03:55 AM    Triglyceride 114 07/14/2019 03:55 AM    CHOL/HDL Ratio 4.9 07/14/2019 03:55 AM      Thyroid Panel Lab Results   Component Value Date/Time    TSH 2.460 12/08/2019 12:17 PM    T4, Free 1.3 12/08/2019 12:17 PM        Most Recent UA Lab Results   Component Value Date/Time    Color RONALDO 12/09/2019 02:41 PM    Appearance CLOUDY 12/09/2019 02:41 PM    Specific gravity 1.021 12/09/2019 02:41 PM    pH (UA) 5.5 12/09/2019 02:41 PM    Protein 30 (A) 12/09/2019 02:41 PM    Glucose NEGATIVE  12/09/2019 02:41 PM    Ketone NEGATIVE  12/09/2019 02:41 PM    Bilirubin SMALL (A) 12/09/2019 02:41 PM    Blood NEGATIVE  12/09/2019 02:41 PM    Urobilinogen 0.2 12/09/2019 02:41 PM    Nitrites NEGATIVE 12/09/2019 02:41 PM    Leukocyte Esterase NEGATIVE  12/09/2019 02:41 PM    WBC 3-5 12/09/2019 02:41 PM    RBC 0-3 12/09/2019 02:41 PM    Epithelial cells 0 12/02/2019 02:24 PM    Bacteria TRACE 12/09/2019 02:41 PM    Casts 0-3 12/09/2019 02:41 PM    Other observations RESULTS VERIFIED MANUALLY 12/09/2019 02:41 PM        No Known Allergies  Immunization History   Administered Date(s) Administered    Influenza High Dose Vaccine PF 02/19/2016, 10/09/2018, 10/23/2019    Pneumococcal Polysaccharide (PPSV-23) 05/21/2018    TB Skin Test (PPD) Intradermal 12/02/2019, 12/12/2019       All Labs from Last 24 Hrs:  Recent Results (from the past 24 hour(s))   CBC WITH AUTOMATED DIFF    Collection Time: 01/06/20  6:42 AM   Result Value Ref Range    WBC 6.0 4.3 - 11.1 K/uL    RBC 3.50 (L) 4.23 - 5.6 M/uL    HGB 11.0 (L) 13.6 - 17.2 g/dL    HCT 34.2 (L) 41.1 - 50.3 %    MCV 97.7 79.6 - 97.8 FL    MCH 31.4 26.1 - 32.9 PG    MCHC 32.2 31.4 - 35.0 g/dL    RDW 18.2 (H) 11.9 - 14.6 %    PLATELET 472 722 - 309 K/uL    MPV 10.1 9.4 - 12.3 FL    ABSOLUTE NRBC 0.00 0.0 - 0.2 K/uL    DF AUTOMATED      NEUTROPHILS 59 43 - 78 %    LYMPHOCYTES 27 13 - 44 %    MONOCYTES 7 4.0 - 12.0 %    EOSINOPHILS 3 0.5 - 7.8 %    BASOPHILS 1 0.0 - 2.0 %    IMMATURE GRANULOCYTES 3 0.0 - 5.0 %    ABS. NEUTROPHILS 3.6 1.7 - 8.2 K/UL    ABS. LYMPHOCYTES 1.7 0.5 - 4.6 K/UL    ABS. MONOCYTES 0.4 0.1 - 1.3 K/UL    ABS. EOSINOPHILS 0.2 0.0 - 0.8 K/UL    ABS. BASOPHILS 0.0 0.0 - 0.2 K/UL    ABS. IMM.  GRANS. 0.2 0.0 - 0.5 K/UL   METABOLIC PANEL, COMPREHENSIVE    Collection Time: 01/06/20  6:42 AM   Result Value Ref Range    Sodium 143 136 - 145 mmol/L    Potassium 4.2 3.5 - 5.1 mmol/L    Chloride 110 (H) 98 - 107 mmol/L    CO2 27 21 - 32 mmol/L    Anion gap 6 (L) 7 - 16 mmol/L    Glucose 100 65 - 100 mg/dL    BUN 15 8 - 23 MG/DL    Creatinine 1.19 0.8 - 1.5 MG/DL    GFR est AA >60 >60 ml/min/1.73m2    GFR est non-AA >60 >60 ml/min/1.73m2    Calcium 9.4 8.3 - 10.4 MG/DL    Bilirubin, total 0.8 0.2 - 1.1 MG/DL    ALT (SGPT) 33 12 - 65 U/L    AST (SGOT) 15 15 - 37 U/L    Alk.  phosphatase 101 50 - 136 U/L    Protein, total 6.6 6.3 - 8.2 g/dL    Albumin 2.6 (L) 3.2 - 4.6 g/dL    Globulin 4.0 (H) 2.3 - 3.5 g/dL    A-G Ratio 0.7 (L) 1.2 - 3.5     MAGNESIUM    Collection Time: 01/06/20  6:42 AM   Result Value Ref Range    Magnesium 1.8 1.8 - 2.4 mg/dL       Current Med List in Hospital:   Current Facility-Administered Medications   Medication Dose Route Frequency    aspirin chewable tablet 81 mg  81 mg Oral DAILY    nystatin (MYCOSTATIN) 100,000 unit/gram powder   Topical BID    lidocaine 4 % patch 1 Patch  1 Patch TransDERmal Q24H    famotidine (PEPCID) tablet 20 mg  20 mg Oral DAILY    haloperidol lactate (HALDOL) injection 2.5 mg  2.5 mg IntraVENous Q6H PRN    calcium carbonate (TUMS) chewable tablet 200 mg [elemental]  200 mg Oral TID WITH MEALS    sodium chloride (NS) flush 5-40 mL  5-40 mL IntraVENous Q8H    sodium chloride (NS) flush 5-40 mL  5-40 mL IntraVENous PRN    atorvastatin (LIPITOR) tablet 20 mg  20 mg Oral QHS    clopidogrel (PLAVIX) tablet 75 mg  75 mg Oral DAILY    nitroglycerin (NITROSTAT) tablet 0.4 mg  0.4 mg SubLINGual Q5MIN PRN    sodium chloride (NS) flush 5-40 mL  5-40 mL IntraVENous PRN    acetaminophen (TYLENOL) tablet 650 mg  650 mg Oral Q6H PRN    oxyCODONE-acetaminophen (PERCOCET 7.5) 7.5-325 mg per tablet 1 Tab  1 Tab Oral Q6H PRN    naloxone (NARCAN) injection 0.4 mg  0.4 mg IntraVENous PRN    ondansetron (ZOFRAN) injection 4 mg  4 mg IntraVENous Q4H PRN    magnesium hydroxide (MILK OF MAGNESIA) 400 mg/5 mL oral suspension 30 mL  30 mL Oral DAILY PRN    heparin (porcine) injection 5,000 Units  5,000 Units SubCUTAneous Q12H       Discharge Exam:  Patient Vitals for the past 24 hrs:   Temp Pulse Resp BP SpO2   01/06/20 0702 98 °F (36.7 °C) 79 16 108/66 94 %   01/06/20 0446 98.1 °F (36.7 °C) 92 16 131/74 98 %   01/05/20 2320 97.9 °F (36.6 °C) 82 18 137/86 96 %   01/05/20 1947 97.4 °F (36.3 °C) 82 18 114/73 96 %   01/05/20 1645 98 °F (36.7 °C) 86 17 112/62 96 %   01/05/20 1201 97.7 °F (36.5 °C) 97 18 136/76 97 %     Oxygen Therapy  O2 Sat (%): 94 % (01/06/20 0702)  Pulse via Oximetry: 93 beats per minute (12/18/19 0422)  O2 Device: Room air (01/02/20 1132)  O2 Flow Rate (L/min): 1 l/min (12/03/19 0516)    Estimated body mass index is 20.91 kg/m² as calculated from the following:    Height as of this encounter: 5' 11\" (1.803 m). Weight as of this encounter: 68 kg (149 lb 14.4 oz). Intake/Output Summary (Last 24 hours) at 1/6/2020 0957  Last data filed at 1/6/2020 3643  Gross per 24 hour   Intake 600 ml   Output --   Net 600 ml       *Note that automatically entered I/Os may not be accurate; dependent on patient compliance with collection and accurate  by assistants. General:    Well nourished. Alert. Eyes:   Normal sclerae. Extraocular movements intact. ENT:  Normocephalic, atraumatic. Moist mucous membranes  CV:   Regular rate and rhythm. No murmur, rub, or gallop. Lungs:  Clear to auscultation bilaterally. No wheezing, rhonchi, or rales. Abdomen: Soft, nontender, nondistended. Bowel sounds normal.   Extremities: Warm and dry. No cyanosis or edema. Neurologic: CN II-XII grossly intact. No gross focal deficits   Skin:     No rashes or jaundice. Psych:  Normal mood and affect. Discharge Info:   Current Discharge Medication List      START taking these medications    Details   famotidine (PEPCID) 20 mg tablet Take 1 Tab by mouth daily. Qty: 30 Tab, Refills: 0         CONTINUE these medications which have NOT CHANGED    Details   aspirin 81 mg chewable tablet Take 1 Tab by mouth daily. atorvastatin (LIPITOR) 20 mg tablet Take 1 Tab by mouth nightly. Qty: 30 Tab, Refills: 11      nitroglycerin (NITROSTAT) 0.4 mg SL tablet 1 Tab by SubLINGual route every five (5) minutes as needed for Chest Pain.  Up to 3 doses. Qty: 1 Bottle, Refills: 11      pantoprazole (PROTONIX) 40 mg tablet Take 1 Tab by mouth Daily (before breakfast). Qty: 30 Tab, Refills: 0      clopidogrel (PLAVIX) 75 mg tab Take 1 Tab by mouth daily. Qty: 30 Tab, Refills: 0             Follow Up Orders: Follow-up Appointments   Procedures    FOLLOW UP VISIT Appointment in: Other (Specify) [facility]OPEDIC DR FOR THE LEFT SHOULDER PROBLEM, LABS AND URINE RECHECK. TAKE PAPERS WITH YOU. [facility] INTO AN ORTHOPEDIC DR FOR THE LEFT SHOULDER PROBLEM, LABS AND URINE RECHECK. TAKE PAPERS WITH YOU. Standing Status:   Standing     Number of Occurrences:   1     Order Specific Question:   Appointment in     Answer: Other (Specify)       Follow-up Information     Follow up With Specialties Details Why Contact Info    follow up PCP   after leaving facility           Time spent in patient discharge planning and coordination 35 minutes.     Signed:  Michael Carpio MD

## 2020-01-31 ENCOUNTER — HOSPITAL ENCOUNTER (OUTPATIENT)
Dept: ULTRASOUND IMAGING | Age: 85
Discharge: HOME | End: 2020-01-31
Attending: FAMILY MEDICINE
Payer: MEDICARE

## 2020-01-31 DIAGNOSIS — R11.0 NAUSEA: ICD-10-CM

## 2020-01-31 PROCEDURE — 76705 ECHO EXAM OF ABDOMEN: CPT

## 2020-02-07 ENCOUNTER — HOSPITAL ENCOUNTER (OUTPATIENT)
Dept: MRI IMAGING | Age: 85
Discharge: HOME | End: 2020-02-07
Attending: FAMILY MEDICINE
Payer: MEDICARE

## 2020-02-07 DIAGNOSIS — R26.81 UNSTEADINESS ON FEET: ICD-10-CM

## 2020-02-07 PROCEDURE — 70551 MRI BRAIN STEM W/O DYE: CPT

## 2021-07-05 ENCOUNTER — APPOINTMENT (OUTPATIENT)
Dept: GENERAL RADIOLOGY | Age: 86
End: 2021-07-05
Attending: STUDENT IN AN ORGANIZED HEALTH CARE EDUCATION/TRAINING PROGRAM
Payer: MEDICARE

## 2021-07-05 ENCOUNTER — HOSPITAL ENCOUNTER (EMERGENCY)
Age: 86
Discharge: HOME | End: 2021-07-05
Attending: STUDENT IN AN ORGANIZED HEALTH CARE EDUCATION/TRAINING PROGRAM
Payer: MEDICARE

## 2021-07-05 VITALS
OXYGEN SATURATION: 98 % | BODY MASS INDEX: 18.69 KG/M2 | WEIGHT: 138 LBS | HEIGHT: 72 IN | RESPIRATION RATE: 21 BRPM | TEMPERATURE: 98.1 F | DIASTOLIC BLOOD PRESSURE: 73 MMHG | SYSTOLIC BLOOD PRESSURE: 133 MMHG | HEART RATE: 87 BPM

## 2021-07-05 DIAGNOSIS — R10.13 DYSPEPSIA: Primary | ICD-10-CM

## 2021-07-05 LAB
ALBUMIN SERPL-MCNC: 3.4 G/DL (ref 3.2–4.6)
ALBUMIN/GLOB SERPL: 0.9 {RATIO} (ref 1.2–3.5)
ALP SERPL-CCNC: 71 U/L (ref 50–136)
ALT SERPL-CCNC: 14 U/L (ref 12–65)
ANION GAP SERPL CALC-SCNC: 6 MMOL/L (ref 7–16)
AST SERPL-CCNC: 11 U/L (ref 15–37)
ATRIAL RATE: 90 BPM
BASOPHILS # BLD: 0 K/UL (ref 0–0.2)
BASOPHILS NFR BLD: 0 % (ref 0–2)
BILIRUB SERPL-MCNC: 0.6 MG/DL (ref 0.2–1.1)
BUN SERPL-MCNC: 27 MG/DL (ref 8–23)
CALCIUM SERPL-MCNC: 9.7 MG/DL (ref 8.3–10.4)
CALCULATED P AXIS, ECG09: 68 DEGREES
CALCULATED R AXIS, ECG10: 21 DEGREES
CALCULATED T AXIS, ECG11: 52 DEGREES
CHLORIDE SERPL-SCNC: 108 MMOL/L (ref 98–107)
CO2 SERPL-SCNC: 27 MMOL/L (ref 21–32)
CREAT SERPL-MCNC: 1.51 MG/DL (ref 0.8–1.5)
DIAGNOSIS, 93000: NORMAL
DIFFERENTIAL METHOD BLD: ABNORMAL
EOSINOPHIL # BLD: 0.1 K/UL (ref 0–0.8)
EOSINOPHIL NFR BLD: 1 % (ref 0.5–7.8)
ERYTHROCYTE [DISTWIDTH] IN BLOOD BY AUTOMATED COUNT: 17.9 % (ref 11.9–14.6)
GLOBULIN SER CALC-MCNC: 4 G/DL (ref 2.3–3.5)
GLUCOSE SERPL-MCNC: 131 MG/DL (ref 65–100)
HCT VFR BLD AUTO: 35.9 % (ref 41.1–50.3)
HGB BLD-MCNC: 11.4 G/DL (ref 13.6–17.2)
IMM GRANULOCYTES # BLD AUTO: 0.1 K/UL (ref 0–0.5)
IMM GRANULOCYTES NFR BLD AUTO: 1 % (ref 0–5)
LIPASE SERPL-CCNC: 274 U/L (ref 73–393)
LYMPHOCYTES # BLD: 1.8 K/UL (ref 0.5–4.6)
LYMPHOCYTES NFR BLD: 20 % (ref 13–44)
MAGNESIUM SERPL-MCNC: 1.9 MG/DL (ref 1.8–2.4)
MCH RBC QN AUTO: 28 PG (ref 26.1–32.9)
MCHC RBC AUTO-ENTMCNC: 31.8 G/DL (ref 31.4–35)
MCV RBC AUTO: 88.2 FL (ref 79.6–97.8)
MONOCYTES # BLD: 0.5 K/UL (ref 0.1–1.3)
MONOCYTES NFR BLD: 6 % (ref 4–12)
NEUTS SEG # BLD: 6.5 K/UL (ref 1.7–8.2)
NEUTS SEG NFR BLD: 72 % (ref 43–78)
NRBC # BLD: 0 K/UL (ref 0–0.2)
P-R INTERVAL, ECG05: 138 MS
PLATELET # BLD AUTO: 279 K/UL (ref 150–450)
PMV BLD AUTO: 10.8 FL (ref 9.4–12.3)
POTASSIUM SERPL-SCNC: 4.1 MMOL/L (ref 3.5–5.1)
PROT SERPL-MCNC: 7.4 G/DL (ref 6.3–8.2)
Q-T INTERVAL, ECG07: 338 MS
QRS DURATION, ECG06: 82 MS
QTC CALCULATION (BEZET), ECG08: 413 MS
RBC # BLD AUTO: 4.07 M/UL (ref 4.23–5.6)
SODIUM SERPL-SCNC: 141 MMOL/L (ref 136–145)
TROPONIN-HIGH SENSITIVITY: 12.6 PG/ML (ref 0–14)
TROPONIN-HIGH SENSITIVITY: 13.8 PG/ML (ref 0–14)
VENTRICULAR RATE, ECG03: 90 BPM
WBC # BLD AUTO: 9 K/UL (ref 4.3–11.1)

## 2021-07-05 PROCEDURE — 85025 COMPLETE CBC W/AUTO DIFF WBC: CPT

## 2021-07-05 PROCEDURE — 83690 ASSAY OF LIPASE: CPT

## 2021-07-05 PROCEDURE — 99285 EMERGENCY DEPT VISIT HI MDM: CPT

## 2021-07-05 PROCEDURE — 94762 N-INVAS EAR/PLS OXIMTRY CONT: CPT

## 2021-07-05 PROCEDURE — 84484 ASSAY OF TROPONIN QUANT: CPT

## 2021-07-05 PROCEDURE — 71046 X-RAY EXAM CHEST 2 VIEWS: CPT

## 2021-07-05 PROCEDURE — 74011250637 HC RX REV CODE- 250/637: Performed by: STUDENT IN AN ORGANIZED HEALTH CARE EDUCATION/TRAINING PROGRAM

## 2021-07-05 PROCEDURE — 74011000250 HC RX REV CODE- 250: Performed by: STUDENT IN AN ORGANIZED HEALTH CARE EDUCATION/TRAINING PROGRAM

## 2021-07-05 PROCEDURE — 83735 ASSAY OF MAGNESIUM: CPT

## 2021-07-05 PROCEDURE — 80053 COMPREHEN METABOLIC PANEL: CPT

## 2021-07-05 PROCEDURE — 93005 ELECTROCARDIOGRAM TRACING: CPT | Performed by: STUDENT IN AN ORGANIZED HEALTH CARE EDUCATION/TRAINING PROGRAM

## 2021-07-05 RX ORDER — ONDANSETRON 4 MG/1
4 TABLET, ORALLY DISINTEGRATING ORAL
Qty: 8 TABLET | Refills: 2 | Status: SHIPPED | OUTPATIENT
Start: 2021-07-05

## 2021-07-05 RX ORDER — SODIUM CHLORIDE 0.9 % (FLUSH) 0.9 %
5-10 SYRINGE (ML) INJECTION EVERY 8 HOURS
Status: DISCONTINUED | OUTPATIENT
Start: 2021-07-05 | End: 2021-07-05 | Stop reason: HOSPADM

## 2021-07-05 RX ORDER — SUCRALFATE 1 G/1
1 TABLET ORAL 4 TIMES DAILY
Qty: 60 TABLET | Refills: 2 | Status: SHIPPED | OUTPATIENT
Start: 2021-07-05 | End: 2021-07-20

## 2021-07-05 RX ORDER — SODIUM CHLORIDE 0.9 % (FLUSH) 0.9 %
5-10 SYRINGE (ML) INJECTION AS NEEDED
Status: DISCONTINUED | OUTPATIENT
Start: 2021-07-05 | End: 2021-07-05 | Stop reason: HOSPADM

## 2021-07-05 RX ADMIN — ALUMINUM HYDROXIDE, MAGNESIUM HYDROXIDE, DIMETHICONE 40 ML: 200; 200; 20 LIQUID ORAL at 16:14

## 2021-07-05 NOTE — ED NOTES
I have reviewed discharge instructions with the patient. The patient verbalized understanding. Patient left ED via Discharge Method: ambulatory to Home with son. Opportunity for questions and clarification provided. Patient given 2 scripts. To continue your aftercare when you leave the hospital, you may receive an automated call from our care team to check in on how you are doing. This is a free service and part of our promise to provide the best care and service to meet your aftercare needs.  If you have questions, or wish to unsubscribe from this service please call [phone]. Thank you for Choosing our [address] [place] [address] Emergency Department.

## 2021-07-05 NOTE — ED TRIAGE NOTES
Pt and son state pt started having chest pain about two hours ago. States he had a MI two years ago and the pain was similar. They state pt has lost a lot of weight over the past few months also. Denies SOB but states he did vomit after the chest pain started. Masked for triage.

## 2021-07-05 NOTE — DISCHARGE INSTRUCTIONS
Your cardiac enzymes were all normal, x-ray imaging shows your hiatal hernia which is felt to be causing your symptoms today. You have been prescribed medication to take for this discomfort as well as nausea. Please arrange close follow-up with your primary care provider for further evaluation and recheck. Return for worsening symptoms, concerns or questions.

## 2021-07-28 ENCOUNTER — APPOINTMENT (OUTPATIENT)
Dept: CT IMAGING | Age: 86
DRG: 056 | End: 2021-07-28
Attending: EMERGENCY MEDICINE
Payer: MEDICARE

## 2021-07-28 ENCOUNTER — APPOINTMENT (OUTPATIENT)
Dept: GENERAL RADIOLOGY | Age: 86
DRG: 056 | End: 2021-07-28
Attending: EMERGENCY MEDICINE
Payer: MEDICARE

## 2021-07-28 ENCOUNTER — HOSPITAL ENCOUNTER (INPATIENT)
Age: 86
LOS: 6 days | Discharge: HOME | DRG: 056 | End: 2021-08-03
Attending: EMERGENCY MEDICINE | Admitting: HOSPITALIST
Payer: MEDICARE

## 2021-07-28 ENCOUNTER — APPOINTMENT (OUTPATIENT)
Dept: MRI IMAGING | Age: 86
DRG: 056 | End: 2021-07-28
Attending: HOSPITALIST
Payer: MEDICARE

## 2021-07-28 ENCOUNTER — APPOINTMENT (OUTPATIENT)
Dept: CT IMAGING | Age: 86
DRG: 056 | End: 2021-07-28
Attending: HOSPITALIST
Payer: MEDICARE

## 2021-07-28 DIAGNOSIS — N30.00 ACUTE CYSTITIS WITHOUT HEMATURIA: ICD-10-CM

## 2021-07-28 DIAGNOSIS — R26.81 GAIT INSTABILITY: Primary | ICD-10-CM

## 2021-07-28 DIAGNOSIS — I67.3: ICD-10-CM

## 2021-07-28 PROBLEM — N39.0 UTI (URINARY TRACT INFECTION): Status: ACTIVE | Noted: 2021-07-28

## 2021-07-28 PROBLEM — I63.9 ACUTE ISCHEMIC STROKE (HCC): Status: ACTIVE | Noted: 2021-07-28

## 2021-07-28 PROBLEM — R29.6 FALLS FREQUENTLY: Status: ACTIVE | Noted: 2021-07-28

## 2021-07-28 LAB
ALBUMIN SERPL-MCNC: 2.7 G/DL (ref 3.2–4.6)
ALBUMIN/GLOB SERPL: 0.7 {RATIO} (ref 1.2–3.5)
ALP SERPL-CCNC: 81 U/L (ref 50–136)
ALT SERPL-CCNC: 22 U/L (ref 12–65)
ANION GAP SERPL CALC-SCNC: 6 MMOL/L (ref 7–16)
APPEARANCE UR: ABNORMAL
AST SERPL-CCNC: 25 U/L (ref 15–37)
BACTERIA URNS QL MICRO: ABNORMAL /HPF
BASOPHILS # BLD: 0.1 K/UL (ref 0–0.2)
BASOPHILS NFR BLD: 0 % (ref 0–2)
BILIRUB SERPL-MCNC: 0.9 MG/DL (ref 0.2–1.1)
BILIRUB UR QL: NEGATIVE
BUN SERPL-MCNC: 24 MG/DL (ref 8–23)
CALCIUM SERPL-MCNC: 9.4 MG/DL (ref 8.3–10.4)
CASTS URNS QL MICRO: 0 /LPF
CHLORIDE SERPL-SCNC: 105 MMOL/L (ref 98–107)
CO2 SERPL-SCNC: 28 MMOL/L (ref 21–32)
COLOR UR: YELLOW
CREAT SERPL-MCNC: 1.28 MG/DL (ref 0.8–1.5)
DIFFERENTIAL METHOD BLD: ABNORMAL
EOSINOPHIL # BLD: 0.1 K/UL (ref 0–0.8)
EOSINOPHIL NFR BLD: 1 % (ref 0.5–7.8)
EPI CELLS #/AREA URNS HPF: 0 /HPF
ERYTHROCYTE [DISTWIDTH] IN BLOOD BY AUTOMATED COUNT: 17.6 % (ref 11.9–14.6)
GLOBULIN SER CALC-MCNC: 4 G/DL (ref 2.3–3.5)
GLUCOSE SERPL-MCNC: 94 MG/DL (ref 65–100)
GLUCOSE UR STRIP.AUTO-MCNC: NEGATIVE MG/DL
HCT VFR BLD AUTO: 32.5 % (ref 41.1–50.3)
HGB BLD-MCNC: 10.7 G/DL (ref 13.6–17.2)
HGB UR QL STRIP: ABNORMAL
IMM GRANULOCYTES # BLD AUTO: 0.3 K/UL (ref 0–0.5)
IMM GRANULOCYTES NFR BLD AUTO: 2 % (ref 0–5)
KETONES UR QL STRIP.AUTO: NEGATIVE MG/DL
LEUKOCYTE ESTERASE UR QL STRIP.AUTO: ABNORMAL
LIPASE SERPL-CCNC: 92 U/L (ref 73–393)
LYMPHOCYTES # BLD: 1.6 K/UL (ref 0.5–4.6)
LYMPHOCYTES NFR BLD: 12 % (ref 13–44)
MCH RBC QN AUTO: 28.5 PG (ref 26.1–32.9)
MCHC RBC AUTO-ENTMCNC: 32.9 G/DL (ref 31.4–35)
MCV RBC AUTO: 86.4 FL (ref 79.6–97.8)
MONOCYTES # BLD: 0.7 K/UL (ref 0.1–1.3)
MONOCYTES NFR BLD: 5 % (ref 4–12)
NEUTS SEG # BLD: 10.3 K/UL (ref 1.7–8.2)
NEUTS SEG NFR BLD: 79 % (ref 43–78)
NITRITE UR QL STRIP.AUTO: POSITIVE
NRBC # BLD: 0 K/UL (ref 0–0.2)
PH UR STRIP: 5.5 [PH] (ref 5–9)
PLATELET # BLD AUTO: 241 K/UL (ref 150–450)
PMV BLD AUTO: 9.9 FL (ref 9.4–12.3)
POTASSIUM SERPL-SCNC: 3.5 MMOL/L (ref 3.5–5.1)
PROT SERPL-MCNC: 6.7 G/DL (ref 6.3–8.2)
PROT UR STRIP-MCNC: ABNORMAL MG/DL
RBC # BLD AUTO: 3.76 M/UL (ref 4.23–5.6)
RBC #/AREA URNS HPF: ABNORMAL /HPF
SODIUM SERPL-SCNC: 139 MMOL/L (ref 136–145)
SP GR UR REFRACTOMETRY: 1.02 (ref 1–1.02)
UROBILINOGEN UR QL STRIP.AUTO: 1 EU/DL (ref 0.2–1)
WBC # BLD AUTO: 13.1 K/UL (ref 4.3–11.1)
WBC URNS QL MICRO: >100 /HPF

## 2021-07-28 PROCEDURE — 85025 COMPLETE CBC W/AUTO DIFF WBC: CPT

## 2021-07-28 PROCEDURE — 99284 EMERGENCY DEPT VISIT MOD MDM: CPT

## 2021-07-28 PROCEDURE — 81001 URINALYSIS AUTO W/SCOPE: CPT

## 2021-07-28 PROCEDURE — 87040 BLOOD CULTURE FOR BACTERIA: CPT

## 2021-07-28 PROCEDURE — 74011000636 HC RX REV CODE- 636: Performed by: HOSPITALIST

## 2021-07-28 PROCEDURE — 71045 X-RAY EXAM CHEST 1 VIEW: CPT

## 2021-07-28 PROCEDURE — 65660000000 HC RM CCU STEPDOWN

## 2021-07-28 PROCEDURE — 70551 MRI BRAIN STEM W/O DYE: CPT

## 2021-07-28 PROCEDURE — 83690 ASSAY OF LIPASE: CPT

## 2021-07-28 PROCEDURE — 74011000258 HC RX REV CODE- 258: Performed by: EMERGENCY MEDICINE

## 2021-07-28 PROCEDURE — 0042T CT PERF W CBF: CPT

## 2021-07-28 PROCEDURE — 87088 URINE BACTERIA CULTURE: CPT

## 2021-07-28 PROCEDURE — 4A03X5D MEASUREMENT OF ARTERIAL FLOW, INTRACRANIAL, EXTERNAL APPROACH: ICD-10-PCS | Performed by: FAMILY MEDICINE

## 2021-07-28 PROCEDURE — 74011250637 HC RX REV CODE- 250/637: Performed by: HOSPITALIST

## 2021-07-28 PROCEDURE — 87086 URINE CULTURE/COLONY COUNT: CPT

## 2021-07-28 PROCEDURE — 80053 COMPREHEN METABOLIC PANEL: CPT

## 2021-07-28 PROCEDURE — 74011250636 HC RX REV CODE- 250/636: Performed by: EMERGENCY MEDICINE

## 2021-07-28 PROCEDURE — 81003 URINALYSIS AUTO W/O SCOPE: CPT

## 2021-07-28 PROCEDURE — 70450 CT HEAD/BRAIN W/O DYE: CPT

## 2021-07-28 PROCEDURE — 70496 CT ANGIOGRAPHY HEAD: CPT

## 2021-07-28 PROCEDURE — 72070 X-RAY EXAM THORAC SPINE 2VWS: CPT

## 2021-07-28 PROCEDURE — 87186 SC STD MICRODIL/AGAR DIL: CPT

## 2021-07-28 RX ORDER — NITROGLYCERIN 0.4 MG/1
0.4 TABLET SUBLINGUAL
Status: DISCONTINUED | OUTPATIENT
Start: 2021-07-28 | End: 2021-08-03 | Stop reason: HOSPADM

## 2021-07-28 RX ORDER — ENOXAPARIN SODIUM 100 MG/ML
40 INJECTION SUBCUTANEOUS EVERY 24 HOURS
Status: DISCONTINUED | OUTPATIENT
Start: 2021-07-28 | End: 2021-08-03 | Stop reason: HOSPADM

## 2021-07-28 RX ORDER — ONDANSETRON 2 MG/ML
4 INJECTION INTRAMUSCULAR; INTRAVENOUS
Status: DISCONTINUED | OUTPATIENT
Start: 2021-07-28 | End: 2021-08-03 | Stop reason: HOSPADM

## 2021-07-28 RX ORDER — PANTOPRAZOLE SODIUM 40 MG/1
40 TABLET, DELAYED RELEASE ORAL
Status: DISCONTINUED | OUTPATIENT
Start: 2021-07-29 | End: 2021-08-03 | Stop reason: HOSPADM

## 2021-07-28 RX ORDER — CLOPIDOGREL BISULFATE 75 MG/1
75 TABLET ORAL DAILY
Status: DISCONTINUED | OUTPATIENT
Start: 2021-07-29 | End: 2021-08-03 | Stop reason: HOSPADM

## 2021-07-28 RX ORDER — ACETAMINOPHEN 325 MG/1
650 TABLET ORAL
Status: DISCONTINUED | OUTPATIENT
Start: 2021-07-28 | End: 2021-08-03 | Stop reason: HOSPADM

## 2021-07-28 RX ORDER — FAMOTIDINE 20 MG/1
20 TABLET, FILM COATED ORAL DAILY
Status: DISCONTINUED | OUTPATIENT
Start: 2021-07-29 | End: 2021-08-03 | Stop reason: HOSPADM

## 2021-07-28 RX ORDER — SODIUM CHLORIDE 0.9 % (FLUSH) 0.9 %
5-40 SYRINGE (ML) INJECTION EVERY 8 HOURS
Status: DISCONTINUED | OUTPATIENT
Start: 2021-07-28 | End: 2021-07-30 | Stop reason: SDUPTHER

## 2021-07-28 RX ORDER — SODIUM CHLORIDE 0.9 % (FLUSH) 0.9 %
5-40 SYRINGE (ML) INJECTION EVERY 8 HOURS
Status: DISCONTINUED | OUTPATIENT
Start: 2021-07-28 | End: 2021-08-03 | Stop reason: HOSPADM

## 2021-07-28 RX ORDER — LABETALOL HYDROCHLORIDE 5 MG/ML
5 INJECTION, SOLUTION INTRAVENOUS
Status: DISCONTINUED | OUTPATIENT
Start: 2021-07-28 | End: 2021-08-03 | Stop reason: HOSPADM

## 2021-07-28 RX ORDER — SODIUM CHLORIDE 0.9 % (FLUSH) 0.9 %
10 SYRINGE (ML) INJECTION
Status: COMPLETED | OUTPATIENT
Start: 2021-07-28 | End: 2021-07-28

## 2021-07-28 RX ORDER — SODIUM CHLORIDE 0.9 % (FLUSH) 0.9 %
5-40 SYRINGE (ML) INJECTION AS NEEDED
Status: DISCONTINUED | OUTPATIENT
Start: 2021-07-28 | End: 2021-08-03 | Stop reason: HOSPADM

## 2021-07-28 RX ORDER — SODIUM CHLORIDE 9 MG/ML
75 INJECTION, SOLUTION INTRAVENOUS CONTINUOUS
Status: DISCONTINUED | OUTPATIENT
Start: 2021-07-28 | End: 2021-07-29

## 2021-07-28 RX ORDER — GUAIFENESIN 100 MG/5ML
81 LIQUID (ML) ORAL DAILY
Status: DISCONTINUED | OUTPATIENT
Start: 2021-07-29 | End: 2021-08-03 | Stop reason: HOSPADM

## 2021-07-28 RX ORDER — SODIUM CHLORIDE 0.9 % (FLUSH) 0.9 %
5-40 SYRINGE (ML) INJECTION AS NEEDED
Status: DISCONTINUED | OUTPATIENT
Start: 2021-07-28 | End: 2021-07-30 | Stop reason: SDUPTHER

## 2021-07-28 RX ORDER — ATORVASTATIN CALCIUM 40 MG/1
80 TABLET, FILM COATED ORAL
Status: DISCONTINUED | OUTPATIENT
Start: 2021-07-28 | End: 2021-08-03 | Stop reason: HOSPADM

## 2021-07-28 RX ADMIN — CEFTRIAXONE 1 G: 1 INJECTION, POWDER, FOR SOLUTION INTRAMUSCULAR; INTRAVENOUS at 18:25

## 2021-07-28 RX ADMIN — ATORVASTATIN CALCIUM 80 MG: 40 TABLET, FILM COATED ORAL at 22:43

## 2021-07-28 RX ADMIN — IOPAMIDOL 100 ML: 755 INJECTION, SOLUTION INTRAVENOUS at 19:04

## 2021-07-28 RX ADMIN — Medication 10 ML: at 22:00

## 2021-07-28 RX ADMIN — Medication 10 ML: at 19:04

## 2021-07-28 RX ADMIN — SODIUM CHLORIDE 1000 ML: 900 INJECTION, SOLUTION INTRAVENOUS at 15:26

## 2021-07-28 NOTE — ED TRIAGE NOTES
Pt is here today for a decline in activities of daily living; lethargy, anorexia, multiple falls recently.

## 2021-07-28 NOTE — H&P
[facility] History and Physical   Admit Date:  2021  2:32 PM   Name:  Magdiel Long Sr   Age:  80 y.o. Sex:  male  :  1931   MRN:  [id number]     Presenting Complaint: Lethargy    Reason(s) for Admission: UTI (urinary tract infection) [N39.0]  Falls frequently [R29.6]  Acute ischemic stroke [facility]) [I63.9]     Assessment & Plan: This is a 80 y male with:    Suspected Stroke:  Frequent falls  Stroke protocol ordered. CT head - no hemorrhage. Severe bilateral patchy white matter hypo attenuation throughout both cerebral hemispheres grossly unchanged from prior exam, but extensive. Acute/sub-acute CVA cannot be excluded. Aspirin, Plavix, Statin. Allow for permissive hypertension. CT perfusion scan, CTA head and neck ordered. MRI Brain, ECHO ordered. Fall precautions. PT/OT/Speech therapy eval.  PPD. UTI:  Ceftriaxone pending cultures. CAD:  Aspirin, Plavix, Statin. Hiatal hernia/GERD:  Pepcid, Protonix      Disposition/Discharge Planning: Remote telemetry, Inpatient. Hopefully discharge to [facility]/  assisted living    Diet: ADULT DIET Regular; Low Sodium (2 gm)  VTE ppx: Lovenox  Code status: Full Code   DPOA: Pt's son Mr Burak Valladares [phone]    HPI:   Dianelys Fisher is a 80 y.o. male with medical history of CAD, GERD, Dementia who presented to ED with 10 days of decreased appetite, multiple falls. Pt's son is at bedside and able to provide information. Pt has been falling a lot at home. He lives with room mates. He had a fall 2 days ago and hit his head. Mainly, he has balance issues with walking. No tingling, numbness or weakness of extremities. Son reports pt has not been able to care for himself and has been forgetting to take meds. No fever. No chills. He is incontinent of urine. No vomiting, but has nausea. No chest pain. No palpitations. No cough. No shortness of breath. No headache. He has not been eating much and has been losing weight.  Lost about 10-15 pounds in the last 2 weeks. He also has backache. ER course:   Afebrile, CBC with WBC 13. BMP with Potassium 3.4, Cr 1.28. Lipase 92. CXR shows mild bibasilar atelectasis. Xray thoracic spine shows no acute compression fracture. Mild degenerative spondylosis. CT head shows - no hemorrhage. Severe bilateral patchy white matter hypo attenuation throughout both cerebral hemispheres grossly unchanged from prior exam, but extensive. Acute/sub-acute CVA cannot be excluded. Pt admitted for further evaluation and management of Suspected stroke, UTI, falls. Review of Systems:  10 systems reviewed and negative except as noted in HPI. Past medical history:  CAD  Hiatal hernia    Past Medical History:   Diagnosis Date    Colon cancer ([facility] [address] 01/2012    Hiatal hernia       History reviewed. No pertinent surgical history. No Known Allergies   Social History     Tobacco Use    Smoking status: Never Smoker    Smokeless tobacco: Never Used   Substance Use Topics    Alcohol use: Never      History reviewed. No pertinent family history. No family history of Diabetes, HTN,   Family history reviewed and noncontributory to patient's acute condition; no relevant family history unless otherwise noted above. Immunization History   Administered Date(s) Administered    Influenza High Dose Vaccine PF 02/19/2016, 10/09/2018, 10/23/2019    Pneumococcal Polysaccharide (PPSV-23) 05/21/2018    TB Skin Test (PPD) Intradermal 12/02/2019, 12/12/2019     PTA Medications:  Current Outpatient Medications   Medication Instructions    aspirin 81 mg, Oral, DAILY    atorvastatin (LIPITOR) 20 mg, Oral, EVERY BEDTIME    clopidogreL (PLAVIX) 75 mg, Oral, DAILY    famotidine (PEPCID) 20 mg, Oral, DAILY    nitroglycerin (NITROSTAT) 0.4 mg, SubLINGual, EVERY 5 MIN AS NEEDED, Up to 3 doses.     ondansetron (ZOFRAN ODT) 4 mg, Oral, EVERY 8 HOURS AS NEEDED    pantoprazole (PROTONIX) 40 mg, Oral, DAILY BEFORE BREAKFAST Objective:     Patient Vitals for the past 24 hrs:   Temp Pulse Resp BP SpO2   07/28/21 1600 -- -- -- 137/74 93 %   07/28/21 1418 97.7 °F (36.5 °C) 95 18 119/75 94 %     Oxygen Therapy  O2 Sat (%): 93 % (07/28/21 1600)  Pulse via Oximetry: 82 beats per minute (07/28/21 1600)  O2 Device: None (Room air) (07/28/21 1418)    Estimated body mass index is 19.38 kg/m² as calculated from the following:    Height as of this encounter: 5' 10.5\" (1.791 m). Weight as of this encounter: 62.1 kg (137 lb). No intake or output data in the 24 hours ending 07/28/21 1801      Physical Exam:  General:    Elderly male, no overt distress  Eyes:  Sclerae appear normal.  Extraocular movements intact. HENT:  Normocephalic, atraumatic. Nares appear normal, no drainage. Moist mucous membranes  Neck:  Supple. No restricted ROM. CV:   RRR. No m/r/g. No peripheral edema. No JVD  Lungs:   CTAB. No wheezing, rhonchi, or rales. Appears even, unlabored  Abdomen: Bowel sounds present. Soft, nontender, nondistended. Extremities: Warm and dry. No cyanosis or clubbing  Skin:     No rashes. Normal turgor. Normal coloration  Neuro:  Cranial nerves II-XII grossly intact. No focal weakness or numbness, cerebellar testing normal,   Psych:  Normal mood and affect.   Calm    I reviewed the labs, imaging, EKGs, telemetry, meds given in ER, and other studies done:    Recent Results (from the past 24 hour(s))   CBC WITH AUTOMATED DIFF    Collection Time: 07/28/21  2:44 PM   Result Value Ref Range    WBC 13.1 (H) 4.3 - 11.1 K/uL    RBC 3.76 (L) 4.23 - 5.6 M/uL    HGB 10.7 (L) 13.6 - 17.2 g/dL    HCT 32.5 (L) 41.1 - 50.3 %    MCV 86.4 79.6 - 97.8 FL    MCH 28.5 26.1 - 32.9 PG    MCHC 32.9 31.4 - 35.0 g/dL    RDW 17.6 (H) 11.9 - 14.6 %    PLATELET 776 161 - 970 K/uL    MPV 9.9 9.4 - 12.3 FL    ABSOLUTE NRBC 0.00 0.0 - 0.2 K/uL    DF AUTOMATED      NEUTROPHILS 79 (H) 43 - 78 %    LYMPHOCYTES 12 (L) 13 - 44 %    MONOCYTES 5 4.0 - 12.0 % EOSINOPHILS 1 0.5 - 7.8 %    BASOPHILS 0 0.0 - 2.0 %    IMMATURE GRANULOCYTES 2 0.0 - 5.0 %    ABS. NEUTROPHILS 10.3 (H) 1.7 - 8.2 K/UL    ABS. LYMPHOCYTES 1.6 0.5 - 4.6 K/UL    ABS. MONOCYTES 0.7 0.1 - 1.3 K/UL    ABS. EOSINOPHILS 0.1 0.0 - 0.8 K/UL    ABS. BASOPHILS 0.1 0.0 - 0.2 K/UL    ABS. IMM. GRANS. 0.3 0.0 - 0.5 K/UL   METABOLIC PANEL, COMPREHENSIVE    Collection Time: 07/28/21  2:44 PM   Result Value Ref Range    Sodium 139 136 - 145 mmol/L    Potassium 3.5 3.5 - 5.1 mmol/L    Chloride 105 98 - 107 mmol/L    CO2 28 21 - 32 mmol/L    Anion gap 6 (L) 7 - 16 mmol/L    Glucose 94 65 - 100 mg/dL    BUN 24 (H) 8 - 23 MG/DL    Creatinine 1.28 0.8 - 1.5 MG/DL    GFR est AA >60 >60 ml/min/1.73m2    GFR est non-AA 56 (L) >60 ml/min/1.73m2    Calcium 9.4 8.3 - 10.4 MG/DL    Bilirubin, total 0.9 0.2 - 1.1 MG/DL    ALT (SGPT) 22 12 - 65 U/L    AST (SGOT) 25 15 - 37 U/L    Alk.  phosphatase 81 50 - 136 U/L    Protein, total 6.7 6.3 - 8.2 g/dL    Albumin 2.7 (L) 3.2 - 4.6 g/dL    Globulin 4.0 (H) 2.3 - 3.5 g/dL    A-G Ratio 0.7 (L) 1.2 - 3.5     LIPASE    Collection Time: 07/28/21  2:44 PM   Result Value Ref Range    Lipase 92 73 - 393 U/L   URINALYSIS W/ RFLX MICROSCOPIC    Collection Time: 07/28/21  4:12 PM   Result Value Ref Range    Color YELLOW      Appearance CLOUDY      Specific gravity 1.018 1.001 - 1.023      pH (UA) 5.5 5.0 - 9.0      Protein TRACE (A) NEG mg/dL    Glucose Negative mg/dL    Ketone Negative NEG mg/dL    Bilirubin Negative NEG      Blood MODERATE (A) NEG      Urobilinogen 1.0 0.2 - 1.0 EU/dL    Nitrites Positive (A) NEG      Leukocyte Esterase LARGE (A) NEG      WBC >100 (H) 0 /hpf    RBC 0-3 0 /hpf    Epithelial cells 0 0 /hpf    Bacteria 1+ (H) 0 /hpf    Casts 0 0 /lpf       All Micro Results     Procedure Component Value Units Date/Time    CULTURE, BLOOD [[id number]]     Order Status: Sent Specimen: Blood     CULTURE, BLOOD [[id number]]     Order Status: Sent Specimen: Blood     CULTURE, URINE [[id number]] Collected: 07/28/21 1612    Order Status: Completed Specimen: Urine from Clean catch Updated: 07/28/21 1619          Other Studies:    XR SPINE THORAC 2 V    Result Date: 7/28/2021  THORACIC SPINE SERIES. Clinical Indication: Mid back pain after a fall Findings: The vertebral bodies are normal in height and alignment. The natural thoracic kyphosis is maintained. Mild degenerative disc changes are noted in the mid and lower thoracic spine. The paraspinal soft tissues are unremarkable. 1. No acute compression fracture. 2. Mild degenerative spondylosis. CT HEAD WO CONT    Result Date: 7/28/2021  CT of the head without contrast. CLINICAL INDICATION: Recurrent falls PROCEDURE: Serial thin section axial images are obtained from the cranial vertex through the skull base without the administration of intravenous contrast. Radiation dose reduction techniques were used for this study. Our CT scanners use one or all of the following: Automated exposure control, adjusted of the mA and/or kV according to patient size, iterative reconstruction COMPARISON: Head CT dated 12/2/2019. FINDINGS: There is no acute intracranial hemorrhage, mass, or mass effect. No abnormal extra-axial fluid collections identified. There is no hydrocephalus. The basilar cisterns are widely patent. Severe patchy white matter hypoattenuation is again noted involving the subcortical, deep, and periventricular white matter. This is not significantly changed from the prior exam. No skull fracture or aggressive osseous lesion noted. The mastoid air cells and included paranasal sinuses are clear. 1. No acute intracranial abnormality. 2. Severe bilateral patchy white matter hypoattenuation noted throughout both cerebral hemispheres is grossly unchanged from the prior exam but extensive. Acute/subacute CVA cannot be excluded given the severity of the underlying white matter disease.      XR CHEST PORT    Result Date: 7/28/2021  Portable chest x-ray CLINICAL INDICATION: Lethargy FINDINGS: Single AP view the chest compared to a similar exam dated 7/5/2021 show the lungs are slightly underexpanded with mild bibasilar atelectasis. Otherwise the lungs are clear. The cardiac silhouette and mediastinum are unremarkable. Degenerative changes are noted the right shoulder. Slightly under expanded lungs with mild bibasilar atelectasis.       Medications Administered      Medications Administered     sodium chloride 0.9 % bolus infusion 1,000 mL     Admin Date  07/28/2021 Action  New Bag Dose  1,000 mL Route  IntraVENous Administered By  Kiley Spencer RN                  Problem List:     Hospital Problems as of 7/28/2021 Date Reviewed: 7/23/2019        Codes Class Noted - Resolved POA    UTI (urinary tract infection) ICD-10-CM: N39.0  ICD-9-CM: 599.0  7/28/2021 - Present Unknown        Falls frequently ICD-10-CM: R29.6  ICD-9-CM: V15.88  7/28/2021 - Present Unknown        Acute ischemic stroke [facility]) ICD-10-CM: I63.9  ICD-9-CM: 434.91  7/28/2021 - Present Unknown                 Signed:  Brandie Campos MD

## 2021-07-28 NOTE — ED PROVIDER NOTES
80-year-old male presenting with his sons for 10 days of decreased appetite, weight loss, gait instability and multiple falls. Patient himself is awake alert aware of all things. He remembers his falls. He just feels more unsteady on his gait. He does not have an appetite. Tells me he is lost about 14 pounds in 2 weeks without trying. The history is provided by the patient. Lethargy  This is a new problem. The current episode started more than 1 week ago. The problem occurs constantly. The problem has not changed since onset. Pertinent negatives include no chest pain, no abdominal pain, no headaches and no shortness of breath. Nothing aggravates the symptoms. Nothing relieves the symptoms. He has tried nothing for the symptoms. The treatment provided no relief. Past Medical History:   Diagnosis Date    [facility]    Hiatal hernia        History reviewed. No pertinent surgical history. History reviewed. No pertinent family history. Social History     Socioeconomic History    Marital status:      Spouse name: Not on file    Number of children: Not on file    Years of education: Not on file    Highest education level: Not on file   Occupational History    Not on file   Tobacco Use    Smoking status: Never Smoker    Smokeless tobacco: Never Used   Substance and Sexual Activity    Alcohol use: Never    Drug use: Never    Sexual activity: Not on file   Other Topics Concern    Not on file   Social History Narrative    Not on file     Social Determinants of Health     Financial Resource Strain:     Difficulty of Paying Living Expenses:    Food Insecurity:     Worried About Running Out of Food in the Last Year:     [address] in the Last Year:    Transportation Needs:     Lack of Transportation (Medical):      Lack of Transportation (Non-Medical):    Physical Activity:     Days of Exercise per Week:     Minutes of Exercise per Session:    Stress:     Feeling of Stress :    Social Connections:     Frequency of Communication with Friends and Family:     Frequency of Social Gatherings with Friends and Family:     Attends Judaism Services:     Active Member of Clubs or Organizations:     Attends Club or Organization Meetings:     Marital Status:    Intimate Partner Violence:     Fear of Current or Ex-Partner:     Emotionally Abused:     Physically Abused:     Sexually Abused: ALLERGIES: Patient has no known allergies. Review of Systems   Constitutional: Positive for fatigue and unexpected weight change. Respiratory: Negative for shortness of breath. Cardiovascular: Negative for chest pain. Gastrointestinal: Negative for abdominal pain. Musculoskeletal: Positive for back pain. Neurological: Negative for headaches. All other systems reviewed and are negative. Vitals:    07/28/21 1418   BP: 119/75   Pulse: 95   Resp: 18   Temp: 97.7 °F (36.5 °C)   SpO2: 94%   Weight: 62.1 kg (137 lb)   Height: 5' 10.5\" (1.791 m)            Physical Exam  Vitals and nursing note reviewed. Constitutional:       Appearance: He is well-developed. Comments: Frail and thin appearing   HENT:      Head: Normocephalic and atraumatic. Eyes:      Conjunctiva/sclera: Conjunctivae normal.      Pupils: Pupils are equal, round, and reactive to light. Cardiovascular:      Rate and Rhythm: Normal rate and regular rhythm. Heart sounds: Normal heart sounds. Pulmonary:      Effort: Pulmonary effort is normal.      Breath sounds: Normal breath sounds. Abdominal:      General: Bowel sounds are normal.      Palpations: Abdomen is soft. Musculoskeletal:         General: No deformity. Normal range of motion. Cervical back: Normal range of motion and neck supple. Skin:     General: Skin is warm and dry. Neurological:      Mental Status: He is alert and oriented to person, place, and time. Cranial Nerves: No cranial nerve deficit. Psychiatric:         Behavior: Behavior normal.          MDM  Number of Diagnoses or Management Options  Diagnosis management comments: 59-year-old male presenting for evaluation of weakness, gait instability, multiple falls, loss of appetite and weight loss. Patient has a history of colon cancer so this is concerning for recurrence. Other considerations are dehydration, closed head injury, urinary tract infection, worsening dementia       Amount and/or Complexity of Data Reviewed  Clinical lab tests: ordered and reviewed (Results for orders placed or performed during the hospital encounter of 07/28/21  -CBC WITH AUTOMATED DIFF       Result                      Value             Ref Range           WBC                         13.1 (H)          4.3 - 11.1 K*       RBC                         3.76 (L)          4.23 - 5.6 M*       HGB                         10.7 (L)          13.6 - 17.2 *       HCT                         32.5 (L)          41.1 - 50.3 %       MCV                         86.4              79.6 - 97.8 *       MCH                         28.5              26.1 - 32.9 *       MCHC                        32.9              31.4 - 35.0 *       RDW                         17.6 (H)          11.9 - 14.6 %       PLATELET                    241               150 - 450 K/*       MPV                         9.9               9.4 - 12.3 FL       ABSOLUTE NRBC               0.00              0.0 - 0.2 K/*       DF                          AUTOMATED                             NEUTROPHILS                 79 (H)            43 - 78 %           LYMPHOCYTES                 12 (L)            13 - 44 %           MONOCYTES                   5                 4.0 - 12.0 %        EOSINOPHILS                 1                 0.5 - 7.8 %         BASOPHILS                   0                 0.0 - 2.0 %         IMMATURE GRANULOCYTES       2                 0.0 - 5.0 %         ABS.  NEUTROPHILS            10.3 (H)          1.7 - 8.2 K/*       ABS. LYMPHOCYTES            1.6               0.5 - 4.6 K/*       ABS. MONOCYTES              0.7               0.1 - 1.3 K/*       ABS. EOSINOPHILS            0.1               0.0 - 0.8 K/*       ABS. BASOPHILS              0.1               0.0 - 0.2 K/*       ABS. IMM. GRANS.            0.3               0.0 - 0.5 K/*  -METABOLIC PANEL, COMPREHENSIVE       Result                      Value             Ref Range           Sodium                      139               136 - 145 mm*       Potassium                   3.5               3.5 - 5.1 mm*       Chloride                    105               98 - 107 mmo*       CO2                         28                21 - 32 mmol*       Anion gap                   6 (L)             7 - 16 mmol/L       Glucose                     94                65 - 100 mg/*       BUN                         24 (H)            8 - 23 MG/DL        Creatinine                  1.28              0.8 - 1.5 MG*       GFR est AA                  >60               >60 ml/min/1*       GFR est non-AA              56 (L)            >60 ml/min/1*       Calcium                     9.4               8.3 - 10.4 M*       Bilirubin, total            0.9               0.2 - 1.1 MG*       ALT (SGPT)                  22                12 - 65 U/L         AST (SGOT)                  25                15 - 37 U/L         Alk.  phosphatase            81                50 - 136 U/L        Protein, total              6.7               6.3 - 8.2 g/*       Albumin                     2.7 (L)           3.2 - 4.6 g/*       Globulin                    4.0 (H)           2.3 - 3.5 g/*       A-G Ratio                   0.7 (L)           1.2 - 3.5      -LIPASE       Result                      Value             Ref Range           Lipase                      92                73 - 393 U/L   -URINALYSIS W/ RFLX MICROSCOPIC       Result                      Value             Ref Range           Color YELLOW                                Appearance                  CLOUDY                                Specific gravity            1.018             1.001 - 1.02*       pH (UA)                     5.5               5.0 - 9.0           Protein                     TRACE (A)         NEG mg/dL           Glucose                     Negative          mg/dL               Ketone                      Negative          NEG mg/dL           Bilirubin                   Negative          NEG                 Blood                       MODERATE (A)      NEG                 Urobilinogen                1.0               0.2 - 1.0 EU*       Nitrites                    Positive (A)      NEG                 Leukocyte Esterase          LARGE (A)         NEG                 WBC                         >100 (H)          0 /hpf              RBC                         0-3               0 /hpf              Epithelial cells            0                 0 /hpf              Bacteria                    1+ (H)            0 /hpf              Casts                       0                 0 /lpf         )  Tests in the radiology section of CPT®: ordered and reviewed (XR CHEST PA LAT    Result Date: 7/5/2021  EXAM: CHEST X-RAY, 2 VIEWS INDICATION: chest pain COMPARISON: 12/9/2019 TECHNIQUE: Frontal and lateral views of the chest were obtained. FINDINGS: The lungs are clear and the pulmonary vasculature is normal. Aortic atherosclerotic calcifications. Hiatal hernia present. Cardiac silhouette is mildly enlarged. Coronary stent noted. Multilevel degenerative changes of the spine. No radiographic evidence of acute cardiopulmonary disease. XR SPINE THORAC 2 V    Result Date: 7/28/2021  THORACIC SPINE SERIES. Clinical Indication: Mid back pain after a fall Findings: The vertebral bodies are normal in height and alignment. The natural thoracic kyphosis is maintained.  Mild degenerative disc changes are noted in the mid and lower thoracic spine. The paraspinal soft tissues are unremarkable. 1. No acute compression fracture. 2. Mild degenerative spondylosis. CT HEAD WO CONT    Result Date: 7/28/2021  CT of the head without contrast. CLINICAL INDICATION: Recurrent falls PROCEDURE: Serial thin section axial images are obtained from the cranial vertex through the skull base without the administration of intravenous contrast. Radiation dose reduction techniques were used for this study. Our CT scanners use one or all of the following: Automated exposure control, adjusted of the mA and/or kV according to patient size, iterative reconstruction COMPARISON: Head CT dated 12/2/2019. FINDINGS: There is no acute intracranial hemorrhage, mass, or mass effect. No abnormal extra-axial fluid collections identified. There is no hydrocephalus. The basilar cisterns are widely patent. Severe patchy white matter hypoattenuation is again noted involving the subcortical, deep, and periventricular white matter. This is not significantly changed from the prior exam. No skull fracture or aggressive osseous lesion noted. The mastoid air cells and included paranasal sinuses are clear. 1. No acute intracranial abnormality. 2. Severe bilateral patchy white matter hypoattenuation noted throughout both cerebral hemispheres is grossly unchanged from the prior exam but extensive. Acute/subacute CVA cannot be excluded given the severity of the underlying white matter disease. XR CHEST PORT    Result Date: 7/28/2021  Portable chest x-ray CLINICAL INDICATION: Lethargy FINDINGS: Single AP view the chest compared to a similar exam dated 7/5/2021 show the lungs are slightly underexpanded with mild bibasilar atelectasis. Otherwise the lungs are clear. The cardiac silhouette and mediastinum are unremarkable. Degenerative changes are noted the right shoulder.      Slightly under expanded lungs with mild bibasilar atelectasis.    )  Tests in the medicine section of CPT®: ordered and reviewed  Decide to obtain previous medical records or to obtain history from someone other than the patient: yes  Independent visualization of images, tracings, or specimens: yes    Risk of Complications, Morbidity, and/or Mortality  Presenting problems: high  Diagnostic procedures: high  Management options: high  General comments: I personally reviewed the patient's vital signs, laboratory tests, and/or radiological findings. I discussed these findings with the patient and their significance. I answered all questions and explained that given these findings there is significant concern for increased morbidity and/or mortality without immediate intervention. As a result, I recommended admission to the hospital, consulted the appropriate service, and transitioned care to that service in improved condition      Patient Progress  Patient progress: stable    ED Course as of Jul 28 1705   Wed Jul 28, 2021   1534 Slightly elevated white blood cell count otherwise normal CBC. CMP is unremarkable. Lipase is normal.    [JS]   0281 I have added a head CT due to multiple falls and thoracic spine x-ray due to 2 complaints of mid back pain after his fall as well. [JS]   1642 Urinalysis consistent with infection. Getting blood cultures and starting the patient on ceftriaxone.     [JS]      ED Course User Index  [JS] Rosalinda Araujo MD       Procedures

## 2021-07-29 ENCOUNTER — APPOINTMENT (OUTPATIENT)
Dept: CT IMAGING | Age: 86
DRG: 056 | End: 2021-07-29
Attending: FAMILY MEDICINE
Payer: MEDICARE

## 2021-07-29 ENCOUNTER — APPOINTMENT (OUTPATIENT)
Dept: NON INVASIVE DIAGNOSTICS | Age: 86
DRG: 056 | End: 2021-07-29
Attending: HOSPITALIST
Payer: MEDICARE

## 2021-07-29 LAB
ANION GAP SERPL CALC-SCNC: 4 MMOL/L (ref 7–16)
BUN SERPL-MCNC: 21 MG/DL (ref 8–23)
CALCIUM SERPL-MCNC: 8.7 MG/DL (ref 8.3–10.4)
CHLORIDE SERPL-SCNC: 109 MMOL/L (ref 98–107)
CHOLEST SERPL-MCNC: 144 MG/DL
CO2 SERPL-SCNC: 26 MMOL/L (ref 21–32)
CREAT SERPL-MCNC: 1.09 MG/DL (ref 0.8–1.5)
ECHO AO ASC DIAM: 3.9 CM
ECHO AO ROOT DIAM: 3.1 CM
ECHO AR MAX VEL PISA: 418 CM/S
ECHO AV AREA PEAK VELOCITY: 1.91 CM2
ECHO AV AREA VTI: 1.28 CM2
ECHO AV AREA/BSA PEAK VELOCITY: 1.1 CM2/M2
ECHO AV AREA/BSA VTI: 0.7 CM2/M2
ECHO AV MEAN GRADIENT: 10 MMHG
ECHO AV PEAK GRADIENT: 21 MMHG
ECHO AV PEAK VELOCITY: 228 CM/S
ECHO AV REGURGITANT PHT: 484 MS
ECHO AV VTI: 49.1 CM
ECHO LA AREA 2C: 20.5 CM2
ECHO LA AREA 4C: 24.3 CM2
ECHO LA MAJOR AXIS: 6.4 CM
ECHO LA MINOR AXIS: 3.68 CM
ECHO LV E' LATERAL VELOCITY: 8.69 CM/S
ECHO LV E' SEPTAL VELOCITY: 4.18 CM/S
ECHO LV EDV A2C: 92 CM3
ECHO LV EDV A4C: 95 CM3
ECHO LV ESV A2C: 37 CM3
ECHO LV ESV A4C: 42 CM3
ECHO LV INTERNAL DIMENSION DIASTOLIC: 4.39 CM (ref 4.2–5.9)
ECHO LV INTERNAL DIMENSION SYSTOLIC: 2.77 CM
ECHO LV IVSD: 1.2 CM (ref 0.6–1)
ECHO LV MASS 2D: 183.8 G (ref 88–224)
ECHO LV MASS INDEX 2D: 105.6 G/M2 (ref 49–115)
ECHO LV POSTERIOR WALL DIASTOLIC: 1.14 CM (ref 0.6–1)
ECHO LVOT DIAM: 1.9 CM
ECHO LVOT PEAK GRADIENT: 9 MMHG
ECHO LVOT VTI: 22.1 CM
ECHO MV A VELOCITY: 81.2 CM/S
ECHO MV E DECELERATION TIME (DT): 192 MS
ECHO MV E VELOCITY: 92.2 CM/S
ECHO MV E/A RATIO: 1.14
ECHO MV E/E' LATERAL: 10.61
ECHO MV E/E' RATIO (AVERAGED): 16.33
ECHO MV E/E' SEPTAL: 22.06
ECHO RV INTERNAL DIMENSION: 3.84 CM
ECHO RV TAPSE: 2.4 CM (ref 1.5–2)
ECHO TV REGURGITANT MAX VELOCITY: 272 CM/S
ECHO TV REGURGITANT PEAK GRADIENT: 30 MMHG
ERYTHROCYTE [DISTWIDTH] IN BLOOD BY AUTOMATED COUNT: 17.4 % (ref 11.9–14.6)
EST. AVERAGE GLUCOSE BLD GHB EST-MCNC: 114 MG/DL
GLUCOSE BLD STRIP.AUTO-MCNC: 106 MG/DL (ref 65–100)
GLUCOSE BLD STRIP.AUTO-MCNC: 111 MG/DL (ref 65–100)
GLUCOSE BLD STRIP.AUTO-MCNC: 170 MG/DL (ref 65–100)
GLUCOSE BLD STRIP.AUTO-MCNC: 98 MG/DL (ref 65–100)
GLUCOSE SERPL-MCNC: 117 MG/DL (ref 65–100)
HBA1C MFR BLD: 5.6 % (ref 4.2–6.3)
HCT VFR BLD AUTO: 29.5 % (ref 41.1–50.3)
HDLC SERPL-MCNC: 23 MG/DL (ref 40–60)
HDLC SERPL: 6.3 {RATIO}
HGB BLD-MCNC: 9.5 G/DL (ref 13.6–17.2)
LDLC SERPL CALC-MCNC: 95 MG/DL
MCH RBC QN AUTO: 28.1 PG (ref 26.1–32.9)
MCHC RBC AUTO-ENTMCNC: 32.2 G/DL (ref 31.4–35)
MCV RBC AUTO: 87.3 FL (ref 79.6–97.8)
NRBC # BLD: 0 K/UL (ref 0–0.2)
PLATELET # BLD AUTO: 233 K/UL (ref 150–450)
PMV BLD AUTO: 10.1 FL (ref 9.4–12.3)
POTASSIUM SERPL-SCNC: 3.5 MMOL/L (ref 3.5–5.1)
RBC # BLD AUTO: 3.38 M/UL (ref 4.23–5.6)
SERVICE CMNT-IMP: ABNORMAL
SERVICE CMNT-IMP: NORMAL
SODIUM SERPL-SCNC: 139 MMOL/L (ref 136–145)
TRIGL SERPL-MCNC: 130 MG/DL (ref 35–150)
TSH SERPL DL<=0.005 MIU/L-ACNC: 1.71 UIU/ML
VLDLC SERPL CALC-MCNC: 26 MG/DL (ref 6–23)
WBC # BLD AUTO: 8 K/UL (ref 4.3–11.1)

## 2021-07-29 PROCEDURE — 97112 NEUROMUSCULAR REEDUCATION: CPT

## 2021-07-29 PROCEDURE — 83036 HEMOGLOBIN GLYCOSYLATED A1C: CPT

## 2021-07-29 PROCEDURE — 74011250636 HC RX REV CODE- 250/636: Performed by: HOSPITALIST

## 2021-07-29 PROCEDURE — 70450 CT HEAD/BRAIN W/O DYE: CPT

## 2021-07-29 PROCEDURE — 36415 COLL VENOUS BLD VENIPUNCTURE: CPT

## 2021-07-29 PROCEDURE — 74011000258 HC RX REV CODE- 258: Performed by: HOSPITALIST

## 2021-07-29 PROCEDURE — 97530 THERAPEUTIC ACTIVITIES: CPT

## 2021-07-29 PROCEDURE — 65660000000 HC RM CCU STEPDOWN

## 2021-07-29 PROCEDURE — 97161 PT EVAL LOW COMPLEX 20 MIN: CPT

## 2021-07-29 PROCEDURE — 84443 ASSAY THYROID STIM HORMONE: CPT

## 2021-07-29 PROCEDURE — 80048 BASIC METABOLIC PNL TOTAL CA: CPT

## 2021-07-29 PROCEDURE — 85027 COMPLETE CBC AUTOMATED: CPT

## 2021-07-29 PROCEDURE — 80061 LIPID PANEL: CPT

## 2021-07-29 PROCEDURE — 74011250637 HC RX REV CODE- 250/637: Performed by: HOSPITALIST

## 2021-07-29 PROCEDURE — 74011000302 HC RX REV CODE- 302: Performed by: HOSPITALIST

## 2021-07-29 PROCEDURE — 82962 GLUCOSE BLOOD TEST: CPT

## 2021-07-29 PROCEDURE — 97165 OT EVAL LOW COMPLEX 30 MIN: CPT

## 2021-07-29 PROCEDURE — 94761 N-INVAS EAR/PLS OXIMETRY MLT: CPT

## 2021-07-29 PROCEDURE — 74011250637 HC RX REV CODE- 250/637: Performed by: FAMILY MEDICINE

## 2021-07-29 PROCEDURE — 92610 EVALUATE SWALLOWING FUNCTION: CPT

## 2021-07-29 PROCEDURE — 86580 TB INTRADERMAL TEST: CPT | Performed by: HOSPITALIST

## 2021-07-29 RX ORDER — LORAZEPAM 0.5 MG/1
0.5 TABLET ORAL ONCE
Status: COMPLETED | OUTPATIENT
Start: 2021-07-29 | End: 2021-07-29

## 2021-07-29 RX ADMIN — ASPIRIN 81 MG 81 MG: 81 TABLET ORAL at 08:16

## 2021-07-29 RX ADMIN — ATORVASTATIN CALCIUM 80 MG: 40 TABLET, FILM COATED ORAL at 21:19

## 2021-07-29 RX ADMIN — CLOPIDOGREL BISULFATE 75 MG: 75 TABLET ORAL at 08:15

## 2021-07-29 RX ADMIN — Medication 10 ML: at 21:19

## 2021-07-29 RX ADMIN — SODIUM CHLORIDE 75 ML/HR: 9 INJECTION, SOLUTION INTRAVENOUS at 00:05

## 2021-07-29 RX ADMIN — Medication 10 ML: at 05:35

## 2021-07-29 RX ADMIN — PANTOPRAZOLE SODIUM 40 MG: 40 TABLET, DELAYED RELEASE ORAL at 05:35

## 2021-07-29 RX ADMIN — TUBERCULIN PURIFIED PROTEIN DERIVATIVE 5 UNITS: 5 INJECTION, SOLUTION INTRADERMAL at 21:22

## 2021-07-29 RX ADMIN — ENOXAPARIN SODIUM 40 MG: 40 INJECTION SUBCUTANEOUS at 18:47

## 2021-07-29 RX ADMIN — CEFTRIAXONE 1 G: 1 INJECTION, POWDER, FOR SOLUTION INTRAMUSCULAR; INTRAVENOUS at 19:02

## 2021-07-29 RX ADMIN — LORAZEPAM 0.5 MG: 0.5 TABLET ORAL at 21:19

## 2021-07-29 RX ADMIN — FAMOTIDINE 20 MG: 20 TABLET ORAL at 08:15

## 2021-07-29 NOTE — PROGRESS NOTES
Progress Note    Patient: Lu Aaron MRN: [id number]  SSN: [id number]    YOB: 1931  Age: 80 y.o. Sex: male      Admit Date: 7/28/2021    LOS: 1 day     Subjective:   F/U UTI, right posterior parietal lobe CVA    \"80 y.o. male with medical history of CAD, GERD, Dementia who presented to ED with 10 days of decreased appetite, multiple falls. Pt's son is at bedside and able to provide information. Pt has been falling a lot at home. He lives with room mates. He had a fall 2 days ago and hit his head. Mainly, he has balance issues with walking. Son reports pt has not been able to care for himself and has been forgetting to take meds. No fever. No chills. He is incontinent of urine. \" CXR shows mild bibasilar atelectasis. Xray thoracic spine shows no acute compression fracture. Mild degenerative spondylosis. CT head showed no hemorrhage. Severe bilateral patchy white matter hypo attenuation throughout both cerebral hemispheres grossly unchanged from prior exam, but extensive. Acute/sub-acute CVA cannot be excluded. LDL 95, cholesterol 144. A1C 5.6. TSH 1.7. CTA head and neck with no large vessel occlusion. Diffuse vascular disease noted. CT perfusion with no evidence of core infarct or ischemic penumbra. MRI brain showed Punctate lacunar infarct right posterior parietal lobe. Currently on ASA, plavix, and high dose statin. ECHO pending. UA consistent with UTI. WBC 13 and now 8. Urine culture pending. Ceftriaxone started on 7/28. More confusion over night--repeat CT head with no acute changes. Likely delirium. No confusion when I examine patient. No concerns.    Current Facility-Administered Medications   Medication Dose Route Frequency    sodium chloride (NS) flush 5-40 mL  5-40 mL IntraVENous Q8H    sodium chloride (NS) flush 5-40 mL  5-40 mL IntraVENous PRN    sodium chloride (NS) flush 5-40 mL  5-40 mL IntraVENous Q8H    sodium chloride (NS) flush 5-40 mL  5-40 mL IntraVENous PRN    0.9% sodium chloride infusion  75 mL/hr IntraVENous CONTINUOUS    ondansetron (ZOFRAN) injection 4 mg  4 mg IntraVENous Q6H PRN    aspirin chewable tablet 81 mg  81 mg Oral DAILY    clopidogreL (PLAVIX) tablet 75 mg  75 mg Oral DAILY    atorvastatin (LIPITOR) tablet 80 mg  80 mg Oral QHS    acetaminophen (TYLENOL) tablet 650 mg  650 mg Oral Q4H PRN    labetaloL (NORMODYNE;TRANDATE) injection 5 mg  5 mg IntraVENous Q10MIN PRN    enoxaparin (LOVENOX) injection 40 mg  40 mg SubCUTAneous Q24H    cefTRIAXone (ROCEPHIN) 1 g in 0.9% sodium chloride (MBP/ADV) 50 mL MBP  1 g IntraVENous Q24H    famotidine (PEPCID) tablet 20 mg  20 mg Oral DAILY    nitroglycerin (NITROSTAT) tablet 0.4 mg  0.4 mg SubLINGual Q5MIN PRN    pantoprazole (PROTONIX) tablet 40 mg  40 mg Oral ACB    tuberculin injection 5 Units  5 Units IntraDERMal ONCE       Objective:     Vitals:    07/29/21 0200 07/29/21 0300 07/29/21 0400 07/29/21 0500   BP: (!) 111/57 103/62 116/61 121/67   Pulse: 74 71 70 72   Resp: 20 18 18 (!) 39   Temp:   98.8 °F (37.1 °C)    SpO2: 90% 95% 94% 94%   Weight:       Height:             Intake and Output:  Current Shift: 07/28 1901 - 07/29 0700  In: 525 [I.V.:525]  Out: -   Last three shifts: No intake/output data recorded. Physical Exam:   General:  Alert, cooperative, no distress, hard of hearing. .   Eyes:  Conjunctivae/corneas clear. Ears:  Normal TMs and external ear canals both ears. Nose: Nares normal. Septum midline. Mouth/Throat: Lips, mucosa, and tongue normal.   Neck:  no JVD. Back:   deferred. Lungs:   Clear to auscultation bilaterally. Heart:  Regular rate and rhythm, S1, S2 normal   Abdomen:   Soft, non-tender. Bowel sounds normal. No masses,  No organomegaly. Extremities: Extremities normal, atraumatic, no cyanosis or edema. Pulses: 2+ and symmetric all extremities.    Skin: Skin color, texture, turgor normal. No rashes or lesions   Lymph nodes: Cervical, supraclavicular, and axillary nodes normal.   Neurologic: Hard of hearing, good ROM in bed. Can tell me the year, who the president is, and where he is located. Lab/Data Review:    Recent Results (from the past 24 hour(s))   CBC WITH AUTOMATED DIFF    Collection Time: 07/28/21  2:44 PM   Result Value Ref Range    WBC 13.1 (H) 4.3 - 11.1 K/uL    RBC 3.76 (L) 4.23 - 5.6 M/uL    HGB 10.7 (L) 13.6 - 17.2 g/dL    HCT 32.5 (L) 41.1 - 50.3 %    MCV 86.4 79.6 - 97.8 FL    MCH 28.5 26.1 - 32.9 PG    MCHC 32.9 31.4 - 35.0 g/dL    RDW 17.6 (H) 11.9 - 14.6 %    PLATELET 058 109 - 367 K/uL    MPV 9.9 9.4 - 12.3 FL    ABSOLUTE NRBC 0.00 0.0 - 0.2 K/uL    DF AUTOMATED      NEUTROPHILS 79 (H) 43 - 78 %    LYMPHOCYTES 12 (L) 13 - 44 %    MONOCYTES 5 4.0 - 12.0 %    EOSINOPHILS 1 0.5 - 7.8 %    BASOPHILS 0 0.0 - 2.0 %    IMMATURE GRANULOCYTES 2 0.0 - 5.0 %    ABS. NEUTROPHILS 10.3 (H) 1.7 - 8.2 K/UL    ABS. LYMPHOCYTES 1.6 0.5 - 4.6 K/UL    ABS. MONOCYTES 0.7 0.1 - 1.3 K/UL    ABS. EOSINOPHILS 0.1 0.0 - 0.8 K/UL    ABS. BASOPHILS 0.1 0.0 - 0.2 K/UL    ABS. IMM. GRANS. 0.3 0.0 - 0.5 K/UL   METABOLIC PANEL, COMPREHENSIVE    Collection Time: 07/28/21  2:44 PM   Result Value Ref Range    Sodium 139 136 - 145 mmol/L    Potassium 3.5 3.5 - 5.1 mmol/L    Chloride 105 98 - 107 mmol/L    CO2 28 21 - 32 mmol/L    Anion gap 6 (L) 7 - 16 mmol/L    Glucose 94 65 - 100 mg/dL    BUN 24 (H) 8 - 23 MG/DL    Creatinine 1.28 0.8 - 1.5 MG/DL    GFR est AA >60 >60 ml/min/1.73m2    GFR est non-AA 56 (L) >60 ml/min/1.73m2    Calcium 9.4 8.3 - 10.4 MG/DL    Bilirubin, total 0.9 0.2 - 1.1 MG/DL    ALT (SGPT) 22 12 - 65 U/L    AST (SGOT) 25 15 - 37 U/L    Alk.  phosphatase 81 50 - 136 U/L    Protein, total 6.7 6.3 - 8.2 g/dL    Albumin 2.7 (L) 3.2 - 4.6 g/dL    Globulin 4.0 (H) 2.3 - 3.5 g/dL    A-G Ratio 0.7 (L) 1.2 - 3.5     LIPASE    Collection Time: 07/28/21  2:44 PM   Result Value Ref Range    Lipase 92 73 - 393 U/L   URINALYSIS W/ RFLX MICROSCOPIC    Collection Time: 07/28/21  4:12 PM   Result Value Ref Range    Color YELLOW      Appearance CLOUDY      Specific gravity 1.018 1.001 - 1.023      pH (UA) 5.5 5.0 - 9.0      Protein TRACE (A) NEG mg/dL    Glucose Negative mg/dL    Ketone Negative NEG mg/dL    Bilirubin Negative NEG      Blood MODERATE (A) NEG      Urobilinogen 1.0 0.2 - 1.0 EU/dL    Nitrites Positive (A) NEG      Leukocyte Esterase LARGE (A) NEG      WBC >100 (H) 0 /hpf    RBC 0-3 0 /hpf    Epithelial cells 0 0 /hpf    Bacteria 1+ (H) 0 /hpf    Casts 0 0 /lpf   GLUCOSE, POC    Collection Time: 07/29/21 12:15 AM   Result Value Ref Range    Glucose (POC) 170 (H) 65 - 100 mg/dL    Performed by [facility]    LIPID PANEL    Collection Time: 07/29/21  3:35 AM   Result Value Ref Range    Cholesterol, total 144 MG/DL    Triglyceride 130 35 - 150 MG/DL    HDL Cholesterol 23 (L) 40 - 60 MG/DL    LDL, calculated 95 <100 MG/DL    VLDL, calculated 26 (H) 6.0 - 23.0 MG/DL    CHOL/HDL Ratio 6.3 <200     HEMOGLOBIN A1C WITH EAG    Collection Time: 07/29/21  3:35 AM   Result Value Ref Range    Hemoglobin A1c 5.6 4.2 - 6.3 %    Est. average glucose 114 mg/dL   CBC W/O DIFF    Collection Time: 07/29/21  3:35 AM   Result Value Ref Range    WBC 8.0 4.3 - 11.1 K/uL    RBC 3.38 (L) 4.23 - 5.6 M/uL    HGB 9.5 (L) 13.6 - 17.2 g/dL    HCT 29.5 (L) 41.1 - 50.3 %    MCV 87.3 79.6 - 97.8 FL    MCH 28.1 26.1 - 32.9 PG    MCHC 32.2 31.4 - 35.0 g/dL    RDW 17.4 (H) 11.9 - 14.6 %    PLATELET 825 626 - 907 K/uL    MPV 10.1 9.4 - 12.3 FL    ABSOLUTE NRBC 0.00 0.0 - 0.2 K/uL   TSH 3RD GENERATION    Collection Time: 07/29/21  3:35 AM   Result Value Ref Range    TSH 8.479 uIU/mL   METABOLIC PANEL, BASIC    Collection Time: 07/29/21  3:35 AM   Result Value Ref Range    Sodium 139 136 - 145 mmol/L    Potassium 3.5 3.5 - 5.1 mmol/L    Chloride 109 (H) 98 - 107 mmol/L    CO2 26 21 - 32 mmol/L    Anion gap 4 (L) 7 - 16 mmol/L    Glucose 117 (H) 65 - 100 mg/dL    BUN 21 8 - 23 MG/DL    Creatinine 1.09 0.8 - 1.5 MG/DL    GFR est AA >60 >60 ml/min/1.73m2    GFR est non-AA >60 >60 ml/min/1.73m2    Calcium 8.7 8.3 - 10.4 MG/DL   GLUCOSE, POC    Collection Time: 07/29/21  5:39 AM   Result Value Ref Range    Glucose (POC) 98 65 - 100 mg/dL    Performed by Zoe Mota        Assessment/ Plan:     Principal Problem:    Acute ischemic [facility] ([facility] (7/28/2021)    Active Problems:    CAD (coronary artery disease) (7/18/2019)      Debility (12/2/2019)      Dementia ([facility] (12/23/2019)      UTI (urinary tract infection) (7/28/2021)      Falls frequently (7/28/2021)    CVA, right parietal lobe - ECHO pending. ASA, plavix, statin. UTI - Ceftriaxone. Urine culture pending. Frequent falls - PT/OT to see. Likely will need rehab versus long term care facility. Transfer to med/surg with remote tele     Hopeful dc in two days.      DVT prophylaxis - Lovenox  Signed By: Joanna Heaton DO     July 29, 2021

## 2021-07-29 NOTE — PROGRESS NOTES
Patient arrived via stretcher from [facility] ED with RN. Patient wiped down with CHG wipes. Notes skin to warm, dry and pale. There's an scabbed area on his L forearm. Has multiple small scattered bruises and multiple pink non raised rash-like areas scattered also. States they don't itch or bother him. Notes some redness in groin area, and sacrum is pink but blanchable. Allyeyn placed. This skin assessment was done with Ismael Pugh.

## 2021-07-29 NOTE — PROGRESS NOTES
Patient has been alert and oriented. Notes during neuro checks he's more confused about the time and date as the morning progressed. He was having trouble finding his words and slurring over them at times. Paged hospitalist and received orders for a head CT to check for any changes. Will continue to monitor.

## 2021-07-29 NOTE — PROGRESS NOTES
Care Management Interventions  Palliative Care Criteria Met (RRAT>21 & CHF Dx)?: No (Dx CVA, UTI Risk 12%)  Transition of Care Consult (CM Consult): Discharge Planning  Discharge Durable Medical Equipment: No (cane)  Physical Therapy Consult: Yes  Occupational Therapy Consult: Yes  Speech Therapy Consult: Yes  Current Support Network: Other (lives with roommates in one story home)  Discharge Location  Discharge Placement: Unable to determine at this time  Interdisciplinary rounds with Dr. Dulce Maria Rodriguez, CM, nursing, and PT for plan of care and goals. CM met with patient who is alert and oriented to person, and place. He is hard of hearing. Noted admitted with confusion, UTI, falls and CVA with hx Dementia. Per patient he was not clear where he lives or who he lives with. Noted in 1/2020 patient was d/c to [facility] rehab for long term care and CM unsure if he is still there. Patient was unclear about his home situation and seemed confused about where he lives. Patient states he has 2 sons live in [place] and was okay to speak with them. CM will f/u with family for plan of care and PT/OT notes for rehab if he is not at rehab. CM following.

## 2021-07-29 NOTE — ED NOTES
TRANSFER - OUT REPORT:    Verbal report given to Jesu (name) on [address]  being transferred to [phone] (unit) for routine progression of care       Report consisted of patients Situation, Background, Assessment and   Recommendations(SBAR). Information from the following report(s) SBAR was reviewed with the receiving nurse. Lines:   Peripheral IV 07/28/21 Left Antecubital (Active)   Site Assessment Clean, dry, & intact 07/28/21 1455   Dressing Status Clean, dry, & intact 07/28/21 1455        Opportunity for questions and clarification was provided.       Patient transported with:   Registered Nurse

## 2021-07-29 NOTE — PROGRESS NOTES
STG: Patient will complete cognitive linguistic assessment with 100% participation. SPEECH LANGUAGE PATHOLOGY: DYSPHAGIA- Initial Assessment    NAME/AGE/GENDER: Gosia Arenas is a [address] y.o. male  DATE: 7/29/2021  PRIMARY DIAGNOSIS: UTI (urinary tract infection) [N39.0]  Falls frequently [R29.6]  Acute ischemic stroke ([facility] [I63.9]      ICD-10: Treatment Diagnosis: R13.12 Dysphagia, Oropharyngeal Phase    RECOMMENDATIONS   DIET:    Regular Consistency   Thin Liquids    MEDICATIONS: With liquid     ASPIRATION PRECAUTIONS  · Slow rate of intake  · Small bites/sips  · Upright at 90 degrees during meal     COMPENSATORY STRATEGIES/MODIFICATIONS  · None     RECOMMENDATIONS for CONTINUED SPEECH THERAPY:   YES: Anticipate need for ongoing speech therapy during this hospitalization and at next level of care. ASSESSMENT   Patient presents with oropharyngeal swallow function that is within normal limits. No s/sx of dysphagia identified. Recommend continue regular consistency diet/thin liquids. Meds with liquid rinse. Speech min-mildly dysarthric and patient is very confused. Plan for speech/cognitive linguistic assessment next session or at next level of care if patient is discharged prior to assessment. EDUCATION:  · Recommendations discussed with Patient and RN  CONTINUATION OF SKILLED SERVICES/MEDICAL NECESSITY:   Patient continues to require skilled intervention due to need for speech-langauge and cognitive assessment. REHABILITATION POTENTIAL FOR STATED GOALS: Excellent    PLAN    FREQUENCY/DURATION: Assessment of cognitive-linguistic abilities to be completed at next session. Frequency and duration to be determined by findings/recommendations.     - Recommendations for next treatment session: Next treatment session will address assessment of cognitive-linguistic abilities     SUBJECTIVE   Patient alert upright in bed upon arrival. Pleasantly confused.  Thinks I am here to sell him something. Oxygen Device: room air  Pain: Pain Scale 1: Numeric (0 - 10)  Pain Intensity 1: 0    History of Present Injury/Illness: Mr. Mary Bobby  has a past medical history of Colon cancer ([facility] (01/2012) and Hiatal hernia. Marco Records He also  has no past surgical history on file. PRECAUTIONS/ALLERGIES: Patient has no known allergies. Problem List:  (Impairments causing functional limitations):  1. Oropharyngeal dyspahgia    Previous Dysphagia: NONE REPORTED  Diet Prior to Evaluation: Regular/thin    Orientation:  Person    Cognitive-Linguistic Screening:   Alertness  o Alert   Speech Production:   o Some dysarthria, but able to understand   Expressive Language:  o Word finding deficits noted   o Speech is tangential and nonsensical at times  Standard Pacific Language:  o Answers yes/no questions appropriately and follows 1 step commands   Cognition:   o Confused. Disoriented to place. Unable to redirect. Prior Level of Function: Lives with roomates? Recommendations: Given results of screening, further evaluation is indicated to assess speech-langauge and cognitive abilities. OBJECTIVE   Oral Motor:   · Labial: No impairment  · Dentition: Intact  · Oral Hygiene: Adequate  · Lingual: No impairment    Dysphagia evaluation:   Patient consumed trials of thin by cup/straw/serail sips, mixed, and solid consistency trials without overt s/sx airway compromise. Functional oral prep and clearance with all consistencies.     Tool Used: Dysphagia Outcome and Severity Scale (VARGHESE)    Score Comments   Normal Diet  [x] 7 With no strategies or extra time needed   Functional Swallow  [] 6 May have mild oral or pharyngeal delay   Mild Dysphagia  [] 5 Which may require one diet consistency restricted    Mild-Moderate Dysphagia  [] 4 With 1-2 diet consistencies restricted   Moderate Dysphagia  [] 3 With 2 or more diet consistencies restricted   Moderate-Severe Dysphagia  [] 2 With partial PO strategies (trials with ST only)   Severe Dysphagia  [] 1 With inability to tolerate any PO safely      Score:  Initial: 7 Most Recent: x (Date 07/29/21 )   Interpretation of Tool: The Dysphagia Outcome and Severity Scale (VARGHESE) is a simple, easy-to-use, 7-point scale developed to systematically rate the functional severity of dysphagia based on objective assessment and make recommendations for diet level, independence level, and type of nutrition. Current Medications:   No current facility-administered medications on file prior to encounter. Current Outpatient Medications on File Prior to Encounter   Medication Sig Dispense Refill    ondansetron (ZOFRAN ODT) 4 mg disintegrating tablet Take 1 Tablet by mouth every eight (8) hours as needed for Nausea. 8 Tablet 2    atorvastatin (LIPITOR) 20 mg tablet Take 1 Tab by mouth nightly. 90 Tab 3    clopidogreL (PLAVIX) 75 mg tab Take 1 Tab by mouth daily. 90 Tab 3    nitroglycerin (NITROSTAT) 0.4 mg SL tablet 1 Tab by SubLINGual route every five (5) minutes as needed for Chest Pain. Up to 3 doses. 1 Bottle 3    pantoprazole (PROTONIX) 40 mg tablet Take 1 Tab by mouth Daily (before breakfast). 90 Tab 3    aspirin 81 mg chewable tablet Take 1 Tab by mouth daily. 90 Tab 3    famotidine (PEPCID) 20 mg tablet Take 1 Tab by mouth daily.  30 Tab 0        INTERDISCIPLINARY COLLABORATION: RN    After treatment position/precautions:  · Upright in bed  · Call light within reach  · RN notified    Total Treatment Duration:   Time In: 2719  Time Out: [facility] [address], [address] [facility]

## 2021-07-29 NOTE — DISCHARGE INSTRUCTIONS

## 2021-07-29 NOTE — PROGRESS NOTES
Patients 1100 neuro check to be delayed r/t echocardiogram in progress. Will complete when they are finished. Patient is lying in bed and is following commands.  Denies sx of stroke at this time

## 2021-07-29 NOTE — PROGRESS NOTES
Care Management Interventions  Palliative Care Criteria Met (RRAT>21 & CHF Dx)?: No (Dx CVA, UTI Risk 12%)  Transition of Care Consult (CM Consult): Discharge Planning  Discharge Durable Medical Equipment: No  Physical Therapy Consult: Yes  Occupational Therapy Consult: Yes  Speech Therapy Consult: Yes  Current Support Network: Other (lives with 2 roommates)  The Plan for Transition of Care is Related to the Following Treatment Goals : increase endurance, plan to go to rehab then LUISA   The Patient and/or Patient Representative was Provided with a Choice of Provider and Agrees with the Discharge Plan?: Yes  Name of the Patient Representative Who was Provided with a Choice of Provider and Agrees with the Discharge Plan: daughter in law Abram Ngo wife of Gerard Bucio who has HCPOA on file  Freedom of Choice List was Provided with Basic Dialogue that Supports the Patient's Individualized Plan of Care/Goals, Treatment Preferences and Shares the Quality Data Associated with the Providers?: Yes  Discharge Location  Discharge Placement: Skilled nursing facility  Spoke with patient daughter in law Abram Ngo [phone] who is the wife of patient son Sherran Brunner who we have HCPOA paperwork on file. She states patient lives with 2 roommates in their [place] that are alcoholics and the home is a mess. She states that they are in the process of working on getting him into  [facility] and their contact is Rose Monahan but would like SNF to build up his strength prior to admission. CM provided list and discussed with daughter in  and she requested some near [facility] and CM reviewed star rating and those near [facility] and she would like referral to [facility] first then will review more in am if she changed her mind will let CM know. Referral sent to [facility] for review. CM following.

## 2021-07-29 NOTE — PROGRESS NOTES
Problem: Pressure Injury - Risk of  Goal: *Prevention of pressure injury  Description: Document Tyler Scale and appropriate interventions in the flowsheet. Outcome: Progressing Towards Goal  Note: Pressure Injury Interventions:       Moisture Interventions: Absorbent underpads, Apply protective barrier, creams and emollients, Check for incontinence Q2 hours and as needed    Activity Interventions: Assess need for specialty bed, Increase time out of bed, Pressure redistribution bed/mattress(bed type), PT/OT evaluation    Mobility Interventions: Assess need for specialty bed, Float heels, HOB 30 degrees or less, Pressure redistribution bed/mattress (bed type), PT/OT evaluation, Turn and reposition approx. every two hours(pillow and wedges)    Nutrition Interventions: Document food/fluid/supplement intake, Offer support with meals,snacks and hydration    Friction and Shear Interventions: Apply protective barrier, creams and emollients, Foam dressings/transparent film/skin sealants, HOB 30 degrees or less                Problem: Patient Education: Go to Patient Education Activity  Goal: Patient/Family Education  Outcome: Progressing Towards Goal     Problem: Falls - Risk of  Goal: *Absence of Falls  Description: Document Vale Fall Risk and appropriate interventions in the flowsheet.   Outcome: Progressing Towards Goal  Note: Fall Risk Interventions:  Mobility Interventions: Bed/chair exit alarm, Patient to call before getting OOB, PT Consult for mobility concerns, OT consult for ADLs              Elimination Interventions: Bed/chair exit alarm, Call light in reach, Stay With Me (per policy)    History of Falls Interventions: Bed/chair exit alarm, Evaluate medications/consider consulting pharmacy         Problem: Patient Education: Go to Patient Education Activity  Goal: Patient/Family Education  Outcome: Progressing Towards Goal     Problem: Patient Education: Go to Patient Education Activity  Goal: Patient/Family Education  Outcome: Progressing Towards Goal     Problem: TIA/CVA Stroke: 0-24 hours  Goal: Off Pathway (Use only if patient is Off Pathway)  Outcome: Progressing Towards Goal  Goal: Activity/Safety  Outcome: Progressing Towards Goal  Goal: Consults, if ordered  Outcome: Progressing Towards Goal  Goal: Diagnostic Test/Procedures  Outcome: Progressing Towards Goal  Goal: Nutrition/Diet  Outcome: Progressing Towards Goal  Goal: Discharge Planning  Outcome: Progressing Towards Goal  Goal: Medications  Outcome: Progressing Towards Goal  Goal: Respiratory  Outcome: Progressing Towards Goal  Goal: Treatments/Interventions/Procedures  Outcome: Progressing Towards Goal  Goal: Minimize risk of bleeding post-thrombolytic infusion  Outcome: Progressing Towards Goal  Goal: Monitor for complications post-thrombolytic infusion  Outcome: Progressing Towards Goal  Goal: Psychosocial  Outcome: Progressing Towards Goal  Goal: *Hemodynamically stable  Outcome: Progressing Towards Goal  Goal: *Neurologically stable  Description: Absence of additional neurological deficits    Outcome: Progressing Towards Goal  Goal: *Verbalizes anxiety and depression are reduced or absent  Outcome: Progressing Towards Goal  Goal: *Absence of Signs of Aspiration on Current Diet  Outcome: Progressing Towards Goal  Goal: *Absence of deep venous thrombosis signs and symptoms(Stroke Metric)  Outcome: Progressing Towards Goal  Goal: *Ability to perform ADLs and demonstrates progressive mobility and function  Outcome: Progressing Towards Goal  Goal: *Stroke education started(Stroke Metric)  Outcome: Progressing Towards Goal  Goal: *Dysphagia screen performed(Stroke Metric)  Outcome: Progressing Towards Goal  Goal: *Rehab consulted(Stroke Metric)  Outcome: Progressing Towards Goal

## 2021-07-29 NOTE — CONSULTS
Physiatry Consultation Acknowledged  EHR Reviewed    Based on premorbid dementia and hx of falls, now with acute stroke I agree with therapies in there pt will likely require long term placement. He is mobilizing fairly well but his cognition makes him unsafe and impulsive. Needs [employer] approval for post acute rehab setting. He does not meet medical necessity for [facility] and insurance will likely agree with a lower level of care.   -no charge  Neeta Schroeder MD, Medical Director  [address] [place] [address]

## 2021-07-29 NOTE — PROGRESS NOTES
Went to give patient IV abx, patient has removed his IV access. Patient appears to have sun downers as he has been increasingly agitated over the last few hours. Patient given meal tray at this time. Will attempt IV insertion after he has finished eating.

## 2021-07-29 NOTE — PROGRESS NOTES
Problem: Self Care Deficits Care Plan (Adult)  Goal: *Acute Goals and Plan of Care (Insert Text)  Outcome: Progressing Towards Goal  Note:   Patient will complete bathing with supervision to increase self care independence. Patient will complete grooming with supervision to increase self care independence. Patient will tolerate 40 minutes of OT treatment with self incorporated rest breaks to increase activity tolerance to enhance participation in hobbies. Patient will complete all functional transfers with independence using adaptive equipment as needed. Patient will complete UE exercises with supervision to increase overall activity tolerance and strength. Patient will perform safe 2 step kitchen tasks. Timeframe: 7 visits       OCCUPATIONAL THERAPY: Initial Assessment and Daily Note 7/29/2021  INPATIENT: OT Visit Days: 1  Payor: Himanshu Lerner / Plan: [facility] HUMANA MEDICARE CHOICE PPO/PFFS / Product Type: Managed Care Medicare /      NAME/AGE/GENDER: Jessica Lo is a 80 y.o. male   PRIMARY DIAGNOSIS:  UTI (urinary tract infection) [N39.0]  Falls frequently [R29.6]  Acute ischemic stroke ([facility] [address] [I63.9] Acute ischemic stroke ([facility] [address] Acute ischemic stroke ([facility] [address]        ICD-10: Treatment Diagnosis:    Other lack of cordination (R27.8)   Precautions/Allergies:     Patient has no known allergies. ASSESSMENT:     Mr. Carilyn Schlatter presents with subacute and new infarcts. His working memory is affected and can only follow functional or simple commands. He is unaware that he had strokes, but did know he was in a hospital. He is somewhat mobile but is unsafe with quick movements. Strength, vision, and perception all appear normal. He would do best in memory care at [facility] or [facility]. Initiate OT.      This section established at most recent assessment   PROBLEM LIST (Impairments causing functional limitations):  Decreased ADL/Functional Activities  Decreased Pacing Skills  Increased Fatigue  Decreased Cognition   INTERVENTIONS PLANNED: (Benefits and precautions of occupational therapy have been discussed with the patient.)  Activities of daily living training  Neuromuscular re-eduation  Therapeutic activity  Therapeutic exercise     TREATMENT PLAN: Frequency/Duration: Follow patient 3x a week to address above goals. Rehabilitation Potential For Stated Goals: Good     REHAB RECOMMENDATIONS (at time of discharge pending progress):    Placement: It is my opinion, based on this patient's performance to date, that Mr. Hoang Hollins may benefit from intensive therapy at a [address] [place] [address] after discharge due to the functional deficits listed above that are likely to improve with skilled rehabilitation and concerns that he/she may be unsafe to be unsupervised at home due to or half-way with memory care. .  Equipment:   None at this time              OCCUPATIONAL PROFILE AND HISTORY:   History of Present Injury/Illness (Reason for Referral):  See H&P  Past Medical History/Comorbidities:   Mr. Hoang Hollins  has a past medical history of Colon cancer ([facility] [address] (01/2012) and Hiatal hernia. Mr. Hoang Hollins  has no past surgical history on file. Social History/Living Environment:   Home Environment: Private residence  # Steps to Enter: 4  Rails to Enter: No  One/Two Story Residence: One story  Living Alone: No  Support Systems: Friends \ neighbors, Child(margi)  Patient Expects to be Discharged to[de-identified] Unknown  Current DME Used/Available at Home: Cane, straight  Tub or Shower Type: Shower  Prior Level of Function/Work/Activity:  Independent prior per reports. Maybe had a roommate.       Number of Personal Factors/Comorbidities that affect the Plan of Care: Brief history (0):  LOW COMPLEXITY   ASSESSMENT OF OCCUPATIONAL PERFORMANCE[de-identified]   Activities of Daily Living:   Basic ADLs (From Assessment) Complex ADLs (From Assessment)   Feeding: Setup  Oral Facial Hygiene/Grooming: Stand-by assistance  Bathing: Contact guard assistance  Upper Body Dressing: Contact guard assistance  Lower Body Dressing: Contact guard assistance  Toileting: Contact guard assistance     Grooming/Bathing/Dressing Activities of Daily Living                       Functional Transfers  Bathroom Mobility: Minimum assistance  Toilet Transfer : Contact guard assistance     Bed/Mat Mobility  Supine to Sit: Minimum assistance  Sit to Stand: Contact guard assistance  Stand to Sit: Contact guard assistance  Bed to Chair: Minimum assistance  Scooting: Stand-by assistance     Most Recent Physical Functioning:   Gross Assessment:                  Posture:     Balance:  Sitting: Intact  Standing: Impaired; Without support  Standing - Static: Fair  Standing - Dynamic : Fair Bed Mobility:  Supine to Sit: Minimum assistance  Scooting: Stand-by assistance  Wheelchair Mobility:     Transfers:  Sit to Stand: Contact guard assistance  Stand to Sit: Contact guard assistance  Bed to Chair: Minimum assistance            Patient Vitals for the past 6 hrs:   BP BP Patient Position SpO2 Pulse   07/29/21 0700 121/67 At rest;Semi fowlers 94 % 70   07/29/21 0800 138/75 -- 95 % 70   07/29/21 1000 (!) 140/91 -- -- 92   07/29/21 1100 117/67 -- 97 % 67       Mental Status  Neurologic State: Alert  Orientation Level: Disoriented to situation  Cognition: Follows commands                          Physical Skills Involved:  Range of Motion  Activity Tolerance Cognitive Skills Affected (resulting in the inability to perform in a timely and safe manner):  Executive Function  Immediate Memory  Short Term Recall  Long Term Memory  Sustained Attention  Divided Attention Psychosocial Skills Affected:  Social Interaction  Emotional Regulation  Self-Awareness   Number of elements that affect the Plan of Care: 3-5:  MODERATE COMPLEXITY   CLINICAL DECISION MAKING:   [facility] AM-PAC 6 Clicks   Daily Activity Inpatient Short Form  How much help from another person does the patient currently need... Total A Lot A Little None   1. Putting on and taking off regular lower body clothing? [] 1   [] 2   [x] 3   [] 4   2. Bathing (including washing, rinsing, drying)? [] 1   [] 2   [x] 3   [] 4   3. Toileting, which includes using toilet, bedpan or urinal?   [] 1   [] 2   [x] 3   [] 4   4. Putting on and taking off regular upper body clothing? [] 1   [] 2   [x] 3   [] 4   5. Taking care of personal grooming such as brushing teeth? [] 1   [] 2   [x] 3   [] 4   6. Eating meals? [] 1   [] 2   [] 3   [x] 4   © 2007, Trustees of [address] [place] [address] [address] [id number], under license to [employer]. All rights reserved      Score:  Initial: 19 Most Recent: X (Date: -- )    Interpretation of Tool:  Represents activities that are increasingly more difficult (i.e. Bed mobility, Transfers, Gait). Medical Necessity:     Skilled intervention continues to be required due to Deficits ntoed above. Reason for Services/Other Comments:  Patient continues to require skilled intervention due to   CVAs  . Use of outcome tool(s) and clinical judgement create a POC that gives a: MODERATE COMPLEXITY         TREATMENT:   (In addition to Assessment/Re-Assessment sessions the following treatments were rendered)     Pre-treatment Symptoms/Complaints:    Pain: Initial:   Pain Intensity 1: 0  Post Session:  0     Neuromuscular Re-education: ( 10):  Balance exercises during ADL training. Re orientation and problem solving around room. Initial evaluation 10 mintues. Braces/Orthotics/Lines/Etc:   O2 Device: None (Room air)  Treatment/Session Assessment:    Response to Treatment:  Good, sitting up in recliner  Interdisciplinary Collaboration:   Physical Therapist  Occupational Therapist  Registered Nurse  After treatment position/precautions:   Up in chair  Call light within reach  RN notified   Compliance with Program/Exercises: Compliant all of the time, Will assess as treatment progresses.   Recommendations/Intent for next treatment session: \"Next visit will focus on advancements to more challenging activities and reduction in assistance provided\".   Total Treatment Duration:  OT Patient Time In/Time Out  Time In: 0930  Time Out: [address], OT

## 2021-07-29 NOTE — PROGRESS NOTES
RT Evaluation for Home Oxygen    Resting (Room Air)  SpO2: 92  HR:        During Walk (Room Air)  SpO2: 98  HR:    Walk/Assistance Device: Ambulation  Rate of Dyspnea: Regular pattern RR 12-20  Symptoms:    Comments:        After Walk  SpO2: 95  HR:    O2 Device:    O2 Flow Rate (L/min):    FIO2 (%):    Rate of Dyspnea: Regular pattern RR 12-20  Symptoms:    Comments:      Additional Comments: pt stated that he likes walking and that he can still run fast.    Electronically signed by RT Shilpa on 7/29/2021 at 10:23 AM

## 2021-07-29 NOTE — PROGRESS NOTES
Problem: Mobility Impaired (Adult and Pediatric)  Goal: *Acute Goals and Plan of Care (Insert Text)  Outcome: Progressing Towards Goal  Note:   LTG:  (1.)Mr. Ngozi Santana will move from supine to sit and sit to supine  in bed with SUPERVISION within 7 treatment day(s). (2.)Mr. Ngozi Santana will transfer from bed to chair and chair to bed with SUPERVISION using the least restrictive device within 7 treatment day(s). (3.)Mr. Ngozi Santana will ambulate with SUPERVISION for 200 feet with the least restrictive device within 7 treatment day(s). ________________________________________________________________________________________________       PHYSICAL THERAPY: Initial Assessment and AM 7/29/2021  INPATIENT: PT Visit Days : 1  Payor: Chaparro Hayes / Plan: [facility] HUMANA MEDICARE CHOICE PPO/PFFS / Product Type: redIT Care Medicare /       NAME/AGE/GENDER: Aleksandra Garza is a 80 y.o. male   PRIMARY DIAGNOSIS: UTI (urinary tract infection) [N39.0]  Falls frequently [R29.6]  Acute ischemic stroke ([address] [I63.9] Acute ischemic stroke ([facility] [address] Acute ischemic stroke ([facility] [address]        ICD-10: Treatment Diagnosis:    Generalized Muscle Weakness (M62.81)  Other lack of cordination (R27.8)  Difficulty in walking, Not elsewhere classified (R26.2)  History of falling (Z91.81)   Precaution/Allergies:  Patient has no known allergies. ASSESSMENT:     Mr. Ngozi Santana presents with above diagnosis. He demonstrates confusion, generalized weakness and imbalance which make him a fall risk. He has had multiple falls at home but unable to describe what makes him fall. He reports he is independent but has had balance problems in the past .  He reports getting some help for it which helped his tennis game. Patient talked a lot about playing tennis. He struggled with telling therapist year but was able to tell the month. He appeared to be alert and oriented at times but then disoriented within a few minutes.   MRI showed acute as well as old infarcts. Patient reports he lives with a friend from whom he rents and that his 2 sons live near. Unsure of accuracy of patient reports as no family member present. Patient certainly not safe to d/c without 24/7 supervision. Patient would benefit from STR with likely transition to LTC, Memory Care, or [facility]. This section established at most recent assessment   PROBLEM LIST (Impairments causing functional limitations):  Decreased Strength  Decreased ADL/Functional Activities  Decreased Transfer Abilities  Decreased Ambulation Ability/Technique  Decreased Balance  Decreased Cognition   INTERVENTIONS PLANNED: (Benefits and precautions of physical therapy have been discussed with the patient.)  Balance Exercise  Bed Mobility  Family Education  Gait Training  Home Exercise Program (HEP)  Therapeutic Activites  Therapeutic Exercise/Strengthening  Transfer Training     TREATMENT PLAN: Frequency/Duration: daily for duration of hospital stay  Rehabilitation Potential For Stated Goals: Good     REHAB RECOMMENDATIONS (at time of discharge pending progress):    Placement: It is my opinion, based on this patient's performance to date, that Mr. Carilyn Schlatter may benefit from intensive therapy at a [address] [place] [address] after discharge due to the functional deficits listed above that are likely to improve with skilled rehabilitation and concerns that he/she may be unsafe to be unsupervised at home due to history of falls . Equipment: To be determined based on progress and d/c plan              HISTORY:   History of Present Injury/Illness (Reason for Referral):  Jessica Lo is a 80 y.o. male with medical history of CAD, GERD, Dementia who presented to ED with 10 days of decreased appetite, multiple falls. Pt's son is at bedside and able to provide information. Pt has been falling a lot at home. He lives with room mates. He had a fall 2 days ago and hit his head. Mainly, he has balance issues with walking.  No tingling, numbness or weakness of extremities. Son reports pt has not been able to care for himself and has been forgetting to take meds. No fever. No chills. He is incontinent of urine. No vomiting, but has nausea. No chest pain. No palpitations. No cough. No shortness of breath. No headache. He has not been eating much and has been losing weight. Lost about 10-15 pounds in the last 2 weeks. He also has backache. ER course:   Afebrile, CBC with WBC 13. BMP with Potassium 3.4, Cr 1.28. Lipase 92. CXR shows mild bibasilar atelectasis. Xray thoracic spine shows no acute compression fracture. Mild degenerative spondylosis. CT head shows - no hemorrhage. Severe bilateral patchy white matter hypo attenuation throughout both cerebral hemispheres grossly unchanged from prior exam, but extensive. Acute/sub-acute CVA cannot be excluded. Pt admitted for further evaluation and management of Suspected stroke, UTI, falls. Past Medical History/Comorbidities:   Mr. Kit Gomez  has a past medical history of Colon cancer ([facility] [address] (01/2012) and Hiatal hernia. Mr. Kit Gomez  has no past surgical history on file. Social History/Living Environment:   Home Environment: Private residence  # Steps to Enter: 4  Rails to Enter: No  One/Two Story Residence: One story  Living Alone: No  Support Systems: Friends \ neighbors, Child(margi)  Patient Expects to be Discharged to[de-identified] Unknown  Current DME Used/Available at Home: Cane, straight  Tub or Shower Type: Shower  Prior Level of Function/Work/Activity:  Independent but multiple falls. Number of Personal Factors/Comorbidities that affect the Plan of Care: 1-2: MODERATE COMPLEXITY   EXAMINATION:   Most Recent Physical Functioning:   Gross Assessment:  AROM: Within functional limits  Strength: Generally decreased, functional  Coordination: Generally decreased, functional               Posture:     Balance:  Sitting: Intact  Standing: Impaired; Without support  Standing - Static: Fair  Standing - Dynamic : Fair Bed Mobility:  Supine to Sit: Minimum assistance  Scooting: Stand-by assistance  Wheelchair Mobility:     Transfers:  Sit to Stand: Contact guard assistance  Stand to Sit: Contact guard assistance  Bed to Chair: Minimum assistance  Gait:     Speed/Estrellita: Pace decreased (<100 feet/min)  Step Length: Left shortened;Right shortened  Distance (ft): 75 Feet (ft)  Assistive Device: Walker, rolling;Gait belt  Ambulation - Level of Assistance: Minimal assistance  Interventions: Safety awareness training;Verbal cues      Body Structures Involved:  Muscles Body Functions Affected: Movement Related Activities and Participation Affected: Mobility   Number of elements that affect the Plan of Care: 3: MODERATE COMPLEXITY   CLINICAL PRESENTATION:   Presentation: Stable and uncomplicated: LOW COMPLEXITY   CLINICAL DECISION MAKING:   [facility] AM-PAC 6 Clicks   Basic Mobility Inpatient Short Form  How much difficulty does the patient currently have. .. Unable A Lot A Little None   1. Turning over in bed (including adjusting bedclothes, sheets and blankets)? [] 1   [] 2   [x] 3   [] 4   2. Sitting down on and standing up from a chair with arms ( e.g., wheelchair, bedside commode, etc.)   [] 1   [] 2   [x] 3   [] 4   3. Moving from lying on back to sitting on the side of the bed? [] 1   [] 2   [x] 3   [] 4   How much help from another person does the patient currently need. .. Total A Lot A Little None   4. Moving to and from a bed to a chair (including a wheelchair)? [] 1   [] 2   [x] 3   [] 4   5. Need to walk in hospital room? [] 1   [] 2   [x] 3   [] 4   6. Climbing 3-5 steps with a railing? [] 1   [] 2   [x] 3   [] 4   © 2007, Trustees of [facility], under license to [employer].  All rights reserved      Score:  Initial: 18 Most Recent: X (Date: -- )    Interpretation of Tool:  Represents activities that are increasingly more difficult (i.e. Bed mobility, Transfers, Gait). Medical Necessity:     Patient is expected to demonstrate progress in   strength, balance, and coordination   to   improve safety during out of  bed activity. .  Reason for Services/Other Comments:  Patient continues to require skilled intervention due to   Decreased cognition, balance, and mobility. .   Use of outcome tool(s) and clinical judgement create a POC that gives a: Clear prediction of patient's progress: LOW COMPLEXITY            TREATMENT:   (In addition to Assessment/Re-Assessment sessions the following treatments were rendered)   Pre-treatment Symptoms/Complaints:  Patient agreeable. Pain: Initial:   Pain Intensity 1: 0  Post Session:  0     Therapeutic Activity: (    10 minutes): Therapeutic activities including Bed transfers, Chair transfers, and Ambulation on level ground to improve mobility, strength, balance, and coordination. Required minimal Safety awareness training;Verbal cues to promote static and dynamic balance in standing. Braces/Orthotics/Lines/Etc:   IV  telemetry  O2 Device: None (Room air)  Treatment/Session Assessment:    Response to Treatment:  Participated well and moved fairly well but decreased safety awareness. Interdisciplinary Collaboration:   Physical Therapist  Registered Nurse  After treatment position/precautions:   Up in chair  Bed/Chair-wheels locked  Call light within reach  RN notified   Compliance with Program/Exercises: Will assess as treatment progresses  Recommendations/Intent for next treatment session: \"Next visit will focus on advancements to more challenging activities and reduction in assistance provided\".   Total Treatment Duration:  PT Patient Time In/Time Out  Time In: 0830  Time Out: [address] [facility] SABI Sidhu

## 2021-07-30 LAB
GLUCOSE BLD STRIP.AUTO-MCNC: 106 MG/DL (ref 65–100)
SERVICE CMNT-IMP: ABNORMAL

## 2021-07-30 PROCEDURE — 74011000258 HC RX REV CODE- 258: Performed by: HOSPITALIST

## 2021-07-30 PROCEDURE — 82962 GLUCOSE BLOOD TEST: CPT

## 2021-07-30 PROCEDURE — 74011250637 HC RX REV CODE- 250/637: Performed by: HOSPITALIST

## 2021-07-30 PROCEDURE — 74011250636 HC RX REV CODE- 250/636: Performed by: HOSPITALIST

## 2021-07-30 PROCEDURE — 65660000000 HC RM CCU STEPDOWN

## 2021-07-30 RX ADMIN — Medication 10 ML: at 14:34

## 2021-07-30 RX ADMIN — Medication 10 ML: at 06:33

## 2021-07-30 RX ADMIN — CEFTRIAXONE 1 G: 1 INJECTION, POWDER, FOR SOLUTION INTRAMUSCULAR; INTRAVENOUS at 17:21

## 2021-07-30 RX ADMIN — ASPIRIN 81 MG 81 MG: 81 TABLET ORAL at 08:18

## 2021-07-30 RX ADMIN — PANTOPRAZOLE SODIUM 40 MG: 40 TABLET, DELAYED RELEASE ORAL at 06:33

## 2021-07-30 RX ADMIN — ATORVASTATIN CALCIUM 80 MG: 40 TABLET, FILM COATED ORAL at 22:15

## 2021-07-30 RX ADMIN — FAMOTIDINE 20 MG: 20 TABLET ORAL at 08:18

## 2021-07-30 RX ADMIN — CLOPIDOGREL BISULFATE 75 MG: 75 TABLET ORAL at 08:18

## 2021-07-30 RX ADMIN — Medication 10 ML: at 22:15

## 2021-07-30 RX ADMIN — ENOXAPARIN SODIUM 40 MG: 40 INJECTION SUBCUTANEOUS at 17:21

## 2021-07-30 NOTE — PROGRESS NOTES
Problem: Pressure Injury - Risk of  Goal: *Prevention of pressure injury  Description: Document Tyler Scale and appropriate interventions in the flowsheet. Outcome: Progressing Towards Goal  Note: Pressure Injury Interventions:       Moisture Interventions: Absorbent underpads, Apply protective barrier, creams and emollients, Assess need for specialty bed, Check for incontinence Q2 hours and as needed, Internal/External urinary devices    Activity Interventions: Assess need for specialty bed, Increase time out of bed, Pressure redistribution bed/mattress(bed type)    Mobility Interventions: Assess need for specialty bed, Float heels, HOB 30 degrees or less, Pressure redistribution bed/mattress (bed type), Turn and reposition approx. every two hours(pillow and wedges)    Nutrition Interventions: Document food/fluid/supplement intake, Offer support with meals,snacks and hydration    Friction and Shear Interventions: Apply protective barrier, creams and emollients, Foam dressings/transparent film/skin sealants, HOB 30 degrees or less, Transferring/repositioning devices                Problem: Patient Education: Go to Patient Education Activity  Goal: Patient/Family Education  Outcome: Progressing Towards Goal     Problem: Falls - Risk of  Goal: *Absence of Falls  Description: Document Vale Fall Risk and appropriate interventions in the flowsheet.   Outcome: Progressing Towards Goal  Note: Fall Risk Interventions:  Mobility Interventions: Bed/chair exit alarm, Patient to call before getting OOB, OT consult for ADLs, PT Consult for mobility concerns              Elimination Interventions: Bed/chair exit alarm, Call light in reach, Patient to call for help with toileting needs, Toileting schedule/hourly rounds    History of Falls Interventions: Bed/chair exit alarm, Evaluate medications/consider consulting pharmacy         Problem: Patient Education: Go to Patient Education Activity  Goal: Patient/Family Education  Outcome: Progressing Towards Goal     Problem: Patient Education: Go to Patient Education Activity  Goal: Patient/Family Education  Outcome: Progressing Towards Goal     Problem: TIA/CVA Stroke: Day 2 Until Discharge  Goal: Off Pathway (Use only if patient is Off Pathway)  Outcome: Progressing Towards Goal  Goal: Activity/Safety  Outcome: Progressing Towards Goal  Goal: Diagnostic Test/Procedures  Outcome: Progressing Towards Goal  Goal: Nutrition/Diet  Outcome: Progressing Towards Goal  Goal: Discharge Planning  Outcome: Progressing Towards Goal  Goal: Medications  Outcome: Progressing Towards Goal  Goal: Respiratory  Outcome: Progressing Towards Goal  Goal: Treatments/Interventions/Procedures  Outcome: Progressing Towards Goal  Goal: Psychosocial  Outcome: Progressing Towards Goal  Goal: *Verbalizes anxiety and depression are reduced or absent  Outcome: Progressing Towards Goal  Goal: *Absence of aspiration  Outcome: Progressing Towards Goal  Goal: *Absence of deep venous thrombosis signs and symptoms(Stroke Metric)  Outcome: Progressing Towards Goal  Goal: *Optimal pain control at patient's stated goal  Outcome: Progressing Towards Goal  Goal: *Tolerating diet  Outcome: Progressing Towards Goal  Goal: *Ability to perform ADLs and demonstrates progressive mobility and function  Outcome: Progressing Towards Goal  Goal: *Stroke education continued(Stroke Metric)  Outcome: Progressing Towards Goal     Problem: Patient Education: Go to Patient Education Activity  Goal: Patient/Family Education  Outcome: Progressing Towards Goal     Problem: Patient Education: Go to Patient Education Activity  Goal: Patient/Family Education  Outcome: Progressing Towards Goal

## 2021-07-30 NOTE — PROGRESS NOTES
SPEECH PATHOLOGY NOTE:    Attempted to see patient for cognitive linguistic assessment this date; however, patient perseverative on not being provided salt or ice cream with meals. Attempted to re-direct patient; however, unsuccessful. Will follow up at later time/date as patient is able to functionally participate and as schedule permits.       Katia Morgan MS, CCC-SLP

## 2021-07-31 LAB
MM INDURATION POC: 0 MM (ref 0–5)
PPD POC: NEGATIVE NEGATIVE

## 2021-07-31 PROCEDURE — 97530 THERAPEUTIC ACTIVITIES: CPT

## 2021-07-31 PROCEDURE — 74011250637 HC RX REV CODE- 250/637: Performed by: HOSPITALIST

## 2021-07-31 PROCEDURE — 74011000258 HC RX REV CODE- 258: Performed by: HOSPITALIST

## 2021-07-31 PROCEDURE — 65270000029 HC RM PRIVATE

## 2021-07-31 PROCEDURE — 74011250636 HC RX REV CODE- 250/636: Performed by: HOSPITALIST

## 2021-07-31 PROCEDURE — 99222 1ST HOSP IP/OBS MODERATE 55: CPT | Performed by: PSYCHIATRY & NEUROLOGY

## 2021-07-31 PROCEDURE — 97535 SELF CARE MNGMENT TRAINING: CPT

## 2021-07-31 RX ADMIN — ENOXAPARIN SODIUM 40 MG: 40 INJECTION SUBCUTANEOUS at 17:42

## 2021-07-31 RX ADMIN — CLOPIDOGREL BISULFATE 75 MG: 75 TABLET ORAL at 08:38

## 2021-07-31 RX ADMIN — Medication 10 ML: at 05:53

## 2021-07-31 RX ADMIN — CEFTRIAXONE 1 G: 1 INJECTION, POWDER, FOR SOLUTION INTRAMUSCULAR; INTRAVENOUS at 17:45

## 2021-07-31 RX ADMIN — Medication 10 ML: at 14:00

## 2021-07-31 RX ADMIN — ATORVASTATIN CALCIUM 80 MG: 40 TABLET, FILM COATED ORAL at 21:44

## 2021-07-31 RX ADMIN — ASPIRIN 81 MG 81 MG: 81 TABLET ORAL at 08:38

## 2021-07-31 RX ADMIN — ACETAMINOPHEN 650 MG: 325 TABLET ORAL at 08:38

## 2021-07-31 RX ADMIN — Medication 10 ML: at 21:44

## 2021-07-31 RX ADMIN — PANTOPRAZOLE SODIUM 40 MG: 40 TABLET, DELAYED RELEASE ORAL at 05:55

## 2021-07-31 RX ADMIN — FAMOTIDINE 20 MG: 20 TABLET ORAL at 08:38

## 2021-07-31 NOTE — PROGRESS NOTES
Progress Note    Patient: Fernando Weston MRN: [id number]  SSN: [id number]    YOB: 1931  Age: 80 y.o. Sex: male      Admit Date: 7/28/2021    LOS: 3 days     Subjective:   F/U UTI, right posterior parietal lobe CVA    \"80 y.o. male with medical history of CAD, GERD, Dementia who presented to ED with 10 days of decreased appetite, multiple falls. Pt's son is at bedside and able to provide information. Pt has been falling a lot at home. He lives with room mates. He had a fall 2 days ago and hit his head. Mainly, he has balance issues with walking. Son reports pt has not been able to care for himself and has been forgetting to take meds. No fever. No chills. He is incontinent of urine. \" CXR shows mild bibasilar atelectasis. Xray thoracic spine shows no acute compression fracture. Mild degenerative spondylosis. CT head showed no hemorrhage. Severe bilateral patchy white matter hypo attenuation throughout both cerebral hemispheres grossly unchanged from prior exam, but extensive. Acute/sub-acute CVA cannot be excluded. LDL 95, cholesterol 144. A1C 5.6. TSH 1.7. CTA head and neck with no large vessel occlusion. Diffuse vascular disease noted. CT perfusion with no evidence of core infarct or ischemic penumbra. MRI brain showed Punctate lacunar infarct right posterior parietal lobe. Currently on ASA, plavix, and high dose statin. ECHO EF 55%, no shunting, severe dilated left atrium. Neurology consult stated he has [place] diease from advanced white matter degeneration. Agreed to no anticoagulation due to his falls. UA consistent with UTI. WBC 13 and now 8. Urine culture gram negative rods. Ceftriaxone started on 7/28. No concerns today.    Current Facility-Administered Medications   Medication Dose Route Frequency    sodium chloride (NS) flush 5-40 mL  5-40 mL IntraVENous Q8H    sodium chloride (NS) flush 5-40 mL  5-40 mL IntraVENous PRN    ondansetron (ZOFRAN) injection 4 mg  4 mg IntraVENous Q6H PRN    aspirin chewable tablet 81 mg  81 mg Oral DAILY    clopidogreL (PLAVIX) tablet 75 mg  75 mg Oral DAILY    atorvastatin (LIPITOR) tablet 80 mg  80 mg Oral QHS    acetaminophen (TYLENOL) tablet 650 mg  650 mg Oral Q4H PRN    labetaloL (NORMODYNE;TRANDATE) injection 5 mg  5 mg IntraVENous Q10MIN PRN    enoxaparin (LOVENOX) injection 40 mg  40 mg SubCUTAneous Q24H    cefTRIAXone (ROCEPHIN) 1 g in 0.9% sodium chloride (MBP/ADV) 50 mL MBP  1 g IntraVENous Q24H    famotidine (PEPCID) tablet 20 mg  20 mg Oral DAILY    nitroglycerin (NITROSTAT) tablet 0.4 mg  0.4 mg SubLINGual Q5MIN PRN    pantoprazole (PROTONIX) tablet 40 mg  40 mg Oral ACB       Objective:     Vitals:    07/31/21 1101 07/31/21 1115 07/31/21 1145 07/31/21 1200   BP: 111/66      Pulse: 96 92 90 86   Resp: 20 (!) 33 20 (!) 40   Temp: 97.5 °F (36.4 °C)      SpO2: 98% 100% 100% 100%   Weight:       Height:             Intake and Output:  Current Shift: 07/31 0701 - 07/31 1900  In: 100 [I.V.:100]  Out: 325 [Urine:325]  Last three shifts: 07/29 1901 - 07/31 0700  In: 120 [P.O.:120]  Out: -     Physical Exam:   General:  Alert, cooperative, no distress, hard of hearing. .   Eyes:  Conjunctivae/corneas clear. Ears:  Normal TMs and external ear canals both ears. Nose: Nares normal. Septum midline. Mouth/Throat: Lips, mucosa, and tongue normal.   Neck:  no JVD. Back:   deferred. Lungs:   Clear to auscultation bilaterally. Heart:  Regular rate and rhythm, S1, S2 normal   Abdomen:   Soft, non-tender. Bowel sounds normal.    Extremities: Extremities normal, atraumatic, no cyanosis or edema. Pulses: 2+ and symmetric all extremities. Skin: Skin color, texture, turgor normal. No rashes or lesions   Lymph nodes: Cervical, supraclavicular, and axillary nodes normal.   Neurologic: Hard of hearing, good ROM in bed.  Answering all questions appropriately        Lab/Data Review:    No results found for this or any previous visit (from the past 24 hour(s)). Assessment/ Plan:     Principal Problem:    Acute ischemic stroke ([facility] [address] (7/28/2021)    Active Problems:    CAD (coronary artery disease) (7/18/2019)      Debility (12/2/2019)      Dementia ([facility] [address] (12/23/2019)      UTI (urinary tract infection) (7/28/2021)      Falls frequently (7/28/2021)    CVA, right parietal lobe - ASA, plavix, statin. UTI - Ceftriaxone. Urine culture sensitivity pending. Frequent falls - PT/OT. Likely will need rehab versus long term care facility. Looking into [employer] to med/surg with remote tele     Hopeful dc Monday.      DVT prophylaxis - Lovenox  Signed By: Joanna Heaton DO     July 31, 2021

## 2021-07-31 NOTE — ROUTINE PROCESS
TRANSFER - IN REPORT:    Verbal report received from Channing Duffy on [address]  being received from (ICU) for routine progression of care      Report consisted of patients Situation, Background, Assessment and   Recommendations(SBAR). Information from the following report(s) SBAR was reviewed with the receiving nurse. Opportunity for questions and clarification was provided. Assessment completed upon patients arrival to unit and care assumed. Detail Level: Simple

## 2021-07-31 NOTE — PROGRESS NOTES
Problem: Self Care Deficits Care Plan (Adult)  Goal: *Acute Goals and Plan of Care (Insert Text)  Outcome: Progressing Towards Goal  Note:   1. Patient will complete bathing with supervision to increase self care independence. 2. Patient will complete grooming with supervision to increase self care independence. 3. Patient will tolerate 40 minutes of OT treatment with self incorporated rest breaks to increase activity tolerance to enhance participation in hobbies. PROGRESSING  4. Patient will complete all functional transfers with independence using adaptive equipment as needed. PROGRESSING  5. Patient will complete UE exercises with supervision to increase overall activity tolerance and strength. 6. Patient will perform safe 2 step kitchen tasks. Timeframe: 7 visits       OCCUPATIONAL THERAPY: Daily Note and AM 7/31/2021  INPATIENT: OT Visit Days: 1  Payor: Lacie Rose / Plan: [employer] MEDICARE CHOICE PPO/PFFS / Product Type: Managed Care Medicare /      NAME/AGE/GENDER: Estella Shen is a 80 y.o. male   PRIMARY DIAGNOSIS:  UTI (urinary tract infection) [N39.0]  Falls frequently [R29.6]  Acute ischemic stroke ([facility] [address] [I63.9] Acute ischemic stroke ([facility] [address] Acute ischemic stroke ([facility] [address]       ICD-10: Treatment Diagnosis:    · Other lack of cordination (R27.8)   Precautions/Allergies:     Patient has no known allergies. ASSESSMENT:     Mr. Taina Hubbard presents with subacute and new infarcts. His working memory is affected and can only follow functional or simple commands. He is unaware that he had strokes, but did know he was in a hospital. He is somewhat mobile but is unsafe with quick movements. Strength, vision, and perception all appear normal. He would do best in memory care at [facility] or  to [facility]. Initiate OT.     7/31/21 He is asleep in bed, woke easily. He transferred to EOB with CGA. He donned socks with CGA. He leans to the left and posteriorly when performing tasks seated onEOB.  He stood and transferred to recliner with CGA. He performed B UE theract with pillow cases. He unknotted and folded them with min cues. He donned pillow case on pillow with SBA. He is cooperative and pleasantly confused. He would continue to benefit from OT Will follow. He is sitting in recliner with tab alert in place. Nursing aware. This section established at most recent assessment   PROBLEM LIST (Impairments causing functional limitations):  1. Decreased ADL/Functional Activities  2. Decreased Pacing Skills  3. Increased Fatigue  4. Decreased Cognition   INTERVENTIONS PLANNED: (Benefits and precautions of occupational therapy have been discussed with the patient.)  1. Activities of daily living training  2. Neuromuscular re-eduation  3. Therapeutic activity  4. Therapeutic exercise     TREATMENT PLAN: Frequency/Duration: Follow patient 3x a week to address above goals. Rehabilitation Potential For Stated Goals: Good     REHAB RECOMMENDATIONS (at time of discharge pending progress):    Placement: It is my opinion, based on this patient's performance to date, that Mr. Ngozi Santana may benefit from intensive therapy at a [address] [place] [address] after discharge due to the functional deficits listed above that are likely to improve with skilled rehabilitation and concerns that he/she may be unsafe to be unsupervised at home due to or [facility] with memory care. .  Equipment:    None at this time              OCCUPATIONAL PROFILE AND HISTORY:   History of Present Injury/Illness (Reason for Referral):  See H&P  Past Medical History/Comorbidities:   Mr. Ngozi Santana  has a past medical history of Colon cancer ([facility] [address] (01/2012) and Hiatal hernia. Mr. Ngozi Santana  has no past surgical history on file.   Social History/Living Environment:   Home Environment: Private residence  # Steps to Enter: 4  Rails to Enter: No  One/Two Story Residence: One story  Living Alone: No  Support Systems: Friends \ neighbors, Child(margi)  Patient Expects to be Discharged to[de-identified] Unknown  Current DME Used/Available at Home: Cane, straight  Tub or Shower Type: Shower  Prior Level of Function/Work/Activity:  Independent prior per reports. Maybe had a roommate. Number of Personal Factors/Comorbidities that affect the Plan of Care: Brief history (0):  LOW COMPLEXITY   ASSESSMENT OF OCCUPATIONAL PERFORMANCE[de-identified]   Activities of Daily Living:   Basic ADLs (From Assessment) Complex ADLs (From Assessment)   Feeding: Setup  Oral Facial Hygiene/Grooming: Stand-by assistance  Bathing: Contact guard assistance  Upper Body Dressing: Contact guard assistance  Lower Body Dressing: Contact guard assistance  Toileting: Contact guard assistance     Grooming/Bathing/Dressing Activities of Daily Living     Cognitive Retraining  Safety/Judgement: Decreased insight into deficits           Toileting  Toileting Assistance:  (condom cath)         Lower Body Dressing Assistance  Antiembolitic Stockings: Contact guard assistance (fig. 4) Bed/Mat Mobility  Supine to Sit: Contact guard assistance  Sit to Stand: Stand-by assistance;Contact guard assistance  Stand to Sit: Stand-by assistance;Contact guard assistance  Bed to Chair: Contact guard assistance  Scooting: Contact guard assistance     Most Recent Physical Functioning:   Gross Assessment:                  Posture:     Balance:  Sitting: Intact  Standing: Impaired; With support  Standing - Static: Good  Standing - Dynamic : Fair Bed Mobility:  Supine to Sit: Contact guard assistance  Scooting: Contact guard assistance  Wheelchair Mobility:     Transfers:  Sit to Stand: Stand-by assistance;Contact guard assistance  Stand to Sit: Stand-by assistance;Contact guard assistance  Bed to Chair: Contact guard assistance            Patient Vitals for the past 6 hrs:   BP BP Patient Position SpO2 Pulse   07/31/21 0730 -- -- 100 % 73   07/31/21 0745 -- -- 100 % 69   07/31/21 0800 136/77 -- 99 % 80   07/31/21 0815 -- -- 96 % 77   07/31/21 0830 -- -- 100 % 87   21 0845 -- -- 100 % 85   21 0900 137/77 -- 100 % 80   21 0915 -- -- 100 % 91   21 0930 -- -- (!) 88 % 85   21 0945 -- -- 100 % 84   21 1000 113/65 -- 100 % 89   21 1015 -- -- 100 % 94   21 1030 -- -- 99 % 86   21 1045 -- -- 100 % (!) 104   21 1100 111/66 -- -- 92   21 1101 111/66 Sitting 98 % 96   21 1115 -- -- 100 % 92   21 1145 -- -- 100 % 90   21 1200 -- -- 100 % 86       Mental Status  Neurologic State: Alert, Confused  Orientation Level: Disoriented to place, Disoriented to situation  Cognition: Follows commands  Perception: Cues to maintain midline in sitting, Cues to maintain midline in standing  Perseveration: Perseverates during conversation  Safety/Judgement: Decreased insight into deficits                          Physical Skills Involved:  1. Range of Motion  2. Activity Tolerance Cognitive Skills Affected (resulting in the inability to perform in a timely and safe manner):  1. Executive Function  2. Immediate Memory  3. Short Term Recall  4. Long Term Memory  5. Sustained Attention  6. Divided Attention Psychosocial Skills Affected:  1. Social Interaction  2. Emotional Regulation  3. Self-Awareness   Number of elements that affect the Plan of Care: 3-5:  MODERATE COMPLEXITY   CLINICAL DECISION MAKIN [facility] [address] [id number] AM-PAC 6 Clicks   Daily Activity Inpatient Short Form  How much help from another person does the patient currently need. .. Total A Lot A Little None   1. Putting on and taking off regular lower body clothing? [] 1   [] 2   [x] 3   [] 4   2. Bathing (including washing, rinsing, drying)? [] 1   [] 2   [x] 3   [] 4   3. Toileting, which includes using toilet, bedpan or urinal?   [] 1   [] 2   [x] 3   [] 4   4. Putting on and taking off regular upper body clothing? [] 1   [] 2   [x] 3   [] 4   5. Taking care of personal grooming such as brushing teeth? [] 1   [] 2   [x] 3   [] 4   6. Eating meals? [] 1   [] 2   [] 3   [x] 4   © 2007, Trustees of [address] [place] [address] [address] [id number], under license to [employer]. All rights reserved      Score:  Initial: 19 Most Recent: X (Date: -- )    Interpretation of Tool:  Represents activities that are increasingly more difficult (i.e. Bed mobility, Transfers, Gait). Medical Necessity:     · Skilled intervention continues to be required due to Deficits ntoed above. Reason for Services/Other Comments:  · Patient continues to require skilled intervention due to   · CVAs  · . Use of outcome tool(s) and clinical judgement create a POC that gives a: MODERATE COMPLEXITY         TREATMENT:   (In addition to Assessment/Re-Assessment sessions the following treatments were rendered)     Pre-treatment Symptoms/Complaints:    Pain: Initial:   Pain Intensity 1: 0  Post Session:  0       Self Care: (25): Procedure(s) (per grid) utilized to improve and/or restore self-care/home management as related to dressing and functional tranfers, home tasks. Required minimal visual, verbal, manual and tactile cueing to facilitate activities of daily living skills and compensatory activities. Braces/Orthotics/Lines/Etc:   · ICU monitors, condom cath  · O2 Device: None (Room air)  Treatment/Session Assessment:    · Response to Treatment:  Good, sitting up in recliner  · Interdisciplinary Collaboration:   o Physical Therapist  o Occupational Therapist  o Registered Nurse  · After treatment position/precautions:   o Up in chair  o Bed alarm/tab alert on  o Bed/Chair-wheels locked  o Bed in low position  o Call light within reach  o RN notified   · Compliance with Program/Exercises: Compliant all of the time. · Recommendations/Intent for next treatment session: \"Next visit will focus on advancements to more challenging activities and reduction in assistance provided\".   Total Treatment Duration:25  OT Patient Time In/Time Out  Time In: 1030  Time Out: Fredis Salvador OT

## 2021-07-31 NOTE — PROGRESS NOTES
Problem: Mobility Impaired (Adult and Pediatric)  Goal: *Acute Goals and Plan of Care (Insert Text)  Outcome: Progressing Towards Goal  Note:   LTG:  (1.)Mr. Jessica Dwyer will move from supine to sit and sit to supine  in bed with SUPERVISION within 7 treatment day(s). (2.)Mr. Jessica Dwyer will transfer from bed to chair and chair to bed with SUPERVISION using the least restrictive device within 7 treatment day(s). (3.)Mr. Jessica Dwyer will ambulate with SUPERVISION for 200 feet with the least restrictive device within 7 treatment day(s). ________________________________________________________________________________________________       PHYSICAL THERAPY: Daily Note and AM 7/31/2021  INPATIENT: PT Visit Days : 2  Payor: Ish  / Plan: [facility] [employer] MEDICARE CHOICE PPO/PFFS / Product Type: [employer] /       NAME/AGE/GENDER: Maggie Nix is a 80 y.o. male   PRIMARY DIAGNOSIS: UTI (urinary tract infection) [N39.0]  Falls frequently [R29.6]  Acute ischemic stroke ([facility] [address] [I63.9] Acute ischemic stroke ([facility] [address] Acute ischemic stroke ([facility] [address]       ICD-10: Treatment Diagnosis:    · Generalized Muscle Weakness (M62.81)  · Other lack of cordination (R27.8)  · Difficulty in walking, Not elsewhere classified (R26.2)  · History of falling (Z91.81)   Precaution/Allergies:  Patient has no known allergies. ASSESSMENT:     Mr. Jessica Dwyer presents with above diagnosis. He demonstrates confusion, generalized weakness and imbalance which make him a fall risk. He has had multiple falls at home but unable to describe what makes him fall. He reports he is independent but has had balance problems in the past .  He reports getting some help for it which helped his tennis game. Patient talked a lot about playing tennis. He struggled with telling therapist year but was able to tell the month. He appeared to be alert and oriented at times but then disoriented within a few minutes.   MRI showed acute as well as old infarcts. Patient reports he lives with a friend from whom he rents and that his 2 sons live near. Unsure of accuracy of patient reports as no family member present. Patient certainly not safe to d/c without 24/7 supervision. Patient would benefit from STR with likely transition to LTC, Memory Care, or [facility]. Patient participated well this am.  Still periods of confusion. Ambulated increased distance without RW-used gait belt. Occasional path deviations which could have led to loss of balance without assist/support. Continues to lack safety awareness. Likely best suited for memory care of [facility] at this time. This section established at most recent assessment   PROBLEM LIST (Impairments causing functional limitations):  1. Decreased Strength  2. Decreased ADL/Functional Activities  3. Decreased Transfer Abilities  4. Decreased Ambulation Ability/Technique  5. Decreased Balance  6. Decreased Cognition   INTERVENTIONS PLANNED: (Benefits and precautions of physical therapy have been discussed with the patient.)  1. Balance Exercise  2. Bed Mobility  3. Family Education  4. Gait Training  5. Home Exercise Program (HEP)  6. Therapeutic Activites  7. Therapeutic Exercise/Strengthening  8. Transfer Training     TREATMENT PLAN: Frequency/Duration: daily for duration of hospital stay  Rehabilitation Potential For Stated Goals: Good     REHAB RECOMMENDATIONS (at time of discharge pending progress):    Placement: It is my opinion, based on this patient's performance to date, that Mr. Jorge Ann may benefit from intensive therapy at a [address] [place] [address] after discharge due to the functional deficits listed above that are likely to improve with skilled rehabilitation and concerns that he/she may be unsafe to be unsupervised at home due to history of falls .   Equipment:    To be determined based on progress and d/c plan              HISTORY:   History of Present Injury/Illness (Reason for Referral):  Fernando Weston is a 80 y.o. male with medical history of CAD, GERD, Dementia who presented to ED with 10 days of decreased appetite, multiple falls. Pt's son is at bedside and able to provide information. Pt has been falling a lot at home. He lives with room mates. He had a fall 2 days ago and hit his head. Mainly, he has balance issues with walking. No tingling, numbness or weakness of extremities. Son reports pt has not been able to care for himself and has been forgetting to take meds. No fever. No chills. He is incontinent of urine. No vomiting, but has nausea. No chest pain. No palpitations. No cough. No shortness of breath. No headache. He has not been eating much and has been losing weight. Lost about 10-15 pounds in the last 2 weeks. He also has backache. ER course:   Afebrile, CBC with WBC 13. BMP with Potassium 3.4, Cr 1.28. Lipase 92. CXR shows mild bibasilar atelectasis. Xray thoracic spine shows no acute compression fracture. Mild degenerative spondylosis. CT head shows - no hemorrhage. Severe bilateral patchy white matter hypo attenuation throughout both cerebral hemispheres grossly unchanged from prior exam, but extensive. Acute/sub-acute CVA cannot be excluded. Pt admitted for further evaluation and management of Suspected stroke, UTI, falls. Past Medical History/Comorbidities:   Mr. Jose Campbell  has a past medical history of Colon cancer ([facility] [address] (01/2012) and Hiatal hernia. Mr. Jose Campbell  has no past surgical history on file. Social History/Living Environment:   Home Environment: Private residence  # Steps to Enter: 4  Rails to Enter: No  One/Two Story Residence: One story  Living Alone: No  Support Systems: Friends \ neighbors, Child(margi)  Patient Expects to be Discharged to[de-identified] Unknown  Current DME Used/Available at Home: Cane, straight  Tub or Shower Type: Shower  Prior Level of Function/Work/Activity:  Independent but multiple falls.      Number of Personal Factors/Comorbidities that affect the Plan of Care: 1-2: MODERATE COMPLEXITY   EXAMINATION:   Most Recent Physical Functioning:   Gross Assessment:  AROM: Generally decreased, functional  Strength: Generally decreased, functional  Coordination: Generally decreased, functional               Posture:     Balance:  Sitting: Intact  Standing - Static: Good  Standing - Dynamic : Fair Bed Mobility:     Wheelchair Mobility:     Transfers:  Sit to Stand: Stand-by assistance;Contact guard assistance  Stand to Sit: Stand-by assistance;Contact guard assistance  Bed to Chair: Contact guard assistance  Gait:     Speed/Estrellita: Pace decreased (<100 feet/min)  Step Length: Left shortened;Right shortened  Gait Abnormalities: Path deviations  Distance (ft): 700 Feet (ft)  Assistive Device: Gait belt  Ambulation - Level of Assistance: Contact guard assistance;Minimal assistance  Interventions: Safety awareness training;Verbal cues      Body Structures Involved:  1. Muscles Body Functions Affected:  1. Movement Related Activities and Participation Affected:  1. Mobility   Number of elements that affect the Plan of Care: 3: MODERATE COMPLEXITY   CLINICAL PRESENTATION:   Presentation: Stable and uncomplicated: LOW COMPLEXITY   CLINICAL DECISION MAKIN [facility] [address] [id number] AM-PAC 6 Clicks   Basic Mobility Inpatient Short Form  How much difficulty does the patient currently have. .. Unable A Lot A Little None   1. Turning over in bed (including adjusting bedclothes, sheets and blankets)? [] 1   [] 2   [x] 3   [] 4   2. Sitting down on and standing up from a chair with arms ( e.g., wheelchair, bedside commode, etc.)   [] 1   [] 2   [x] 3   [] 4   3. Moving from lying on back to sitting on the side of the bed? [] 1   [] 2   [x] 3   [] 4   How much help from another person does the patient currently need. .. Total A Lot A Little None   4. Moving to and from a bed to a chair (including a wheelchair)? [] 1   [] 2   [x] 3   [] 4   5. Need to walk in hospital room? [] 1   [] 2   [x] 3   [] 4   6. Climbing 3-5 steps with a railing? [] 1   [] 2   [x] 3   [] 4   © 2007, Trustees of [address] [place] [address] [address] [id number], under license to [employer]. All rights reserved      Score:  Initial: 18 Most Recent: X (Date: -- )    Interpretation of Tool:  Represents activities that are increasingly more difficult (i.e. Bed mobility, Transfers, Gait). Medical Necessity:     · Patient is expected to demonstrate progress in   · strength, balance, and coordination  ·  to   · improve safety during out of  bed activity. · .  Reason for Services/Other Comments:  · Patient continues to require skilled intervention due to   · Decreased cognition, balance, and mobility. · .   Use of outcome tool(s) and clinical judgement create a POC that gives a: Clear prediction of patient's progress: LOW COMPLEXITY            TREATMENT:   (In addition to Assessment/Re-Assessment sessions the following treatments were rendered)   Pre-treatment Symptoms/Complaints:  Patient agreeable. Pain: Initial:   Pain Intensity 1: 0  Post Session:  0     Therapeutic Activity: (    25 minutes): Therapeutic activities including Chair transfers and Ambulation on level ground to improve mobility, strength, balance, and coordination. Required minimal Safety awareness training;Verbal cues to promote static and dynamic balance in standing. Braces/Orthotics/Lines/Etc:   · telemetry  · O2 Device: None (Room air)  Treatment/Session Assessment:    · Response to Treatment:  Participated well and moved fairly well but continues to have limited safety awareness. · Interdisciplinary Collaboration:   o Physical Therapist  o Registered Nurse  · After treatment position/precautions:   o Up in chair  o Bed alarm/tab alert on  o Bed/Chair-wheels locked  o Call light within reach  o RN notified   · Compliance with Program/Exercises:  Will assess as treatment progresses  · Recommendations/Intent for next treatment session: \"Next visit will focus on advancements to more challenging activities and reduction in assistance provided\".   Total Treatment Duration:  PT Patient Time In/Time Out  Time In: 1115  Time Out: 1200 [facility]

## 2021-07-31 NOTE — PROGRESS NOTES
07/31/21 1600   Dual Skin Pressure Injury Assessment   Dual Skin Pressure Injury Assessment X   Left elbow elkin

## 2021-07-31 NOTE — PROGRESS NOTES
Problem: Pressure Injury - Risk of  Goal: *Prevention of pressure injury  Description: Document Tyler Scale and appropriate interventions in the flowsheet. Outcome: Progressing Towards Goal  Note: Pressure Injury Interventions:       Moisture Interventions: Absorbent underpads, Apply protective barrier, creams and emollients, Check for incontinence Q2 hours and as needed, Maintain skin hydration (lotion/cream), Minimize layers, Moisture barrier, Offer toileting Q_hr    Activity Interventions: Assess need for specialty bed, Increase time out of bed, Pressure redistribution bed/mattress(bed type), PT/OT evaluation    Mobility Interventions: Assess need for specialty bed, Float heels, HOB 30 degrees or less, Pressure redistribution bed/mattress (bed type), PT/OT evaluation, Turn and reposition approx. every two hours(pillow and wedges)    Nutrition Interventions: Document food/fluid/supplement intake, Discuss nutritional consult with provider, Offer support with meals,snacks and hydration    Friction and Shear Interventions: Apply protective barrier, creams and emollients, Foam dressings/transparent film/skin sealants, HOB 30 degrees or less, Lift sheet, Minimize layers, Transferring/repositioning devices                Problem: Falls - Risk of  Goal: *Absence of Falls  Description: Document Vale Fall Risk and appropriate interventions in the flowsheet.   Outcome: Progressing Towards Goal  Note: Fall Risk Interventions:  Mobility Interventions: Bed/chair exit alarm, Communicate number of staff needed for ambulation/transfer, OT consult for ADLs, Patient to call before getting OOB, PT Consult for mobility concerns, PT Consult for assist device competence, Strengthening exercises (ROM-active/passive), Utilize walker, cane, or other assistive device    Mentation Interventions: Adequate sleep, hydration, pain control, Bed/chair exit alarm, Door open when patient unattended, Evaluate medications/consider consulting pharmacy, Eyeglasses and hearing aids, Increase mobility, More frequent rounding, Reorient patient, Room close to nurse's station, Toileting rounds, Update white board    Medication Interventions: Assess postural VS orthostatic hypotension, Bed/chair exit alarm, Evaluate medications/consider consulting pharmacy, Patient to call before getting OOB, Teach patient to arise slowly    Elimination Interventions: Bed/chair exit alarm, Call light in reach, Patient to call for help with toileting needs, Stay With Me (per policy), Toilet paper/wipes in reach, Toileting schedule/hourly rounds, Urinal in reach    History of Falls Interventions: Bed/chair exit alarm, Consult care management for discharge planning, Door open when patient unattended, Evaluate medications/consider consulting pharmacy, Investigate reason for fall, Room close to nurse's station, Utilize gait belt for transfer/ambulation         Problem: TIA/CVA Stroke: Day 2 Until Discharge  Goal: Off Pathway (Use only if patient is Off Pathway)  Outcome: Progressing Towards Goal  Goal: Activity/Safety  Outcome: Progressing Towards Goal  Goal: Diagnostic Test/Procedures  Outcome: Progressing Towards Goal  Goal: Nutrition/Diet  Outcome: Progressing Towards Goal  Goal: Discharge Planning  Outcome: Progressing Towards Goal  Goal: Medications  Outcome: Progressing Towards Goal  Goal: Respiratory  Outcome: Progressing Towards Goal  Goal: Treatments/Interventions/Procedures  Outcome: Progressing Towards Goal  Goal: Psychosocial  Outcome: Progressing Towards Goal  Goal: *Verbalizes anxiety and depression are reduced or absent  Outcome: Progressing Towards Goal  Goal: *Absence of aspiration  Outcome: Progressing Towards Goal  Goal: *Absence of deep venous thrombosis signs and symptoms(Stroke Metric)  Outcome: Progressing Towards Goal  Goal: *Optimal pain control at patient's stated goal  Outcome: Progressing Towards Goal  Goal: *Tolerating diet  Outcome: Progressing Towards Goal  Goal: *Ability to perform ADLs and demonstrates progressive mobility and function  Outcome: Progressing Towards Goal  Goal: *Stroke education continued(Stroke Metric)  Outcome: Progressing Towards Goal     Problem: Ischemic Stroke: Discharge Outcomes  Goal: *Verbalizes anxiety and depression are reduced or absent  Outcome: Progressing Towards Goal  Goal: *Verbalize understanding of risk factor modification(Stroke Metric)  Outcome: Progressing Towards Goal  Goal: *Hemodynamically stable  Outcome: Progressing Towards Goal  Goal: *Absence of aspiration pneumonia  Outcome: Progressing Towards Goal  Goal: *Aware of needed dietary changes  Outcome: Progressing Towards Goal  Goal: *Verbalize understanding of prescribed medications including anti-coagulants, anti-lipid, and/or anti-platelets(Stroke Metric)  Outcome: Progressing Towards Goal  Goal: *Tolerating diet  Outcome: Progressing Towards Goal  Goal: *Aware of follow-up diagnostics related to anticoagulants  Outcome: Progressing Towards Goal  Goal: *Ability to perform ADLs and demonstrates progressive mobility and function  Outcome: Progressing Towards Goal  Goal: *Absence of DVT(Stroke Metric)  Outcome: Progressing Towards Goal  Goal: *Absence of aspiration  Outcome: Progressing Towards Goal  Goal: *Optimal pain control at patient's stated goal  Outcome: Progressing Towards Goal  Goal: *Home safety concerns addressed  Outcome: Progressing Towards Goal  Goal: *Describes available resources and support systems  Outcome: Progressing Towards Goal  Goal: *Verbalizes understanding of activation of EMS(911) for stroke symptoms(Stroke Metric)  Outcome: Progressing Towards Goal  Goal: *Understands and describes signs and symptoms to report to providers(Stroke Metric)  Outcome: Progressing Towards Goal  Goal: *Neurolgocially stable (absence of additional neurological deficits)  Outcome: Progressing Towards Goal  Goal: *Verbalizes importance of follow-up with primary care physician(Stroke Metric)  Outcome: Progressing Towards Goal  Goal: *Smoking cessation discussed,if applicable(Stroke Metric)  Outcome: Progressing Towards Goal  Goal: *Depression screening completed(Stroke Metric)  Outcome: Progressing Towards Goal

## 2021-07-31 NOTE — CONSULTS
[facility] Neurology [facility]  [address]  [facility], [address] [facility]          Chief Complaint   Patient presents with   Amanda Lorenzo is a 80 y.o. male who presents on referral from [place] hospitalist service with reference to progressive difficulty with balance he has a longstanding history of some degree of dementia has had recent falls decreased appetite no relatives were available at the time that I talked to the patient this morning and secondary to dementia there is not a great deal of information available from him    It is apparent however that he had a substantial: Cognitive issues and is confabulating to a degree      Past Medical History:   Diagnosis Date    Colon cancer ([facility] [address] 01/2012    Hiatal hernia        History reviewed. No pertinent surgical history. History reviewed. No pertinent family history. Social History     Socioeconomic History    Marital status:      Spouse name: Not on file    Number of children: Not on file    Years of education: Not on file    Highest education level: Not on file   Tobacco Use    Smoking status: Never Smoker    Smokeless tobacco: Never Used   Substance and Sexual Activity    Alcohol use: Never    Drug use: Never     Social Determinants of Health     Financial Resource Strain:     Difficulty of Paying Living Expenses:    Food Insecurity:     Worried About Running Out of Food in the Last Year:     [address] in the Last Year:    Transportation Needs:     Lack of Transportation (Medical):      Lack of Transportation (Non-Medical):    Physical Activity:     Days of Exercise per Week:     Minutes of Exercise per Session:    Stress:     Feeling of Stress :    Social Connections:     Frequency of Communication with Friends and Family:     Frequency of Social Gatherings with Friends and Family:     Attends [employer]ars[employer] Services:     Active Member of Clubs or Organizations:     Attends Club or Organization Meetings:     Marital Status:            Current Facility-Administered Medications:     sodium chloride (NS) flush 5-40 mL, 5-40 mL, IntraVENous, Q8H, Stefan Franco MD, 10 mL at 07/31/21 0553    sodium chloride (NS) flush 5-40 mL, 5-40 mL, IntraVENous, PRN, Stefan Franco MD    ondansetron (ZOFRAN) injection 4 mg, 4 mg, IntraVENous, Q6H PRN, Stefan Franco MD    aspirin chewable tablet 81 mg, 81 mg, Oral, DAILY, Stefan Franco MD, 81 mg at 07/31/21 0838    clopidogreL (PLAVIX) tablet 75 mg, 75 mg, Oral, DAILY, Stefan Franco MD, 75 mg at 07/31/21 0838    atorvastatin (LIPITOR) tablet 80 mg, 80 mg, Oral, QHS, Stefan Franco MD, 80 mg at 07/30/21 2215    acetaminophen (TYLENOL) tablet 650 mg, 650 mg, Oral, Q4H PRN, Stefan Franco MD, 650 mg at 07/31/21 0838    labetaloL (NORMODYNE;TRANDATE) injection 5 mg, 5 mg, IntraVENous, Q10MIN PRN, Stefan Franco MD    enoxaparin (LOVENOX) injection 40 mg, 40 mg, SubCUTAneous, Q24H, Stefan Franco MD, 40 mg at 07/30/21 1721    cefTRIAXone (ROCEPHIN) 1 g in 0.9% sodium chloride (MBP/ADV) 50 mL MBP, 1 g, IntraVENous, Q24H, Stefan Franco MD, Transfusion Completed at 07/30/21 1800    famotidine (PEPCID) tablet 20 mg, 20 mg, Oral, DAILY, Stefan Franco MD, 20 mg at 07/31/21 3968    nitroglycerin (NITROSTAT) tablet 0.4 mg, 0.4 mg, SubLINGual, Q5MIN PRN, Stefan Franco MD    pantoprazole (PROTONIX) tablet 40 mg, 40 mg, Oral, ACB, Stefan Franco MD, 40 mg at 07/31/21 0555    No Known Allergies    Review of Systems  Secondary to the patient's dementia and confabulation a reliable 14 point review of systems is not available  Visit Vitals  /66 (BP 1 Location: Right upper arm, BP Patient Position: Sitting)   Pulse 86   Temp 97.5 °F (36.4 °C)   Resp (!) 40   Ht 5' 10\" (1.778 m)   Wt 128 lb 1.4 oz (58.1 kg)   SpO2 100%   BMI 18.38 kg/m²       Neurologic Exam  The patient is alert cooperative and interactive on the telemedicine evaluation-however he is clearly very vague about historical details and communication is difficult secondary to his hearing deficit which is amplified by the challenges of video communication  The patient appears well-hydrated. The patient's respiratory rate is normal.  Patient is not dyspneic at rest.  Inspection of the head and neck in an AP view reveals no evidence of thyromegaly   Orbital anatomy to inspection appears normal  The oral cavity demonstrates no evidence of drooling or dry mouth  Cranial nerve examination. Normal gaze. No evidence of extraocular muscle paralysis. Pupils equal in size. There is no ptosis. The face moves symmetrically nasolabial folds are symmetric cheek puff symmetric   Tongue protrudes midline  Hearing is markedly diminished  Speech is normal and articulate. No word finding difficulties. However marked reduction in recent and remote memory     The patient demonstrates no pronator drift of the upper extremities  The small muscles of the hands demonstrate extensive arthritis  Inspection of the lower extremity musculature reveals deconditioning      Most recent MRI  Results from [place] encounter on 07/28/21    MRI BRAIN WO CONT    Narrative  EXAMINATION: BRAIN MRI 7/28/2021 8:17 PM    INDICATION: 10 days decreased appetite, gait instability, and falls    COMPARISON: CT head, CTA head and neck: CT perfusion 7/28/2021    TECHNIQUE: Multiplanar multisequence MRI of the brain without intravenous  contrast.    FINDINGS:    Small acute to early subacute infarct in the posterior right parietal cortex. No  evidence of recent hemorrhage. Normal vascular flow voids. Scattered white  matter FLAIR hyperintensities, advanced matter lesion small area of  encephalomalacia along the inferior left temporal lobe. Normal size of  ventricles and extra-axial spaces for age. No calvarial abnormality is seen. Prior bilateral lens replacements. Essentially  clear paranasal sinuses and mastoid air cells.  No abnormality of extracranial  soft tissues. Impression  1. Small acute to early subacute infarct in the right posterior parietal cortex. 2. Advanced white matter FLAIR hyperintensities, likely chronic small vessel  ischemic change. 3. Small area of encephalomalacia at the inferior left temporal lobe which may  be from old infarct or prior trauma. Most recent MRA  Results from [place] encounter on 07/28/21    MRI BRAIN WO CONT    Narrative  EXAMINATION: BRAIN MRI 7/28/2021 8:17 PM    INDICATION: 10 days decreased appetite, gait instability, and falls    COMPARISON: CT head, CTA head and neck: CT perfusion 7/28/2021    TECHNIQUE: Multiplanar multisequence MRI of the brain without intravenous  contrast.    FINDINGS:    Small acute to early subacute infarct in the posterior right parietal cortex. No  evidence of recent hemorrhage. Normal vascular flow voids. Scattered white  matter FLAIR hyperintensities, advanced matter lesion small area of  encephalomalacia along the inferior left temporal lobe. Normal size of  ventricles and extra-axial spaces for age. No calvarial abnormality is seen. Prior bilateral lens replacements. Essentially  clear paranasal sinuses and mastoid air cells. No abnormality of extracranial  soft tissues. Impression  1. Small acute to early subacute infarct in the right posterior parietal cortex. 2. Advanced white matter FLAIR hyperintensities, likely chronic small vessel  ischemic change. 3. Small area of encephalomalacia at the inferior left temporal lobe which may  be from old infarct or prior trauma. Most recent CTA  Results from [place] encounter on 07/28/21    CT HEAD WO CONT    Narrative  EXAM: Noncontrast CT head. INDICATION: Mental status changes. COMPARISON: Yesterday's MRI brain. TECHNIQUE: Noncontrast CT images of the head were obtained. Radiation dose  reduction techniques were used for this study.   Our CT scanners use one or all  of the following:  Automated exposure control, adjustment of the mA or kV  according to patient size, iterative reconstruction. FINDINGS: There is moderate volume loss with extensive chronic small vessel  ischemic changes in the white matter. The acute right parietal lobe lacunar  infarct on yesterday's MRI is not well-visualized on this study, due to its  small size and the chronic ischemic changes in this region. No hemorrhage or  mass is identified. There is no mass effect, midline shift or depressed  fracture. The visualized paranasal sinuses and mastoid air cells are clear. Impression  1. No acute process. 2. The tiny acute right parietal lobe infarct on yesterday's MRI brain is not  well-visualized on this study, due to its small size and chronic small vessel  ischemic changes in this region. Images reviewed on the PACS system the area of infarction is of no significance. What is of significance is the far advanced deep white matter disease which is in a Binswanger's pattern    Most recent Echo  No results found for this visit on 07/28/21. Most recent lipid panels  Lab Results   Component Value Date/Time    Cholesterol, total 144 07/29/2021 03:35 AM    HDL Cholesterol 23 (L) 07/29/2021 03:35 AM    LDL, calculated 95 07/29/2021 03:35 AM    VLDL, calculated 26 (H) 07/29/2021 03:35 AM    Triglyceride 130 07/29/2021 03:35 AM    CHOL/HDL Ratio 6.3 07/29/2021 03:35 AM       Most recent Hgb A1C  Lab Results   Component Value Date/Time    Hemoglobin A1c 5.6 07/29/2021 03:35 AM                 Diagnoses and all orders for this visit:    1. Gait instability    2.  Acute cystitis without hematuria    Other orders  -     CBC WITH AUTOMATED DIFF; Standing  -     METABOLIC PANEL, COMPREHENSIVE; Standing  -     POC URINE DIPSTICK; Standing  -     LIPASE; Standing  -     URINALYSIS W/ RFLX MICROSCOPIC; Standing  -     CULTURE, URINE; Standing  -     sodium chloride 0.9 % bolus infusion 1,000 mL  -     XR CHEST PORT; Standing  -     CT HEAD WO CONT; Standing  -     XR SPINE THORAC 2 V; Standing  -     CULTURE, BLOOD; Standing  -     CULTURE, BLOOD; Standing  -     cefTRIAXone (ROCEPHIN) 1 g in 0.9% sodium chloride (MBP/ADV) 50 mL MBP  -     NURSING-MISCELLANEOUS:; Standing  -     VITAL SIGNS PER UNIT ROUTINE; Standing  -     NOTIFY PROVIDER: VITAL SIGNS CHANGES; Standing  -     NEURO/VASCULAR CHECKS; Standing  -     CARDIAC MONITORING; Standing  -     INTAKE AND OUTPUT; Standing  -     MEASURE HEIGHT; Standing  -     WEIGH PATIENT; Standing  -     FALL PRECAUTIONS; Standing  -     NURSING-MISCELLANEOUS:; Standing  -     NURSING-MISCELLANEOUS:; Standing  -     sodium chloride (NS) flush 5-40 mL  -     sodium chloride (NS) flush 5-40 mL  -     LIPID PANEL; Standing  -     HEMOGLOBIN A1C WITH EAG; Standing  -     IP CONSULT TO STROKE COORDINATOR; Standing  -     IP CONSULT TO PHYSIATRIST(REHAB MEDICINE);  Standing  -     IP CONSULT TO CASE MANAGEMENT; Standing  -     SLP--EVAL, DEVISE PLAN OF CARE AND TREAT; Standing  -     PT--EVAL, DEVISE PLAN OF CARE AND TREAT; Standing  -     OT--EVAL, DEVISE PLAN OF CARE AND TREAT; Standing  -     INITIAL PHYSICIAN ORDER: INPATIENT; Standing  -     FULL CODE; Standing  -     NIH STROKE SCALE ASSESSMENT; Standing  -     POC GLUCOSE; Standing  -     RT--OXIMETRY, SPOT CHECK; Standing  -     DIET ADULT; Standing  -     ondansetron (ZOFRAN) injection 4 mg  -     aspirin chewable tablet 81 mg  -     clopidogreL (PLAVIX) tablet 75 mg  -     atorvastatin (LIPITOR) tablet 80 mg  -     acetaminophen (TYLENOL) tablet 650 mg  -     labetaloL (NORMODYNE;TRANDATE) injection 5 mg  -     enoxaparin (LOVENOX) injection 40 mg  -     CBC W/O DIFF; Standing  -     TSH 3RD GENERATION; Standing  -     METABOLIC PANEL, BASIC; Standing  -     ECHO ADULT COMPLETE; Standing  -     MRI BRAIN WO CONT; Standing  -     cefTRIAXone (ROCEPHIN) 1 g in 0.9% sodium chloride (MBP/ADV) 50 mL MBP  -     famotidine (PEPCID) tablet 20 mg  -     nitroglycerin (NITROSTAT) tablet 0.4 mg  -     pantoprazole (PROTONIX) tablet 40 mg  -     PLEASE READ & DOCUMENT PPD TEST IN 24 HRS; Standing  -     PLEASE READ & DOCUMENT PPD TEST IN 48 HRS; Standing  -     PLEASE READ & DOCUMENT PPD TEST IN 72 HRS; Standing  -     tuberculin injection 5 Units  -     FALL PRECAUTIONS; Standing  -     NURSING-MISCELLANEOUS:; Standing  -     CTA HEAD NECK W WO CONT; Standing  -     CT PERF W CBF; Standing  -     sodium chloride 0.9 % bolus infusion 100 mL  -     iopamidoL (ISOVUE-370) 76 % injection 100 mL  -     saline peripheral flush soln 10 mL  -     PT--EVAL, DEVISE PLAN OF CARE AND TREAT; Standing  -     GLUCOSE, POC; Standing  -     GLUCOSE, POC; Standing  -     CT HEAD WO CONT; Standing  -     TRANSFER PATIENT; Standing  -     GLUCOSE, POC; Standing  -     IP CONSULT TO NEUROLOGY; Standing  -     LORazepam (ATIVAN) tablet 0.5 mg  -     GLUCOSE, POC; Standing  -     GLUCOSE, POC; Standing  -     TRANSFER PATIENT; Standing      Impression    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 The current tiny infarct is of absolutely no functional significance with reference to this it probably however reflects an ongoing chronic process where he is having small infarcts repetitively and that is what we are seeing in the white matter disease    This is an age-related and blood pressure related phenomenon and it is unlikely to be substantially impacted by lipid therapy or platelet inhibitors but would agree with the current management.   I believe that he is at high risk for intracranial hemorrhage on dual antiplatelet therapy and I would stick with aspirin alone    We do not have any data with reference to safety of dual antiplatelet therapy in nonagenarian's    His overall prognosis from a functional standpoint in terms of stability with walking is unfortunately poor    I am glad he has been seen by Dr. Jamie Dong who that is really the only avenue of assistance but it is unlikely to change matters very much          Dante Muro MD

## 2021-08-01 LAB
ANION GAP SERPL CALC-SCNC: 4 MMOL/L (ref 7–16)
BUN SERPL-MCNC: 16 MG/DL (ref 8–23)
CALCIUM SERPL-MCNC: 9.7 MG/DL (ref 8.3–10.4)
CHLORIDE SERPL-SCNC: 108 MMOL/L (ref 98–107)
CO2 SERPL-SCNC: 29 MMOL/L (ref 21–32)
CREAT SERPL-MCNC: 1.03 MG/DL (ref 0.8–1.5)
ERYTHROCYTE [DISTWIDTH] IN BLOOD BY AUTOMATED COUNT: 17.5 % (ref 11.9–14.6)
GLUCOSE SERPL-MCNC: 98 MG/DL (ref 65–100)
HCT VFR BLD AUTO: 34.1 % (ref 41.1–50.3)
HGB BLD-MCNC: 10.9 G/DL (ref 13.6–17.2)
MCH RBC QN AUTO: 27.7 PG (ref 26.1–32.9)
MCHC RBC AUTO-ENTMCNC: 32 G/DL (ref 31.4–35)
MCV RBC AUTO: 86.8 FL (ref 79.6–97.8)
NRBC # BLD: 0 K/UL (ref 0–0.2)
PLATELET # BLD AUTO: 295 K/UL (ref 150–450)
PMV BLD AUTO: 10.1 FL (ref 9.4–12.3)
POTASSIUM SERPL-SCNC: 3.8 MMOL/L (ref 3.5–5.1)
RBC # BLD AUTO: 3.93 M/UL (ref 4.23–5.6)
SODIUM SERPL-SCNC: 141 MMOL/L (ref 136–145)
WBC # BLD AUTO: 9.4 K/UL (ref 4.3–11.1)

## 2021-08-01 PROCEDURE — 74011000258 HC RX REV CODE- 258: Performed by: HOSPITALIST

## 2021-08-01 PROCEDURE — 36415 COLL VENOUS BLD VENIPUNCTURE: CPT

## 2021-08-01 PROCEDURE — 74011250637 HC RX REV CODE- 250/637: Performed by: HOSPITALIST

## 2021-08-01 PROCEDURE — 80048 BASIC METABOLIC PNL TOTAL CA: CPT

## 2021-08-01 PROCEDURE — 85027 COMPLETE CBC AUTOMATED: CPT

## 2021-08-01 PROCEDURE — 65270000029 HC RM PRIVATE

## 2021-08-01 PROCEDURE — 74011250636 HC RX REV CODE- 250/636: Performed by: HOSPITALIST

## 2021-08-01 PROCEDURE — 97530 THERAPEUTIC ACTIVITIES: CPT

## 2021-08-01 RX ADMIN — Medication 10 ML: at 22:00

## 2021-08-01 RX ADMIN — Medication 10 ML: at 16:45

## 2021-08-01 RX ADMIN — CLOPIDOGREL BISULFATE 75 MG: 75 TABLET ORAL at 08:51

## 2021-08-01 RX ADMIN — CEFTRIAXONE 1 G: 1 INJECTION, POWDER, FOR SOLUTION INTRAMUSCULAR; INTRAVENOUS at 16:48

## 2021-08-01 RX ADMIN — ASPIRIN 81 MG 81 MG: 81 TABLET ORAL at 08:51

## 2021-08-01 RX ADMIN — PANTOPRAZOLE SODIUM 40 MG: 40 TABLET, DELAYED RELEASE ORAL at 05:36

## 2021-08-01 RX ADMIN — Medication 10 ML: at 05:36

## 2021-08-01 RX ADMIN — ENOXAPARIN SODIUM 40 MG: 40 INJECTION SUBCUTANEOUS at 16:47

## 2021-08-01 RX ADMIN — ATORVASTATIN CALCIUM 80 MG: 40 TABLET, FILM COATED ORAL at 23:34

## 2021-08-01 RX ADMIN — FAMOTIDINE 20 MG: 20 TABLET ORAL at 08:51

## 2021-08-01 NOTE — PROGRESS NOTES
Progress Note    Patient: Gosia Arenas MRN: [id number]  SSN: [id number]    YOB: 1931  Age: 80 y.o. Sex: male      Admit Date: 7/28/2021    LOS: 4 days     Subjective:   F/U UTI, right posterior parietal lobe CVA    \"80 y.o. male with medical history of CAD, GERD, Dementia who presented to ED with 10 days of decreased appetite, multiple falls. Pt's son is at bedside and able to provide information. Pt has been falling a lot at home. He lives with room mates. He had a fall 2 days ago and hit his head. Mainly, he has balance issues with walking. Son reports pt has not been able to care for himself and has been forgetting to take meds. No fever. No chills. He is incontinent of urine. \" CXR shows mild bibasilar atelectasis. Xray thoracic spine shows no acute compression fracture. Mild degenerative spondylosis. CT head showed no hemorrhage. Severe bilateral patchy white matter hypo attenuation throughout both cerebral hemispheres grossly unchanged from prior exam, but extensive. Acute/sub-acute CVA cannot be excluded. LDL 95, cholesterol 144. A1C 5.6. TSH 1.7. CTA head and neck with no large vessel occlusion. Diffuse vascular disease noted. CT perfusion with no evidence of core infarct or ischemic penumbra. MRI brain showed Punctate lacunar infarct right posterior parietal lobe. Currently on ASA, plavix, and high dose statin. ECHO EF 55%, no shunting, severe dilated left atrium. Neurology consult stated he has Binswangers diease from advanced white matter degeneration. Agreed to no anticoagulation due to his falls. UA consistent with UTI. WBC 13 and now 8. Urine culture gram negative rods. Ceftriaxone started on 7/28. No concerns today.    Current Facility-Administered Medications   Medication Dose Route Frequency    sodium chloride (NS) flush 5-40 mL  5-40 mL IntraVENous Q8H    sodium chloride (NS) flush 5-40 mL  5-40 mL IntraVENous PRN    ondansetron (ZOFRAN) injection 4 mg  4 mg IntraVENous Q6H PRN    aspirin chewable tablet 81 mg  81 mg Oral DAILY    clopidogreL (PLAVIX) tablet 75 mg  75 mg Oral DAILY    atorvastatin (LIPITOR) tablet 80 mg  80 mg Oral QHS    acetaminophen (TYLENOL) tablet 650 mg  650 mg Oral Q4H PRN    labetaloL (NORMODYNE;TRANDATE) injection 5 mg  5 mg IntraVENous Q10MIN PRN    enoxaparin (LOVENOX) injection 40 mg  40 mg SubCUTAneous Q24H    cefTRIAXone (ROCEPHIN) 1 g in 0.9% sodium chloride (MBP/ADV) 50 mL MBP  1 g IntraVENous Q24H    famotidine (PEPCID) tablet 20 mg  20 mg Oral DAILY    nitroglycerin (NITROSTAT) tablet 0.4 mg  0.4 mg SubLINGual Q5MIN PRN    pantoprazole (PROTONIX) tablet 40 mg  40 mg Oral ACB       Objective:     Vitals:    07/31/21 1600 07/31/21 2317 08/01/21 0345 08/01/21 0805   BP: 126/70 115/80 121/79 138/80   Pulse: 86 78 88 83   Resp: 16 16 16 16   Temp: 97.5 °F (36.4 °C) 98.6 °F (37 °C) 98.2 °F (36.8 °C) 97.9 °F (36.6 °C)   SpO2: 96% 96% 95% 93%   Weight:       Height:             Intake and Output:  Current Shift: No intake/output data recorded. Last three shifts: 07/30 1901 - 08/01 0700  In: 100 [I.V.:100]  Out: 2250 [Urine:2250]    Physical Exam:   General:  Alert, cooperative, no distress, hard of hearing. .   Eyes:  Conjunctivae/corneas clear. Ears:  Normal TMs and external ear canals both ears. Nose: Nares normal. Septum midline. Mouth/Throat: Lips, mucosa, and tongue normal.   Neck:  no JVD. Back:   deferred. Lungs:   Clear to auscultation bilaterally. Heart:  Regular rate and rhythm, S1, S2 normal   Abdomen:   Soft, non-tender. Bowel sounds normal.    Extremities: Extremities normal, atraumatic, no cyanosis or edema. Pulses: 2+ and symmetric all extremities. Skin: Skin color, texture, turgor normal. No rashes or lesions   Lymph nodes: Cervical, supraclavicular, and axillary nodes normal.   Neurologic: Hard of hearing, good ROM in bed.  Answering all questions appropriately        Lab/Data Review:    Recent Results (from the past 24 hour(s))   PLEASE READ & DOCUMENT PPD TEST IN 48 HRS    Collection Time: 07/31/21  9:44 PM   Result Value Ref Range    PPD Negative Negative    mm Induration 0 0 - 5 mm   METABOLIC PANEL, BASIC    Collection Time: 08/01/21  4:46 AM   Result Value Ref Range    Sodium 141 136 - 145 mmol/L    Potassium 3.8 3.5 - 5.1 mmol/L    Chloride 108 (H) 98 - 107 mmol/L    CO2 29 21 - 32 mmol/L    Anion gap 4 (L) 7 - 16 mmol/L    Glucose 98 65 - 100 mg/dL    BUN 16 8 - 23 MG/DL    Creatinine 1.03 0.8 - 1.5 MG/DL    GFR est AA >60 >60 ml/min/1.73m2    GFR est non-AA >60 >60 ml/min/1.73m2    Calcium 9.7 8.3 - 10.4 MG/DL   CBC W/O DIFF    Collection Time: 08/01/21  4:46 AM   Result Value Ref Range    WBC 9.4 4.3 - 11.1 K/uL    RBC 3.93 (L) 4.23 - 5.6 M/uL    HGB 10.9 (L) 13.6 - 17.2 g/dL    HCT 34.1 (L) 41.1 - 50.3 %    MCV 86.8 79.6 - 97.8 FL    MCH 27.7 26.1 - 32.9 PG    MCHC 32.0 31.4 - 35.0 g/dL    RDW 17.5 (H) 11.9 - 14.6 %    PLATELET 396 363 - 525 K/uL    MPV 10.1 9.4 - 12.3 FL    ABSOLUTE NRBC 0.00 0.0 - 0.2 K/uL       Assessment/ Plan:     Principal Problem:    Acute ischemic stroke (HCC) (7/28/2021)    Active Problems:    CAD (coronary artery disease) (7/18/2019)      Debility (12/2/2019)      Dementia ([address] (12/23/2019)      UTI (urinary tract infection) (7/28/2021)      Falls frequently (7/28/2021)    CVA, right parietal lobe - ASA, plavix, statin. UTI - Ceftriaxone. Urine culture sensitivity pending. Frequent falls - PT/OT. Looking into [address] Monday.      DVT prophylaxis - Lovenox  Signed By: Carmel Carlson DO     August 1, 2021

## 2021-08-01 NOTE — PROGRESS NOTES
Problem: Mobility Impaired (Adult and Pediatric)  Goal: *Acute Goals and Plan of Care (Insert Text)  Outcome: Progressing Towards Goal  Note:   LTG:  (1.)Mr. Devon Flor will move from supine to sit and sit to supine  in bed with SUPERVISION within 7 treatment day(s). (2.)Mr. Devon Flor will transfer from bed to chair and chair to bed with SUPERVISION using the least restrictive device within 7 treatment day(s). (3.)Mr. Devon Flor will ambulate with SUPERVISION for 200 feet with the least restrictive device within 7 treatment day(s). ________________________________________________________________________________________________       PHYSICAL THERAPY: Daily Note and AM 8/1/2021  INPATIENT: PT Visit Days : 3  Payor: Rene Smith / Plan: [facility] [employer] MEDICARE CHOICE PPO/PFFS / Product Type: [employer] Medicare /       NAME/AGE/GENDER: Davina Hillman is a 80 y.o. male   PRIMARY DIAGNOSIS: UTI (urinary tract infection) [N39.0]  Falls frequently [R29.6]  Acute ischemic stroke ([facility] [I63.9] Acute ischemic stroke ([facility] Acute ischemic stroke ([facility]       ICD-10: Treatment Diagnosis:    · Generalized Muscle Weakness (M62.81)  · Other lack of cordination (R27.8)  · Difficulty in walking, Not elsewhere classified (R26.2)  · History of falling (Z91.81)   Precaution/Allergies:  Patient has no known allergies. ASSESSMENT:     Mr. Devon Flor presents with above diagnosis. He demonstrates confusion, generalized weakness and imbalance which make him a fall risk. He has had multiple falls at home but unable to describe what makes him fall. He reports he is independent but has had balance problems in the past .  He reports getting some help for it which helped his tennis game. Patient talked a lot about playing tennis. He struggled with telling therapist year but was able to tell the month. He appeared to be alert and oriented at times but then disoriented within a few minutes.   MRI showed acute as well as old infarcts. Patient reports he lives with a friend from whom he rents and that his 2 sons live near. Unsure of accuracy of patient reports as no family member present. Patient certainly not safe to d/c without 24/7 supervision. Patient would benefit from STR with likely transition to LTC, Memory Care, or [facility]. Patient participated well this am but not quite as steady as yesterday. Had BM on floor in room but not aware. A little more difficulty with L LE advancement with gait and kept knees flexed with standing and walking-hadn't noticed this before. Remains frequently confused. This section established at most recent assessment   PROBLEM LIST (Impairments causing functional limitations):  1. Decreased Strength  2. Decreased ADL/Functional Activities  3. Decreased Transfer Abilities  4. Decreased Ambulation Ability/Technique  5. Decreased Balance  6. Decreased Cognition   INTERVENTIONS PLANNED: (Benefits and precautions of physical therapy have been discussed with the patient.)  1. Balance Exercise  2. Bed Mobility  3. Family Education  4. Gait Training  5. Home Exercise Program (HEP)  6. Therapeutic Activites  7. Therapeutic Exercise/Strengthening  8. Transfer Training     TREATMENT PLAN: Frequency/Duration: daily for duration of hospital stay  Rehabilitation Potential For Stated Goals: Good     REHAB RECOMMENDATIONS (at time of discharge pending progress):    Placement: It is my opinion, based on this patient's performance to date, that Mr. Devon Flor may benefit from intensive therapy at a [address] [place] [address] after discharge due to the functional deficits listed above that are likely to improve with skilled rehabilitation and concerns that he/she may be unsafe to be unsupervised at home due to history of falls . Equipment:    To be determined based on progress and d/c plan              HISTORY:   History of Present Injury/Illness (Reason for Referral):  Bowen Harrison "Dixie" 89 is a 80 y.o. male with medical history of CAD, GERD, Dementia who presented to ED with 10 days of decreased appetite, multiple falls. Pt's son is at bedside and able to provide information. Pt has been falling a lot at home. He lives with room mates. He had a fall 2 days ago and hit his head. Mainly, he has balance issues with walking. No tingling, numbness or weakness of extremities. Son reports pt has not been able to care for himself and has been forgetting to take meds. No fever. No chills. He is incontinent of urine. No vomiting, but has nausea. No chest pain. No palpitations. No cough. No shortness of breath. No headache. He has not been eating much and has been losing weight. Lost about 10-15 pounds in the last 2 weeks. He also has backache. ER course:   Afebrile, CBC with WBC 13. BMP with Potassium 3.4, Cr 1.28. Lipase 92. CXR shows mild bibasilar atelectasis. Xray thoracic spine shows no acute compression fracture. Mild degenerative spondylosis. CT head shows - no hemorrhage. Severe bilateral patchy white matter hypo attenuation throughout both cerebral hemispheres grossly unchanged from prior exam, but extensive. Acute/sub-acute CVA cannot be excluded. Pt admitted for further evaluation and management of Suspected stroke, UTI, falls. Past Medical History/Comorbidities:   Mr. Hoang Hollins  has a past medical history of Colon cancer ([facility] (01/2012) and Hiatal hernia. Mr. Hoang Hollins  has no past surgical history on file. Social History/Living Environment:   Home Environment: Private residence  # Steps to Enter: 4  Rails to Enter: No  One/Two Story Residence: One story  Living Alone: No  Support Systems: Friends \ neighbors, Child(margi)  Patient Expects to be Discharged to[de-identified] Unknown  Current DME Used/Available at Home: Cane, straight  Tub or Shower Type: Shower  Prior Level of Function/Work/Activity:  Independent but multiple falls.      Number of Personal Factors/Comorbidities that affect the Plan of Care: 1-2: MODERATE COMPLEXITY   EXAMINATION:   Most Recent Physical Functioning:   Gross Assessment:  AROM: Generally decreased, functional  Strength: Generally decreased, functional  Coordination: Generally decreased, functional               Posture:     Balance:  Sitting: Intact  Standing - Static: Good  Standing - Dynamic : Fair Bed Mobility:  Supine to Sit: Supervision  Scooting: Supervision  Wheelchair Mobility:     Transfers:  Sit to Stand: Contact guard assistance  Stand to Sit: Contact guard assistance  Bed to Chair: Contact guard assistance  Gait:     Base of Support: Center of gravity altered (keeps knees flexed)  Speed/Estrellita: Pace decreased (<100 feet/min)  Step Length: Left shortened  Gait Abnormalities: Early heel rise;Path deviations  Distance (ft): 650 Feet (ft)  Assistive Device: Gait belt  Ambulation - Level of Assistance: Contact guard assistance;Minimal assistance  Interventions: Safety awareness training;Verbal cues      Body Structures Involved:  1. Muscles Body Functions Affected:  1. Movement Related Activities and Participation Affected:  1. Mobility   Number of elements that affect the Plan of Care: 3: MODERATE COMPLEXITY   CLINICAL PRESENTATION:   Presentation: Stable and uncomplicated: LOW COMPLEXITY   CLINICAL DECISION MAKIN[address] [place] [address] AM-PAC 6 Clicks   Basic Mobility Inpatient Short Form  How much difficulty does the patient currently have. .. Unable A Lot A Little None   1. Turning over in bed (including adjusting bedclothes, sheets and blankets)? [] 1   [] 2   [x] 3   [] 4   2. Sitting down on and standing up from a chair with arms ( e.g., wheelchair, bedside commode, etc.)   [] 1   [] 2   [x] 3   [] 4   3. Moving from lying on back to sitting on the side of the bed? [] 1   [] 2   [x] 3   [] 4   How much help from another person does the patient currently need. .. Total A Lot A Little None   4. Moving to and from a bed to a chair (including a wheelchair)?    [] 1 [] 2   [x] 3   [] 4   5. Need to walk in hospital room? [] 1   [] 2   [x] 3   [] 4   6. Climbing 3-5 steps with a railing? [] 1   [] 2   [x] 3   [] 4   © 2007, Trustees of [address] [place] [address] [address] [address], under license to [employer]. All rights reserved      Score:  Initial: 18 Most Recent: X (Date: -- )    Interpretation of Tool:  Represents activities that are increasingly more difficult (i.e. Bed mobility, Transfers, Gait). Medical Necessity:     · Patient is expected to demonstrate progress in   · strength, balance, and coordination  ·  to   · improve safety during out of  bed activity. · .  Reason for Services/Other Comments:  · Patient continues to require skilled intervention due to   · Decreased cognition, balance, and mobility. · .   Use of outcome tool(s) and clinical judgement create a POC that gives a: Clear prediction of patient's progress: LOW COMPLEXITY            TREATMENT:   (In addition to Assessment/Re-Assessment sessions the following treatments were rendered)   Pre-treatment Symptoms/Complaints:  Patient agreeable. Pain: Initial:   Pain Intensity 1: 0  Post Session:  0     Therapeutic Activity: (    25 minutes): Therapeutic activities including Bed transfers, Chair transfers and Ambulation on level ground to improve mobility, strength, balance, and coordination. Required minimal Safety awareness training;Verbal cues to promote static and dynamic balance in standing. Braces/Orthotics/Lines/Etc:   · telemetry  · O2 Device: None (Room air)  Treatment/Session Assessment:    · Response to Treatment:  Participated well and moved fairly well but continues to have limited safety awareness. · Interdisciplinary Collaboration:   o Physical Therapist  o Registered Nurse  · After treatment position/precautions:   o Up in chair  o Bed alarm/tab alert on  o Bed/Chair-wheels locked  o Call light within reach  o RN notified   · Compliance with Program/Exercises:  Will assess as treatment progresses  · Recommendations/Intent for next treatment session: \"Next visit will focus on advancements to more challenging activities and reduction in assistance provided\".   Total Treatment Duration:  PT Patient Time In/Time Out  Time In: 1045  Time Out: 1221 [facility],

## 2021-08-02 LAB
BACTERIA SPEC CULT: NORMAL
BACTERIA SPEC CULT: NORMAL
SARS-COV-2, COV2: NORMAL
SERVICE CMNT-IMP: NORMAL
SERVICE CMNT-IMP: NORMAL

## 2021-08-02 PROCEDURE — 74011250637 HC RX REV CODE- 250/637: Performed by: HOSPITALIST

## 2021-08-02 PROCEDURE — 97530 THERAPEUTIC ACTIVITIES: CPT

## 2021-08-02 PROCEDURE — 74011250636 HC RX REV CODE- 250/636: Performed by: HOSPITALIST

## 2021-08-02 PROCEDURE — 65270000029 HC RM PRIVATE

## 2021-08-02 PROCEDURE — U0005 INFEC AGEN DETEC AMPLI PROBE: HCPCS

## 2021-08-02 PROCEDURE — 92523 SPEECH SOUND LANG COMPREHEN: CPT

## 2021-08-02 RX ORDER — ATORVASTATIN CALCIUM 80 MG/1
80 TABLET, FILM COATED ORAL
Qty: 3 TABLET | Refills: 0 | Status: SHIPPED | OUTPATIENT
Start: 2021-08-02

## 2021-08-02 RX ADMIN — PANTOPRAZOLE SODIUM 40 MG: 40 TABLET, DELAYED RELEASE ORAL at 06:38

## 2021-08-02 RX ADMIN — FAMOTIDINE 20 MG: 20 TABLET ORAL at 09:23

## 2021-08-02 RX ADMIN — Medication 10 ML: at 21:34

## 2021-08-02 RX ADMIN — ATORVASTATIN CALCIUM 80 MG: 40 TABLET, FILM COATED ORAL at 21:34

## 2021-08-02 RX ADMIN — Medication 10 ML: at 06:38

## 2021-08-02 RX ADMIN — ENOXAPARIN SODIUM 40 MG: 40 INJECTION SUBCUTANEOUS at 17:19

## 2021-08-02 RX ADMIN — CLOPIDOGREL BISULFATE 75 MG: 75 TABLET ORAL at 10:41

## 2021-08-02 RX ADMIN — ASPIRIN 81 MG 81 MG: 81 TABLET ORAL at 09:23

## 2021-08-02 NOTE — PROGRESS NOTES
SPEECH LANGUAGE PATHOLOGY: SPEECH-LANGUAGE/COGNITION: Initial Assessment and Discharge    NAME/AGE/GENDER: Man Hines is a 80 y.o. male  DATE: 8/2/2021  PRIMARY DIAGNOSIS: UTI (urinary tract infection) [N39.0]  Falls frequently [R29.6]  Acute ischemic stroke ([facility] [address] [I63.9]      ICD-10: Treatment Diagnosis: R41.841 Cognitive-Communication Deficit    RECOMMENDATIONS   TREATMENT RECOMMENDATIONS:    Speech therapy at next level of care targeting safety awareness and spaced retrieval training for recall of functional information. STRATEGIES:    Reduce distractions when communicating with patient   Redirect patient to task frequently to improve attention/safety     EDUCATION:  · Recommendations discussed with Patient     Continuation of Skilled Services/Medical Necessity:  Patient with baseline dementia. Suspect patient is close to baseline in regard to cognitive linguistic abilities. Recommend speech therapy at next level of care targeting safety awareness and spaced retrieval training. RECOMMENDATIONS for CONTINUED SPEECH THERAPY: YES: Anticipate need for ongoing speech therapy at next level of care. ASSESSMENT   Patient presents with moderate-severe cognitive linguistic impairment. Basic expressive/receptive language skills are intact for expressing daily wants/needs. Occasional anomia noted in conversation. Verbose and tangential requiring min-mod verbal cues to maintain topic, but able to redirect this date. Poor short term recall, sustained attention,  problem solving, and awareness of deficits. Suspect baseline as patient has history of dementia. No further speech therapy clinically indicated during acute hospitalization. Recommend speech therapy at next level of care targeting safety awareness and spaced retrieval training for recall of functional information.      REHABILITATION POTENTIAL FOR STATED GOALS: Fair    PLAN    FREQUENCY/DURATION: No further speech therapy needs during acute hospitalization.     - Recommendations for next treatment session: No additional speech therapy indicated at this time. SUBJECTIVE   Patient alert upright in bed for session. Pleasantly confused. History of Present Injury/Illness: Mr. Mary Bobby  has a past medical history of Colon cancer ([facility] [address] (2012) and Hiatal hernia. Marco Records He also  has no past surgical history on file. Problem List:  (Impairments causing functional limitations):  1. Cognitive linguistic deficits    Previous Speech Therapy NONE REPORTED    Orientation:   Person    Pain: Pain Scale 1: Numeric (0 - 10)  Pain Intensity 1: 0    OBJECTIVE   Patient was administered portions of the Communication/Cognitive Brief Evaluation with results as follows:    1 step commands: 3/3  2 step commands: 4/4  Yes/no questions: 4/4  Naming common objects: 5/5  Responsive namin/5  Problem solvin/4  Functional recall: unable to recall use of call bell for assistance 1 mintue post training    Tool Used: MODIFIED ROSEANN SCALE (mRS)   Score   No Symptoms  [] 0   No significant disability despite symptoms; able to carry out all usual duties and activities  [] 1   Slight disability; unable to carry out all previous activities but able to look after own affairs without assistance. [] 2   Moderate disability; requiring some help but able to walk without assistance  [] 3   Moderately severe disability; unable to walk without assistance and unable to attend to own bodily needs without assistance  [x] 4   Severe disability; bedridden, incontinent, and requiring constant nursing care and attention  [] 5      Score:  Initial: 4    Interpretation of Tool: The Modified Zullinger Scale is a 7-point scaled used to quantify level of disability as it relates to a patient's functional abilities. Current Medications:   No current facility-administered medications on file prior to encounter.      Current Outpatient Medications on File Prior to Encounter   Medication Sig Dispense Refill    ondansetron (ZOFRAN ODT) 4 mg disintegrating tablet Take 1 Tablet by mouth every eight (8) hours as needed for Nausea. 8 Tablet 2    atorvastatin (LIPITOR) 20 mg tablet Take 1 Tab by mouth nightly. 90 Tab 3    clopidogreL (PLAVIX) 75 mg tab Take 1 Tab by mouth daily. 90 Tab 3    nitroglycerin (NITROSTAT) 0.4 mg SL tablet 1 Tab by SubLINGual route every five (5) minutes as needed for Chest Pain. Up to 3 doses. 1 Bottle 3    pantoprazole (PROTONIX) 40 mg tablet Take 1 Tab by mouth Daily (before breakfast). 90 Tab 3    aspirin 81 mg chewable tablet Take 1 Tab by mouth daily. 90 Tab 3    famotidine (PEPCID) 20 mg tablet Take 1 Tab by mouth daily. 30 Tab 0       INTERDISCIPLINARY COLLABORATION: n/a  PRECAUTIONS/ALLERGIES: Patient has no known allergies.      SAFETY:  After treatment position/precautions:  · Upright in bed    Total Treatment Duration:     Time In: 1322  Time Out: 727 [facility] Rigo Gutierrez

## 2021-08-02 NOTE — PROGRESS NOTES
Problem: Mobility Impaired (Adult and Pediatric)  Goal: *Acute Goals and Plan of Care (Insert Text)  Outcome: Progressing Towards Goal  Note:   LTG:  (1.)Mr. Hong Taylor will move from supine to sit and sit to supine  in bed with SUPERVISION within 7 treatment day(s). (2.)Mr. Hong Taylor will transfer from bed to chair and chair to bed with SUPERVISION using the least restrictive device within 7 treatment day(s). (3.)Mr. Hong Taylor will ambulate with SUPERVISION for 200 feet with the least restrictive device within 7 treatment day(s). ________________________________________________________________________________________________       PHYSICAL THERAPY: Daily Note and AM 8/2/2021  INPATIENT: PT Visit Days : 4  Payor: Hodan Winchester / Plan: [facility] [employer] MEDICARE CHOICE PPO/PFFS / Product Type: [employer] Medicare /       NAME/AGE/GENDER: Monica Mcrae is a 80 y.o. male   PRIMARY DIAGNOSIS: UTI (urinary tract infection) [N39.0]  Falls frequently [R29.6]  Acute ischemic stroke ([facility] [address] [I63.9] Acute ischemic stroke ([facility] [address] Acute ischemic stroke ([facility] [address]       ICD-10: Treatment Diagnosis:    · Generalized Muscle Weakness (M62.81)  · Other lack of cordination (R27.8)  · Difficulty in walking, Not elsewhere classified (R26.2)  · History of falling (Z91.81)   Precaution/Allergies:  Patient has no known allergies. ASSESSMENT:     Mr. Hong Taylor presents with above diagnosis. He demonstrates confusion, generalized weakness and imbalance which make him a fall risk. He has had multiple falls at home but unable to describe what makes him fall. He reports he is independent but has had balance problems in the past .  He reports getting some help for it which helped his tennis game. Patient talked a lot about playing tennis. He struggled with telling therapist year but was able to tell the month. He appeared to be alert and oriented at times but then disoriented within a few minutes.   MRI showed acute as well as old infarcts. Patient reports he lives with a friend from whom he rents and that his 2 sons live near. Unsure of accuracy of patient reports as no family member present. Patient certainly not safe to d/c without 24/7 supervision. Patient would benefit from STR with likely transition to LTC, Memory Care, or [facility]. Patient participated well this am but not quite as steady as yesterday. Had BM on floor in room but not aware. A little more difficulty with L LE advancement with gait and kept knees flexed with standing and walking-hadn't noticed this before. Remains frequently confused. 8/2 participated well. Supine>EOB with supervision. Then stood rest few min then walk 650 ft with 1 rest break, still not quite steady on his feet. Still confuse some. Return to supine with needs in reach and bed alarm on. This section established at most recent assessment   PROBLEM LIST (Impairments causing functional limitations):  1. Decreased Strength  2. Decreased ADL/Functional Activities  3. Decreased Transfer Abilities  4. Decreased Ambulation Ability/Technique  5. Decreased Balance  6. Decreased Cognition   INTERVENTIONS PLANNED: (Benefits and precautions of physical therapy have been discussed with the patient.)  1. Balance Exercise  2. Bed Mobility  3. Family Education  4. Gait Training  5. Home Exercise Program (HEP)  6. Therapeutic Activites  7. Therapeutic Exercise/Strengthening  8. Transfer Training     TREATMENT PLAN: Frequency/Duration: daily for duration of hospital stay  Rehabilitation Potential For Stated Goals: Good     REHAB RECOMMENDATIONS (at time of discharge pending progress):    Placement:   It is my opinion, based on this patient's performance to date, that Mr. Humberto Bhatti may benefit from intensive therapy at a [facility] after discharge due to the functional deficits listed above that are likely to improve with skilled rehabilitation and concerns that he/she may be unsafe to be unsupervised at home due to history of falls . Equipment:    To be determined based on progress and d/c plan              HISTORY:   History of Present Injury/Illness (Reason for Referral):  Donnell Lucas is a 80 y.o. male with medical history of CAD, GERD, Dementia who presented to ED with 10 days of decreased appetite, multiple falls. Pt's son is at bedside and able to provide information. Pt has been falling a lot at home. He lives with room mates. He had a fall 2 days ago and hit his head. Mainly, he has balance issues with walking. No tingling, numbness or weakness of extremities. Son reports pt has not been able to care for himself and has been forgetting to take meds. No fever. No chills. He is incontinent of urine. No vomiting, but has nausea. No chest pain. No palpitations. No cough. No shortness of breath. No headache. He has not been eating much and has been losing weight. Lost about 10-15 pounds in the last 2 weeks. He also has backache. ER course:   Afebrile, CBC with WBC 13. BMP with Potassium 3.4, Cr 1.28. Lipase 92. CXR shows mild bibasilar atelectasis. Xray thoracic spine shows no acute compression fracture. Mild degenerative spondylosis. CT head shows - no hemorrhage. Severe bilateral patchy white matter hypo attenuation throughout both cerebral hemispheres grossly unchanged from prior exam, but extensive. Acute/sub-acute CVA cannot be excluded. Pt admitted for further evaluation and management of Suspected stroke, UTI, falls. Past Medical History/Comorbidities:   Mr. Khushi Espinal  has a past medical history of Colon cancer ([facility] [address] (01/2012) and Hiatal hernia. Mr. Khushi Espinal  has no past surgical history on file.   Social History/Living Environment:   Home Environment: Private residence  # Steps to Enter: 4  Rails to Enter: No  One/Two Story Residence: One story  Living Alone: No  Support Systems: Friends \ neighbors, Child(margi)  Patient Expects to be Discharged to[de-identified] Unknown  Current DME Used/Available at Home: Cane, straight  Tub or Shower Type: Shower  Prior Level of Function/Work/Activity:  Independent but multiple falls. Number of Personal Factors/Comorbidities that affect the Plan of Care: 1-2: MODERATE COMPLEXITY   EXAMINATION:   Most Recent Physical Functioning:   Gross Assessment:                  Posture:     Balance:  Sitting: Intact  Standing - Static: Good  Standing - Dynamic : Fair Bed Mobility:  Supine to Sit: Supervision  Sit to Supine: Supervision  Wheelchair Mobility:     Transfers:  Sit to Stand: Contact guard assistance  Stand to Sit: Contact guard assistance  Bed to Chair: Contact guard assistance  Gait:     Base of Support: Center of gravity altered  Speed/Estrellita: Pace decreased (<100 feet/min)  Step Length: Left shortened  Gait Abnormalities: Early heel rise;Path deviations  Distance (ft): 650 Feet (ft)  Assistive Device: Gait belt  Ambulation - Level of Assistance: Contact guard assistance;Minimal assistance  Interventions: Safety awareness training      Body Structures Involved:  1. Muscles Body Functions Affected:  1. Movement Related Activities and Participation Affected:  1. Mobility   Number of elements that affect the Plan of Care: 3: MODERATE COMPLEXITY   CLINICAL PRESENTATION:   Presentation: Stable and uncomplicated: LOW COMPLEXITY   CLINICAL DECISION MAKIN[address] [place] [address]-PAC 6 Clicks   Basic Mobility Inpatient Short Form  How much difficulty does the patient currently have. .. Unable A Lot A Little None   1. Turning over in bed (including adjusting bedclothes, sheets and blankets)? [] 1   [] 2   [x] 3   [] 4   2. Sitting down on and standing up from a chair with arms ( e.g., wheelchair, bedside commode, etc.)   [] 1   [] 2   [x] 3   [] 4   3. Moving from lying on back to sitting on the side of the bed? [] 1   [] 2   [x] 3   [] 4   How much help from another person does the patient currently need. ..  Total A Lot A Little None   4. Moving to and from a bed to a chair (including a wheelchair)? [] 1   [] 2   [x] 3   [] 4   5. Need to walk in hospital room? [] 1   [] 2   [x] 3   [] 4   6. Climbing 3-5 steps with a railing? [] 1   [] 2   [x] 3   [] 4   © 2007, Trustees of [employer], under license to [employer]. All rights reserved      Score:  Initial: 18 Most Recent: X (Date: -- )    Interpretation of Tool:  Represents activities that are increasingly more difficult (i.e. Bed mobility, Transfers, Gait). Medical Necessity:     · Patient is expected to demonstrate progress in   · strength, balance, and coordination  ·  to   · improve safety during out of  bed activity. · .  Reason for Services/Other Comments:  · Patient continues to require skilled intervention due to   · Decreased cognition, balance, and mobility. · .   Use of outcome tool(s) and clinical judgement create a POC that gives a: Clear prediction of patient's progress: LOW COMPLEXITY            TREATMENT:   (In addition to Assessment/Re-Assessment sessions the following treatments were rendered)   Pre-treatment Symptoms/Complaints:  Patient agreeable. Pain: Initial:   Pain Intensity 1: 0  Post Session:  0     Therapeutic Activity: (    25 minutes): Therapeutic activities including Bed transfers, Chair transfers and Ambulation on level ground to improve mobility, strength, balance, and coordination. Required minimal Safety awareness training to promote static and dynamic balance in standing. Braces/Orthotics/Lines/Etc:   · telemetry  · O2 Device: None (Room air)  Treatment/Session Assessment:    · Response to Treatment:  Participated well with some safety  awareness.   · Interdisciplinary Collaboration:   o Physical Therapy Assistant  o Registered Nurse  · After treatment position/precautions:   o Supine in bed  o Bed alarm/tab alert on  o Bed/Chair-wheels locked  o Call light within reach  o RN notified   · Compliance with Program/Exercises: Will assess as treatment progresses  · Recommendations/Intent for next treatment session: \"Next visit will focus on advancements to more challenging activities and reduction in assistance provided\".   Total Treatment Duration:  PT Patient Time In/Time Out  Time In: 1100  Time Out: [address] Ines Anand PTA

## 2021-08-02 NOTE — PROGRESS NOTES
Care Management Interventions  PCP Verified by CM: Yes  Palliative Care Criteria Met (RRAT>21 & CHF Dx)?: No (Dx CVA, UTI Risk 12%)  Mode of Transport at Discharge: Self  Transition of Care Consult (CM Consult): Assisted Living  Discharge Durable Medical Equipment: No  Physical Therapy Consult: Yes  Occupational Therapy Consult: Yes  Speech Therapy Consult: Yes  Current Support Network: Lives with Caregiver  Confirm Follow Up Transport: Family  The Plan for Transition of Care is Related to the Following Treatment Goals : [address]  The Patient and/or Patient Representative was Provided with a Choice of Provider and Agrees with the Discharge Plan?: Yes  Name of the Patient Representative Who was Provided with a Choice of Provider and Agrees with the Discharge Plan: daughter in law Evelio Myers wife of Early Senate who has HCPOA on file  Freedom of Choice List was Provided with Basic Dialogue that Supports the Patient's Individualized Plan of Care/Goals, Treatment Preferences and Shares the Quality Data Associated with the Providers?: Yes  Discharge Location  Discharge Placement: Assisted Living ([address] [place] [address])    Saw pt in interdisciplinarily rounds with the following disciplines: Physician, Case Management, Nursing, Dietary,Therapy and Pharmacy. The plan of care was discussed along with goals for discharge date/ location. SW spoke w/ Pt's daughter-in-law [place]). SW explained to pt's daughter that pt walked 0ft today w/ PT and would not qualify for a skilled nursing facility. Pt's daughter-in-law stated that she and her son had another plan for pt to go to an assisted living [facility]) due to not being able to go back where he was living because of the living conditions. [place] checked to see what [facility] is requiring of pt. Pt has a bed at [facility] and pt's daughter-in law and son will be picking pt up today.  SW will follow-up w/ pt.      3:00pm-Pt's daughter-in-law Carlita López) contacted SW about pt needing a 2nd step PPD before being accepted to [facility]. SW contacted [employer] [facility]) and she confirmed that pt would need a 2nd step PPD which would be placed on August 5th. Pt's daughter-in-law stated that she is unprepared for pt to be d/c today and could pt stay one more night because she did not understand the PPD protocol but is doing what she can to get pt placed in a safe place. AZ spoke to Dr. Diego Ruelas and she stated that pt can be d/c on tomorrow instead of today.     Dalton Long, MSW  Dalton Long, MSW

## 2021-08-02 NOTE — DISCHARGE SUMMARY
Hospitalist Discharge Summary     Patient ID:  Marguerite [place] Sr  [id number]  10 y.o.  6/1/1931  Admit date: 7/28/2021  2:32 PM  Discharge date and time: 8/2/2021  Attending: Ivy Stringer DO  PCP:  None  Treatment Team: Attending Provider: Ivy Stringer DO; Care Manager: Daniele Ruiz RN; Utilization Review: Yojana Uribe RN; Utilization Review: Divya Rodriguez RN; Physical Therapy Assistant: Evelio Ray PTA; Primary Nurse: Mona Kimball RN; Occupational Therapist: Kati Arizmendi OT    Principal Diagnosis Acute ischemic stroke [facility])   Principal Problem:    Acute ischemic stroke [facility]) (7/28/2021)    Active Problems:    CAD (coronary artery disease) (7/18/2019)      Debility (12/2/2019)      Dementia ([facility] [address] (12/23/2019)      UTI (urinary tract infection) (7/28/2021)      Falls frequently (7/28/2021)     Hospital Course: \"89 y.o. male with medical history of CAD, GERD, Dementia who presented to ED with 10 days of decreased appetite, multiple falls. Pt's son is at bedside and able to provide information. Pt has been falling a lot at home. He lives with room mates. He had a fall 2 days ago and hit his head. Mainly, he has balance issues with walking. Son reports pt has not been able to care for himself and has been forgetting to take meds. No fever. No chills. He is incontinent of urine. \" CXR shows mild bibasilar atelectasis. Xray thoracic spine shows no acute compression fracture. Mild degenerative spondylosis. CT head showed no hemorrhage. Severe bilateral patchy white matter hypo attenuation throughout both cerebral hemispheres grossly unchanged from prior exam, but extensive. Acute/sub-acute CVA cannot be excluded. LDL 95, cholesterol 144. A1C 5.6. TSH 1.7. CTA head and neck with no large vessel occlusion. Diffuse vascular disease noted. CT perfusion with no evidence of core infarct or ischemic penumbra. MRI brain showed Punctate lacunar infarct right posterior parietal lobe. Currently on ASA, plavix, and high dose statin. ECHO EF 55%, no shunting, severe dilated left atrium. Neurology consult stated he has [place] diease from advanced white matter degeneration. Agreed to no anticoagulation due to his falls.      UA consistent with UTI. WBC 13 and now 8. Urine culture gram negative rods. Ceftriaxone started on 7/28 and received 5 day treatment. Urine culture final sensitivity pending but has remained afebrile and pleasant. If fevers, AMS, please come back to the ED. Please refer to the admission H&P for details of presentation. In summary, the patient is stable for discharge. Significant Diagnostic Studies:       Labs: Results:       Chemistry Recent Labs     08/01/21  0446   GLU 98      K 3.8   *   CO2 29   BUN 16   CREA 1.03   CA 9.7   AGAP 4*      CBC w/Diff Recent Labs     08/01/21  0446   WBC 9.4   RBC 3.93*   HGB 10.9*   HCT 34.1*         Cardiac Enzymes No results for input(s): CPK, CKND1, FELICIA in the last 72 hours. No lab exists for component: CKRMB, TROIP   Coagulation No results for input(s): PTP, INR, APTT, INREXT in the last 72 hours. Lipid Panel Lab Results   Component Value Date/Time    Cholesterol, total 144 07/29/2021 03:35 AM    HDL Cholesterol 23 (L) 07/29/2021 03:35 AM    LDL, calculated 95 07/29/2021 03:35 AM    VLDL, calculated 26 (H) 07/29/2021 03:35 AM    Triglyceride 130 07/29/2021 03:35 AM    CHOL/HDL Ratio 6.3 07/29/2021 03:35 AM      BNP No results for input(s): BNPP in the last 72 hours. Liver Enzymes No results for input(s): TP, ALB, TBIL, AP in the last 72 hours.     No lab exists for component: SGOT, GPT, DBIL   Thyroid Studies Lab Results   Component Value Date/Time    TSH 1.710 07/29/2021 03:35 AM            Discharge Exam:  Visit Vitals  /68 (BP 1 Location: Left upper arm, BP Patient Position: At rest)   Pulse 78   Temp 97.7 °F (36.5 °C)   Resp 18   Ht 5' 10\" (1.778 m)   Wt 58.1 kg (128 lb 1.4 oz)   SpO2 96%   BMI 18.38 kg/m²     General appearance: alert, cooperative, no distress, hard of hearing. Pleasantly confused at times but this is a baseline for him  Lungs: clear to auscultation bilaterally  Heart: regular rate and rhythm, S1, S2 normal  Abdomen: soft, non-tender. Bowel sounds normal. No masses,  no organomegaly  Extremities: muscle wasting   Neurologic: Good ROM in bed     Disposition:Rehab   Discharge Condition: stable  Patient Instructions: As above   Current Discharge Medication List      CONTINUE these medications which have CHANGED    Details   atorvastatin (LIPITOR) 80 mg tablet Take 1 Tablet by mouth nightly. Qty: 3 Tablet, Refills: 0  Start date: 8/2/2021         CONTINUE these medications which have NOT CHANGED    Details   ondansetron (ZOFRAN ODT) 4 mg disintegrating tablet Take 1 Tablet by mouth every eight (8) hours as needed for Nausea. Qty: 8 Tablet, Refills: 2      clopidogreL (PLAVIX) 75 mg tab Take 1 Tab by mouth daily. Qty: 90 Tab, Refills: 3      nitroglycerin (NITROSTAT) 0.4 mg SL tablet 1 Tab by SubLINGual route every five (5) minutes as needed for Chest Pain. Up to 3 doses. Qty: 1 Bottle, Refills: 3      pantoprazole (PROTONIX) 40 mg tablet Take 1 Tab by mouth Daily (before breakfast). Qty: 90 Tab, Refills: 3      aspirin 81 mg chewable tablet Take 1 Tab by mouth daily. Qty: 90 Tab, Refills: 3      famotidine (PEPCID) 20 mg tablet Take 1 Tab by mouth daily. Qty: 30 Tab, Refills: 0             Activity: Up and alina with assistance   Diet:low Na diet   Wound Care:None     Follow-up PCP in one week.    ·     Time spent to discharge patient 35 minutes  Signed:  Alfredo Jean Baptiste DO  8/2/2021  2:10 PM

## 2021-08-03 VITALS
HEIGHT: 70 IN | WEIGHT: 128.09 LBS | RESPIRATION RATE: 16 BRPM | DIASTOLIC BLOOD PRESSURE: 67 MMHG | SYSTOLIC BLOOD PRESSURE: 98 MMHG | BODY MASS INDEX: 18.34 KG/M2 | HEART RATE: 78 BPM | TEMPERATURE: 97.8 F | OXYGEN SATURATION: 92 %

## 2021-08-03 LAB
SARS-COV-2, COV2: NOT DETECTED
SPECIMEN SOURCE, FCOV2M: NORMAL

## 2021-08-03 PROCEDURE — 74011250637 HC RX REV CODE- 250/637: Performed by: HOSPITALIST

## 2021-08-03 RX ADMIN — FAMOTIDINE 20 MG: 20 TABLET ORAL at 09:31

## 2021-08-03 RX ADMIN — CLOPIDOGREL BISULFATE 75 MG: 75 TABLET ORAL at 09:31

## 2021-08-03 RX ADMIN — PANTOPRAZOLE SODIUM 40 MG: 40 TABLET, DELAYED RELEASE ORAL at 06:18

## 2021-08-03 RX ADMIN — Medication 10 ML: at 13:45

## 2021-08-03 RX ADMIN — ASPIRIN 81 MG 81 MG: 81 TABLET ORAL at 09:31

## 2021-08-03 RX ADMIN — Medication 10 ML: at 06:19

## 2021-08-03 NOTE — PROGRESS NOTES
AVS summary reviewed with patient and family. Pt aware of whom to call in case of emergency. Dual NIH completed with Dane Louise RN. IV access removed. Opportunity given to ask questions and answered. Pt verbalized understanding.

## 2021-08-03 NOTE — PROGRESS NOTES
Care Management Interventions  PCP Verified by CM: Yes  Palliative Care Criteria Met (RRAT>21 & CHF Dx)?: No (Dx CVA, UTI Risk 12%)  Mode of Transport at Discharge: Self  Transition of Care Consult (CM Consult): SNF, Other  Partner SNF: Yes  Discharge Durable Medical Equipment: No  Physical Therapy Consult: Yes  Occupational Therapy Consult: Yes  Speech Therapy Consult: Yes  Current Support Network: Family Lives Nearby  Confirm Follow Up Transport: Family  The Plan for Transition of Care is Related to the Following Treatment Goals : [address]  The Patient and/or Patient Representative was Provided with a Choice of Provider and Agrees with the Discharge Plan?: Yes  Name of the Patient Representative Who was Provided with a Choice of Provider and Agrees with the Discharge Plan: daughter in law Torie Rodriguez wife of Andres Yoon who has HCPOA on file  Freedom of Choice List was Provided with Basic Dialogue that Supports the Patient's Individualized Plan of Care/Goals, Treatment Preferences and Shares the Quality Data Associated with the Providers?: Yes  Discharge Location  Discharge Placement: Other: (Home w/ family)    Saw pt in interdisciplinarily rounds with the following disciplines: Physician, Case Management, Nursing, Dietary,Therapy and Pharmacy. The plan of care was discussed along with goals for discharge date/ location. AZ spoke w/ pt's son and daughter-in law Devora Pearson). Pt is being d/c today and pt is going home w/ his son and daughter-in-law. Pt is scheduled to be picked up by son at 3:00pm today and on August 7th going to [facility]. Pt is unable to go right after d/c because [facility] requires pt to have a 2nd step PPD, which pt will have on August 6th and read on August 7th. AZ faxed pt's information to [facility], a confirmation was sent and [facility]. AZ gave pt's son a copy of pt's information that was faxed.      Amara Alcides Vasquez MSW

## 2021-08-03 NOTE — PROGRESS NOTES
Progress Note    Patient: Kade Bullock MRN: [id number]  SSN: [id number]    YOB: 1931  Age: 80 y.o. Sex: male      Admit Date: 7/28/2021    LOS: 6 days     Subjective:   F/U CVA     \"89 y.o. male with medical history of CAD, GERD, Dementia who presented to ED with 10 days of decreased appetite, multiple falls. Pt's son is at bedside and able to provide information. Pt has been falling a lot at home. He lives with room mates. He had a fall 2 days ago and hit his head. Mainly, he has balance issues with walking. Son reports pt has not been able to care for himself and has been forgetting to take meds. No fever. No chills. He is incontinent of urine. \" CXR shows mild bibasilar atelectasis. Xray thoracic spine shows no acute compression fracture. Mild degenerative spondylosis. CT head showed no hemorrhage. Severe bilateral patchy white matter hypo attenuation throughout both cerebral hemispheres grossly unchanged from prior exam, but extensive. Acute/sub-acute CVA cannot be excluded. LDL 95, cholesterol 144. A1C 5.6. TSH 1.7. CTA head and neck with no large vessel occlusion. Diffuse vascular disease noted. CT perfusion with no evidence of core infarct or ischemic penumbra. MRI brain showed Punctate lacunar infarct right posterior parietal lobe. Currently on ASA, plavix, and high dose statin. ECHO EF 55%, no shunting, severe dilated left atrium.  Neurology consult stated he has [place] diease from advanced white matter degeneration. Agreed to no anticoagulation due to his falls.      UA consistent with UTI. WBC 13 and now 8. Urine culture serratia marcescens. Ceftriaxone started on 7/28 and received 5 day treatment. No concerns this AM. No chest pain or SOB.      Current Facility-Administered Medications   Medication Dose Route Frequency    sodium chloride (NS) flush 5-40 mL  5-40 mL IntraVENous Q8H    sodium chloride (NS) flush 5-40 mL  5-40 mL IntraVENous PRN    ondansetron [facility]) injection 4 mg  4 mg IntraVENous Q6H PRN    aspirin chewable tablet 81 mg  81 mg Oral DAILY    clopidogreL (PLAVIX) tablet 75 mg  75 mg Oral DAILY    atorvastatin (LIPITOR) tablet 80 mg  80 mg Oral QHS    acetaminophen (TYLENOL) tablet 650 mg  650 mg Oral Q4H PRN    labetaloL (NORMODYNE;TRANDATE) injection 5 mg  5 mg IntraVENous Q10MIN PRN    enoxaparin (LOVENOX) injection 40 mg  40 mg SubCUTAneous Q24H    famotidine (PEPCID) tablet 20 mg  20 mg Oral DAILY    nitroglycerin (NITROSTAT) tablet 0.4 mg  0.4 mg SubLINGual Q5MIN PRN    pantoprazole (PROTONIX) tablet 40 mg  40 mg Oral ACB       Objective:     Vitals:    08/02/21 1952 08/02/21 2323 08/03/21 0349 08/03/21 0735   BP: 109/63 112/65 128/71 115/60   Pulse: 93 90 78 78   Resp: 18 18 18 16   Temp: 98.3 °F (36.8 °C) 98.5 °F (36.9 °C) 98.2 °F (36.8 °C) 98 °F (36.7 °C)   SpO2: 93% 95% 94% 94%   Weight:       Height:             Intake and Output:  Current Shift: No intake/output data recorded. Last three shifts: 08/01 1901 - 08/03 0700  In: -   Out: 200 [Urine:200]    Physical Exam:   General:  Alert, cooperative, no distress, appears stated age. Eyes:  Conjunctivae/corneas clear. Ears:  Normal TMs and external ear canals both ears. Nose: Nares normal. Septum midline. Mouth/Throat: Lips, mucosa, and tongue normal.    Neck: no JVD. Back:   deferred. Lungs:   Clear to auscultation bilaterally. Heart:  Regular rate and rhythm, S1, S2 normal   Abdomen:   Soft, non-tender. Bowel sounds normal.    Extremities: Muscle wasting to LE bilaterally    Pulses: 2+ and symmetric all extremities. Skin: Skin color, texture, turgor normal. No rashes or lesions   Lymph nodes: Cervical, supraclavicular, and axillary nodes normal.   Neurologic: CNII-XII intact.         Lab/Data Review:    Recent Results (from the past 24 hour(s))   SARS-COV-2    Collection Time: 08/02/21  3:00 PM   Result Value Ref Range    SARS-CoV-2 Please find results under separate order         Assessment/ Plan:     Principal Problem:    [facility] (7/28/2021)    Active Problems:    CAD (coronary artery disease) (7/18/2019)      Debility (12/2/2019)      Dementia ([facility] (12/23/2019)      UTI (urinary tract infection) (7/28/2021)      Falls frequently (7/28/2021)    CVA, right parietal lobe - ASA, plavix, statin.      UTI - S/p Ceftriaxone.      Frequent falls - PT/OT.   Looking into [facility] [address]      Hopeful dc today      DVT prophylaxis - Lovenox  Signed By: Carmel Carlson DO     August 3, 2021

## 2021-08-03 NOTE — PROGRESS NOTES
Problem: Patient Education: Go to Patient Education Activity  Goal: Patient/Family Education  Outcome: Progressing Towards Goal     Problem: Falls - Risk of  Goal: *Absence of Falls  Description: Document Ana Moya Fall Risk and appropriate interventions in the flowsheet.   Outcome: Progressing Towards Goal  Note: Fall Risk Interventions:  Mobility Interventions: Bed/chair exit alarm    Mentation Interventions: Bed/chair exit alarm    Medication Interventions: Assess postural VS orthostatic hypotension, Bed/chair exit alarm    Elimination Interventions: Bed/chair exit alarm, Call light in reach    History of Falls Interventions: Bed/chair exit alarm         Problem: Patient Education: Go to Patient Education Activity  Goal: Patient/Family Education  Outcome: Progressing Towards Goal

## 2021-08-04 LAB
BACTERIA SPEC CULT: ABNORMAL
SERVICE CMNT-IMP: ABNORMAL

## 2021-08-26 ENCOUNTER — HOSPITAL ENCOUNTER (OUTPATIENT)
Dept: PHYSICAL THERAPY | Age: 86
Discharge: HOME | End: 2021-08-26
Payer: MEDICARE
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
## 2021-08-26 PROCEDURE — 97110 THERAPEUTIC EXERCISES: CPT

## 2021-08-26 PROCEDURE — 97161 PT EVAL LOW COMPLEX 20 MIN: CPT

## 2021-08-26 NOTE — PROGRESS NOTES
Uday Castro Sr  : 1931  Primary: Claudio Parker Medicare Choice *  Secondary:  [facility]  [address], [address]  [phone]   [phone]         OUTPATIENT PHYSICAL THERAPY:Daily Note 2021      TREATMENT:   PT Patient Time In/Time Out  Time In: 1430  Time Out: 1515      Total Time: 45min  Visit Count:  1     ICD-10: Treatment Diagnosis: M62.81, R26.2, R29.6  Medication Last Reviewed: 21      TREATMENT PLAN  Effective Dates: 2021 TO 2021 (90 days). Frequency/Duration: 2 times a week for 90 Day(s)         Subjective: See Evaluation Note dated 2021 for details   Pain:     Objective: See Evaluation Note dated 2021 for details      Therapeutic Exercise: (15min) Done in order to improve strength, ROM and understanding of current condition.     Date:   Date:   Date:   Date:     Activity/Exercise Parameters      Education Discussed examination findings, HEP, plan of care      Sit to Stand 3x10 (elevated mat w/o hands)      Hip ABD 3x10 B      Hip Ext 3x10 B                        Manual Therapy: (0min) Done in order to improve joint and soft tissue mobility,reduce muscle guarding, and decrease muscle tone   Date:   Date:   Date:   Date:     Type Parameters      Joint Mobilization       Soft Tissue Mobilization           Modalities: (-) Done in order to reduce swelling and pain    Assessment: See Evaluation Note dated 2021 for details    Plan: See Evaluation Note dated 2021 for details    Future Appointments   Date Time Provider Deon Gardner   2021  8:45 AM Medford Mom, PT [facility]   9/3/2021  8:45 AM Kyle Mom, PT [facility]   2021  4:00 PM Medford Mom, PT [facility]   2021  4:00 PM Medford Mom, PT [facility]   2022  8:15 AM Jim Platt MD [facility]       Unbilled Time: 30min eval  Units: 1 eval low/ SOL Tinsley Honey, PT, DPT, OCS    Visit Approval Visit # Therapist initials Date A NS / Cx < 24 hr >24 hr Cx Comments    1 WJ 8/26 [x]  [] [] Initial evaluation       [] [] []        [] [] []        [] [] []        [] [] []        [] [] []        [] [] []        [] [] []        [] [] []        [] [] []        [] [] []        [] [] []        [] [] []        [] [] []        [] [] []        [] [] []        [] [] []        [] [] []

## 2021-09-01 ENCOUNTER — HOSPITAL ENCOUNTER (OUTPATIENT)
Dept: PHYSICAL THERAPY | Age: 86
Discharge: HOME | End: 2021-09-01
Payer: MEDICARE

## 2021-09-01 PROCEDURE — 97110 THERAPEUTIC EXERCISES: CPT

## 2021-09-01 NOTE — PROGRESS NOTES
Marguerite Fairbanks Sr  : 1931  Primary: Reshma Parker Medicare Choice *  Secondary:  [facility]  [address], [facility], [address]  [phone]   [phone]         OUTPATIENT PHYSICAL THERAPY:Daily Note 2021      TREATMENT:   PT Patient Time In/Time Out  Time In: 0840  Time Out: 7439      Total Time: 54 min  Visit Count:  2     ICD-10: Treatment Diagnosis: M62.81, R26.2, R29.6  Medication Last Reviewed: 21      TREATMENT PLAN  Effective Dates: 2021 TO 2021 (90 days). Frequency/Duration: 2 times a week for 90 Day(s)         Subjective: See Evaluation Note dated 2021 for details--Hard of hearing but says he is doing well.  Pain:     Objective: See Evaluation Note dated 2021 for details      Therapeutic Exercise: (54 min) Done in order to improve strength, ROM and understanding of current condition. Date:   Date:  21 Date:   Date:     Activity/Exercise Parameters      Education Discussed examination findings, HEP, plan of care      Sit to Stand 3x10 (elevated mat w/o hands) 3x10 with PT support - posterior lean     Hip ABD 3x10 B 3x10     Hip Ext 3x10 B 3x10     Shuttle  50# 3x10 cues     Stairs  4x     HRs  20x 2      Step ups  20x at stairs. Manual Therapy: (0min) Done in order to improve joint and soft tissue mobility,reduce muscle guarding, and decrease muscle tone   Date:   Date:   Date:   Date:     Type Parameters      Joint Mobilization       Soft Tissue Mobilization           Modalities: (-) Done in order to reduce swelling and pain    Assessment: See Evaluation Note dated 2021 for details  Leans posteriorly with balance and needs manual cues to correct. Shuttle very good exercise for him.       Plan: See Evaluation Note dated 2021 for details    Future Appointments   Date Time Provider Deon Gardner   9/3/2021  8:45 AM Prasad Saleh, PT [facility] 9/7/2021  4:00 PM Shira Huston, PT [facility]   9/9/2021  4:00 PM Shira Huston, PT [facility]   2/24/2022  8:15 AM Cydney Benitez MD [facility]         Units: [address], DPT, DPT, OCS    Visit Approval Visit # Therapist initials Date A NS / Cx < 24 hr >24 hr Cx Comments    1 WJ 8/26 [x]  [] [] Initial evaluation    2 kg 9/1 [x] [] []        [] [] []        [] [] []        [] [] []        [] [] []        [] [] []        [] [] []        [] [] []        [] [] []        [] [] []        [] [] []        [] [] []        [] [] []        [] [] []        [] [] []        [] [] []        [] [] []

## 2021-09-03 ENCOUNTER — HOSPITAL ENCOUNTER (OUTPATIENT)
Dept: PHYSICAL THERAPY | Age: 86
Discharge: HOME | End: 2021-09-03
Payer: MEDICARE

## 2021-09-03 PROCEDURE — 97110 THERAPEUTIC EXERCISES: CPT

## 2021-09-03 NOTE — PROGRESS NOTES
Kwaku Hoffman Sr  : 1931  Primary: Marivel Parker Medicare Choice *  Secondary:  [facility]  [address], [address]  [phone]   [phone]         OUTPATIENT PHYSICAL THERAPY:Daily Note 9/3/2021      TREATMENT:   PT Patient Time In/Time Out  Time In: 845  Time Out: 915      Total Time: 30 min  Visit Count:  3     ICD-10: Treatment Diagnosis: M62.81, R26.2, R29.6  Medication Last Reviewed: 21      TREATMENT PLAN  Effective Dates: 2021 TO 2021 (90 days). Frequency/Duration: 2 times a week for 90 Day(s)         Subjective: Pt states he is doing fine today.  Pain:     Objective: Therapeutic Exercise: (30min) Done in order to improve strength, ROM and understanding of current condition. Date:   Date:  21 Date:  9/3 Date:     Activity/Exercise Parameters      Education Discussed examination findings, HEP, plan of care      NuStep   x6min lvl 5    Sit to Stand 3x10 (elevated mat w/o hands) 3x10 with PT support - posterior lean 3x10 (low mat, slight PT verbal cues)    Hip ABD 3x10 B 3x10 3x10    Hip Ext 3x10 B 3x10     Shuttle  50# 3x10 cues     Stairs  4x     HRs  20x 2      Step ups  20x at stairs. Step Taps   3x10 8\"    Standing Balance   · Feet together  · Feet together head turns                                           Manual Therapy: (0min) Done in order to improve joint and soft tissue mobility,reduce muscle guarding, and decrease muscle tone   Date:   Date:   Date:   Date:     Type Parameters      Joint Mobilization       Soft Tissue Mobilization           Modalities: (-) Done in order to reduce swelling and pain    Assessment: Pt had an accident during the session and wished to leave due to this.       Plan: continue per POC    Future Appointments   Date Time Provider Deon Gardner   2021  4:00 PM Shalom Thacker [facility]   2021  4:00 PM Adrien Vasquez PT [facility]   2/24/2022  8:15 AM Helga Lay MD [facility]         Units: Ryan Noyola, PT, DPT, OCS    Visit Approval Visit # Therapist initials Date A NS / Cx < 24 hr >24 hr Cx Comments    1 WJ 8/26 [x]  [] [] Initial evaluation    2 [facility] 9/1 [x] [] []     3 WJ 9/3 [x] [] []        [] [] []        [] [] []        [] [] []        [] [] []        [] [] []        [] [] []        [] [] []        [] [] []        [] [] []        [] [] []        [] [] []        [] [] []        [] [] []        [] [] []        [] [] []

## 2021-09-09 ENCOUNTER — HOSPITAL ENCOUNTER (OUTPATIENT)
Dept: PHYSICAL THERAPY | Age: 86
Discharge: HOME | End: 2021-09-09
Payer: MEDICARE

## 2021-09-09 PROCEDURE — 97110 THERAPEUTIC EXERCISES: CPT

## 2021-09-09 NOTE — PROGRESS NOTES
Marguerite Fairbanks Sr  : 1931  Primary: Reshma Parker Medicare Choice *  Secondary:  [facility]  [address], [address]  [phone]   [phone]         OUTPATIENT PHYSICAL THERAPY:Daily Note 2021      TREATMENT:   PT Patient Time In/Time Out  Time In: 0400  Time Out: 0453      Total Time: 53 min  Visit Count:  4     ICD-10: Treatment Diagnosis: M62.81, R26.2, R29.6  Medication Last Reviewed: 21      TREATMENT PLAN  Effective Dates: 2021 TO 2021 (90 days). Frequency/Duration: 2 times a week for 90 Day(s)         Subjective: Patient reports he hasn't been feeling well today. He states he has a stomach ache.  Pain:     Objective: Therapeutic Exercise: (53min) Done in order to improve strength, ROM and understanding of current condition. Date:   Date:  21 Date:  9/3 Date:     Activity/Exercise Parameters      Education Discussed examination findings, HEP, plan of care      NuStep   x6min lvl 5 X 10 min level 2   Sit to Stand 3x10 (elevated mat w/o hands) 3x10 with PT support - posterior lean 3x10 (low mat, slight PT verbal cues) 2 x 10 from chair   Hip ABD 3x10 B 3x10 3x10    Hip Ext 3x10 B 3x10     Shuttle  50# 3x10 cues     Stairs  4x     HRs  20x 2      Step ups  20x at stairs. Step Taps   3x10 8\"    Standing Balance   · Feet together  · Feet together head turns    bridging    3 x 10   clams    3 x 10 red  TB   SLR    3 x 10   Marching (supine)    3 x 10   Bent knee fallouts    3 x 10 red TB       Manual Therapy: (0min) Done in order to improve joint and soft tissue mobility,reduce muscle guarding, and decrease muscle tone   Date:   Date:   Date:   Date:     Type Parameters      Joint Mobilization       Soft Tissue Mobilization           Modalities: (-) Done in order to reduce swelling and pain    Assessment: Patient tolerated treatment well.  Reviewed home exercises for clear understanding.     Plan: continue per POC    Future Appointments   Date Time Provider Deon Gardner   2/24/2022  8:15 AM Mary Berkowitz MD [facility]         Units: [address] [place] [address]    Visit Approval Visit # Therapist initials Date A NS / Cx < 24 hr >24 hr Cx Comments    1 WJ 8/26 [x]  [] [] Initial evaluation    2 [facility] 9/1 [x] [] []     3 W 9/3 [x] [] []     4 [facility] [address] 9/9 [x] [] []        [] [] []        [] [] []        [] [] []        [] [] []        [] [] []        [] [] []        [] [] []        [] [] []        [] [] []        [] [] []        [] [] []        [] [] []        [] [] []        [] [] []

## 2021-09-14 ENCOUNTER — HOSPITAL ENCOUNTER (OUTPATIENT)
Dept: PHYSICAL THERAPY | Age: 86
Discharge: HOME | End: 2021-09-14
Payer: MEDICARE

## 2021-09-14 PROCEDURE — 97110 THERAPEUTIC EXERCISES: CPT

## 2021-09-14 NOTE — PROGRESS NOTES
Tamica Swanson Sr  : 1931  Primary: Tima Parker Medicare Choice *  Secondary:  [facility]  [address], [address]  [phone]   [phone]         OUTPATIENT PHYSICAL THERAPY:Daily Note 2021      TREATMENT:   PT Patient Time In/Time Out  Time In: 1515  Time Out: 1600      Total Time: 53 min  Visit Count:  5     ICD-10: Treatment Diagnosis: M62.81, R26.2, R29.6  Medication Last Reviewed: 21      TREATMENT PLAN  Effective Dates: 2021 TO 2021 (90 days). Frequency/Duration: 2 times a week for 90 Day(s)         Subjective: Patient states he has a hiatal hernia that is acting up today   Pain:     Objective: Therapeutic Exercise: (45min) Done in order to improve strength, ROM and understanding of current condition. Date:  21 Date:  9/3 Date:      Activity/Exercise       Education       NuStep  x6min lvl 5 X 10 min level 2 x10min    Sit to Stand 3x10 with PT support - posterior lean 3x10 (low mat, slight PT verbal cues) 2 x 10 from chair 3x10 (low mat, slight PT verbal cues)   Hip ABD 3x10 3x10  3x10 B   Hip Ext 3x10   3x10 B   Shuttle 50# 3x10 cues      Stairs 4x      HRs 20x 2       Step ups 20x at stairs.       Step Taps  3x10 8\"     Standing Balance  · Feet together  · Feet together head turns     bridging   3 x 10 3x10   clams   3 x 10 red  TB    SLR   3 x 10 3x10 B   Marching (supine)   3 x 10    Bent knee fallouts   3 x 10 red TB 3x10 red TB   Walking    x250ft (outside)                                   Manual Therapy: (0min) Done in order to improve joint and soft tissue mobility,reduce muscle guarding, and decrease muscle tone   Date:   Date:   Date:   Date:     Type Parameters      Joint Mobilization       Soft Tissue Mobilization           Modalities: (-) Done in order to reduce swelling and pain    Assessment: Patient tolerated treatment well but continues to require constant cuing to slow down     Plan: continue per POC    Future Appointments   Date Time Provider Deon Kendra   9/16/2021  2:30 PM Prasad Bard, PT [facility]   9/21/2021  8:45 AM Prasad Bard, PT [facility]   9/23/2021  2:30 PM Prasad Bard, PT [facility]   9/28/2021 11:00 AM Prasad Bard, PT [facility]   9/30/2021 10:15 AM Prasad Bard, PT [facility]   2/24/2022  8:15 AM Bryan Liao MD [facility]         Units: Julio Brown, PT,     Visit Approval Visit # Therapist initials Date A NS / Cx < 24 hr >24 hr Cx Comments    1 W 8/26 [x]  [] [] Initial evaluation    2 [facility] 9/1 [x] [] []     3 WJ 9/3 [x] [] []     4 [facility] [address] 9/9 [x] [] []     5 WJ 9/14 [x] [] []        [] [] []        [] [] []        [] [] []        [] [] []        [] [] []        [] [] []        [] [] []        [] [] []        [] [] []        [] [] []        [] [] []        [] [] []        [] [] []

## 2021-09-16 ENCOUNTER — HOSPITAL ENCOUNTER (OUTPATIENT)
Dept: PHYSICAL THERAPY | Age: 86
Discharge: HOME | End: 2021-09-16
Payer: MEDICARE

## 2021-09-16 NOTE — PROGRESS NOTES
Precious Karsten Sr  : 1931  Primary: Edibecky Sky Humanjarret Medicare Choice *  Secondary:  [facility]  [address] [place] [address], [facility] [address]  [phone]   [phone]        OUTPATIENT DAILY NOTE    NAME/AGE/GENDER: Oksana Pereztist is a 80 y.o. male. DATE: 2021    Mr. Maurisio Khoury showed up to PT today and started to exercise. Pt then stated he was starting to feel very nauseous and sweaty. Session was discontinued due do this and recent hiatal hernia.     Damián Clay, PT

## 2021-09-21 ENCOUNTER — HOSPITAL ENCOUNTER (OUTPATIENT)
Dept: PHYSICAL THERAPY | Age: 86
Discharge: HOME | End: 2021-09-21
Payer: MEDICARE

## 2021-09-21 PROCEDURE — 97110 THERAPEUTIC EXERCISES: CPT

## 2021-09-21 NOTE — PROGRESS NOTES
Roberto Yu Sr  : 1931  Primary: Julio Cesar Parker Medicare Choice *  Secondary:  [facility]  [address], [address]  [phone]   [phone]         OUTPATIENT PHYSICAL THERAPY:Daily Note 2021      TREATMENT:   PT Patient Time In/Time Out  Time In: 845  Time Out: 930      Total Time: 53 min  Visit Count:  6     ICD-10: Treatment Diagnosis: M62.81, R26.2, R29.6  Medication Last Reviewed: 21      TREATMENT PLAN  Effective Dates: 2021 TO 2021 (90 days). Frequency/Duration: 2 times a week for 90 Day(s)         Subjective: Patient states he is still not feeling great today   Pain:     Objective: Therapeutic Exercise: (45min) Done in order to improve strength, ROM and understanding of current condition.     Date:  9/3 Date:   Date:   Date:     Activity/Exercise       Education       NuStep x6min lvl 5 X 10 min level 2 x10min  x8min   Sit to Stand 3x10 (low mat, slight PT verbal cues) 2 x 10 from chair 3x10 (low mat, slight PT verbal cues) 3x10 (low mat with cues)   Hip ABD 3x10  3x10 B 3x10 B   Hip Ext   3x10 B 3x10 B   Shuttle       Stairs       HRs       Step ups       Step Taps 3x10 8\"      Standing Balance · Feet together  · Feet together head turns      bridging  3 x 10 3x10 3x10   clams  3 x 10 red  TB  3x10 green TB (hooklying)   SLR  3 x 10 3x10 B    Marching (supine)  3 x 10  3x10 green TB   Bent knee fallouts  3 x 10 red TB 3x10 red TB    Walking   x250ft (outside) x250ft (outside)                                   Manual Therapy: (0min) Done in order to improve joint and soft tissue mobility,reduce muscle guarding, and decrease muscle tone   Date:   Date:   Date:   Date:     Type Parameters      Joint Mobilization       Soft Tissue Mobilization           Modalities: (-) Done in order to reduce swelling and pain    Assessment: Patient required fewer cues for slowing down exercises today, however did require some tactile and verbal cues for forward weight shift with sit to stands     Plan: continue per POC    Future Appointments   Date Time Provider Deon Kendra   9/23/2021  2:30 PM Mary Wakefield, PT [facility]   9/28/2021 11:00 AM Mary Wakefield, PT [facility]   9/30/2021 10:15 AM Mary Wakefield, PT [facility]   2/24/2022  8:15 AM Helga Lay MD [facility]         Units: Jessica Grier PT,     Visit Approval Visit # Therapist initials Date A NS / Cx < 24 hr >24 hr Cx Comments    1 WJ 8/26 [x]  [] [] Initial evaluation    2 [facility] 9/1 [x] [] []     3 WJ 9/3 [x] [] []     4 [address] 9/9 [x] [] []     5 WJ 9/14 [x] [] []     6 WJ 9/21 [x] [] []        [] [] []        [] [] []        [] [] []        [] [] []        [] [] []        [] [] []        [] [] []        [] [] []        [] [] []        [] [] []        [] [] []        [] [] []

## 2021-09-23 ENCOUNTER — HOSPITAL ENCOUNTER (OUTPATIENT)
Dept: PHYSICAL THERAPY | Age: 86
Discharge: HOME | End: 2021-09-23
Payer: MEDICARE

## 2021-09-23 PROCEDURE — 97110 THERAPEUTIC EXERCISES: CPT

## 2021-09-23 NOTE — PROGRESS NOTES
Aimee Last Sr  : 1931  Primary: Arron Parker Medicare Choice *  Secondary:  [facility]  [address], [address]  [phone]   [phone]         OUTPATIENT PHYSICAL THERAPY:Daily Note 2021      TREATMENT:   PT Patient Time In/Time Out  Time In: 1430  Time Out: 1515      Total Time: 45 min  Visit Count:  7     ICD-10: Treatment Diagnosis: M62.81, R26.2, R29.6  Medication Last Reviewed: 21      TREATMENT PLAN  Effective Dates: 2021 TO 2021 (90 days). Frequency/Duration: 2 times a week for 90 Day(s)         Subjective: Patient states he is doing a little better, has a rollator   Pain:     Objective: Therapeutic Exercise: (45min) Done in order to improve strength, ROM and understanding of current condition. Date:   Date:   Date:   Date:     Activity/Exercise       Education    Sized rollator   NuStep X 10 min level 2 x10min  x8min    Sit to Stand 2 x 10 from chair 3x10 (low mat, slight PT verbal cues) 3x10 (low mat with cues) 3x10 (low mat)   Hip ABD  3x10 B 3x10 B    Hip Ext  3x10 B 3x10 B    Shuttle       Stairs       HRs       Step ups       Step Taps       Standing Balance       bridging 3 x 10 3x10 3x10 3x10   clams 3 x 10 red  TB  3x10 green TB (hooklying)    SLR 3 x 10 3x10 B     Marching (supine) 3 x 10  3x10 green TB    Bent knee fallouts 3 x 10 red TB 3x10 red TB     Walking  x250ft (outside) x250ft (outside) [id number]                                   Manual Therapy: (0min) Done in order to improve joint and soft tissue mobility,reduce muscle guarding, and decrease muscle tone   Date:   Date:   Date:   Date:     Type Parameters      Joint Mobilization       Soft Tissue Mobilization           Modalities: (-) Done in order to reduce swelling and pain    Assessment: Patient did not require as many cues for sit to stands to shift weight forward.  Needed some cuing to slow down while ambulating with rollator     Plan: continue per POC    Future Appointments   Date Time Provider Deon Kendra   9/28/2021 11:00 AM Elyssa Castillo PT [facility]   9/30/2021 10:15 AM Elyssa Castillo PT [facility]   2/24/2022  8:15 AM MD CARMELITA Pond [facility]         Units: Ag Sera, PT,     Visit Approval Visit # Therapist initials Date A NS / Cx < 24 hr >24 hr Cx Comments    1 W 8/26 [x]  [] [] Initial evaluation    2 [facility] 9/1 [x] [] []     3 W 9/3 [x] [] []     4 [facility] [address] 9/9 [x] [] []     5 W 9/14 [x] [] []     6 W 9/21 [x] [] []     7 W 9/23 [x] [] []        [] [] []        [] [] []        [] [] []        [] [] []        [] [] []        [] [] []        [] [] []        [] [] []        [] [] []        [] [] []        [] [] []

## 2021-09-28 ENCOUNTER — HOSPITAL ENCOUNTER (OUTPATIENT)
Dept: PHYSICAL THERAPY | Age: 86
Discharge: HOME | End: 2021-09-28
Payer: MEDICARE

## 2021-09-28 NOTE — PROGRESS NOTES
Pool Ashley Sr  : 1931  Primary: Octaviano Parker Medicare Choice *  Secondary:  [facility]  [address] [place] [address], [facility] [address]  [phone]   [phone]        OUTPATIENT DAILY NOTE    NAME/AGE/GENDER: Warner Peres is a 80 y.o. male. DATE: 2021    Mr. Garza Chelais for today's appointment due to patient's transport called and stated she was unable to pick him up from the facility today.     Jefe Davies, PT

## 2021-09-30 ENCOUNTER — HOSPITAL ENCOUNTER (OUTPATIENT)
Dept: PHYSICAL THERAPY | Age: 86
Discharge: HOME | End: 2021-09-30
Payer: MEDICARE

## 2021-09-30 PROCEDURE — 97110 THERAPEUTIC EXERCISES: CPT

## 2021-09-30 NOTE — PROGRESS NOTES
Casey Pinto Sr  : 1931  Primary: Winston Parker Medicare Choice *  Secondary:  [facility]  [address], [address]  [phone]   [phone]         OUTPATIENT PHYSICAL THERAPY:Daily Note 2021      TREATMENT:   PT Patient Time In/Time Out  Time In: 1020  Time Out: 1105      Total Time: 40min  Visit Count:  8     ICD-10: Treatment Diagnosis: M62.81, R26.2, R29.6  Medication Last Reviewed: 21      TREATMENT PLAN  Effective Dates: 2021 TO 2021 (90 days). Frequency/Duration: 2 times a week for 90 Day(s)         Subjective: Patient states he is ok today, was really tired yesterday   Pain:     Objective: Therapeutic Exercise: (40min) Done in order to improve strength, ROM and understanding of current condition.     Date:   Date:   Date:   Date:     Activity/Exercise       Education   Sized rollator    NuStep x10min  x8min  x10min   Sit to Stand 3x10 (low mat, slight PT verbal cues) 3x10 (low mat with cues) 3x10 (low mat) 3x10 (chair)   Hip ABD 3x10 B 3x10 B  3x10 B 3lbs   Hip Ext 3x10 B 3x10 B  3x10 B 3lbs   Shuttle       Stairs       Sidestepping    2x60ft 3lbs   Step ups       Step Taps       Standing Balance       bridging 3x10 3x10 3x10 3x15   clams  3x10 green TB (hooklying)     SLR 3x10 B   3x10 B 3lbs   Marching (supine)  3x10 green TB     Bent knee fallouts 3x10 red TB      Walking x250ft (outside) x250ft (outside) [id number]                                    Manual Therapy: (0min) Done in order to improve joint and soft tissue mobility,reduce muscle guarding, and decrease muscle tone   Date:   Date:   Date:   Date:     Type Parameters      Joint Mobilization       Soft Tissue Mobilization           Modalities: (-) Done in order to reduce swelling and pain    Assessment: Patient tolerated session well, able to do all exercises with minimal breaks     Plan: continue per POC    Future Appointments   Date Time Provider Deon Gardner   2/24/2022  8:15 AM Luis Villafuerte MD [facility]         Units: Maureen Decker PT,     Visit Approval Visit # Therapist initials Date A NS / Cx < 24 hr >24 hr Cx Comments    1 [facility] 8/26 [x]  [] [] Initial evaluation    2 [facility] 9/1 [x] [] []     3 [facility] 9/3 [x] [] []     4 [address] 9/9 [x] [] []     5 [facility] 9/14 [x] [] []     6 [facility] 9/21 [x] [] []     7 [facility] 9/23 [x] [] []     8 [facility] 9/20 [x] [] []        [] [] []        [] [] []        [] [] []        [] [] []        [] [] []        [] [] []        [] [] []        [] [] []        [] [] []        [] [] []

## 2021-10-01 ENCOUNTER — HOSPITAL ENCOUNTER (INPATIENT)
Age: 86
LOS: 3 days | Discharge: HOME | DRG: 690 | End: 2021-10-04
Attending: EMERGENCY MEDICINE | Admitting: FAMILY MEDICINE
Payer: MEDICARE

## 2021-10-01 ENCOUNTER — APPOINTMENT (OUTPATIENT)
Dept: CT IMAGING | Age: 86
DRG: 690 | End: 2021-10-01
Attending: EMERGENCY MEDICINE
Payer: MEDICARE

## 2021-10-01 DIAGNOSIS — A41.9 SEPSIS WITH ACUTE RENAL FAILURE WITHOUT SEPTIC SHOCK, DUE TO UNSPECIFIED ORGANISM, UNSPECIFIED ACUTE RENAL FAILURE TYPE (HCC): ICD-10-CM

## 2021-10-01 DIAGNOSIS — K80.20 CALCULUS OF GALLBLADDER WITHOUT CHOLECYSTITIS WITHOUT OBSTRUCTION: ICD-10-CM

## 2021-10-01 DIAGNOSIS — K59.00 CONSTIPATION, UNSPECIFIED CONSTIPATION TYPE: ICD-10-CM

## 2021-10-01 DIAGNOSIS — N30.00 ACUTE CYSTITIS WITHOUT HEMATURIA: Primary | ICD-10-CM

## 2021-10-01 DIAGNOSIS — R65.20 SEPSIS WITH ACUTE RENAL FAILURE WITHOUT SEPTIC SHOCK, DUE TO UNSPECIFIED ORGANISM, UNSPECIFIED ACUTE RENAL FAILURE TYPE (HCC): ICD-10-CM

## 2021-10-01 DIAGNOSIS — N17.9 SEPSIS WITH ACUTE RENAL FAILURE WITHOUT SEPTIC SHOCK, DUE TO UNSPECIFIED ORGANISM, UNSPECIFIED ACUTE RENAL FAILURE TYPE (HCC): ICD-10-CM

## 2021-10-01 PROBLEM — I63.9 ACUTE ISCHEMIC STROKE (HCC): Status: RESOLVED | Noted: 2021-07-28 | Resolved: 2021-10-01

## 2021-10-01 PROBLEM — I25.10 CAD (CORONARY ARTERY DISEASE): Chronic | Status: RESOLVED | Noted: 2019-07-18 | Resolved: 2021-10-01

## 2021-10-01 LAB
ALBUMIN SERPL-MCNC: 3 G/DL (ref 3.2–4.6)
ALBUMIN/GLOB SERPL: 0.7 {RATIO} (ref 1.2–3.5)
ALP SERPL-CCNC: 90 U/L (ref 50–136)
ALT SERPL-CCNC: 12 U/L (ref 12–65)
ANION GAP SERPL CALC-SCNC: 4 MMOL/L (ref 7–16)
APPEARANCE UR: ABNORMAL
AST SERPL-CCNC: 12 U/L (ref 15–37)
BACTERIA URNS QL MICRO: ABNORMAL /HPF
BASOPHILS # BLD: 0.1 K/UL (ref 0–0.2)
BASOPHILS NFR BLD: 0 % (ref 0–2)
BILIRUB SERPL-MCNC: 1.4 MG/DL (ref 0.2–1.1)
BILIRUB UR QL: NEGATIVE
BUN SERPL-MCNC: 21 MG/DL (ref 8–23)
CALCIUM SERPL-MCNC: 9.4 MG/DL (ref 8.3–10.4)
CASTS URNS QL MICRO: 0 /LPF
CHLORIDE SERPL-SCNC: 105 MMOL/L (ref 98–107)
CO2 SERPL-SCNC: 29 MMOL/L (ref 21–32)
COLOR UR: YELLOW
CREAT SERPL-MCNC: 1.44 MG/DL (ref 0.8–1.5)
DIFFERENTIAL METHOD BLD: ABNORMAL
EOSINOPHIL # BLD: 0 K/UL (ref 0–0.8)
EOSINOPHIL NFR BLD: 0 % (ref 0.5–7.8)
EPI CELLS #/AREA URNS HPF: 0 /HPF
ERYTHROCYTE [DISTWIDTH] IN BLOOD BY AUTOMATED COUNT: 17.8 % (ref 11.9–14.6)
GLOBULIN SER CALC-MCNC: 4.1 G/DL (ref 2.3–3.5)
GLUCOSE SERPL-MCNC: 111 MG/DL (ref 65–100)
GLUCOSE UR STRIP.AUTO-MCNC: NEGATIVE MG/DL
HCT VFR BLD AUTO: 37.2 % (ref 41.1–50.3)
HGB BLD-MCNC: 11.5 G/DL (ref 13.6–17.2)
HGB UR QL STRIP: NEGATIVE
IMM GRANULOCYTES # BLD AUTO: 0.5 K/UL (ref 0–0.5)
IMM GRANULOCYTES NFR BLD AUTO: 2 % (ref 0–5)
KETONES UR QL STRIP.AUTO: NEGATIVE MG/DL
LACTATE SERPL-SCNC: 1.6 MMOL/L (ref 0.4–2)
LACTATE SERPL-SCNC: 2.2 MMOL/L (ref 0.4–2)
LEUKOCYTE ESTERASE UR QL STRIP.AUTO: ABNORMAL
LIPASE SERPL-CCNC: 36 U/L (ref 73–393)
LYMPHOCYTES # BLD: 1.4 K/UL (ref 0.5–4.6)
LYMPHOCYTES NFR BLD: 6 % (ref 13–44)
MCH RBC QN AUTO: 28.8 PG (ref 26.1–32.9)
MCHC RBC AUTO-ENTMCNC: 30.9 G/DL (ref 31.4–35)
MCV RBC AUTO: 93 FL (ref 79.6–97.8)
MONOCYTES # BLD: 1.1 K/UL (ref 0.1–1.3)
MONOCYTES NFR BLD: 5 % (ref 4–12)
NEUTS SEG # BLD: 22 K/UL (ref 1.7–8.2)
NEUTS SEG NFR BLD: 88 % (ref 43–78)
NITRITE UR QL STRIP.AUTO: POSITIVE
NRBC # BLD: 0.02 K/UL (ref 0–0.2)
PH UR STRIP: 5.5 [PH] (ref 5–9)
PLATELET # BLD AUTO: 282 K/UL (ref 150–450)
PMV BLD AUTO: 10 FL (ref 9.4–12.3)
POTASSIUM SERPL-SCNC: 4.5 MMOL/L (ref 3.5–5.1)
PROCALCITONIN SERPL-MCNC: 2.66 NG/ML
PROT SERPL-MCNC: 7.1 G/DL (ref 6.3–8.2)
PROT UR STRIP-MCNC: NEGATIVE MG/DL
RBC # BLD AUTO: 4 M/UL (ref 4.23–5.6)
RBC #/AREA URNS HPF: ABNORMAL /HPF
SODIUM SERPL-SCNC: 138 MMOL/L (ref 136–145)
SP GR UR REFRACTOMETRY: 1.02 (ref 1–1.02)
UROBILINOGEN UR QL STRIP.AUTO: 0.2 EU/DL (ref 0.2–1)
WBC # BLD AUTO: 25.1 K/UL (ref 4.3–11.1)
WBC URNS QL MICRO: >100 /HPF

## 2021-10-01 PROCEDURE — 87088 URINE BACTERIA CULTURE: CPT

## 2021-10-01 PROCEDURE — 87205 SMEAR GRAM STAIN: CPT

## 2021-10-01 PROCEDURE — 99285 EMERGENCY DEPT VISIT HI MDM: CPT

## 2021-10-01 PROCEDURE — 87040 BLOOD CULTURE FOR BACTERIA: CPT

## 2021-10-01 PROCEDURE — 96365 THER/PROPH/DIAG IV INF INIT: CPT

## 2021-10-01 PROCEDURE — 87086 URINE CULTURE/COLONY COUNT: CPT

## 2021-10-01 PROCEDURE — 74011000258 HC RX REV CODE- 258: Performed by: EMERGENCY MEDICINE

## 2021-10-01 PROCEDURE — 84145 PROCALCITONIN (PCT): CPT

## 2021-10-01 PROCEDURE — 74177 CT ABD & PELVIS W/CONTRAST: CPT

## 2021-10-01 PROCEDURE — 83690 ASSAY OF LIPASE: CPT

## 2021-10-01 PROCEDURE — 96375 TX/PRO/DX INJ NEW DRUG ADDON: CPT

## 2021-10-01 PROCEDURE — 83605 ASSAY OF LACTIC ACID: CPT

## 2021-10-01 PROCEDURE — 74011000636 HC RX REV CODE- 636: Performed by: EMERGENCY MEDICINE

## 2021-10-01 PROCEDURE — 80053 COMPREHEN METABOLIC PANEL: CPT

## 2021-10-01 PROCEDURE — 87186 SC STD MICRODIL/AGAR DIL: CPT

## 2021-10-01 PROCEDURE — 85025 COMPLETE CBC W/AUTO DIFF WBC: CPT

## 2021-10-01 PROCEDURE — 65270000029 HC RM PRIVATE

## 2021-10-01 PROCEDURE — 81001 URINALYSIS AUTO W/SCOPE: CPT

## 2021-10-01 PROCEDURE — 74011250636 HC RX REV CODE- 250/636: Performed by: EMERGENCY MEDICINE

## 2021-10-01 RX ORDER — SODIUM CHLORIDE 9 MG/ML
75 INJECTION, SOLUTION INTRAVENOUS CONTINUOUS
Status: DISPENSED | OUTPATIENT
Start: 2021-10-02 | End: 2021-10-02

## 2021-10-01 RX ORDER — ONDANSETRON 2 MG/ML
4 INJECTION INTRAMUSCULAR; INTRAVENOUS
Status: DISCONTINUED | OUTPATIENT
Start: 2021-10-01 | End: 2021-10-04 | Stop reason: HOSPADM

## 2021-10-01 RX ORDER — POLYETHYLENE GLYCOL 3350 17 G/17G
17 POWDER, FOR SOLUTION ORAL DAILY
Status: DISCONTINUED | OUTPATIENT
Start: 2021-10-02 | End: 2021-10-04 | Stop reason: HOSPADM

## 2021-10-01 RX ORDER — SODIUM CHLORIDE 0.9 % (FLUSH) 0.9 %
5-40 SYRINGE (ML) INJECTION EVERY 8 HOURS
Status: DISCONTINUED | OUTPATIENT
Start: 2021-10-02 | End: 2021-10-04 | Stop reason: HOSPADM

## 2021-10-01 RX ORDER — ACETAMINOPHEN 650 MG/1
650 SUPPOSITORY RECTAL
Status: DISCONTINUED | OUTPATIENT
Start: 2021-10-01 | End: 2021-10-04 | Stop reason: HOSPADM

## 2021-10-01 RX ORDER — PROMETHAZINE HYDROCHLORIDE 25 MG/1
12.5 TABLET ORAL
Status: DISCONTINUED | OUTPATIENT
Start: 2021-10-01 | End: 2021-10-04 | Stop reason: HOSPADM

## 2021-10-01 RX ORDER — ACETAMINOPHEN 325 MG/1
650 TABLET ORAL
Status: DISCONTINUED | OUTPATIENT
Start: 2021-10-01 | End: 2021-10-04 | Stop reason: HOSPADM

## 2021-10-01 RX ORDER — ENOXAPARIN SODIUM 100 MG/ML
30 INJECTION SUBCUTANEOUS DAILY
Status: DISCONTINUED | OUTPATIENT
Start: 2021-10-02 | End: 2021-10-04 | Stop reason: HOSPADM

## 2021-10-01 RX ORDER — ONDANSETRON 2 MG/ML
4 INJECTION INTRAMUSCULAR; INTRAVENOUS
Status: COMPLETED | OUTPATIENT
Start: 2021-10-01 | End: 2021-10-01

## 2021-10-01 RX ORDER — CLOPIDOGREL BISULFATE 75 MG/1
75 TABLET ORAL DAILY
Status: DISCONTINUED | OUTPATIENT
Start: 2021-10-02 | End: 2021-10-04 | Stop reason: HOSPADM

## 2021-10-01 RX ORDER — SODIUM CHLORIDE 0.9 % (FLUSH) 0.9 %
5-40 SYRINGE (ML) INJECTION AS NEEDED
Status: DISCONTINUED | OUTPATIENT
Start: 2021-10-01 | End: 2021-10-04 | Stop reason: HOSPADM

## 2021-10-01 RX ORDER — FAMOTIDINE 20 MG/1
20 TABLET, FILM COATED ORAL DAILY
Status: DISCONTINUED | OUTPATIENT
Start: 2021-10-02 | End: 2021-10-04 | Stop reason: HOSPADM

## 2021-10-01 RX ORDER — PANTOPRAZOLE SODIUM 40 MG/1
40 TABLET, DELAYED RELEASE ORAL
Status: DISCONTINUED | OUTPATIENT
Start: 2021-10-02 | End: 2021-10-04 | Stop reason: HOSPADM

## 2021-10-01 RX ORDER — SODIUM CHLORIDE 0.9 % (FLUSH) 0.9 %
5-40 SYRINGE (ML) INJECTION EVERY 8 HOURS
Status: DISCONTINUED | OUTPATIENT
Start: 2021-10-01 | End: 2021-10-04 | Stop reason: HOSPADM

## 2021-10-01 RX ORDER — SODIUM CHLORIDE 0.9 % (FLUSH) 0.9 %
10 SYRINGE (ML) INJECTION
Status: ACTIVE | OUTPATIENT
Start: 2021-10-01 | End: 2021-10-02

## 2021-10-01 RX ADMIN — DIATRIZOATE MEGLUMINE AND DIATRIZOATE SODIUM 15 ML: 660; 100 LIQUID ORAL; RECTAL at 17:45

## 2021-10-01 RX ADMIN — ONDANSETRON 4 MG: 2 INJECTION INTRAMUSCULAR; INTRAVENOUS at 17:45

## 2021-10-01 RX ADMIN — IOPAMIDOL 100 ML: 755 INJECTION, SOLUTION INTRAVENOUS at 19:21

## 2021-10-01 RX ADMIN — SODIUM CHLORIDE 1000 ML: 900 INJECTION, SOLUTION INTRAVENOUS at 16:56

## 2021-10-01 RX ADMIN — PIPERACILLIN AND TAZOBACTAM 4.5 G: 4; .5 INJECTION, POWDER, FOR SOLUTION INTRAVENOUS at 18:30

## 2021-10-01 NOTE — ED PROVIDER NOTES
Patient presents to the ER with family with complaints of generalized fatigue and weakness. Family reports he has had issues with multiple falls over the past couple days. They are concerned that he is becoming more weak. Patient's major complaint is his \"hiatal hernia\". States is causing him difficulty with eating and recurrent epigastric abdominal pain. Denies any fevers or hematemesis. Reports that he is felt somewhat constipated. Currently lives at assisted living facility    The history is provided by the patient and a relative. Fall  The accident occurred more than 2 days ago. Associated symptoms include abdominal pain and nausea. Pertinent negatives include no vomiting and no headaches. Fatigue  This is a recurrent problem. The current episode started more than 2 days ago. The problem has not changed since onset. Pertinent negatives include no focal weakness, no slurred speech, no speech difficulty, no agitation, no mental status change, no unresponsiveness and no disorientation. There has been no fever. Associated symptoms include nausea. Pertinent negatives include no chest pain, no vomiting, no altered mental status, no confusion, no headaches and no choking. Past Medical History:   Diagnosis Date    Colon cancer ([facility] [address] 01/2012    Hiatal hernia        No past surgical history on file. History reviewed. No pertinent family history.     Social History     Socioeconomic History    Marital status:      Spouse name: Not on file    Number of children: Not on file    Years of education: Not on file    Highest education level: Not on file   Occupational History    Not on file   Tobacco Use    Smoking status: Never Smoker    Smokeless tobacco: Never Used   Substance and Sexual Activity    Alcohol use: Never    Drug use: Never    Sexual activity: Not on file   Other Topics Concern    Not on file   Social History Narrative    Not on file     Social Determinants of Health Financial Resource Strain:     Difficulty of Paying Living Expenses:    Food Insecurity:     Worried About Running Out of Food in the Last Year:     [address] in the Last Year:    Transportation Needs:     Lack of Transportation (Medical):  Lack of Transportation (Non-Medical):    Physical Activity:     Days of Exercise per Week:     Minutes of Exercise per Session:    Stress:     Feeling of Stress :    Social Connections:     Frequency of Communication with Friends and Family:     Frequency of Social Gatherings with Friends and Family:     Attends Orthodox Services:     Active Member of Clubs or Organizations:     Attends Club or Organization Meetings:     Marital Status:    Intimate Partner Violence:     Fear of Current or Ex-Partner:     Emotionally Abused:     Physically Abused:     Sexually Abused: ALLERGIES: Patient has no known allergies. Review of Systems   Constitutional: Positive for fatigue. Negative for chills. HENT: Negative for congestion, dental problem, trouble swallowing and voice change. Eyes: Negative for photophobia, pain and redness. Respiratory: Negative for choking, chest tightness, wheezing and stridor. Cardiovascular: Negative for chest pain and leg swelling. Gastrointestinal: Positive for abdominal pain and nausea. Negative for vomiting. Endocrine: Negative for polyphagia and polyuria. Genitourinary: Negative for difficulty urinating, discharge, penile pain and testicular pain. Musculoskeletal: Negative for back pain. Skin: Negative for color change and pallor. Neurological: Positive for light-headedness. Negative for tremors, focal weakness, speech difficulty, weakness and headaches. Hematological: Negative for adenopathy. Psychiatric/Behavioral: Negative for agitation and confusion. All other systems reviewed and are negative.       Vitals:    10/01/21 1618   BP: (!) 97/52   Pulse: 82   Resp: 18   Temp: 98.7 °F (37.1 °C)   SpO2: 95%            Physical Exam  Vitals and nursing note reviewed. Constitutional:       General: He is not in acute distress. Appearance: Normal appearance. He is not ill-appearing. HENT:      Head: Normocephalic and atraumatic. Right Ear: External ear normal.      Left Ear: External ear normal.      Nose: Nose normal. No congestion or rhinorrhea. Eyes:      General:         Right eye: No discharge. Left eye: No discharge. Extraocular Movements: Extraocular movements intact. Pupils: Pupils are equal, round, and reactive to light. Cardiovascular:      Rate and Rhythm: Normal rate and regular rhythm. Pulses: Normal pulses. Heart sounds: Normal heart sounds. Pulmonary:      Effort: Pulmonary effort is normal. No respiratory distress. Breath sounds: Normal breath sounds. No stridor. No wheezing or rhonchi. Abdominal:      General: Abdomen is flat. There is no distension. Palpations: There is no mass. Tenderness: There is abdominal tenderness in the epigastric area. Musculoskeletal:         General: No swelling, tenderness or signs of injury. Normal range of motion. Cervical back: Normal range of motion and neck supple. Skin:     General: Skin is warm. Capillary Refill: Capillary refill takes less than 2 seconds. Coloration: Skin is not jaundiced or pale. Findings: No erythema. Neurological:      General: No focal deficit present. Mental Status: He is alert and oriented to person, place, and time. Cranial Nerves: No cranial nerve deficit. Sensory: No sensory deficit. Motor: No weakness. Psychiatric:         Mood and Affect: Mood normal.         Behavior: Behavior normal.          MDM  Number of Diagnoses or Management Options  Diagnosis management comments: Differential diagnosis patient is broad.     5:38 PM  Labs are significant for elevated white count of 25,000, creatinine mildly increased to 1.4, normal lipase. 6:42 PM  Urinalysis consistent with infection, will send for culture, lactic mildly elevated at 2.2. Will start Zosyn. Plan for CT scan of abdomen and pelvis    9:02 PM  CT scan shows hiatal hernia, gallstones without any acute signs of cholecystitis or biliary obstruction, significant stool burden: Noted. Diverticular disease noted as well. Plan discussed case with hospitalist for admission. Amount and/or Complexity of Data Reviewed  Clinical lab tests: ordered and reviewed  Tests in the radiology section of CPT®: ordered and reviewed  Review and summarize past medical records: yes  Independent visualization of images, tracings, or specimens: yes    Risk of Complications, Morbidity, and/or Mortality  Presenting problems: moderate  Diagnostic procedures: moderate  Management options: moderate    Patient Progress  Patient progress: stable         Procedures      Results Include:    Recent Results (from the past 24 hour(s))   CBC WITH AUTOMATED DIFF    Collection Time: 10/01/21  4:26 PM   Result Value Ref Range    WBC 25.1 (H) 4.3 - 11.1 K/uL    RBC 4.00 (L) 4.23 - 5.6 M/uL    HGB 11.5 (L) 13.6 - 17.2 g/dL    HCT 37.2 (L) 41.1 - 50.3 %    MCV 93.0 79.6 - 97.8 FL    MCH 28.8 26.1 - 32.9 PG    MCHC 30.9 (L) 31.4 - 35.0 g/dL    RDW 17.8 (H) 11.9 - 14.6 %    PLATELET 642 216 - 634 K/uL    MPV 10.0 9.4 - 12.3 FL    ABSOLUTE NRBC 0.02 0.0 - 0.2 K/uL    DF AUTOMATED      NEUTROPHILS 88 (H) 43 - 78 %    LYMPHOCYTES 6 (L) 13 - 44 %    MONOCYTES 5 4.0 - 12.0 %    EOSINOPHILS 0 (L) 0.5 - 7.8 %    BASOPHILS 0 0.0 - 2.0 %    IMMATURE GRANULOCYTES 2 0.0 - 5.0 %    ABS. NEUTROPHILS 22.0 (H) 1.7 - 8.2 K/UL    ABS. LYMPHOCYTES 1.4 0.5 - 4.6 K/UL    ABS. MONOCYTES 1.1 0.1 - 1.3 K/UL    ABS. EOSINOPHILS 0.0 0.0 - 0.8 K/UL    ABS. BASOPHILS 0.1 0.0 - 0.2 K/UL    ABS. IMM. GRANS.  0.5 0.0 - 0.5 K/UL   METABOLIC PANEL, COMPREHENSIVE    Collection Time: 10/01/21  4:26 PM   Result Value Ref Range Sodium 138 136 - 145 mmol/L    Potassium 4.5 3.5 - 5.1 mmol/L    Chloride 105 98 - 107 mmol/L    CO2 29 21 - 32 mmol/L    Anion gap 4 (L) 7 - 16 mmol/L    Glucose 111 (H) 65 - 100 mg/dL    BUN 21 8 - 23 MG/DL    Creatinine 1.44 0.8 - 1.5 MG/DL    GFR est AA 59 (L) >60 ml/min/1.73m2    GFR est non-AA 49 (L) >60 ml/min/1.73m2    Calcium 9.4 8.3 - 10.4 MG/DL    Bilirubin, total 1.4 (H) 0.2 - 1.1 MG/DL    ALT (SGPT) 12 12 - 65 U/L    AST (SGOT) 12 (L) 15 - 37 U/L    Alk. phosphatase 90 50 - 136 U/L    Protein, total 7.1 6.3 - 8.2 g/dL    Albumin 3.0 (L) 3.2 - 4.6 g/dL    Globulin 4.1 (H) 2.3 - 3.5 g/dL    A-G Ratio 0.7 (L) 1.2 - 3.5     LIPASE    Collection Time: 10/01/21  4:26 PM   Result Value Ref Range    Lipase 36 (L) 73 - 393 U/L   LACTIC ACID    Collection Time: 10/01/21  5:35 PM   Result Value Ref Range    Lactic acid 2.2 (H) 0.4 - 2.0 MMOL/L   URINALYSIS W/ RFLX MICROSCOPIC    Collection Time: 10/01/21  6:07 PM   Result Value Ref Range    Color YELLOW      Appearance CLOUDY      Specific gravity 1.016 1.001 - 1.023      pH (UA) 5.5 5.0 - 9.0      Protein Negative NEG mg/dL    Glucose Negative mg/dL    Ketone Negative NEG mg/dL    Bilirubin Negative NEG      Blood Negative NEG      Urobilinogen 0.2 0.2 - 1.0 EU/dL    Nitrites Positive (A) NEG      Leukocyte Esterase LARGE (A) NEG      WBC >100 (H) 0 /hpf    RBC 0-3 0 /hpf    Epithelial cells 0 0 /hpf    Bacteria 2+ (H) 0 /hpf    Casts 0 0 /lpf     Voice dictation software was used during the making of this note. This software is not perfect and grammatical and other typographical errors may be present. This note has been proofread, but may still contain errors.   Vikram Jeffers MD; 10/1/2021 @6:42 PM   ===================================================================

## 2021-10-01 NOTE — ED TRIAGE NOTES
Family reports pt has become weak and fell. Pt has been seen and needs PET scan for hiatal hernia.   Family member also reports that pt has not been able to eat due to nausea

## 2021-10-02 LAB
ANION GAP SERPL CALC-SCNC: 4 MMOL/L (ref 7–16)
BASOPHILS # BLD: 0 K/UL (ref 0–0.2)
BASOPHILS NFR BLD: 0 % (ref 0–2)
BUN SERPL-MCNC: 21 MG/DL (ref 8–23)
CALCIUM SERPL-MCNC: 8.7 MG/DL (ref 8.3–10.4)
CHLORIDE SERPL-SCNC: 108 MMOL/L (ref 98–107)
CO2 SERPL-SCNC: 27 MMOL/L (ref 21–32)
CREAT SERPL-MCNC: 1.23 MG/DL (ref 0.8–1.5)
DIFFERENTIAL METHOD BLD: ABNORMAL
EOSINOPHIL # BLD: 0.1 K/UL (ref 0–0.8)
EOSINOPHIL NFR BLD: 1 % (ref 0.5–7.8)
ERYTHROCYTE [DISTWIDTH] IN BLOOD BY AUTOMATED COUNT: 17.6 % (ref 11.9–14.6)
GLUCOSE SERPL-MCNC: 100 MG/DL (ref 65–100)
HCT VFR BLD AUTO: 31.1 % (ref 41.1–50.3)
HGB BLD-MCNC: 9.8 G/DL (ref 13.6–17.2)
IMM GRANULOCYTES # BLD AUTO: 0.1 K/UL (ref 0–0.5)
IMM GRANULOCYTES NFR BLD AUTO: 1 % (ref 0–5)
LYMPHOCYTES # BLD: 1.3 K/UL (ref 0.5–4.6)
LYMPHOCYTES NFR BLD: 13 % (ref 13–44)
MAGNESIUM SERPL-MCNC: 1.8 MG/DL (ref 1.8–2.4)
MCH RBC QN AUTO: 28.7 PG (ref 26.1–32.9)
MCHC RBC AUTO-ENTMCNC: 31.5 G/DL (ref 31.4–35)
MCV RBC AUTO: 91.2 FL (ref 79.6–97.8)
MONOCYTES # BLD: 0.6 K/UL (ref 0.1–1.3)
MONOCYTES NFR BLD: 5 % (ref 4–12)
NEUTS SEG # BLD: 8.1 K/UL (ref 1.7–8.2)
NEUTS SEG NFR BLD: 80 % (ref 43–78)
NRBC # BLD: 0 K/UL (ref 0–0.2)
PLATELET # BLD AUTO: 203 K/UL (ref 150–450)
PMV BLD AUTO: 9.7 FL (ref 9.4–12.3)
POTASSIUM SERPL-SCNC: 4.1 MMOL/L (ref 3.5–5.1)
RBC # BLD AUTO: 3.41 M/UL (ref 4.23–5.6)
SODIUM SERPL-SCNC: 139 MMOL/L (ref 136–145)
WBC # BLD AUTO: 10.1 K/UL (ref 4.3–11.1)

## 2021-10-02 PROCEDURE — 74011000250 HC RX REV CODE- 250: Performed by: FAMILY MEDICINE

## 2021-10-02 PROCEDURE — 74011000258 HC RX REV CODE- 258: Performed by: FAMILY MEDICINE

## 2021-10-02 PROCEDURE — 83735 ASSAY OF MAGNESIUM: CPT

## 2021-10-02 PROCEDURE — 80048 BASIC METABOLIC PNL TOTAL CA: CPT

## 2021-10-02 PROCEDURE — 85025 COMPLETE CBC W/AUTO DIFF WBC: CPT

## 2021-10-02 PROCEDURE — 65270000029 HC RM PRIVATE

## 2021-10-02 PROCEDURE — 97162 PT EVAL MOD COMPLEX 30 MIN: CPT

## 2021-10-02 PROCEDURE — 97535 SELF CARE MNGMENT TRAINING: CPT

## 2021-10-02 PROCEDURE — 74011250636 HC RX REV CODE- 250/636: Performed by: FAMILY MEDICINE

## 2021-10-02 PROCEDURE — 36415 COLL VENOUS BLD VENIPUNCTURE: CPT

## 2021-10-02 PROCEDURE — 86580 TB INTRADERMAL TEST: CPT | Performed by: FAMILY MEDICINE

## 2021-10-02 PROCEDURE — 97165 OT EVAL LOW COMPLEX 30 MIN: CPT

## 2021-10-02 PROCEDURE — 74011250637 HC RX REV CODE- 250/637: Performed by: FAMILY MEDICINE

## 2021-10-02 PROCEDURE — 97530 THERAPEUTIC ACTIVITIES: CPT

## 2021-10-02 RX ADMIN — POLYETHYLENE GLYCOL 3350 17 G: 17 POWDER, FOR SOLUTION ORAL at 08:30

## 2021-10-02 RX ADMIN — PANTOPRAZOLE SODIUM 40 MG: 40 TABLET, DELAYED RELEASE ORAL at 07:53

## 2021-10-02 RX ADMIN — Medication 10 ML: at 21:40

## 2021-10-02 RX ADMIN — CLOPIDOGREL BISULFATE 75 MG: 75 TABLET ORAL at 08:33

## 2021-10-02 RX ADMIN — TUBERCULIN PURIFIED PROTEIN DERIVATIVE 5 UNITS: 5 INJECTION, SOLUTION INTRADERMAL at 00:42

## 2021-10-02 RX ADMIN — Medication 10 ML: at 00:41

## 2021-10-02 RX ADMIN — Medication 10 ML: at 21:28

## 2021-10-02 RX ADMIN — SODIUM CHLORIDE 75 ML/HR: 900 INJECTION, SOLUTION INTRAVENOUS at 00:41

## 2021-10-02 RX ADMIN — CEFTRIAXONE 1 G: 1 INJECTION, POWDER, FOR SOLUTION INTRAMUSCULAR; INTRAVENOUS at 00:41

## 2021-10-02 RX ADMIN — FAMOTIDINE 20 MG: 20 TABLET ORAL at 08:33

## 2021-10-02 RX ADMIN — Medication 10 ML: at 06:00

## 2021-10-02 RX ADMIN — ENOXAPARIN SODIUM 30 MG: 30 INJECTION SUBCUTANEOUS at 08:34

## 2021-10-02 NOTE — PROGRESS NOTES
Problem: Mobility Impaired (Adult and Pediatric)  Goal: *Acute Goals and Plan of Care (Insert Text)  Outcome: Progressing Towards Goal  Note: DISCHARGE GOALS ;  (1.)Mr. Mary Verduzco will move from supine to sit and sit to supine  with SUPERVISION . (2.)Mr. Mary Verduzco will transfer from bed to chair and chair to bed with SUPERVISION. (3.)Mr. Mary Verduzco will ambulate with SUPERVISION for 300 feet.    ________________________________________________________________________________________________      PHYSICAL THERAPY: Initial Assessment and AM 10/2/2021  INPATIENT: PT Visit Days : 1  Payor: Irwin Fraser / Plan: [employer] MEDICARE CHOICE PPO/PFFS / Product Type: Managed Care Medicare /       NAME/AGE/GENDER: Theresa Moreno is a 80 y.o. male   PRIMARY DIAGNOSIS: UTI (urinary tract infection) [N39.0]  YAMILEX (acute kidney injury) ([facility] [N17.9] Sepsis ([facility] [address] Sepsis ([facility] [address]        ICD-10: Treatment Diagnosis:    Generalized Muscle Weakness (M62.81)  Other lack of cordination (R27.8)  Difficulty in walking, Not elsewhere classified (R26.2)  Other abnormalities of gait and mobility (R26.89)  Repeated Falls (R29.6)  History of falling (Z91.81)   Precaution/Allergies:  Patient has no known allergies. ASSESSMENT:     Mr. Mary Verduzco presents with general weakness along with unsteady gait & transfers. This pt is currently not functioning at his reported or implied baseline. The facility that pt is living, may be aware of his instability with gait & transfers, therefore may be providing the needed assist for safety. 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 PT will follow up to see if pt can show improved stability with WB'ing activity.     This section established at most recent assessment   PROBLEM LIST (Impairments causing functional limitations):  Decreased Strength  Decreased ADL/Functional Activities  Decreased Transfer Abilities  Decreased Ambulation Ability/Technique  Decreased Balance  Decreased Activity Tolerance  Decreased Flexibility/Joint Mobility   INTERVENTIONS PLANNED: (Benefits and precautions of physical therapy have been discussed with the patient.)  Balance Exercise  Bed Mobility  Gait Training  Transfer Training     TREATMENT PLAN: Frequency/Duration: daily for duration of hospital stay  Rehabilitation Potential For Stated Goals: Good     REHAB RECOMMENDATIONS (at time of discharge pending progress):    Placement: It is my opinion, based on this patient's performance to date, that Mr. Jd Robb may benefit from participating in 1-2 additional therapy sessions in order to continue to assess for rehab potential and then make recommendation for disposition at discharge. Equipment:   None at this time              HISTORY:   History of Present Injury/Illness (Reason for Referral):  Patient is a 80 y.o. male with dementia, who presented to the ED with history of falls and weakness. My history is from the ER staff and records. Patient has dementia and cannot provide reliable history. There is no family present. He apparently is from a assisted living facility. Report is that patient is not eating well and having recurrent epigastric pain, which he relates to a hiatal hernia. Family and facility report that he is becoming increasingly weak and has had a few falls in the last several days. There is been no significant injuries. There is been no report of focal weaknesses, slurred speech, speech difficulty, or change from baseline mental status. There is been no fever/chills, vomiting or diarrhea, chest pain, shortness of breath. He has reported nausea and upset stomach. ER staff reports that he was more talkative earlier, but has since received pain medications and has been sleeping since then.   Past Medical History/Comorbidities:   Mr. Jd Robb  has a past medical history of CAD (coronary artery disease) (7/18/2019), Colon cancer ([facility] [address] (01/2012), and Hiatal hernia. Mr. Param Henriquez  has no past surgical history on file. Social History/Living Environment:   Home Environment[address] Name: 0  One/Two Story Residence: One story  Living Alone: No  Support Systems: Child(margi) (facility staff)  Patient Expects to be Discharged to[de-identified] Assisted living  Prior Level of Function/Work/Activity:  Pt was functioning at an LUISA level of car or supervision. Personal Factors: Other factors that influence how disability is experienced by the patient:  current & PMH   Number of Personal Factors/Comorbidities that affect the Plan of Care: 3+: HIGH COMPLEXITY   EXAMINATION:   Most Recent Physical Functioning:   Gross Assessment:  AROM: Generally decreased, functional  Strength: Generally decreased, functional  Coordination: Generally decreased, functional                    Balance:  Sitting: Intact; Without support  Standing: Impaired; Without support Bed Mobility:  Supine to Sit: Stand-by assistance  Sit to Supine: Stand-by assistance  Scooting: Stand-by assistance       Transfers:  Sit to Stand: Contact guard assistance  Stand to Sit: Contact guard assistance  Bed to Chair: Contact guard assistance (with walker)  Gait:     Speed/Estrellita: Fluctuations  Step Length:  (irregular)  Gait Abnormalities: Decreased step clearance; Path deviations;Trunk sway increased  Distance (ft):  (additional 450ft after an initial 50ft gait assessment)  Assistive Device:  (none)  Ambulation - Level of Assistance: Contact guard assistance   Functional Mobility:         Gait/Ambulation:  cga        Transfers:  cga        Bed Mobility:  sba   Body Structures Involved:  Metabolic  Muscles Body Functions Affected:   Movement Related  Metobolic/Endocrine Activities and Participation Affected:  General Tasks and Demands  Mobility   Number of elements that affect the Plan of Care: 4+: HIGH COMPLEXITY   CLINICAL PRESENTATION: Presentation: Evolving clinical presentation with changing clinical characteristics: MODERATE COMPLEXITY   CLINICAL DECISION MAKIN [facility] Mobility Inpatient Short Form  How much difficulty does the patient currently have. .. Unable A Lot A Little None   1. Turning over in bed (including adjusting bedclothes, sheets and blankets)? [] 1   [] 2   [x] 3   [] 4   2. Sitting down on and standing up from a chair with arms ( e.g., wheelchair, bedside commode, etc.)   [] 1   [] 2   [x] 3   [] 4   3. Moving from lying on back to sitting on the side of the bed? [] 1   [] 2   [x] 3   [] 4   How much help from another person does the patient currently need. .. Total A Lot A Little None   4. Moving to and from a bed to a chair (including a wheelchair)? [] 1   [] 2   [x] 3   [] 4   5. Need to walk in hospital room? [] 1   [] 2   [x] 3   [] 4   6. Climbing 3-5 steps with a railing? [x] 1   [] 2   [] 3   [] 4   © , Trustees of [facility] [employer], under license to [employer]. All rights reserved      Score:  Initial: 16 Most Recent: X (Date: -- )    Interpretation of Tool:  Represents activities that are increasingly more difficult (i.e. Bed mobility, Transfers, Gait). Medical Necessity:     Patient is expected to demonstrate progress in   strength, balance, coordination, and functional technique   to   decrease assistance required with bed mobility, transfers & gait  . Reason for Services/Other Comments:  Patient continues to require skilled intervention due to   Pt unsafe with functional mobility  .    Use of outcome tool(s) and clinical judgement create a POC that gives a: Questionable prediction of patient's progress: MODERATE COMPLEXITY            TREATMENT:   (In addition to Assessment/Re-Assessment sessions the following treatments were rendered)   Pre-treatment Symptoms/Complaints:  pt was agreeable  Pain: Initial: numeric scale  Pain Intensity 1: 0  Post Session:  0/10     Therapeutic Activity: (    23 min (extra time to work through activity noted): Therapeutic activities including prolonged standing balance activity with wt shift  to prep for gait, progressive gait training an additional 450 ft after an initial 50 ft gait assessment, cool down LE AROM to improve mobility, strength, balance, coordination, and dynamic movement of arm - bilateral, leg - bilateral, and core to improve functional endurance & stability . Assessment     Braces/Orthotics/Lines/Etc:   IV  Treatment/Session Assessment:    Response to Treatment:  tolerated well, but impulsive & unpredictable  Interdisciplinary Collaboration:   Occupational Therapist  Registered Nurse  Physician    Certified Nursing Assistant/Patient Care Technician  After treatment position/precautions:   Supine in bed  Bed alarm/tab alert on  Bed/Chair-wheels locked  Bed in low position  Call light within reach  RN notified   Compliance with Program/Exercises: Will assess as treatment progresses  Recommendations/Intent for next treatment session: \"Next visit will focus on reduction in assistance provided\".   Total Treatment Duration:  PT Patient Time In/Time Out  Time In: 0945  Time Out: [address], PT

## 2021-10-02 NOTE — H&P
[facility]  History and Physical    Patient ID:  Ahmagin Ashley Sr  male  6/1/1931  [id number]    Admission Date: 10/1/2021  Chief Complaint: Weakness and falls  Reason for Admission: Sepsis with UTI    ASSESSMENT & PLAN:    Active Hospital Problems    Diagnosis Date Noted    UTI (urinary tract infection) 07/28/2021     Priority: 1 - One    YAMILEX (acute kidney injury) ([facility] [address] 10/01/2021     Priority: 2 - Two    Sepsis ([address] 10/01/2021    Dementia ([address] 12/23/2019    Debility 12/02/2019     Principal Problem:    Sepsis ([address] (10/1/2021)  IV fluids, IV Rocephin, monitor labs    Active Problems:    UTI (urinary tract infection) (7/28/2021)  As noted above      YAMILEX (acute kidney injury) ([address] (10/1/2021)  IV fluids and monitor labs      Debility (12/2/2019)  PT/OT, PPD in case rehab or placement is needed      Dementia ([address] (12/23/2019)  Not on any current medications      Disposition: admit to inpatient, MedSurg  Diet: Regular  VTE ppx: Lovenox   CODE STATUS: Full    Surrogate decision-maker:     HISTORY OF PRESENT ILLNESS:  Patient was discussed with the ER provider prior to seeing the patient. Patient is a 80 y.o. male with dementia, who presented to the ED with history of falls and weakness. My history is from the ER staff and records. Patient has dementia and cannot provide reliable history. There is no family present. He apparently is from a assisted living facility. Report is that patient is not eating well and having recurrent epigastric pain, which he relates to a hiatal hernia. Family and facility report that he is becoming increasingly weak and has had a few falls in the last several days. There is been no significant injuries. There is been no report of focal weaknesses, slurred speech, speech difficulty, or change from baseline mental status. There is been no fever/chills, vomiting or diarrhea, chest pain, shortness of breath. He has reported nausea and upset stomach.   ER staff reports that he was more talkative earlier, but has since received pain medications and has been sleeping since then. REVIEW OF SYSTEMS:  A 14 point review of systems was taken and pertinent positive and negative as per HPI. Review of systems is unreliable due to patient's dementia    No Known Allergies    Prior to Admission Medications   Prescriptions Last Dose Informant Patient Reported? Taking?   aspirin 81 mg chewable tablet   No No   Sig: Take 1 Tab by mouth daily. Patient not taking: Reported on 2021   atorvastatin (LIPITOR) 80 mg tablet   No No   Sig: Take 1 Tablet by mouth nightly. clopidogreL (PLAVIX) 75 mg tab   No No   Sig: Take 1 Tab by mouth daily. famotidine (PEPCID) 20 mg tablet   No No   Sig: Take 1 Tab by mouth daily. nitroglycerin (NITROSTAT) 0.4 mg SL tablet   No No   Si Tab by SubLINGual route every five (5) minutes as needed for Chest Pain. Up to 3 doses. ondansetron (ZOFRAN ODT) 4 mg disintegrating tablet   No No   Sig: Take 1 Tablet by mouth every eight (8) hours as needed for Nausea. pantoprazole (PROTONIX) 40 mg tablet   No No   Sig: Take 1 Tab by mouth Daily (before breakfast). Facility-Administered Medications: None       Past Medical History:   Diagnosis Date    CAD (coronary artery disease) 2019    Colon cancer ([facility] [address] 2012    Hiatal hernia      No past surgical history on file. Social History     Tobacco Use    Smoking status: Never Smoker    Smokeless tobacco: Never Used   Substance Use Topics    Alcohol use: Never       FAMILY HISTORY: Patient unable to provide due to dementia  History reviewed. No pertinent family history.           PHYSICAL EXAM:    Patient Vitals for the past 24 hrs:   Temp Pulse Resp BP SpO2   10/01/21 2107 -- 88 -- (!) 102/59 96 %   10/01/21 1846 -- 87 18 (!) 111/58 94 %   10/01/21 1744 -- 86 -- (!) 114/56 92 %   10/01/21 1724 -- -- -- 92/61 93 %   10/01/21 1704 -- -- -- 103/63 93 %   10/01/21 1618 98.7 °F (37.1 °C) 82 18 (!) 97/52 95 %     Visit Vitals  BP (!) 102/59   Pulse 88   Temp 98.7 °F (37.1 °C)   Resp 18   SpO2 96%       General:    WD and WN, No apparent distress. Eyes:  PERRL; EOMI; sclera normal/non-icteric  HENT:  Normocephalic, without obvious abnormality; Oropharynx is clear with tacky mucous membranes   Resp:    Clear/diminished to auscultation bilaterally. Resp are even and unlabored  CVS/Heart: Regular rate and rhythm,  No LE edema    GI:    Soft, non-tender. Not distended. Bowel sounds normal.     Musc/SK: Muscle strength is unable to be reliably tested; No cyanosis.  No clubbing  Skin:  No obvious rash/lesions  Neurologic: CN II - XII are grossly intact - Eye exam as noted above; moves extremities equally  Psych: Lethargic and oriented x 1;  Judgement and insight are impaired      Intake/Output last 3 shifts:      Labs:  CMP:   Lab Results   Component Value Date/Time     10/01/2021 04:26 PM    K 4.5 10/01/2021 04:26 PM     10/01/2021 04:26 PM    CO2 29 10/01/2021 04:26 PM    AGAP 4 (L) 10/01/2021 04:26 PM     (H) 10/01/2021 04:26 PM    BUN 21 10/01/2021 04:26 PM    CREA 1.44 10/01/2021 04:26 PM    GFRAA 59 (L) 10/01/2021 04:26 PM    GFRNA 49 (L) 10/01/2021 04:26 PM    CA 9.4 10/01/2021 04:26 PM    ALB 3.0 (L) 10/01/2021 04:26 PM    TBILI 1.4 (H) 10/01/2021 04:26 PM    TP 7.1 10/01/2021 04:26 PM    GLOB 4.1 (H) 10/01/2021 04:26 PM    AGRAT 0.7 (L) 10/01/2021 04:26 PM    ALT 12 10/01/2021 04:26 PM         CBC:    Lab Results   Component Value Date/Time    WBC 25.1 (H) 10/01/2021 04:26 PM    HGB 11.5 (L) 10/01/2021 04:26 PM    HCT 37.2 (L) 10/01/2021 04:26 PM     10/01/2021 04:26 PM       No results found for: INR, PTMR, PTP, PT1, PT2, INREXT    ABG:  No results found for: PH, PHI, PCO2, PCO2I, PO2, PO2I, HCO3, HCO3I, FIO2, FIO2I        Lab Results   Component Value Date/Time    CK 98 12/02/2019 07:58 PM    Troponin-I, Qt. <0.02 (L) 12/27/2019 06:15 PM       Imaging & Other Studies:  CT ABD PELV W CONT    Result Date: 10/1/2021  CT OF THE ABDOMEN AND PELVIS WITH CONTRAST:          CLINICAL HISTORY:   Upper abdominal pain and vomiting. Now with leukocytosis, anemia, lactic acidemia, and bacteriuria. TECHNIQUE:  Oral and nonionic intravenous contrast was administered, and the abdomen and pelvis were scanned with spiral technique. Radiation dose reduction was achieved using one or all of the following techniques: automated exposure control, weight-based dosing, iterative reconstruction. COMPARISON:  None. CT ABDOMEN FINDINGS: There is linear atelectasis at both lung bases. Large hiatal hernia. Faintly calcified stones in the dependent portion of the gallbladder without pericholecystic inflammatory changes or biliary ductal dilatation. The liver, spleen, pancreas, adrenals, and kidneys appear unremarkable. Extensive aortoiliac atherosclerotic calcification and mild ectasia without carole aneurysm. CT PELVIS FINDINGS: The appendix is not confidently identified, but there are no secondary signs of appendicitis. Diverticula are scattered in the colon without adjacent inflammatory to suggest diverticulitis. Large amount of stool in the rectal vault with transverse diameter of 8.5 cm suggests possible fecal impaction. No pathologically enlarged lymph nodes or free fluid is evident. The prostate is not enlarged. Bone windows demonstrate no definite aggressive process, accounting for degenerative changes. 1. Cholelithiasis without biliary ductal dilatation or findings to suggest cholecystitis. 2.  Large amount of stool in the rectal vault suggesting possible fecal impaction. 3.  Scattered colonic diverticulosis and nonvisualization of the appendix with no secondary signs of appendicitis, diverticulitis, or other acute abdominopelvic abnormality identified. 4.  Large hiatal hernia.      EKG Results     None          Active Problems:  Patient Active Problem List    Diagnosis Date Noted  UTI (urinary tract infection) 07/28/2021    YAMILEX (acute kidney injury) ([facility] 10/01/2021    Sepsis ([facility] 10/01/2021    Falls frequently 07/28/2021    Dementia ([facility] 12/23/2019    Debility 12/02/2019         Monserrat Marquez MD  [facility]  10/1/2021 9:56 PM

## 2021-10-02 NOTE — PROGRESS NOTES
Problem: Self Care Deficits Care Plan (Adult)  Goal: *Acute Goals and Plan of Care (Insert Text)  Outcome: Progressing Towards Goal  Note: 1. Patient will perform grooming with supervision. 2. Patient will perform Upper body dressing with supervision  3. Patient will perform lower body dressing with min assist.  4. Patient will perform upper and lower body bathing with  min assist.  5. Patient will perform toilet transfers with CGA. 6. Patient will perform shower transfer with CGA. 7. Patient will participate in 30 + minutes of ADL/ therapeutic exercise/therapeutic activity with min rest breaks to increase activity tolerance for self care. 8. Patient will perform ADL functional mobility in room with CGA. Goals to be achieved in 7 days. OCCUPATIONAL THERAPY: Initial Assessment and AM 10/2/2021  INPATIENT: OT Visit Days: 1  Payor: Joshua Barr / Plan: [facility] HU[employer]NA MEDICARE CHOICE PPO/PFFS / Product Type: Managed Care Medicare /      NAME/AGE/GENDER: Priyanka Moore is a 80 y.o. male   PRIMARY DIAGNOSIS:  UTI (urinary tract infection) [N39.0]  YAMILEX (acute kidney injury) ([facility] [N17.9] Sepsis ([facility] Sepsis ([facility]        ICD-10: Treatment Diagnosis:    Other lack of cordination (R27.8)  Difficulty in walking, Not elsewhere classified (R26.2)  Other abnormalities of gait and mobility (R26.89)   Precautions/Allergies:     Patient has no known allergies. ASSESSMENT:     Mr. Miko Patel presents supine in bed s/p walking with PT and using the bathroom with CNA. He needed assist with brief and hygiene. He sat on the EOB and bathed with mod assist. He perseverates with conversation and needs constant cues to attend to task. He changed his gown with mod assist. He stood and took steps to [facility] with CGA. He lives at an [facility], and his ADL status prior to admit is unknown. He returend to supine and bed alarm was on. Will follow for acute care OT services.      This section established at most recent assessment PROBLEM LIST (Impairments causing functional limitations):  Decreased ADL/Functional Activities  Decreased Transfer Abilities  Decreased Ambulation Ability/Technique  Decreased Balance  Decreased Cognition   INTERVENTIONS PLANNED: (Benefits and precautions of occupational therapy have been discussed with the patient.)  Activities of daily living training  Adaptive equipment training  Balance training  Clothing management  Donning&doffing training  Neuromuscular re-eduation  Therapeutic activity  Therapeutic exercise     TREATMENT PLAN: Frequency/Duration: Follow patient 3 times to address above goals. Rehabilitation Potential For Stated Goals: Good     REHAB RECOMMENDATIONS (at time of discharge pending progress):    Placement: It is my opinion, based on this patient's performance to date, that Mr. Nadiya Tariq may benefit from [address] after discharge due to the functional deficits listed above that are likely to improve with skilled rehabilitation because he/she has multiple medical issues that affect his/her functional mobility in the community. if he does not achieve his baseline status  Equipment:   None at this time              OCCUPATIONAL PROFILE AND HISTORY:   History of Present Injury/Illness (Reason for Referral):  See H and p  Past Medical History/Comorbidities:   Mr. Nadiya Tariq  has a past medical history of CAD (coronary artery disease) (7/18/2019), Colon cancer ([facility] [address] (01/2012), and Hiatal hernia. Mr. Nadiya Tariq  has no past surgical history on file. Social History/Living Environment:   Home Environment: (P) Assisted living  Support Systems: Child(margi), Assisted Living  Prior Level of Function/Work/Activity:  Unknown ADL status.      Number of Personal Factors/Comorbidities that affect the Plan of Care: Brief history (0):  LOW COMPLEXITY   ASSESSMENT OF OCCUPATIONAL PERFORMANCE[de-identified]   Activities of Daily Living:   Basic ADLs (From Assessment) Complex ADLs (From Assessment)   Oral Facial Hygiene/Grooming: Supervision (wash his face)  Bathing: Moderate assistance  Upper Body Dressing: Moderate assistance  Lower Body Dressing: Moderate assistance  Toileting: Moderate assistance (taped brief)     Grooming/Bathing/Dressing Activities of Daily Living   Grooming  Grooming Assistance: Supervision  Washing Face: Supervision     Upper Body Bathing  Bathing Assistance: Moderate assistance  Position Performed: Seated in chair     Lower Body Bathing  Bathing Assistance: Moderate assistance  Lower Body : Minimum assistance; Moderate assistance     Upper Body Dressing Assistance  Dressing Assistance: Moderate assistance  Hospital Gown:  Moderate assistance       Bed/Mat Mobility  Sit to Supine: Contact guard assistance  Sit to Stand: Contact guard assistance  Stand to Sit: Contact guard assistance     Most Recent Physical Functioning:   Gross Assessment:                  Posture:     Balance:  Sitting: High guard  Standing: With support Bed Mobility:  Sit to Supine: Contact guard assistance  Wheelchair Mobility:     Transfers:  Sit to Stand: Contact guard assistance  Stand to Sit: Contact guard assistance            Patient Vitals for the past 6 hrs:   BP BP Patient Position SpO2 Pulse   10/02/21 0725 107/69 Supine 98 % 75       Mental Status  Neurologic State: Alert, Confused  Orientation Level: Disoriented to place, Disoriented to situation, Disoriented to time  Cognition: Decreased attention/concentration, Decreased command following, Impaired decision making  Perseveration: Perseverates during conversation                          Physical Skills Involved:  Balance  Strength  Activity Tolerance Cognitive Skills Affected (resulting in the inability to perform in a timely and safe manner):  Perception  Immediate Memory  Short Term Recall  Long Term Memory  Comprehension Psychosocial Skills Affected:  Habits/Routines  Environmental Adaptation  Self-Awareness   Number of elements that affect the Plan of Care: 5+: HIGH COMPLEXITY   CLINICAL DECISION MAKIN[address] [place] [address] AM-PAC 6 Clicks   Daily Activity Inpatient Short Form  How much help from another person does the patient currently need. .. Total A Lot A Little None   1. Putting on and taking off regular lower body clothing? [] 1   [x] 2   [] 3   [] 4   2. Bathing (including washing, rinsing, drying)? [] 1   [x] 2   [] 3   [] 4   3. Toileting, which includes using toilet, bedpan or urinal?   [] 1   [x] 2   [] 3   [] 4   4. Putting on and taking off regular upper body clothing? [] 1   [x] 2   [] 3   [] 4   5. Taking care of personal grooming such as brushing teeth? [] 1   [] 2   [x] 3   [] 4   6. Eating meals? [] 1   [] 2   [x] 3   [] 4   © , Trustees of [address] [place] [address], under license to [employer]. All rights reserved      Score:  Initial: 14 Most Recent: X (Date: -- )    Interpretation of Tool:  Represents activities that are increasingly more difficult (i.e. Bed mobility, Transfers, Gait). Medical Necessity:     · Patient is expected to demonstrate progress in   · Self care skills and functional mobility  ·     Reason for Services/Other Comments:  · Patient continues to require skilled intervention due to   · Above listed deficits     Use of outcome tool(s) and clinical judgement create a POC that gives a: LOW COMPLEXITY         TREATMENT:   (In addition to Assessment/Re-Assessment sessions the following treatments were rendered)     Pre-treatment Symptoms/Complaints:    Pain: Initial:   Pain Intensity 1: 0  Post Session:  0/10     Self Care: (10): Procedure(s) (per grid) utilized to improve and/or restore self-care/home management as related to dressing, bathing, grooming, and functional tranfers . Required maximal visual, verbal, manual, and tactile cueing to facilitate activities of daily living skills and compensatory activities.     Assessment/Reassessment  complete    Braces/Orthotics/Lines/Etc:   IV  O2 Device: None (Room air)  Treatment/Session Assessment:    Response to Treatment:  tolerated well,   Interdisciplinary Collaboration:   Physical Therapist  Occupational Therapist  Registered Nurse  Certified Nursing Assistant/Patient Care Technician  After treatment position/precautions:   Supine in bed  Bed alarm/tab alert on  Bed/Chair-wheels locked  Bed in low position  Call light within reach  RN notified  Side rails x 3   Compliance with Program/Exercises: Will assess as treatment progresses. Recommendations/Intent for next treatment session: \"Next visit will focus on advancements to more challenging activities and reduction in assistance provided\".   Total Treatment Duration:10  OT Patient Time In/Time Out  Time In: 0475  Time Out: 0681 [facility]

## 2021-10-02 NOTE — PROGRESS NOTES
SW reviewed patient's chart and conducted a baseline assessment. Discharge plan at this time is as follows:     Care Management Interventions  PCP Verified by CM: Yes  Mode of Transport at Discharge: Self  Transition of Care Consult (CM Consult): Discharge Planning, Assisted Living  Physical Therapy Consult: Yes  Occupational Therapy Consult: Yes  Support Systems: Child(margi), Assisted Living  Confirm Follow Up Transport: Family  Discharge Location  Discharge Placement: Assisted Living ([facility])      *Please note that discharge plans can change throughout an inpatient admission.  Ensure that you are referring to the most recent social work/nurse case management note for current discharge plan*     Julia Archuleta, [address] Work   [facility]    * [email or web address]

## 2021-10-02 NOTE — ROUTINE PROCESS
TRANSFER - OUT REPORT:    Verbal report given to charge nurse on Nae Dent  being transferred to [facility] for routine progression of care       Report consisted of patients Situation, Background, Assessment and   Recommendations(SBAR). Information from the following report(s) ED Summary was reviewed with the receiving nurse. Lines:   Peripheral IV 10/01/21 Right Antecubital (Active)   Site Assessment Clean, dry, & intact 10/01/21 1623   Phlebitis Assessment 0 10/01/21 1623   Dressing Status Clean, dry, & intact 10/01/21 1623   Dressing Type Gauze;Transparent;Tape 10/01/21 1623   Hub Color/Line Status Pink;Flushed 10/01/21 1623   Action Taken Blood drawn 10/01/21 1623       Peripheral IV 10/01/21 Left Antecubital (Active)   Site Assessment Clean, dry, & intact 10/01/21 1730   Phlebitis Assessment 0 10/01/21 1730   Infiltration Assessment 0 10/01/21 1730   Dressing Status Clean, dry, & intact 10/01/21 1730   Dressing Type Transparent 10/01/21 1730   Hub Color/Line Status Patent; Flushed 10/01/21 1730   Action Taken Blood drawn 10/01/21 1730        Opportunity for questions and clarification was provided.       Patient transported with:   [employer]

## 2021-10-02 NOTE — PROGRESS NOTES
[facility] Hospitalist Progress Note     Name: Chyna Herrera Sr Age: 80 y.o. Sex: male  : 1931    MRN:     [id number]    Admit Date:  10/1/2021    Presenting Complaint:   Fall and Fatigue      Reason(s) for Admission:   UTI (urinary tract infection) [N39.0]  YAMILEX (acute kidney injury) [facility]) [N17.9]       Hospital Course and Interval History:     Please refer to the admission H&P for details of presentation. In summary, Tristian Ortiz is a 80 y.o. male with medical history significant for dementia, CAD who presented from assisted living facility due to not eating well, recurrent epigastric pain, increasingly weak with few falls in the past several days. Work-up in the ED showed urine of 2+ bacteria, > 100 WBC, large leukocyte esterase and positive nitrites. Subjective/24 hr Events (10/02/21) : Patient is seen and examined at bedside. No acute events reported overnight by nursing staff. Pleasantly confused. AAOx2. Able to participate with PT today. Patient denies fever, chills, chest pains, shortness of breath, n/v, abdominal pain. Reports that he has urinary frequency without any dysuria. Review of Systems: 10 point review of systems is otherwise negative with the exception of the elements mentioned above. Assessment & Plan     Acute cystitis  UA of 2+ bacteria, > 100 WBC, large leukocyte esterase and positive nitrites. - on ceftriaxone  - follow up BCx and UCx     Sepsis ruled out  Does not meet criteria for sepsis. Patient is afebrile, not tachycardic nor tachypneic. Has leukocytosis with results. YAMILEX  Cr on admission of 1.44 with baseline around 1. Continue with IV fluids. Creatinine seems to have improved today to 1.23    Dementia: not on medications at home    Diet:  ADULT DIET Regular  DVT PPx: lovenox  Code: Full Code      I have reviewed and ordered clinical lab tests and independently visualized images, tracing.      Problem List:  Hospital Problems as of 10/2/2021 Date Reviewed: 7/23/2019        Codes Class Noted - Resolved POA    YAMILEX (acute kidney injury) [facility]) ICD-10-CM: N17.9  ICD-9-CM: 584.9  10/1/2021 - Present Yes        UTI (urinary tract infection) ICD-10-CM: N39.0  ICD-9-CM: 599.0  7/28/2021 - Present Yes        Dementia ([address] (Chronic) ICD-10-CM: F03.90  ICD-9-CM: 294.20  12/23/2019 - Present Yes        Debility (Chronic) ICD-10-CM: R53.81  ICD-9-CM: 799.3  12/2/2019 - Present Yes                 Objective:     Patient Vitals for the past 24 hrs:   Temp Pulse Resp BP SpO2   10/02/21 1515 97.5 °F (36.4 °C) 95 18 124/76 96 %   10/02/21 1115 98.3 °F (36.8 °C) 86 18 110/70 96 %   10/02/21 0747 -- -- -- -- 97 %   10/02/21 0725 97.5 °F (36.4 °C) 75 18 107/69 98 %   10/02/21 0342 99.8 °F (37.7 °C) 82 18 102/66 94 %   10/01/21 2356 98.9 °F (37.2 °C) 74 18 106/63 --   10/01/21 2228 -- -- -- (!) 115/56 --   10/01/21 2148 -- -- -- (!) 116/59 94 %   10/01/21 2128 -- -- -- 117/60 93 %   10/01/21 2107 -- 88 -- (!) 102/59 96 %   10/01/21 1846 -- 87 18 (!) 111/58 94 %   10/01/21 1744 -- 86 -- (!) 114/56 92 %   10/01/21 1724 -- -- -- 92/61 93 %   10/01/21 1704 -- -- -- 103/63 93 %     Oxygen Therapy  O2 Sat (%): 96 % (10/02/21 1515)  Pulse via Oximetry: 80 beats per minute (10/02/21 0747)  O2 Device: None (Room air) (10/02/21 4925)    Estimated body mass index is 18.65 kg/m² as calculated from the following:    Height as of this encounter: 5' 10\" (1.778 m). Weight as of this encounter: 59 kg (130 lb). Intake/Output Summary (Last 24 hours) at 10/2/2021 1653  Last data filed at 10/2/2021 1440  Gross per 24 hour   Intake 600 ml   Output --   Net 600 ml           Physical Exam:   General:     alert, awake   Head:   Normocephalic, atraumatic  Eyes:   anicteric sclerae, normal conjunctiva,  EOMI.  ENT:   Nares appear normal, no drainage. Moist oral mucosa  Neck:    supple, non-tender. Trachea midline. Lungs:   CTAB, no wheezing.  Respirations even  Cardiac:   RRR, Normal S1 and S2.    Abdomen:   Soft, non distended, nontender, +BS, no guarding/rebound  Extremities:   No edema , pedal pulses present, no cyanosis  Skin:   Warm, dry, normal coloration and texture  Neuro:  AAOx32. No gross focal neurological deficit  Psychiatric:  No anxiety, calm, cooperative    Data Review:  I have reviewed ordered lab tests and independently visualized imaging below:    Last 24hr Labs:  Recent Results (from the past 24 hour(s))   CULTURE, BLOOD    Collection Time: 10/01/21  5:20 PM    Specimen: Blood   Result Value Ref Range    Special Requests: RIGHT  Antecubital        Culture result: NO GROWTH AFTER 13 HOURS     LACTIC ACID    Collection Time: 10/01/21  5:35 PM   Result Value Ref Range    Lactic acid 2.2 (H) 0.4 - 2.0 MMOL/L   CULTURE, BLOOD    Collection Time: 10/01/21  5:35 PM    Specimen: Blood   Result Value Ref Range    Special Requests: LEFT  Antecubital        Culture result: NO GROWTH AFTER 13 HOURS     PROCALCITONIN    Collection Time: 10/01/21  5:35 PM   Result Value Ref Range    Procalcitonin 2.66 ng/mL   URINALYSIS W/ RFLX MICROSCOPIC    Collection Time: 10/01/21  6:07 PM   Result Value Ref Range    Color YELLOW      Appearance CLOUDY      Specific gravity 1.016 1.001 - 1.023      pH (UA) 5.5 5.0 - 9.0      Protein Negative NEG mg/dL    Glucose Negative mg/dL    Ketone Negative NEG mg/dL    Bilirubin Negative NEG      Blood Negative NEG      Urobilinogen 0.2 0.2 - 1.0 EU/dL    Nitrites Positive (A) NEG      Leukocyte Esterase LARGE (A) NEG      WBC >100 (H) 0 /hpf    RBC 0-3 0 /hpf    Epithelial cells 0 0 /hpf    Bacteria 2+ (H) 0 /hpf    Casts 0 0 /lpf   CULTURE, URINE    Collection Time: 10/01/21  6:07 PM    Specimen: Clean catch; Urine   Result Value Ref Range    Special Requests: NO SPECIAL REQUESTS      Culture result:        NO GROWTH AFTER SHORT PERIOD OF INCUBATION. FURTHER RESULTS TO FOLLOW AFTER OVERNIGHT INCUBATION.    LACTIC ACID    Collection Time: 10/01/21  7:48 PM   Result Value Ref Range    Lactic acid 1.6 0.4 - 2.0 MMOL/L   METABOLIC PANEL, BASIC    Collection Time: 10/02/21  4:50 AM   Result Value Ref Range    Sodium 139 136 - 145 mmol/L    Potassium 4.1 3.5 - 5.1 mmol/L    Chloride 108 (H) 98 - 107 mmol/L    CO2 27 21 - 32 mmol/L    Anion gap 4 (L) 7 - 16 mmol/L    Glucose 100 65 - 100 mg/dL    BUN 21 8 - 23 MG/DL    Creatinine 1.23 0.8 - 1.5 MG/DL    GFR est AA >60 >60 ml/min/1.73m2    GFR est non-AA 59 (L) >60 ml/min/1.73m2    Calcium 8.7 8.3 - 10.4 MG/DL   MAGNESIUM    Collection Time: 10/02/21  4:50 AM   Result Value Ref Range    Magnesium 1.8 1.8 - 2.4 mg/dL   CBC WITH AUTOMATED DIFF    Collection Time: 10/02/21  4:50 AM   Result Value Ref Range    WBC 10.1 4.3 - 11.1 K/uL    RBC 3.41 (L) 4.23 - 5.6 M/uL    HGB 9.8 (L) 13.6 - 17.2 g/dL    HCT 31.1 (L) 41.1 - 50.3 %    MCV 91.2 79.6 - 97.8 FL    MCH 28.7 26.1 - 32.9 PG    MCHC 31.5 31.4 - 35.0 g/dL    RDW 17.6 (H) 11.9 - 14.6 %    PLATELET 691 550 - 269 K/uL    MPV 9.7 9.4 - 12.3 FL    ABSOLUTE NRBC 0.00 0.0 - 0.2 K/uL    DF AUTOMATED      NEUTROPHILS 80 (H) 43 - 78 %    LYMPHOCYTES 13 13 - 44 %    MONOCYTES 5 4.0 - 12.0 %    EOSINOPHILS 1 0.5 - 7.8 %    BASOPHILS 0 0.0 - 2.0 %    IMMATURE GRANULOCYTES 1 0.0 - 5.0 %    ABS. NEUTROPHILS 8.1 1.7 - 8.2 K/UL    ABS. LYMPHOCYTES 1.3 0.5 - 4.6 K/UL    ABS. MONOCYTES 0.6 0.1 - 1.3 K/UL    ABS. EOSINOPHILS 0.1 0.0 - 0.8 K/UL    ABS. BASOPHILS 0.0 0.0 - 0.2 K/UL    ABS. IMM. GRANS. 0.1 0.0 - 0.5 K/UL       All Micro Results     Procedure Component Value Units Date/Time    CULTURE, URINE [[id number]] Collected: 10/01/21 1807    Order Status: Completed Specimen: Urine from Clean catch Updated: 10/02/21 0912     Special Requests: NO SPECIAL REQUESTS        Culture result:       NO GROWTH AFTER SHORT PERIOD OF INCUBATION. FURTHER RESULTS TO FOLLOW AFTER OVERNIGHT INCUBATION.           CULTURE, BLOOD [[id number]] Collected: 10/01/21 1720    Order Status: Completed Specimen: Blood Updated: 10/02/21 5371     Special Requests: --        RIGHT  Antecubital       Culture result: NO GROWTH AFTER 13 HOURS       CULTURE, BLOOD [[id number]] Collected: 10/01/21 1735    Order Status: Completed Specimen: Blood Updated: 10/02/21 0719     Special Requests: --        LEFT  Antecubital       Culture result: NO GROWTH AFTER 13 HOURS             EKG Results     None          Other Studies:  CT ABD PELV W CONT    Result Date: 10/1/2021  CT OF THE ABDOMEN AND PELVIS WITH CONTRAST:          CLINICAL HISTORY:   Upper abdominal pain and vomiting. Now with leukocytosis, anemia, lactic acidemia, and bacteriuria. TECHNIQUE:  Oral and nonionic intravenous contrast was administered, and the abdomen and pelvis were scanned with spiral technique. Radiation dose reduction was achieved using one or all of the following techniques: automated exposure control, weight-based dosing, iterative reconstruction. COMPARISON:  None. CT ABDOMEN FINDINGS: There is linear atelectasis at both lung bases. Large hiatal hernia. Faintly calcified stones in the dependent portion of the gallbladder without pericholecystic inflammatory changes or biliary ductal dilatation. The liver, spleen, pancreas, adrenals, and kidneys appear unremarkable. Extensive aortoiliac atherosclerotic calcification and mild ectasia without carole aneurysm. CT PELVIS FINDINGS: The appendix is not confidently identified, but there are no secondary signs of appendicitis. Diverticula are scattered in the colon without adjacent inflammatory to suggest diverticulitis. Large amount of stool in the rectal vault with transverse diameter of 8.5 cm suggests possible fecal impaction. No pathologically enlarged lymph nodes or free fluid is evident. The prostate is not enlarged. Bone windows demonstrate no definite aggressive process, accounting for degenerative changes. 1. Cholelithiasis without biliary ductal dilatation or findings to suggest cholecystitis.  2.  Large amount of stool in the rectal vault suggesting possible fecal impaction. 3.  Scattered colonic diverticulosis and nonvisualization of the appendix with no secondary signs of appendicitis, diverticulitis, or other acute abdominopelvic abnormality identified. 4.  Large hiatal hernia. Current Meds:   Current Facility-Administered Medications   Medication Dose Route Frequency    sodium chloride (NS) flush 5-40 mL  5-40 mL IntraVENous Q8H    sodium chloride (NS) flush 5-40 mL  5-40 mL IntraVENous PRN    clopidogreL (PLAVIX) tablet 75 mg  75 mg Oral DAILY    famotidine (PEPCID) tablet 20 mg  20 mg Oral DAILY    pantoprazole (PROTONIX) tablet 40 mg  40 mg Oral ACB    0.9% sodium chloride infusion  75 mL/hr IntraVENous CONTINUOUS    sodium chloride (NS) flush 5-40 mL  5-40 mL IntraVENous Q8H    sodium chloride (NS) flush 5-40 mL  5-40 mL IntraVENous PRN    acetaminophen (TYLENOL) tablet 650 mg  650 mg Oral Q6H PRN    Or    acetaminophen (TYLENOL) suppository 650 mg  650 mg Rectal Q6H PRN    polyethylene glycol (MIRALAX) packet 17 g  17 g Oral DAILY    promethazine (PHENERGAN) tablet 12.5 mg  12.5 mg Oral Q6H PRN    Or    ondansetron (ZOFRAN) injection 4 mg  4 mg IntraVENous Q6H PRN    enoxaparin (LOVENOX) injection 30 mg  30 mg SubCUTAneous DAILY    cefTRIAXone (ROCEPHIN) 1 g in 0.9% sodium chloride (MBP/ADV) 50 mL MBP  1 g IntraVENous Q24H    tuberculin injection 5 Units  5 Units IntraDERMal ONCE       Part of this note was written by using a voice dictation software and the note has been proof read but may still contain some grammatical/other typographical errors.     Signed By: Randall Flanagan MD   [facility] Hospitalist Service    October 2, 2021  7:45 AM

## 2021-10-02 NOTE — PROGRESS NOTES
Patient discussed during rounds. Patient has a reported history of dementia, PT recommending 24/7 supervision and currently unsure of living situation. It was later discovered that the patient lives in [place] [address] but facility is unknown. SW called the patient's son and emergency contact Darnell Castillo with no answer, LINH. [place] then called Kavya Gilliam, the wife of the patient's youngest son Jayda Kim who provided background information. Kavya Stoneshanique states that the patient has lived at [facility] since August. He does not require assistive devices to ambulate and is independent in ADLs with prompting. Patient is seen at the [place] for primary care. Patient is also seen for outpatient PT per chart review. Plan at this time will be to return to his [facility] with outpatient PT resumed.      Erich Severin, LMSW    [facility]    * [email or web address]

## 2021-10-03 PROBLEM — N17.9 AKI (ACUTE KIDNEY INJURY) (HCC): Status: RESOLVED | Noted: 2021-10-01 | Resolved: 2021-10-03

## 2021-10-03 LAB
ANION GAP SERPL CALC-SCNC: 3 MMOL/L (ref 7–16)
BASOPHILS # BLD: 0 K/UL (ref 0–0.2)
BASOPHILS NFR BLD: 0 % (ref 0–2)
BUN SERPL-MCNC: 18 MG/DL (ref 8–23)
CALCIUM SERPL-MCNC: 8.7 MG/DL (ref 8.3–10.4)
CHLORIDE SERPL-SCNC: 110 MMOL/L (ref 98–107)
CO2 SERPL-SCNC: 27 MMOL/L (ref 21–32)
CREAT SERPL-MCNC: 1.02 MG/DL (ref 0.8–1.5)
DIFFERENTIAL METHOD BLD: ABNORMAL
EOSINOPHIL # BLD: 0.2 K/UL (ref 0–0.8)
EOSINOPHIL NFR BLD: 3 % (ref 0.5–7.8)
ERYTHROCYTE [DISTWIDTH] IN BLOOD BY AUTOMATED COUNT: 17.7 % (ref 11.9–14.6)
GLUCOSE SERPL-MCNC: 104 MG/DL (ref 65–100)
HCT VFR BLD AUTO: 30.7 % (ref 41.1–50.3)
HGB BLD-MCNC: 9.6 G/DL (ref 13.6–17.2)
IMM GRANULOCYTES # BLD AUTO: 0.1 K/UL (ref 0–0.5)
IMM GRANULOCYTES NFR BLD AUTO: 1 % (ref 0–5)
LYMPHOCYTES # BLD: 1.3 K/UL (ref 0.5–4.6)
LYMPHOCYTES NFR BLD: 21 % (ref 13–44)
MCH RBC QN AUTO: 28.6 PG (ref 26.1–32.9)
MCHC RBC AUTO-ENTMCNC: 31.3 G/DL (ref 31.4–35)
MCV RBC AUTO: 91.4 FL (ref 79.6–97.8)
MM INDURATION POC: 0 MM (ref 0–5)
MM INDURATION POC: 0 MM (ref 0–5)
MONOCYTES # BLD: 0.5 K/UL (ref 0.1–1.3)
MONOCYTES NFR BLD: 8 % (ref 4–12)
NEUTS SEG # BLD: 4.2 K/UL (ref 1.7–8.2)
NEUTS SEG NFR BLD: 67 % (ref 43–78)
NRBC # BLD: 0 K/UL (ref 0–0.2)
PLATELET # BLD AUTO: 214 K/UL (ref 150–450)
PMV BLD AUTO: 10.1 FL (ref 9.4–12.3)
POTASSIUM SERPL-SCNC: 3.7 MMOL/L (ref 3.5–5.1)
PPD POC: NEGATIVE NEGATIVE
PPD POC: NORMAL
RBC # BLD AUTO: 3.36 M/UL (ref 4.23–5.6)
SODIUM SERPL-SCNC: 140 MMOL/L (ref 136–145)
WBC # BLD AUTO: 6.3 K/UL (ref 4.3–11.1)

## 2021-10-03 PROCEDURE — 97530 THERAPEUTIC ACTIVITIES: CPT

## 2021-10-03 PROCEDURE — 80048 BASIC METABOLIC PNL TOTAL CA: CPT

## 2021-10-03 PROCEDURE — 74011250637 HC RX REV CODE- 250/637: Performed by: FAMILY MEDICINE

## 2021-10-03 PROCEDURE — 36415 COLL VENOUS BLD VENIPUNCTURE: CPT

## 2021-10-03 PROCEDURE — 74011250636 HC RX REV CODE- 250/636: Performed by: FAMILY MEDICINE

## 2021-10-03 PROCEDURE — 74011250637 HC RX REV CODE- 250/637: Performed by: INTERNAL MEDICINE

## 2021-10-03 PROCEDURE — 74011000258 HC RX REV CODE- 258: Performed by: FAMILY MEDICINE

## 2021-10-03 PROCEDURE — 85025 COMPLETE CBC W/AUTO DIFF WBC: CPT

## 2021-10-03 PROCEDURE — 65270000029 HC RM PRIVATE

## 2021-10-03 RX ADMIN — CEFTRIAXONE 1 G: 1 INJECTION, POWDER, FOR SOLUTION INTRAMUSCULAR; INTRAVENOUS at 00:05

## 2021-10-03 RX ADMIN — Medication 1 AMPULE: at 10:11

## 2021-10-03 RX ADMIN — Medication 10 ML: at 21:26

## 2021-10-03 RX ADMIN — Medication 10 ML: at 06:10

## 2021-10-03 RX ADMIN — POLYETHYLENE GLYCOL 3350 17 G: 17 POWDER, FOR SOLUTION ORAL at 08:16

## 2021-10-03 RX ADMIN — CEFTRIAXONE 1 G: 1 INJECTION, POWDER, FOR SOLUTION INTRAMUSCULAR; INTRAVENOUS at 23:15

## 2021-10-03 RX ADMIN — ENOXAPARIN SODIUM 30 MG: 30 INJECTION SUBCUTANEOUS at 08:00

## 2021-10-03 RX ADMIN — Medication 10 ML: at 14:00

## 2021-10-03 RX ADMIN — PANTOPRAZOLE SODIUM 40 MG: 40 TABLET, DELAYED RELEASE ORAL at 08:00

## 2021-10-03 RX ADMIN — CLOPIDOGREL BISULFATE 75 MG: 75 TABLET ORAL at 08:00

## 2021-10-03 RX ADMIN — Medication 1 AMPULE: at 21:25

## 2021-10-03 RX ADMIN — FAMOTIDINE 20 MG: 20 TABLET ORAL at 08:00

## 2021-10-03 NOTE — PROGRESS NOTES
[facility]ist Progress Note     Name: Robles Navarro Sr Age: 80 y.o. Sex: male  : 1931    MRN:     [id number]    Admit Date:  10/1/2021    Presenting Complaint:   Fall and Fatigue      Reason(s) for Admission:   UTI (urinary tract infection) [N39.0]  YAMILEX (acute kidney injury) [facility]) [N17.9]       Hospital Course and Interval History:     Please refer to the admission H&P for details of presentation. In summary, Karoline Carvalho is a 80 y.o. male with medical history significant for dementia, CAD who presented from assisted living facility due to not eating well, recurrent epigastric pain, increasingly weak with few falls in the past several days. Work-up in the ED showed urine of 2+ bacteria, > 100 WBC, large leukocyte esterase and positive nitrites. Subjective/24 hr Events (10/03/21) : Patient is seen and examined at bedside. No acute events reported overnight by nursing staff. Pleasantly confused. AAOx2. Able to participate with PT today. Patient denies fever, chills, chest pains, shortness of breath, n/v, abdominal pain. Reports that he has urinary frequency without any dysuria. Review of Systems: 10 point review of systems is otherwise negative with the exception of the elements mentioned above. Assessment & Plan     Acute cystitis  UA of 2+ bacteria, > 100 WBC, large leukocyte esterase and positive nitrites. Culture with gram-negative rods blood culture without any growth to date  - on ceftriaxone  - follow up BCx and UCx     YAMILEX - resolved  Cr on admission of 1.44 with baseline around 1. Continue with IV fluids. Creatinine seems to have improved today to 1.23    Dementia: not on medications at home    Sepsis ruled out  Does not meet criteria for sepsis. Patient is afebrile, not tachycardic nor tachypneic. Has leukocytosis with results.       Diet:  ADULT DIET Regular  ADULT ORAL NUTRITION SUPPLEMENT Breakfast, Lunch, Dinner; Standard High Calorie/High Protein  DVT PPx: lovenox  Code: Full Code      I have reviewed and ordered clinical lab tests and independently visualized images, tracing. DISPO: Tomorrow    Problem List:  Hospital Problems as of 10/3/2021 Date Reviewed: 10/3/2021        Codes Class Noted - Resolved POA    * (Principal) YAMILEX (acute kidney injury) ([facility] ICD-10-CM: N17.9  ICD-9-CM: 584.9  10/1/2021 - Present Yes        UTI (urinary tract infection) ICD-10-CM: N39.0  ICD-9-CM: 599.0  7/28/2021 - Present Yes        Dementia ([facility] (Chronic) ICD-10-CM: F03.90  ICD-9-CM: 294.20  12/23/2019 - Present Yes        Debility (Chronic) ICD-10-CM: R53.81  ICD-9-CM: 799.3  12/2/2019 - Present Yes                 Objective:     Patient Vitals for the past 24 hrs:   Temp Pulse Resp BP SpO2   10/03/21 1130 97.7 °F (36.5 °C) 71 18 (!) 144/74 98 %   10/03/21 0730 97.6 °F (36.4 °C) 67 18 131/74 99 %   10/03/21 0304 98.4 °F (36.9 °C) 74 18 129/76 97 %   10/02/21 2255 98.4 °F (36.9 °C) 72 18 134/85 97 %   10/02/21 1851 98.3 °F (36.8 °C) 94 16 119/79 98 %   10/02/21 1515 97.5 °F (36.4 °C) 95 18 124/76 96 %     Oxygen Therapy  O2 Sat (%): 98 % (10/03/21 1130)  Pulse via Oximetry: 80 beats per minute (10/02/21 0747)  O2 Device: None (Room air) (10/03/21 1130)    Estimated body mass index is 18.65 kg/m² as calculated from the following:    Height as of this encounter: 5' 10\" (1.778 m). Weight as of this encounter: 59 kg (130 lb). Intake/Output Summary (Last 24 hours) at 10/3/2021 1232  Last data filed at 10/3/2021 1054  Gross per 24 hour   Intake 2220 ml   Output 175 ml   Net 2045 ml           Physical Exam:   General:     alert, awake , pleasant   Head:   Normocephalic, atraumatic  Eyes:   anicteric sclerae, normal conjunctiva,  EOMI.  ENT:   Nares appear normal, no drainage. Moist oral mucosa  Neck:    supple, non-tender. Trachea midline. Lungs:   CTAB, no wheezing. Respirations even  Cardiac:   RRR, Normal S1 and S2.     Abdomen:   Soft, non distended, nontender, +BS, no guarding/rebound  Extremities:   No edema , pedal pulses present, no cyanosis  Skin:   Warm, dry, normal coloration and texture  Neuro:  AAOx2. No gross focal neurological deficit  Psychiatric:  No anxiety, calm, cooperative    Data Review:  I have reviewed ordered lab tests and independently visualized imaging below:    Last 24hr Labs:  Recent Results (from the past 24 hour(s))   PLEASE READ & DOCUMENT PPD TEST IN 24 HRS    Collection Time: 10/03/21 12:40 AM   Result Value Ref Range    PPD Negative Negative    mm Induration 0 0 - 5 mm   PLEASE READ & DOCUMENT PPD TEST IN 48 HRS    Collection Time: 10/03/21 12:40 AM   Result Value Ref Range    PPD      mm Induration 0 0 - 5 mm   METABOLIC PANEL, BASIC    Collection Time: 10/03/21  5:04 AM   Result Value Ref Range    Sodium 140 136 - 145 mmol/L    Potassium 3.7 3.5 - 5.1 mmol/L    Chloride 110 (H) 98 - 107 mmol/L    CO2 27 21 - 32 mmol/L    Anion gap 3 (L) 7 - 16 mmol/L    Glucose 104 (H) 65 - 100 mg/dL    BUN 18 8 - 23 MG/DL    Creatinine 1.02 0.8 - 1.5 MG/DL    GFR est AA >60 >60 ml/min/1.73m2    GFR est non-AA >60 >60 ml/min/1.73m2    Calcium 8.7 8.3 - 10.4 MG/DL   CBC WITH AUTOMATED DIFF    Collection Time: 10/03/21  5:04 AM   Result Value Ref Range    WBC 6.3 4.3 - 11.1 K/uL    RBC 3.36 (L) 4.23 - 5.6 M/uL    HGB 9.6 (L) 13.6 - 17.2 g/dL    HCT 30.7 (L) 41.1 - 50.3 %    MCV 91.4 79.6 - 97.8 FL    MCH 28.6 26.1 - 32.9 PG    MCHC 31.3 (L) 31.4 - 35.0 g/dL    RDW 17.7 (H) 11.9 - 14.6 %    PLATELET 153 542 - 622 K/uL    MPV 10.1 9.4 - 12.3 FL    ABSOLUTE NRBC 0.00 0.0 - 0.2 K/uL    DF AUTOMATED      NEUTROPHILS 67 43 - 78 %    LYMPHOCYTES 21 13 - 44 %    MONOCYTES 8 4.0 - 12.0 %    EOSINOPHILS 3 0.5 - 7.8 %    BASOPHILS 0 0.0 - 2.0 %    IMMATURE GRANULOCYTES 1 0.0 - 5.0 %    ABS. NEUTROPHILS 4.2 1.7 - 8.2 K/UL    ABS. LYMPHOCYTES 1.3 0.5 - 4.6 K/UL    ABS. MONOCYTES 0.5 0.1 - 1.3 K/UL    ABS. EOSINOPHILS 0.2 0.0 - 0.8 K/UL    ABS.  BASOPHILS 0.0 0.0 - 0.2 K/UL ABS. IMM. GRANS. 0.1 0.0 - 0.5 K/UL       All Micro Results     Procedure Component Value Units Date/Time    CULTURE, URINE [[id number]]  (Abnormal) Collected: 10/01/21 1807    Order Status: Completed Specimen: Urine from Clean catch Updated: 10/03/21 0843     Special Requests: NO SPECIAL REQUESTS        Culture result:       >100,000 COLONIES/mL GRAM NEGATIVE RODS IDENTIFICATION AND SUSCEPTIBILITY TO FOLLOW                  10,000 to 50,000 COLONIES/mL MIXED SKIN ROSE ISOLATED          CULTURE, BLOOD [[id number]] Collected: 10/01/21 1735    Order Status: Completed Specimen: Blood Updated: 10/03/21 0645     Special Requests: --        LEFT  Antecubital       Culture result: NO GROWTH 2 DAYS       CULTURE, BLOOD [[id number]] Collected: 10/01/21 1720    Order Status: Completed Specimen: Blood Updated: 10/03/21 0645     Special Requests: --        RIGHT  Antecubital       Culture result: NO GROWTH 2 DAYS             EKG Results     None          Other Studies:  CT ABD PELV W CONT    Result Date: 10/1/2021  CT OF THE ABDOMEN AND PELVIS WITH CONTRAST:          CLINICAL HISTORY:   Upper abdominal pain and vomiting. Now with leukocytosis, anemia, lactic acidemia, and bacteriuria. TECHNIQUE:  Oral and nonionic intravenous contrast was administered, and the abdomen and pelvis were scanned with spiral technique. Radiation dose reduction was achieved using one or all of the following techniques: automated exposure control, weight-based dosing, iterative reconstruction. COMPARISON:  None. CT ABDOMEN FINDINGS: There is linear atelectasis at both lung bases. Large hiatal hernia. Faintly calcified stones in the dependent portion of the gallbladder without pericholecystic inflammatory changes or biliary ductal dilatation. The liver, spleen, pancreas, adrenals, and kidneys appear unremarkable. Extensive aortoiliac atherosclerotic calcification and mild ectasia without carole aneurysm.  CT PELVIS FINDINGS: The appendix is not confidently identified, but there are no secondary signs of appendicitis. Diverticula are scattered in the colon without adjacent inflammatory to suggest diverticulitis. Large amount of stool in the rectal vault with transverse diameter of 8.5 cm suggests possible fecal impaction. No pathologically enlarged lymph nodes or free fluid is evident. The prostate is not enlarged. Bone windows demonstrate no definite aggressive process, accounting for degenerative changes. 1. Cholelithiasis without biliary ductal dilatation or findings to suggest cholecystitis. 2.  Large amount of stool in the rectal vault suggesting possible fecal impaction. 3.  Scattered colonic diverticulosis and nonvisualization of the appendix with no secondary signs of appendicitis, diverticulitis, or other acute abdominopelvic abnormality identified. 4.  Large hiatal hernia.          Current Meds:   Current Facility-Administered Medications   Medication Dose Route Frequency    alcohol 62% (NOZIN) nasal  1 Ampule  1 Ampule Topical Q12H    influenza vaccine 2021-22 (6 mos+)(PF) (FLUARIX/FLULAVAL/FLUZONE QUAD) injection 0.5 mL  1 Each IntraMUSCular PRIOR TO DISCHARGE    sodium chloride (NS) flush 5-40 mL  5-40 mL IntraVENous Q8H    sodium chloride (NS) flush 5-40 mL  5-40 mL IntraVENous PRN    clopidogreL (PLAVIX) tablet 75 mg  75 mg Oral DAILY    famotidine (PEPCID) tablet 20 mg  20 mg Oral DAILY    pantoprazole (PROTONIX) tablet 40 mg  40 mg Oral ACB    sodium chloride (NS) flush 5-40 mL  5-40 mL IntraVENous Q8H    sodium chloride (NS) flush 5-40 mL  5-40 mL IntraVENous PRN    acetaminophen (TYLENOL) tablet 650 mg  650 mg Oral Q6H PRN    Or    acetaminophen (TYLENOL) suppository 650 mg  650 mg Rectal Q6H PRN    polyethylene glycol (MIRALAX) packet 17 g  17 g Oral DAILY    promethazine (PHENERGAN) tablet 12.5 mg  12.5 mg Oral Q6H PRN    Or    ondansetron (ZOFRAN) injection 4 mg  4 mg IntraVENous Q6H PRN    enoxaparin (LOVENOX) injection 30 mg  30 mg SubCUTAneous DAILY    cefTRIAXone (ROCEPHIN) 1 g in 0.9% sodium chloride (MBP/ADV) 50 mL MBP  1 g IntraVENous Q24H       Part of this note was written by using a voice dictation software and the note has been proof read but may still contain some grammatical/other typographical errors.     Signed By: Brenda Ring MD   [facility]    October 3, 2021  7:45 AM

## 2021-10-03 NOTE — PROGRESS NOTES
Problem: Pressure Injury - Risk of  Goal: *Prevention of pressure injury  Description: Document Tyler Scale and appropriate interventions in the flowsheet. Outcome: Progressing Towards Goal  Note: Pressure Injury Interventions:  Sensory Interventions: Minimize linen layers    Moisture Interventions: Absorbent underpads    Activity Interventions: Pressure redistribution bed/mattress(bed type), Increase time out of bed, PT/OT evaluation    Mobility Interventions: Pressure redistribution bed/mattress (bed type)    Nutrition Interventions: Offer support with meals,snacks and hydration, Discuss nutritional consult with provider, Document food/fluid/supplement intake    Friction and Shear Interventions: Apply protective barrier, creams and emollients, HOB 30 degrees or less                Problem: Patient Education: Go to Patient Education Activity  Goal: Patient/Family Education  Outcome: Progressing Towards Goal     Problem: Falls - Risk of  Goal: *Absence of Falls  Description: Document Vale Fall Risk and appropriate interventions in the flowsheet.   Outcome: Progressing Towards Goal  Note: Fall Risk Interventions:  Mobility Interventions: Bed/chair exit alarm, OT consult for ADLs, Patient to call before getting OOB, PT Consult for mobility concerns, PT Consult for assist device competence, Strengthening exercises (ROM-active/passive), Utilize walker, cane, or other assistive device    Mentation Interventions: Bed/chair exit alarm, Door open when patient unattended, Evaluate medications/consider consulting pharmacy, Eyeglasses and hearing aids, Gait belt with transfers/ambulation, Increase mobility, More frequent rounding, Reorient patient, Room close to nurse's station, Self-releasing belt, Toileting rounds, Update white board    Medication Interventions: Bed/chair exit alarm, Patient to call before getting OOB, Teach patient to arise slowly, Evaluate medications/consider consulting pharmacy, Utilize gait belt for transfers/ambulation    Elimination Interventions: Bed/chair exit alarm, Call light in reach, Elevated toilet seat, Patient to call for help with toileting needs, Stay With Me (per policy), Toilet paper/wipes in reach, Toileting schedule/hourly rounds, Urinal in reach              Problem: Patient Education: Go to Patient Education Activity  Goal: Patient/Family Education  Outcome: Progressing Towards Goal     Problem: Patient Education: Go to Patient Education Activity  Goal: Patient/Family Education  Outcome: Progressing Towards Goal     Problem: Patient Education: Go to Patient Education Activity  Goal: Patient/Family Education  Outcome: Progressing Towards Goal     Problem: Urinary Tract Infection - Adult  Goal: *Absence of infection signs and symptoms  Outcome: Progressing Towards Goal

## 2021-10-03 NOTE — PROGRESS NOTES
Problem: Mobility Impaired (Adult and Pediatric)  Goal: *Acute Goals and Plan of Care (Insert Text)  Outcome: Progressing Towards Goal  Note: DISCHARGE GOALS ;  (1.)Mr. Rashmi Guerrero will move from supine to sit and sit to supine  with SUPERVISION . (2.)Mr. Rashmi Guerrero will transfer from bed to chair and chair to bed with SUPERVISION. (3.)Mr. Rashmi Guerrero will ambulate with SUPERVISION for 300 feet.    ________________________________________________________________________________________________      PHYSICAL THERAPY: Daily Note and AM 10/3/2021  INPATIENT: PT Visit Days : 2  Payor: Nhan Macias / Plan: [facility] [employer] MEDICARE CHOICE PPO/PFFS / Product Type: Managed Care Medicare /       NAME/AGE/GENDER: Jd Huff is a 80 y.o. male   PRIMARY DIAGNOSIS: UTI (urinary tract infection) [N39.0]  YAMILEX (acute kidney injury) ([facility] [N17.9] YAMILEX (acute kidney injury) ([facility] YAMILEX (acute kidney injury) ([facility]       ICD-10: Treatment Diagnosis:    · Generalized Muscle Weakness (M62.81)  · Other lack of cordination (R27.8)  · Difficulty in walking, Not elsewhere classified (R26.2)  · Other abnormalities of gait and mobility (R26.89)  · Repeated Falls (R29.6)  · History of falling (Z91.81)   Precaution/Allergies:  Patient has no known allergies. ASSESSMENT:     Mr. Rashmi Guerrero presents with general weakness along with unsteady gait & transfers. This pt is currently not functioning at his reported or implied baseline. The facility that pt is living, may be aware of his instability with gait & transfers, therefore may be providing the needed assist for safety. 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 PT will follow up to see if pt can show improved stability with WB'ing activity. Pt excited to ambulate today, but PT noted that he'd had a BM while in bed. Pt amenable to going to the bathroom to clean up but refused assistance.  Some unsteadiness with gait and dynamic standing balance. PT explained that he needed supervision while ambulating. Pt able to clean himself and washed hands with mod cueing, but was agitated afterward and refused to don his brief for ambulation. Independent with return to bed and RN placed brief on pt while supine. Pt left supine in bed with RN present and bed alarm on. Will attempt to progress ambulation at next session. This section established at most recent assessment   PROBLEM LIST (Impairments causing functional limitations):  1. Decreased Strength  2. Decreased ADL/Functional Activities  3. Decreased Transfer Abilities  4. Decreased Ambulation Ability/Technique  5. Decreased Balance  6. Decreased Activity Tolerance  7. Decreased Flexibility/Joint Mobility   INTERVENTIONS PLANNED: (Benefits and precautions of physical therapy have been discussed with the patient.)  1. Balance Exercise  2. Bed Mobility  3. Gait Training  4. Transfer Training     TREATMENT PLAN: Frequency/Duration: daily for duration of hospital stay  Rehabilitation Potential For Stated Goals: Good     REHAB RECOMMENDATIONS (at time of discharge pending progress):    Placement: It is my opinion, based on this patient's performance to date, that Mr. Lara Alberts may benefit from participating in 1-2 additional therapy sessions in order to continue to assess for rehab potential and then make recommendation for disposition at discharge. Equipment:    None at this time              HISTORY:   History of Present Injury/Illness (Reason for Referral):  Patient is a 80 y.o. male with dementia, who presented to the ED with history of falls and weakness. My history is from the ER staff and records. Patient has dementia and cannot provide reliable history. There is no family present. He apparently is from a assisted living facility. Report is that patient is not eating well and having recurrent epigastric pain, which he relates to a hiatal hernia. Family and facility report that he is becoming increasingly weak and has had a few falls in the last several days. There is been no significant injuries. There is been no report of focal weaknesses, slurred speech, speech difficulty, or change from baseline mental status. There is been no fever/chills, vomiting or diarrhea, chest pain, shortness of breath. He has reported nausea and upset stomach. ER staff reports that he was more talkative earlier, but has since received pain medications and has been sleeping since then. Past Medical History/Comorbidities:   Mr. Jd Robb  has a past medical history of CAD (coronary artery disease) (7/18/2019), Colon cancer ([facility] (01/2012), and Hiatal hernia. Mr. Jd Robb  has no past surgical history on file. Social History/Living Environment:   Home Environment: [address] [place] [address] Road Name: 0  One/Two Story Residence: One story  Living Alone: No  Support Systems: Child(margi), Assisted Living  Patient Expects to be Discharged to[de-identified] Assisted living  Current DME Used/Available at Home: None  Prior Level of Function/Work/Activity:  Pt was functioning at an LUISA level of car or supervision. Personal Factors:           Other factors that influence how disability is experienced by the patient:  current & PMH   Number of Personal Factors/Comorbidities that affect the Plan of Care: 3+: HIGH COMPLEXITY   EXAMINATION:   Most Recent Physical Functioning:   Gross Assessment:  AROM: Within functional limits  Strength: Generally decreased, functional  Coordination: Generally decreased, functional                    Balance:  Sitting: Intact  Standing: Intact  Standing - Static: Good  Standing - Dynamic : Occasional Bed Mobility:  Rolling: Supervision  Supine to Sit: Stand-by assistance  Sit to Supine: Stand-by assistance       Transfers:  Sit to Stand: Supervision  Stand to Sit: Stand-by assistance  Gait:     Speed/Estrellita: Slow  Step Length: Right shortened;Left shortened  Gait Abnormalities: Decreased step clearance  Distance (ft): 18 Feet (ft)  Assistive Device:  (none)  Ambulation - Level of Assistance: Stand-by assistance   Functional Mobility:         Gait/Ambulation:  cga        Transfers:  cga        Bed Mobility:  sba   Body Structures Involved:  1. Metabolic  2. Muscles Body Functions Affected:  1. Movement Related  2. Metobolic/Endocrine Activities and Participation Affected:  1. General Tasks and Demands  2. Mobility   Number of elements that affect the Plan of Care: 4+: HIGH COMPLEXITY   CLINICAL PRESENTATION:   Presentation: Evolving clinical presentation with changing clinical characteristics: MODERATE COMPLEXITY   CLINICAL DECISION MAKIN [facility] Mobility Inpatient Short Form  How much difficulty does the patient currently have. .. Unable A Lot A Little None   1. Turning over in bed (including adjusting bedclothes, sheets and blankets)? [] 1   [] 2   [x] 3   [] 4   2. Sitting down on and standing up from a chair with arms ( e.g., wheelchair, bedside commode, etc.)   [] 1   [] 2   [x] 3   [] 4   3. Moving from lying on back to sitting on the side of the bed? [] 1   [] 2   [x] 3   [] 4   How much help from another person does the patient currently need. .. Total A Lot A Little None   4. Moving to and from a bed to a chair (including a wheelchair)? [] 1   [] 2   [x] 3   [] 4   5. Need to walk in hospital room? [] 1   [] 2   [x] 3   [] 4   6. Climbing 3-5 steps with a railing? [x] 1   [] 2   [] 3   [] 4   © , Trustees of [address] [place] [address] [address] [address], under license to [employer]. All rights reserved      Score:  Initial: 16 Most Recent: X (Date: -- )    Interpretation of Tool:  Represents activities that are increasingly more difficult (i.e. Bed mobility, Transfers, Gait).     Medical Necessity:     · Patient is expected to demonstrate progress in   · strength, balance, coordination, and functional technique  ·  to   · decrease assistance required with bed mobility, transfers & gait  · .  Reason for Services/Other Comments:  · Patient continues to require skilled intervention due to   · Pt unsafe with functional mobility  · . Use of outcome tool(s) and clinical judgement create a POC that gives a: Questionable prediction of patient's progress: MODERATE COMPLEXITY            TREATMENT:   (In addition to Assessment/Re-Assessment sessions the following treatments were rendered)   Pre-treatment Symptoms/Complaints:  pt was agreeable  Pain: Initial: numeric scale  Pain Intensity 1: 0  Post Session:  0/10     Therapeutic Activity: (    24 min (extra time to work through activity noted): Therapeutic activities including dynamic standing balance to clean up after BM and wash hands, bed/toilet transfers, dynamic sitting balance to don pants for ambulation to improve mobility, strength, balance, coordination, and dynamic movement of arm - bilateral, leg - bilateral, and core to improve functional endurance & stability . Braces/Orthotics/Lines/Etc:   · IV  Treatment/Session Assessment:    · Response to Treatment:  Fair. Did not follow commands as well today  · Interdisciplinary Collaboration:   o Physical Therapist  o Registered Nurse  · After treatment position/precautions:   o Supine in bed  o Bed alarm/tab alert on  o Bed/Chair-wheels locked  o Bed in low position  o Call light within reach  o Nurse at bedside   · Compliance with Program/Exercises: Will assess as treatment progresses  · Recommendations/Intent for next treatment session: \"Next visit will focus on reduction in assistance provided\".   Total Treatment Duration:  PT Patient Time In/Time Out  Time In: 0901  Time Out: [address] Alma Ye, PT

## 2021-10-03 NOTE — PROGRESS NOTES
0704-Bedside report received from [facility]. Resting in bed. No needs voiced. No s/s of acute distress. 1842-Bedside shift change report given to Darline (oncoming nurse) by Gaurang Marsh (offgoing nurse). Report included the following information SBAR, Kardex, Intake/Output, MAR and Recent Results.

## 2021-10-04 VITALS
HEIGHT: 70 IN | TEMPERATURE: 97.7 F | DIASTOLIC BLOOD PRESSURE: 85 MMHG | OXYGEN SATURATION: 98 % | RESPIRATION RATE: 18 BRPM | HEART RATE: 80 BPM | BODY MASS INDEX: 18.61 KG/M2 | SYSTOLIC BLOOD PRESSURE: 120 MMHG | WEIGHT: 130 LBS

## 2021-10-04 LAB
ANION GAP SERPL CALC-SCNC: 4 MMOL/L (ref 7–16)
BASOPHILS # BLD: 0 K/UL (ref 0–0.2)
BASOPHILS NFR BLD: 0 % (ref 0–2)
BUN SERPL-MCNC: 17 MG/DL (ref 8–23)
CALCIUM SERPL-MCNC: 9.4 MG/DL (ref 8.3–10.4)
CHLORIDE SERPL-SCNC: 107 MMOL/L (ref 98–107)
CO2 SERPL-SCNC: 30 MMOL/L (ref 21–32)
CREAT SERPL-MCNC: 1.13 MG/DL (ref 0.8–1.5)
DIFFERENTIAL METHOD BLD: ABNORMAL
EOSINOPHIL # BLD: 0.2 K/UL (ref 0–0.8)
EOSINOPHIL NFR BLD: 3 % (ref 0.5–7.8)
ERYTHROCYTE [DISTWIDTH] IN BLOOD BY AUTOMATED COUNT: 17.3 % (ref 11.9–14.6)
GLUCOSE SERPL-MCNC: 112 MG/DL (ref 65–100)
HCT VFR BLD AUTO: 34.5 % (ref 41.1–50.3)
HGB BLD-MCNC: 11 G/DL (ref 13.6–17.2)
IMM GRANULOCYTES # BLD AUTO: 0.1 K/UL (ref 0–0.5)
IMM GRANULOCYTES NFR BLD AUTO: 2 % (ref 0–5)
LYMPHOCYTES # BLD: 1.4 K/UL (ref 0.5–4.6)
LYMPHOCYTES NFR BLD: 24 % (ref 13–44)
MCH RBC QN AUTO: 28.7 PG (ref 26.1–32.9)
MCHC RBC AUTO-ENTMCNC: 31.9 G/DL (ref 31.4–35)
MCV RBC AUTO: 90.1 FL (ref 79.6–97.8)
MM INDURATION POC: 0 MM (ref 0–5)
MONOCYTES # BLD: 0.4 K/UL (ref 0.1–1.3)
MONOCYTES NFR BLD: 6 % (ref 4–12)
NEUTS SEG # BLD: 3.7 K/UL (ref 1.7–8.2)
NEUTS SEG NFR BLD: 64 % (ref 43–78)
NRBC # BLD: 0 K/UL (ref 0–0.2)
PLATELET # BLD AUTO: 233 K/UL (ref 150–450)
PMV BLD AUTO: 9.9 FL (ref 9.4–12.3)
POTASSIUM SERPL-SCNC: 4.3 MMOL/L (ref 3.5–5.1)
PPD POC: NEGATIVE NEGATIVE
RBC # BLD AUTO: 3.83 M/UL (ref 4.23–5.6)
SODIUM SERPL-SCNC: 141 MMOL/L (ref 136–145)
WBC # BLD AUTO: 5.9 K/UL (ref 4.3–11.1)

## 2021-10-04 PROCEDURE — 74011250636 HC RX REV CODE- 250/636: Performed by: FAMILY MEDICINE

## 2021-10-04 PROCEDURE — 85025 COMPLETE CBC W/AUTO DIFF WBC: CPT

## 2021-10-04 PROCEDURE — 74011250637 HC RX REV CODE- 250/637: Performed by: FAMILY MEDICINE

## 2021-10-04 PROCEDURE — 36415 COLL VENOUS BLD VENIPUNCTURE: CPT

## 2021-10-04 PROCEDURE — 80048 BASIC METABOLIC PNL TOTAL CA: CPT

## 2021-10-04 PROCEDURE — 74011250637 HC RX REV CODE- 250/637: Performed by: INTERNAL MEDICINE

## 2021-10-04 RX ADMIN — Medication 10 ML: at 05:43

## 2021-10-04 RX ADMIN — FAMOTIDINE 20 MG: 20 TABLET ORAL at 09:20

## 2021-10-04 RX ADMIN — PANTOPRAZOLE SODIUM 40 MG: 40 TABLET, DELAYED RELEASE ORAL at 08:14

## 2021-10-04 RX ADMIN — CLOPIDOGREL BISULFATE 75 MG: 75 TABLET ORAL at 09:20

## 2021-10-04 RX ADMIN — ENOXAPARIN SODIUM 30 MG: 30 INJECTION SUBCUTANEOUS at 09:12

## 2021-10-04 RX ADMIN — Medication 1 AMPULE: at 09:20

## 2021-10-04 NOTE — PROGRESS NOTES
Problem: Pressure Injury - Risk of  Goal: *Prevention of pressure injury  Description: Document Tyler Scale and appropriate interventions in the flowsheet. Outcome: Progressing Towards Goal  Note: Pressure Injury Interventions:  Sensory Interventions: Assess changes in LOC    Moisture Interventions: Absorbent underpads    Activity Interventions: Pressure redistribution bed/mattress(bed type)    Mobility Interventions: Pressure redistribution bed/mattress (bed type)    Nutrition Interventions: Offer support with meals,snacks and hydration, Discuss nutritional consult with provider, Document food/fluid/supplement intake    Friction and Shear Interventions: Apply protective barrier, creams and emollients, HOB 30 degrees or less                Problem: Patient Education: Go to Patient Education Activity  Goal: Patient/Family Education  Outcome: Progressing Towards Goal     Problem: Falls - Risk of  Goal: *Absence of Falls  Description: Document Vale Fall Risk and appropriate interventions in the flowsheet.   Outcome: Progressing Towards Goal  Note: Fall Risk Interventions:  Mobility Interventions: Bed/chair exit alarm    Mentation Interventions: Bed/chair exit alarm    Medication Interventions: Bed/chair exit alarm    Elimination Interventions: Bed/chair exit alarm, Call light in reach, Toileting schedule/hourly rounds              Problem: Patient Education: Go to Patient Education Activity  Goal: Patient/Family Education  Outcome: Progressing Towards Goal     Problem: Patient Education: Go to Patient Education Activity  Goal: Patient/Family Education  Outcome: Progressing Towards Goal     Problem: Patient Education: Go to Patient Education Activity  Goal: Patient/Family Education  Outcome: Progressing Towards Goal     Problem: Urinary Tract Infection - Adult  Goal: *Absence of infection signs and symptoms  Outcome: Progressing Towards Goal

## 2021-10-04 NOTE — DISCHARGE SUMMARY
[facility] Hospitalist Discharge Summary     Name:    Oracio Lujan Sr  80 y.o.  male  :    1931     MRN:   [id number]       Admitting Physician: Dennie Sally, MD Admit Date:  10/1/2021  4:38 PM   Attending Physician: Raymond Hobbs MD  Primary Care Physician: None       Discharge Physician: Mackenzie Bullard MD  Discharge date: 10/04/21   Discharged Condition: Stable    Indication for Admission:   Chief Complaint   Patient presents with    Fall    Fatigue        Reasons for hospitalization:  Hospital Problems as of 10/4/2021 Date Reviewed: 10/3/2021        Codes Class Noted - Resolved POA    UTI (urinary tract infection) ICD-10-CM: N39.0  ICD-9-CM: 599.0  2021 - Present Yes        Dementia ([facility] [address] (Chronic) ICD-10-CM: F03.90  ICD-9-CM: 294.20  2019 - Present Yes        Debility (Chronic) ICD-10-CM: R53.81  ICD-9-CM: 799.3  2019 - Present Yes        * (Principal) RESOLVED: YAMILEX (acute kidney injury) ([facility] [address] ICD-10-CM: N17.9  ICD-9-CM: 584.9  10/1/2021 - 10/3/2021 Yes                 Discharge Diagnosis: YAMILEX, UTI  Did Patient have Sepsis (YES OR NO): No      Hospital Course :  Please refer to the admission H&P for details of presentation. In summary, Grace Beckett is a 80 y.o. male with past medical history significant for medical history significant for dementia, CAD who presented from assisted living facility due to not eating well, recurrent epigastric pain, increasingly weak with few falls in the past several days. Work-up in the ED showed urine of 2+ bacteria, > 100 WBC, large leukocyte esterase and positive nitrites. UCx grew mixed skin daljit and he has completed 3 days of antibiotics. His YAMILEX has resolved and he is back to his baseline. Patient is medically stable for discharge. Patient is to continue taking medications as prescribed and to follow up with PCP on discharge. Patient is instructed to to call a physician or return to ED if any concerns/symptoms worsened. Discharge summary and encounter summary was sent to PCP electronically via \"Comm Mgt\" link in [facility], if possible. Consults: None     Disposition: Home or Self Care  Diet: DIET ADULT  DIET ADULT ORAL NUTRITION SUPPLEMENT   Code Status: Full Code    Discharge Info:     Current Discharge Medication List      CONTINUE these medications which have NOT CHANGED    Details   atorvastatin (LIPITOR) 80 mg tablet Take 1 Tablet by mouth nightly. Qty: 3 Tablet, Refills: 0      clopidogreL (PLAVIX) 75 mg tab Take 1 Tab by mouth daily. Qty: 90 Tab, Refills: 3      pantoprazole (PROTONIX) 40 mg tablet Take 1 Tab by mouth Daily (before breakfast). Qty: 90 Tab, Refills: 3      famotidine (PEPCID) 20 mg tablet Take 1 Tab by mouth daily. Qty: 30 Tab, Refills: 0      ondansetron (ZOFRAN ODT) 4 mg disintegrating tablet Take 1 Tablet by mouth every eight (8) hours as needed for Nausea. Qty: 8 Tablet, Refills: 2      nitroglycerin (NITROSTAT) 0.4 mg SL tablet 1 Tab by SubLINGual route every five (5) minutes as needed for Chest Pain. Up to 3 doses. Qty: 1 Bottle, Refills: 3         STOP taking these medications       aspirin 81 mg chewable tablet Comments:   Reason for Stopping: There are no discontinued medications. Follow Up Orders:  No orders of the defined types were placed in this encounter.       Follow-up Information     Follow up With Specialties Details Why Contact Info    None    None ([phone]) Patient stated that they have no PCP              Discharge Exam:    Patient Vitals for the past 24 hrs:   Temp Pulse Resp BP SpO2   10/04/21 0821 97.6 °F (36.4 °C) 72 16 (!) 147/80 95 %   10/04/21 0240 98.3 °F (36.8 °C) 89 16 139/84 97 %   10/03/21 2254 97.5 °F (36.4 °C) 75 16 138/79 95 %   10/03/21 1906 98.5 °F (36.9 °C) 92 16 (!) 144/83 97 %   10/03/21 1520 98.3 °F (36.8 °C) 86 18 (!) 149/80 95 %     Oxygen Therapy  O2 Sat (%): 95 % (10/04/21 0821)  Pulse via Oximetry: 80 beats per minute (10/02/21 0747)  O2 Device: None (Room air) (10/04/21 0821)    Estimated body mass index is 18.65 kg/m² as calculated from the following:    Height as of this encounter: 5' 10\" (1.778 m). Weight as of this encounter: 59 kg (130 lb). Intake/Output Summary (Last 24 hours) at 10/4/2021 1145  Last data filed at 10/4/2021 1020  Gross per 24 hour   Intake 840 ml   Output --   Net 840 ml       *Note that automatically entered I/Os may not be accurate; dependent on patient compliance with collection and accurate  by assistants. Physical Exam:   General:                     alert, awake , pleasant   Head:                          Normocephalic, atraumatic  Eyes:                           anicteric sclerae, normal conjunctiva,  EOMI.  ENT:                            Nares appear normal, no drainage. Moist oral mucosa  Neck:                          supple, non-tender. Trachea midline. Lungs:                        CTAB, no wheezing. Respirations even  Cardiac:                      RRR, Normal S1 and S2. Abdomen:                  Soft, non distended, nontender, +BS, no guarding/rebound  Extremities:               No edema , pedal pulses present, no cyanosis  Skin:                           Warm, dry, normal coloration and texture  Neuro:              AAOx2.  No gross focal neurological deficit  Psychiatric:                No anxiety, calm, cooperative    All Labs from Last 24 Hrs:  Recent Results (from the past 24 hour(s))   PLEASE READ & DOCUMENT PPD TEST IN 72 HRS    Collection Time: 10/04/21 12:42 AM   Result Value Ref Range    PPD Negative Negative    mm Induration 0 0 - 5 mm   CBC WITH AUTOMATED DIFF    Collection Time: 10/04/21  5:33 AM   Result Value Ref Range    WBC 5.9 4.3 - 11.1 K/uL    RBC 3.83 (L) 4.23 - 5.6 M/uL    HGB 11.0 (L) 13.6 - 17.2 g/dL    HCT 34.5 (L) 41.1 - 50.3 %    MCV 90.1 79.6 - 97.8 FL    MCH 28.7 26.1 - 32.9 PG    MCHC 31.9 31.4 - 35.0 g/dL    RDW 17.3 (H) 11.9 - 14.6 %    PLATELET 348 421 - 450 K/uL    MPV 9.9 9.4 - 12.3 FL    ABSOLUTE NRBC 0.00 0.0 - 0.2 K/uL    DF AUTOMATED      NEUTROPHILS 64 43 - 78 %    LYMPHOCYTES 24 13 - 44 %    MONOCYTES 6 4.0 - 12.0 %    EOSINOPHILS 3 0.5 - 7.8 %    BASOPHILS 0 0.0 - 2.0 %    IMMATURE GRANULOCYTES 2 0.0 - 5.0 %    ABS. NEUTROPHILS 3.7 1.7 - 8.2 K/UL    ABS. LYMPHOCYTES 1.4 0.5 - 4.6 K/UL    ABS. MONOCYTES 0.4 0.1 - 1.3 K/UL    ABS. EOSINOPHILS 0.2 0.0 - 0.8 K/UL    ABS. BASOPHILS 0.0 0.0 - 0.2 K/UL    ABS. IMM.  GRANS. 0.1 0.0 - 0.5 K/UL   METABOLIC PANEL, BASIC    Collection Time: 10/04/21  5:33 AM   Result Value Ref Range    Sodium 141 136 - 145 mmol/L    Potassium 4.3 3.5 - 5.1 mmol/L    Chloride 107 98 - 107 mmol/L    CO2 30 21 - 32 mmol/L    Anion gap 4 (L) 7 - 16 mmol/L    Glucose 112 (H) 65 - 100 mg/dL    BUN 17 8 - 23 MG/DL    Creatinine 1.13 0.8 - 1.5 MG/DL    GFR est AA >60 >60 ml/min/1.73m2    GFR est non-AA >60 >60 ml/min/1.73m2    Calcium 9.4 8.3 - 10.4 MG/DL       All Micro Results     Procedure Component Value Units Date/Time    CULTURE, URINE [[id number]]  (Abnormal) Collected: 10/01/21 1807    Order Status: Completed Specimen: Urine from Clean catch Updated: 10/04/21 0826     Special Requests: NO SPECIAL REQUESTS        Culture result:       >100,000 COLONIES/mL GRAM NEGATIVE RODS IDENTIFICATION AND SUSCEPTIBILITY TO FOLLOW                  10,000 to 50,000 COLONIES/mL MIXED SKIN ROSE ISOLATED                  CULTURE IN PROGRESS,FURTHER UPDATES TO FOLLOW          CULTURE, BLOOD [[id number]] Collected: 10/01/21 1720    Order Status: Completed Specimen: Blood Updated: 10/04/21 0804     Special Requests: --        RIGHT  Antecubital       Culture result: NO GROWTH 3 DAYS       CULTURE, BLOOD [[id number]] Collected: 10/01/21 1735    Order Status: Completed Specimen: Blood Updated: 10/04/21 0804     Special Requests: --        LEFT  Antecubital       Culture result: NO GROWTH 3 DAYS             [unfilled]    Diagnostic Imaging/Tests:   CT YUN WHITMAN W CONT    Result Date: 10/1/2021  CT OF THE ABDOMEN AND PELVIS WITH CONTRAST:          CLINICAL HISTORY:   Upper abdominal pain and vomiting. Now with leukocytosis, anemia, lactic acidemia, and bacteriuria. TECHNIQUE:  Oral and nonionic intravenous contrast was administered, and the abdomen and pelvis were scanned with spiral technique. Radiation dose reduction was achieved using one or all of the following techniques: automated exposure control, weight-based dosing, iterative reconstruction. COMPARISON:  None. CT ABDOMEN FINDINGS: There is linear atelectasis at both lung bases. Large hiatal hernia. Faintly calcified stones in the dependent portion of the gallbladder without pericholecystic inflammatory changes or biliary ductal dilatation. The liver, spleen, pancreas, adrenals, and kidneys appear unremarkable. Extensive aortoiliac atherosclerotic calcification and mild ectasia without carole aneurysm. CT PELVIS FINDINGS: The appendix is not confidently identified, but there are no secondary signs of appendicitis. Diverticula are scattered in the colon without adjacent inflammatory to suggest diverticulitis. Large amount of stool in the rectal vault with transverse diameter of 8.5 cm suggests possible fecal impaction. No pathologically enlarged lymph nodes or free fluid is evident. The prostate is not enlarged. Bone windows demonstrate no definite aggressive process, accounting for degenerative changes. 1. Cholelithiasis without biliary ductal dilatation or findings to suggest cholecystitis. 2.  Large amount of stool in the rectal vault suggesting possible fecal impaction. 3.  Scattered colonic diverticulosis and nonvisualization of the appendix with no secondary signs of appendicitis, diverticulitis, or other acute abdominopelvic abnormality identified. 4.  Large hiatal hernia. Echocardiogram/EKG results:  No results found for this visit on 10/01/21.     EKG Results     None          Results for orders placed or performed during the hospital encounter of 07/05/21   EKG, 12 LEAD, INITIAL   Result Value Ref Range    Ventricular Rate 90 BPM    Atrial Rate 90 BPM    P-R Interval 138 ms    QRS Duration 82 ms    Q-T Interval 338 ms    QTC Calculation (Bezet) 413 ms    Calculated P Axis 68 degrees    Calculated R Axis 21 degrees    Calculated T Axis 52 degrees    Diagnosis       Normal sinus rhythm  Normal ECG  When compared with ECG of 27-DEC-2019 17:58,  No significant change was found  Confirmed by Katarzyna Ferris MD (), ROSARIO CLARK ([id number]) on 7/5/2021 6:30:32 PM         Procedures done this admission:  * No surgery found *        Time spent in patient discharge planning and coordination 35 minutes.       Signed By: Juan Luis Zavaleta MD   [facility] Hospitalist Service    October 4, 2021  11:45 AM

## 2021-10-04 NOTE — PROGRESS NOTES
0658-Bedside report received from [facility], [facility]. Resting in bed. No needs voiced. No s/s of acute distress.

## 2021-10-04 NOTE — PROGRESS NOTES
END OF SHIFT NOTE:    Intake/Output  No intake/output data recorded. Voiding: YES  Catheter: NO  Drain:              Stool:  2 occurrences. Stool Assessment  Stool Color: Lyndol Showman (10/03/21 1950)  Stool Appearance: Soft (10/03/21 1950)  Stool Amount: Medium (10/03/21 1950)  Stool Source/Status: Rectum (10/03/21 1431)    Emesis:  0 occurrences. VITAL SIGNS  Patient Vitals for the past 12 hrs:   Temp Pulse Resp BP SpO2   10/04/21 0240 98.3 °F (36.8 °C) 89 16 139/84 97 %   10/03/21 2254 97.5 °F (36.4 °C) 75 16 138/79 95 %   10/03/21 1906 98.5 °F (36.9 °C) 92 16 (!) 144/83 97 %       Pain Assessment  Pain 1  Pain Scale 1: Numeric (0 - 10) (10/04/21 0220)  Pain Intensity 1: 0 (10/04/21 0220)  Patient Stated Pain Goal: 0 (10/04/21 0220)  Faces (Anne-Baker) Scale 1: Hurts a little bit (10/01/21 1618)    Ambulating  Yes    Additional Information:   Pt ambulated well with assistance. Confused. Slept well. No needs voiced at this time. Shift report given to oncoming nurse at the bedside.     Fox Campos RN

## 2021-10-04 NOTE — PROGRESS NOTES
AZ spoke with to [facility] at [facility]. Padmini reports pt can return today if medically ready. Hospitalist aware.    RAIZA ParksW

## 2021-10-05 LAB
BACTERIA SPEC CULT: ABNORMAL
GRAM STN SPEC: ABNORMAL
SERVICE CMNT-IMP: ABNORMAL
SERVICE CMNT-IMP: ABNORMAL

## 2021-10-05 NOTE — ED NOTES
Micro called me with +blood cultures. Patient was admitted. She was instructed to call the hospitalist with this result.

## 2021-10-06 LAB
BACTERIA SPEC CULT: NORMAL
SERVICE CMNT-IMP: NORMAL

## 2021-10-12 NOTE — ADT AUTH CERT NOTES
Comments  Comment     Last edited by  on  at    Patient Demographics    Patient Name   Earl Abdul   [id number] Legal Sex   Male    1931 Address   [address] [address] Phone   [phone] (Home)   [phone] (Mobile) *Preferred*   Patient Demographics    Patient Name   Earl Abdul   [id number] Legal Sex   Male    1931 Address   [address] [address] Phone   [phone] (Home)   [phone] (Mobile) *Preferred*   CSN:   [id number]   Admit Date: Admit Time Room Bed   Oct 1, 2021  4:38  31 288   Attending Providers    Provider Pager From To   Олег Gary MD  10/01/21 10/01/21   Samantha Salas MD  10/01/21 10/02/21   Manuel Montesinos MD  10/02/21 10/04/21   Emergency Contact(s)    Name Relation Home Work [address] [place] [address] Son [phone]   Utilization Reviews       POST D/C CLINICAL 10/3 by Nito Ambriz RN       Review Entered Review Status   10/12/2021 10:24 In Primary      Criteria Review   10/3  Pleasantly confused. AAOx2. Able to participate with PT today. Patient denies fever, chills, chest pains, shortness of breath, n/v, abdominal pain. Reports that he has urinary frequency without any dysuria. Acute cystitis  UA of 2+ bacteria, > 100 WBC, large leukocyte esterase and positive nitrites. Culture with gram-negative rods blood culture without any growth to date  - on ceftriaxone  - follow up BCx and UCx      YAMILEX - resolved  Cr on admission of 1.44 with baseline around 1. Continue with IV fluids. Creatinine seems to have improved today to 1.23     Dementia: not on medications at home     Sepsis ruled out  Does not meet criteria for sepsis. Patient is afebrile, not tachycardic nor tachypneic.   Has leukocytosis with results.       Physical Exam:   General:                     alert, awake , pleasant   Head:                          Normocephalic, atraumatic  Eyes:                           anicteric sclerae, normal conjunctiva,  EOMI.  ENT:                            Nares appear normal, no drainage. Moist oral mucosa  Neck:                          supple, non-tender. Trachea midline. Lungs:                        CTAB, no wheezing. Respirations even  Cardiac:                      RRR, Normal S1 and S2. Abdomen:                  Soft, non distended, nontender, +BS, no guarding/rebound  Extremities:               No edema , pedal pulses present, no cyanosis  Skin:                           Warm, dry, normal coloration and texture  Neuro:             AAOx2. No gross focal neurological deficit  Psychiatric:                No anxiety, calm, cooperative     /80, TEMP 98.3, HR 86, RR 18, O2 95 ON RA     PT, OT        NS DRIP @ 75, ROCEPHIN 1G QD IV            POST D/C CLINICAL 10/2 by Fracisco Russell RN       Review Entered Review Status   10/12/2021 10:23 In Primary      Criteria Review   1 OF 2 BLOOD CULT ON 10/1: [facility]     URINE CULT: SERRATIA MARCESCENS - MULTI DRUG RESISTANT     CT:    1. Cholelithiasis without biliary ductal dilatation or findings to suggest  cholecystitis. 2.  Large amount of stool in the rectal vault suggesting possible fecal  impaction. 3.  Scattered colonic diverticulosis and nonvisualization of the appendix with  no secondary signs of appendicitis, diverticulitis, or other acute  abdominopelvic abnormality identified. 4.  Large hiatal hernia                 10/2  Pleasantly confused. AAOx2. Able to participate with PT today. Patient denies fever, chills, chest pains, shortness of breath, n/v, abdominal pain. Reports that he has urinary frequency without any dysuria. Acute cystitis  UA of 2+ bacteria, > 100 WBC, large leukocyte esterase and positive nitrites. - on ceftriaxone  - follow up BCx and UCx      Sepsis ruled out  Does not meet criteria for sepsis. Patient is afebrile, not tachycardic nor tachypneic. Has leukocytosis with results.       YAMILEX  Cr on admission of 1.44 with baseline around 1. Continue with IV fluids. Creatinine seems to have improved today to 1.23     Physical Exam:   General:                     alert, awake   Head:                          Normocephalic, atraumatic  Eyes:                           anicteric sclerae, normal conjunctiva,  EOMI.  ENT:                            Nares appear normal, no drainage. Moist oral mucosa  Neck:                          supple, non-tender. Trachea midline. Lungs:                        CTAB, no wheezing. Respirations even  Cardiac:                      RRR, Normal S1 and S2. Abdomen:                  Soft, non distended, nontender, +BS, no guarding/rebound  Extremities:               No edema , pedal pulses present, no cyanosis  Skin:                           Warm, dry, normal coloration and texture  Neuro:             AAOx32.  No gross focal neurological deficit  Psychiatric:                No anxiety, calm, cooperative     /86, TEMP 98.4, HR 72, RR 18, O2 97 ON RA     PT, OT     NS DRIP @ 75, ROCEPHIN 1G QD IV

## 2021-11-17 ENCOUNTER — APPOINTMENT (OUTPATIENT)
Dept: GENERAL RADIOLOGY | Age: 86
End: 2021-11-17
Attending: EMERGENCY MEDICINE
Payer: MEDICARE

## 2021-11-17 ENCOUNTER — APPOINTMENT (OUTPATIENT)
Dept: CT IMAGING | Age: 86
End: 2021-11-17
Attending: EMERGENCY MEDICINE
Payer: MEDICARE

## 2021-11-17 ENCOUNTER — HOSPITAL ENCOUNTER (OUTPATIENT)
Age: 86
Setting detail: OBSERVATION
Discharge: NURSING, REHAB OR LONG-TERM CARE | End: 2021-11-19
Attending: EMERGENCY MEDICINE | Admitting: INTERNAL MEDICINE
Payer: MEDICARE

## 2021-11-17 DIAGNOSIS — G93.40 ENCEPHALOPATHY ACUTE: Primary | ICD-10-CM

## 2021-11-17 DIAGNOSIS — N30.00 ACUTE CYSTITIS WITHOUT HEMATURIA: ICD-10-CM

## 2021-11-17 PROBLEM — G93.41 ACUTE METABOLIC ENCEPHALOPATHY: Status: ACTIVE | Noted: 2021-11-17

## 2021-11-17 LAB
ALBUMIN SERPL-MCNC: 3.4 G/DL (ref 3.2–4.6)
ALBUMIN/GLOB SERPL: 0.9 {RATIO} (ref 1.2–3.5)
ALP SERPL-CCNC: 85 U/L (ref 50–136)
ALT SERPL-CCNC: 14 U/L (ref 12–65)
ANION GAP SERPL CALC-SCNC: 4 MMOL/L (ref 7–16)
AST SERPL-CCNC: 10 U/L (ref 15–37)
BACTERIA URNS QL MICRO: ABNORMAL /HPF
BASOPHILS # BLD: 0 K/UL (ref 0–0.2)
BASOPHILS NFR BLD: 0 % (ref 0–2)
BILIRUB SERPL-MCNC: 0.9 MG/DL (ref 0.2–1.1)
BUN SERPL-MCNC: 18 MG/DL (ref 8–23)
CALCIUM SERPL-MCNC: 9.3 MG/DL (ref 8.3–10.4)
CASTS URNS QL MICRO: ABNORMAL /LPF
CHLORIDE SERPL-SCNC: 107 MMOL/L (ref 98–107)
CO2 SERPL-SCNC: 28 MMOL/L (ref 21–32)
COVID-19 RAPID TEST, COVR: NOT DETECTED
CREAT SERPL-MCNC: 1.33 MG/DL (ref 0.8–1.5)
DIFFERENTIAL METHOD BLD: ABNORMAL
EOSINOPHIL # BLD: 0 K/UL (ref 0–0.8)
EOSINOPHIL NFR BLD: 0 % (ref 0.5–7.8)
EPI CELLS #/AREA URNS HPF: 0 /HPF
ERYTHROCYTE [DISTWIDTH] IN BLOOD BY AUTOMATED COUNT: 16.7 % (ref 11.9–14.6)
GLOBULIN SER CALC-MCNC: 4 G/DL (ref 2.3–3.5)
GLUCOSE SERPL-MCNC: 145 MG/DL (ref 65–100)
HCT VFR BLD AUTO: 37.7 % (ref 41.1–50.3)
HGB BLD-MCNC: 12 G/DL (ref 13.6–17.2)
IMM GRANULOCYTES # BLD AUTO: 0.2 K/UL (ref 0–0.5)
IMM GRANULOCYTES NFR BLD AUTO: 1 % (ref 0–5)
LACTATE SERPL-SCNC: 1 MMOL/L (ref 0.4–2)
LYMPHOCYTES # BLD: 0.8 K/UL (ref 0.5–4.6)
LYMPHOCYTES NFR BLD: 4 % (ref 13–44)
MCH RBC QN AUTO: 28.9 PG (ref 26.1–32.9)
MCHC RBC AUTO-ENTMCNC: 31.8 G/DL (ref 31.4–35)
MCV RBC AUTO: 90.8 FL (ref 79.6–97.8)
MONOCYTES # BLD: 0.9 K/UL (ref 0.1–1.3)
MONOCYTES NFR BLD: 5 % (ref 4–12)
NEUTS SEG # BLD: 16.5 K/UL (ref 1.7–8.2)
NEUTS SEG NFR BLD: 90 % (ref 43–78)
NRBC # BLD: 0 K/UL (ref 0–0.2)
PLATELET # BLD AUTO: 280 K/UL (ref 150–450)
PMV BLD AUTO: 10.2 FL (ref 9.4–12.3)
POTASSIUM SERPL-SCNC: 3.9 MMOL/L (ref 3.5–5.1)
PROT SERPL-MCNC: 7.4 G/DL (ref 6.3–8.2)
RBC # BLD AUTO: 4.15 M/UL (ref 4.23–5.6)
RBC #/AREA URNS HPF: ABNORMAL /HPF
SODIUM SERPL-SCNC: 139 MMOL/L (ref 136–145)
SOURCE, COVRS: NORMAL
WBC # BLD AUTO: 18.4 K/UL (ref 4.3–11.1)
WBC URNS QL MICRO: ABNORMAL /HPF

## 2021-11-17 PROCEDURE — 87088 URINE BACTERIA CULTURE: CPT

## 2021-11-17 PROCEDURE — 70450 CT HEAD/BRAIN W/O DYE: CPT

## 2021-11-17 PROCEDURE — 80053 COMPREHEN METABOLIC PANEL: CPT

## 2021-11-17 PROCEDURE — 96365 THER/PROPH/DIAG IV INF INIT: CPT

## 2021-11-17 PROCEDURE — 83605 ASSAY OF LACTIC ACID: CPT

## 2021-11-17 PROCEDURE — 65270000029 HC RM PRIVATE

## 2021-11-17 PROCEDURE — 71045 X-RAY EXAM CHEST 1 VIEW: CPT

## 2021-11-17 PROCEDURE — 87040 BLOOD CULTURE FOR BACTERIA: CPT

## 2021-11-17 PROCEDURE — 99284 EMERGENCY DEPT VISIT MOD MDM: CPT

## 2021-11-17 PROCEDURE — 87086 URINE CULTURE/COLONY COUNT: CPT

## 2021-11-17 PROCEDURE — 74011250637 HC RX REV CODE- 250/637: Performed by: FAMILY MEDICINE

## 2021-11-17 PROCEDURE — 81003 URINALYSIS AUTO W/O SCOPE: CPT

## 2021-11-17 PROCEDURE — 85025 COMPLETE CBC W/AUTO DIFF WBC: CPT

## 2021-11-17 PROCEDURE — 87186 SC STD MICRODIL/AGAR DIL: CPT

## 2021-11-17 PROCEDURE — 65390000012 HC CONDITION CODE 44 OBSERVATION

## 2021-11-17 PROCEDURE — 81015 MICROSCOPIC EXAM OF URINE: CPT

## 2021-11-17 PROCEDURE — 87635 SARS-COV-2 COVID-19 AMP PRB: CPT

## 2021-11-17 PROCEDURE — 2709999900 HC NON-CHARGEABLE SUPPLY

## 2021-11-17 PROCEDURE — 74011250636 HC RX REV CODE- 250/636: Performed by: EMERGENCY MEDICINE

## 2021-11-17 PROCEDURE — 74011250636 HC RX REV CODE- 250/636: Performed by: FAMILY MEDICINE

## 2021-11-17 PROCEDURE — 74011000258 HC RX REV CODE- 258: Performed by: EMERGENCY MEDICINE

## 2021-11-17 RX ORDER — CLOPIDOGREL BISULFATE 75 MG/1
75 TABLET ORAL DAILY
Status: DISCONTINUED | OUTPATIENT
Start: 2021-11-18 | End: 2021-11-19 | Stop reason: HOSPADM

## 2021-11-17 RX ORDER — ENOXAPARIN SODIUM 100 MG/ML
40 INJECTION SUBCUTANEOUS DAILY
Status: DISCONTINUED | OUTPATIENT
Start: 2021-11-18 | End: 2021-11-19 | Stop reason: HOSPADM

## 2021-11-17 RX ORDER — ONDANSETRON 2 MG/ML
4 INJECTION INTRAMUSCULAR; INTRAVENOUS
Status: DISCONTINUED | OUTPATIENT
Start: 2021-11-17 | End: 2021-11-19 | Stop reason: HOSPADM

## 2021-11-17 RX ORDER — ACETAMINOPHEN 650 MG/1
650 SUPPOSITORY RECTAL
Status: DISCONTINUED | OUTPATIENT
Start: 2021-11-17 | End: 2021-11-19 | Stop reason: HOSPADM

## 2021-11-17 RX ORDER — SODIUM CHLORIDE 0.9 % (FLUSH) 0.9 %
5-40 SYRINGE (ML) INJECTION EVERY 8 HOURS
Status: DISCONTINUED | OUTPATIENT
Start: 2021-11-17 | End: 2021-11-19 | Stop reason: HOSPADM

## 2021-11-17 RX ORDER — POLYETHYLENE GLYCOL 3350 17 G/17G
17 POWDER, FOR SOLUTION ORAL DAILY PRN
Status: DISCONTINUED | OUTPATIENT
Start: 2021-11-17 | End: 2021-11-19 | Stop reason: HOSPADM

## 2021-11-17 RX ORDER — SODIUM CHLORIDE 9 MG/ML
75 INJECTION, SOLUTION INTRAVENOUS CONTINUOUS
Status: DISCONTINUED | OUTPATIENT
Start: 2021-11-17 | End: 2021-11-18

## 2021-11-17 RX ORDER — FAMOTIDINE 20 MG/1
20 TABLET, FILM COATED ORAL DAILY
Status: DISCONTINUED | OUTPATIENT
Start: 2021-11-18 | End: 2021-11-19 | Stop reason: HOSPADM

## 2021-11-17 RX ORDER — ACETAMINOPHEN 325 MG/1
650 TABLET ORAL
Status: DISCONTINUED | OUTPATIENT
Start: 2021-11-17 | End: 2021-11-19 | Stop reason: HOSPADM

## 2021-11-17 RX ORDER — ONDANSETRON 4 MG/1
4 TABLET, ORALLY DISINTEGRATING ORAL
Status: DISCONTINUED | OUTPATIENT
Start: 2021-11-17 | End: 2021-11-19 | Stop reason: HOSPADM

## 2021-11-17 RX ORDER — PANTOPRAZOLE SODIUM 40 MG/1
40 TABLET, DELAYED RELEASE ORAL
Status: DISCONTINUED | OUTPATIENT
Start: 2021-11-18 | End: 2021-11-19 | Stop reason: HOSPADM

## 2021-11-17 RX ORDER — ATORVASTATIN CALCIUM 40 MG/1
80 TABLET, FILM COATED ORAL
Status: DISCONTINUED | OUTPATIENT
Start: 2021-11-17 | End: 2021-11-19 | Stop reason: HOSPADM

## 2021-11-17 RX ORDER — SODIUM CHLORIDE 0.9 % (FLUSH) 0.9 %
5-40 SYRINGE (ML) INJECTION AS NEEDED
Status: DISCONTINUED | OUTPATIENT
Start: 2021-11-17 | End: 2021-11-19 | Stop reason: HOSPADM

## 2021-11-17 RX ADMIN — SODIUM CHLORIDE 75 ML/HR: 900 INJECTION, SOLUTION INTRAVENOUS at 23:42

## 2021-11-17 RX ADMIN — ATORVASTATIN CALCIUM 80 MG: 40 TABLET, FILM COATED ORAL at 23:43

## 2021-11-17 RX ADMIN — CEFTRIAXONE 1 G: 1 INJECTION, POWDER, FOR SOLUTION INTRAMUSCULAR; INTRAVENOUS at 19:51

## 2021-11-17 RX ADMIN — Medication 10 ML: at 23:43

## 2021-11-17 NOTE — ED TRIAGE NOTES
Pt daughter in law presents with patient from [address] [place] [address]. Per family member patient has been having confusion that has worsened over past week. Patient lives in assisted living portion and has been increasingly fatigued.     Humberto Dunn RN

## 2021-11-17 NOTE — ED PROVIDER NOTES
79-year-old gentleman presents with daughter with concerns about some confusion and weakness. Apparently over the past couple of days he has had some decline in his functional status. Daughter says that there has not been one specific thing and she is globally gotten a little worse. He denies any specific symptoms at this point but the daughter says his history may not be entirely reliable. She says he had no vomiting or diarrhea. No known fevers or cough. They do think that he has been urinating more than usual and that his urine has had an odor. No other associated symptoms. Elements of this note were created using speech recognition software. As such, errors of speech recognition may be present. Past Medical History:   Diagnosis Date    CAD (coronary artery disease) 7/18/2019    Colon cancer ([facility] [address] 01/2012    Hiatal hernia        No past surgical history on file. History reviewed. No pertinent family history. Social History     Socioeconomic History    Marital status:      Spouse name: Not on file    Number of children: Not on file    Years of education: Not on file    Highest education level: Not on file   Occupational History    Not on file   Tobacco Use    Smoking status: Never Smoker    Smokeless tobacco: Never Used   Substance and Sexual Activity    Alcohol use: Never    Drug use: Never    Sexual activity: Not on file   Other Topics Concern    Not on file   Social History Narrative    Not on file     Social Determinants of Health     Financial Resource Strain:     Difficulty of Paying Living Expenses: Not on file   Food Insecurity:     Worried About Running Out of Food in the Last Year: Not on file    Alexandra of Food in the Last Year: Not on file   Transportation Needs:     Lack of Transportation (Medical): Not on file    Lack of Transportation (Non-Medical):  Not on file   Physical Activity:     Days of Exercise per Week: Not on file    Minutes of Exercise per Session: Not on file   Stress:     Feeling of Stress : Not on file   Social Connections:     Frequency of Communication with Friends and Family: Not on file    Frequency of Social Gatherings with Friends and Family: Not on file    Attends Taoism Services: Not on file    Active Member of Clubs or Organizations: Not on file    Attends Club or Organization Meetings: Not on file    Marital Status: Not on file   Intimate Partner Violence:     Fear of Current or Ex-Partner: Not on file    Emotionally Abused: Not on file    Physically Abused: Not on file    Sexually Abused: Not on file   Housing Stability:     Unable to Pay for Housing in the Last Year: Not on file    Number of [place] in the Last Year: Not on file    Unstable Housing in the Last Year: Not on file         ALLERGIES: Patient has no known allergies. Review of Systems   Reason unable to perform ROS: Review of systems obtained from the daughter. Constitutional: Negative for chills, diaphoresis and fever. HENT: Negative for congestion, rhinorrhea and sore throat. Eyes: Negative for redness and visual disturbance. Respiratory: Negative for cough, chest tightness, shortness of breath and wheezing. Cardiovascular: Negative for chest pain and palpitations. Gastrointestinal: Negative for abdominal pain, blood in stool, diarrhea, nausea and vomiting. Endocrine: Negative for polydipsia and polyuria. Genitourinary: Negative for dysuria and hematuria. Musculoskeletal: Negative for arthralgias, myalgias and neck stiffness. Skin: Negative for rash. Allergic/Immunologic: Negative for environmental allergies and food allergies. Neurological: Negative for dizziness, weakness and headaches. Hematological: Negative for adenopathy. Does not bruise/bleed easily. Psychiatric/Behavioral: Positive for confusion. Negative for sleep disturbance. The patient is not nervous/anxious.         Vitals:    11/17/21 1631 BP: 119/74   Pulse: (!) 105   Resp: 16   Temp: 98 °F (36.7 °C)   SpO2: 94%   Weight: 59 kg (130 lb)            Physical Exam  Vitals and nursing note reviewed. Constitutional:       Comments: Patient is alert and will answer questions. He is pleasantly confused. HENT:      Head: Normocephalic and atraumatic. Mouth/Throat:      Mouth: Mucous membranes are moist.   Eyes:      General:         Right eye: No discharge. Left eye: No discharge. Cardiovascular:      Rate and Rhythm: Normal rate and regular rhythm. Pulmonary:      Effort: Pulmonary effort is normal.      Breath sounds: Normal breath sounds. Abdominal:      Tenderness: There is no abdominal tenderness. There is no rebound. Neurological:      General: No focal deficit present. Mental Status: He is alert. Mental status is at baseline. Motor: No weakness. MDM  Number of Diagnoses or Management Options  Acute cystitis without hematuria  Encephalopathy acute  Diagnosis management comments: I will check his basic blood work and a urine. His urine shows  white cells. Head CT scan and chest x-ray are unremarkable except for some possible atelectasis. If so I do not think he has sepsis or septic shock. Given his encephalopathy I will plan to discuss the case with the hospitalist for admission. 9:17 PM  I spoke with the hospitalist who kindly agreed to see the patient. ED Course as of 11/17/21 2116 Wed Nov 17, 2021 1907 His white count is elevated at 18. I will get his head CT scan to look for occult abnormality. Urine pending. May need to add lactic acid and cultures.  [AC]      ED Course User Index  [AC] Dixie Estrada MD       Procedures

## 2021-11-18 PROBLEM — D72.829 LEUKOCYTOSIS: Status: ACTIVE | Noted: 2021-11-18

## 2021-11-18 PROBLEM — D72.829 LEUKOCYTOSIS: Status: RESOLVED | Noted: 2021-11-18 | Resolved: 2021-11-18

## 2021-11-18 LAB
ANION GAP SERPL CALC-SCNC: 4 MMOL/L (ref 7–16)
BUN SERPL-MCNC: 18 MG/DL (ref 8–23)
CALCIUM SERPL-MCNC: 9 MG/DL (ref 8.3–10.4)
CHLORIDE SERPL-SCNC: 106 MMOL/L (ref 98–107)
CO2 SERPL-SCNC: 30 MMOL/L (ref 21–32)
CREAT SERPL-MCNC: 1.29 MG/DL (ref 0.8–1.5)
ERYTHROCYTE [DISTWIDTH] IN BLOOD BY AUTOMATED COUNT: 16.9 % (ref 11.9–14.6)
GLUCOSE SERPL-MCNC: 111 MG/DL (ref 65–100)
HCT VFR BLD AUTO: 35.2 % (ref 41.1–50.3)
HGB BLD-MCNC: 11.2 G/DL (ref 13.6–17.2)
MCH RBC QN AUTO: 29.1 PG (ref 26.1–32.9)
MCHC RBC AUTO-ENTMCNC: 31.8 G/DL (ref 31.4–35)
MCV RBC AUTO: 91.4 FL (ref 79.6–97.8)
NRBC # BLD: 0 K/UL (ref 0–0.2)
PLATELET # BLD AUTO: 227 K/UL (ref 150–450)
PMV BLD AUTO: 9.8 FL (ref 9.4–12.3)
POTASSIUM SERPL-SCNC: 4 MMOL/L (ref 3.5–5.1)
RBC # BLD AUTO: 3.85 M/UL (ref 4.23–5.6)
SODIUM SERPL-SCNC: 140 MMOL/L (ref 136–145)
WBC # BLD AUTO: 10.8 K/UL (ref 4.3–11.1)

## 2021-11-18 PROCEDURE — 65390000012 HC CONDITION CODE 44 OBSERVATION

## 2021-11-18 PROCEDURE — 77030040393 HC DRSG OPTIFOAM GENT MDII -B

## 2021-11-18 PROCEDURE — 74011250636 HC RX REV CODE- 250/636: Performed by: INTERNAL MEDICINE

## 2021-11-18 PROCEDURE — 80048 BASIC METABOLIC PNL TOTAL CA: CPT

## 2021-11-18 PROCEDURE — 85027 COMPLETE CBC AUTOMATED: CPT

## 2021-11-18 PROCEDURE — 96372 THER/PROPH/DIAG INJ SC/IM: CPT

## 2021-11-18 PROCEDURE — 36415 COLL VENOUS BLD VENIPUNCTURE: CPT

## 2021-11-18 PROCEDURE — 74011250636 HC RX REV CODE- 250/636: Performed by: FAMILY MEDICINE

## 2021-11-18 PROCEDURE — G0378 HOSPITAL OBSERVATION PER HR: HCPCS

## 2021-11-18 PROCEDURE — 74011250637 HC RX REV CODE- 250/637: Performed by: FAMILY MEDICINE

## 2021-11-18 PROCEDURE — 96376 TX/PRO/DX INJ SAME DRUG ADON: CPT

## 2021-11-18 PROCEDURE — 74011000258 HC RX REV CODE- 258: Performed by: INTERNAL MEDICINE

## 2021-11-18 PROCEDURE — 2709999900 HC NON-CHARGEABLE SUPPLY

## 2021-11-18 RX ADMIN — CLOPIDOGREL BISULFATE 75 MG: 75 TABLET ORAL at 09:11

## 2021-11-18 RX ADMIN — ATORVASTATIN CALCIUM 80 MG: 40 TABLET, FILM COATED ORAL at 20:47

## 2021-11-18 RX ADMIN — Medication 10 ML: at 06:19

## 2021-11-18 RX ADMIN — FAMOTIDINE 20 MG: 20 TABLET, FILM COATED ORAL at 09:11

## 2021-11-18 RX ADMIN — Medication 10 ML: at 20:50

## 2021-11-18 RX ADMIN — CEFTRIAXONE 1 G: 1 INJECTION, POWDER, FOR SOLUTION INTRAMUSCULAR; INTRAVENOUS at 18:11

## 2021-11-18 RX ADMIN — Medication 10 ML: at 15:23

## 2021-11-18 RX ADMIN — PANTOPRAZOLE SODIUM 40 MG: 40 TABLET, DELAYED RELEASE ORAL at 06:19

## 2021-11-18 RX ADMIN — ENOXAPARIN SODIUM 40 MG: 100 INJECTION SUBCUTANEOUS at 09:11

## 2021-11-18 NOTE — H&P
Hospitalist History and Physical   Admit Date:  2021  4:36 PM   Name:  Washington Cameron Sr   Age:  80 y.o. Sex:  male  :  1931   MRN:  [id number]     Presenting Complaint: confusion  Reason(s) for Admission: Acute metabolic encephalopathy [S78.55]     History of Present Illness:   Theresa Moreno is a 80 y.o. male with medical history of dementia who presented to ED with confusion and weakness. Patient has no specific complaints. His daughter reports that over the past couple of days, he has had a decline in his overall functional status. No known fevers/chills. Patient denies pain. Upon ER evaluation, head CT is okay. Patient has leukocytosis with evidence of UTI on UA. Hospitalist to admit. Review of Systems:  10 systems reviewed and negative except as noted in HPI. Assessment & Plan:   * Acute metabolic encephalopathy  - Confusion more than baseline  - Head CT okay  - Appears to have UTI on UA  - Likely source of symptoms    UTI (urinary tract infection)  - Rocephin IV  - Urine culture  - Blood culture    Dementia (HCC)  - Currently only oriented to person    Debility  - Of note       Disposition: inpatient    Diet: ADULT DIET Regular  VTE ppx: lovenox  Code status: Full Code      Past medical history reviewed. Past Medical History:   Diagnosis Date    CAD (coronary artery disease) 2019    Colon cancer ([facility] [address] 2012    Hiatal hernia      Past surgical history reviewed. No past surgical history on file. No Known Allergies   Social History     Tobacco Use    Smoking status: Never Smoker    Smokeless tobacco: Never Used   Substance Use Topics    Alcohol use: Never      History reviewed. No pertinent family history. Family history reviewed and noncontributory to patient's acute condition; no relevant family history unless otherwise noted above.   Immunization History   Administered Date(s) Administered    Influenza High Dose Vaccine PF 2016, 10/09/2018, 10/23/2019    Pneumococcal Polysaccharide (PPSV-23) 05/21/2018    TB Skin Test (PPD) Intradermal 12/02/2019, 12/12/2019, 07/29/2021, 10/02/2021     PTA Medications:  Current Outpatient Medications   Medication Instructions    atorvastatin (LIPITOR) 80 mg, Oral, EVERY BEDTIME    clopidogreL (PLAVIX) 75 mg, Oral, DAILY    famotidine (PEPCID) 20 mg, Oral, DAILY    nitroglycerin (NITROSTAT) 0.4 mg, SubLINGual, EVERY 5 MIN AS NEEDED, Up to 3 doses.  ondansetron (ZOFRAN ODT) 4 mg, Oral, EVERY 8 HOURS AS NEEDED    pantoprazole (PROTONIX) 40 mg, Oral, DAILY BEFORE BREAKFAST       Objective:     Patient Vitals for the past 24 hrs:   Temp Pulse Resp BP SpO2   11/17/21 2330 98.6 °F (37 °C) 89 16 132/66 96 %   11/17/21 2221 -- (!) 104 16 123/60 98 %   11/17/21 1631 98 °F (36.7 °C) (!) 105 16 119/74 94 %     Oxygen Therapy  O2 Sat (%): 96 % (11/17/21 2330)    Estimated body mass index is 18.65 kg/m² as calculated from the following:    Height as of 10/1/21: 5' 10\" (1.778 m). Weight as of this encounter: 59 kg (130 lb). No intake or output data in the 24 hours ending 11/18/21 0001      Physical Exam:  General:    Well nourished. No overt distress  Head:  Normocephalic, atraumatic  Eyes:  Sclerae appear normal.  Pupils equally round. HENT:  Nares appear normal, no drainage. Moist mucous membranes  Neck:  No restricted ROM. Trachea midline  CV:   RRR. S1/S2 auscultated  Lungs:   CTAB. No wheezing, rhonchi, or rales. Appears even, unlabored  Abdomen: Bowel sounds present. Soft, nontender, nondistended. Extremities: Warm and dry. No cyanosis or clubbing. No edema. Skin:     No rashes. Normal turgor. Normal coloration  Neuro:  Cranial nerves II-XII grossly intact. Sensation intact  Psych:  Normal mood and affect. Oriented to person.     Data Ordered and Personally Reviewed:    Last 24hr Labs:  Recent Results (from the past 24 hour(s))   CBC WITH AUTOMATED DIFF    Collection Time: 11/17/21  4:52 PM Result Value Ref Range    WBC 18.4 (H) 4.3 - 11.1 K/uL    RBC 4.15 (L) 4.23 - 5.6 M/uL    HGB 12.0 (L) 13.6 - 17.2 g/dL    HCT 37.7 (L) 41.1 - 50.3 %    MCV 90.8 79.6 - 97.8 FL    MCH 28.9 26.1 - 32.9 PG    MCHC 31.8 31.4 - 35.0 g/dL    RDW 16.7 (H) 11.9 - 14.6 %    PLATELET 201 113 - 941 K/uL    MPV 10.2 9.4 - 12.3 FL    ABSOLUTE NRBC 0.00 0.0 - 0.2 K/uL    NEUTROPHILS 90 (H) 43 - 78 %    LYMPHOCYTES 4 (L) 13 - 44 %    MONOCYTES 5 4.0 - 12.0 %    EOSINOPHILS 0 (L) 0.5 - 7.8 %    BASOPHILS 0 0.0 - 2.0 %    IMMATURE GRANULOCYTES 1 0.0 - 5.0 %    ABS. NEUTROPHILS 16.5 (H) 1.7 - 8.2 K/UL    ABS. LYMPHOCYTES 0.8 0.5 - 4.6 K/UL    ABS. MONOCYTES 0.9 0.1 - 1.3 K/UL    ABS. EOSINOPHILS 0.0 0.0 - 0.8 K/UL    ABS. BASOPHILS 0.0 0.0 - 0.2 K/UL    ABS. IMM. GRANS. 0.2 0.0 - 0.5 K/UL    DF AUTOMATED     METABOLIC PANEL, COMPREHENSIVE    Collection Time: 11/17/21  4:52 PM   Result Value Ref Range    Sodium 139 136 - 145 mmol/L    Potassium 3.9 3.5 - 5.1 mmol/L    Chloride 107 98 - 107 mmol/L    CO2 28 21 - 32 mmol/L    Anion gap 4 (L) 7 - 16 mmol/L    Glucose 145 (H) 65 - 100 mg/dL    BUN 18 8 - 23 MG/DL    Creatinine 1.33 0.8 - 1.5 MG/DL    GFR est AA >60 >60 ml/min/1.73m2    GFR est non-AA 54 (L) >60 ml/min/1.73m2    Calcium 9.3 8.3 - 10.4 MG/DL    Bilirubin, total 0.9 0.2 - 1.1 MG/DL    ALT (SGPT) 14 12 - 65 U/L    AST (SGOT) 10 (L) 15 - 37 U/L    Alk.  phosphatase 85 50 - 136 U/L    Protein, total 7.4 6.3 - 8.2 g/dL    Albumin 3.4 3.2 - 4.6 g/dL    Globulin 4.0 (H) 2.3 - 3.5 g/dL    A-G Ratio 0.9 (L) 1.2 - 3.5     LACTIC ACID    Collection Time: 11/17/21  7:28 PM   Result Value Ref Range    Lactic acid 1.0 0.4 - 2.0 MMOL/L   URINE MICROSCOPIC    Collection Time: 11/17/21  7:50 PM   Result Value Ref Range    WBC  0 /hpf    RBC 5-10 0 /hpf    Epithelial cells 0 0 /hpf    Bacteria 2+ (H) 0 /hpf    Casts 0-3 0 /lpf   COVID-19 RAPID TEST    Collection Time: 11/17/21  8:18 PM   Result Value Ref Range    Specimen source NASAL SWAB      COVID-19 rapid test Not detected NOTD         All Micro Results     Procedure Component Value Units Date/Time    COVID-19 RAPID TEST [[id number]] Collected: 11/17/21 2018    Order Status: Completed Specimen: Nasopharyngeal Updated: 11/17/21 2110     Specimen source NASAL SWAB        COVID-19 rapid test Not detected        Comment:      The specimen is NEGATIVE for SARS-CoV-2, the novel coronavirus associated with COVID-19. A negative result does not rule out COVID-19. This test has been authorized by the FDA under an Emergency Use Authorization (EUA) for use by authorized laboratories. Fact sheet for Healthcare Providers: [employer]  Fact sheet for Patients: [employer]       Methodology: Isothermal Nucleic Acid Amplification         CULTURE, URINE [[id number]]     Order Status: Sent Specimen: Urine from Clean catch     CULTURE, BLOOD [[id number]] Collected: 11/17/21 1928    Order Status: Completed Specimen: Blood Updated: 11/17/21 1937    CULTURE, BLOOD [[id number]] Collected: 11/17/21 1928    Order Status: Completed Specimen: Blood Updated: 11/17/21 1937          Other Studies:  CT HEAD WO CONT    Result Date: 11/17/2021  NONCONTRAST HEAD CT CLINICAL HISTORY:  Worsening confusion over the last week. TECHNIQUE:  Axial images were obtained with spiral technique. Radiation dose reduction was achieved using one or all of the following techniques: automated exposure control, weight-based dosing, iterative reconstruction. COMPARISON:  CT and MRI of July 29, 2021. REPORT:   Examination is limited by nonstandard positioning of the patient's head as well as artifact associated with motion, despite repeat scanning. Noncontrast Head CT demonstrates no definite intracranial mass effect, hemorrhage, or evidence of acute geographic infarction. Extensive white matter hypodensities are most consistent with small vessel ischemic disease. Ventricles are unchanged. Orbits  and paranasal sinuses are clear where imaged. Bone windows demonstrate no definite fracture or destruction. EXTENSIVE SMALL VESSEL ISCHEMIC DISEASE WITH NO ACUTE INTRACRANIAL ABNORMALITY IDENTIFIED ON THIS TECHNICALLY LIMITED, NONCONTRAST CT.     XR CHEST PORT    Result Date: 11/17/2021  PORTABLE CHEST, November 17, 2021 at 2013 hours CLINICAL HISTORY:  Worsening confusion over the last week. COMPARISON:  July 28, 2021. FINDINGS:  AP erect image demonstrates no confluent infiltrate or significant pleural fluid. There is mild atelectasis/infiltrate at both lung bases. The heart size is within normal limits without evidence of congestive heart failure or pneumothorax. The bony thorax appears intact on this view. Severe degenerative changes of the right shoulder. MILD BASILAR ATELECTASIS/INFILTRATE.         Medications Administered     cefTRIAXone (ROCEPHIN) 1 g in 0.9% sodium chloride (MBP/ADV) 50 mL MBP     Admin Date  11/17/2021 Action  New Bag Dose  1 g Rate  100 mL/hr Route  IntraVENous Administered By  Yany Dorsey RN                  Signed:  Dakotah Schmitz MD

## 2021-11-18 NOTE — PROGRESS NOTES
Discharge Location  Discharge Placement: Unable to determine at this time    SW made an attempt to speak w/ pt., pt is unable to answer questions he state that he does not remember anything, SW made made an attempt to reach out to pt's son Shabana Rodas at [phone]), SW left pt's son a VM and will follow-up w/ pt's son    Delfino Syed MSW

## 2021-11-18 NOTE — PROGRESS NOTES
Upon shift assessment, pt had scant amount of blood in brief. Brief saturated with urine. No open wounds in groin area. Pt denies pain. Will continue to monitor.

## 2021-11-18 NOTE — PROGRESS NOTES
Hospitalist Progress Note   Admit Date:  2021  4:36 PM   Name:  Danielle Alejandra Sr   Age:  80 y.o. Sex:  male  :  1931   MRN:  [id number]   Room:  [facility]/    Presenting Complaint: Altered mental status    Reason(s) for Admission: Acute metabolic encephalopathy [N20.62]     Hospital Course & Interval History:   Mr. Teri Damon is a nice 81 y/o WM with a h/o CAD and dementia who was admitted to our service on  with acute metabolic encephalopathy 2/2 acute cystitis. Head CT unremarkable. Has had progressive functional decline lately. Met sepsis criteria with UTI, leukocytosis and tachycardia. He was started on fluids and Rocephin. Subjective (21):  Pleasant, in bed, mildly disoriented. Says he feels well. Denies pain, SOB, N/V/D. Assessment & Plan:   # Sepsis 2/2 acute cystitis   - Leukocytosis + tachycardia + UTI. Sepsis now resolved. - Most recent 2 urine cultures showed serratia sens to Rocephin so con't this for now. Current blood/urine cxs negative. # Acute metabolic encephalopathy   - 2/2 above. Today was oriented to hospital and city/state, uncertain of his baseline but this has likely resolved. # Dementia   - Orientation as above. # CAD   - Plavix, statin    Dispo/Discharge Planning: Pending.    Diet:  ADULT DIET Regular  DVT PPx: Lovenox  Code status: Full Code    Hospital Problems as of 2021 Date Reviewed: 10/3/2021          Codes Class Noted - Resolved POA    * (Principal) Acute metabolic encephalopathy CLS-10-RL: G93.41  ICD-9-CM: 348.31  2021 - Present Yes        UTI (urinary tract infection) ICD-10-CM: N39.0  ICD-9-CM: 599.0  2021 - Present Yes        Dementia ([address] (Chronic) ICD-10-CM: F03.90  ICD-9-CM: 294.20  2019 - Present Yes        Debility (Chronic) ICD-10-CM: R53.81  ICD-9-CM: 799.3  2019 - Present Yes        Sepsis ([facility] [address] ICD-10-CM: A41.9  ICD-9-CM: 038.9, 995.91  2019 - Present Yes        RESOLVED: Leukocytosis ICD-10-CM: K46.210  ICD-9-CM: 288.60  11/18/2021 - 11/18/2021 Yes              Objective:     Patient Vitals for the past 24 hrs:   Temp Pulse Resp BP SpO2   11/18/21 1135 97.4 °F (36.3 °C) 84 17 125/71 96 %   11/18/21 0711 98.1 °F (36.7 °C) 79 18 116/73 95 %   11/18/21 0330 98.6 °F (37 °C) 84 18 98/69 94 %   11/17/21 2330 98.6 °F (37 °C) 89 16 132/66 96 %   11/17/21 2221 -- (!) 104 16 123/60 98 %   11/17/21 1631 98 °F (36.7 °C) (!) 105 16 119/74 94 %     Oxygen Therapy  O2 Sat (%): 96 % (11/18/21 1135)    Estimated body mass index is 18.65 kg/m² as calculated from the following:    Height as of 10/1/21: 5' 10\" (1.778 m). Weight as of this encounter: 59 kg (130 lb). No intake or output data in the 24 hours ending 11/18/21 1428      Physical Exam:   Blood pressure 125/71, pulse 84, temperature 97.4 °F (36.3 °C), resp. rate 17, weight 59 kg (130 lb), SpO2 96 %. General:    Well nourished. No overt distress. Appears stated age. Head:  Normocephalic, atraumatic  Eyes:  Sclerae appear normal.  Pupils equally round. ENT:  Nares appear normal, no drainage. Moist oral mucosa  Neck:  No restricted ROM. Trachea midline   CV:   RRR. No m/r/g. No jugular venous distension. Lungs:   CTAB. No wheezing, rhonchi, or rales. Respirations even, unlabored. Abdomen: Bowel sounds present. Soft, nontender, nondistended. Extremities: No cyanosis or clubbing. No edema  Skin:     No rashes and normal coloration. Warm and dry. Neuro:  CN II-XII grossly intact. Sensation intact. Alert, oriented to self and place (hospital, city/state) but not year (said 65). Moves all extremities. Psych:  Normal mood and affect.       I have reviewed ordered lab tests and independently visualized imaging below:    Recent Labs:  Recent Results (from the past 48 hour(s))   CBC WITH AUTOMATED DIFF    Collection Time: 11/17/21  4:52 PM   Result Value Ref Range    WBC 18.4 (H) 4.3 - 11.1 K/uL    RBC 4.15 (L) 4.23 - 5.6 M/uL    HGB 12.0 (L) 13.6 - 17.2 g/dL    HCT 37.7 (L) 41.1 - 50.3 %    MCV 90.8 79.6 - 97.8 FL    MCH 28.9 26.1 - 32.9 PG    MCHC 31.8 31.4 - 35.0 g/dL    RDW 16.7 (H) 11.9 - 14.6 %    PLATELET 865 363 - 173 K/uL    MPV 10.2 9.4 - 12.3 FL    ABSOLUTE NRBC 0.00 0.0 - 0.2 K/uL    NEUTROPHILS 90 (H) 43 - 78 %    LYMPHOCYTES 4 (L) 13 - 44 %    MONOCYTES 5 4.0 - 12.0 %    EOSINOPHILS 0 (L) 0.5 - 7.8 %    BASOPHILS 0 0.0 - 2.0 %    IMMATURE GRANULOCYTES 1 0.0 - 5.0 %    ABS. NEUTROPHILS 16.5 (H) 1.7 - 8.2 K/UL    ABS. LYMPHOCYTES 0.8 0.5 - 4.6 K/UL    ABS. MONOCYTES 0.9 0.1 - 1.3 K/UL    ABS. EOSINOPHILS 0.0 0.0 - 0.8 K/UL    ABS. BASOPHILS 0.0 0.0 - 0.2 K/UL    ABS. IMM. GRANS. 0.2 0.0 - 0.5 K/UL    DF AUTOMATED     METABOLIC PANEL, COMPREHENSIVE    Collection Time: 11/17/21  4:52 PM   Result Value Ref Range    Sodium 139 136 - 145 mmol/L    Potassium 3.9 3.5 - 5.1 mmol/L    Chloride 107 98 - 107 mmol/L    CO2 28 21 - 32 mmol/L    Anion gap 4 (L) 7 - 16 mmol/L    Glucose 145 (H) 65 - 100 mg/dL    BUN 18 8 - 23 MG/DL    Creatinine 1.33 0.8 - 1.5 MG/DL    GFR est AA >60 >60 ml/min/1.73m2    GFR est non-AA 54 (L) >60 ml/min/1.73m2    Calcium 9.3 8.3 - 10.4 MG/DL    Bilirubin, total 0.9 0.2 - 1.1 MG/DL    ALT (SGPT) 14 12 - 65 U/L    AST (SGOT) 10 (L) 15 - 37 U/L    Alk.  phosphatase 85 50 - 136 U/L    Protein, total 7.4 6.3 - 8.2 g/dL    Albumin 3.4 3.2 - 4.6 g/dL    Globulin 4.0 (H) 2.3 - 3.5 g/dL    A-G Ratio 0.9 (L) 1.2 - 3.5     CULTURE, BLOOD    Collection Time: 11/17/21  7:28 PM    Specimen: Blood   Result Value Ref Range    Special Requests: RIGHT  Antecubital        Culture result: NO GROWTH AFTER 11 HOURS     CULTURE, BLOOD    Collection Time: 11/17/21  7:28 PM    Specimen: Blood   Result Value Ref Range    Special Requests: RIGHT  FOREARM        Culture result: NO GROWTH AFTER 11 HOURS     LACTIC ACID    Collection Time: 11/17/21  7:28 PM   Result Value Ref Range    Lactic acid 1.0 0.4 - 2.0 MMOL/L   URINE MICROSCOPIC Collection Time: 11/17/21  7:50 PM   Result Value Ref Range    WBC  0 /hpf    RBC 5-10 0 /hpf    Epithelial cells 0 0 /hpf    Bacteria 2+ (H) 0 /hpf    Casts 0-3 0 /lpf   COVID-19 RAPID TEST    Collection Time: 11/17/21  8:18 PM   Result Value Ref Range    Specimen source NASAL SWAB      COVID-19 rapid test Not detected NOTD     METABOLIC PANEL, BASIC    Collection Time: 11/18/21  4:25 AM   Result Value Ref Range    Sodium 140 136 - 145 mmol/L    Potassium 4.0 3.5 - 5.1 mmol/L    Chloride 106 98 - 107 mmol/L    CO2 30 21 - 32 mmol/L    Anion gap 4 (L) 7 - 16 mmol/L    Glucose 111 (H) 65 - 100 mg/dL    BUN 18 8 - 23 MG/DL    Creatinine 1.29 0.8 - 1.5 MG/DL    GFR est AA >60 >60 ml/min/1.73m2    GFR est non-AA 56 (L) >60 ml/min/1.73m2    Calcium 9.0 8.3 - 10.4 MG/DL   CBC W/O DIFF    Collection Time: 11/18/21  4:25 AM   Result Value Ref Range    WBC 10.8 4.3 - 11.1 K/uL    RBC 3.85 (L) 4.23 - 5.6 M/uL    HGB 11.2 (L) 13.6 - 17.2 g/dL    HCT 35.2 (L) 41.1 - 50.3 %    MCV 91.4 79.6 - 97.8 FL    MCH 29.1 26.1 - 32.9 PG    MCHC 31.8 31.4 - 35.0 g/dL    RDW 16.9 (H) 11.9 - 14.6 %    PLATELET 422 601 - 151 K/uL    MPV 9.8 9.4 - 12.3 FL    ABSOLUTE NRBC 0.00 0.0 - 0.2 K/uL       All Micro Results     Procedure Component Value Units Date/Time    CULTURE, URINE [[id number]] Collected: 11/17/21 1950    Order Status: Completed Specimen: Urine from Clean catch Updated: 11/18/21 0906    CULTURE, BLOOD [[id number]] Collected: 11/17/21 1928    Order Status: Completed Specimen: Blood Updated: 11/18/21 0716     Special Requests: --        RIGHT  FOREARM       Culture result: NO GROWTH AFTER 11 HOURS       CULTURE, BLOOD [[id number]] Collected: 11/17/21 1928    Order Status: Completed Specimen: Blood Updated: 11/18/21 0716     Special Requests: --        RIGHT  Antecubital       Culture result: NO GROWTH AFTER 11 HOURS       COVID-19 RAPID TEST [[id number]] Collected: 11/17/21 2018    Order Status: Completed Specimen: Nasopharyngeal Updated: 11/17/21 2110     Specimen source NASAL SWAB        COVID-19 rapid test Not detected        Comment:      The specimen is NEGATIVE for SARS-CoV-2, the novel coronavirus associated with COVID-19. A negative result does not rule out COVID-19. This test has been authorized by the FDA under an Emergency Use Authorization (EUA) for use by authorized laboratories. Fact sheet for Healthcare Providers: iTendency.uy  Fact sheet for Patients: iTendency.uy       Methodology: Isothermal Nucleic Acid Amplification               Other Studies:  CT HEAD WO CONT    Result Date: 11/17/2021  NONCONTRAST HEAD CT CLINICAL HISTORY:  Worsening confusion over the last week. TECHNIQUE:  Axial images were obtained with spiral technique. Radiation dose reduction was achieved using one or all of the following techniques: automated exposure control, weight-based dosing, iterative reconstruction. COMPARISON:  CT and MRI of July 29, 2021. REPORT:   Examination is limited by nonstandard positioning of the patient's head as well as artifact associated with motion, despite repeat scanning. Noncontrast Head CT demonstrates no definite intracranial mass effect, hemorrhage, or evidence of acute geographic infarction. Extensive white matter hypodensities are most consistent with small vessel ischemic disease. Ventricles are unchanged. Orbits  and paranasal sinuses are clear where imaged. Bone windows demonstrate no definite fracture or destruction. EXTENSIVE SMALL VESSEL ISCHEMIC DISEASE WITH NO ACUTE INTRACRANIAL ABNORMALITY IDENTIFIED ON THIS TECHNICALLY LIMITED, NONCONTRAST CT.     XR CHEST PORT    Result Date: 11/17/2021  PORTABLE CHEST, November 17, 2021 at 2013 hours CLINICAL HISTORY:  Worsening confusion over the last week. COMPARISON:  July 28, 2021. FINDINGS:  AP erect image demonstrates no confluent infiltrate or significant pleural fluid.   There is mild atelectasis/infiltrate at both lung bases. The heart size is within normal limits without evidence of congestive heart failure or pneumothorax. The bony thorax appears intact on this view. Severe degenerative changes of the right shoulder. MILD BASILAR ATELECTASIS/INFILTRATE. Current Meds:  Current Facility-Administered Medications   Medication Dose Route Frequency    famotidine (PEPCID) tablet 20 mg  20 mg Oral DAILY    clopidogreL (PLAVIX) tablet 75 mg  75 mg Oral DAILY    pantoprazole (PROTONIX) tablet 40 mg  40 mg Oral ACB    atorvastatin (LIPITOR) tablet 80 mg  80 mg Oral QHS    sodium chloride (NS) flush 5-40 mL  5-40 mL IntraVENous Q8H    sodium chloride (NS) flush 5-40 mL  5-40 mL IntraVENous PRN    acetaminophen (TYLENOL) tablet 650 mg  650 mg Oral Q6H PRN    Or    acetaminophen (TYLENOL) suppository 650 mg  650 mg Rectal Q6H PRN    polyethylene glycol (MIRALAX) packet 17 g  17 g Oral DAILY PRN    ondansetron (ZOFRAN ODT) tablet 4 mg  4 mg Oral Q8H PRN    Or    ondansetron (ZOFRAN) injection 4 mg  4 mg IntraVENous Q6H PRN    enoxaparin (LOVENOX) injection 40 mg  40 mg SubCUTAneous DAILY    cefTRIAXone (ROCEPHIN) 1 g in 0.9% sodium chloride (MBP/ADV) 50 mL MBP  1 g IntraVENous Q24H       Signed:  Dee Mathias MD    Part of this note may have been written by using a voice dictation software. The note has been proof read but may still contain some grammatical/other typographical errors.

## 2021-11-18 NOTE — ASSESSMENT & PLAN NOTE
- Confusion more than baseline  - Head CT okay  - Appears to have UTI on UA  - Likely source of symptoms

## 2021-11-18 NOTE — PROGRESS NOTES
Care Management Interventions  PCP Verified by CM:  Yes  Transition of Care Consult (CM Consult): Assisted Living  Discharge Location  Discharge Placement: Assisted Living ([address] [facility])    SW spoke w/ pt and pt's daughter-in-law Ry Carlson), pt is still confused at this time and was unable to answer questions, pt's daughter-in-law was able to assist SW w/ pt's info, pt is currently at [facility] and [address] [facility] has been there a few years, pt has fallen several times, at least 2x last week, pt has a rollator but will not use it because of pride, pt has not had any broken bones, pt's daughter-in-law is concerned about pt's stomach pains    Pt's PCP is Dr. Karma Lim last seen a few weeks ago, SW will follow pt until d/c    CARL Jones

## 2021-11-18 NOTE — PROGRESS NOTES
Admission assessment complete. Patient is alert and partially oriented. Respirations are even and unlabored, patient is on room air. Bowel sounds are present. Patient has scattered bruising on upper arms, and right lower leg. Patient has Psoriasis patches in groin area, and back. Only a small red area was noted on patient's sacrum. Dressed with Allevyn to prevent progression. IV fluids infusing per order. No needs expressed at this time. Oriented patient to room and call light functions, patient did not verbalize or demonstrate understanding. Bed low and locked, call light within reach. Gripper socks in place. Bed alarm in place, door open. Frequent rounding intended.   ___________________________________________       11/17/21 2300   Dual Skin Pressure Injury Assessment   Dual Skin Pressure Injury Assessment WDL   Second Care Provider (Based on [address] [place] [address]) Demarcus Alberts RN   Sacrum  Mid  (slighly red, dressing applied)   Skin Integumentary   Skin Integumentary (WDL) X    Pressure  Injury Documentation No Pressure Injury Noted-Pressure Ulcer Prevention Initiated   Skin Condition/Temp Flaky; Fragile   Skin Color Ecchymosis (comment); Red  (scattered. Psoriasis)   _____________________________________________    Problem: Falls - Risk of  Goal: *Absence of Falls  Description: Document Vale Fall Risk and appropriate interventions in the flowsheet.   Outcome: Progressing Towards Goal  Note: Fall Risk Interventions:  Mobility Interventions: Bed/chair exit alarm, Communicate number of staff needed for ambulation/transfer    Mentation Interventions: Bed/chair exit alarm, Door open when patient unattended    Elimination Interventions: Bed/chair exit alarm, Call light in reach, Elevated toilet seat, Patient to call for help with toileting needs, Stay With Me (per policy), Toilet paper/wipes in reach, Toileting schedule/hourly rounds, Urinal in reach    Problem: Pressure Injury - Risk of  Goal: *Prevention of pressure injury  Description: Document Tyler Scale and appropriate interventions in the flowsheet.   Outcome: Progressing Towards Goal  Note: Pressure Injury Interventions:  Sensory Interventions: Keep linens dry and wrinkle-free    Moisture Interventions: Limit adult briefs    Activity Interventions: Pressure redistribution bed/mattress(bed type)    Mobility Interventions: Pressure redistribution bed/mattress (bed type)    Nutrition Interventions: Offer support with meals,snacks and hydration    Friction and Shear Interventions: Lift sheet

## 2021-11-18 NOTE — PROGRESS NOTES
Spoke with patient's daughter in law at bedside. Daughter in law states patient's cognitive level has decreased over past year and at times patient is Amiee Lewandowsky it and can answer all questions appropriately, but sometimes he is confused. \" Daughter in law says patient is currently close to baseline status.

## 2021-11-19 VITALS
DIASTOLIC BLOOD PRESSURE: 67 MMHG | OXYGEN SATURATION: 92 % | HEART RATE: 88 BPM | WEIGHT: 130 LBS | BODY MASS INDEX: 18.65 KG/M2 | RESPIRATION RATE: 20 BRPM | TEMPERATURE: 98.2 F | SYSTOLIC BLOOD PRESSURE: 180 MMHG

## 2021-11-19 PROBLEM — G93.41 ACUTE METABOLIC ENCEPHALOPATHY: Status: RESOLVED | Noted: 2021-11-17 | Resolved: 2021-11-19

## 2021-11-19 PROBLEM — N39.0 UTI (URINARY TRACT INFECTION): Status: RESOLVED | Noted: 2021-07-28 | Resolved: 2021-11-19

## 2021-11-19 PROBLEM — A41.9 SEPSIS (HCC): Status: RESOLVED | Noted: 2019-12-02 | Resolved: 2021-11-19

## 2021-11-19 PROCEDURE — 74011250636 HC RX REV CODE- 250/636: Performed by: FAMILY MEDICINE

## 2021-11-19 PROCEDURE — G0378 HOSPITAL OBSERVATION PER HR: HCPCS

## 2021-11-19 PROCEDURE — 96372 THER/PROPH/DIAG INJ SC/IM: CPT

## 2021-11-19 PROCEDURE — 74011250637 HC RX REV CODE- 250/637: Performed by: FAMILY MEDICINE

## 2021-11-19 RX ORDER — CEFPODOXIME PROXETIL 100 MG/1
100 TABLET, FILM COATED ORAL 2 TIMES DAILY
Qty: 10 TABLET | Refills: 0 | Status: SHIPPED | OUTPATIENT
Start: 2021-11-19 | End: 2021-11-24

## 2021-11-19 RX ORDER — CEFPODOXIME PROXETIL 200 MG/1
100 TABLET, FILM COATED ORAL 2 TIMES DAILY
Qty: 5 TABLET | Refills: 0 | Status: SHIPPED | OUTPATIENT
Start: 2021-11-19 | End: 2021-11-19 | Stop reason: SDUPTHER

## 2021-11-19 RX ADMIN — ENOXAPARIN SODIUM 40 MG: 100 INJECTION SUBCUTANEOUS at 09:36

## 2021-11-19 RX ADMIN — PANTOPRAZOLE SODIUM 40 MG: 40 TABLET, DELAYED RELEASE ORAL at 06:03

## 2021-11-19 RX ADMIN — CLOPIDOGREL BISULFATE 75 MG: 75 TABLET ORAL at 09:36

## 2021-11-19 RX ADMIN — FAMOTIDINE 20 MG: 20 TABLET, FILM COATED ORAL at 09:36

## 2021-11-19 NOTE — PROGRESS NOTES
Problem: Falls - Risk of  Goal: *Absence of Falls  Description: Document Marly Rodriguez Fall Risk and appropriate interventions in the flowsheet. Outcome: Progressing Towards Goal  Note: Fall Risk Interventions:  Mobility Interventions: Bed/chair exit alarm, OT consult for ADLs, Patient to call before getting OOB, PT Consult for mobility concerns, PT Consult for assist device competence, Strengthening exercises (ROM-active/passive), Utilize walker, cane, or other assistive device    Mentation Interventions: Adequate sleep, hydration, pain control, Bed/chair exit alarm, Door open when patient unattended    Medication Interventions: Bed/chair exit alarm, Patient to call before getting OOB, Teach patient to arise slowly    Elimination Interventions: Call light in reach, Elevated toilet seat, Patient to call for help with toileting needs, Urinal in reach              Problem: Patient Education: Go to Patient Education Activity  Goal: Patient/Family Education  Outcome: Progressing Towards Goal     Problem: Pressure Injury - Risk of  Goal: *Prevention of pressure injury  Description: Document Tyler Scale and appropriate interventions in the flowsheet.   Outcome: Progressing Towards Goal  Note: Pressure Injury Interventions:  Sensory Interventions: Assess changes in LOC    Moisture Interventions: Absorbent underpads, Apply protective barrier, creams and emollients, Check for incontinence Q2 hours and as needed    Activity Interventions: Increase time out of bed, Pressure redistribution bed/mattress(bed type), PT/OT evaluation    Mobility Interventions: HOB 30 degrees or less, Pressure redistribution bed/mattress (bed type), PT/OT evaluation    Nutrition Interventions: Document food/fluid/supplement intake, Discuss nutritional consult with provider, Offer support with meals,snacks and hydration    Friction and Shear Interventions: Minimize layers                Problem: Patient Education: Go to Patient Education Activity  Goal: Patient/Family Education  Outcome: Progressing Towards Goal

## 2021-11-19 NOTE — DISCHARGE INSTRUCTIONS
Patient Education        Urinary Tract Infections (UTI) in Men: Care Instructions  Overview     A urinary tract infection, or UTI, is a term for an infection anywhere between the kidneys and the urethra. (The urethra is the tube that carries urine from the bladder to outside the body.) Most UTIs are bladder infections. They often cause pain or burning when you urinate. UTIs are caused by bacteria. This means they can be cured with antibiotics. Be sure to complete your treatment so that the infection does not get worse. Follow-up care is a key part of your treatment and safety. Be sure to make and go to all appointments, and call your doctor if you are having problems. It's also a good idea to know your test results and keep a list of the medicines you take. How can you care for yourself at home? · Take your antibiotics as prescribed. Do not stop taking them just because you feel better. You need to take the full course of antibiotics. · Take your medicines exactly as prescribed. Your doctor may have prescribed a medicine, such as phenazopyridine (Pyridium), to help relieve pain when you urinate. This turns your urine orange. You may stop taking it when your symptoms get better. But be sure to take all of your antibiotics, which treat the infection. · Drink extra water for the next day or two. This will help make the urine less concentrated and help wash out the bacteria causing the infection. (If you have kidney, heart, or liver disease and have to limit your fluids, talk with your doctor before you increase your fluid intake.)  · Avoid drinks that are carbonated or have caffeine. They can irritate the bladder. · Urinate often. Try to empty your bladder each time. · To relieve pain, take a hot bath or lay a heating pad (set on low) over your lower belly or genital area. Never go to sleep with a heating pad in place. To help prevent UTIs  · Drink plenty of fluids.  If you have kidney, heart, or liver disease and have to limit fluids, talk with your doctor before you increase the amount of fluids you drink. · Urinate when you have the urge. Do not hold your urine for a long time. Urinate before you go to sleep. · Keep your penis clean. Catheter care  If you have a drainage tube (catheter) in place, the following steps will help you care for it. · Always wash your hands before and after touching your catheter. · Check the area around the urethra for inflammation or signs of infection. Signs of infection include irritated, swollen, red, or tender skin, or pus around the catheter. · Clean the area around the catheter with soap and water two times a day. Dry with a clean towel afterward. · Do not apply powder or lotion to the skin around the catheter. To empty the urine collection bag   · Wash your hands with soap and water. · Without touching the drain spout, remove the spout from its sleeve at the bottom of the collection bag. Open the valve on the spout. · Let the urine flow out of the bag and into the toilet or a container. Do not let the tubing or drain spout touch anything. · After you empty the bag, clean the end of the drain spout with tissue and water. Close the valve and put the drain spout back into its sleeve at the bottom of the collection bag. · Wash your hands with soap and water. When should you call for help? Call your doctor now or seek immediate medical care if:    · Symptoms such as a fever, chills, nausea, or vomiting get worse or happen for the first time.     · You have new pain in your back just below your rib cage. This is called flank pain.     · There is new blood or pus in your urine.     · You are not able to take or keep down your antibiotics. Watch closely for changes in your health, and be sure to contact your doctor if:    · You are not getting better after taking an antibiotic for 2 days.     · Your symptoms go away but then come back. Where can you learn more?   Go to http://www.gray.com/  Enter E376 in the search box to learn more about \"Urinary Tract Infections (UTI) in Men: Care Instructions. \"  Current as of: February 10, 2021               Content Version: 13.0  © 6441-0362 [employer]. Care instructions adapted under license by [employer] (which disclaims liability or warranty for this information). If you have questions about a medical condition or this instruction, always ask your healthcare professional. [place] [address] any warranty or liability for your use of this information.

## 2021-11-19 NOTE — DISCHARGE SUMMARY
Hospitalist Discharge Summary   Admit Date:  2021  4:36 PM   DC Note date: 2021  Name:  Ce Majano Sr   Age:  80 y.o. Sex:  male  :  1931   MRN:  [id number]   Room:  [facility]  PCP:  None    Presenting Complaint: Altered mental status    Initial Admission Diagnosis: Acute metabolic encephalopathy [A15.78]  Sepsis ([facility] [address] [A41.9]  UTI (urinary tract infection) [N39.0]     Problem List for this Hospitalization:  Hospital Problems as of 2021 Date Reviewed: 2021          Codes Class Noted - Resolved POA    Dementia ([facility] [address] (Chronic) ICD-10-CM: F03.90  ICD-9-CM: 294.20  2019 - Present Yes        Debility (Chronic) ICD-10-CM: R53.81  ICD-9-CM: 799.3  2019 - Present Yes        RESOLVED: Leukocytosis ICD-10-CM: I37.395  ICD-9-CM: 288.60  2021 - 2021 Yes        * (Principal) RESOLVED: Acute metabolic encephalopathy KTR-44-AK: G93.41  ICD-9-CM: 348.31  2021 - 2021 Yes        RESOLVED: UTI (urinary tract infection) ICD-10-CM: N39.0  ICD-9-CM: 599.0  2021 - 2021 Yes        RESOLVED: Sepsis ([facility] [address] ICD-10-CM: A41.9  ICD-9-CM: 038.9, 995.91  2019 - 2021 Yes            Did Patient have Sepsis (YES OR NO): Yes. Hospital Course:  Mr. Ashley Navarro is a nice 79 y/o WM with a h/o CAD and dementia who was admitted to our service on  with acute metabolic encephalopathy 2/2 acute cystitis. Head CT was unremarkable. He has reportedly had progressive functional decline lately. He met sepsis criteria with UTI, leukocytosis and tachycardia. He was started on fluids and Rocephin. Cultures growing GNRs. He has improved and is feeling well. Leukocytosis has resolved and he is afebrile. Will prescribe Vantin to complete his course based on prior urine cultures. Will follow up finalization of ID/sensitivities. Hospital course was otherwise unremarkable and he is medically stable for discharge back to his facility today.      Disposition: Skilled Nursing Facility  Diet: ADULT DIET Regular  Code Status: Full Code    Follow Up Orders:  No orders of the defined types were placed in this encounter. Follow-up Information     Follow up With Specialties Details Why Contact Info    None    None (395) Patient stated that they have no PCP            Follow up labs/diagnostics (ultimately defer to outpatient provider):  N/A    Time spent in patient discharge and coordination 35 minutes. Plan was discussed with patient, RN, CM. All questions answered. Patient was stable at time of discharge. Instructions given to call a physician or return if any concerns. Discharge Info:   Current Discharge Medication List      START taking these medications    Details   cefpodoxime (VANTIN) 100 mg tablet Take 1 Tablet by mouth two (2) times a day for 5 days. Qty: 10 Tablet, Refills: 0  Start date: 11/19/2021, End date: 11/24/2021         CONTINUE these medications which have NOT CHANGED    Details   atorvastatin (LIPITOR) 80 mg tablet Take 1 Tablet by mouth nightly. Qty: 3 Tablet, Refills: 0      ondansetron (ZOFRAN ODT) 4 mg disintegrating tablet Take 1 Tablet by mouth every eight (8) hours as needed for Nausea. Qty: 8 Tablet, Refills: 2      clopidogreL (PLAVIX) 75 mg tab Take 1 Tab by mouth daily. Qty: 90 Tab, Refills: 3      nitroglycerin (NITROSTAT) 0.4 mg SL tablet 1 Tab by SubLINGual route every five (5) minutes as needed for Chest Pain. Up to 3 doses. Qty: 1 Bottle, Refills: 3      pantoprazole (PROTONIX) 40 mg tablet Take 1 Tab by mouth Daily (before breakfast). Qty: 90 Tab, Refills: 3      famotidine (PEPCID) 20 mg tablet Take 1 Tab by mouth daily. Qty: 30 Tab, Refills: 0             Procedures done this admission:  * No surgery found *    Consults this admission:  None    Echocardiogram/EKG results:  Results from Hospital Encounter encounter on 07/28/21    ECHO ADULT COMPLETE    Interpretation Summary  · LV: Estimated LVEF is 55 - 60%.  Normal cavity size and systolic function (ejection fraction normal). Mild concentric hypertrophy. Wall motion: normal. Left ventricular diastolic dysfunction. · Saline contrast was given to evaluate for intracardiac shunt. There was no evidence of intracardiac shunting  · Mild aortic valve stenosis is present. Mild aortic valve regurgitation is present. · LA: Severely dilated left atrium. EKG Results     None          Diagnostic Imaging/Tests:   CT HEAD WO CONT    Result Date: 11/17/2021  NONCONTRAST HEAD CT CLINICAL HISTORY:  Worsening confusion over the last week. TECHNIQUE:  Axial images were obtained with spiral technique. Radiation dose reduction was achieved using one or all of the following techniques: automated exposure control, weight-based dosing, iterative reconstruction. COMPARISON:  CT and MRI of July 29, 2021. REPORT:   Examination is limited by nonstandard positioning of the patient's head as well as artifact associated with motion, despite repeat scanning. Noncontrast Head CT demonstrates no definite intracranial mass effect, hemorrhage, or evidence of acute geographic infarction. Extensive white matter hypodensities are most consistent with small vessel ischemic disease. Ventricles are unchanged. Orbits  and paranasal sinuses are clear where imaged. Bone windows demonstrate no definite fracture or destruction. EXTENSIVE SMALL VESSEL ISCHEMIC DISEASE WITH NO ACUTE INTRACRANIAL ABNORMALITY IDENTIFIED ON THIS TECHNICALLY LIMITED, NONCONTRAST CT.     XR CHEST PORT    Result Date: 11/17/2021  PORTABLE CHEST, November 17, 2021 at 2013 hours CLINICAL HISTORY:  Worsening confusion over the last week. COMPARISON:  July 28, 2021. FINDINGS:  AP erect image demonstrates no confluent infiltrate or significant pleural fluid. There is mild atelectasis/infiltrate at both lung bases. The heart size is within normal limits without evidence of congestive heart failure or pneumothorax.   The bony thorax appears intact on this view.  Severe degenerative changes of the right shoulder. MILD BASILAR ATELECTASIS/INFILTRATE. All Micro Results     Procedure Component Value Units Date/Time    CULTURE, URINE [[id number]]  (Abnormal) Collected: 11/17/21 1950    Order Status: Completed Specimen: Urine from Clean catch Updated: 11/19/21 0838     Special Requests: NO SPECIAL REQUESTS        Culture result:       >100,000 COLONIES/mL GRAM NEGATIVE RODS IDENTIFICATION AND SUSCEPTIBILITY TO FOLLOW          CULTURE, BLOOD [[id number]] Collected: 11/17/21 1928    Order Status: Completed Specimen: Blood Updated: 11/19/21 0800     Special Requests: --        RIGHT  Antecubital       Culture result: NO GROWTH 2 DAYS       CULTURE, BLOOD [[id number]] Collected: 11/17/21 1928    Order Status: Completed Specimen: Blood Updated: 11/19/21 0800     Special Requests: --        RIGHT  FOREARM       Culture result: NO GROWTH 2 DAYS       COVID-19 RAPID TEST [[id number]] Collected: 11/17/21 2018    Order Status: Completed Specimen: Nasopharyngeal Updated: 11/17/21 2110     Specimen source NASAL SWAB        COVID-19 rapid test Not detected        Comment:      The specimen is NEGATIVE for SARS-CoV-2, the novel coronavirus associated with COVID-19. A negative result does not rule out COVID-19. This test has been authorized by the FDA under an Emergency Use Authorization (EUA) for use by authorized laboratories.         Fact sheet for Healthcare Providers: ConventionUpdate.co.nz  Fact sheet for Patients: ConventionUpdate.co.nz       Methodology: Isothermal Nucleic Acid Amplification               Labs: Results:       BMP, Mg, Phos Recent Labs     11/18/21 0425 11/17/21  1652    139   K 4.0 3.9    107   CO2 30 28   AGAP 4* 4*   BUN 18 18   CREA 1.29 1.33   CA 9.0 9.3   * 145*      CBC Recent Labs     11/18/21  0425 11/17/21  1652   WBC 10.8 18.4*   RBC 3.85* 4.15*   HGB 11.2* 12.0*   HCT 35.2* 37.7*  280   GRANS  --  90*   LYMPH  --  4*   EOS  --  0*   MONOS  --  5   BASOS  --  0   IG  --  1   ANEU  --  16.5*   ABL  --  0.8   RAMIN  --  0.0   ABM  --  0.9   ABB  --  0.0   AIG  --  0.2      LFT Recent Labs     11/17/21  1652   ALT 14   AP 85   TP 7.4   ALB 3.4   GLOB 4.0*   AGRAT 0.9*      Cardiac Testing Lab Results   Component Value Date/Time    CK 98 12/02/2019 07:58 PM    Troponin-I, Qt. <0.02 (L) 12/27/2019 06:15 PM    Troponin-I, Qt. 0.02 12/03/2019 05:40 AM    Troponin-I, Qt. <0.02 (L) 12/03/2019 12:28 AM      Coagulation Tests Lab Results   Component Value Date/Time    aPTT 101.6 (H) 07/16/2019 03:45 PM    aPTT 63.8 (H) 07/16/2019 08:43 AM    aPTT 97.7 (H) 07/16/2019 01:36 AM      A1c Lab Results   Component Value Date/Time    Hemoglobin A1c 5.6 07/29/2021 03:35 AM    Hemoglobin A1c 5.6 12/18/2019 03:46 PM      Lipid Panel Lab Results   Component Value Date/Time    Cholesterol, total 144 07/29/2021 03:35 AM    HDL Cholesterol 23 (L) 07/29/2021 03:35 AM    LDL, calculated 95 07/29/2021 03:35 AM    VLDL, calculated 26 (H) 07/29/2021 03:35 AM    Triglyceride 130 07/29/2021 03:35 AM    CHOL/HDL Ratio 6.3 07/29/2021 03:35 AM      Thyroid Panel Lab Results   Component Value Date/Time    TSH 1.710 07/29/2021 03:35 AM    TSH 2.460 12/08/2019 12:17 PM    T4, Free 1.3 12/08/2019 12:17 PM        Most Recent UA Lab Results   Component Value Date/Time    Color YELLOW 10/01/2021 06:07 PM    Appearance CLOUDY 10/01/2021 06:07 PM    Specific gravity 1.016 10/01/2021 06:07 PM    pH (UA) 5.5 10/01/2021 06:07 PM    Protein Negative 10/01/2021 06:07 PM    Glucose Negative 10/01/2021 06:07 PM    Ketone Negative 10/01/2021 06:07 PM    Bilirubin Negative 10/01/2021 06:07 PM    Blood Negative 10/01/2021 06:07 PM    Urobilinogen 0.2 10/01/2021 06:07 PM    Nitrites Positive (A) 10/01/2021 06:07 PM    Leukocyte Esterase LARGE (A) 10/01/2021 06:07 PM    WBC  11/17/2021 07:50 PM    RBC 5-10 11/17/2021 07:50 PM Epithelial cells 0 11/17/2021 07:50 PM    Bacteria 2+ (H) 11/17/2021 07:50 PM    Casts 0-3 11/17/2021 07:50 PM    Other observations RESULTS VERIFIED MANUALLY 12/09/2019 02:41 PM          All Labs from Last 24 Hrs:  No results found for this or any previous visit (from the past 24 hour(s)).     Current Med List in Hospital:   Current Facility-Administered Medications   Medication Dose Route Frequency    cefTRIAXone (ROCEPHIN) 1 g in 0.9% sodium chloride (MBP/ADV) 50 mL MBP  1 g IntraVENous Q24H    famotidine (PEPCID) tablet 20 mg  20 mg Oral DAILY    clopidogreL (PLAVIX) tablet 75 mg  75 mg Oral DAILY    pantoprazole (PROTONIX) tablet 40 mg  40 mg Oral ACB    atorvastatin (LIPITOR) tablet 80 mg  80 mg Oral QHS    sodium chloride (NS) flush 5-40 mL  5-40 mL IntraVENous Q8H    sodium chloride (NS) flush 5-40 mL  5-40 mL IntraVENous PRN    acetaminophen (TYLENOL) tablet 650 mg  650 mg Oral Q6H PRN    Or    acetaminophen (TYLENOL) suppository 650 mg  650 mg Rectal Q6H PRN    polyethylene glycol (MIRALAX) packet 17 g  17 g Oral DAILY PRN    ondansetron (ZOFRAN ODT) tablet 4 mg  4 mg Oral Q8H PRN    Or    ondansetron (ZOFRAN) injection 4 mg  4 mg IntraVENous Q6H PRN    enoxaparin (LOVENOX) injection 40 mg  40 mg SubCUTAneous DAILY       No Known Allergies  Immunization History   Administered Date(s) Administered    Influenza High Dose Vaccine PF 02/19/2016, 10/09/2018, 10/23/2019    Pneumococcal Polysaccharide (PPSV-23) 05/21/2018    TB Skin Test (PPD) Intradermal 12/02/2019, 12/12/2019, 07/29/2021, 10/02/2021       Recent Vital Data:  Patient Vitals for the past 24 hrs:   Temp Pulse Resp BP SpO2   11/19/21 1101 98 °F (36.7 °C) 79 20 112/72 97 %   11/19/21 0348 97.9 °F (36.6 °C) 80 18 101/70 97 %   11/18/21 2234 97.9 °F (36.6 °C) 84 16 (!) 142/76 93 %   11/18/21 1931 98.2 °F (36.8 °C) 82 16 139/75 93 %   11/18/21 1629 98.6 °F (37 °C) 77 18 104/62 94 %     Oxygen Therapy  O2 Sat (%): 97 % (11/19/21 1101)  O2 Device: None (Room air) (11/19/21 2678)    Estimated body mass index is 18.65 kg/m² as calculated from the following:    Height as of 10/1/21: 5' 10\" (1.778 m). Weight as of this encounter: 59 kg (130 lb). Intake/Output Summary (Last 24 hours) at 11/19/2021 1441  Last data filed at 11/19/2021 0806  Gross per 24 hour   Intake 480 ml   Output --   Net 480 ml         Physical Exam:  General:    Well nourished. No overt distress. Thin, appears stated age. Head:  Normocephalic, atraumatic  Eyes:  Sclerae appear normal.  Pupils equally round. HENT:  Nares appear normal, no drainage. Moist mucous membranes  Neck:  No restricted ROM. Trachea midline  CV:   RRR. No m/r/g. No JVD  Lungs:   CTAB. No wheezing, rhonchi, or rales. Even, unlabored  Abdomen:   Soft, nontender, nondistended. Extremities: Warm and dry. No cyanosis or clubbing. No edema. Skin:     No rashes. Normal coloration  Neuro:  CN II-XII grossly intact. Tangential speech but oriented. Psych:  Normal mood and affect. As above. Signed:  Frances Rios MD    Part of this note may have been written by using a voice dictation software. The note has been proof read but may still contain some grammatical/other typographical errors.

## 2021-11-19 NOTE — PROGRESS NOTES
Hospitalist Progress Note   Admit Date:  2021  4:36 PM   Name:  Laurie Serra Sr   Age:  80 y.o. Sex:  male  :  1931   MRN:  [id number]   Room:  [facility]/    Presenting Complaint: Altered mental status    Reason(s) for Admission: Acute metabolic encephalopathy [C02.63]  Sepsis ([address] [A41.9]  UTI (urinary tract infection) [N39.0]     Hospital Course & Interval History:   Mr. Thea Maier is a nice 81 y/o WM with a h/o CAD and dementia who was admitted to our service on  with acute metabolic encephalopathy 2/2 acute cystitis. Head CT unremarkable. Has had progressive functional decline lately. Met sepsis criteria with UTI, leukocytosis and tachycardia. He was started on fluids and Rocephin. Cultures with GNRs. Subjective (21):  Doing well today, lucid but some nonsensical speech. Denies pain. Sleeping well, good appetite. No chest pain, SOB, N/V/D. Assessment & Plan:   # Sepsis 2/2 acute cystitis              - Leukocytosis + tachycardia + UTI. Sepsis now resolved. - Con't Rocephin, prior cxs showed serratia sens to Rocephin. GNRs in culture, awaiting final ID/sens.     # Acute metabolic encephalopathy              - 2/2 above. Now resolved.     # Dementia              - Orientation as above.      # CAD              - Plavix, statin    Dispo/Discharge Planning: Back to facility, CM checking to see when he can return since he is medically stable for discharge.   Diet:  ADULT DIET Regular  DVT PPx: Lovenox  Code status: Full Code    Hospital Problems as of 2021 Date Reviewed: 10/3/2021          Codes Class Noted - Resolved POA    UTI (urinary tract infection) ICD-10-CM: N39.0  ICD-9-CM: 599.0  2021 - Present Yes        Dementia ([address] (Chronic) ICD-10-CM: F03.90  ICD-9-CM: 294.20  2019 - Present Yes        Debility (Chronic) ICD-10-CM: R53.81  ICD-9-CM: 799.3  2019 - Present Yes        RESOLVED: Leukocytosis ICD-10-CM: N38.903  ICD-9-CM: 288.60 11/18/2021 - 11/18/2021 Yes        * (Principal) RESOLVED: Acute metabolic encephalopathy [facility]99-YS: G93.41  ICD-9-CM: 348.31  11/17/2021 - 11/19/2021 Yes        RESOLVED: Sepsis ([facility] [address] ICD-10-CM: A41.9  ICD-9-CM: 038.9, 995.91  12/2/2019 - 11/19/2021 Yes              Objective:     Patient Vitals for the past 24 hrs:   Temp Pulse Resp BP SpO2   11/19/21 1101 98 °F (36.7 °C) 79 20 112/72 97 %   11/19/21 0348 97.9 °F (36.6 °C) 80 18 101/70 97 %   11/18/21 2234 97.9 °F (36.6 °C) 84 16 (!) 142/76 93 %   11/18/21 1931 98.2 °F (36.8 °C) 82 16 139/75 93 %   11/18/21 1629 98.6 °F (37 °C) 77 18 104/62 94 %     Oxygen Therapy  O2 Sat (%): 97 % (11/19/21 1101)  O2 Device: None (Room air) (11/19/21 0348)    Estimated body mass index is 18.65 kg/m² as calculated from the following:    Height as of 10/1/21: 5' 10\" (1.778 m). Weight as of this encounter: 59 kg (130 lb). Intake/Output Summary (Last 24 hours) at 11/19/2021 1241  Last data filed at 11/19/2021 0806  Gross per 24 hour   Intake 480 ml   Output --   Net 480 ml         Physical Exam:   Blood pressure 112/72, pulse 79, temperature 98 °F (36.7 °C), resp. rate 20, weight 59 kg (130 lb), SpO2 97 %. General:    Well nourished. No overt distress. Appears stated age. Head:  Normocephalic, atraumatic  Eyes:  Sclerae appear normal.  Pupils equally round. ENT:  Nares appear normal, no drainage. Moist oral mucosa  Neck:  No restricted ROM. Trachea midline   CV:   RRR. No m/r/g. No jugular venous distension. Lungs:   CTAB. No wheezing, rhonchi, or rales. Respirations even, unlabored. Abdomen: Bowel sounds present. Soft, nontender, nondistended. Extremities: No cyanosis or clubbing. No edema  Skin:     No rashes and normal coloration. Warm and dry. Neuro:  CN II-XII grossly intact. Sensation intact. Oriented to time and place. Some tangential speech. Psych:  Normal mood and affect.       I have reviewed ordered lab tests and independently visualized imaging below:    Recent Labs:  Recent Results (from the past 48 hour(s))   CBC WITH AUTOMATED DIFF    Collection Time: 11/17/21  4:52 PM   Result Value Ref Range    WBC 18.4 (H) 4.3 - 11.1 K/uL    RBC 4.15 (L) 4.23 - 5.6 M/uL    HGB 12.0 (L) 13.6 - 17.2 g/dL    HCT 37.7 (L) 41.1 - 50.3 %    MCV 90.8 79.6 - 97.8 FL    MCH 28.9 26.1 - 32.9 PG    MCHC 31.8 31.4 - 35.0 g/dL    RDW 16.7 (H) 11.9 - 14.6 %    PLATELET 196 436 - 032 K/uL    MPV 10.2 9.4 - 12.3 FL    ABSOLUTE NRBC 0.00 0.0 - 0.2 K/uL    NEUTROPHILS 90 (H) 43 - 78 %    LYMPHOCYTES 4 (L) 13 - 44 %    MONOCYTES 5 4.0 - 12.0 %    EOSINOPHILS 0 (L) 0.5 - 7.8 %    BASOPHILS 0 0.0 - 2.0 %    IMMATURE GRANULOCYTES 1 0.0 - 5.0 %    ABS. NEUTROPHILS 16.5 (H) 1.7 - 8.2 K/UL    ABS. LYMPHOCYTES 0.8 0.5 - 4.6 K/UL    ABS. MONOCYTES 0.9 0.1 - 1.3 K/UL    ABS. EOSINOPHILS 0.0 0.0 - 0.8 K/UL    ABS. BASOPHILS 0.0 0.0 - 0.2 K/UL    ABS. IMM. GRANS. 0.2 0.0 - 0.5 K/UL    DF AUTOMATED     METABOLIC PANEL, COMPREHENSIVE    Collection Time: 11/17/21  4:52 PM   Result Value Ref Range    Sodium 139 136 - 145 mmol/L    Potassium 3.9 3.5 - 5.1 mmol/L    Chloride 107 98 - 107 mmol/L    CO2 28 21 - 32 mmol/L    Anion gap 4 (L) 7 - 16 mmol/L    Glucose 145 (H) 65 - 100 mg/dL    BUN 18 8 - 23 MG/DL    Creatinine 1.33 0.8 - 1.5 MG/DL    GFR est AA >60 >60 ml/min/1.73m2    GFR est non-AA 54 (L) >60 ml/min/1.73m2    Calcium 9.3 8.3 - 10.4 MG/DL    Bilirubin, total 0.9 0.2 - 1.1 MG/DL    ALT (SGPT) 14 12 - 65 U/L    AST (SGOT) 10 (L) 15 - 37 U/L    Alk.  phosphatase 85 50 - 136 U/L    Protein, total 7.4 6.3 - 8.2 g/dL    Albumin 3.4 3.2 - 4.6 g/dL    Globulin 4.0 (H) 2.3 - 3.5 g/dL    A-G Ratio 0.9 (L) 1.2 - 3.5     CULTURE, BLOOD    Collection Time: 11/17/21  7:28 PM    Specimen: Blood   Result Value Ref Range    Special Requests: RIGHT  Antecubital        Culture result: NO GROWTH 2 DAYS     CULTURE, BLOOD    Collection Time: 11/17/21  7:28 PM    Specimen: Blood   Result Value Ref Range Special Requests: RIGHT  FOREARM        Culture result: NO GROWTH 2 DAYS     LACTIC ACID    Collection Time: 11/17/21  7:28 PM   Result Value Ref Range    Lactic acid 1.0 0.4 - 2.0 MMOL/L   URINE MICROSCOPIC    Collection Time: 11/17/21  7:50 PM   Result Value Ref Range    WBC  0 /hpf    RBC 5-10 0 /hpf    Epithelial cells 0 0 /hpf    Bacteria 2+ (H) 0 /hpf    Casts 0-3 0 /lpf   CULTURE, URINE    Collection Time: 11/17/21  7:50 PM    Specimen: Clean catch; Urine   Result Value Ref Range    Special Requests: NO SPECIAL REQUESTS      Culture result: (A)       >100,000 COLONIES/mL GRAM NEGATIVE RODS IDENTIFICATION AND SUSCEPTIBILITY TO FOLLOW   COVID-19 RAPID TEST    Collection Time: 11/17/21  8:18 PM   Result Value Ref Range    Specimen source NASAL SWAB      COVID-19 rapid test Not detected NOTD     METABOLIC PANEL, BASIC    Collection Time: 11/18/21  4:25 AM   Result Value Ref Range    Sodium 140 136 - 145 mmol/L    Potassium 4.0 3.5 - 5.1 mmol/L    Chloride 106 98 - 107 mmol/L    CO2 30 21 - 32 mmol/L    Anion gap 4 (L) 7 - 16 mmol/L    Glucose 111 (H) 65 - 100 mg/dL    BUN 18 8 - 23 MG/DL    Creatinine 1.29 0.8 - 1.5 MG/DL    GFR est AA >60 >60 ml/min/1.73m2    GFR est non-AA 56 (L) >60 ml/min/1.73m2    Calcium 9.0 8.3 - 10.4 MG/DL   CBC W/O DIFF    Collection Time: 11/18/21  4:25 AM   Result Value Ref Range    WBC 10.8 4.3 - 11.1 K/uL    RBC 3.85 (L) 4.23 - 5.6 M/uL    HGB 11.2 (L) 13.6 - 17.2 g/dL    HCT 35.2 (L) 41.1 - 50.3 %    MCV 91.4 79.6 - 97.8 FL    MCH 29.1 26.1 - 32.9 PG    MCHC 31.8 31.4 - 35.0 g/dL    RDW 16.9 (H) 11.9 - 14.6 %    PLATELET 036 270 - 013 K/uL    MPV 9.8 9.4 - 12.3 FL    ABSOLUTE NRBC 0.00 0.0 - 0.2 K/uL       All Micro Results     Procedure Component Value Units Date/Time    CULTURE, URINE [[id number]]  (Abnormal) Collected: 11/17/21 1950    Order Status: Completed Specimen: Urine from Clean catch Updated: 11/19/21 0838     Special Requests: NO SPECIAL REQUESTS        Culture result:       >100,000 COLONIES/mL GRAM NEGATIVE RODS IDENTIFICATION AND SUSCEPTIBILITY TO FOLLOW          CULTURE, BLOOD [[id number]] Collected: 11/17/21 1928    Order Status: Completed Specimen: Blood Updated: 11/19/21 0800     Special Requests: --        RIGHT  Antecubital       Culture result: NO GROWTH 2 DAYS       CULTURE, BLOOD [[id number]] Collected: 11/17/21 1928    Order Status: Completed Specimen: Blood Updated: 11/19/21 0800     Special Requests: --        RIGHT  FOREARM       Culture result: NO GROWTH 2 DAYS       COVID-19 RAPID TEST [[id number]] Collected: 11/17/21 2018    Order Status: Completed Specimen: Nasopharyngeal Updated: 11/17/21 2110     Specimen source NASAL SWAB        COVID-19 rapid test Not detected        Comment:      The specimen is NEGATIVE for SARS-CoV-2, the novel coronavirus associated with COVID-19. A negative result does not rule out COVID-19. This test has been authorized by the FDA under an Emergency Use Authorization (EUA) for use by authorized laboratories. Fact sheet for Healthcare Providers: ConventionTeachBoostdate.co.nz  Fact sheet for Patients: ConventionUpdate.co.nz       Methodology: Isothermal Nucleic Acid Amplification               Other Studies:  No results found.     Current Meds:  Current Facility-Administered Medications   Medication Dose Route Frequency    cefTRIAXone (ROCEPHIN) 1 g in 0.9% sodium chloride (MBP/ADV) 50 mL MBP  1 g IntraVENous Q24H    famotidine (PEPCID) tablet 20 mg  20 mg Oral DAILY    clopidogreL (PLAVIX) tablet 75 mg  75 mg Oral DAILY    pantoprazole (PROTONIX) tablet 40 mg  40 mg Oral ACB    atorvastatin (LIPITOR) tablet 80 mg  80 mg Oral QHS    sodium chloride (NS) flush 5-40 mL  5-40 mL IntraVENous Q8H    sodium chloride (NS) flush 5-40 mL  5-40 mL IntraVENous PRN    acetaminophen (TYLENOL) tablet 650 mg  650 mg Oral Q6H PRN    Or    acetaminophen (TYLENOL) suppository 650 mg  650 mg Rectal Q6H PRN    polyethylene glycol (MIRALAX) packet 17 g  17 g Oral DAILY PRN    ondansetron (ZOFRAN ODT) tablet 4 mg  4 mg Oral Q8H PRN    Or    ondansetron (ZOFRAN) injection 4 mg  4 mg IntraVENous Q6H PRN    enoxaparin (LOVENOX) injection 40 mg  40 mg SubCUTAneous DAILY       Signed:  Sunny Vasquez MD    Part of this note may have been written by using a voice dictation software. The note has been proof read but may still contain some grammatical/other typographical errors.

## 2021-11-19 NOTE — PROGRESS NOTES
Problem: Falls - Risk of  Goal: *Absence of Falls  Description: Document Tracee Saxena Fall Risk and appropriate interventions in the flowsheet. Outcome: Progressing Towards Goal  Note: Fall Risk Interventions:  Mobility Interventions: Patient to call before getting OOB    Mentation Interventions: Bed/chair exit alarm         Elimination Interventions: Call light in reach              Problem: Patient Education: Go to Patient Education Activity  Goal: Patient/Family Education  Outcome: Progressing Towards Goal     Problem: Pressure Injury - Risk of  Goal: *Prevention of pressure injury  Description: Document Tyler Scale and appropriate interventions in the flowsheet.   Outcome: Progressing Towards Goal  Note: Pressure Injury Interventions:  Sensory Interventions: Assess changes in LOC    Moisture Interventions: Apply protective barrier, creams and emollients    Activity Interventions: Pressure redistribution bed/mattress(bed type)    Mobility Interventions: Pressure redistribution bed/mattress (bed type)    Nutrition Interventions: Document food/fluid/supplement intake    Friction and Shear Interventions: Minimize layers                Problem: Patient Education: Go to Patient Education Activity  Goal: Patient/Family Education  Outcome: Progressing Towards Goal

## 2021-11-19 NOTE — PROGRESS NOTES
Care Management Interventions  PCP Verified by CM: Yes  Transition of Care Consult (CM Consult): Assisted Living  Discharge Location  Discharge Placement: Assisted Living ([facility])    Saw pt in interdisciplinarily rounds with the following disciplines: Physician, Case Management, Nursing, Dietary,Therapy and Pharmacy. The plan of care was discussed along with goals for discharge date/ location. Per , pt is medically stable for d/c today.  SW notified pt's daughter-in-law Elnita Clubs) that pt would be going back to [facility] [place] spoke w/ [address], faxed prescriptions  [facility]) and scheduled Samantha Monzon, pt will be picked up a 7pm due to Samantha Monzon being booked    CARL Solares

## 2021-11-20 LAB
BACTERIA SPEC CULT: ABNORMAL
SERVICE CMNT-IMP: ABNORMAL

## 2021-11-22 LAB
BACTERIA SPEC CULT: NORMAL
BACTERIA SPEC CULT: NORMAL
SERVICE CMNT-IMP: NORMAL
SERVICE CMNT-IMP: NORMAL

## 2021-12-15 ENCOUNTER — APPOINTMENT (OUTPATIENT)
Dept: GENERAL RADIOLOGY | Age: 86
DRG: 871 | End: 2021-12-15
Attending: EMERGENCY MEDICINE
Payer: MEDICARE

## 2021-12-15 ENCOUNTER — HOSPITAL ENCOUNTER (INPATIENT)
Age: 86
LOS: 8 days | Discharge: NURSING, REHAB OR LONG-TERM CARE | DRG: 871 | End: 2021-12-23
Attending: EMERGENCY MEDICINE | Admitting: INTERNAL MEDICINE
Payer: MEDICARE

## 2021-12-15 DIAGNOSIS — N39.0 COMPLICATED UTI (URINARY TRACT INFECTION): Primary | ICD-10-CM

## 2021-12-15 DIAGNOSIS — F03.91 DEMENTIA WITH BEHAVIORAL DISTURBANCE, UNSPECIFIED DEMENTIA TYPE: ICD-10-CM

## 2021-12-15 PROBLEM — E87.20 LACTIC ACIDOSIS: Status: ACTIVE | Noted: 2021-12-15

## 2021-12-15 PROBLEM — E87.20 LACTIC ACIDOSIS: Status: RESOLVED | Noted: 2021-12-15 | Resolved: 2021-12-15

## 2021-12-15 LAB
ALBUMIN SERPL-MCNC: 3.3 G/DL (ref 3.2–4.6)
ALBUMIN/GLOB SERPL: 0.8 {RATIO} (ref 1.2–3.5)
ALP SERPL-CCNC: 83 U/L (ref 50–136)
ALT SERPL-CCNC: 15 U/L (ref 12–65)
ANION GAP SERPL CALC-SCNC: 6 MMOL/L (ref 7–16)
APPEARANCE UR: ABNORMAL
AST SERPL-CCNC: 11 U/L (ref 15–37)
ATRIAL RATE: 101 BPM
BACTERIA URNS QL MICRO: 0 /HPF
BASOPHILS # BLD: 0 K/UL (ref 0–0.2)
BASOPHILS NFR BLD: 0 % (ref 0–2)
BILIRUB SERPL-MCNC: 1.2 MG/DL (ref 0.2–1.1)
BILIRUB UR QL: NEGATIVE
BUN SERPL-MCNC: 18 MG/DL (ref 8–23)
CALCIUM SERPL-MCNC: 9.7 MG/DL (ref 8.3–10.4)
CALCULATED P AXIS, ECG09: 45 DEGREES
CALCULATED R AXIS, ECG10: 25 DEGREES
CALCULATED T AXIS, ECG11: 39 DEGREES
CHLORIDE SERPL-SCNC: 101 MMOL/L (ref 98–107)
CO2 SERPL-SCNC: 30 MMOL/L (ref 21–32)
COLOR UR: ABNORMAL
CREAT SERPL-MCNC: 1.42 MG/DL (ref 0.8–1.5)
DIAGNOSIS, 93000: NORMAL
DIFFERENTIAL METHOD BLD: ABNORMAL
EOSINOPHIL # BLD: 0.1 K/UL (ref 0–0.8)
EOSINOPHIL NFR BLD: 0 % (ref 0.5–7.8)
EPI CELLS #/AREA URNS HPF: ABNORMAL /HPF
ERYTHROCYTE [DISTWIDTH] IN BLOOD BY AUTOMATED COUNT: 17.2 % (ref 11.9–14.6)
GLOBULIN SER CALC-MCNC: 4.4 G/DL (ref 2.3–3.5)
GLUCOSE SERPL-MCNC: 114 MG/DL (ref 65–100)
GLUCOSE UR STRIP.AUTO-MCNC: NEGATIVE MG/DL
HCT VFR BLD AUTO: 38.4 % (ref 41.1–50.3)
HGB BLD-MCNC: 12 G/DL (ref 13.6–17.2)
HGB UR QL STRIP: ABNORMAL
IMM GRANULOCYTES # BLD AUTO: 0.3 K/UL (ref 0–0.5)
IMM GRANULOCYTES NFR BLD AUTO: 2 % (ref 0–5)
KETONES UR QL STRIP.AUTO: ABNORMAL MG/DL
LACTATE SERPL-SCNC: 1 MMOL/L (ref 0.4–2)
LACTATE SERPL-SCNC: 2.5 MMOL/L (ref 0.4–2)
LEUKOCYTE ESTERASE UR QL STRIP.AUTO: ABNORMAL
LYMPHOCYTES # BLD: 1.4 K/UL (ref 0.5–4.6)
LYMPHOCYTES NFR BLD: 10 % (ref 13–44)
MAGNESIUM SERPL-MCNC: 2.1 MG/DL (ref 1.8–2.4)
MCH RBC QN AUTO: 28.8 PG (ref 26.1–32.9)
MCHC RBC AUTO-ENTMCNC: 31.3 G/DL (ref 31.4–35)
MCV RBC AUTO: 92.3 FL (ref 79.6–97.8)
MONOCYTES # BLD: 1.3 K/UL (ref 0.1–1.3)
MONOCYTES NFR BLD: 9 % (ref 4–12)
NEUTS SEG # BLD: 10.5 K/UL (ref 1.7–8.2)
NEUTS SEG NFR BLD: 78 % (ref 43–78)
NITRITE UR QL STRIP.AUTO: NEGATIVE
NRBC # BLD: 0 K/UL (ref 0–0.2)
P-R INTERVAL, ECG05: 134 MS
PH UR STRIP: 6.5 [PH] (ref 5–9)
PLATELET # BLD AUTO: 264 K/UL (ref 150–450)
PMV BLD AUTO: 10.6 FL (ref 9.4–12.3)
POTASSIUM SERPL-SCNC: 4 MMOL/L (ref 3.5–5.1)
PROT SERPL-MCNC: 7.7 G/DL (ref 6.3–8.2)
PROT UR STRIP-MCNC: 30 MG/DL
Q-T INTERVAL, ECG07: 330 MS
QRS DURATION, ECG06: 78 MS
QTC CALCULATION (BEZET), ECG08: 427 MS
RBC # BLD AUTO: 4.16 M/UL (ref 4.23–5.6)
RBC #/AREA URNS HPF: >100 /HPF
SODIUM SERPL-SCNC: 137 MMOL/L (ref 136–145)
SP GR UR REFRACTOMETRY: 1.02 (ref 1–1.02)
UROBILINOGEN UR QL STRIP.AUTO: 1 EU/DL (ref 0.2–1)
VENTRICULAR RATE, ECG03: 101 BPM
WBC # BLD AUTO: 13.5 K/UL (ref 4.3–11.1)
WBC URNS QL MICRO: ABNORMAL /HPF

## 2021-12-15 PROCEDURE — 74011000258 HC RX REV CODE- 258: Performed by: EMERGENCY MEDICINE

## 2021-12-15 PROCEDURE — 93005 ELECTROCARDIOGRAM TRACING: CPT | Performed by: EMERGENCY MEDICINE

## 2021-12-15 PROCEDURE — 96376 TX/PRO/DX INJ SAME DRUG ADON: CPT

## 2021-12-15 PROCEDURE — 85025 COMPLETE CBC W/AUTO DIFF WBC: CPT

## 2021-12-15 PROCEDURE — 74022 RADEX COMPL AQT ABD SERIES: CPT

## 2021-12-15 PROCEDURE — 83735 ASSAY OF MAGNESIUM: CPT

## 2021-12-15 PROCEDURE — 99285 EMERGENCY DEPT VISIT HI MDM: CPT

## 2021-12-15 PROCEDURE — 96365 THER/PROPH/DIAG IV INF INIT: CPT

## 2021-12-15 PROCEDURE — 99284 EMERGENCY DEPT VISIT MOD MDM: CPT

## 2021-12-15 PROCEDURE — 80053 COMPREHEN METABOLIC PANEL: CPT

## 2021-12-15 PROCEDURE — 87086 URINE CULTURE/COLONY COUNT: CPT

## 2021-12-15 PROCEDURE — 72100 X-RAY EXAM L-S SPINE 2/3 VWS: CPT

## 2021-12-15 PROCEDURE — 74011250637 HC RX REV CODE- 250/637: Performed by: NURSE PRACTITIONER

## 2021-12-15 PROCEDURE — 87040 BLOOD CULTURE FOR BACTERIA: CPT

## 2021-12-15 PROCEDURE — 81001 URINALYSIS AUTO W/SCOPE: CPT

## 2021-12-15 PROCEDURE — 65270000029 HC RM PRIVATE

## 2021-12-15 PROCEDURE — 74011250636 HC RX REV CODE- 250/636: Performed by: EMERGENCY MEDICINE

## 2021-12-15 PROCEDURE — 83605 ASSAY OF LACTIC ACID: CPT

## 2021-12-15 RX ORDER — NITROGLYCERIN 0.4 MG/1
0.4 TABLET SUBLINGUAL
Status: DISCONTINUED | OUTPATIENT
Start: 2021-12-15 | End: 2021-12-23 | Stop reason: HOSPADM

## 2021-12-15 RX ORDER — ONDANSETRON 2 MG/ML
4 INJECTION INTRAMUSCULAR; INTRAVENOUS
Status: DISCONTINUED | OUTPATIENT
Start: 2021-12-15 | End: 2021-12-23 | Stop reason: HOSPADM

## 2021-12-15 RX ORDER — ACETAMINOPHEN 650 MG/1
650 SUPPOSITORY RECTAL
Status: DISCONTINUED | OUTPATIENT
Start: 2021-12-15 | End: 2021-12-15

## 2021-12-15 RX ORDER — ACETAMINOPHEN 325 MG/1
650 TABLET ORAL
Status: DISCONTINUED | OUTPATIENT
Start: 2021-12-15 | End: 2021-12-23 | Stop reason: HOSPADM

## 2021-12-15 RX ORDER — SODIUM CHLORIDE 0.9 % (FLUSH) 0.9 %
5-40 SYRINGE (ML) INJECTION EVERY 8 HOURS
Status: DISCONTINUED | OUTPATIENT
Start: 2021-12-15 | End: 2021-12-23 | Stop reason: HOSPADM

## 2021-12-15 RX ORDER — ONDANSETRON 4 MG/1
4 TABLET, ORALLY DISINTEGRATING ORAL
Status: DISCONTINUED | OUTPATIENT
Start: 2021-12-15 | End: 2021-12-23 | Stop reason: HOSPADM

## 2021-12-15 RX ORDER — FAMOTIDINE 20 MG/1
20 TABLET, FILM COATED ORAL EVERY 12 HOURS
Status: DISCONTINUED | OUTPATIENT
Start: 2021-12-15 | End: 2021-12-23 | Stop reason: HOSPADM

## 2021-12-15 RX ORDER — ENOXAPARIN SODIUM 100 MG/ML
30 INJECTION SUBCUTANEOUS DAILY
Status: DISCONTINUED | OUTPATIENT
Start: 2021-12-16 | End: 2021-12-23 | Stop reason: HOSPADM

## 2021-12-15 RX ORDER — POLYETHYLENE GLYCOL 3350 17 G/17G
17 POWDER, FOR SOLUTION ORAL DAILY PRN
Status: DISCONTINUED | OUTPATIENT
Start: 2021-12-15 | End: 2021-12-23 | Stop reason: HOSPADM

## 2021-12-15 RX ORDER — SODIUM CHLORIDE 0.9 % (FLUSH) 0.9 %
5-40 SYRINGE (ML) INJECTION AS NEEDED
Status: DISCONTINUED | OUTPATIENT
Start: 2021-12-15 | End: 2021-12-23 | Stop reason: HOSPADM

## 2021-12-15 RX ADMIN — CEFTRIAXONE 1 G: 1 INJECTION, POWDER, FOR SOLUTION INTRAMUSCULAR; INTRAVENOUS at 15:14

## 2021-12-15 RX ADMIN — SODIUM CHLORIDE 500 ML: 900 INJECTION, SOLUTION INTRAVENOUS at 15:15

## 2021-12-15 NOTE — PROGRESS NOTES
Progress Note    Patient: Estella Shen MRN: [id number]  SSN: [id number]    YOB: 1931  Age: 80 y.o. Sex: male      Admit Date: 7/28/2021    LOS: 2 days     Subjective:   F/U UTI, right posterior parietal lobe CVA    \"80 y.o. male with medical history of CAD, GERD, Dementia who presented to ED with 10 days of decreased appetite, multiple falls. Pt's son is at bedside and able to provide information. Pt has been falling a lot at home. He lives with room mates. He had a fall 2 days ago and hit his head. Mainly, he has balance issues with walking. Son reports pt has not been able to care for himself and has been forgetting to take meds. No fever. No chills. He is incontinent of urine. \" CXR shows mild bibasilar atelectasis. Xray thoracic spine shows no acute compression fracture. Mild degenerative spondylosis. CT head showed no hemorrhage. Severe bilateral patchy white matter hypo attenuation throughout both cerebral hemispheres grossly unchanged from prior exam, but extensive. Acute/sub-acute CVA cannot be excluded. LDL 95, cholesterol 144. A1C 5.6. TSH 1.7. CTA head and neck with no large vessel occlusion. Diffuse vascular disease noted. CT perfusion with no evidence of core infarct or ischemic penumbra. MRI brain showed Punctate lacunar infarct right posterior parietal lobe. Currently on ASA, plavix, and high dose statin. ECHO EF 55%, no shunting, severe dilated left atrium. Neurology consult pending. UA consistent with UTI. WBC 13 and now 8. Urine culture pending. Ceftriaxone started on 7/28. More confusion over night--given Ativan 0.5mg at became sedated. Likely delirium. No confusion when I examine patient. No concerns.    Current Facility-Administered Medications   Medication Dose Route Frequency    sodium chloride (NS) flush 5-40 mL  5-40 mL IntraVENous Q8H    sodium chloride (NS) flush 5-40 mL  5-40 mL IntraVENous PRN    sodium chloride (NS) flush 5-40 mL  5-40 mL IntraVENous Q8H    sodium chloride (NS) flush 5-40 mL  5-40 mL IntraVENous PRN    ondansetron (ZOFRAN) injection 4 mg  4 mg IntraVENous Q6H PRN    aspirin chewable tablet 81 mg  81 mg Oral DAILY    clopidogreL (PLAVIX) tablet 75 mg  75 mg Oral DAILY    atorvastatin (LIPITOR) tablet 80 mg  80 mg Oral QHS    acetaminophen (TYLENOL) tablet 650 mg  650 mg Oral Q4H PRN    labetaloL (NORMODYNE;TRANDATE) injection 5 mg  5 mg IntraVENous Q10MIN PRN    enoxaparin (LOVENOX) injection 40 mg  40 mg SubCUTAneous Q24H    cefTRIAXone (ROCEPHIN) 1 g in 0.9% sodium chloride (MBP/ADV) 50 mL MBP  1 g IntraVENous Q24H    famotidine (PEPCID) tablet 20 mg  20 mg Oral DAILY    nitroglycerin (NITROSTAT) tablet 0.4 mg  0.4 mg SubLINGual Q5MIN PRN    pantoprazole (PROTONIX) tablet 40 mg  40 mg Oral ACB    tuberculin injection 5 Units  5 Units IntraDERMal ONCE       Objective:     Vitals:    07/30/21 0401 07/30/21 0501 07/30/21 0601 07/30/21 0701   BP: 133/77 123/62 123/83 122/73   Pulse: 69 73 81 72   Resp: 29 23 21 17   Temp: 97.6 °F (36.4 °C)   97.7 °F (36.5 °C)   SpO2: 97% (!) 89% 98% 98%   Weight:       Height:             Intake and Output:  Current Shift: No intake/output data recorded. Last three shifts: 07/28 1901 - 07/30 0700  In: 525 [I.V.:525]  Out: 425 [Urine:425]    Physical Exam:   General:  Alert, cooperative, no distress, hard of hearing. .   Eyes:  Conjunctivae/corneas clear. Ears:  Normal TMs and external ear canals both ears. Nose: Nares normal. Septum midline. Mouth/Throat: Lips, mucosa, and tongue normal.   Neck:  no JVD. Back:   deferred. Lungs:   Clear to auscultation bilaterally. Heart:  Regular rate and rhythm, S1, S2 normal   Abdomen:   Soft, non-tender. Bowel sounds normal.    Extremities: Extremities normal, atraumatic, no cyanosis or edema. Pulses: 2+ and symmetric all extremities.    Skin: Skin color, texture, turgor normal. No rashes or lesions   Lymph nodes: Cervical, supraclavicular, and axillary nodes normal.   Neurologic: Hard of hearing, good ROM in bed.  Answering all questions appropriately        Lab/Data Review:    Recent Results (from the past 24 hour(s))   GLUCOSE, POC    Collection Time: 07/29/21 12:11 PM   Result Value Ref Range    Glucose (POC) 106 (H) 65 - 100 mg/dL    Performed by Nayely    ECHO ADULT COMPLETE    Collection Time: 07/29/21 12:43 PM   Result Value Ref Range    LV ED Vol A2C 92.00 cm3    LV ED Vol A4C 95.00 cm3    LV ES Vol A2C 37.00 cm3    LV ES Vol A4C 42.00 cm3    IVSd 1.20 (A) 0.60 - 1.00 cm    LVIDd 4.39 4.20 - 5.90 cm    LVIDs 2.77 cm    LVOT d 1.90 cm    LVOT VTI 22.10 cm    LVOT Peak Gradient 9.00 mmHg    LVPWd 1.14 (A) 0.60 - 1.00 cm    LV E' Septal Velocity 4.18 cm/s    LV E' Lateral Velocity 8.69 cm/s    Left Atrium Minor Axis 3.68 cm    Left Atrium Major Axis 6.40 cm    LA Area 2C 20.50 cm2    LA Area 4C 24.30 cm2    Aortic Regurgitant Pressure Half-time 484.00 ms    AR Max Maninder 418.00 cm/s    Aortic Valve Systolic Peak Velocity 652.51 cm/s    AoV PG 21.00 mmHg    Aortic Valve Systolic Mean Gradient 12.99 mmHg    AoV VTI 49.10 cm    Aortic Valve Area by Continuity of VTI 1.28 cm2    Aortic Valve Area by Continuity of Peak Velocity 1.91 cm2    Ao Root D 3.10 cm    AO ASC D 3.90 cm    Mitral Valve E Wave Deceleration Time 192.00 ms    MV A Maninder 81.20 cm/s    MV E Maninder 92.20 cm/s    RVIDd 3.84 cm    TR Max Velocity 272.00 cm/s    Triscuspid Valve Regurgitation Peak Gradient 30.00 mmHg    Tapse 2.40 (A) 1.50 - 2.00 cm    MV E/A 1.14     LV Mass .8 88.0 - 224.0 g    LV Mass AL Index 105.6 49.0 - 115.0 g/m2    E/E' lateral 10.61     E/E' septal 22.06     E/E' ratio (averaged) 16.33     KRYSTYNA/BSA Pk Maninder 1.1 cm2/m2    KRYSTYNA/BSA VTI 0.7 cm2/m2   GLUCOSE, POC    Collection Time: 07/29/21  9:40 PM   Result Value Ref Range    Glucose (POC) 111 (H) 65 - 100 mg/dL    Performed by [facility] [address], POC    Collection Time: 07/30/21  6:37 AM   Result Value Ref Range    Glucose (POC) 106 (H) 65 - 100 mg/dL    Performed by Maggie Ma        Assessment/ Plan:     Principal Problem:    Acute ischemic stroke ([facility] (7/28/2021)    Active Problems:    CAD (coronary artery disease) (7/18/2019)      Debility (12/2/2019)      Dementia ([facility] (12/23/2019)      UTI (urinary tract infection) (7/28/2021)      Falls frequently (7/28/2021)    CVA, right parietal lobe - Neurology consulted. ASA, plavix, statin. UTI - Ceftriaxone. Urine culture pending. Frequent falls - PT/OT. Likely will need rehab versus long term care facility. Looking into [facility] to med/surg with remote tele     Hopeful dc in one day.      DVT prophylaxis - Lovenox  Signed By: Tim Arevalo DO     July 30, 2021 no

## 2021-12-15 NOTE — ED PROVIDER NOTES
25-year-old male brought in by daughter-in-law for weakness decreased appetite and some increased confusion. Reports that he was fairly combative several nights ago. He is done this in the past when he had urinary tract infections. He has had almost a monthly admission for urinary tract infections since September. Patient seems quite well right now has no complaints but with further prompting does admit that he has had some low back pain that radiates into his legs. No fevers or chills. No vomiting. 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 The history is provided by the patient and a relative. Fatigue  This is a recurrent problem. The current episode started more than 2 days ago. The problem has not changed since onset. There was no focality noted. Primary symptoms include agitation and mental status change. There has been no fever. Past Medical History:   Diagnosis Date    CAD (coronary artery disease) 7/18/2019    Colon cancer ([facility] 01/2012    Hiatal hernia        No past surgical history on file. No family history on file.     Social History     Socioeconomic History    Marital status:      Spouse name: Not on file    Number of children: Not on file    Years of education: Not on file    Highest education level: Not on file   Occupational History    Not on file   Tobacco Use    Smoking status: Never Smoker    Smokeless tobacco: Never Used   Substance and Sexual Activity    Alcohol use: Never    Drug use: Never    Sexual activity: Not on file   Other Topics Concern    Not on file   Social History Narrative    Not on file     Social Determinants of Health     Financial Resource Strain:     Difficulty of Paying Living Expenses: Not on file   Food Insecurity:     Worried About Running Out of Food in the Last Year: Not on file    Alexandra of Food in the Last Year: Not on file   Transportation Needs:     Lack of Transportation (Medical): Not on file    Lack of Transportation (Non-Medical): Not on file   Physical Activity:     Days of Exercise per Week: Not on file    Minutes of Exercise per Session: Not on file   Stress:     Feeling of Stress : Not on file   Social Connections:     Frequency of Communication with Friends and Family: Not on file    Frequency of Social Gatherings with Friends and Family: Not on file    Attends Rastafari Services: Not on file    Active Member of [address] [place] [address] [email or web address] or Organizations: Not on file    Attends Club or Organization Meetings: Not on file    Marital Status: Not on file   Intimate Partner Violence:     Fear of Current or Ex-Partner: Not on file    Emotionally Abused: Not on file    Physically Abused: Not on file    Sexually Abused: Not on file   Housing Stability:     Unable to Pay for Housing in the Last Year: Not on file    Number of [place] in the Last Year: Not on file    Unstable Housing in the Last Year: Not on file         ALLERGIES: Patient has no known allergies. Review of Systems   Constitutional: Positive for appetite change and fatigue. Musculoskeletal: Positive for back pain. Psychiatric/Behavioral: Positive for agitation. All other systems reviewed and are negative. Vitals:    12/15/21 1357   BP: 103/65   Pulse: (!) 103   Resp: 22   Temp: 98.5 °F (36.9 °C)   SpO2: 95%   Weight: 57.6 kg (127 lb)   Height: 5' 8\" (1.727 m)            Physical Exam  Vitals and nursing note reviewed. Constitutional:       Appearance: He is well-developed. HENT:      Head: Normocephalic and atraumatic. Eyes:      Conjunctiva/sclera: Conjunctivae normal.      Pupils: Pupils are equal, round, and reactive to light. Cardiovascular:      Rate and Rhythm: Normal rate and regular rhythm. Heart sounds: Normal heart sounds.       Comments: Heart is mostly regular but occasional pauses likely PVCs  Pulmonary:      Effort: Pulmonary effort is normal.      Breath sounds: Normal breath sounds. Abdominal:      General: Bowel sounds are normal.      Palpations: Abdomen is soft. Musculoskeletal:         General: No deformity. Normal range of motion. Cervical back: Normal range of motion and neck supple. Skin:     General: Skin is warm and dry. Neurological:      Mental Status: He is alert. Cranial Nerves: No cranial nerve deficit. Comments: Oriented to person   Psychiatric:         Behavior: Behavior normal.          MDM  Number of Diagnoses or Management Options  Complicated UTI (urinary tract infection)  Dementia with behavioral disturbance, unspecified dementia type [facility])  Diagnosis management comments: Pleasant 66-year-old male presenting with his daughter-in-law for increased agitation and confusion. Likely recurrent urinary tract infection. Also been complaining of some low back pain. We will get sepsis labs, urinalysis with culture, abdominal series x-ray and a lumbar x-ray. Given that the patient has been increasingly agitated, weak, not eating and not assisting with care he probably needs to be hospitalized for IV antibiotics once again.        Amount and/or Complexity of Data Reviewed  Clinical lab tests: ordered and reviewed (Results for orders placed or performed during the hospital encounter of 12/15/21  -CBC WITH AUTOMATED DIFF:        Result                      Value             Ref Range           WBC                         13.5 (H)          4.3 - 11.1 K*       RBC                         4.16 (L)          4.23 - 5.6 M*       HGB                         12.0 (L)          13.6 - 17.2 *       HCT                         38.4 (L)          41.1 - 50.3 %       MCV                         92.3              79.6 - 97.8 *       MCH                         28.8              26.1 - 32.9 *       MCHC                        31.3 (L)          31.4 - 35.0 *       RDW                         17.2 (H)          11.9 - 14.6 %       PLATELET                    264 150 - 450 K/*       MPV                         10.6              9.4 - 12.3 FL       ABSOLUTE NRBC               0.00              0.0 - 0.2 K/*       DF                          AUTOMATED                             NEUTROPHILS                 78                43 - 78 %           LYMPHOCYTES                 10 (L)            13 - 44 %           MONOCYTES                   9                 4.0 - 12.0 %        EOSINOPHILS                 0 (L)             0.5 - 7.8 %         BASOPHILS                   0                 0.0 - 2.0 %         IMMATURE GRANULOCYTES       2                 0.0 - 5.0 %         ABS. NEUTROPHILS            10.5 (H)          1.7 - 8.2 K/*       ABS. LYMPHOCYTES            1.4               0.5 - 4.6 K/*       ABS. MONOCYTES              1.3               0.1 - 1.3 K/*       ABS. EOSINOPHILS            0.1               0.0 - 0.8 K/*       ABS. BASOPHILS              0.0               0.0 - 0.2 K/*       ABS. IMM.  GRANS.            0.3               0.0 - 0.5 K/*  -METABOLIC PANEL, COMPREHENSIVE:        Result                      Value             Ref Range           Sodium                      137               136 - 145 mm*       Potassium                   4.0               3.5 - 5.1 mm*       Chloride                    101               98 - 107 mmo*       CO2                         30                21 - 32 mmol*       Anion gap                   6 (L)             7 - 16 mmol/L       Glucose                     114 (H)           65 - 100 mg/*       BUN                         18                8 - 23 MG/DL        Creatinine                  1.42              0.8 - 1.5 MG*       GFR est AA                  >60               >60 ml/min/1*       GFR est non-AA              50 (L)            >60 ml/min/1*       Calcium                     9.7               8.3 - 10.4 M*       Bilirubin, total            1.2 (H)           0.2 - 1.1 MG*       ALT (SGPT)                  15 12 - 65 U/L         AST (SGOT)                  11 (L)            15 - 37 U/L         Alk.  phosphatase            83                50 - 136 U/L        Protein, total              7.7               6.3 - 8.2 g/*       Albumin                     3.3               3.2 - 4.6 g/*       Globulin                    4.4 (H)           2.3 - 3.5 g/*       A-G Ratio                   0.8 (L)           1.2 - 3.5      -MAGNESIUM:        Result                      Value             Ref Range           Magnesium                   2.1               1.8 - 2.4 mg*  -LACTIC ACID:        Result                      Value             Ref Range           Lactic acid                 2.5 (H)           0.4 - 2.0 MM*  -URINALYSIS W/ RFLX MICROSCOPIC:        Result                      Value             Ref Range           Color                       RED                                   Appearance                  CLOUDY                                Specific gravity            1.018             1.001 - 1.02*       pH (UA)                     6.5               5.0 - 9.0           Protein                     30 (A)            NEG mg/dL           Glucose                     Negative          mg/dL               Ketone                      TRACE (A)         NEG mg/dL           Bilirubin                   Negative          NEG                 Blood                       LARGE (A)         NEG                 Urobilinogen                1.0               0.2 - 1.0 EU*       Nitrites                    Negative          NEG                 Leukocyte Esterase          SMALL (A)         NEG            -EKG, 12 LEAD, INITIAL:        Result                      Value             Ref Range           Ventricular Rate            101               BPM                 Atrial Rate                 101               BPM                 P-R Interval                134               ms                  QRS Duration                78                ms Q-T Interval                330               ms                  QTC Calculation (Bezet)     427               ms                  Calculated P Axis           45                degrees             Calculated R Axis           25                degrees             Calculated T Axis           39                degrees             Diagnosis                                                     Sinus tachycardia with Premature atrial complexes   Otherwise normal ECG   When compared with ECG of 05-JUL-2021 14:37,   Premature atrial complexes are now Present   Confirmed by Alvin Cruz ([id number]) on 12/15/2021 2:45:53 PM     )  Tests in the radiology section of CPT®: ordered and reviewed (XR SPINE LUMB 2 OR 3 V    Result Date: 12/15/2021  LUMBAR SPINE SERIES. Clinical Indication: Back pain Findings: A 50% compression fracture deformity is noted at T12. This has developed since the prior chest x-ray from July 2021. Degenerative facet arthropathy with grade 1 degenerative anterolisthesis is noted at L4-L5. The SI joints are intact. 1. 50% compression fracture deformity at T12. 2. Degenerative grade 1 anterolisthesis at L4-L5 from degenerative facet arthropathy. XR ABD ACUTE W 1 V CHEST    Result Date: 12/15/2021  Acute abdominal series CLINICAL INDICATION: Worsening fatigue for one week FINDINGS: There is a large hiatal hernia. The lung bases are clear. No free air noted beneath the diaphragm. The bowel gas pattern is unremarkable. The bowel loops are normal in caliber with stool noted distally. No dilated loops of small bowel evident. No abnormal calculi appreciated. 1. Large hiatal hernia. 2. Otherwise, no acute abdominal or pelvic abnormality. CT HEAD WO CONT    Result Date: 11/17/2021  NONCONTRAST HEAD CT CLINICAL HISTORY:  Worsening confusion over the last week. TECHNIQUE:  Axial images were obtained with spiral technique.   Radiation dose reduction was achieved using one or all of the following techniques: automated exposure control, weight-based dosing, iterative reconstruction. COMPARISON:  CT and MRI of July 29, 2021. REPORT:   Examination is limited by nonstandard positioning of the patient's head as well as artifact associated with motion, despite repeat scanning. Noncontrast Head CT demonstrates no definite intracranial mass effect, hemorrhage, or evidence of acute geographic infarction. Extensive white matter hypodensities are most consistent with small vessel ischemic disease. Ventricles are unchanged. Orbits  and paranasal sinuses are clear where imaged. Bone windows demonstrate no definite fracture or destruction. EXTENSIVE SMALL VESSEL ISCHEMIC DISEASE WITH NO ACUTE INTRACRANIAL ABNORMALITY IDENTIFIED ON THIS TECHNICALLY LIMITED, NONCONTRAST CT.     XR CHEST PORT    Result Date: 11/17/2021  PORTABLE CHEST, November 17, 2021 at 2013 hours CLINICAL HISTORY:  Worsening confusion over the last week. COMPARISON:  July 28, 2021. FINDINGS:  AP erect image demonstrates no confluent infiltrate or significant pleural fluid. There is mild atelectasis/infiltrate at both lung bases. The heart size is within normal limits without evidence of congestive heart failure or pneumothorax. The bony thorax appears intact on this view. Severe degenerative changes of the right shoulder.      MILD BASILAR ATELECTASIS/INFILTRATE.    )  Tests in the medicine section of CPT®: reviewed and ordered  Decide to obtain previous medical records or to obtain history from someone other than the patient: yes  Obtain history from someone other than the patient: yes (Some history provided by daughter-in-law)  Review and summarize past medical records: yes (Reviewed blood cultures and urine cultures)  Discuss the patient with other providers: yes (Consult hospitalist for further management)  Independent visualization of images, tracings, or specimens: yes (Personally reviewed the imaging and EKG)    Risk of Complications, Morbidity, and/or Mortality  Presenting problems: high  Diagnostic procedures: high  Management options: high  General comments: I personally reviewed the patient's vital signs, laboratory tests, and/or radiological findings. I discussed these findings with the patient and their significance. I answered all questions and explained that given these findings there is significant concern for increased morbidity and/or mortality without immediate intervention. As a result, I recommended admission to the hospital, consulted the appropriate service, and transitioned care to that service in improved condition      Patient Progress  Patient progress: improved    ED Course as of 12/15/21 1541   Wed Dec 15, 2021   1411 Reviewed the patient's medical record and he has been hospitalized 2 times in the past couple of months for urinary tract infections, both of which grew Serratia medicines. During one of the hospitalizations he also had positive blood cultures with Serratia. Serratia has always been susceptible to ceftriaxone.  [JS]   9800 EKG performed shows sinus tachycardia rate 101, normal axis, WV is 130, QRS is 75, QTc is 425 with no acute ischemic change [JS]      ED Course User Index  [JS] Sunny Hathaway MD       Procedures

## 2021-12-15 NOTE — PROGRESS NOTES
Care Management Interventions  PCP Verified by CM: Yes (Josiane Curran N.P.)  Transition of Care Consult (CM Consult): Discharge Planning  Support Systems: Assisted Living (Lives at [address] [place] [address])  Confirm Follow Up Transport: Family  The Patient and/or Patient Representative was Provided with a Choice of Provider and Agrees with the Discharge Plan?: Yes  Freedom of Choice List was Provided with Basic Dialogue that Supports the Patient's Individualized Plan of Care/Goals, Treatment Preferences and Shares the Quality Data Associated with the Providers?: Yes  Discharge Location  Discharge Placement: Assisted Living  Visited with pt to establish prior to admission baseline and discuss their anticipated goals at time of d/c. Pt dtr-in-law/Ann #[phone] at bedside. Assisting answering questions for pt, for he has x of Dementia. Pt lives at [facility] since Aug 10th 2021. Pt has been inde with ADL's, up until a few days ago, now it is taking 2 people to transfer him. Pt has been hospitalized every month for the past 4 months for UTI's per family. PT/OT ordered for eval and CM to follow.

## 2021-12-15 NOTE — ED TRIAGE NOTES
Pt to ER from assisted living with daughter in law who states pt has been getting weak over the past week and they are concerned about a UTI. States they got a sample last Thursday but no results yet. Pt has dementia so is not really able to answer questions. Masked for triage.

## 2021-12-15 NOTE — ED NOTES
80 y.o. M with CC of increasing fatigue for about 1 week. Daughter-in-law reports patient lives in an assisted living facility and they sent his urine last week but have not heard back. She states patient is also complaining of bilateral hip pain that goes across his lower back. Patient evaluated initially in triage. Rapid Medical Evaluation was conducted and necessary orders have been placed. I have performed a medical screening exam.  Care will now be transferred to the provider in the back of the emergency department.   Nubia Sargent NP 1:56 PM

## 2021-12-15 NOTE — H&P
Hospitalist History and Physical   Admit Date:  12/15/2021  1:59 PM   Name:  Kerry Riedel Sr   Age:  80 y.o. Sex:  male  :  1931   MRN:  [id number]   Room:  ER06/06    Presenting Complaint: Fatigue    Reason(s) for Admission: UTI (urinary tract infection) [N39.0]     History of Present Illness:   Bassem Amaral is a 80 y.o. male with medical history of  Recurrent UTIs, dementia, CAD Hiatal hernia who presented to the ER  with a complaint of combativeness with weakness, poor appetite and inability to stand. Symptoms began on Wednesday of last week, per daughter-in-law with patient becoming combative at [facility]. Patient is pleasantly confused and information obtained from daughter-in-law at bedside who states that on Tuesday of this week patient was unable to get OOB or stand. She reports history of multiple falls in July and August but is unaware of any falls at [facility]. Patient noted to be treated for UTI in Oct and November with UCx positive for [place]. Patient denies CP, SOB, N/V. Hospitalist asked to admit patient for further work-up      Review of Systems:  10 systems reviewed and negative except as noted in HPI. Assessment & Plan: Active Problems:  UTI (urinary tract infection) (12/15/2021)  -Patient has had previous UCx positive for S[place] susceptible to Rocephin.  Will continue Rocephin  -With likelihood of BPH, bladder scan q shift ordered  -Urology consulted    Hx of multiple falls  -Per family patient denied dizziness, LOC stating he does not know why he falls   -PT/OT consulted  -Orthostatics prn     T12 fx  -Noted on imaging today; not seen on XR 21   -Ortho spine consulted for recs  -Prn analgesia     Dementia  -Supportive care  -Redirect prn     Hiatal Hernia  -Noted     *Per daughter in law patient is only taking MV, zofran and Pepcid       Dispo/Discharge Planning:     Dispo pending     Diet: ADULT DIET Regular; No Salt Added (3-4 gm)  VTE ppx: Lovenox SQ   Code status: Full Code    Hospital Problems as of 12/15/2021 Date Reviewed: 11/19/2021          Codes Class Noted - Resolved POA    UTI (urinary tract infection) ICD-10-CM: N39.0  ICD-9-CM: 599.0  12/15/2021 - Present Unknown              Past History:  Past Medical History:   Diagnosis Date    CAD (coronary artery disease) 7/18/2019    Colon cancer ([facility] [address] 01/2012    Hiatal hernia      No past surgical history on file. No Known Allergies   Social History     Tobacco Use    Smoking status: Never Smoker    Smokeless tobacco: Never Used   Substance Use Topics    Alcohol use: Never      No family history on file. Family history reviewed and negative except as otherwise noted. Immunization History   Administered Date(s) Administered    Influenza High Dose Vaccine PF 02/19/2016, 10/09/2018, 10/23/2019    Pneumococcal Polysaccharide (PPSV-23) 05/21/2018    TB Skin Test (PPD) Intradermal 12/02/2019, 12/12/2019, 07/29/2021, 10/02/2021     Prior to Admit Medications:  Current Outpatient Medications   Medication Instructions    atorvastatin (LIPITOR) 80 mg, Oral, EVERY BEDTIME    clopidogreL (PLAVIX) 75 mg, Oral, DAILY    famotidine (PEPCID) 20 mg, Oral, DAILY    nitroglycerin (NITROSTAT) 0.4 mg, SubLINGual, EVERY 5 MIN AS NEEDED, Up to 3 doses.  ondansetron (ZOFRAN ODT) 4 mg, Oral, EVERY 8 HOURS AS NEEDED    pantoprazole (PROTONIX) 40 mg, Oral, DAILY BEFORE BREAKFAST       Objective:     Patient Vitals for the past 24 hrs:   Temp Pulse Resp BP SpO2   12/15/21 1547 -- (!) 101 -- 113/85 --   12/15/21 1537 -- (!) 109 19 -- 95 %   12/15/21 1527 -- -- 28 130/73 --   12/15/21 1357 98.5 °F (36.9 °C) (!) 103 22 103/65 95 %     Oxygen Therapy  O2 Sat (%): 95 % (12/15/21 1537)  Pulse via Oximetry: 97 beats per minute (12/15/21 1537)  O2 Device: None (Room air) (12/15/21 1357)    Estimated body mass index is 19.31 kg/m² as calculated from the following:    Height as of this encounter: 5' 8\" (1.727 m).     Weight as of this encounter: 57.6 kg (127 lb). Intake/Output Summary (Last 24 hours) at 12/15/2021 1631  Last data filed at 12/15/2021 1625  Gross per 24 hour   Intake 550 ml   Output --   Net 550 ml         Physical Exam:    Blood pressure 113/85, pulse (!) 101, temperature 98.5 °F (36.9 °C), resp. rate 19, height 5' 8\" (1.727 m), weight 57.6 kg (127 lb), SpO2 95 %. General:    No overt distress, periodic non sensical speech (baseline)  Head:  Normocephalic, atraumatic  Eyes:  Sclerae appear normal.  Pupils equally round. ENT:  Nares appear normal, no drainage. Moist oral mucosa  Neck:  No restricted ROM. Trachea midline   CV:   RRR. No m/r/g. No jugular venous distension. Lungs:   CTAB. No wheezing, rhonchi, or rales. Respirations even, unlabored  Abdomen: Bowel sounds present. Soft, nontender, nondistended. Extremities: No cyanosis or clubbing. No edema  Skin:     No rashes and normal coloration. Warm and dry. Bruising BLE    Neuro:  CN II-XII grossly intact. A&Ox 1-2   Psych:  Normal mood and affect. I have reviewed ordered lab tests and independently visualized imaging below:    Last 24hr Labs:  Recent Results (from the past 24 hour(s))   CBC WITH AUTOMATED DIFF    Collection Time: 12/15/21  2:12 PM   Result Value Ref Range    WBC 13.5 (H) 4.3 - 11.1 K/uL    RBC 4.16 (L) 4.23 - 5.6 M/uL    HGB 12.0 (L) 13.6 - 17.2 g/dL    HCT 38.4 (L) 41.1 - 50.3 %    MCV 92.3 79.6 - 97.8 FL    MCH 28.8 26.1 - 32.9 PG    MCHC 31.3 (L) 31.4 - 35.0 g/dL    RDW 17.2 (H) 11.9 - 14.6 %    PLATELET 305 773 - 477 K/uL    MPV 10.6 9.4 - 12.3 FL    ABSOLUTE NRBC 0.00 0.0 - 0.2 K/uL    DF AUTOMATED      NEUTROPHILS 78 43 - 78 %    LYMPHOCYTES 10 (L) 13 - 44 %    MONOCYTES 9 4.0 - 12.0 %    EOSINOPHILS 0 (L) 0.5 - 7.8 %    BASOPHILS 0 0.0 - 2.0 %    IMMATURE GRANULOCYTES 2 0.0 - 5.0 %    ABS. NEUTROPHILS 10.5 (H) 1.7 - 8.2 K/UL    ABS. LYMPHOCYTES 1.4 0.5 - 4.6 K/UL    ABS. MONOCYTES 1.3 0.1 - 1.3 K/UL    ABS.  EOSINOPHILS 0.1 0.0 - 0.8 K/UL    ABS. BASOPHILS 0.0 0.0 - 0.2 K/UL    ABS. IMM. GRANS. 0.3 0.0 - 0.5 K/UL   METABOLIC PANEL, COMPREHENSIVE    Collection Time: 12/15/21  2:12 PM   Result Value Ref Range    Sodium 137 136 - 145 mmol/L    Potassium 4.0 3.5 - 5.1 mmol/L    Chloride 101 98 - 107 mmol/L    CO2 30 21 - 32 mmol/L    Anion gap 6 (L) 7 - 16 mmol/L    Glucose 114 (H) 65 - 100 mg/dL    BUN 18 8 - 23 MG/DL    Creatinine 1.42 0.8 - 1.5 MG/DL    GFR est AA >60 >60 ml/min/1.73m2    GFR est non-AA 50 (L) >60 ml/min/1.73m2    Calcium 9.7 8.3 - 10.4 MG/DL    Bilirubin, total 1.2 (H) 0.2 - 1.1 MG/DL    ALT (SGPT) 15 12 - 65 U/L    AST (SGOT) 11 (L) 15 - 37 U/L    Alk.  phosphatase 83 50 - 136 U/L    Protein, total 7.7 6.3 - 8.2 g/dL    Albumin 3.3 3.2 - 4.6 g/dL    Globulin 4.4 (H) 2.3 - 3.5 g/dL    A-G Ratio 0.8 (L) 1.2 - 3.5     MAGNESIUM    Collection Time: 12/15/21  2:12 PM   Result Value Ref Range    Magnesium 2.1 1.8 - 2.4 mg/dL   LACTIC ACID    Collection Time: 12/15/21  2:12 PM   Result Value Ref Range    Lactic acid 2.5 (H) 0.4 - 2.0 MMOL/L   EKG, 12 LEAD, INITIAL    Collection Time: 12/15/21  2:31 PM   Result Value Ref Range    Ventricular Rate 101 BPM    Atrial Rate 101 BPM    P-R Interval 134 ms    QRS Duration 78 ms    Q-T Interval 330 ms    QTC Calculation (Bezet) 427 ms    Calculated P Axis 45 degrees    Calculated R Axis 25 degrees    Calculated T Axis 39 degrees    Diagnosis       Sinus tachycardia with Premature atrial complexes  Otherwise normal ECG  When compared with ECG of 05-JUL-2021 14:37,  Premature atrial complexes are now Present  Confirmed by David Beltre ([id number]) on 12/15/2021 2:45:53 PM     URINALYSIS W/ RFLX MICROSCOPIC    Collection Time: 12/15/21  2:39 PM   Result Value Ref Range    Color RED      Appearance CLOUDY      Specific gravity 1.018 1.001 - 1.023      pH (UA) 6.5 5.0 - 9.0      Protein 30 (A) NEG mg/dL    Glucose Negative mg/dL    Ketone TRACE (A) NEG mg/dL    Bilirubin Negative NEG      Blood LARGE (A) NEG      Urobilinogen 1.0 0.2 - 1.0 EU/dL    Nitrites Negative NEG      Leukocyte Esterase SMALL (A) NEG      WBC 5-10 0 /hpf    RBC >100 0 /hpf    Epithelial cells 0-3 0 /hpf    Bacteria 0 0 /hpf       All Micro Results     Procedure Component Value Units Date/Time    CULTURE, BLOOD [[id number]] Collected: 12/15/21 1439    Order Status: Completed Specimen: Blood Updated: 12/15/21 1452    CULTURE, URINE [[id number]] Collected: 12/15/21 1439    Order Status: Completed Specimen: Urine from Clean catch Updated: 12/15/21 1447    CULTURE, BLOOD [[id number]] Collected: 12/15/21 1415    Order Status: Completed Specimen: Blood Updated: 12/15/21 1443          Other Studies:  XR SPINE LUMB 2 OR 3 V    Result Date: 12/15/2021  LUMBAR SPINE SERIES. Clinical Indication: Back pain Findings: A 50% compression fracture deformity is noted at T12. This has developed since the prior chest x-ray from July 2021. Degenerative facet arthropathy with grade 1 degenerative anterolisthesis is noted at L4-L5. The SI joints are intact. 1. 50% compression fracture deformity at T12. 2. Degenerative grade 1 anterolisthesis at L4-L5 from degenerative facet arthropathy. XR ABD ACUTE W 1 V CHEST    Result Date: 12/15/2021  Acute abdominal series CLINICAL INDICATION: Worsening fatigue for one week FINDINGS: There is a large hiatal hernia. The lung bases are clear. No free air noted beneath the diaphragm. The bowel gas pattern is unremarkable. The bowel loops are normal in caliber with stool noted distally. No dilated loops of small bowel evident. No abnormal calculi appreciated. 1. Large hiatal hernia. 2. Otherwise, no acute abdominal or pelvic abnormality.       Medications Administered     cefTRIAXone (ROCEPHIN) 1 g in 0.9% sodium chloride (MBP/ADV) 50 mL MBP     Admin Date  12/15/2021 Action  New Bag Dose  1 g Rate  100 mL/hr Route  IntraVENous Administered By  Liza Iniguez RN          sodium chloride 0.9 % bolus infusion 500 mL Admin Date  12/15/2021 Action  New Bag Dose  500 mL Route  IntraVENous Administered By  Ping Lama RN                Signed:  Rob Wilde NP    Part of this note may have been written by using a voice dictation software. The note has been proof read but may still contain some grammatical/other typographical errors.

## 2021-12-16 ENCOUNTER — APPOINTMENT (OUTPATIENT)
Dept: GENERAL RADIOLOGY | Age: 86
DRG: 871 | End: 2021-12-16
Attending: INTERNAL MEDICINE
Payer: MEDICARE

## 2021-12-16 LAB
ALBUMIN SERPL-MCNC: 3.1 G/DL (ref 3.2–4.6)
ALBUMIN/GLOB SERPL: 0.7 {RATIO} (ref 1.2–3.5)
ALP SERPL-CCNC: 79 U/L (ref 50–136)
ALT SERPL-CCNC: 14 U/L (ref 12–65)
AMMONIA PLAS-SCNC: <10 UMOL/L (ref 24.9–68)
ANION GAP SERPL CALC-SCNC: 4 MMOL/L (ref 7–16)
AST SERPL-CCNC: 11 U/L (ref 15–37)
BASOPHILS # BLD: 0.1 K/UL (ref 0–0.2)
BASOPHILS NFR BLD: 0 % (ref 0–2)
BILIRUB SERPL-MCNC: 0.9 MG/DL (ref 0.2–1.1)
BUN SERPL-MCNC: 21 MG/DL (ref 8–23)
CALCIUM SERPL-MCNC: 9.7 MG/DL (ref 8.3–10.4)
CHLORIDE SERPL-SCNC: 102 MMOL/L (ref 98–107)
CO2 SERPL-SCNC: 29 MMOL/L (ref 21–32)
CREAT SERPL-MCNC: 1.23 MG/DL (ref 0.8–1.5)
DIFFERENTIAL METHOD BLD: ABNORMAL
EOSINOPHIL # BLD: 0.1 K/UL (ref 0–0.8)
EOSINOPHIL NFR BLD: 1 % (ref 0.5–7.8)
ERYTHROCYTE [DISTWIDTH] IN BLOOD BY AUTOMATED COUNT: 17.2 % (ref 11.9–14.6)
FOLATE SERPL-MCNC: 18.5 NG/ML (ref 3.1–17.5)
GLOBULIN SER CALC-MCNC: 4.6 G/DL (ref 2.3–3.5)
GLUCOSE SERPL-MCNC: 110 MG/DL (ref 65–100)
HCT VFR BLD AUTO: 36.8 % (ref 41.1–50.3)
HGB BLD-MCNC: 11.5 G/DL (ref 13.6–17.2)
IMM GRANULOCYTES # BLD AUTO: 0.3 K/UL (ref 0–0.5)
IMM GRANULOCYTES NFR BLD AUTO: 2 % (ref 0–5)
LYMPHOCYTES # BLD: 1.3 K/UL (ref 0.5–4.6)
LYMPHOCYTES NFR BLD: 12 % (ref 13–44)
MCH RBC QN AUTO: 28.8 PG (ref 26.1–32.9)
MCHC RBC AUTO-ENTMCNC: 31.3 G/DL (ref 31.4–35)
MCV RBC AUTO: 92 FL (ref 79.6–97.8)
MONOCYTES # BLD: 0.7 K/UL (ref 0.1–1.3)
MONOCYTES NFR BLD: 6 % (ref 4–12)
NEUTS SEG # BLD: 9 K/UL (ref 1.7–8.2)
NEUTS SEG NFR BLD: 78 % (ref 43–78)
NRBC # BLD: 0 K/UL (ref 0–0.2)
PLATELET # BLD AUTO: 256 K/UL (ref 150–450)
PMV BLD AUTO: 9.9 FL (ref 9.4–12.3)
POTASSIUM SERPL-SCNC: 4.1 MMOL/L (ref 3.5–5.1)
PROT SERPL-MCNC: 7.7 G/DL (ref 6.3–8.2)
RBC # BLD AUTO: 4 M/UL (ref 4.23–5.6)
SODIUM SERPL-SCNC: 135 MMOL/L (ref 136–145)
TSH SERPL DL<=0.005 MIU/L-ACNC: 5.36 UIU/ML
VIT B12 SERPL-MCNC: 570 PG/ML (ref 193–986)
WBC # BLD AUTO: 11.4 K/UL (ref 4.3–11.1)

## 2021-12-16 PROCEDURE — 99281 EMR DPT VST MAYX REQ PHY/QHP: CPT | Performed by: PHYSICIAN ASSISTANT

## 2021-12-16 PROCEDURE — 85025 COMPLETE CBC W/AUTO DIFF WBC: CPT

## 2021-12-16 PROCEDURE — 73552 X-RAY EXAM OF FEMUR 2/>: CPT

## 2021-12-16 PROCEDURE — 97161 PT EVAL LOW COMPLEX 20 MIN: CPT

## 2021-12-16 PROCEDURE — 82746 ASSAY OF FOLIC ACID SERUM: CPT

## 2021-12-16 PROCEDURE — 82140 ASSAY OF AMMONIA: CPT

## 2021-12-16 PROCEDURE — 73502 X-RAY EXAM HIP UNI 2-3 VIEWS: CPT

## 2021-12-16 PROCEDURE — 80053 COMPREHEN METABOLIC PANEL: CPT

## 2021-12-16 PROCEDURE — 36415 COLL VENOUS BLD VENIPUNCTURE: CPT

## 2021-12-16 PROCEDURE — 74011250636 HC RX REV CODE- 250/636: Performed by: NURSE PRACTITIONER

## 2021-12-16 PROCEDURE — 74011250637 HC RX REV CODE- 250/637: Performed by: NURSE PRACTITIONER

## 2021-12-16 PROCEDURE — 74011000258 HC RX REV CODE- 258: Performed by: NURSE PRACTITIONER

## 2021-12-16 PROCEDURE — 51798 US URINE CAPACITY MEASURE: CPT

## 2021-12-16 PROCEDURE — 97165 OT EVAL LOW COMPLEX 30 MIN: CPT

## 2021-12-16 PROCEDURE — 82607 VITAMIN B-12: CPT

## 2021-12-16 PROCEDURE — 84443 ASSAY THYROID STIM HORMONE: CPT

## 2021-12-16 PROCEDURE — 65270000029 HC RM PRIVATE

## 2021-12-16 RX ADMIN — Medication 10 ML: at 22:00

## 2021-12-16 RX ADMIN — MULTIPLE VITAMINS W/ MINERALS TAB 1 TABLET: TAB at 09:02

## 2021-12-16 RX ADMIN — Medication 10 ML: at 14:00

## 2021-12-16 RX ADMIN — FAMOTIDINE 20 MG: 20 TABLET ORAL at 09:02

## 2021-12-16 RX ADMIN — CEFTRIAXONE 1 G: 1 INJECTION, POWDER, FOR SOLUTION INTRAMUSCULAR; INTRAVENOUS at 15:45

## 2021-12-16 RX ADMIN — ENOXAPARIN SODIUM 30 MG: 100 INJECTION SUBCUTANEOUS at 09:02

## 2021-12-16 NOTE — PROGRESS NOTES
Hospitalist Progress Note   Admit Date:  12/15/2021  1:59 PM   Name:  Grace Beckett   Age:  80 y.o. Sex:  male  :  1931   MRN:  [id number]   Room:  ER06/06    Presenting Complaint: Fatigue    Reason(s) for Admission: UTI (urinary tract infection) [N39.0]     Hospital Course & Interval History:   Mr. Miko De Oliveira is a 80 y.o. male with medical history of  Recurrent UTIs, dementia, CAD Hiatal hernia who presented to the ER  with a complaint of combativeness with weakness, poor appetite and inability to stand. Symptoms began on Wednesday of last week, per daughter-in-law with patient becoming combative at [facility]. Patient is pleasantly confused and information obtained from daughter-in-law at bedside who states that on Tuesday of this week patient was unable to get OOB or stand. She reports history of multiple falls in July and August but is unaware of any falls at [facility]. Patient noted to be treated for UTI in Oct and November with UCx positive for [place]. Subjective (21):  Pt confused. Thinks he is in [place] and wanting to put his son in snf. Son at bedside. Pt is pleasant and cooperative. Assessment & Plan:     Sepsis secondary to UTI (urinary tract infection) (12/15/2021)  -Patient has had previous UCx positive for [place] susceptible to Rocephin. Will continue Rocephin  -With likelihood of BPH, bladder scan q shift ordered  -Urology consulted   Urology to follow up outpatient.   Follow cx and plan for 7 day abx treatment per Urology  Lactic acid cleared     Hx of multiple falls  -Per family patient denied dizziness, LOC stating he does not know why he falls   -PT/OT consulted  -Orthostatics prn      T12 fx  -Noted on imaging today; not seen on XR 21   -Ortho spine consulted for recs  -Prn analgesia    Ortho spine consulted, no intervention recommended.      Dementia  -Supportive care  -Redirect prn      Hiatal Hernia  -Noted     Acute encephalopathy   underlying dementia  Will complete encephalopathy work up TSH, B12, folate, ammonia    YAMILEX  12/16 IVF  BMP in AM        Dispo/Discharge Planning:     Dispo pending      Diet: ADULT DIET Regular; No Salt Added (3-4 gm)  VTE ppx: Lovenox SQ   Code status: Full Code    Hospital Problems as of 12/16/2021 Date Reviewed: 11/19/2021          Codes Class Noted - Resolved POA    UTI (urinary tract infection) ICD-10-CM: N39.0  ICD-9-CM: 599.0  12/15/2021 - Present Yes        Acute metabolic encephalopathy ACE-01-KI: G93.41  ICD-9-CM: 348.31  11/17/2021 - Present Yes        Dementia ([facility] [address] (Chronic) ICD-10-CM: F03.90  ICD-9-CM: 294.20  12/23/2019 - Present Yes        Debility (Chronic) ICD-10-CM: R53.81  ICD-9-CM: 799.3  12/2/2019 - Present Yes        * (Principal) Sepsis ([facility] [address] ICD-10-CM: A41.9  ICD-9-CM: 038.9, 995.91  12/2/2019 - Present Yes        RESOLVED: Lactic acidosis ICD-10-CM: E87.2  ICD-9-CM: 276.2  12/15/2021 - 12/15/2021 Yes              Objective:     Patient Vitals for the past 24 hrs:   Pulse Resp BP SpO2   12/16/21 1435 77 17 (!) 109/55 97 %   12/16/21 0356 -- 14 (!) 104/53 97 %   12/16/21 0020 72 16 (!) 105/56 94 %   12/15/21 2030 93 17 115/62 93 %   12/15/21 1850 97 22 124/62 --   12/15/21 1719 100 -- 116/70 --   12/15/21 1648 -- 20 -- --   12/15/21 1647 (!) 117 -- 114/63 --   12/15/21 1624 (!) 101 -- 123/87 --   12/15/21 1547 (!) 101 -- 113/85 --   12/15/21 1537 (!) 109 19 -- 95 %   12/15/21 1527 -- 28 130/73 --     Oxygen Therapy  O2 Sat (%): 97 % (12/16/21 1435)  Pulse via Oximetry: 69 beats per minute (12/16/21 0356)  O2 Device: None (Room air) (12/16/21 0020)    Estimated body mass index is 19.31 kg/m² as calculated from the following:    Height as of this encounter: 5' 8\" (1.727 m). Weight as of this encounter: 57.6 kg (127 lb).     Intake/Output Summary (Last 24 hours) at 12/16/2021 1502  Last data filed at 12/15/2021 1625  Gross per 24 hour   Intake 550 ml   Output --   Net 550 ml         Physical Exam: Blood pressure (!) 109/55, pulse 77, temperature 98.5 °F (36.9 °C), resp. rate 17, height 5' 8\" (1.727 m), weight 57.6 kg (127 lb), SpO2 97 %. General:          No overt distress, confused  Head:               Normocephalic, atraumatic  Eyes:               Sclerae appear normal.  Pupils equally round. ENT:                Nares appear normal, no drainage. Moist oral mucosa  Neck:               No restricted ROM. Trachea midline   CV:                  RRR. No m/r/g. No jugular venous distension. Lungs:             CTAB. No wheezing, rhonchi, or rales. Respirations even, unlabored  Abdomen: Bowel sounds present. Soft, nontender, nondistended. Extremities:     No cyanosis or clubbing. No edema  Skin:                No rashes and normal coloration. Warm and dry. Bruising BLE    Neuro:             CN II-XII grossly intact. A&Ox 1-2   Psych:             Normal mood and affect. I have reviewed ordered lab tests and independently visualized imaging below:    Recent Labs:  Recent Results (from the past 48 hour(s))   CBC WITH AUTOMATED DIFF    Collection Time: 12/15/21  2:12 PM   Result Value Ref Range    WBC 13.5 (H) 4.3 - 11.1 K/uL    RBC 4.16 (L) 4.23 - 5.6 M/uL    HGB 12.0 (L) 13.6 - 17.2 g/dL    HCT 38.4 (L) 41.1 - 50.3 %    MCV 92.3 79.6 - 97.8 FL    MCH 28.8 26.1 - 32.9 PG    MCHC 31.3 (L) 31.4 - 35.0 g/dL    RDW 17.2 (H) 11.9 - 14.6 %    PLATELET 903 701 - 821 K/uL    MPV 10.6 9.4 - 12.3 FL    ABSOLUTE NRBC 0.00 0.0 - 0.2 K/uL    DF AUTOMATED      NEUTROPHILS 78 43 - 78 %    LYMPHOCYTES 10 (L) 13 - 44 %    MONOCYTES 9 4.0 - 12.0 %    EOSINOPHILS 0 (L) 0.5 - 7.8 %    BASOPHILS 0 0.0 - 2.0 %    IMMATURE GRANULOCYTES 2 0.0 - 5.0 %    ABS. NEUTROPHILS 10.5 (H) 1.7 - 8.2 K/UL    ABS. LYMPHOCYTES 1.4 0.5 - 4.6 K/UL    ABS. MONOCYTES 1.3 0.1 - 1.3 K/UL    ABS. EOSINOPHILS 0.1 0.0 - 0.8 K/UL    ABS. BASOPHILS 0.0 0.0 - 0.2 K/UL    ABS. IMM.  GRANS. 0.3 0.0 - 0.5 K/UL   METABOLIC PANEL, COMPREHENSIVE    Collection Time: 12/15/21  2:12 PM   Result Value Ref Range    Sodium 137 136 - 145 mmol/L    Potassium 4.0 3.5 - 5.1 mmol/L    Chloride 101 98 - 107 mmol/L    CO2 30 21 - 32 mmol/L    Anion gap 6 (L) 7 - 16 mmol/L    Glucose 114 (H) 65 - 100 mg/dL    BUN 18 8 - 23 MG/DL    Creatinine 1.42 0.8 - 1.5 MG/DL    GFR est AA >60 >60 ml/min/1.73m2    GFR est non-AA 50 (L) >60 ml/min/1.73m2    Calcium 9.7 8.3 - 10.4 MG/DL    Bilirubin, total 1.2 (H) 0.2 - 1.1 MG/DL    ALT (SGPT) 15 12 - 65 U/L    AST (SGOT) 11 (L) 15 - 37 U/L    Alk.  phosphatase 83 50 - 136 U/L    Protein, total 7.7 6.3 - 8.2 g/dL    Albumin 3.3 3.2 - 4.6 g/dL    Globulin 4.4 (H) 2.3 - 3.5 g/dL    A-G Ratio 0.8 (L) 1.2 - 3.5     MAGNESIUM    Collection Time: 12/15/21  2:12 PM   Result Value Ref Range    Magnesium 2.1 1.8 - 2.4 mg/dL   LACTIC ACID    Collection Time: 12/15/21  2:12 PM   Result Value Ref Range    Lactic acid 2.5 (H) 0.4 - 2.0 MMOL/L   CULTURE, BLOOD    Collection Time: 12/15/21  2:15 PM    Specimen: Blood   Result Value Ref Range    Special Requests: LEFT  Antecubital        Culture result: NO GROWTH AFTER 16 HOURS     EKG, 12 LEAD, INITIAL    Collection Time: 12/15/21  2:31 PM   Result Value Ref Range    Ventricular Rate 101 BPM    Atrial Rate 101 BPM    P-R Interval 134 ms    QRS Duration 78 ms    Q-T Interval 330 ms    QTC Calculation (Bezet) 427 ms    Calculated P Axis 45 degrees    Calculated R Axis 25 degrees    Calculated T Axis 39 degrees    Diagnosis       Sinus tachycardia with Premature atrial complexes  Otherwise normal ECG  When compared with ECG of 05-JUL-2021 14:37,  Premature atrial complexes are now Present  Confirmed by Magui Arellano ([id number]) on 12/15/2021 2:45:53 PM     URINALYSIS W/ RFLX MICROSCOPIC    Collection Time: 12/15/21  2:39 PM   Result Value Ref Range    Color RED      Appearance CLOUDY      Specific gravity 1.018 1.001 - 1.023      pH (UA) 6.5 5.0 - 9.0      Protein 30 (A) NEG mg/dL    Glucose Negative mg/dL    Ketone TRACE (A) NEG mg/dL    Bilirubin Negative NEG      Blood LARGE (A) NEG      Urobilinogen 1.0 0.2 - 1.0 EU/dL    Nitrites Negative NEG      Leukocyte Esterase SMALL (A) NEG      WBC 5-10 0 /hpf    RBC >100 0 /hpf    Epithelial cells 0-3 0 /hpf    Bacteria 0 0 /hpf   CULTURE, URINE    Collection Time: 12/15/21  2:39 PM    Specimen: Urine    CATH URINE   Result Value Ref Range    Special Requests: NO SPECIAL REQUESTS      Culture result:        NO GROWTH AFTER SHORT PERIOD OF INCUBATION. FURTHER RESULTS TO FOLLOW AFTER OVERNIGHT INCUBATION. CULTURE, BLOOD    Collection Time: 12/15/21  2:39 PM    Specimen: Blood   Result Value Ref Range    Special Requests: RIGHT  Antecubital        Culture result: NO GROWTH AFTER 16 HOURS     LACTIC ACID    Collection Time: 12/15/21  4:13 PM   Result Value Ref Range    Lactic acid 1.0 0.4 - 2.0 MMOL/L       All Micro Results     Procedure Component Value Units Date/Time    CULTURE, URINE [[id number]] Collected: 12/15/21 1439    Order Status: Completed Specimen: Urine Updated: 12/16/21 0804     Special Requests: NO SPECIAL REQUESTS        Culture result:       NO GROWTH AFTER SHORT PERIOD OF INCUBATION. FURTHER RESULTS TO FOLLOW AFTER OVERNIGHT INCUBATION. CULTURE, BLOOD [[id number]] Collected: 12/15/21 1415    Order Status: Completed Specimen: Blood Updated: 12/16/21 0728     Special Requests: --        LEFT  Antecubital       Culture result: NO GROWTH AFTER 16 HOURS       CULTURE, BLOOD [[id number]] Collected: 12/15/21 1439    Order Status: Completed Specimen: Blood Updated: 12/16/21 0728     Special Requests: --        RIGHT  Antecubital       Culture result: NO GROWTH AFTER 16 HOURS             Other Studies:  XR SPINE LUMB 2 OR 3 V    Result Date: 12/15/2021  LUMBAR SPINE SERIES. Clinical Indication: Back pain Findings: A 50% compression fracture deformity is noted at T12. This has developed since the prior chest x-ray from July 2021.  Degenerative facet arthropathy with grade 1 degenerative anterolisthesis is noted at L4-L5. The SI joints are intact. 1. 50% compression fracture deformity at T12. 2. Degenerative grade 1 anterolisthesis at L4-L5 from degenerative facet arthropathy. XR HIP RT W OR WO PELV 2-3 VWS    Result Date: 12/16/2021  Pelvis and right hip radiographs, 3 views Right femur radiographs, 2 views INDICATION: Pain status post fall COMPARISON: None. FINDINGS: The bones appear demineralized. Within this limitation, no acute fracture evident. 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 Arteriosclerotic calcifications. No acute radiographic abnormality. XR FEMUR RT 2 VS    Result Date: 12/16/2021  Pelvis and right hip radiographs, 3 views Right femur radiographs, 2 views INDICATION: Pain status post fall COMPARISON: None. FINDINGS: The bones appear demineralized. Within this limitation, no acute fracture evident. 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 Arteriosclerotic calcifications. No acute radiographic abnormality. XR ABD ACUTE W 1 V CHEST    Result Date: 12/15/2021  Acute abdominal series CLINICAL INDICATION: Worsening fatigue for one week FINDINGS: There is a large hiatal hernia. The lung bases are clear. No free air noted beneath the diaphragm. The bowel gas pattern is unremarkable. The bowel loops are normal in caliber with stool noted distally. No dilated loops of small bowel evident. No abnormal calculi appreciated. 1. Large hiatal hernia. 2. Otherwise, no acute abdominal or pelvic abnormality.       Current Meds:  Current Facility-Administered Medications   Medication Dose Route Frequency    sodium chloride (NS) flush 5-40 mL  5-40 mL IntraVENous Q8H    sodium chloride (NS) flush 5-40 mL  5-40 mL IntraVENous PRN    acetaminophen (TYLENOL) tablet 650 mg  650 mg Oral Q6H PRN    polyethylene glycol (MIRALAX) packet 17 g  17 g Oral DAILY PRN    ondansetron (ZOFRAN ODT) tablet 4 mg  4 mg Oral Q8H PRN    Or    ondansetron (ZOFRAN) injection 4 mg  4 mg IntraVENous Q6H PRN    enoxaparin (LOVENOX) injection 30 mg  30 mg SubCUTAneous DAILY    cefTRIAXone (ROCEPHIN) 1 g in 0.9% sodium chloride (MBP/ADV) 50 mL MBP  1 g IntraVENous Q24H    acetaminophen (TYLENOL) tablet 650 mg  650 mg Oral Q6H PRN    famotidine (PEPCID) tablet 20 mg  20 mg Oral Q12H    ondansetron (ZOFRAN ODT) tablet 4 mg  4 mg Oral Q8H PRN    multivitamin, tx-iron-ca-min (THERA-M w/ IRON) tablet 1 Tablet  1 Tablet Oral DAILY    nitroglycerin (NITROSTAT) tablet 0.4 mg  0.4 mg SubLINGual Q5MIN PRN     Current Outpatient Medications   Medication Sig    atorvastatin (LIPITOR) 80 mg tablet Take 1 Tablet by mouth nightly.  ondansetron (ZOFRAN ODT) 4 mg disintegrating tablet Take 1 Tablet by mouth every eight (8) hours as needed for Nausea.  clopidogreL (PLAVIX) 75 mg tab Take 1 Tab by mouth daily.  nitroglycerin (NITROSTAT) 0.4 mg SL tablet 1 Tab by SubLINGual route every five (5) minutes as needed for Chest Pain. Up to 3 doses.  pantoprazole (PROTONIX) 40 mg tablet Take 1 Tab by mouth Daily (before breakfast).  famotidine (PEPCID) 20 mg tablet Take 1 Tab by mouth daily.        Signed:  Masood Rachel NP    Notes, labs, VS, diagnostic testing reviewed  Case reviewed with pt, care team, Dr. Kathleen Matt, son at bedside

## 2021-12-16 NOTE — PROGRESS NOTES
Problem: Self Care Deficits Care Plan (Adult)  Goal: *Acute Goals and Plan of Care (Insert Text)  Description: 1. Patient will perform grooming with supervision. 2. Patient will perform Upper body dressing with supervision  3. Patient will perform lower body dressing with min assist  4. Patient will perform upper and lower body bathing with CGA. 5. Patient will perform toilet transfers with CGA. 6. Patient will perform shower transfer with CGA. 7. Patient will participate in 30 + minutes of ADL/ therapeutic exercise/therapeutic activity with min rest breaks to increase activity tolerance for self care. 8. Patient will perform ADL functional mobility in room with CGA. Goals to be achieved in 7 days. Outcome: Progressing Towards Goal     OCCUPATIONAL THERAPY: Initial Assessment and AM 12/16/2021  INPATIENT:    Payor: Иван Brown / Plan: [employer] MEDICARE CHOICE PPO/PFFS / Product Type: [employer] Medicare /      NAME/AGE/GENDER: Chidi Nesbitt is a 80 y.o. male   PRIMARY DIAGNOSIS:  UTI (urinary tract infection) [N39.0] Sepsis ([facility] [address] Sepsis ([facility] [address]        ICD-10: Treatment Diagnosis:    Generalized Muscle Weakness (M62.81)  Other lack of cordination (R27.8)  Difficulty in walking, Not elsewhere classified (R26.2)   Precautions/Allergies:     Patient has no known allergies. ASSESSMENT:     Mr. Nivia Ramires Sr admitted with above diagnosis. Pt is pleasantly confused and lives at [facility]. Pt is normally independent with ADLs per chart. Pt is mod assist with bed mobility and min assist x 2 for functional transfers. Pt complained of right hip pain with movement. Pt presents with decreased self care and functional mobility. Pt would benefit from skilled OT to increase independence.      This section established at most recent assessment   PROBLEM LIST (Impairments causing functional limitations):  Decreased Strength  Decreased ADL/Functional Activities  Decreased Transfer Abilities  Decreased Ambulation Ability/Technique  Decreased Balance  Decreased Activity Tolerance   INTERVENTIONS PLANNED: (Benefits and precautions of occupational therapy have been discussed with the patient.)  Activities of daily living training  Adaptive equipment training  Balance training  Clothing management  Therapeutic activity  Therapeutic exercise     TREATMENT PLAN: Frequency/Duration: Follow patient 3x/week to address above goals. Rehabilitation Potential For Stated Goals: Good     REHAB RECOMMENDATIONS (at time of discharge pending progress):    Placement: It is my opinion, based on this patient's performance to date, that Mr. Ryann White may benefit from participating in 1-2 additional therapy sessions in order to continue to assess for rehab potential and then make recommendation for disposition at discharge. Equipment: To be determined              OCCUPATIONAL PROFILE AND HISTORY:   History of Present Injury/Illness (Reason for Referral):  27-year-old male brought in by daughter-in-law for weakness decreased appetite and some increased confusion. Reports that he was fairly combative several nights ago. He is done this in the past when he had urinary tract infections. He has had almost a monthly admission for urinary tract infections since September. Patient seems quite well right now has no complaints but with further prompting does admit that he has had some low back pain that radiates into his legs. No fevers or chills. No vomiting. 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 Past Medical History/Comorbidities:   Mr. Ryann White  has a past medical history of CAD (coronary artery disease) (7/18/2019), Colon cancer ([facility] [address] (01/2012), and Hiatal hernia. Mr. Ryann White  has no past surgical history on file.   Social History/Living Environment:   Home Environment: [address] [place] [address] Road Name: [address] [place] [address] Road: Assisted Living,Child(margi) (Lives at [facility])  Prior Level of Function/Work/Activity:  Lives at [facility]; indep with ADLs; no assistive device     Number of Personal Factors/Comorbidities that affect the Plan of Care: Brief history (0):  LOW COMPLEXITY   ASSESSMENT OF OCCUPATIONAL PERFORMANCE[de-identified]   Activities of Daily Living:   Basic ADLs (From Assessment) Complex ADLs (From Assessment)   Feeding: Setup  Oral Facial Hygiene/Grooming: Setup  Bathing: Moderate assistance  Upper Body Dressing: Setup  Lower Body Dressing: Maximum assistance  Toileting: Maximum assistance,Total assistance     Grooming/Bathing/Dressing Activities of Daily Living     Cognitive Retraining  Safety/Judgement: Fall prevention                 Functional Transfers  Toilet Transfer : Minimum assistance;Assist x2 ([facility])     Bed/Mat Mobility  Supine to Sit: Moderate assistance  Sit to Supine: Moderate assistance  Sit to Stand: Minimum assistance;Assist x2  Stand to Sit: Minimum assistance;Assist x2  Bed to Chair: Minimum assistance;Assist x2     Most Recent Physical Functioning:   Gross Assessment:  AROM: Generally decreased, functional (BUE)  Strength: Generally decreased, functional (BUE)  Coordination: Generally decreased, functional (BUE)  Tone: Normal  Sensation: Intact               Posture:     Balance:  Sitting: Intact  Standing: With support;Pull to stand Bed Mobility:  Supine to Sit: Moderate assistance  Sit to Supine: Moderate assistance  Wheelchair Mobility:     Transfers:  Sit to Stand: Minimum assistance;Assist x2  Stand to Sit: Minimum assistance;Assist x2  Bed to Chair: Minimum assistance;Assist x2            No data found.     Mental Status  Neurologic State: Alert,Confused  Orientation Level: Oriented to person  Cognition: Follows commands  Perception: Appears intact  Perseveration: No perseveration noted  Safety/Judgement: Fall prevention                          Physical Skills Involved:  Balance  Strength  Activity Tolerance Cognitive Skills Affected (resulting in the inability to perform in a timely and safe manner): Short Term Recall  Long Term Memory  Expression Psychosocial Skills Affected:  Habits/Routines   Number of elements that affect the Plan of Care: 1-3:  LOW COMPLEXITY   CLINICAL DECISION MAKIN[address] [place] [address] AM-PAC 6 Clicks   Daily Activity Inpatient Short Form  How much help from another person does the patient currently need. .. Total A Lot A Little None   1. Putting on and taking off regular lower body clothing? [] 1   [x] 2   [] 3   [] 4   2. Bathing (including washing, rinsing, drying)? [] 1   [x] 2   [] 3   [] 4   3. Toileting, which includes using toilet, bedpan or urinal?   [] 1   [x] 2   [] 3   [] 4   4. Putting on and taking off regular upper body clothing? [] 1   [] 2   [] 3   [x] 4   5. Taking care of personal grooming such as brushing teeth? [] 1   [] 2   [] 3   [x] 4   6. Eating meals? [] 1   [] 2   [] 3   [x] 4   © , Trustees of [address] [place] [address], under license to [employer]. All rights reserved      Score:  Initial: 18 Most Recent: X (Date: -- )    Interpretation of Tool:  Represents activities that are increasingly more difficult (i.e. Bed mobility, Transfers, Gait). Medical Necessity:     Patient is expected to demonstrate progress in   strength, balance, coordination, and functional technique   to   increase independence with self care and functional mobility  . Reason for Services/Other Comments:  Patient continues to require skilled intervention due to   Decrease self care and functional mobility  .    Use of outcome tool(s) and clinical judgement create a POC that gives a: LOW COMPLEXITY         TREATMENT:   (In addition to Assessment/Re-Assessment sessions the following treatments were rendered)     Pre-treatment Symptoms/Complaints:  tolerated standing by bag  Pain: Initial:   Pain Intensity 1: 5  Pain Location 1: Hip  Pain Orientation 1: Right  Pain Intervention(s) 1: Repositioned  Post Session:  5     Assessment/Reassessment only, no treatment provided today    Braces/Orthotics/Lines/Etc:   O2 Device: None (Room air)  Treatment/Session Assessment:    Response to Treatment:  tolerated well  Interdisciplinary Collaboration:   Physical Therapist  Occupational Therapist  Registered Nurse  After treatment position/precautions:   Supine in bed  Bed/Chair-wheels locked  Bed in low position  RN notified  Side rails x 2   Compliance with Program/Exercises: Compliant all of the time. Recommendations/Intent for next treatment session: \"Next visit will focus on advancements to more challenging activities and reduction in assistance provided\".   Total Treatment Duration:  OT Patient Time In/Time Out  Time In: 7701  Time Out: 2400 [facility]

## 2021-12-16 NOTE — PROGRESS NOTES
Problem: Mobility Impaired (Adult and Pediatric)  Goal: *Acute Goals and Plan of Care (Insert Text)  Note: STG:  (1.)Mr. Nadiya Tariq will move from supine to sit and sit to supine  with CONTACT GUARD ASSIST within 7 treatment day(s). (2.)Mr. Nadiya Tariq will transfer from bed to chair and chair to bed with CONTACT GUARD ASSIST using the least restrictive device within 7 treatment day(s). (3.)Mr. Nadiya Tariq will ambulate with CONTACT GUARD ASSIST for 150 feet with the least restrictive device within 7 treatment day(s). (4.)Pt. will increase B LE strength 1/2 grade within 7 days      ________________________________________________________________________________________________      PHYSICAL THERAPY: Initial Assessment and AM 12/16/2021  INPATIENT: PT Visit Days : 1  Payor: Milana Lee / Plan: [facility] [employer] MEDICARE CHOICE PPO/PFFS / Product Type: 12Return Care Medicare /       NAME/AGE/GENDER: Brissa Greenwood is a [address] y.o. male   PRIMARY DIAGNOSIS: UTI (urinary tract infection) [N39.0] Sepsis ([facility] [address] Sepsis ([facility] [address]        ICD-10: Treatment Diagnosis:    Generalized Muscle Weakness (M62.81)  Other lack of cordination (R27.8)  Other abnormalities of gait and mobility (R26.89)  History of falling (Z91.81)   Precaution/Allergies:  Patient has no known allergies. ASSESSMENT:     Mr. Nadiya Tariq presents with a UTI and newly diagnosed T12 compression fx. He is confused and is confused and complaining of R hip pain. He would benefit from further PT while here to address these deficits: decreased LE strength and coordination, decreased endurance, standing balance and functional mobility, and increased R hip pain. He resides at [facility]. If he is functioning at an acceptable  level that he can return to [facility], that would be ideal with [facility]. If not, then STR might be an option.     This section established at most recent assessment   PROBLEM LIST (Impairments causing functional limitations):  Decreased Strength  Decreased ADL/Functional Activities  Decreased Transfer Abilities  Decreased Ambulation Ability/Technique  Decreased Balance  Increased Pain  Decreased Activity Tolerance  Increased Fatigue  Decreased Flexibility/Joint Mobility  Decreased Cognition   INTERVENTIONS PLANNED: (Benefits and precautions of physical therapy have been discussed with the patient.)  Balance Exercise  Bed Mobility  Gait Training  Range of Motion (ROM)  Therapeutic Activites  Therapeutic Exercise/Strengthening  Transfer Training     TREATMENT PLAN: Frequency/Duration: daily for duration of hospital stay  Rehabilitation Potential For Stated Goals: Good     REHAB RECOMMENDATIONS (at time of discharge pending progress):    Placement: It is my opinion, based on this patient's performance to date, that Mr. Raj Londono may benefit from [address] after discharge due to the functional deficits listed above that are likely to improve with skilled rehabilitation because he/she has multiple medical issues that affect his/her functional mobility in the community. Should he not be at an acceptable level for return to [facility], then STR might be an option. Equipment:   May need RW if he doesn't have one already              HISTORY:   History of Present Injury/Illness (Reason for Referral): Admitted with UTI, T12 compression fx  Past Medical History/Comorbidities:   Mr. Raj Londono  has a past medical history of CAD (coronary artery disease) (7/18/2019), Colon cancer ([facility] [address] (01/2012), and Hiatal hernia. Mr. Raj Londono  has no past surgical history on file.   Social History/Living Environment:   Home Environment[address] Name: [place]  # Steps to Enter: 0  One/Two Story Residence: One story  Support Systems: Assisted Living,Child(margi)  Patient Expects to be Discharged to[de-identified] Assisted living (may need STR if [facility] doesn't feel he is able to return safely)  Tub or Shower Type: Shower  Prior Level of Function/Work/Activity:  Resides in [place]. Unable to let me kn ow his prior functional level  Dominant Side:         RIGHT   Number of Personal Factors/Comorbidities that affect the Plan of Care: 3+: HIGH COMPLEXITY   EXAMINATION:   Most Recent Physical Functioning:   Gross Assessment:  AROM: Generally decreased, functional (B LEs)  Strength: Generally decreased, functional (R hip 2, knee/ankle 3+; L LE 3+)  Coordination: Generally decreased, functional (B LEs)  Sensation: Intact (B LEs)               Posture:  Posture Assessment: Forward head,Rounded shoulders  Balance:  Sitting: Intact  Standing: Pull to stand; With support Bed Mobility:  Supine to Sit: Moderate assistance  Sit to Supine: Moderate assistance  Scooting: Minimum assistance  Wheelchair Mobility:     Transfers:  Sit to Stand: Minimum assistance;Assist x2  Stand to Sit: Minimum assistance  Stand Pivot Transfers: Minimal assistance;Assist x2  Gait:     Speed/Estrellita: Slow  Step Length: Left shortened  Stance: Right decreased  Gait Abnormalities: Antalgic;Decreased step clearance  Distance (ft): 5 Feet (ft)  Assistive Device:  (HHA of 2. try RW next visit)  Ambulation - Level of Assistance: Minimal assistance;Assist x2  Interventions: Safety awareness training;Verbal cues; Visual/Demos      Body Structures Involved:  Bones  Joints  Muscles Body Functions Affected:  Genitourinary  Neuromusculoskeletal  Movement Related Activities and Participation Affected:  General Tasks and Demands  Mobility  Self Care   Number of elements that affect the Plan of Care: 4+: HIGH COMPLEXITY   CLINICAL PRESENTATION:   Presentation: Stable and uncomplicated: LOW COMPLEXITY   CLINICAL DECISION MAKING:   [employer] AM-PAC 6 Clicks   Basic Mobility Inpatient Short Form  How much difficulty does the patient currently have. .. Unable A Lot A Little None   1. Turning over in bed (including adjusting bedclothes, sheets and blankets)? [] 1   [] 2   [x] 3   [] 4   2. Sitting down on and standing up from a chair with arms ( e.g., wheelchair, bedside commode, etc.)   [] 1   [] 2   [x] 3   [] 4   3. Moving from lying on back to sitting on the side of the bed? [] 1   [x] 2   [] 3   [] 4   How much help from another person does the patient currently need. .. Total A Lot A Little None   4. Moving to and from a bed to a chair (including a wheelchair)? [] 1   [] 2   [x] 3   [] 4   5. Need to walk in hospital room? [] 1   [x] 2   [] 3   [] 4   6. Climbing 3-5 steps with a railing? [x] 1   [] 2   [] 3   [] 4   © 2007, Trustees of [facility] [employer], under license to [employer]. All rights reserved      Score:  Initial: 14 Most Recent: X (Date: -- )    Interpretation of Tool:  Represents activities that are increasingly more difficult (i.e. Bed mobility, Transfers, Gait). Medical Necessity:     Patient demonstrates   good   rehab potential due to higher previous functional level. Reason for Services/Other Comments:  Patient   continues to require present interventions due to patient's inability to perform functional mobility at a safe CGA level  . Use of outcome tool(s) and clinical judgement create a POC that gives a: Clear prediction of patient's progress: LOW COMPLEXITY            TREATMENT:   (In addition to Assessment/Re-Assessment sessions the following treatments were rendered)   Pre-treatment Symptoms/Complaints:  confused, but willing to participate. Some R hip pain with weight bearing  Pain: Initial:   Pain Intensity 1: 3  Pain Location 1: Hip  Pain Orientation 1: Right  Pain Intervention(s) 1: Ambulation/Increased Activity,Emotional support,Repositioned  Post Session:  1, supine on stretcher in ED     Assessment/Reassessment only, no treatment provided today    Braces/Orthotics/Lines/Etc:   IV  Telemetry lines  O2 Device: None (Room air)  Treatment/Session Assessment:    Response to Treatment:  confused.  Try RW next visit  Interdisciplinary Collaboration: Physical Therapist  Occupational Therapist  Registered Nurse  Physician  After treatment position/precautions:   Supine in bed  Bed/Chair-wheels locked  Bed in low position  Call light within reach  RN notified  Side rails x 2   Compliance with Program/Exercises: Will assess as treatment progresses  Recommendations/Intent for next treatment session: \"Next visit will focus on advancements to more challenging activities and reduction in assistance provided\".   Total Treatment Duration:  PT Patient Time In/Time Out  Time In: 9970  Time Out: Manjit [address], PT

## 2021-12-16 NOTE — PROGRESS NOTES
12/16/21 1730   Dual Skin Pressure Injury Assessment   Dual Skin Pressure Injury Assessment WDL   Second Care Provider (Based on [address] [place] [address]) [place], RN   Skin Integumentary   Skin Integumentary (WDL) X    Pressure  Injury Documentation No Pressure Injury Noted-Pressure Ulcer Prevention Initiated   Skin Color Pale;Ecchymosis (comment)   Skin Condition/Temp Petechiae;Fragile;Dry;Flaky   Skin Integrity Wound (add Wound LDA); Abrasion  (skin tears on RUE)   Turgor Epidermis thin w/ loss of subcut tissue   Hair Growth Sparce   Nails WDL   Varicosities Present   Wound Prevention and Protection Methods   Orientation of Wound Prevention Posterior   Location of Wound Prevention Sacrum/Coccyx   Dressing Present  Yes; Intact, not due to be changed   Dressing Status Intact   Wound Offloading (Prevention Methods) Bed, pressure reduction mattress

## 2021-12-16 NOTE — PROGRESS NOTES
Consulted for recurrent UTI. Chart reviewed. Pt is a 80year old male with a PMH of dementia and recurrent UTIs (monthly since September) presented to the ER from assisted living facility with progressively worsening confusion and found to have T12 fracture. Ceftriaxone started based on previous cultures. Agree with ceftriaxone and follow up cultures and transition to appropriate PO abx for 7 days. We will arrange close outpt follow up appt with NP in the next 2 weeks for further evaluation. We will get this scheduled and we will notify he and his family of date/time. I have reviewed the above note. I agree with the HPI, exam, assessment and plan as outlined by the nurse practitioner. Isaías Patel M.D.     [facility]  [address]  Phone: [phone]  Fax: [phone]

## 2021-12-16 NOTE — ED NOTES
Pt agitated at this time. Pt food tray found in floor. Urine in floor. Room cleaned and pt changed, linens changed. Lights off for behavior help.

## 2021-12-16 NOTE — CONSULTS
[facility]  Consultation Note    Patient ID:  Name: Israel Kidd  MRN: [id number]  AGE: [address] y.o.  : 1931    Date of Consultation:  2021  Referring Physician: Yared Garcia DO    Subjective: 19-year-old male with history of dementia and recurrent UTIs presented to ED with worsening confusion. Is being admitted for UTI, urology consulted. He was also complaining of low back pain with radiation into the legs. X-rays of the lumbar spine revealed T12 fracture, Ortho was consulted regarding T12 fracture. Past Medical History Includes:   Past Medical History:   Diagnosis Date    CAD (coronary artery disease) 2019    Colon cancer ([address] 2012    Hiatal hernia    , History reviewed. No pertinent surgical history. Family History: History reviewed. No pertinent family history.    Social History:   Social History     Tobacco Use    Smoking status: Never Smoker    Smokeless tobacco: Never Used   Substance Use Topics    Alcohol use: Never       ALLERGIES: No Known Allergies     Patient Medications    Current Facility-Administered Medications   Medication Dose Route Frequency    sodium chloride (NS) flush 5-40 mL  5-40 mL IntraVENous Q8H    sodium chloride (NS) flush 5-40 mL  5-40 mL IntraVENous PRN    acetaminophen (TYLENOL) tablet 650 mg  650 mg Oral Q6H PRN    polyethylene glycol (MIRALAX) packet 17 g  17 g Oral DAILY PRN    ondansetron (ZOFRAN ODT) tablet 4 mg  4 mg Oral Q8H PRN    Or    ondansetron (ZOFRAN) injection 4 mg  4 mg IntraVENous Q6H PRN    enoxaparin (LOVENOX) injection 30 mg  30 mg SubCUTAneous DAILY    cefTRIAXone (ROCEPHIN) 1 g in 0.9% sodium chloride (MBP/ADV) 50 mL MBP  1 g IntraVENous Q24H    acetaminophen (TYLENOL) tablet 650 mg  650 mg Oral Q6H PRN    famotidine (PEPCID) tablet 20 mg  20 mg Oral Q12H    ondansetron (ZOFRAN ODT) tablet 4 mg  4 mg Oral Q8H PRN    multivitamin, tx-iron-ca-min (THERA-M w/ IRON) tablet 1 Tablet  1 Tablet Oral DAILY    nitroglycerin (NITROSTAT) tablet 0.4 mg  0.4 mg SubLINGual Q5MIN PRN     Current Outpatient Medications   Medication Sig    atorvastatin (LIPITOR) 80 mg tablet Take 1 Tablet by mouth nightly.  ondansetron (ZOFRAN ODT) 4 mg disintegrating tablet Take 1 Tablet by mouth every eight (8) hours as needed for Nausea.  clopidogreL (PLAVIX) 75 mg tab Take 1 Tab by mouth daily.  nitroglycerin (NITROSTAT) 0.4 mg SL tablet 1 Tab by SubLINGual route every five (5) minutes as needed for Chest Pain. Up to 3 doses.  pantoprazole (PROTONIX) 40 mg tablet Take 1 Tab by mouth Daily (before breakfast).  famotidine (PEPCID) 20 mg tablet Take 1 Tab by mouth daily. VITALS: No data found. , Temp (24hrs), Av.5 °F (36.9 °C), Min:98.5 °F (36.9 °C), Max:98.5 °F (36.9 °C)         X-ray:   LUMBAR SPINE SERIES.    Clinical Indication: Back pain      Findings: A 50% compression fracture deformity is noted at T12. This has  developed since the prior chest x-ray from 2021. Degenerative facet  arthropathy with grade 1 degenerative anterolisthesis is noted at L4-L5. The SI  joints are intact.     IMPRESSION  1. 50% compression fracture deformity at T12.  2. Degenerative grade 1 anterolisthesis at L4-L5 from degenerative facet  arthropathy. I reviewed the x-rays of the lumbar spine. There is a 50% compression fracture deformity at T12. This compression fracture deformity was also noted on the CT of the abdomen and pelvis  view on 10/1/2021. This is not an acute compression fracture. He does have multilevel degenerative changes of the lumbar spine with anterior listhesis L4-5 from facet arthropathy.     Diagnosis   Patient Active Problem List   Diagnosis Code    Debility R53.81    Sepsis ([address] A41.9    Dementia ([address] F03.90    Falls frequently R29.6    Acute metabolic encephalopathy E35.27    UTI (urinary tract infection) N39.0          Assessment and Plan:  BD  Compression fracture T12 is remote. It was at least present in October. It does not appear to have collapsing further from the October imaging. These fractures typically heal after 3 months. There is no recommendation for further treatment for the T12 compression fracture. I suspect his pain may be from the L4-5 spondylolisthesis secondary to significant degenerative changes and facet arthropathy. This would best be managed in an outpatient setting months his UTI has resolved. No injections would be recommended at this time with an ongoing infection.     SATURNINO Fonseca  12/16/2021,  1:10 PM

## 2021-12-16 NOTE — ED NOTES
TRANSFER - OUT REPORT:    Verbal report given to LARA Hooks on [address]  being transferred to [facility]  for routine progression of care       Report consisted of patients Situation, Background, Assessment and   Recommendations(SBAR). Information from the following report(s) SBAR and ED Summary was reviewed with the receiving nurse. Lines:   Peripheral IV 12/15/21 Left Antecubital (Active)   Site Assessment Clean, dry, & intact 12/15/21 1416   Phlebitis Assessment 0 12/15/21 1416   Infiltration Assessment 0 12/15/21 1416   Dressing Status Clean, dry, & intact 12/15/21 1416       Peripheral IV 12/15/21 Right Antecubital (Active)   Site Assessment Clean, dry, & intact 12/15/21 1438   Phlebitis Assessment 0 12/15/21 1438   Infiltration Assessment 0 12/15/21 1438   Dressing Status Clean, dry, & intact 12/15/21 1438   Dressing Type Gauze;Tape;Transparent 12/15/21 1438   Hub Color/Line Status Pink;Flushed 12/15/21 1438   Action Taken Blood drawn 12/15/21 1438        Opportunity for questions and clarification was provided.       Patient transported with:   [place]

## 2021-12-17 ENCOUNTER — APPOINTMENT (OUTPATIENT)
Dept: GENERAL RADIOLOGY | Age: 86
DRG: 871 | End: 2021-12-17
Attending: INTERNAL MEDICINE
Payer: MEDICARE

## 2021-12-17 LAB
ANION GAP SERPL CALC-SCNC: 3 MMOL/L (ref 7–16)
BASOPHILS # BLD: 0 K/UL (ref 0–0.2)
BASOPHILS NFR BLD: 1 % (ref 0–2)
BUN SERPL-MCNC: 24 MG/DL (ref 8–23)
CALCIUM SERPL-MCNC: 9.2 MG/DL (ref 8.3–10.4)
CHLORIDE SERPL-SCNC: 105 MMOL/L (ref 98–107)
CO2 SERPL-SCNC: 29 MMOL/L (ref 21–32)
CREAT SERPL-MCNC: 1.11 MG/DL (ref 0.8–1.5)
DIFFERENTIAL METHOD BLD: ABNORMAL
EOSINOPHIL # BLD: 0.2 K/UL (ref 0–0.8)
EOSINOPHIL NFR BLD: 2 % (ref 0.5–7.8)
ERYTHROCYTE [DISTWIDTH] IN BLOOD BY AUTOMATED COUNT: 17 % (ref 11.9–14.6)
GLUCOSE SERPL-MCNC: 114 MG/DL (ref 65–100)
HCT VFR BLD AUTO: 32.6 % (ref 41.1–50.3)
HGB BLD-MCNC: 10.4 G/DL (ref 13.6–17.2)
IMM GRANULOCYTES # BLD AUTO: 0.2 K/UL (ref 0–0.5)
IMM GRANULOCYTES NFR BLD AUTO: 3 % (ref 0–5)
LYMPHOCYTES # BLD: 1.2 K/UL (ref 0.5–4.6)
LYMPHOCYTES NFR BLD: 14 % (ref 13–44)
MCH RBC QN AUTO: 29.1 PG (ref 26.1–32.9)
MCHC RBC AUTO-ENTMCNC: 31.9 G/DL (ref 31.4–35)
MCV RBC AUTO: 91.3 FL (ref 79.6–97.8)
MONOCYTES # BLD: 0.8 K/UL (ref 0.1–1.3)
MONOCYTES NFR BLD: 9 % (ref 4–12)
NEUTS SEG # BLD: 6.5 K/UL (ref 1.7–8.2)
NEUTS SEG NFR BLD: 73 % (ref 43–78)
NRBC # BLD: 0 K/UL (ref 0–0.2)
PLATELET # BLD AUTO: 244 K/UL (ref 150–450)
PMV BLD AUTO: 10.4 FL (ref 9.4–12.3)
POTASSIUM SERPL-SCNC: 4.1 MMOL/L (ref 3.5–5.1)
RBC # BLD AUTO: 3.57 M/UL (ref 4.23–5.6)
SODIUM SERPL-SCNC: 137 MMOL/L (ref 136–145)
WBC # BLD AUTO: 8.9 K/UL (ref 4.3–11.1)

## 2021-12-17 PROCEDURE — 73590 X-RAY EXAM OF LOWER LEG: CPT

## 2021-12-17 PROCEDURE — 80048 BASIC METABOLIC PNL TOTAL CA: CPT

## 2021-12-17 PROCEDURE — 97530 THERAPEUTIC ACTIVITIES: CPT

## 2021-12-17 PROCEDURE — 74011250636 HC RX REV CODE- 250/636: Performed by: FAMILY MEDICINE

## 2021-12-17 PROCEDURE — 51798 US URINE CAPACITY MEASURE: CPT

## 2021-12-17 PROCEDURE — 36415 COLL VENOUS BLD VENIPUNCTURE: CPT

## 2021-12-17 PROCEDURE — 74011250637 HC RX REV CODE- 250/637: Performed by: NURSE PRACTITIONER

## 2021-12-17 PROCEDURE — 85025 COMPLETE CBC W/AUTO DIFF WBC: CPT

## 2021-12-17 PROCEDURE — 74011000250 HC RX REV CODE- 250: Performed by: FAMILY MEDICINE

## 2021-12-17 PROCEDURE — 65270000029 HC RM PRIVATE

## 2021-12-17 PROCEDURE — 74011000258 HC RX REV CODE- 258: Performed by: NURSE PRACTITIONER

## 2021-12-17 PROCEDURE — 74011250636 HC RX REV CODE- 250/636: Performed by: NURSE PRACTITIONER

## 2021-12-17 RX ADMIN — ENOXAPARIN SODIUM 30 MG: 100 INJECTION SUBCUTANEOUS at 10:03

## 2021-12-17 RX ADMIN — CEFTRIAXONE 1 G: 1 INJECTION, POWDER, FOR SOLUTION INTRAMUSCULAR; INTRAVENOUS at 16:18

## 2021-12-17 RX ADMIN — Medication 10 ML: at 13:40

## 2021-12-17 RX ADMIN — MULTIPLE VITAMINS W/ MINERALS TAB 1 TABLET: TAB at 10:03

## 2021-12-17 RX ADMIN — FAMOTIDINE 20 MG: 20 TABLET ORAL at 21:41

## 2021-12-17 RX ADMIN — Medication 10 ML: at 21:41

## 2021-12-17 RX ADMIN — Medication 10 ML: at 06:00

## 2021-12-17 NOTE — PROGRESS NOTES
Hospitalist Progress Note   Admit Date:  12/15/2021  1:59 PM   Name:  Harley Gaming   Age:  80 y.o. Sex:  male  :  1931   MRN:  [id number]   Room:  [facility]/    Presenting Complaint: Fatigue    Reason(s) for Admission: UTI (urinary tract infection) [N39.0]     Hospital Course & Interval History:   Mr. Roberto Dawn is a 80 y.o. male with medical history of  Recurrent UTIs, dementia, CAD Hiatal hernia who presented to the ER  with a complaint of combativeness with weakness, poor appetite and inability to stand. Symptoms began on Wednesday of last week, per daughter-in-law with patient becoming combative at [facility]. Patient is pleasantly confused and information obtained from daughter-in-law at bedside who states that on Tuesday of this week patient was unable to get OOB or stand. She reports history of multiple falls in July and August but is unaware of any falls at [facility]. Patient noted to be treated for UTI in Oct and November with UCx positive for [place]. Subjective (21):  Pt remains confused. Talking about selling cars. Pt is pleasant and cooperative. Assessment & Plan:     Sepsis secondary to UTI (urinary tract infection) (12/15/2021)  -Patient has had previous UCx positive for S[place] susceptible to Rocephin. Will continue Rocephin  -With likelihood of BPH, bladder scan q shift ordered  -Urology consulted   Urology to follow up outpatient.   Follow cx and plan for 7 day abx treatment per Urology  Lactic acid cleared   Urine culture contaminated - will treat with 7 days total     Hx of multiple falls  -Per family patient denied dizziness, LOC stating he does not know why he falls   -PT/OT consulted  -Orthostatics prn      T12 fx  -Noted on imaging today; not seen on XR 21   -Ortho spine consulted for recs  -Prn analgesia    Ortho spine consulted, no intervention recommended.      Dementia  -Supportive care  -Redirect prn      Hiatal Hernia  -Noted     Acute encephalopathy  12/16 underlying dementia  12/17 encephalopathy work up TSH, B12, folate, ammonia - all normal    YAMILEX  12/16 IVF  12/17 Resolved - stop IVFs        Dispo/Discharge Planning:     Dispo pending      Diet: ADULT DIET Regular; No Salt Added (3-4 gm)  VTE ppx: Lovenox SQ   Code status: Full Code    Hospital Problems as of 12/17/2021 Date Reviewed: 11/19/2021          Codes Class Noted - Resolved POA    * (Principal) Sepsis ([facility] ICD-10-CM: A41.9  ICD-9-CM: 038.9, 995.91  12/2/2019 - Present Yes        Debility (Chronic) ICD-10-CM: R53.81  ICD-9-CM: 799.3  12/2/2019 - Present Yes        UTI (urinary tract infection) ICD-10-CM: N39.0  ICD-9-CM: 599.0  12/15/2021 - Present Yes        Acute metabolic encephalopathy [facility]ANIE: G93.41  ICD-9-CM: 348.31  11/17/2021 - Present Yes        Dementia ([facility] (Chronic) ICD-10-CM: F03.90  ICD-9-CM: 294.20  12/23/2019 - Present Yes        RESOLVED: Lactic acidosis ICD-10-CM: E87.2  ICD-9-CM: 276.2  12/15/2021 - 12/15/2021 Yes              Objective:     Patient Vitals for the past 24 hrs:   Temp Pulse Resp BP SpO2   12/17/21 1118 98.2 °F (36.8 °C) 100 18 123/64 96 %   12/17/21 0720 97.6 °F (36.4 °C) 80 18 124/82 93 %   12/17/21 0331 98.6 °F (37 °C) 67 20 124/72 94 %   12/16/21 2243 98.4 °F (36.9 °C) 94 18 119/76 92 %   12/16/21 1917 -- (!) 110 -- 110/68 93 %   12/16/21 1758 98.4 °F (36.9 °C) 66 18 (!) 111/58 97 %   12/16/21 1435 -- 77 17 (!) 109/55 97 %     Oxygen Therapy  O2 Sat (%): 96 % (12/17/21 1118)  Pulse via Oximetry: 69 beats per minute (12/16/21 0356)  O2 Device: None (Room air) (12/17/21 2663)    Estimated body mass index is 19.31 kg/m² as calculated from the following:    Height as of this encounter: 5' 8\" (1.727 m). Weight as of this encounter: 57.6 kg (127 lb). No intake or output data in the 24 hours ending 12/17/21 1425      Physical Exam:     Blood pressure 123/64, pulse 100, temperature 98.2 °F (36.8 °C), resp.  rate 18, height 5' 8\" (1.727 m), weight 57.6 kg (127 lb), SpO2 96 %. General:          No overt distress, confused  Head:               Normocephalic, atraumatic  Eyes:               Sclerae appear normal.  Pupils equally round. ENT:                Nares appear normal, no drainage. Moist oral mucosa  Neck:               No restricted ROM. Trachea midline   CV:                  RRR. No m/r/g. No jugular venous distension. Lungs:             CTAB. No wheezing, rhonchi, or rales. Respirations even, unlabored  Abdomen: Bowel sounds present. Soft, nontender, nondistended. Extremities:     No cyanosis or clubbing. No edema  Skin:                No rashes and normal coloration. Warm and dry. Bruising BLE    Neuro:             CN II-XII grossly intact. A&Ox 1-2   Psych:             Confused, pleasant.         I have reviewed ordered lab tests and independently visualized imaging below:    Recent Labs:  Recent Results (from the past 48 hour(s))   EKG, 12 LEAD, INITIAL    Collection Time: 12/15/21  2:31 PM   Result Value Ref Range    Ventricular Rate 101 BPM    Atrial Rate 101 BPM    P-R Interval 134 ms    QRS Duration 78 ms    Q-T Interval 330 ms    QTC Calculation (Bezet) 427 ms    Calculated P Axis 45 degrees    Calculated R Axis 25 degrees    Calculated T Axis 39 degrees    Diagnosis       Sinus tachycardia with Premature atrial complexes  Otherwise normal ECG  When compared with ECG of 05-JUL-2021 14:37,  Premature atrial complexes are now Present  Confirmed by Jesenia Javier ([id number]) on 12/15/2021 2:45:53 PM     URINALYSIS W/ RFLX MICROSCOPIC    Collection Time: 12/15/21  2:39 PM   Result Value Ref Range    Color RED      Appearance CLOUDY      Specific gravity 1.018 1.001 - 1.023      pH (UA) 6.5 5.0 - 9.0      Protein 30 (A) NEG mg/dL    Glucose Negative mg/dL    Ketone TRACE (A) NEG mg/dL    Bilirubin Negative NEG      Blood LARGE (A) NEG      Urobilinogen 1.0 0.2 - 1.0 EU/dL    Nitrites Negative NEG      Leukocyte Esterase SMALL (A) NEG      WBC 5-10 0 /hpf    RBC >100 0 /hpf    Epithelial cells 0-3 0 /hpf    Bacteria 0 0 /hpf   CULTURE, URINE    Collection Time: 12/15/21  2:39 PM    Specimen: Urine    CATH URINE   Result Value Ref Range    Special Requests: NO SPECIAL REQUESTS      Culture result: <10,000 COLONIES/mL MIXED SKIN ROSE ISOLATED     CULTURE, BLOOD    Collection Time: 12/15/21  2:39 PM    Specimen: Blood   Result Value Ref Range    Special Requests: RIGHT  Antecubital        Culture result: NO GROWTH 2 DAYS     LACTIC ACID    Collection Time: 12/15/21  4:13 PM   Result Value Ref Range    Lactic acid 1.0 0.4 - 2.0 MMOL/L   CBC WITH AUTOMATED DIFF    Collection Time: 12/16/21  6:45 PM   Result Value Ref Range    WBC 11.4 (H) 4.3 - 11.1 K/uL    RBC 4.00 (L) 4.23 - 5.6 M/uL    HGB 11.5 (L) 13.6 - 17.2 g/dL    HCT 36.8 (L) 41.1 - 50.3 %    MCV 92.0 79.6 - 97.8 FL    MCH 28.8 26.1 - 32.9 PG    MCHC 31.3 (L) 31.4 - 35.0 g/dL    RDW 17.2 (H) 11.9 - 14.6 %    PLATELET 192 238 - 336 K/uL    MPV 9.9 9.4 - 12.3 FL    ABSOLUTE NRBC 0.00 0.0 - 0.2 K/uL    DF AUTOMATED      NEUTROPHILS 78 43 - 78 %    LYMPHOCYTES 12 (L) 13 - 44 %    MONOCYTES 6 4.0 - 12.0 %    EOSINOPHILS 1 0.5 - 7.8 %    BASOPHILS 0 0.0 - 2.0 %    IMMATURE GRANULOCYTES 2 0.0 - 5.0 %    ABS. NEUTROPHILS 9.0 (H) 1.7 - 8.2 K/UL    ABS. LYMPHOCYTES 1.3 0.5 - 4.6 K/UL    ABS. MONOCYTES 0.7 0.1 - 1.3 K/UL    ABS. EOSINOPHILS 0.1 0.0 - 0.8 K/UL    ABS. BASOPHILS 0.1 0.0 - 0.2 K/UL    ABS. IMM.  GRANS. 0.3 0.0 - 0.5 K/UL   METABOLIC PANEL, COMPREHENSIVE    Collection Time: 12/16/21  6:45 PM   Result Value Ref Range    Sodium 135 (L) 136 - 145 mmol/L    Potassium 4.1 3.5 - 5.1 mmol/L    Chloride 102 98 - 107 mmol/L    CO2 29 21 - 32 mmol/L    Anion gap 4 (L) 7 - 16 mmol/L    Glucose 110 (H) 65 - 100 mg/dL    BUN 21 8 - 23 MG/DL    Creatinine 1.23 0.8 - 1.5 MG/DL    GFR est AA >60 >60 ml/min/1.73m2    GFR est non-AA 59 (L) >60 ml/min/1.73m2    Calcium 9.7 8.3 - 10.4 MG/DL    Bilirubin, total 0.9 0.2 - 1.1 MG/DL    ALT (SGPT) 14 12 - 65 U/L    AST (SGOT) 11 (L) 15 - 37 U/L    Alk. phosphatase 79 50 - 136 U/L    Protein, total 7.7 6.3 - 8.2 g/dL    Albumin 3.1 (L) 3.2 - 4.6 g/dL    Globulin 4.6 (H) 2.3 - 3.5 g/dL    A-G Ratio 0.7 (L) 1.2 - 3.5     TSH 3RD GENERATION    Collection Time: 12/16/21  6:45 PM   Result Value Ref Range    TSH 5.360 uIU/mL   FOLATE    Collection Time: 12/16/21  6:45 PM   Result Value Ref Range    Folate 18.5 (H) 3.1 - 17.5 ng/mL   VITAMIN B12    Collection Time: 12/16/21  6:45 PM   Result Value Ref Range    Vitamin B12 570 193 - 986 pg/mL   AMMONIA    Collection Time: 12/16/21  6:45 PM   Result Value Ref Range    Ammonia <10 (L) 24.9 - 68 UMOL/L   CBC WITH AUTOMATED DIFF    Collection Time: 12/17/21  6:10 AM   Result Value Ref Range    WBC 8.9 4.3 - 11.1 K/uL    RBC 3.57 (L) 4.23 - 5.6 M/uL    HGB 10.4 (L) 13.6 - 17.2 g/dL    HCT 32.6 (L) 41.1 - 50.3 %    MCV 91.3 79.6 - 97.8 FL    MCH 29.1 26.1 - 32.9 PG    MCHC 31.9 31.4 - 35.0 g/dL    RDW 17.0 (H) 11.9 - 14.6 %    PLATELET 478 931 - 410 K/uL    MPV 10.4 9.4 - 12.3 FL    ABSOLUTE NRBC 0.00 0.0 - 0.2 K/uL    DF AUTOMATED      NEUTROPHILS 73 43 - 78 %    LYMPHOCYTES 14 13 - 44 %    MONOCYTES 9 4.0 - 12.0 %    EOSINOPHILS 2 0.5 - 7.8 %    BASOPHILS 1 0.0 - 2.0 %    IMMATURE GRANULOCYTES 3 0.0 - 5.0 %    ABS. NEUTROPHILS 6.5 1.7 - 8.2 K/UL    ABS. LYMPHOCYTES 1.2 0.5 - 4.6 K/UL    ABS. MONOCYTES 0.8 0.1 - 1.3 K/UL    ABS. EOSINOPHILS 0.2 0.0 - 0.8 K/UL    ABS. BASOPHILS 0.0 0.0 - 0.2 K/UL    ABS. IMM.  GRANS. 0.2 0.0 - 0.5 K/UL   METABOLIC PANEL, BASIC    Collection Time: 12/17/21  6:10 AM   Result Value Ref Range    Sodium 137 136 - 145 mmol/L    Potassium 4.1 3.5 - 5.1 mmol/L    Chloride 105 98 - 107 mmol/L    CO2 29 21 - 32 mmol/L    Anion gap 3 (L) 7 - 16 mmol/L    Glucose 114 (H) 65 - 100 mg/dL    BUN 24 (H) 8 - 23 MG/DL    Creatinine 1.11 0.8 - 1.5 MG/DL    GFR est AA >60 >60 ml/min/1.73m2    GFR est non-AA >60 >60 ml/min/1.73m2    Calcium 9.2 8.3 - 10.4 MG/DL       All Micro Results     Procedure Component Value Units Date/Time    CULTURE, URINE [[id number]] Collected: 12/15/21 1439    Order Status: Completed Specimen: Urine Updated: 12/17/21 0918     Special Requests: NO SPECIAL REQUESTS        Culture result:       <10,000 COLONIES/mL MIXED SKIN ROSE ISOLATED          CULTURE, BLOOD [[id number]] Collected: 12/15/21 1439    Order Status: Completed Specimen: Blood Updated: 12/17/21 0828     Special Requests: --        RIGHT  Antecubital       Culture result: NO GROWTH 2 DAYS       CULTURE, BLOOD [[id number]] Collected: 12/15/21 1415    Order Status: Completed Specimen: Blood Updated: 12/17/21 0828     Special Requests: --        LEFT  Antecubital       Culture result: NO GROWTH 2 DAYS             Other Studies:  No results found.     Current Meds:  Current Facility-Administered Medications   Medication Dose Route Frequency    sodium chloride (NS) flush 5-40 mL  5-40 mL IntraVENous Q8H    sodium chloride (NS) flush 5-40 mL  5-40 mL IntraVENous PRN    acetaminophen (TYLENOL) tablet 650 mg  650 mg Oral Q6H PRN    polyethylene glycol (MIRALAX) packet 17 g  17 g Oral DAILY PRN    ondansetron (ZOFRAN ODT) tablet 4 mg  4 mg Oral Q8H PRN    Or    ondansetron (ZOFRAN) injection 4 mg  4 mg IntraVENous Q6H PRN    enoxaparin (LOVENOX) injection 30 mg  30 mg SubCUTAneous DAILY    cefTRIAXone (ROCEPHIN) 1 g in 0.9% sodium chloride (MBP/ADV) 50 mL MBP  1 g IntraVENous Q24H    acetaminophen (TYLENOL) tablet 650 mg  650 mg Oral Q6H PRN    famotidine (PEPCID) tablet 20 mg  20 mg Oral Q12H    ondansetron (ZOFRAN ODT) tablet 4 mg  4 mg Oral Q8H PRN    multivitamin, tx-iron-ca-min (THERA-M w/ IRON) tablet 1 Tablet  1 Tablet Oral DAILY    nitroglycerin (NITROSTAT) tablet 0.4 mg  0.4 mg SubLINGual Q5MIN PRN       Signed:  Farooq Zepeda DO

## 2021-12-17 NOTE — PROGRESS NOTES
Problem: Mobility Impaired (Adult and Pediatric)  Goal: *Acute Goals and Plan of Care (Insert Text)  Note: STG:  (1.)Mr. Jd Robb will move from supine to sit and sit to supine  with CONTACT GUARD ASSIST within 7 treatment day(s). (2.)Mr. Jd Rbob will transfer from bed to chair and chair to bed with CONTACT GUARD ASSIST using the least restrictive device within 7 treatment day(s). (3.)Mr. Jd Robb will ambulate with CONTACT GUARD ASSIST for 150 feet with the least restrictive device within 7 treatment day(s). (4.)Pt. will increase B LE strength 1/2 grade within 7 days      ________________________________________________________________________________________________      PHYSICAL THERAPY: Daily Note and AM 12/17/2021  INPATIENT: PT Visit Days : 2  Payor: Jewel Richter / Plan: [id number] PPO/PFFS / Product Type: Managed Care Medicare /       NAME/AGE/GENDER: Ursula Garner is a 80 y.o. male   PRIMARY DIAGNOSIS: UTI (urinary tract infection) [N39.0] Sepsis ([facility] Sepsis ([facility]       ICD-10: Treatment Diagnosis:    · Generalized Muscle Weakness (M62.81)  · Other lack of cordination (R27.8)  · Other abnormalities of gait and mobility (R26.89)  · History of falling (Z91.81)   Precaution/Allergies:  Patient has no known allergies. ASSESSMENT:     Mr. Jd Robb presents with a UTI and newly diagnosed T12 compression fx. He is confused and is confused and complaining of R hip pain. He would benefit from further PT while here to address these deficits: decreased LE strength and coordination, decreased endurance, standing balance and functional mobility, and increased R hip pain. He resides at [facility]. At this point, I feel like STR is indicated for him. Still with lots of pain in R LE(knee and lateral leg and swelling noted). Performs LE exs. AA on R due to pain. Then sat on edge of bed. Only able to flex R knee to 80 degrees.  Tried to stand and walk, but can hardly bear weight on r le. Still very confused today. Thinks someone beat him up     This section established at most recent assessment   PROBLEM LIST (Impairments causing functional limitations):  1. Decreased Strength  2. Decreased ADL/Functional Activities  3. Decreased Transfer Abilities  4. Decreased Ambulation Ability/Technique  5. Decreased Balance  6. Increased Pain  7. Decreased Activity Tolerance  8. Increased Fatigue  9. Decreased Flexibility/Joint Mobility  10. Decreased Cognition   INTERVENTIONS PLANNED: (Benefits and precautions of physical therapy have been discussed with the patient.)  1. Balance Exercise  2. Bed Mobility  3. Gait Training  4. Range of Motion (ROM)  5. Therapeutic Activites  6. Therapeutic Exercise/Strengthening  7. Transfer Training     TREATMENT PLAN: Frequency/Duration: daily for duration of hospital stay  Rehabilitation Potential For Stated Goals: Good     REHAB RECOMMENDATIONS (at time of discharge pending progress):    Placement: It is my opinion, based on this patient's performance to date, that Mr. Charlene Orourke may benefit from intensive therapy at a [address] [place] [address] after discharge due to the functional deficits listed above that are likely to improve with skilled rehabilitation and concerns that he/she may be unsafe to be unsupervised at home due to the need for 24 hr nursing, and intense PT services on a daily basis. Equipment:    May need RW if he doesn't have one already              HISTORY:   History of Present Injury/Illness (Reason for Referral): Admitted with UTI, T12 compression fx  Past Medical History/Comorbidities:   Mr. Charlene Orourke  has a past medical history of CAD (coronary artery disease) (7/18/2019), Colon cancer ([facility] (01/2012), and Hiatal hernia. Mr. Charlene Orourke  has no past surgical history on file.   Social History/Living Environment:   Home Environment: [address] [place] [address] Road Name: Murali Carreon  # Steps to Enter: 0  One/Two Story Residence: One story  Living Alone: No  Support Systems: Child(margi),Caregiver/Home Care Staff  Patient Expects to be Discharged to[de-identified] Assisted living  Current DME Used/Available at Home: None  Tub or Shower Type: Shower  Prior Level of Function/Work/Activity:  Resides in [place]. Unable to let me kn ow his prior functional level  Dominant Side:         RIGHT   Number of Personal Factors/Comorbidities that affect the Plan of Care: 3+: HIGH COMPLEXITY   EXAMINATION:   Most Recent Physical Functioning:   Gross Assessment:  AROM: Generally decreased, functional (B LEs)  Strength: Generally decreased, functional (R hip 2, knee/ankle 3+; L LE 3+)  Coordination: Generally decreased, functional (B LEs)  Sensation: Intact (B LEs)               Posture:  Posture Assessment: Forward head,Rounded shoulders  Balance:  Sitting: Intact  Standing: Pull to stand; With support Bed Mobility:  Supine to Sit: Moderate assistance  Sit to Supine: Moderate assistance  Scooting: Minimum assistance  Wheelchair Mobility:     Transfers:  Sit to Stand: Minimum assistance  Stand to Sit: Minimum assistance  Duration: 25 Minutes  Gait:     Speed/Estrellita: Slow  Step Length: Left shortened  Stance: Right decreased  Gait Abnormalities: Antalgic  Distance (ft): 2 Feet (ft)  Assistive Device: Walker, rolling  Ambulation - Level of Assistance: Moderate assistance  Interventions: Safety awareness training;Verbal cues; Visual/Demos      Body Structures Involved:  1. Bones  2. Joints  3. Muscles Body Functions Affected:  1. Genitourinary  2. Neuromusculoskeletal  3. Movement Related Activities and Participation Affected:  1. General Tasks and Demands  2. Mobility  3.  Self Care   Number of elements that affect the Plan of Care: 4+: HIGH COMPLEXITY   CLINICAL PRESENTATION:   Presentation: Stable and uncomplicated: LOW COMPLEXITY   CLINICAL DECISION MAKIN [facility] [address] [id number] AM-PAC 6 Clicks   Basic Mobility Inpatient Short Form  How much difficulty does the patient currently have. .. Unable A Lot A Little None   1. Turning over in bed (including adjusting bedclothes, sheets and blankets)? [] 1   [] 2   [x] 3   [] 4   2. Sitting down on and standing up from a chair with arms ( e.g., wheelchair, bedside commode, etc.)   [] 1   [] 2   [x] 3   [] 4   3. Moving from lying on back to sitting on the side of the bed? [] 1   [x] 2   [] 3   [] 4   How much help from another person does the patient currently need. .. Total A Lot A Little None   4. Moving to and from a bed to a chair (including a wheelchair)? [] 1   [] 2   [x] 3   [] 4   5. Need to walk in hospital room? [] 1   [x] 2   [] 3   [] 4   6. Climbing 3-5 steps with a railing? [x] 1   [] 2   [] 3   [] 4   © 2007, Trustees of [address] [place] [address] [address] [address], under license to [employer]. All rights reserved      Score:  Initial: 14 Most Recent: X (Date: -- )    Interpretation of Tool:  Represents activities that are increasingly more difficult (i.e. Bed mobility, Transfers, Gait). Medical Necessity:     · Patient demonstrates   · good  ·  rehab potential due to higher previous functional level. Reason for Services/Other Comments:  · Patient   · continues to require present interventions due to patient's inability to perform functional mobility at a safe CGA level  · . Use of outcome tool(s) and clinical judgement create a POC that gives a: Clear prediction of patient's progress: LOW COMPLEXITY            TREATMENT:   (In addition to Assessment/Re-Assessment sessions the following treatments were rendered)   Pre-treatment Symptoms/Complaints:  Still confused.  Increase in pain in R LE especially knee  Pain: Initial:   Pain Intensity 1: 5  Pain Location 1: Hip,Knee  Pain Orientation 1: Lateral,Right  Pain Intervention(s) 1: Ambulation/Increased Activity,Elevation,Emotional support,Repositioned  Post Session:  3, supine in bed     Therapeutic Activity: (  25 Minutes ):  Therapeutic activities including Bed transfers, Chair transfers, Ambulation on level ground and performs LE exs  to improve mobility, strength, balance and coordination. Required moderate Safety awareness training;Verbal cues; Visual/Demos to insure safe technique. Date:  12/27 Date:   Date:     Activity/Exercise Parameters Parameters Parameters   SUPINE: ankle pumps 10          Hip AB/AD 10          Heel slides 10          SLR 10     SITTING; LAQ 10                 L LE AROm, R LE AAROM      Braces/Orthotics/Lines/Etc:   · IV  · O2 Device: None (Room air)  Treatment/Session Assessment:    · Response to Treatment:  Still confused  · Interdisciplinary Collaboration:   o Physical Therapist  o Registered Nurse  o Physician  · After treatment position/precautions:   o Supine in bed  o Bed alarm/tab alert on  o Bed/Chair-wheels locked  o Bed in low position  o Call light within reach  o RN notified  o Side rails x 3   · Compliance with Program/Exercises: Will assess as treatment progresses  · Recommendations/Intent for next treatment session: \"Next visit will focus on advancements to more challenging activities and reduction in assistance provided\".   Total Treatment Duration:  PT Patient Time In/Time Out  Time In: 0945  Time Out: Giovanni [address] [place] [address]

## 2021-12-17 NOTE — PROGRESS NOTES
In the see patient. Confused. Dr. Cami Thomas in as well. This CM called and spoke with pt's daughter-in-law, Charles Metz @ [phone]. States she just moved here from [place]. Advised PT is recommending STR for pt. She states that is a great idea. She was here earlier but will be back later this afternoon. Advised I would leave a patient choice list of STR facilities in pt's room for her to look at later. She will look over. [place] Espitia will be here tomorrow and she will talk with her then. Verb understanding.

## 2021-12-18 LAB
BACTERIA SPEC CULT: NORMAL
SERVICE CMNT-IMP: NORMAL

## 2021-12-18 PROCEDURE — 65270000029 HC RM PRIVATE

## 2021-12-18 PROCEDURE — 86580 TB INTRADERMAL TEST: CPT | Performed by: INTERNAL MEDICINE

## 2021-12-18 PROCEDURE — 74011250637 HC RX REV CODE- 250/637: Performed by: NURSE PRACTITIONER

## 2021-12-18 PROCEDURE — 74011250636 HC RX REV CODE- 250/636: Performed by: NURSE PRACTITIONER

## 2021-12-18 PROCEDURE — 74011000258 HC RX REV CODE- 258: Performed by: NURSE PRACTITIONER

## 2021-12-18 PROCEDURE — 74011000250 HC RX REV CODE- 250: Performed by: INTERNAL MEDICINE

## 2021-12-18 RX ADMIN — Medication 10 ML: at 14:03

## 2021-12-18 RX ADMIN — FAMOTIDINE 20 MG: 20 TABLET ORAL at 08:20

## 2021-12-18 RX ADMIN — ENOXAPARIN SODIUM 30 MG: 100 INJECTION SUBCUTANEOUS at 08:20

## 2021-12-18 RX ADMIN — Medication 10 ML: at 05:43

## 2021-12-18 RX ADMIN — TUBERCULIN PURIFIED PROTEIN DERIVATIVE 5 UNITS: 5 INJECTION, SOLUTION INTRADERMAL at 12:29

## 2021-12-18 RX ADMIN — MULTIPLE VITAMINS W/ MINERALS TAB 1 TABLET: TAB at 08:20

## 2021-12-18 RX ADMIN — FAMOTIDINE 20 MG: 20 TABLET ORAL at 21:28

## 2021-12-18 RX ADMIN — Medication 10 ML: at 21:28

## 2021-12-18 RX ADMIN — CEFTRIAXONE 1 G: 1 INJECTION, POWDER, FOR SOLUTION INTRAMUSCULAR; INTRAVENOUS at 15:20

## 2021-12-18 NOTE — PROGRESS NOTES
END OF SHIFT NOTE:    Intake/Output  12/18 0701 - 12/18 1900  In: -   Out: 300 [Urine:300]   Voiding: YES  Catheter: YES  Drain:              Stool:  1 occurrences. Stool Assessment  Stool Color: Brown (12/18/21 1800)  Stool Appearance: Formed (12/18/21 1800)  Stool Amount: Small (12/18/21 1800)  Stool Source/Status: Rectum (12/18/21 1800)    Emesis:  0 occurrences. VITAL SIGNS  Patient Vitals for the past 12 hrs:   Temp Pulse Resp BP SpO2   12/18/21 1600 98.5 °F (36.9 °C) 88 18 128/66 93 %   12/18/21 1000 98.2 °F (36.8 °C) 81 17 117/69 96 %   12/18/21 0753 -- 89 18 122/67 95 %       Pain Assessment  Pain 1  Pain Scale 1: Visual (12/18/21 0245)  Pain Intensity 1: 0 (12/18/21 0245)  Patient Stated Pain Goal: 0 (12/18/21 0245)  Pain Reassessment 1: Patient resting w/respiratory rate greater than 10 (12/17/21 1400)  Pain Location 1: Hip;Knee (12/17/21 0900)  Pain Orientation 1: Lateral;Right (12/17/21 0900)  Pain Description 1: Sharp (12/17/21 0900)  Pain Intervention(s) 1: Ambulation/Increased Activity; Elevation; Emotional support;Repositioned (12/17/21 0900)    Ambulating  No    Additional Information: PPD given    Shift report given to oncoming nurse at the bedside.     Cookie Sandhoff, RN

## 2021-12-18 NOTE — PROGRESS NOTES
Comprehensive Nutrition Assessment    Type and Reason for Visit: Initial,Positive nutrition screen  Best Practice Alert for Malnutrition Screening Tool: Recently Lost Weight Without Trying: Yes, If Yes, How Much Weight Loss: 2-13 lbs, Eating Poorly Due to Decreased Appetite: Yes    Nutrition Recommendations/Plan:   Meals and Snacks:  Continue current diet. Offer assistance/encouragement prn  Nutrition Supplement Therapy:   Medical food supplement therapy:  Continue Ensure Enlive three times per day (this provides 350 kcal and 20 grams protein per bottle)     Malnutrition Assessment:  Malnutrition Status: Mild malnutrition  Context: Social/environmental circumstances  Findings of clinical characteristics of malnutrition:   Energy Intake:  Unable to assess  Weight Loss:  Mild weight loass (specify amount and time period) (downward weight trend since 2019)     Body Fat Loss:  No significant body fat loss,     Muscle Mass Loss:  1 - Mild muscle mass loss, Clavicles (pectoralis &deltoids),Thigh (quadriceps)  Fluid Accumulation:   ,     Strength:  Not performed     Nutrition Assessment:   Nutrition History: Patient live in assisted living. Unsure of intake PTA. Weight history shows downward trend. Nutrition Background: Admitted with sepsis and UTI. Hx falls, dx dementia, T12 fx. Daily Update:  Saw patient in room- he is confused and unable to provide meaningful history. Conducted NFPE. Minimal intake of lunch tray observed. Taking MVI. No appetite stimulant.      Nutrition Related Findings:   NFPE revealed mild muscle wasting      Current Nutrition Therapies:  ADULT DIET Regular; No Salt Added (3-4 gm)  ADULT ORAL NUTRITION SUPPLEMENT Breakfast, Lunch, Dinner; Standard High Calorie/High Protein    Current Intake:   Average Meal Intake: 1-25% Average Supplement Intake: 1-25%      Anthropometric Measures:  Height: 5' 8\" (172.7 cm)  Current Body Wt: 57.6 kg (127 lb) (12/15), Weight source: Not specified  BMI: 19.3, Underweight (BMI less than 22) age over 72  Admission Body Weight: 127 lb  Ideal Body Weight (lbs) (Calculated): 154 lbs (70 kg), 82.5 %  Usual Body Wt: 58.1 kg (128 lb 1.4 oz) (6 months ago 7/31/21), Percent weight change: -0.8          Edema: No data recorded   Estimated Daily Nutrient Needs:  Energy (kcal/day): 9651-4126 (Kcal/kg (25-30), Weight Used: Current (57.6 kg 12/15))  Protein (g/day): 58-69 Weight Used: (Current (1-1.2))  Fluid (ml/day):   (1 ml/kcal)    Nutrition Diagnosis:   · Inadequate oral intake related to  (sepsis, confusion, dementia) as evidenced by intake 0-25%,weight loss,mild muscle loss    Nutrition Interventions:   Food and/or Nutrient Delivery: Continue current diet,Continue oral nutrition supplement     Coordination of Nutrition Care: Continue to monitor while inpatient (Assist with meals prn.)    Goals: Active Goal: Meet at least 75% of estimated needs by next RD assessment.     Nutrition Monitoring and Evaluation:      Food/Nutrient Intake Outcomes: Food and nutrient intake,Supplement intake       Discharge Planning:    Continue oral nutrition supplement    Rancho Lugo RD, LD  Contact: [phone]

## 2021-12-18 NOTE — PROGRESS NOTES
Hospitalist Progress Note   Admit Date:  12/15/2021  1:59 PM   Name:  Remberto Toussaint   Age:  [address] y[address] Sex:  male  :  1931   MRN:  [id number]   Room:  [facility]/    Presenting Complaint: Fatigue    Reason(s) for Admission: UTI (urinary tract infection) [N39.0]     Hospital Course & Interval History:   Mr. Ginna Ocampo is a [address] male with medical history of  Recurrent UTIs, dementia, CAD Hiatal hernia who presented to the ER  with a complaint of combativeness with weakness, poor appetite and inability to stand. Symptoms began on Wednesday of last week, per daughter-in-law with patient becoming combative at [facility]. Patient is pleasantly confused and information obtained from daughter-in-law at bedside who states that on Tuesday of this week patient was unable to get OOB or stand. She reports history of multiple falls in July and August but is unaware of any falls at [facility]. Patient noted to be treated for UTI in Oct and November with UCx positive for [place]. Subjective (21):  Pt remains confused. Knows name. Defers other orientation questions. Pt is pleasant and cooperative. Assessment & Plan:     Sepsis secondary to UTI (urinary tract infection) (12/15/2021)  -Patient has had previous UCx positive for S[place] susceptible to Rocephin. Will continue Rocephin  -With likelihood of BPH, bladder scan q shift ordered  -Urology consulted   Urology to follow up outpatient.   Follow cx and plan for 7 day abx treatment per Urology  Lactic acid cleared   Urine culture contaminated - will treat with 7 days total   Continue Ceftriaxone while inpatient     Hx of multiple falls  -Per family patient denied dizziness, LOC stating he does not know why he falls   -PT/OT consulted  -Orthostatics prn      T12 fx  -Noted on imaging today; not seen on XR 21   -Ortho spine consulted for recs  -Prn analgesia    Ortho spine consulted, no intervention recommended.      Dementia  -Supportive care  -Redirect prn      Hiatal Hernia  -Noted     Acute encephalopathy  12/16 underlying dementia  12/17 encephalopathy work up TSH, B12, folate, ammonia - all normal  12/18 Still confused, but mildly improved. Per nursing this is how he was previous admission. YAMILEX  12/16 IVF  12/17 Resolved - stop IVFs        Dispo/Discharge Planning:     Dispo pending      Diet: ADULT DIET Regular; No Salt Added (3-4 gm)  VTE ppx: Lovenox SQ   Code status: Full Code    Hospital Problems as of 12/18/2021 Date Reviewed: 11/19/2021          Codes Class Noted - Resolved POA    * (Principal) Sepsis ([facility] [address] ICD-10-CM: A41.9  ICD-9-CM: 038.9, 995.91  12/2/2019 - Present Yes        Debility (Chronic) ICD-10-CM: R53.81  ICD-9-CM: 799.3  12/2/2019 - Present Yes        UTI (urinary tract infection) ICD-10-CM: N39.0  ICD-9-CM: 599.0  12/15/2021 - Present Yes        Acute metabolic encephalopathy BVB-10-GA: G93.41  ICD-9-CM: 348.31  11/17/2021 - Present Yes        Dementia ([facility] [address] (Chronic) ICD-10-CM: F03.90  ICD-9-CM: 294.20  12/23/2019 - Present Yes        RESOLVED: Lactic acidosis ICD-10-CM: E87.2  ICD-9-CM: 276.2  12/15/2021 - 12/15/2021 Yes              Objective:     Patient Vitals for the past 24 hrs:   Temp Pulse Resp BP SpO2   12/18/21 0753 -- 89 18 122/67 95 %   12/18/21 0427 98.5 °F (36.9 °C) 86 18 123/76 94 %   12/17/21 2003 98.3 °F (36.8 °C) 81 17 122/68 93 %   12/17/21 1502 98.3 °F (36.8 °C) 90 20 119/72 95 %   12/17/21 1118 98.2 °F (36.8 °C) 100 18 123/64 96 %     Oxygen Therapy  O2 Sat (%): 95 % (12/18/21 0753)  Pulse via Oximetry: 69 beats per minute (12/16/21 8707)  O2 Device: None (Room air) (12/18/21 4061)    Estimated body mass index is 19.31 kg/m² as calculated from the following:    Height as of this encounter: 5' 8\" (1.727 m). Weight as of this encounter: 57.6 kg (127 lb).     Intake/Output Summary (Last 24 hours) at 12/18/2021 1052  Last data filed at 12/18/2021 0614  Gross per 24 hour   Intake 240 ml   Output 1050 ml   Net -810 ml         Physical Exam:     Blood pressure 122/67, pulse 89, temperature 98.5 °F (36.9 °C), resp. rate 18, height 5' 8\" (1.727 m), weight 57.6 kg (127 lb), SpO2 95 %. General:          No overt distress, confused  Head:               Normocephalic, atraumatic  Eyes:               Sclerae appear normal.  Pupils equally round. ENT:                Nares appear normal, no drainage. Moist oral mucosa  Neck:               No restricted ROM. Trachea midline   CV:                  RRR. No m/r/g. No jugular venous distension. Lungs:             CTAB. No wheezing, rhonchi, or rales. Respirations even, unlabored  Abdomen: Bowel sounds present. Soft, nontender, nondistended. Extremities:     No cyanosis or clubbing. No edema  Skin:                No rashes and normal coloration. Warm and dry. Bruising BLE    Neuro:             CN II-XII grossly intact. A&Ox 1-2   Psych:             Confused, pleasant. I have reviewed ordered lab tests and independently visualized imaging below:    Recent Labs:  Recent Results (from the past 48 hour(s))   CBC WITH AUTOMATED DIFF    Collection Time: 12/16/21  6:45 PM   Result Value Ref Range    WBC 11.4 (H) 4.3 - 11.1 K/uL    RBC 4.00 (L) 4.23 - 5.6 M/uL    HGB 11.5 (L) 13.6 - 17.2 g/dL    HCT 36.8 (L) 41.1 - 50.3 %    MCV 92.0 79.6 - 97.8 FL    MCH 28.8 26.1 - 32.9 PG    MCHC 31.3 (L) 31.4 - 35.0 g/dL    RDW 17.2 (H) 11.9 - 14.6 %    PLATELET 662 179 - 883 K/uL    MPV 9.9 9.4 - 12.3 FL    ABSOLUTE NRBC 0.00 0.0 - 0.2 K/uL    DF AUTOMATED      NEUTROPHILS 78 43 - 78 %    LYMPHOCYTES 12 (L) 13 - 44 %    MONOCYTES 6 4.0 - 12.0 %    EOSINOPHILS 1 0.5 - 7.8 %    BASOPHILS 0 0.0 - 2.0 %    IMMATURE GRANULOCYTES 2 0.0 - 5.0 %    ABS. NEUTROPHILS 9.0 (H) 1.7 - 8.2 K/UL    ABS. LYMPHOCYTES 1.3 0.5 - 4.6 K/UL    ABS. MONOCYTES 0.7 0.1 - 1.3 K/UL    ABS. EOSINOPHILS 0.1 0.0 - 0.8 K/UL    ABS. BASOPHILS 0.1 0.0 - 0.2 K/UL    ABS. IMM.  GRANS. 0.3 0.0 - 0.5 K/UL METABOLIC PANEL, COMPREHENSIVE    Collection Time: 12/16/21  6:45 PM   Result Value Ref Range    Sodium 135 (L) 136 - 145 mmol/L    Potassium 4.1 3.5 - 5.1 mmol/L    Chloride 102 98 - 107 mmol/L    CO2 29 21 - 32 mmol/L    Anion gap 4 (L) 7 - 16 mmol/L    Glucose 110 (H) 65 - 100 mg/dL    BUN 21 8 - 23 MG/DL    Creatinine 1.23 0.8 - 1.5 MG/DL    GFR est AA >60 >60 ml/min/1.73m2    GFR est non-AA 59 (L) >60 ml/min/1.73m2    Calcium 9.7 8.3 - 10.4 MG/DL    Bilirubin, total 0.9 0.2 - 1.1 MG/DL    ALT (SGPT) 14 12 - 65 U/L    AST (SGOT) 11 (L) 15 - 37 U/L    Alk. phosphatase 79 50 - 136 U/L    Protein, total 7.7 6.3 - 8.2 g/dL    Albumin 3.1 (L) 3.2 - 4.6 g/dL    Globulin 4.6 (H) 2.3 - 3.5 g/dL    A-G Ratio 0.7 (L) 1.2 - 3.5     TSH 3RD GENERATION    Collection Time: 12/16/21  6:45 PM   Result Value Ref Range    TSH 5.360 uIU/mL   FOLATE    Collection Time: 12/16/21  6:45 PM   Result Value Ref Range    Folate 18.5 (H) 3.1 - 17.5 ng/mL   VITAMIN B12    Collection Time: 12/16/21  6:45 PM   Result Value Ref Range    Vitamin B12 570 193 - 986 pg/mL   AMMONIA    Collection Time: 12/16/21  6:45 PM   Result Value Ref Range    Ammonia <10 (L) 24.9 - 68 UMOL/L   CBC WITH AUTOMATED DIFF    Collection Time: 12/17/21  6:10 AM   Result Value Ref Range    WBC 8.9 4.3 - 11.1 K/uL    RBC 3.57 (L) 4.23 - 5.6 M/uL    HGB 10.4 (L) 13.6 - 17.2 g/dL    HCT 32.6 (L) 41.1 - 50.3 %    MCV 91.3 79.6 - 97.8 FL    MCH 29.1 26.1 - 32.9 PG    MCHC 31.9 31.4 - 35.0 g/dL    RDW 17.0 (H) 11.9 - 14.6 %    PLATELET 588 822 - 614 K/uL    MPV 10.4 9.4 - 12.3 FL    ABSOLUTE NRBC 0.00 0.0 - 0.2 K/uL    DF AUTOMATED      NEUTROPHILS 73 43 - 78 %    LYMPHOCYTES 14 13 - 44 %    MONOCYTES 9 4.0 - 12.0 %    EOSINOPHILS 2 0.5 - 7.8 %    BASOPHILS 1 0.0 - 2.0 %    IMMATURE GRANULOCYTES 3 0.0 - 5.0 %    ABS. NEUTROPHILS 6.5 1.7 - 8.2 K/UL    ABS. LYMPHOCYTES 1.2 0.5 - 4.6 K/UL    ABS. MONOCYTES 0.8 0.1 - 1.3 K/UL    ABS. EOSINOPHILS 0.2 0.0 - 0.8 K/UL    ABS. BASOPHILS 0.0 0.0 - 0.2 K/UL    ABS. IMM. GRANS. 0.2 0.0 - 0.5 K/UL   METABOLIC PANEL, BASIC    Collection Time: 12/17/21  6:10 AM   Result Value Ref Range    Sodium 137 136 - 145 mmol/L    Potassium 4.1 3.5 - 5.1 mmol/L    Chloride 105 98 - 107 mmol/L    CO2 29 21 - 32 mmol/L    Anion gap 3 (L) 7 - 16 mmol/L    Glucose 114 (H) 65 - 100 mg/dL    BUN 24 (H) 8 - 23 MG/DL    Creatinine 1.11 0.8 - 1.5 MG/DL    GFR est AA >60 >60 ml/min/1.73m2    GFR est non-AA >60 >60 ml/min/1.73m2    Calcium 9.2 8.3 - 10.4 MG/DL       All Micro Results     Procedure Component Value Units Date/Time    CULTURE, BLOOD [[id number]] Collected: 12/15/21 1439    Order Status: Completed Specimen: Blood Updated: 12/18/21 0753     Special Requests: --        RIGHT  Antecubital       Culture result: NO GROWTH 3 DAYS       CULTURE, BLOOD [[id number]] Collected: 12/15/21 1415    Order Status: Completed Specimen: Blood Updated: 12/18/21 0753     Special Requests: --        LEFT  Antecubital       Culture result: NO GROWTH 3 DAYS       CULTURE, URINE [[id number]] Collected: 12/15/21 1439    Order Status: Completed Specimen: Urine Updated: 12/18/21 0744     Special Requests: NO SPECIAL REQUESTS        Culture result:       <10,000 COLONIES/mL MIXED SKIN ROSE ISOLATED                Other Studies:  XR TIB/FIB RT    Result Date: 12/17/2021  RIGHT TIB-FIB 2 view(s). INDICATION: Knee swelling and inability to bear weight. TECHNIQUE: AP and lateral and oblique views. COMPARISON: None. FINDINGS: Extensive chondrocalcinosis bilaterally. No fractures. No periostitis. No erosions. Alignment anatomic. Moderate arterial calcifications. Bilateral chondrocalcinosis at the knee and moderate arterial calcifications.        Current Meds:  Current Facility-Administered Medications   Medication Dose Route Frequency    sodium chloride (NS) flush 5-40 mL  5-40 mL IntraVENous Q8H    sodium chloride (NS) flush 5-40 mL  5-40 mL IntraVENous PRN    acetaminophen (TYLENOL) tablet 650 mg  650 mg Oral Q6H PRN    polyethylene glycol (MIRALAX) packet 17 g  17 g Oral DAILY PRN    ondansetron (ZOFRAN ODT) tablet 4 mg  4 mg Oral Q8H PRN    Or    ondansetron (ZOFRAN) injection 4 mg  4 mg IntraVENous Q6H PRN    enoxaparin (LOVENOX) injection 30 mg  30 mg SubCUTAneous DAILY    cefTRIAXone (ROCEPHIN) 1 g in 0.9% sodium chloride (MBP/ADV) 50 mL MBP  1 g IntraVENous Q24H    acetaminophen (TYLENOL) tablet 650 mg  650 mg Oral Q6H PRN    famotidine (PEPCID) tablet 20 mg  20 mg Oral Q12H    ondansetron (ZOFRAN ODT) tablet 4 mg  4 mg Oral Q8H PRN    multivitamin, tx-iron-ca-min (THERA-M w/ IRON) tablet 1 Tablet  1 Tablet Oral DAILY    nitroglycerin (NITROSTAT) tablet 0.4 mg  0.4 mg SubLINGual Q5MIN PRN       Signed:  Prerna Luna DO

## 2021-12-18 NOTE — PROGRESS NOTES
Patient discussed during rounds. Recommendation for STR. Patient's daughter-in-law reviewed the choice list provided by RN RAVINDER and state that their preference would be for [facility] or [facility]. Referral to [facility] in [facility] link and referral faxed to [facility]. Reviewed chart and noted PPD had not been ordered, discussed with RN who will place an order today.      Cara Jacobs LMSW    [facility]    * [email or web address]

## 2021-12-18 NOTE — PROGRESS NOTES
Problem: Pressure Injury - Risk of  Goal: *Prevention of pressure injury  Description: Document Tyler Scale and appropriate interventions in the flowsheet. Outcome: Progressing Towards Goal  Note: Pressure Injury Interventions:  Sensory Interventions: Assess changes in LOC,Assess need for specialty bed,Float heels    Moisture Interventions: Absorbent underpads,Apply protective barrier, creams and emollients,Limit adult briefs    Activity Interventions: Assess need for specialty bed,Chair cushion,Pressure redistribution bed/mattress(bed type)    Mobility Interventions: Chair cushion,Assess need for specialty bed,Pressure redistribution bed/mattress (bed type)    Nutrition Interventions: Document food/fluid/supplement intake    Friction and Shear Interventions: Apply protective barrier, creams and emollients,Feet elevated on foot rest,Lift sheet                Problem: Patient Education: Go to Patient Education Activity  Goal: Patient/Family Education  Outcome: Progressing Towards Goal     Problem: Falls - Risk of  Goal: *Absence of Falls  Description: Document Montez Cease Fall Risk and appropriate interventions in the flowsheet.   Outcome: Progressing Towards Goal  Note: Fall Risk Interventions:  Mobility Interventions: Assess mobility with egress test,Bed/chair exit alarm,Patient to call before getting OOB    Mentation Interventions: Adequate sleep, hydration, pain control,Bed/chair exit alarm    Medication Interventions: Assess postural VS orthostatic hypotension,Bed/chair exit alarm,Patient to call before getting OOB    Elimination Interventions: Bed/chair exit alarm,Call light in reach,Patient to call for help with toileting needs    History of Falls Interventions: Bed/chair exit alarm,Consult care management for discharge planning,Investigate reason for fall         Problem: Patient Education: Go to Patient Education Activity  Goal: Patient/Family Education  Outcome: Progressing Towards Goal

## 2021-12-18 NOTE — PROGRESS NOTES
END OF SHIFT NOTE:    Intake/Output  12/17 1901 - 12/18 0700  In: 240 [P.O.:240]  Out: 1050 [Urine:1050]   Voiding: YES  Catheter: YES  Drain:              Stool:  0 occurrences. Stool Assessment  Stool Color: Nadeem Hobson (12/17/21 2238)  Stool Appearance: Formed (12/17/21 2238)  Stool Amount: Small (12/17/21 2238)  Stool Source/Status: Rectum (12/17/21 2238)    Emesis:  0 occurrences. VITAL SIGNS  Patient Vitals for the past 12 hrs:   Temp Pulse Resp BP SpO2   12/18/21 0427 98.5 °F (36.9 °C) 86 18 123/76 94 %   12/17/21 2003 98.3 °F (36.8 °C) 81 17 122/68 93 %       Pain Assessment  Pain 1  Pain Scale 1: Visual (12/18/21 0245)  Pain Intensity 1: 0 (12/18/21 0245)  Patient Stated Pain Goal: 0 (12/18/21 0245)  Pain Reassessment 1: Patient resting w/respiratory rate greater than 10 (12/17/21 1400)  Pain Location 1: Hip;Knee (12/17/21 0900)  Pain Orientation 1: Lateral;Right (12/17/21 0900)  Pain Description 1: Sharp (12/17/21 0900)  Pain Intervention(s) 1: Ambulation/Increased Activity; Elevation; Emotional support;Repositioned (12/17/21 0900)    Ambulating  No    Additional Information:   Agitate and combative  Ziprasidone x1      Shift report given to oncoming nurse at the bedside.     Charan Handley RN

## 2021-12-18 NOTE — PROGRESS NOTES
SW reviewed patient's chart and conducted a baseline assessment. Discharge plan at this time is as follows:     Care Management Interventions  PCP Verified by CM: Yes  Mode of Transport at Discharge: BLS  Transition of Care Consult (CM Consult): SNF  Partner SNF: Yes  Physical Therapy Consult: Yes  Occupational Therapy Consult: Yes  Support Systems: Child(margi)  Confirm Follow Up Transport: Family  The Plan for Transition of Care is Related to the Following Treatment Goals : STR  The Patient and/or Patient Representative was Provided with a Choice of Provider and Agrees with the Discharge Plan?: Yes  Name of the Patient Representative Who was Provided with a Choice of Provider and Agrees with the Discharge Plan: Carloyn Lights of Choice List was Provided with Basic Dialogue that Supports the Patient's Individualized Plan of Care/Goals, Treatment Preferences and Shares the Quality Data Associated with the Providers?: Yes  Discharge Location  Discharge Placement: Skilled nursing facility      *Please note that discharge plans can change throughout an inpatient admission.  Ensure that you are referring to the most recent social work/nurse case management note for current discharge plan*     Sarmad Sauceda, [address] [facility] Work   [facility] [employer] [facility]    * [email or web address]

## 2021-12-19 LAB
MM INDURATION POC: 0 MM (ref 0–5)
PPD POC: NEGATIVE NEGATIVE

## 2021-12-19 PROCEDURE — 97535 SELF CARE MNGMENT TRAINING: CPT

## 2021-12-19 PROCEDURE — 74011250636 HC RX REV CODE- 250/636: Performed by: NURSE PRACTITIONER

## 2021-12-19 PROCEDURE — 65270000029 HC RM PRIVATE

## 2021-12-19 PROCEDURE — 97530 THERAPEUTIC ACTIVITIES: CPT

## 2021-12-19 PROCEDURE — 74011000258 HC RX REV CODE- 258: Performed by: NURSE PRACTITIONER

## 2021-12-19 PROCEDURE — 51798 US URINE CAPACITY MEASURE: CPT

## 2021-12-19 PROCEDURE — 74011250637 HC RX REV CODE- 250/637: Performed by: NURSE PRACTITIONER

## 2021-12-19 RX ADMIN — FAMOTIDINE 20 MG: 20 TABLET ORAL at 21:39

## 2021-12-19 RX ADMIN — Medication 10 ML: at 21:39

## 2021-12-19 RX ADMIN — FAMOTIDINE 20 MG: 20 TABLET ORAL at 08:16

## 2021-12-19 RX ADMIN — ENOXAPARIN SODIUM 30 MG: 100 INJECTION SUBCUTANEOUS at 08:16

## 2021-12-19 RX ADMIN — MULTIPLE VITAMINS W/ MINERALS TAB 1 TABLET: TAB at 08:16

## 2021-12-19 RX ADMIN — CEFTRIAXONE 1 G: 1 INJECTION, POWDER, FOR SOLUTION INTRAMUSCULAR; INTRAVENOUS at 15:28

## 2021-12-19 RX ADMIN — Medication 10 ML: at 14:27

## 2021-12-19 RX ADMIN — Medication 10 ML: at 06:30

## 2021-12-19 NOTE — PROGRESS NOTES
Problem: Self Care Deficits Care Plan (Adult)  Goal: *Acute Goals and Plan of Care (Insert Text)  Description: 1. Patient will perform grooming with supervision. 2. Patient will perform Upper body dressing with supervision  3. Patient will perform lower body dressing with min assist  4. Patient will perform upper and lower body bathing with CGA. 5. Patient will perform toilet transfers with CGA. 6. Patient will perform shower transfer with CGA. 7. Patient will participate in 30 + minutes of ADL/ therapeutic exercise/therapeutic activity with min rest breaks to increase activity tolerance for self care. 8. Patient will perform ADL functional mobility in room with CGA. Goals to be achieved in 7 days. Outcome: Progressing Towards Goal     OCCUPATIONAL THERAPY: Daily Note and AM 12/19/2021  INPATIENT: OT Visit Days: 2  Payor: Lesley Yarbrough / Plan: [facility] [employer] MEDICARE CHOICE PPO/PFFS / Product Type: Managed Care Medicare /      NAME/AGE/GENDER: Fang Palomo is a 80 y.o. male   PRIMARY DIAGNOSIS:  UTI (urinary tract infection) [N39.0] Sepsis ([facility] Sepsis ([facility]       ICD-10: Treatment Diagnosis:    · Generalized Muscle Weakness (M62.81)  · Other lack of cordination (R27.8)  · Difficulty in walking, Not elsewhere classified (R26.2)   Precautions/Allergies:     Patient has no known allergies. ASSESSMENT:     Mr. Kitty Rios Sr admitted with above diagnosis. Pt is pleasantly confused and lives at [facility]. Pt is normally independent with ADLs per chart. Pt is mod assist with bed mobility and min assist x 2 for functional transfers. Pt complained of right hip pain with movement. Pt presents with decreased self care and functional mobility. Pt would benefit from skilled OT to increase independence. 12/19/21: Pt presents supine in bed. Remains confused throughout session and requires heavy cueing to follow commands. He required mod A from sup>sit EOB and from sit<>stand with RW.  Pt mobilized from bed to door and over to recliner with mod A. Noted knee buckling throughout ambulation. Required mod A from sit<>stand at chair and min A from sit>supine back in bed for brief change as pt had BM during mobility. He required min/mod A to roll back and forth in bed during total A hygiene for BM. He was positioned upright in bed with breakfast tray. All needs met and in reach. Recommend short to long term placement at discharge. This section established at most recent assessment   PROBLEM LIST (Impairments causing functional limitations):  1. Decreased Strength  2. Decreased ADL/Functional Activities  3. Decreased Transfer Abilities  4. Decreased Ambulation Ability/Technique  5. Decreased Balance  6. Decreased Activity Tolerance   INTERVENTIONS PLANNED: (Benefits and precautions of occupational therapy have been discussed with the patient.)  1. Activities of daily living training  2. Adaptive equipment training  3. Balance training  4. Clothing management  5. Therapeutic activity  6. Therapeutic exercise     TREATMENT PLAN: Frequency/Duration: Follow patient 3x/week to address above goals. Rehabilitation Potential For Stated Goals: Good     REHAB RECOMMENDATIONS (at time of discharge pending progress):    Placement: It is my opinion, based on this patient's performance to date, that Mr. Migel Otero may benefit from intensive therapy at a [address] [place] [address] after discharge due to the functional deficits listed above that are likely to improve with skilled rehabilitation and concerns that he/she may be unsafe to be unsupervised at home due to decreased ADL performance and mobility. Likely short to long term care  Equipment:    To be determined              OCCUPATIONAL PROFILE AND HISTORY:   History of Present Injury/Illness (Reason for Referral):  80-year-old male brought in by daughter-in-law for weakness decreased appetite and some increased confusion.   Reports that he was fairly combative several nights ago. He is done this in the past when he had urinary tract infections. He has had almost a monthly admission for urinary tract infections since September. Patient seems quite well right now has no complaints but with further prompting does admit that he has had some low back pain that radiates into his legs. No fevers or chills. No vomiting. 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 Past Medical History/Comorbidities:   Mr. Miko De Oliveira  has a past medical history of CAD (coronary artery disease) (7/18/2019), Colon cancer ([address] (01/2012), and Hiatal hernia. Mr. Miko De Oliveira  has no past surgical history on file. Social History/Living Environment:   Home Environment: Assisted living  [address] [place] [address] Jose Name: Marina Alcaraz  # Steps to Enter: 0  One/Two Story Residence: One story  Living Alone: No  Support Systems: Child(margi)  Patient Expects to be Discharged to[de-identified] Assisted living  Current DME Used/Available at Home: None  Tub or Shower Type: Shower  Prior Level of Function/Work/Activity:  Lives at [facility]; indep with ADLs; no assistive device     Number of Personal Factors/Comorbidities that affect the Plan of Care: Brief history (0):  LOW COMPLEXITY   ASSESSMENT OF OCCUPATIONAL PERFORMANCE[de-identified]   Activities of Daily Living:   Basic ADLs (From Assessment) Complex ADLs (From Assessment)   Feeding: Setup  Oral Facial Hygiene/Grooming: Setup  Bathing: Moderate assistance  Upper Body Dressing: Setup  Lower Body Dressing: Maximum assistance  Toileting: Total assistance     Grooming/Bathing/Dressing Activities of Daily Living                             Bed/Mat Mobility  Rolling: Minimum assistance  Supine to Sit: Moderate assistance  Sit to Supine: Minimum assistance  Sit to Stand: Moderate assistance  Stand to Sit: Moderate assistance  Bed to Chair: Moderate assistance  Scooting:  Moderate assistance     Most Recent Physical Functioning:   Gross Assessment: Posture:  Posture Assessment: Forward head,Rounded shoulders  Balance:  Sitting: Intact; Without support  Standing: Impaired; With support (walker) Bed Mobility:  Rolling: Minimum assistance  Supine to Sit: Moderate assistance  Sit to Supine: Minimum assistance  Scooting: Moderate assistance  Wheelchair Mobility:     Transfers:  Sit to Stand: Moderate assistance  Stand to Sit: Moderate assistance  Bed to Chair: Moderate assistance  Duration: 42 Minutes (extra time to wor through activity noted)            Patient Vitals for the past 6 hrs:   BP BP Patient Position SpO2 Pulse   12/19/21 0720 119/79 At rest 95 % 91   12/19/21 1040 127/84 At rest 97 % (!) 102       Mental Status  Neurologic State: Confused  Orientation Level: Disoriented X4  Cognition: Decreased attention/concentration,Decreased command following  Perception: Appears intact  Perseveration: No perseveration noted  Safety/Judgement: Fall prevention            LLE Assessment  LLE Assessment (WDL): Exception to WDL RLE Assessment  RLE Assessment (WDL): Exceptions to [facility]           Physical Skills Involved:  1. Balance  2. Strength  3. Activity Tolerance Cognitive Skills Affected (resulting in the inability to perform in a timely and safe manner):  1. Short Term Recall  2. Long Term Memory  3. Expression Psychosocial Skills Affected:  1. Habits/Routines   Number of elements that affect the Plan of Care: 1-3:  LOW COMPLEXITY   CLINICAL DECISION MAKING:   [employer] AM-PAC 6 Clicks   Daily Activity Inpatient Short Form  How much help from another person does the patient currently need. .. Total A Lot A Little None   1. Putting on and taking off regular lower body clothing? [] 1   [x] 2   [] 3   [] 4   2. Bathing (including washing, rinsing, drying)? [] 1   [x] 2   [] 3   [] 4   3. Toileting, which includes using toilet, bedpan or urinal?   [] 1   [x] 2   [] 3   [] 4   4. Putting on and taking off regular upper body clothing?    [] 1   [] 2   [] 3   [x] 4   5. Taking care of personal grooming such as brushing teeth? [] 1   [] 2   [] 3   [x] 4   6. Eating meals? [] 1   [] 2   [] 3   [x] 4   © 2007, Trustees of [address] [place] [address] [address] [id number], under license to [employer]. All rights reserved      Score:  Initial: 18 Most Recent: X (Date: -- )    Interpretation of Tool:  Represents activities that are increasingly more difficult (i.e. Bed mobility, Transfers, Gait). Medical Necessity:     · Patient is expected to demonstrate progress in   · strength, balance, coordination, and functional technique  ·  to   · increase independence with self care and functional mobility  · . Reason for Services/Other Comments:  · Patient continues to require skilled intervention due to   · Decrease self care and functional mobility  · . Use of outcome tool(s) and clinical judgement create a POC that gives a: LOW COMPLEXITY         TREATMENT:   (In addition to Assessment/Re-Assessment sessions the following treatments were rendered)     Pre-treatment Symptoms/Complaints:  tolerated standing by bag  Pain: Initial:   Pain Intensity 1: 3  Pain Location 1: Leg  Pain Orientation 1: Left,Right  Post Session:  3     Co-treatment was necessary to improve patient's ability to follow higher level commands, ability to increase activity demands and ability to return to normal functional activity. Self Care: (33): Procedure(s) (per grid) utilized to improve and/or restore self-care/home management as related to dressing, toileting, self feeding and functional mobility and transfers. Required moderate visual, verbal, manual and tactile cueing to facilitate activities of daily living skills and compensatory activities.     Braces/Orthotics/Lines/Etc:   · O2 Device: None (Room air)  Treatment/Session Assessment:    · Response to Treatment:  tolerated well  · Interdisciplinary Collaboration:   o Physical Therapist  o Occupational Therapist  o Registered Nurse  · After treatment position/precautions:   o Supine in bed  o Bed alarm/tab alert on  o Bed/Chair-wheels locked  o Bed in low position  o Call light within reach  o RN notified  o Side rails x 2   · Compliance with Program/Exercises: Will assess as treatment progresses. · Recommendations/Intent for next treatment session: \"Next visit will focus on advancements to more challenging activities and reduction in assistance provided\".   Total Treatment Duration:  OT Patient Time In/Time Out  Time In: 1004  Time Out: 1 [place], [place]

## 2021-12-19 NOTE — PROGRESS NOTES
Problem: Patient Education: Go to Patient Education Activity  Goal: Patient/Family Education  Outcome: Progressing Towards Goal     Problem: Patient Education: Go to Patient Education Activity  Goal: Patient/Family Education  Outcome: Progressing Towards Goal     Problem: Pressure Injury - Risk of  Goal: *Prevention of pressure injury  Description: Document Tyler Scale and appropriate interventions in the flowsheet. Outcome: Progressing Towards Goal  Note: Pressure Injury Interventions:  Sensory Interventions: Assess changes in LOC,Assess need for specialty bed,Minimize linen layers    Moisture Interventions: Absorbent underpads,Apply protective barrier, creams and emollients,Limit adult briefs    Activity Interventions: Assess need for specialty bed,Chair cushion,Pressure redistribution bed/mattress(bed type)    Mobility Interventions: Assess need for specialty bed,Chair cushion,Pressure redistribution bed/mattress (bed type)    Nutrition Interventions: Document food/fluid/supplement intake    Friction and Shear Interventions: Apply protective barrier, creams and emollients,Feet elevated on foot rest,Lift sheet                Problem: Patient Education: Go to Patient Education Activity  Goal: Patient/Family Education  Outcome: Progressing Towards Goal     Problem: Falls - Risk of  Goal: *Absence of Falls  Description: Document Marly Rodriguez Fall Risk and appropriate interventions in the flowsheet.   Outcome: Progressing Towards Goal  Note: Fall Risk Interventions:  Mobility Interventions: Assess mobility with egress test,Bed/chair exit alarm,Patient to call before getting OOB    Mentation Interventions: Adequate sleep, hydration, pain control,Bed/chair exit alarm,Family/sitter at bedside    Medication Interventions: Assess postural VS orthostatic hypotension,Bed/chair exit alarm,Patient to call before getting OOB    Elimination Interventions: Bed/chair exit alarm,Call light in reach,Patient to call for help with toileting needs    History of Falls Interventions: Bed/chair exit alarm,Consult care management for discharge planning,Investigate reason for fall

## 2021-12-19 NOTE — PROGRESS NOTES
Physician Progress Note      PATIENT:               Aure Camargo  CSN #:                  [id number]  :                       1931  ADMIT DATE:       12/15/2021 1:59 PM  [address] [place] DATE:  RESPONDING  PROVIDER #:        Elena Mcdonald NP          QUERY TEXT:    Pt admitted with confusion, weakness. Pt noted to have UTI. If possible, please document in the progress notes and discharge summary if you are evaluating and /or treating any of the following: The medical record reflects the following:  Risk Factors: Recurrent UTI's, treated for UTI in Oct and November with UCx positive for Serratio  Clinical Indicators: WBC 13.5,LA 2.5, - R 28  Treatment: Rocephin IV, Urology Consult  Options provided:  -- UTI with Sepsis  -- Sepsis was ruled out  -- Other - I will add my own diagnosis  -- Disagree - Not applicable / Not valid  -- Disagree - Clinically unable to determine / Unknown  -- Refer to Clinical Documentation Reviewer    PROVIDER RESPONSE TEXT:    This patient has a UTI with Sepsis.     Query created by: wilfredo box on 2021 11:48 AM      Electronically signed by:  Elena Mcdonald NP 2021 7:00 AM

## 2021-12-19 NOTE — PROGRESS NOTES
Hospitalist Progress Note   Admit Date:  12/15/2021  1:59 PM   Name:  Steven Hoffman   Age:  80 y.o. Sex:  male  :  1931   MRN:  [id number]   Room:  [facility]/    Presenting Complaint: Fatigue    Reason(s) for Admission: UTI (urinary tract infection) [N39.0]     Hospital Course & Interval History:   Mr. Lucy Taylor is a 80 y.o. male with medical history of  Recurrent UTIs, dementia, CAD Hiatal hernia who presented to the ER  with a complaint of combativeness with weakness, poor appetite and inability to stand. Symptoms began on Wednesday of last week, per daughter-in-law with patient becoming combative at [facility]. Patient is pleasantly confused and information obtained from daughter-in-law at bedside who states that on Tuesday of this week patient was unable to get OOB or stand. She reports history of multiple falls in July and August but is unaware of any falls at [facility]. Patient noted to be treated for UTI in Oct and November with UCx positive for [place]. Subjective (21):  Pt remains confused. Knows name. Defers other orientation questions. Cannot believe/nicely arguing that he's been in the hospital. Pt is pleasant and cooperative. Assessment & Plan:     Sepsis secondary to UTI (urinary tract infection) (12/15/2021)  -Patient has had previous UCx positive for Serratio susceptible to Rocephin. Will continue Rocephin  -With likelihood of BPH, bladder scan q shift ordered  -Urology consulted   Urology to follow up outpatient.   Follow cx and plan for 7 day abx treatment per Urology  Lactic acid cleared   Urine culture contaminated - will treat with 7 days total   Continue Ceftriaxone while inpatient - EOT 21     Hx of multiple falls  -Per family patient denied dizziness, LOC stating he does not know why he falls   -PT/OT consulted  -Orthostatics prn      T12 fx  -12/15 Noted on imaging; not seen on XR 21  - Did appear on imaging from 10/2021  -Ortho spine consulted for recs  -Prn analgesia   12/17 Ortho spine consulted, no intervention recommended.      Dementia  -Supportive care  -Redirect prn      Hiatal Hernia  -Noted     Acute encephalopathy  12/16 underlying dementia  12/17 encephalopathy work up TSH, B12, folate, ammonia - all normal  12/18 Still confused, but mildly improved. Per nursing this is how he was previous admission. 12/19 Seems less confused today, but still really confused - likely dementia at this point      Dispo/Discharge Planning:     Dispo pending      Diet: ADULT DIET Regular; No Salt Added (3-4 gm)  VTE ppx: Lovenox SQ   Code status: Full Code    Hospital Problems as of 12/19/2021 Date Reviewed: 11/19/2021          Codes Class Noted - Resolved POA    * (Principal) Sepsis ([facility] [address] ICD-10-CM: A41.9  ICD-9-CM: 038.9, 995.91  12/2/2019 - Present Yes        Debility (Chronic) ICD-10-CM: R53.81  ICD-9-CM: 799.3  12/2/2019 - Present Yes        UTI (urinary tract infection) ICD-10-CM: N39.0  ICD-9-CM: 599.0  12/15/2021 - Present Yes        Acute metabolic encephalopathy [facility]--EY: G93.41  ICD-9-CM: 348.31  11/17/2021 - Present Yes        Dementia ([facility] [address] (Chronic) ICD-10-CM: F03.90  ICD-9-CM: 294.20  12/23/2019 - Present Yes        RESOLVED: Lactic acidosis ICD-10-CM: E87.2  ICD-9-CM: 276.2  12/15/2021 - 12/15/2021 Yes              Objective:     Patient Vitals for the past 24 hrs:   Temp Pulse Resp BP SpO2   12/19/21 0720 97.8 °F (36.6 °C) 91 18 119/79 95 %   12/19/21 0225 98 °F (36.7 °C) 88 19 131/70 97 %   12/18/21 2253 98.2 °F (36.8 °C) 97 19 114/73 96 %   12/18/21 1941 98.5 °F (36.9 °C) 100 19 111/69 96 %   12/18/21 1600 98.5 °F (36.9 °C) 88 18 128/66 93 %     Oxygen Therapy  O2 Sat (%): 95 % (12/19/21 0720)  Pulse via Oximetry: 69 beats per minute (12/16/21 0356)  O2 Device: None (Room air) (12/19/21 0225)    Estimated body mass index is 19.31 kg/m² as calculated from the following:    Height as of this encounter: 5' 8\" (1.727 m).     Weight as of this encounter: 57.6 kg (127 lb). Intake/Output Summary (Last 24 hours) at 12/19/2021 1034  Last data filed at 12/19/2021 0557  Gross per 24 hour   Intake --   Output 1400 ml   Net -1400 ml         Physical Exam:     Blood pressure 119/79, pulse 91, temperature 97.8 °F (36.6 °C), resp. rate 18, height 5' 8\" (1.727 m), weight 57.6 kg (127 lb), SpO2 95 %. General:          No overt distress, confused  Head:               Normocephalic, atraumatic  Eyes:               Sclerae appear normal.  Pupils equally round. ENT:                Nares appear normal, no drainage. Moist oral mucosa  Neck:               No restricted ROM. Trachea midline   CV:                  RRR. No m/r/g. No jugular venous distension. Lungs:             CTAB. No wheezing, rhonchi, or rales. Respirations even, unlabored  Abdomen: Bowel sounds present. Soft, nontender, nondistended. Extremities:     No cyanosis or clubbing. No edema  Skin:                No rashes and normal coloration. Warm and dry. Bruising BLE    Neuro:             CN II-XII grossly intact. A&Ox 1-2   Psych:             Confused, pleasant. I have reviewed ordered lab tests and independently visualized imaging below:    Recent Labs:  No results found for this or any previous visit (from the past 48 hour(s)).     All Micro Results     Procedure Component Value Units Date/Time    CULTURE, BLOOD [[id number]] Collected: 12/15/21 1439    Order Status: Completed Specimen: Blood Updated: 12/18/21 0753     Special Requests: --        RIGHT  Antecubital       Culture result: NO GROWTH 3 DAYS       CULTURE, BLOOD [[id number]] Collected: 12/15/21 1415    Order Status: Completed Specimen: Blood Updated: 12/18/21 0753     Special Requests: --        LEFT  Antecubital       Culture result: NO GROWTH 3 DAYS       CULTURE, URINE [[id number]] Collected: 12/15/21 1439    Order Status: Completed Specimen: Urine Updated: 12/18/21 0744     Special Requests: NO SPECIAL REQUESTS Culture result:       <10,000 COLONIES/mL MIXED SKIN ROSE ISOLATED                Other Studies:  No results found.     Current Meds:  Current Facility-Administered Medications   Medication Dose Route Frequency    tuberculin injection 5 Units  5 Units IntraDERMal ONCE    sodium chloride (NS) flush 5-40 mL  5-40 mL IntraVENous Q8H    sodium chloride (NS) flush 5-40 mL  5-40 mL IntraVENous PRN    acetaminophen (TYLENOL) tablet 650 mg  650 mg Oral Q6H PRN    polyethylene glycol (MIRALAX) packet 17 g  17 g Oral DAILY PRN    ondansetron (ZOFRAN ODT) tablet 4 mg  4 mg Oral Q8H PRN    Or    ondansetron (ZOFRAN) injection 4 mg  4 mg IntraVENous Q6H PRN    enoxaparin (LOVENOX) injection 30 mg  30 mg SubCUTAneous DAILY    cefTRIAXone (ROCEPHIN) 1 g in 0.9% sodium chloride (MBP/ADV) 50 mL MBP  1 g IntraVENous Q24H    acetaminophen (TYLENOL) tablet 650 mg  650 mg Oral Q6H PRN    famotidine (PEPCID) tablet 20 mg  20 mg Oral Q12H    ondansetron (ZOFRAN ODT) tablet 4 mg  4 mg Oral Q8H PRN    multivitamin, tx-iron-ca-min (THERA-M w/ IRON) tablet 1 Tablet  1 Tablet Oral DAILY    nitroglycerin (NITROSTAT) tablet 0.4 mg  0.4 mg SubLINGual Q5MIN PRN       Signed:  Gene Delvalle DO

## 2021-12-19 NOTE — PROGRESS NOTES
END OF SHIFT NOTE:    Intake/Output  12/18 1901 - 12/19 0700  In: -   Out: 1000 [Urine:1000]   Voiding: YES  Catheter: YES  Drain:              Stool:  2 occurrences. Stool Assessment  Stool Color: Brown (12/19/21 0225)  Stool Appearance: Loose (12/19/21 0225)  Stool Amount: Medium (12/19/21 0225)  Stool Source/Status: Rectum (12/19/21 0225)    Emesis:  0 occurrences. VITAL SIGNS  Patient Vitals for the past 12 hrs:   Temp Pulse Resp BP SpO2   12/19/21 0225 98 °F (36.7 °C) 88 19 131/70 97 %   12/18/21 2253 98.2 °F (36.8 °C) 97 19 114/73 96 %   12/18/21 1941 98.5 °F (36.9 °C) 100 19 111/69 96 %       Pain Assessment  Pain 1  Pain Scale 1: Visual (12/19/21 0225)  Pain Intensity 1: 0 (12/19/21 0225)  Patient Stated Pain Goal: 0 (12/19/21 0225)  Pain Reassessment 1: Patient resting w/respiratory rate greater than 10 (12/17/21 1400)  Pain Location 1: Hip;Knee (12/17/21 0900)  Pain Orientation 1: Lateral;Right (12/17/21 0900)  Pain Description 1: Sharp (12/17/21 0900)  Pain Intervention(s) 1: Ambulation/Increased Activity; Elevation; Emotional support;Repositioned (12/17/21 0900)    Ambulating  No    Additional Information:       Shift report given to oncoming nurse at the bedside.     Gilles Dela Cruz RN

## 2021-12-19 NOTE — PROGRESS NOTES
Problem: Mobility Impaired (Adult and Pediatric)  Goal: *Acute Goals and Plan of Care (Insert Text)  Note: STG:  (1.)Mr. Annie Prasad will move from supine to sit and sit to supine  with CONTACT GUARD ASSIST within 7 treatment day(s). (2.)Mr. Annie Prasad will transfer from bed to chair and chair to bed with CONTACT GUARD ASSIST using the least restrictive device within 7 treatment day(s). (3.)Mr. Annie Prasda will ambulate with CONTACT GUARD ASSIST for 150 feet with the least restrictive device within 7 treatment day(s). (4.)Pt. will increase B LE strength 1/2 grade within 7 days      ________________________________________________________________________________________________      PHYSICAL THERAPY: Daily Note and AM 12/19/2021  INPATIENT: PT Visit Days : 3  Payor: Jasvir Rodriguez / Plan: [facility] HUMANA MEDICARE CHOICE PPO/PFFS / Product Type: Watch [employer] Care Medicare /       NAME/AGE/GENDER: Hollis Chanel is a 80 y.o. male   PRIMARY DIAGNOSIS: UTI (urinary tract infection) [N39.0] Sepsis ([facility] [address] Sepsis ([facility] [address]       ICD-10: Treatment Diagnosis:    · Generalized Muscle Weakness (M62.81)  · Other lack of cordination (R27.8)  · Other abnormalities of gait and mobility (R26.89)  · History of falling (Z91.81)   Precaution/Allergies:  Patient has no known allergies. ASSESSMENT:     Mr. Annie Prasad was more alert today, partially oriented to place & time, oriented to person but not situation. Pt followed cues but wanted to move at his pace. Pt showed increased gait distance but needed significant assist for all functional mobility. Pt has muscle atrophy throughout, very deconditioned with tenderness in both legs. Pt's T-12 compression fx is reported to be old by hospitalist, no fx's in LE's but degenerative changes interfering with comfortable mobility. This pt will need extensive rehab at SNF , likely short to long term placement.      This section established at most recent assessment   PROBLEM LIST (Impairments causing functional limitations):  1. Decreased Strength  2. Decreased ADL/Functional Activities  3. Decreased Transfer Abilities  4. Decreased Ambulation Ability/Technique  5. Decreased Balance  6. Increased Pain  7. Decreased Activity Tolerance  8. Increased Fatigue  9. Decreased Flexibility/Joint Mobility  10. Decreased Cognition   INTERVENTIONS PLANNED: (Benefits and precautions of physical therapy have been discussed with the patient.)  1. Balance Exercise  2. Bed Mobility  3. Gait Training  4. Range of Motion (ROM)  5. Therapeutic Activites  6. Therapeutic Exercise/Strengthening  7. Transfer Training     TREATMENT PLAN: Frequency/Duration: daily for duration of hospital stay  Rehabilitation Potential For Stated Goals: Good     REHAB RECOMMENDATIONS (at time of discharge pending progress):    Placement: It is my opinion, based on this patient's performance to date, that Mr. Pablo Roy may benefit from intensive therapy at a [address] [place] [address] after discharge due to the functional deficits listed above that are likely to improve with skilled rehabilitation and concerns that he/she may be unsafe to be unsupervised at home due to the need for 24 hr nursing, and intense PT services on a daily basis. Equipment:    May need RW if he doesn't have one already              HISTORY:   History of Present Injury/Illness (Reason for Referral): Admitted with UTI, T12 compression fx  Past Medical History/Comorbidities:   Mr. Pablo Roy  has a past medical history of CAD (coronary artery disease) (7/18/2019), Colon cancer ([facility] [address] (01/2012), and Hiatal hernia. Mr. Pablo Roy  has no past surgical history on file.   Social History/Living Environment:   Home Environment: [address] Name: [place]  # Steps to Enter: 0  One/Two Story Residence: One story  Living Alone: No  Support Systems: Child(margi)  Patient Expects to be Discharged to[de-identified] Assisted living  Current DME Used/Available at Home: None  Tub or Shower Type: Shower  Prior Level of Function/Work/Activity:  Resides in [facility]. Unable to let me kn ow his prior functional level  Dominant Side:         RIGHT   Number of Personal Factors/Comorbidities that affect the Plan of Care: 3+: HIGH COMPLEXITY   EXAMINATION:   Most Recent Physical Functioning:   Gross Assessment: 2/5 to 3-/5 throughout                       Balance:  Sitting: Intact; Without support  Standing: Impaired; With support (walker) Bed Mobility:  Supine to Sit: Moderate assistance  Sit to Supine: Minimum assistance  Scooting: Moderate assistance       Transfers:  Sit to Stand: Moderate assistance  Stand to Sit: Moderate assistance  Bed to Chair: Moderate assistance  Duration: 42 Minutes (extra time to wor through activity noted)  Gait:     Speed/Estrellita: Shuffled  Step Length: Left shortened;Right shortened  Stance: Right decreased  Gait Abnormalities: Decreased step clearance;Trunk sway increased (knees buckled, flexed posture)  Distance (ft): 20 Feet (ft)  Assistive Device: Walker, rolling  Ambulation - Level of Assistance: Moderate assistance  Interventions: Safety awareness training;Verbal cues      Body Structures Involved:  1. Bones  2. Joints  3. Muscles Body Functions Affected:  1. Genitourinary  2. Neuromusculoskeletal  3. Movement Related Activities and Participation Affected:  1. General Tasks and Demands  2. Mobility  3. Self Care   Number of elements that affect the Plan of Care: 4+: HIGH COMPLEXITY   CLINICAL PRESENTATION:   Presentation: Stable and uncomplicated: LOW COMPLEXITY   CLINICAL DECISION MAKIN [facility] [address] [id number] AM-PAC 6 Clicks   Basic Mobility Inpatient Short Form  How much difficulty does the patient currently have. .. Unable A Lot A Little None   1. Turning over in bed (including adjusting bedclothes, sheets and blankets)? [] 1   [] 2   [x] 3   [] 4   2.   Sitting down on and standing up from a chair with arms ( e.g., wheelchair, bedside commode, etc.)   [] 1   [] 2   [x] 3   [] 4   3. Moving from lying on back to sitting on the side of the bed? [] 1   [x] 2   [] 3   [] 4   How much help from another person does the patient currently need. .. Total A Lot A Little None   4. Moving to and from a bed to a chair (including a wheelchair)? [] 1   [] 2   [x] 3   [] 4   5. Need to walk in hospital room? [] 1   [x] 2   [] 3   [] 4   6. Climbing 3-5 steps with a railing? [x] 1   [] 2   [] 3   [] 4   © 2007, Trustees of [facility] [employer], under license to [employer]. All rights reserved      Score:  Initial: 14 Most Recent: X (Date: -- )    Interpretation of Tool:  Represents activities that are increasingly more difficult (i.e. Bed mobility, Transfers, Gait). Medical Necessity:     · Patient demonstrates   · good  ·  rehab potential due to higher previous functional level. Reason for Services/Other Comments:  · Patient   · continues to require present interventions due to patient's inability to perform functional mobility at a safe CGA level  · . Use of outcome tool(s) and clinical judgement create a POC that gives a: Clear prediction of patient's progress: LOW COMPLEXITY            TREATMENT:   (In addition to Assessment/Re-Assessment sessions the following treatments were rendered)   Pre-treatment Symptoms/Complaints:  Pt agreeable  Pain: Initial: numeric scale  Pain Intensity 1: 3  Pain Location 1: Leg  Pain Orientation 1: Left,Right (throughout)  Pain Intervention(s) 1: Ambulation/Increased Activity,Repositioned  Post Session:  3/10   Therapeutic Activity: (  42 Minutes (extra time to wor through activity noted) ):  Therapeutic activities including LE AROM -AAROM as a warm up, repeated rolling & supine<>sit for practice, transfers & standing balance with walker, progressive gait training to improve mobility, strength, balance, coordination and dynamic movement of arm - bilateral, leg - bilateral and core to improve functional endurance & stability. Braces/Orthotics/Lines/Etc:   · IV  · O2 Device: None (Room air)  Treatment/Session Assessment:    · Response to Treatment:  Pt showing progress  · Interdisciplinary Collaboration:   o Occupational Therapist  o Registered Nurse  o Physician  o   o Certified Nursing Assistant/Patient Care Technician  · After treatment position/precautions:   o Supine in bed  o Bed alarm/tab alert on  o Bed/Chair-wheels locked  o Bed in low position  o Call light within reach  o RN notified  o Side rails x 3   · Compliance with Program/Exercises: Will assess as treatment progresses  · Recommendations/Intent for next treatment session: \"Next visit will focus on reduction in assistance provided\".   Total Treatment Duration:  PT Patient Time In/Time Out  Time In: 6801  Time Out: [address], PT

## 2021-12-19 NOTE — PROGRESS NOTES
END OF SHIFT NOTE:    Intake/Output  12/19 0701 - 12/19 1900  In: 180 [P.O.:180]  Out: -    Voiding: YES  Catheter: YES external  Drain:              Stool:  2 occurrences. Stool Assessment  Stool Color: Black (12/19/21 1034)  Stool Appearance: Soft (12/19/21 1034)  Stool Amount: Medium (12/19/21 1034)  Stool Source/Status: Rectum (12/19/21 1034)    Emesis:  0 occurrences. VITAL SIGNS  Patient Vitals for the past 12 hrs:   Temp Pulse Resp BP SpO2   12/19/21 1511 98.6 °F (37 °C) 91 19 131/75 92 %   12/19/21 1040 98.2 °F (36.8 °C) (!) 102 18 127/84 97 %   12/19/21 0720 97.8 °F (36.6 °C) 91 18 119/79 95 %       Pain Assessment  Pain 1  Pain Scale 1: Numeric (0 - 10) (12/19/21 1230)  Pain Intensity 1: 0 (12/19/21 1230)  Patient Stated Pain Goal: 0 (12/19/21 1230)  Pain Reassessment 1: Yes (12/19/21 1230)  Pain Location 1: Leg (12/19/21 1116)  Pain Orientation 1: Left;Right (throughout) (12/19/21 1116)  Pain Description 1: Sharp (12/17/21 0900)  Pain Intervention(s) 1: Ambulation/Increased Activity;Repositioned (12/19/21 1116)    Ambulating  No    Additional Information:  24 hour ppd read this shift. BM x 2 this shift. Pt removed brief and removed primofit this shift unable to obtain an accurate urine output. Pt removed IV this shift new IV placed. Shift report given to oncoming nurse at the bedside.     Yvonne Troncoso RN

## 2021-12-20 PROBLEM — A41.9 SEPSIS (HCC): Status: RESOLVED | Noted: 2019-12-02 | Resolved: 2021-12-20

## 2021-12-20 PROBLEM — E44.1 MILD PROTEIN-CALORIE MALNUTRITION (HCC): Status: ACTIVE | Noted: 2021-12-20

## 2021-12-20 PROBLEM — G93.41 ACUTE METABOLIC ENCEPHALOPATHY: Status: RESOLVED | Noted: 2021-11-17 | Resolved: 2021-12-20

## 2021-12-20 PROCEDURE — 65270000029 HC RM PRIVATE

## 2021-12-20 PROCEDURE — 74011250637 HC RX REV CODE- 250/637: Performed by: NURSE PRACTITIONER

## 2021-12-20 PROCEDURE — 74011000258 HC RX REV CODE- 258: Performed by: NURSE PRACTITIONER

## 2021-12-20 PROCEDURE — 74011250636 HC RX REV CODE- 250/636: Performed by: NURSE PRACTITIONER

## 2021-12-20 RX ADMIN — CEFTRIAXONE 1 G: 1 INJECTION, POWDER, FOR SOLUTION INTRAMUSCULAR; INTRAVENOUS at 14:02

## 2021-12-20 RX ADMIN — Medication 10 ML: at 13:42

## 2021-12-20 RX ADMIN — MULTIPLE VITAMINS W/ MINERALS TAB 1 TABLET: TAB at 08:43

## 2021-12-20 RX ADMIN — FAMOTIDINE 20 MG: 20 TABLET ORAL at 08:43

## 2021-12-20 RX ADMIN — FAMOTIDINE 20 MG: 20 TABLET ORAL at 21:13

## 2021-12-20 RX ADMIN — Medication 10 ML: at 06:00

## 2021-12-20 RX ADMIN — ENOXAPARIN SODIUM 30 MG: 100 INJECTION SUBCUTANEOUS at 08:44

## 2021-12-20 RX ADMIN — Medication 10 ML: at 21:13

## 2021-12-20 NOTE — PROGRESS NOTES
Hospitalist Progress Note   Admit Date:  12/15/2021  1:59 PM   Name:  Paige Allison   Age:  80 y.o. Sex:  male  :  1931   MRN:  [id number]   Room:  [facility]/    Presenting Complaint: Fatigue    Reason(s) for Admission: UTI (urinary tract infection) [N39.0]     Hospital Course & Interval History:   Mr. Genoveva Palm is a 80 y.o. male with medical history of  Recurrent UTIs, dementia, CAD Hiatal hernia who presented to the ER  with a complaint of combativeness with weakness, poor appetite and inability to stand. Symptoms began on Wednesday of last week, per daughter-in-law with patient becoming combative at [facility]. Patient is pleasantly confused and information obtained from daughter-in-law at bedside who states that on Tuesday of this week patient was unable to get OOB or stand. She reports history of multiple falls in July and August but is unaware of any falls at [facility]. Patient noted to be treated for UTI in Oct and November with UCx positive for [place]. Subjective (21):  Pt remains confused. Knows name. Defers other orientation questions. Does seem less confused today. Pt is pleasant and cooperative. Assessment & Plan:     Sepsis secondary to UTI (urinary tract infection) (12/15/2021)  -Patient has had previous UCx positive for Serratio susceptible to Rocephin. Will continue Rocephin  -With likelihood of BPH, bladder scan q shift ordered  -Urology consulted   Urology to follow up outpatient.   Follow cx and plan for 7 day abx treatment per Urology  Lactic acid cleared   Urine culture contaminated - will treat with 7 days total   Continue Ceftriaxone while inpatient - EOT 21 Sepsis resolved, continue Ceftriaxone     Hx of multiple falls  -Per family patient denied dizziness, LOC stating he does not know why he falls   -PT/OT consulted  -Orthostatics prn      T12 fx  -12/15 Noted on imaging; not seen on XR 21  - Did appear on imaging from 10/2021  -Ortho spine consulted for recs  -Prn analgesia   12/17 Ortho spine consulted, no intervention recommended.      Dementia  -Supportive care  -Redirect prn      Hiatal Hernia  -Noted     Acute encephalopathy  12/16 underlying dementia  12/17 encephalopathy work up TSH, B12, folate, ammonia - all normal  12/18 Still confused, but mildly improved. Per nursing this is how he was previous admission.   12/19 Seems less confused today, but still really confused - likely dementia at this point  12/20 Pleasantly confused, encephalopathy likely resolved at this point      Dispo/Discharge Planning:     Dispo pending      Diet: ADULT DIET Regular; No Salt Added (3-4 gm)  VTE ppx: Lovenox SQ   Code status: Full Code    Hospital Problems as of 12/20/2021 Date Reviewed: 11/19/2021          Codes Class Noted - Resolved POA    * (Principal) Sepsis ([facility] ICD-10-CM: A41.9  ICD-9-CM: 038.9, 995.91  12/2/2019 - Present Yes        Debility (Chronic) ICD-10-CM: R53.81  ICD-9-CM: 799.3  12/2/2019 - Present Yes        Mild protein-calorie malnutrition ([facility] ICD-10-CM: E44.1  ICD-9-CM: 263.1  12/20/2021 - Present Yes        UTI (urinary tract infection) ICD-10-CM: N39.0  ICD-9-CM: 599.0  12/15/2021 - Present Yes        Acute metabolic encephalopathy [facility]: G93.41  ICD-9-CM: 348.31  11/17/2021 - Present Yes        Dementia ([facility] (Chronic) ICD-10-CM: F03.90  ICD-9-CM: 294.20  12/23/2019 - Present Yes        RESOLVED: Lactic acidosis ICD-10-CM: E87.2  ICD-9-CM: 276.2  12/15/2021 - 12/15/2021 Yes              Objective:     Patient Vitals for the past 24 hrs:   Temp Pulse Resp BP SpO2   12/20/21 0740 97.9 °F (36.6 °C) 79 18 127/68 98 %   12/20/21 0256 98.6 °F (37 °C) 90 15 122/62 91 %   12/19/21 2336 98.4 °F (36.9 °C) 87 16 120/70 90 %   12/19/21 1511 98.6 °F (37 °C) 91 19 131/75 92 %     Oxygen Therapy  O2 Sat (%): 98 % (12/20/21 0740)  Pulse via Oximetry: 69 beats per minute (12/16/21 0356)  O2 Device: None (Room air) (12/19/21 2000)    Estimated body mass index is 19.31 kg/m² as calculated from the following:    Height as of this encounter: 5' 8\" (1.727 m). Weight as of this encounter: 57.6 kg (127 lb). Intake/Output Summary (Last 24 hours) at 12/20/2021 1056  Last data filed at 12/20/2021 0931  Gross per 24 hour   Intake 660 ml   Output 350 ml   Net 310 ml         Physical Exam:     Blood pressure 127/68, pulse 79, temperature 97.9 °F (36.6 °C), resp. rate 18, height 5' 8\" (1.727 m), weight 57.6 kg (127 lb), SpO2 98 %. General:          No overt distress, confused  Head:               Normocephalic, atraumatic  Eyes:               Sclerae appear normal.  Pupils equally round. ENT:                Nares appear normal, no drainage. Moist oral mucosa  Neck:               No restricted ROM. Trachea midline   CV:                  RRR. No m/r/g. No jugular venous distension. Lungs:             CTAB. No wheezing, rhonchi, or rales. Respirations even, unlabored  Abdomen: Bowel sounds present. Soft, nontender, nondistended. Extremities:     No cyanosis or clubbing. No edema  Skin:                No rashes and normal coloration. Warm and dry. Bruising BLE    Neuro:             CN II-XII grossly intact. A&Ox 1-2   Psych:             Confused, pleasant.         I have reviewed ordered lab tests and independently visualized imaging below:    Recent Labs:  Recent Results (from the past 48 hour(s))   PLEASE READ & DOCUMENT PPD TEST IN 24 HRS    Collection Time: 12/19/21 12:30 PM   Result Value Ref Range    PPD Negative Negative    mm Induration 0 0 - 5 mm       All Micro Results     Procedure Component Value Units Date/Time    CULTURE, BLOOD [[id number]] Collected: 12/15/21 1415    Order Status: Completed Specimen: Blood Updated: 12/20/21 0810     Special Requests: --        LEFT  Antecubital       Culture result: NO GROWTH 5 DAYS       CULTURE, BLOOD [[id number]] Collected: 12/15/21 1439    Order Status: Completed Specimen: Blood Updated: 12/20/21 2718     Special Requests: --        RIGHT  Antecubital       Culture result: NO GROWTH 5 DAYS       CULTURE, URINE [[id number]] Collected: 12/15/21 1439    Order Status: Completed Specimen: Urine Updated: 12/18/21 0744     Special Requests: NO SPECIAL REQUESTS        Culture result:       <10,000 COLONIES/mL MIXED SKIN ROSE ISOLATED                Other Studies:  No results found.     Current Meds:  Current Facility-Administered Medications   Medication Dose Route Frequency    sodium chloride (NS) flush 5-40 mL  5-40 mL IntraVENous Q8H    sodium chloride (NS) flush 5-40 mL  5-40 mL IntraVENous PRN    acetaminophen (TYLENOL) tablet 650 mg  650 mg Oral Q6H PRN    polyethylene glycol (MIRALAX) packet 17 g  17 g Oral DAILY PRN    ondansetron (ZOFRAN ODT) tablet 4 mg  4 mg Oral Q8H PRN    Or    ondansetron (ZOFRAN) injection 4 mg  4 mg IntraVENous Q6H PRN    enoxaparin (LOVENOX) injection 30 mg  30 mg SubCUTAneous DAILY    cefTRIAXone (ROCEPHIN) 1 g in 0.9% sodium chloride (MBP/ADV) 50 mL MBP  1 g IntraVENous Q24H    acetaminophen (TYLENOL) tablet 650 mg  650 mg Oral Q6H PRN    famotidine (PEPCID) tablet 20 mg  20 mg Oral Q12H    ondansetron (ZOFRAN ODT) tablet 4 mg  4 mg Oral Q8H PRN    multivitamin, tx-iron-ca-min (THERA-M w/ IRON) tablet 1 Tablet  1 Tablet Oral DAILY    nitroglycerin (NITROSTAT) tablet 0.4 mg  0.4 mg SubLINGual Q5MIN PRN       Signed:  Ronni Acevedo DO

## 2021-12-20 NOTE — PROGRESS NOTES
Called and LVM for Kenzie at [facility] to return my call regarding referral.     No callback.  AZ Duran called again and LVM to please return her call regarding referral.

## 2021-12-20 NOTE — ADT AUTH CERT NOTES
Musculoskeletal Disease GRG - Care Day 1 (12/17/2021) by Naila Holguin RN       Review Status Review Entered   Completed 12/20/2021 10:32      Criteria Review      Care Day: 1 Care Date: 12/17/2021 Level of Care:    Guideline Day 1    Clinical Status    ( ) * Clinical Indications met    Interventions    (X) Inpatient interventions as needed    12/20/2021 10:32:03 EST by Geovanni Kenyon      ROCEPHIN 1G QD IV    * Milestone   Additional Notes   Pt remains confused. Talking about selling cars.  Pt is pleasant and cooperative.        Assessment & Plan:       Sepsis secondary to UTI (urinary tract infection) (12/15/2021)   -Patient has had previous UCx positive for Serratio susceptible to Rocephin.  Will continue Rocephin   -With likelihood of BPH, bladder scan q shift ordered   -Urology consulted   12/16 Urology to follow up outpatient.  Follow cx and plan for 7 day abx treatment per Urology   Lactic acid cleared   12/17 Urine culture contaminated - will treat with 7 days total       Hx of multiple falls   -Per family patient denied dizziness, LOC stating he does not know why he falls    -PT/OT consulted   -Orthostatics prn        T12 fx   -Noted on imaging today; not seen on XR 7/28/21    -Ortho spine consulted for recs   -Prn analgesia    12/17 Ortho spine consulted, no intervention recommended.        Dementia   -Supportive care   -Redirect prn        Hiatal Hernia   -Noted        Acute encephalopathy   12/16 underlying dementia   12/17 encephalopathy work up TSH, B12, folate, ammonia - all normal       YAMILEX   12/16 IVF   12/17 Resolved - stop IVFs            /72, TEMP 98.3, HR 90, RR 20, O2 95 ON RA         NO PERTINENT LABS      TIB/FIB XR: Bilateral chondrocalcinosis at the knee and moderate arterial   calcifications.       PT, OT, UP AD HOWARD              Musculoskeletal Disease GRG - Clinical Indications for Admission to Inpatient Care by Naila Holguin RN       Review Status Review Entered Completed 12/20/2021 10:31      Criteria Review      Clinical Indications for Admission to Inpatient Care    Most Recent : Geovanni Kenyon Most Recent Date: 12/20/2021 10:31:03 EST    (X) Hospital admission is needed for appropriate care of the patient because of  1 or more  of    the following :       (X) Fracture, dislocation, or other musculoskeletal injury requiring inpatient care (medical),       as indicated by  1 or more  of the following  (11) (12) (13) (14) (15) (16):          (X) Vertebral fracture requiring observation for instability or neurologic compromise (17) (18)          12/20/2021 10:31:03 EST by Geovanni Kenyon            LUMBAR SPINE XR: 1. 50% compression fracture deformity at T12.  2. Degenerative grade 1 anterolisthesis at L4-L5 from degenerative facet arthropathy.

## 2021-12-20 NOTE — PROGRESS NOTES
Pt VSS pt confused throughout shift slept most of shift not eating well during shift voiding well call light in reach instructed to call with any needs bed alarm on

## 2021-12-20 NOTE — PROGRESS NOTES
Physician Progress Note      PATIENT:               Kirti Vang  CSN #:                  [id number]  :                       1931  ADMIT DATE:       12/15/2021 1:59 PM  DISCH DATE:  RESPONDING  PROVIDER #:        Brandie CERON DO          QUERY TEXT:    Patient admitted with UTI with sepsis. Pt. is noted with BMI of 19.31 and Nutrition Assessment ,  notes mild malnutrition. If possible, please document in progress notes and discharge summary if you are evaluating and /or treating any of the following: The medical record reflects the following:  Risk Factors: Sepsis, YAMILEX, Metabolic Encephalopathy, age 81 y/o  Clinical Indicators: BMI 19.31, Nutrition assessment notes, mild malnutrition. ... Muscle Mass Loss:  1 - Mild muscle mass loss, Clavicles (pectoralis &deltoids),Thigh (quadriceps). Treatment: Nutrition Consult, Nutritional Supplement, Daily wgt,    ASPEN Criteria:  https://aspenjournals. onlinelibrary. merlos. com/doi/full/10.1177/0420904734066516  Options provided:  -- Protein calorie malnutrition mild  -- Underweight with BMI 19.31  -- Other - I will add my own diagnosis  -- Disagree - Not applicable / Not valid  -- Disagree - Clinically unable to determine / Unknown  -- Refer to Clinical Documentation Reviewer    PROVIDER RESPONSE TEXT:    This patient has mild protein calorie malnutrition.     Query created by: wilfredo box on 2021 6:19 AM      Electronically signed by:  Sophia Govea DO 2021 9:05 AM

## 2021-12-20 NOTE — PROGRESS NOTES
Problem: Patient Education: Go to Patient Education Activity  Goal: Patient/Family Education  Outcome: Progressing Towards Goal     Problem: Pressure Injury - Risk of  Goal: *Prevention of pressure injury  Description: Document Tyler Scale and appropriate interventions in the flowsheet. Outcome: Progressing Towards Goal  Note: Pressure Injury Interventions:  Sensory Interventions: Assess changes in LOC    Moisture Interventions: Absorbent underpads    Activity Interventions: Increase time out of bed    Mobility Interventions: HOB 30 degrees or less    Nutrition Interventions: Offer support with meals,snacks and hydration    Friction and Shear Interventions: Apply protective barrier, creams and emollients                Problem: Pressure Injury - Risk of  Goal: *Prevention of pressure injury  Description: Document Tyler Scale and appropriate interventions in the flowsheet.   Outcome: Progressing Towards Goal  Note: Pressure Injury Interventions:  Sensory Interventions: Assess changes in LOC    Moisture Interventions: Absorbent underpads    Activity Interventions: Increase time out of bed    Mobility Interventions: HOB 30 degrees or less    Nutrition Interventions: Offer support with meals,snacks and hydration    Friction and Shear Interventions: Apply protective barrier, creams and emollients

## 2021-12-20 NOTE — PROGRESS NOTES
Problem: Pressure Injury - Risk of  Goal: *Prevention of pressure injury  Description: Document Tyler Scale and appropriate interventions in the flowsheet. Outcome: Progressing Towards Goal  Note: Pressure Injury Interventions:  Sensory Interventions: Assess changes in LOC,Maintain/enhance activity level,Pressure redistribution bed/mattress (bed type)    Moisture Interventions: Absorbent underpads,Internal/External urinary devices,Maintain skin hydration (lotion/cream)    Activity Interventions: Increase time out of bed,Pressure redistribution bed/mattress(bed type)    Mobility Interventions: HOB 30 degrees or less,Pressure redistribution bed/mattress (bed type)    Nutrition Interventions: Offer support with meals,snacks and hydration,Document food/fluid/supplement intake    Friction and Shear Interventions: Apply protective barrier, creams and emollients,Foam dressings/transparent film/skin sealants,HOB 30 degrees or less,Minimize layers                Problem: Falls - Risk of  Goal: *Absence of Falls  Description: Document Vale Fall Risk and appropriate interventions in the flowsheet.   Outcome: Progressing Towards Goal  Note: Fall Risk Interventions:  Mobility Interventions: Bed/chair exit alarm,Communicate number of staff needed for ambulation/transfer,Patient to call before getting OOB    Mentation Interventions: Bed/chair exit alarm,Adequate sleep, hydration, pain control,Door open when patient unattended,Evaluate medications/consider consulting pharmacy,More frequent rounding,Reorient patient,Room close to nurse's station    Medication Interventions: Bed/chair exit alarm,Evaluate medications/consider consulting pharmacy,Patient to call before getting OOB,Teach patient to arise slowly    Elimination Interventions: Bed/chair exit alarm,Call light in reach,Patient to call for help with toileting needs,Toilet paper/wipes in reach,Toileting schedule/hourly rounds    History of Falls Interventions: Bed/chair exit alarm,Consult care management for discharge planning,Door open when patient unattended,Evaluate medications/consider consulting pharmacy,Investigate reason for fall,Room close to nurse's station

## 2021-12-21 PROBLEM — N39.0 UTI (URINARY TRACT INFECTION): Status: RESOLVED | Noted: 2021-12-15 | Resolved: 2021-12-21

## 2021-12-21 LAB
ALBUMIN SERPL-MCNC: 2.4 G/DL (ref 3.2–4.6)
ALBUMIN/GLOB SERPL: 0.5 {RATIO} (ref 1.2–3.5)
ALP SERPL-CCNC: 96 U/L (ref 50–136)
ALT SERPL-CCNC: 21 U/L (ref 12–65)
ANION GAP SERPL CALC-SCNC: 2 MMOL/L (ref 7–16)
AST SERPL-CCNC: 28 U/L (ref 15–37)
BASOPHILS # BLD: 0.1 K/UL (ref 0–0.2)
BASOPHILS NFR BLD: 1 % (ref 0–2)
BILIRUB SERPL-MCNC: 0.6 MG/DL (ref 0.2–1.1)
BUN SERPL-MCNC: 29 MG/DL (ref 8–23)
CALCIUM SERPL-MCNC: 10.5 MG/DL (ref 8.3–10.4)
CHLORIDE SERPL-SCNC: 101 MMOL/L (ref 98–107)
CO2 SERPL-SCNC: 32 MMOL/L (ref 21–32)
CREAT SERPL-MCNC: 1.15 MG/DL (ref 0.8–1.5)
DIFFERENTIAL METHOD BLD: ABNORMAL
EOSINOPHIL # BLD: 0 K/UL (ref 0–0.8)
EOSINOPHIL NFR BLD: 0 % (ref 0.5–7.8)
ERYTHROCYTE [DISTWIDTH] IN BLOOD BY AUTOMATED COUNT: 17.2 % (ref 11.9–14.6)
GLOBULIN SER CALC-MCNC: 4.6 G/DL (ref 2.3–3.5)
GLUCOSE SERPL-MCNC: 127 MG/DL (ref 65–100)
HCT VFR BLD AUTO: 33.9 % (ref 41.1–50.3)
HGB BLD-MCNC: 10.8 G/DL (ref 13.6–17.2)
IMM GRANULOCYTES # BLD AUTO: 0.6 K/UL (ref 0–0.5)
IMM GRANULOCYTES NFR BLD AUTO: 4 % (ref 0–5)
LYMPHOCYTES # BLD: 1.3 K/UL (ref 0.5–4.6)
LYMPHOCYTES NFR BLD: 10 % (ref 13–44)
MCH RBC QN AUTO: 29.2 PG (ref 26.1–32.9)
MCHC RBC AUTO-ENTMCNC: 31.9 G/DL (ref 31.4–35)
MCV RBC AUTO: 91.6 FL (ref 79.6–97.8)
MONOCYTES # BLD: 1.1 K/UL (ref 0.1–1.3)
MONOCYTES NFR BLD: 8 % (ref 4–12)
NEUTS SEG # BLD: 10.4 K/UL (ref 1.7–8.2)
NEUTS SEG NFR BLD: 77 % (ref 43–78)
NRBC # BLD: 0 K/UL (ref 0–0.2)
PLATELET # BLD AUTO: 426 K/UL (ref 150–450)
PMV BLD AUTO: 10.4 FL (ref 9.4–12.3)
POTASSIUM SERPL-SCNC: 4.2 MMOL/L (ref 3.5–5.1)
PROT SERPL-MCNC: 7 G/DL (ref 6.3–8.2)
RBC # BLD AUTO: 3.7 M/UL (ref 4.23–5.6)
SODIUM SERPL-SCNC: 135 MMOL/L (ref 136–145)
WBC # BLD AUTO: 13.5 K/UL (ref 4.3–11.1)

## 2021-12-21 PROCEDURE — 36415 COLL VENOUS BLD VENIPUNCTURE: CPT

## 2021-12-21 PROCEDURE — 65270000029 HC RM PRIVATE

## 2021-12-21 PROCEDURE — 85025 COMPLETE CBC W/AUTO DIFF WBC: CPT

## 2021-12-21 PROCEDURE — 97530 THERAPEUTIC ACTIVITIES: CPT

## 2021-12-21 PROCEDURE — 80053 COMPREHEN METABOLIC PANEL: CPT

## 2021-12-21 PROCEDURE — 74011250637 HC RX REV CODE- 250/637: Performed by: NURSE PRACTITIONER

## 2021-12-21 PROCEDURE — 74011250636 HC RX REV CODE- 250/636: Performed by: NURSE PRACTITIONER

## 2021-12-21 RX ADMIN — Medication 10 ML: at 05:00

## 2021-12-21 RX ADMIN — MULTIPLE VITAMINS W/ MINERALS TAB 1 TABLET: TAB at 08:36

## 2021-12-21 RX ADMIN — FAMOTIDINE 20 MG: 20 TABLET ORAL at 08:36

## 2021-12-21 RX ADMIN — Medication 10 ML: at 13:23

## 2021-12-21 RX ADMIN — ENOXAPARIN SODIUM 30 MG: 100 INJECTION SUBCUTANEOUS at 08:36

## 2021-12-21 RX ADMIN — Medication 10 ML: at 21:00

## 2021-12-21 RX ADMIN — ACETAMINOPHEN 650 MG: 325 TABLET, FILM COATED ORAL at 08:36

## 2021-12-21 NOTE — PROGRESS NOTES
END OF SHIFT NOTE:    Intake/Output  12/21 0701 - 12/21 1900  In: 240 [P.O.:240]  Out: -    Voiding: YES  Catheter: NO  Drain:              Stool:  2 occurrences. Stool Assessment  Stool Color: Joeline Burdock (12/21/21 1523)  Stool Appearance: Soft (12/21/21 0801)  Stool Amount: Small (12/21/21 6016)  Stool Source/Status: Incontinence; Rectum (12/21/21 3218)    Emesis:  0 occurrences. VITAL SIGNS  Patient Vitals for the past 12 hrs:   Temp Pulse Resp BP SpO2   12/21/21 1538 97.8 °F (36.6 °C) 89 20 123/79 97 %   12/21/21 1208 97.8 °F (36.6 °C) (!) 59 20 127/71 96 %   12/21/21 0738 97.5 °F (36.4 °C) 96 16 129/74 96 %       Pain Assessment  Pain 1  Pain Scale 1: Visual (12/21/21 1243)  Pain Intensity 1: 0 (12/21/21 1243)  Patient Stated Pain Goal: 0 (12/21/21 1243)  Pain Reassessment 1: Patient resting w/respiratory rate greater than 10 (12/21/21 1243)  Pain Location 1: Leg (12/19/21 1116)  Pain Orientation 1: Left;Right (throughout) (12/19/21 1116)  Pain Description 1: Sharp (12/17/21 0900)  Pain Intervention(s) 1: Ambulation/Increased Activity;Repositioned (12/19/21 1116)    Ambulating  No    Additional Information: pt confused through out shift VSS had to re direct several times through out shift 2 BM during shift voiding well pt not eating much  Of his meals    Shift report given to oncoming nurse at the bedside.     Palmira Simmons RN

## 2021-12-21 NOTE — PROGRESS NOTES
END OF SHIFT NOTE:      VITAL SIGNS  Patient Vitals for the past 12 hrs:   Temp Pulse Resp BP SpO2   12/21/21 0251 97.3 °F (36.3 °C) 72 16 108/62 92 %   12/20/21 2321 98.8 °F (37.1 °C) 99 17 115/68 94 %   12/20/21 2041 98.4 °F (36.9 °C) 90 17 119/72 94 %       -pt pleasantly confused  -voiding in incontinent brief  -pt resting in bed, vss

## 2021-12-21 NOTE — PROGRESS NOTES
Hospitalist Progress Note   Admit Date:  12/15/2021  1:59 PM   Name:  Paige Allison   Age:  80 y.o. Sex:  male  :  1931   MRN:  [id number]   Room:  [facility]/    Presenting Complaint: Fatigue    Reason(s) for Admission: UTI (urinary tract infection) [N39.0]     Hospital Course & Interval History:   Mr. Genoveva Palm is a 80 y.o. male with medical history of  Recurrent UTIs, dementia, CAD Hiatal hernia who presented to the ER  with a complaint of combativeness with weakness, poor appetite and inability to stand. Symptoms began on Wednesday of last week, per daughter-in-law with patient becoming combative at [facility]. Patient is pleasantly confused and information obtained from daughter-in-law at bedside who states that on Tuesday of this week patient was unable to get OOB or stand. She reports history of multiple falls in July and August but is unaware of any falls at [facility]. Patient noted to be treated for UTI in Oct and November with UCx positive for [place]. Subjective (21):  Pt remains confused. Asking about breakfast. Knows name. Defers other orientation questions. Does seem less confused today. Pt is pleasant and cooperative. Assessment & Plan:     Sepsis secondary to UTI (urinary tract infection) (12/15/2021)  -Patient has had previous UCx positive for Serratio susceptible to Rocephin. Will continue Rocephin  -With likelihood of BPH, bladder scan q shift ordered  -Urology consulted   Urology to follow up outpatient.   Follow cx and plan for 7 day abx treatment per Urology  Lactic acid cleared   Urine culture contaminated - will treat with 7 days total   Continue Ceftriaxone while inpatient - EOT 21 Sepsis resolved, continue Ceftriaxone   Complete Ceftriaxone today     Hx of multiple falls  -Per family patient denied dizziness, LOC stating he does not know why he falls   -PT/OT consulted  -Orthostatics prn      T12 fx  -12/15 Noted on imaging; not seen on XR 7/28/21  - Did appear on imaging from 10/2021  -Ortho spine consulted for recs  -Prn analgesia   12/17 Ortho spine consulted, no intervention recommended.      Dementia  -Supportive care  -Redirect prn      Hiatal Hernia  -Noted     Acute encephalopathy  12/16 underlying dementia  12/17 encephalopathy work up TSH, B12, folate, ammonia - all normal  12/18 Still confused, but mildly improved. Per nursing this is how he was previous admission. 12/19 Seems less confused today, but still really confused - likely dementia at this point  12/20 Pleasantly confused, encephalopathy likely resolved at this point      Dispo/Discharge Planning:      To STR once approved - medically stable for discharge     Diet: ADULT DIET Regular; No Salt Added (3-4 gm)  VTE ppx: Lovenox SQ   Code status: Full Code    Hospital Problems as of 12/21/2021 Date Reviewed: 11/19/2021          Codes Class Noted - Resolved POA    RESOLVED: Sepsis ([facility] [address] ICD-10-CM: A41.9  ICD-9-CM: 038.9, 995.91  12/2/2019 - 12/20/2021 Yes        Debility (Chronic) ICD-10-CM: R53.81  ICD-9-CM: 799.3  12/2/2019 - Present Yes        Mild protein-calorie malnutrition ([facility] [address] ICD-10-CM: E44.1  ICD-9-CM: 263.1  12/20/2021 - Present Yes        * (Principal) UTI (urinary tract infection) ICD-10-CM: N39.0  ICD-9-CM: 599.0  12/15/2021 - Present Yes        Dementia ([facility] [address] (Chronic) ICD-10-CM: F03.90  ICD-9-CM: 294.20  12/23/2019 - Present Yes        RESOLVED: Lactic acidosis ICD-10-CM: E87.2  ICD-9-CM: 276.2  12/15/2021 - 12/15/2021 Yes        RESOLVED: Acute metabolic encephalopathy LNL-62-HY: G93.41  ICD-9-CM: 348.31  11/17/2021 - 12/20/2021 Yes              Objective:     Patient Vitals for the past 24 hrs:   Temp Pulse Resp BP SpO2   12/21/21 0738 97.5 °F (36.4 °C) 96 16 129/74 96 %   12/21/21 0251 97.3 °F (36.3 °C) 72 16 108/62 92 %   12/20/21 2321 98.8 °F (37.1 °C) 99 17 115/68 94 %   12/20/21 2041 98.4 °F (36.9 °C) 90 17 119/72 94 %   12/20/21 1607 99.2 °F (37.3 °C) (!) 110 20 129/72 94 %   12/20/21 1152 98.2 °F (36.8 °C) (!) 107 18 128/66 96 %     Oxygen Therapy  O2 Sat (%): 96 % (12/21/21 0706)  Pulse via Oximetry: 69 beats per minute (12/16/21 0356)  O2 Device: None (Room air) (12/21/21 0251)    Estimated body mass index is 19.31 kg/m² as calculated from the following:    Height as of this encounter: 5' 8\" (1.727 m). Weight as of this encounter: 57.6 kg (127 lb). Intake/Output Summary (Last 24 hours) at 12/21/2021 1116  Last data filed at 12/21/2021 0911  Gross per 24 hour   Intake 480 ml   Output --   Net 480 ml         Physical Exam:     Blood pressure 129/74, pulse 96, temperature 97.5 °F (36.4 °C), resp. rate 16, height 5' 8\" (1.727 m), weight 57.6 kg (127 lb), SpO2 96 %. General:          No overt distress, confused  Head:               Normocephalic, atraumatic  Eyes:               Sclerae appear normal.  Pupils equally round. ENT:                Nares appear normal, no drainage. Moist oral mucosa  Neck:               No restricted ROM. Trachea midline   CV:                  RRR. No m/r/g. No jugular venous distension. Lungs:             CTAB. No wheezing, rhonchi, or rales. Respirations even, unlabored  Abdomen: Bowel sounds present. Soft, nontender, nondistended. Extremities:     No cyanosis or clubbing. No edema  Skin:                No rashes and normal coloration. Warm and dry. Bruising BLE    Neuro:             CN II-XII grossly intact. A&Ox 1-2   Psych:             Confused, pleasant.         I have reviewed ordered lab tests and independently visualized imaging below:    Recent Labs:  Recent Results (from the past 48 hour(s))   PLEASE READ & DOCUMENT PPD TEST IN 24 HRS    Collection Time: 12/19/21 12:30 PM   Result Value Ref Range    PPD Negative Negative    mm Induration 0 0 - 5 mm       All Micro Results     Procedure Component Value Units Date/Time    CULTURE, BLOOD [[id number]] Collected: 12/15/21 1415    Order Status: Completed Specimen: Blood Updated: 12/20/21 0810     Special Requests: --        LEFT  Antecubital       Culture result: NO GROWTH 5 DAYS       CULTURE, BLOOD [[id number]] Collected: 12/15/21 1439    Order Status: Completed Specimen: Blood Updated: 12/20/21 0810     Special Requests: --        RIGHT  Antecubital       Culture result: NO GROWTH 5 DAYS       CULTURE, URINE [[id number]] Collected: 12/15/21 1439    Order Status: Completed Specimen: Urine Updated: 12/18/21 0744     Special Requests: NO SPECIAL REQUESTS        Culture result:       <10,000 COLONIES/mL MIXED SKIN ROSE ISOLATED                Other Studies:  No results found.     Current Meds:  Current Facility-Administered Medications   Medication Dose Route Frequency    sodium chloride (NS) flush 5-40 mL  5-40 mL IntraVENous Q8H    sodium chloride (NS) flush 5-40 mL  5-40 mL IntraVENous PRN    acetaminophen (TYLENOL) tablet 650 mg  650 mg Oral Q6H PRN    polyethylene glycol (MIRALAX) packet 17 g  17 g Oral DAILY PRN    ondansetron (ZOFRAN ODT) tablet 4 mg  4 mg Oral Q8H PRN    Or    ondansetron (ZOFRAN) injection 4 mg  4 mg IntraVENous Q6H PRN    enoxaparin (LOVENOX) injection 30 mg  30 mg SubCUTAneous DAILY    acetaminophen (TYLENOL) tablet 650 mg  650 mg Oral Q6H PRN    famotidine (PEPCID) tablet 20 mg  20 mg Oral Q12H    ondansetron (ZOFRAN ODT) tablet 4 mg  4 mg Oral Q8H PRN    multivitamin, tx-iron-ca-min (THERA-M w/ IRON) tablet 1 Tablet  1 Tablet Oral DAILY    nitroglycerin (NITROSTAT) tablet 0.4 mg  0.4 mg SubLINGual Q5MIN PRN       Signed:  Farooq Zepeda DO

## 2021-12-21 NOTE — PROGRESS NOTES
[facility] called to inform CM they have no manager care bed availability until next week. Krystian from [address] reports he does not have any unvaccinated beds this week. SW called and left a voicemail for dtr-in-law, [employer]. SW following. Dominique Kim, [facility] Mac[place] called SW back to state pt had 1 vaccine injection, she is unsure which vaccine. Jameel Macias will get the vaccine card this afternoonand let SW know. [place] will f/u with [facility] after vaccine information is obtained.    LU[place] Mchugh

## 2021-12-21 NOTE — PROGRESS NOTES
Problem: Mobility Impaired (Adult and Pediatric)  Goal: *Acute Goals and Plan of Care (Insert Text)  Note: STG:  (1.)Mr. Elham Durbin will move from supine to sit and sit to supine  with CONTACT GUARD ASSIST within 7 treatment day(s). (2.)Mr. Ehlam Durbin will transfer from bed to chair and chair to bed with CONTACT GUARD ASSIST using the least restrictive device within 7 treatment day(s). (3.)Mr. Elham Durbin will ambulate with CONTACT GUARD ASSIST for 150 feet with the least restrictive device within 7 treatment day(s). (4.)Pt. will increase B LE strength 1/2 grade within 7 days      ________________________________________________________________________________________________      PHYSICAL THERAPY: Daily Note and PM 12/21/2021  INPATIENT: PT Visit Days : 4  Payor: Jose Raul Judd / [facility] [employer] [facility]PFFS / Product Type: [employer] Care Medicare /       NAME/AGE/GENDER: Tracee Wu is a 80 y.o. male   PRIMARY DIAGNOSIS: UTI (urinary tract infection) [N39.0] UTI (urinary tract infection) UTI (urinary tract infection)       ICD-10: Treatment Diagnosis:    · Generalized Muscle Weakness (M62.81)  · Other lack of cordination (R27.8)  · Other abnormalities of gait and mobility (R26.89)  · History of falling (Z91.81)   Precaution/Allergies:  Patient has no known allergies. ASSESSMENT:     Mr. Elham Durbin was more alert today, partially oriented to place & time, oriented to person but not situation. Pt followed cues but wanted to move at his pace. Pt showed increased gait distance but needed significant assist for all functional mobility. Pt has muscle atrophy throughout, very deconditioned with tenderness in both legs. Pt's T-12 compression fx is reported to be old by hospitalist, no fx's in LE's but degenerative changes interfering with comfortable mobility. This pt will need extensive rehab at [facility] , likely short to long term placement.    12/21  Supine upon arrival.  Work on bed mobility as follows:supine>eob with Mod A and verbal cues to stay on track. Performs B LE exercises while on eob with Mod A and constant support. Return to supine and performs more B LE exercise with verbal.  Pt is confused and having a hard time staying focused. Left in the bed with needs in reach, alarm on and instructed to call for assist.    This section established at most recent assessment   PROBLEM LIST (Impairments causing functional limitations):  1. Decreased Strength  2. Decreased ADL/Functional Activities  3. Decreased Transfer Abilities  4. Decreased Ambulation Ability/Technique  5. Decreased Balance  6. Increased Pain  7. Decreased Activity Tolerance  8. Increased Fatigue  9. Decreased Flexibility/Joint Mobility  10. Decreased Cognition   INTERVENTIONS PLANNED: (Benefits and precautions of physical therapy have been discussed with the patient.)  1. Balance Exercise  2. Bed Mobility  3. Gait Training  4. Range of Motion (ROM)  5. Therapeutic Activites  6. Therapeutic Exercise/Strengthening  7. Transfer Training     TREATMENT PLAN: Frequency/Duration: daily for duration of hospital stay  Rehabilitation Potential For Stated Goals: Good     REHAB RECOMMENDATIONS (at time of discharge pending progress):    Placement: It is my opinion, based on this patient's performance to date, that Mr. Bibiana Bowen may benefit from intensive therapy at [address] after discharge due to the functional deficits listed above that are likely to improve with skilled rehabilitation and concerns that he/she may be unsafe to be unsupervised at home due to the need for 24 hr nursing, and intense PT services on a daily basis. Equipment:    May need RW if he doesn't have one already              HISTORY:   History of Present Injury/Illness (Reason for Referral):   Admitted with UTI, T12 compression fx  Past Medical History/Comorbidities:   Mr. Bibiana Bowen  has a past medical history of CAD (coronary artery disease) (2019), Colon cancer ([facility] [address] (2012), and Hiatal hernia. Mr. Ginna Ocampo  has no past surgical history on file. Social History/Living Environment:   [facility] [address] [place]  # Steps to Enter: 0  One/Two Story Residence: One story  Living Alone: No  Support Systems: Child(margi)  Patient Expects to be Discharged to[de-identified] Assisted living  Current DME Used/Available at Home: None  Tub or Shower Type: Shower  Prior Level of Function/Work/Activity:  Resides in [place]. Unable to let me kn ow his prior functional level  Dominant Side:         RIGHT   Number of Personal Factors/Comorbidities that affect the Plan of Care: 3+: HIGH COMPLEXITY   EXAMINATION:   Most Recent Physical Functioning:   Gross Assessment: 2/5 to 3-/5 throughout                       Balance:  Sitting: Intact; Without support  Standing:  (attempted but very confused ) Bed Mobility:  Supine to Sit: Moderate assistance  Sit to Supine: Moderate assistance  Scooting: Moderate assistance       Transfers:  Sit to Stand:  (attempted but confused)  Duration: 23 Minutes  Gait:            Body Structures Involved:  1. Bones  2. Joints  3. Muscles Body Functions Affected:  1. Genitourinary  2. Neuromusculoskeletal  3. Movement Related Activities and Participation Affected:  1. General Tasks and Demands  2. Mobility  3. Self Care   Number of elements that affect the Plan of Care: 4+: HIGH COMPLEXITY   CLINICAL PRESENTATION:   Presentation: Stable and uncomplicated: LOW COMPLEXITY   CLINICAL DECISION MAKIN [facility] [id number] AM-PAC 6 Clicks   Basic Mobility Inpatient Short Form  How much difficulty does the patient currently have. .. Unable A Lot A Little None   1. Turning over in bed (including adjusting bedclothes, sheets and blankets)? [] 1   [] 2   [x] 3   [] 4   2. Sitting down on and standing up from a chair with arms ( e.g., wheelchair, bedside commode, etc.)   [] 1   [] 2   [x] 3   [] 4   3.   Moving from lying on back to sitting on the side of the bed? [] 1   [x] 2   [] 3   [] 4   How much help from another person does the patient currently need. .. Total A Lot A Little None   4. Moving to and from a bed to a chair (including a wheelchair)? [] 1   [] 2   [x] 3   [] 4   5. Need to walk in hospital room? [] 1   [x] 2   [] 3   [] 4   6. Climbing 3-5 steps with a railing? [x] 1   [] 2   [] 3   [] 4   © 2007, Trustees of [facility] [employer], under license to [employer]. All rights reserved      Score:  Initial: 14 Most Recent: X (Date: -- )    Interpretation of Tool:  Represents activities that are increasingly more difficult (i.e. Bed mobility, Transfers, Gait). Medical Necessity:     · Patient demonstrates   · good  ·  rehab potential due to higher previous functional level. Reason for Services/Other Comments:  · Patient   · continues to require present interventions due to patient's inability to perform functional mobility at a safe CGA level  · . Use of outcome tool(s) and clinical judgement create a POC that gives a: Clear prediction of patient's progress: LOW COMPLEXITY            TREATMENT:   (In addition to Assessment/Re-Assessment sessions the following treatments were rendered)   Pre-treatment Symptoms/Complaints:  Pt agreeable  Pain: Initial: numeric scale     Post Session:  3/10   Therapeutic Activity: (  23 Minutes ):  Therapeutic activities including LE AROM -AAROM as a warm up, mobility, strength, balance, coordination and dynamic movement of arm - bilateral, leg - bilateral and core to improve functional endurance & stability.     Date:  12/21 Date:   Date:     Activity/Exercise Parameters Parameters Parameters   Ankle pumps 10 B P     heelslides 10 B P     Hip abd/add 10 B P     SAQ 10 B P     SLR 10 B P     LAQ in sitting 10 B P                 Braces/Orthotics/Lines/Etc:   · IV  · O2 Device: None (Room air)  Treatment/Session Assessment:    · Response to Treatment:  Pt showing progress, confused and not following commands. · Interdisciplinary Collaboration:   o Physical Therapy Assistant  o Registered Nurse  · After treatment position/precautions:   o Supine in bed  o Bed alarm/tab alert on  o Bed/Chair-wheels locked  o Bed in low position  o Call light within reach  o RN notified  o Side rails x 3   · Compliance with Program/Exercises: Will assess as treatment progresses  · Recommendations/Intent for next treatment session: \"Next visit will focus on reduction in assistance provided\".   Total Treatment Duration:  PT Patient Time In/Time Out  Time In: 1300  Time Out: [facility] Zaynab Anand, PTA

## 2021-12-22 PROCEDURE — 97530 THERAPEUTIC ACTIVITIES: CPT

## 2021-12-22 PROCEDURE — 74011250636 HC RX REV CODE- 250/636: Performed by: NURSE PRACTITIONER

## 2021-12-22 PROCEDURE — 74011250637 HC RX REV CODE- 250/637: Performed by: NURSE PRACTITIONER

## 2021-12-22 PROCEDURE — 65270000029 HC RM PRIVATE

## 2021-12-22 RX ADMIN — MULTIPLE VITAMINS W/ MINERALS TAB 1 TABLET: TAB at 09:20

## 2021-12-22 RX ADMIN — FAMOTIDINE 20 MG: 20 TABLET ORAL at 09:20

## 2021-12-22 RX ADMIN — Medication 10 ML: at 06:00

## 2021-12-22 RX ADMIN — FAMOTIDINE 20 MG: 20 TABLET ORAL at 01:08

## 2021-12-22 RX ADMIN — ENOXAPARIN SODIUM 30 MG: 100 INJECTION SUBCUTANEOUS at 09:20

## 2021-12-22 RX ADMIN — Medication 10 ML: at 14:37

## 2021-12-22 RX ADMIN — FAMOTIDINE 20 MG: 20 TABLET ORAL at 23:50

## 2021-12-22 RX ADMIN — Medication 10 ML: at 22:00

## 2021-12-22 NOTE — PROGRESS NOTES
Problem: Mobility Impaired (Adult and Pediatric)  Goal: *Acute Goals and Plan of Care (Insert Text)  Note: STG:  (1.)Mr. Atul Correa will move from supine to sit and sit to supine  with CONTACT GUARD ASSIST within 7 treatment day(s). (2.)Mr. Atul Correa will transfer from bed to chair and chair to bed with CONTACT GUARD ASSIST using the least restrictive device within 7 treatment day(s). (3.)Mr. Atul Correa will ambulate with CONTACT GUARD ASSIST for 150 feet with the least restrictive device within 7 treatment day(s). (4.)Pt. will increase B LE strength 1/2 grade within 7 days      ________________________________________________________________________________________________      PHYSICAL THERAPY: Daily Note and PM 12/22/2021  INPATIENT: PT Visit Days : 5  Payor: D[facility] Rg / Plan: [facility] [employer] MEDICARE CHOICE PPO/PFFS / Product Type: [employer] /       NAME/AGE/GENDER: Jean Dempsey is a 80 y.o. male   PRIMARY DIAGNOSIS: UTI (urinary tract infection) [N39.0] UTI (urinary tract infection) UTI (urinary tract infection)       ICD-10: Treatment Diagnosis:    · Generalized Muscle Weakness (M62.81)  · Other lack of cordination (R27.8)  · Other abnormalities of gait and mobility (R26.89)  · History of falling (Z91.81)   Precaution/Allergies:  Patient has no known allergies. ASSESSMENT:     Mr. Atul Correa was more alert today, partially oriented to place & time, oriented to person but not situation. Pt followed cues but wanted to move at his pace. Pt showed increased gait distance but needed significant assist for all functional mobility. Pt has muscle atrophy throughout, very deconditioned with tenderness in both legs. Pt's T-12 compression fx is reported to be old by hospitalist, no fx's in LE's but degenerative changes interfering with comfortable mobility. This pt will need extensive rehab at SNF , likely short to long term placement.    12/21  Supine upon arrival.  Work on bed mobility as follows:supine>eob with Mod A and verbal cues to stay on track. Performs B LE exercises while on eob with Mod A and constant support. Return to supine and performs more B LE exercise with verbal.  Pt is confused and having a hard time staying focused. Left in the bed with needs in reach, alarm on and instructed to call for assist.  12/22  Supine upon arrival.  Stated I am on a [employer] truck in [place] right now. Work on bed mobility as follows:supine>eob with Mod A and verbal cues. Decide to lay back down without a warning. Therapist performs PROM on B LE with verbal cues to stay on track. Remind in the bed with needs in reach, alarm on and instructed to call for assist.    This section established at most recent assessment   PROBLEM LIST (Impairments causing functional limitations):  1. Decreased Strength  2. Decreased ADL/Functional Activities  3. Decreased Transfer Abilities  4. Decreased Ambulation Ability/Technique  5. Decreased Balance  6. Increased Pain  7. Decreased Activity Tolerance  8. Increased Fatigue  9. Decreased Flexibility/Joint Mobility  10. Decreased Cognition   INTERVENTIONS PLANNED: (Benefits and precautions of physical therapy have been discussed with the patient.)  1. Balance Exercise  2. Bed Mobility  3. Gait Training  4. Range of Motion (ROM)  5. Therapeutic Activites  6. Therapeutic Exercise/Strengthening  7. Transfer Training     TREATMENT PLAN: Frequency/Duration: daily for duration of hospital stay  Rehabilitation Potential For Stated Goals: Good     REHAB RECOMMENDATIONS (at time of discharge pending progress):    Placement:   It is my opinion, based on this patient's performance to date, that Mr. Sherwin Fenton may benefit from intensive therapy at a [address] [place] [address] after discharge due to the functional deficits listed above that are likely to improve with skilled rehabilitation and concerns that he/she may be unsafe to be unsupervised at home due to the need for 24 hr nursing, and intense PT services on a daily basis. Equipment:    May need RW if he doesn't have one already              HISTORY:   History of Present Injury/Illness (Reason for Referral): Admitted with UTI, T12 compression fx  Past Medical History/Comorbidities:   Mr. Elham Durbin  has a past medical history of CAD (coronary artery disease) (2019), Colon cancer ([address] (2012), and Hiatal hernia. Mr. Elham Durbin  has no past surgical history on file. Social History/Living Environment:   Home Environment: [address] [place] [address] Road Name: Deo Angulo  # Steps to Enter: 0  One/Two Story Residence: One story  Living Alone: No  Support Systems: Child(margi)  Patient Expects to be Discharged to[de-identified] Assisted living  Current DME Used/Available at Home: None  Tub or Shower Type: Shower  Prior Level of Function/Work/Activity:  Resides in care home. Unable to let me kn ow his prior functional level  Dominant Side:         RIGHT   Number of Personal Factors/Comorbidities that affect the Plan of Care: 3+: HIGH COMPLEXITY   EXAMINATION:   Most Recent Physical Functioning:   Gross Assessment: 2/5 to 3-/5 throughout                       Balance:  Sitting: Intact; Without support Bed Mobility:  Supine to Sit: Moderate assistance  Sit to Supine: Moderate assistance  Scooting: Moderate assistance       Transfers:  Duration: 23 Minutes  Gait:            Body Structures Involved:  1. Bones  2. Joints  3. Muscles Body Functions Affected:  1. Genitourinary  2. Neuromusculoskeletal  3. Movement Related Activities and Participation Affected:  1. General Tasks and Demands  2. Mobility  3. Self Care   Number of elements that affect the Plan of Care: 4+: HIGH COMPLEXITY   CLINICAL PRESENTATION:   Presentation: Stable and uncomplicated: LOW COMPLEXITY   CLINICAL DECISION MAKIN [facility] [address] [id number] AM-PAC 6 Clicks   Basic Mobility Inpatient Short Form  How much difficulty does the patient currently have. ..  Unable A Lot A Little None 1.  Turning over in bed (including adjusting bedclothes, sheets and blankets)? [] 1   [] 2   [x] 3   [] 4   2. Sitting down on and standing up from a chair with arms ( e.g., wheelchair, bedside commode, etc.)   [] 1   [] 2   [x] 3   [] 4   3. Moving from lying on back to sitting on the side of the bed? [] 1   [x] 2   [] 3   [] 4   How much help from another person does the patient currently need. .. Total A Lot A Little None   4. Moving to and from a bed to a chair (including a wheelchair)? [] 1   [] 2   [x] 3   [] 4   5. Need to walk in hospital room? [] 1   [x] 2   [] 3   [] 4   6. Climbing 3-5 steps with a railing? [x] 1   [] 2   [] 3   [] 4   © 2007, Trustees of [address] [place] [address], under license to [employer]. All rights reserved      Score:  Initial: 14 Most Recent: X (Date: -- )    Interpretation of Tool:  Represents activities that are increasingly more difficult (i.e. Bed mobility, Transfers, Gait). Medical Necessity:     · Patient demonstrates   · good  ·  rehab potential due to higher previous functional level. Reason for Services/Other Comments:  · Patient   · continues to require present interventions due to patient's inability to perform functional mobility at a safe CGA level  · . Use of outcome tool(s) and clinical judgement create a POC that gives a: Clear prediction of patient's progress: LOW COMPLEXITY            TREATMENT:   (In addition to Assessment/Re-Assessment sessions the following treatments were rendered)   Pre-treatment Symptoms/Complaints:  Pt agreeable  Pain: Initial: numeric scale     Post Session:  3/10   Therapeutic Activity: (  23 Minutes ):  Therapeutic activities including LE AROM -AAROM as a warm up, mobility, strength, balance, coordination and dynamic movement of arm - bilateral, leg - bilateral and core to improve functional endurance & stability.     Date:  12/21 Date:  12/22   Date:     Activity/Exercise Parameters Parameters Parameters   Ankle pumps 10 B P 12 B P    heelslides 10 B P 12 B P    Hip abd/add 10 B P 12 B P    SAQ 10 B P 12 B P    SLR 10 B P 12 B P    LAQ in sitting 10 B P     Shoulder flex/ext  12 B P    Elbow flex/ext  12 B P    IR/ext R  12 B P                Braces/Orthotics/Lines/Etc:   · IV  · O2 Device: None (Room air)  Treatment/Session Assessment:    · Response to Treatment:   confused and not following commands. · Interdisciplinary Collaboration:   o Physical Therapy Assistant  o Registered Nurse  · After treatment position/precautions:   o Supine in bed  o Bed alarm/tab alert on  o Bed/Chair-wheels locked  o Bed in low position  o Call light within reach  o RN notified  o Side rails x 3   · Compliance with Program/Exercises: Will assess as treatment progresses  · Recommendations/Intent for next treatment session: \"Next visit will focus on reduction in assistance provided\".   Total Treatment Duration:  PT Patient Time In/Time Out  Time In: 1430  Time Out: Bryn Mccrackenrp [address], PTA

## 2021-12-22 NOTE — PROGRESS NOTES
END OF SHIFT NOTE:    Intake/Output  No intake/output data recorded. Voiding: YES  Catheter: NO  Drain:              Stool:  2 occurrences. Stool Assessment  Stool Color: Brown (12/22/21 0529)  Stool Appearance: Soft (12/22/21 0529)  Stool Amount: Medium (12/22/21 0529)  Stool Source/Status: Incontinence; Rectum (12/22/21 0529)    Emesis:  0 occurrences. VITAL SIGNS  Patient Vitals for the past 12 hrs:   Temp Pulse Resp BP SpO2   12/22/21 0335 97.7 °F (36.5 °C) 80 16 109/85 97 %   12/21/21 2039 97.9 °F (36.6 °C) 93 17 115/75 97 %       Pain Assessment  Pain 1  Pain Scale 1: Visual (12/22/21 0448)  Pain Intensity 1: 0 (12/22/21 0448)  Patient Stated Pain Goal: 0 (12/22/21 0448)  Pain Reassessment 1: Patient resting w/respiratory rate greater than 10 (12/21/21 1243)  Pain Location 1: Leg (12/19/21 1116)  Pain Orientation 1: Left;Right (throughout) (12/19/21 1116)  Pain Description 1: Sharp (12/17/21 0900)  Pain Intervention(s) 1: Ambulation/Increased Activity;Repositioned (12/19/21 1116)    Ambulating  No    Additional Information: pt remained confused through out the night    Shift report given to oncoming nurse at the bedside.     Rick Fang RN

## 2021-12-22 NOTE — PROGRESS NOTES
Hospitalist Progress Note   Admit Date:  12/15/2021  1:59 PM   Name:  eL Walton   Age:  80 y.o. Sex:  male  :  1931   MRN:  [id number]   Room:  [facility]/    Presenting Complaint: Fatigue    Reason(s) for Admission: UTI (urinary tract infection) [N39.0]     Hospital Course & Interval History:   Mr. Lizeth Perry a 80 y. o. male with medical history of  Recurrent UTIs, dementia, CAD Hiatal hernia who presented to the [facility] a complaint of combativeness with weakness, poor appetite and inability to stand. Symptoms began on Wednesday of last week, per daughter-in-law with patient becoming combative at [facility]. Patient is pleasantly confused and information obtained from daughter-in-law at bedside who states that on Tuesday of this week patient was unable to get OOB or stand. She reports history of multiple falls in July and August but is unaware of any falls at [facility]. Patient noted to be treated for UTI in Oct and November with UCx positive for [place]. Subjective (21):  ROS unable to perform due to dementia   Assessment & Plan:     Sepsis secondary to UTI (urinary tract infection) (12/15/2021)  -Patient has had previous UCx positive for S[place] susceptible to Rocephin. Will continue Rocephin  -With likelihood of BPH, bladder scan q shift ordered  -Urology consulted   Urology to follow up outpatient.   Follow cx and plan for 7 day abx treatment per Urology  Lactic acid cleared   Urine culture contaminated - will treat with 7 days total   Continue Ceftriaxone while inpatient - EOT 21 Sepsis resolved, continue Ceftriaxone   Complete Ceftriaxone      Hx of multiple falls  -Per family patient denied dizziness, LOC stating he does not know why he falls   -PT/OT consulted  -Orthostatics prn      T12 fx  -12/15 Noted on imaging; not seen on XR 21  - Did appear on imaging from 10/2021  -Ortho spine consulted for recs  -Prn analgesia    Ortho spine consulted, no intervention recommended.      Dementia  -Supportive care  -Redirect prn      Hiatal Hernia  -Noted      Acute encephalopathy  12/16 underlying dementia  12/17 encephalopathy work up TSH, B12, folate, ammonia - all normal  12/18 Still confused, but mildly improved. Per nursing this is how he was previous admission.   12/19 Seems less confused today, but still really confused - likely dementia at this point  12/20 Pleasantly confused, encephalopathy likely resolved at this point      12/22 seems to be back to his baseline   Dispo/Discharge Planning:     To STR once approved - medically stable for discharge      Dispo/Discharge Planning:      STR when approved     Diet:  ADULT DIET Regular; No Salt Added (3-4 gm)  ADULT ORAL NUTRITION SUPPLEMENT Breakfast, Lunch, Dinner; Standard High Calorie/High Protein  DVT PPx: lovenox   Code status: Full Code    Hospital Problems as of 12/22/2021 Date Reviewed: 11/19/2021          Codes Class Noted - Resolved POA    Mild protein-calorie malnutrition ([facility] ICD-10-CM: E44.1  ICD-9-CM: 263.1  12/20/2021 - Present Yes        Dementia ([facility] (Chronic) ICD-10-CM: F03.90  ICD-9-CM: 294.20  12/23/2019 - Present Yes        Debility (Chronic) ICD-10-CM: R53.81  ICD-9-CM: 799.3  12/2/2019 - Present Yes        * (Principal) RESOLVED: UTI (urinary tract infection) ICD-10-CM: N39.0  ICD-9-CM: 599.0  12/15/2021 - 12/21/2021 Yes        RESOLVED: Lactic acidosis ICD-10-CM: E87.2  ICD-9-CM: 276.2  12/15/2021 - 12/15/2021 Yes        RESOLVED: Acute metabolic encephalopathy PWG-39-PE: G93.41  ICD-9-CM: 348.31  11/17/2021 - 12/20/2021 Yes        RESOLVED: Sepsis ([facility] ICD-10-CM: A41.9  ICD-9-CM: 038.9, 995.91  12/2/2019 - 12/20/2021 Yes              Objective:     Patient Vitals for the past 24 hrs:   Temp Pulse Resp BP SpO2   12/22/21 1540 97.4 °F (36.3 °C) 67 20 (!) 134/91 96 %   12/22/21 1219 98.2 °F (36.8 °C) 90 20 113/63 95 %   12/22/21 0800 97.6 °F (36.4 °C) 77 20 132/78 96 %   12/22/21 8003 97.7 °F (36.5 °C) 80 16 109/85 97 %   12/21/21 2039 97.9 °F (36.6 °C) 93 17 115/75 97 %     Oxygen Therapy  O2 Sat (%): 96 % (12/22/21 1540)  Pulse via Oximetry: 69 beats per minute (12/16/21 0356)  O2 Device: None (Room air) (12/22/21 6308)    Estimated body mass index is 19.31 kg/m² as calculated from the following:    Height as of this encounter: 5' 8\" (1.727 m). Weight as of this encounter: 57.6 kg (127 lb). Intake/Output Summary (Last 24 hours) at 12/22/2021 1851  Last data filed at 12/22/2021 1515  Gross per 24 hour   Intake 840 ml   Output --   Net 840 ml         Physical Exam:   Blood pressure (!) 134/91, pulse 67, temperature 97.4 °F (36.3 °C), resp. rate 20, height 5' 8\" (1.727 m), weight 57.6 kg (127 lb), SpO2 96 %. General:    Well nourished. No overt distress  Head:  Normocephalic, atraumatic  Eyes:  Sclerae appear normal.  Pupils equally round. ENT:  Nares appear normal, no drainage. Moist oral mucosa  Neck:  No restricted ROM. Trachea midline   CV:   RRR. No m/r/g. No jugular venous distension. Lungs:   CTAB. No wheezing, rhonchi, or rales. Respirations even, unlabored  Abdomen: Bowel sounds present. Soft, nontender, nondistended. Extremities: No cyanosis or clubbing. No edema  Skin:     No rashes and normal coloration. Warm and dry. Neuro:  CN II-XII grossly intact. Speech tangential. Disoriented   Psych:  Normal mood and affect.       I have reviewed ordered lab tests and independently visualized imaging below:    Recent Labs:  Recent Results (from the past 48 hour(s))   CBC WITH AUTOMATED DIFF    Collection Time: 12/21/21 11:57 AM   Result Value Ref Range    WBC 13.5 (H) 4.3 - 11.1 K/uL    RBC 3.70 (L) 4.23 - 5.6 M/uL    HGB 10.8 (L) 13.6 - 17.2 g/dL    HCT 33.9 (L) 41.1 - 50.3 %    MCV 91.6 79.6 - 97.8 FL    MCH 29.2 26.1 - 32.9 PG    MCHC 31.9 31.4 - 35.0 g/dL    RDW 17.2 (H) 11.9 - 14.6 %    PLATELET 406 018 - 495 K/uL    MPV 10.4 9.4 - 12.3 FL    ABSOLUTE NRBC 0.00 0.0 - 0.2 K/uL    DF AUTOMATED      NEUTROPHILS 77 43 - 78 %    LYMPHOCYTES 10 (L) 13 - 44 %    MONOCYTES 8 4.0 - 12.0 %    EOSINOPHILS 0 (L) 0.5 - 7.8 %    BASOPHILS 1 0.0 - 2.0 %    IMMATURE GRANULOCYTES 4 0.0 - 5.0 %    ABS. NEUTROPHILS 10.4 (H) 1.7 - 8.2 K/UL    ABS. LYMPHOCYTES 1.3 0.5 - 4.6 K/UL    ABS. MONOCYTES 1.1 0.1 - 1.3 K/UL    ABS. EOSINOPHILS 0.0 0.0 - 0.8 K/UL    ABS. BASOPHILS 0.1 0.0 - 0.2 K/UL    ABS. IMM. GRANS. 0.6 (H) 0.0 - 0.5 K/UL   METABOLIC PANEL, COMPREHENSIVE    Collection Time: 12/21/21 11:57 AM   Result Value Ref Range    Sodium 135 (L) 136 - 145 mmol/L    Potassium 4.2 3.5 - 5.1 mmol/L    Chloride 101 98 - 107 mmol/L    CO2 32 21 - 32 mmol/L    Anion gap 2 (L) 7 - 16 mmol/L    Glucose 127 (H) 65 - 100 mg/dL    BUN 29 (H) 8 - 23 MG/DL    Creatinine 1.15 0.8 - 1.5 MG/DL    GFR est AA >60 >60 ml/min/1.73m2    GFR est non-AA >60 >60 ml/min/1.73m2    Calcium 10.5 (H) 8.3 - 10.4 MG/DL    Bilirubin, total 0.6 0.2 - 1.1 MG/DL    ALT (SGPT) 21 12 - 65 U/L    AST (SGOT) 28 15 - 37 U/L    Alk.  phosphatase 96 50 - 136 U/L    Protein, total 7.0 6.3 - 8.2 g/dL    Albumin 2.4 (L) 3.2 - 4.6 g/dL    Globulin 4.6 (H) 2.3 - 3.5 g/dL    A-G Ratio 0.5 (L) 1.2 - 3.5         All Micro Results     Procedure Component Value Units Date/Time    CULTURE, BLOOD [[id number]] Collected: 12/15/21 0334    Order Status: Completed Specimen: Blood Updated: 12/20/21 4895     Special Requests: --        LEFT  Antecubital       Culture result: NO GROWTH 5 DAYS       CULTURE, BLOOD [[id number]] Collected: 12/15/21 1439    Order Status: Completed Specimen: Blood Updated: 12/20/21 0810     Special Requests: --        RIGHT  Antecubital       Culture result: NO GROWTH 5 DAYS       CULTURE, URINE [[id number]] Collected: 12/15/21 1439    Order Status: Completed Specimen: Urine Updated: 12/18/21 0744     Special Requests: NO SPECIAL REQUESTS        Culture result:       <10,000 COLONIES/mL MIXED SKIN ROSE ISOLATED Other Studies:  No results found. Current Meds:  Current Facility-Administered Medications   Medication Dose Route Frequency    sodium chloride (NS) flush 5-40 mL  5-40 mL IntraVENous Q8H    sodium chloride (NS) flush 5-40 mL  5-40 mL IntraVENous PRN    acetaminophen (TYLENOL) tablet 650 mg  650 mg Oral Q6H PRN    polyethylene glycol (MIRALAX) packet 17 g  17 g Oral DAILY PRN    ondansetron (ZOFRAN ODT) tablet 4 mg  4 mg Oral Q8H PRN    Or    ondansetron (ZOFRAN) injection 4 mg  4 mg IntraVENous Q6H PRN    enoxaparin (LOVENOX) injection 30 mg  30 mg SubCUTAneous DAILY    acetaminophen (TYLENOL) tablet 650 mg  650 mg Oral Q6H PRN    famotidine (PEPCID) tablet 20 mg  20 mg Oral Q12H    ondansetron (ZOFRAN ODT) tablet 4 mg  4 mg Oral Q8H PRN    multivitamin, tx-iron-ca-min (THERA-M w/ IRON) tablet 1 Tablet  1 Tablet Oral DAILY    nitroglycerin (NITROSTAT) tablet 0.4 mg  0.4 mg SubLINGual Q5MIN PRN       Signed:  Caridad Davenport DO    Part of this note may have been written by using a voice dictation software. The note has been proof read but may still contain some grammatical/other typographical errors.

## 2021-12-22 NOTE — PROGRESS NOTES
Emerson talked with pt's daughter in law Maulik Padilla this pm. Long discussion about Rehabs in the area and bed avail. She understands that her first two choices do not have beds avail at this time. She was open to a ref to [employer]. Emerson opened [facility] and called Elisa to inform of ref. Juany at facility to start precert if pt accepted. Pt has had many many falls (6 falls recently) so strengthening/rehab is needed. Spoke with PT who informed his distance had improved to 20 feet, with knees buckling and leaning posture. [employer] auth likely to be under two weeks. Family needs to plan for future placement for pt once his level of care is determined skilled vs memory care. . Will follow up in am.  Azalea Merlin

## 2021-12-23 VITALS
DIASTOLIC BLOOD PRESSURE: 81 MMHG | HEART RATE: 65 BPM | WEIGHT: 127 LBS | BODY MASS INDEX: 19.25 KG/M2 | HEIGHT: 68 IN | OXYGEN SATURATION: 95 % | SYSTOLIC BLOOD PRESSURE: 114 MMHG | TEMPERATURE: 97.6 F | RESPIRATION RATE: 18 BRPM

## 2021-12-23 LAB
COVID-19 RAPID TEST, COVR: NOT DETECTED
MM INDURATION POC: 0 MM (ref 0–5)
PPD POC: NEGATIVE NEGATIVE
SOURCE, COVRS: NORMAL

## 2021-12-23 PROCEDURE — 74011250636 HC RX REV CODE- 250/636: Performed by: NURSE PRACTITIONER

## 2021-12-23 PROCEDURE — 74011250637 HC RX REV CODE- 250/637: Performed by: NURSE PRACTITIONER

## 2021-12-23 PROCEDURE — 87635 SARS-COV-2 COVID-19 AMP PRB: CPT

## 2021-12-23 PROCEDURE — 97530 THERAPEUTIC ACTIVITIES: CPT

## 2021-12-23 PROCEDURE — 97535 SELF CARE MNGMENT TRAINING: CPT

## 2021-12-23 RX ADMIN — Medication 10 ML: at 14:00

## 2021-12-23 RX ADMIN — Medication 10 ML: at 06:00

## 2021-12-23 RX ADMIN — MULTIPLE VITAMINS W/ MINERALS TAB 1 TABLET: TAB at 09:20

## 2021-12-23 RX ADMIN — FAMOTIDINE 20 MG: 20 TABLET ORAL at 09:20

## 2021-12-23 NOTE — PROGRESS NOTES
Problem: Self Care Deficits Care Plan (Adult)  Goal: *Acute Goals and Plan of Care (Insert Text)  Description: 1. Patient will perform grooming with supervision. 2. Patient will perform Upper body dressing with supervision  3. Patient will perform lower body dressing with min assist  4. Patient will perform upper and lower body bathing with CGA. 5. Patient will perform toilet transfers with CGA. 6. Patient will perform shower transfer with CGA. 7. Patient will participate in 30 + minutes of ADL/ therapeutic exercise/therapeutic activity with min rest breaks to increase activity tolerance for self care. 8. Patient will perform ADL functional mobility in room with CGA. Goals to be achieved in 7 days. Outcome: Progressing Towards Goal     OCCUPATIONAL THERAPY: Daily Note and AM 12/23/2021  INPATIENT: OT Visit Days: 3  Payor: Catarina Ricks / Plan: [employer] MEDICARE CHOICE PPO/PFFS / Product Type: Managed Care Medicare /      NAME/AGE/GENDER: Nimisha Almendarez is a 80 y.o. male   PRIMARY DIAGNOSIS:  UTI (urinary tract infection) [N39.0] UTI (urinary tract infection) UTI (urinary tract infection)       ICD-10: Treatment Diagnosis:    · Generalized Muscle Weakness (M62.81)  · Other lack of cordination (R27.8)  · Difficulty in walking, Not elsewhere classified (R26.2)   Precautions/Allergies:     Patient has no known allergies. ASSESSMENT:     Mr. Bea Gonzalez Sr admitted with above diagnosis. Pt is pleasantly confused and lives at [facility]. Pt is normally independent with ADLs per chart. Pt is mod assist with bed mobility and min assist x 2 for functional transfers. Pt complained of right hip pain with movement. Pt presents with decreased self care and functional mobility. Pt would benefit from skilled OT to increase independence. 12/19/21: Pt presents supine in bed. Remains confused throughout session and requires heavy cueing to follow commands.  He required mod A from sup>sit EOB and from sit<>stand with RW. Pt mobilized from bed to door and over to recliner with mod A. Noted knee buckling throughout ambulation. Required mod A from sit<>stand at chair and min A from sit>supine back in bed for brief change as pt had BM during mobility. He required min/mod A to roll back and forth in bed during total A hygiene for BM. He was positioned upright in bed with breakfast tray. All needs met and in reach. Recommend short to long term placement at discharge. 12/23/2021:  Pt seen with PTA to work on mobility and simple self care (toileting) and continue to assess pt's current level of function as compared to his baseline. Pt is familiar to me from a previous hospital stay a few weeks ago. Pt has had a decrease in mobility from stand by assistance with out a divise to moderate assistance for balance and walker use. Pt is making progress per chart review with walking tolerance and lower extremity strength. Pt appears to be at baseline cognition, pleasantly confused, follows commands and makes conversation. OT will continue to follow to maximize safety and independence with functional mobility and simple self care. This section established at most recent assessment   PROBLEM LIST (Impairments causing functional limitations):  1. Decreased Strength  2. Decreased ADL/Functional Activities  3. Decreased Transfer Abilities  4. Decreased Ambulation Ability/Technique  5. Decreased Balance  6. Decreased Activity Tolerance   INTERVENTIONS PLANNED: (Benefits and precautions of occupational therapy have been discussed with the patient.)  1. Activities of daily living training  2. Adaptive equipment training  3. Balance training  4. Clothing management  5. Therapeutic activity  6. Therapeutic exercise     TREATMENT PLAN: Frequency/Duration: Follow patient 3x/week to address above goals.   Rehabilitation Potential For Stated Goals: Good     REHAB RECOMMENDATIONS (at time of discharge pending progress): Placement: It is my opinion, based on this patient's performance to date, that Mr. Bridgette Houser may benefit from intensive therapy at a [address] [place] [address] after discharge due to the functional deficits listed above that are likely to improve with skilled rehabilitation and concerns that he/she may be unsafe to be unsupervised at home due to decreased ADL performance and mobility. Likely short to long term care  Equipment:    To be determined              OCCUPATIONAL PROFILE AND HISTORY:   History of Present Injury/Illness (Reason for Referral):  58-year-old male brought in by daughter-in-law for weakness decreased appetite and some increased confusion. Reports that he was fairly combative several nights ago. He is done this in the past when he had urinary tract infections. He has had almost a monthly admission for urinary tract infections since September. Patient seems quite well right now has no complaints but with further prompting does admit that he has had some low back pain that radiates into his legs. No fevers or chills. No vomiting. 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 Past Medical History/Comorbidities:   Mr. Bridgette Houser  has a past medical history of CAD (coronary artery disease) (7/18/2019), Colon cancer ([facility] [address] (01/2012), and Hiatal hernia. Mr. Bridgette Houser  has no past surgical history on file.   Social History/Living Environment:   Home Environment: Assisted living  [address] [place] [address] Jose Name: Jordyn Mohamud  # Steps to Enter: 0  One/Two Story Residence: One story  Living Alone: No  Support Systems: Child(margi)  Patient Expects to be Discharged to[de-identified] Assisted living  Current DME Used/Available at Home: None  Tub or Shower Type: Shower  Prior Level of Function/Work/Activity:  Lives at [facility]; indep with ADLs; no assistive device     Number of Personal Factors/Comorbidities that affect the Plan of Care: Brief history (0):  LOW COMPLEXITY   ASSESSMENT OF OCCUPATIONAL PERFORMANCE[de-identified]   Activities of Daily Living:   Basic ADLs (From Assessment) Complex ADLs (From Assessment)   Feeding: Setup  Oral Facial Hygiene/Grooming: Setup  Bathing: Moderate assistance  Upper Body Dressing: Setup  Lower Body Dressing: Maximum assistance  Toileting: Total assistance     Grooming/Bathing/Dressing Activities of Daily Living                             Bed/Mat Mobility  Supine to Sit: Moderate assistance; Additional time;Assist x2  Sit to Supine: Moderate assistance; Additional time;Assist x2  Sit to Stand: Moderate assistance; Additional time;Assist x2  Stand to Sit: Moderate assistance; Additional time;Assist x2  Scooting: Moderate assistance; Additional time;Assist x2     Most Recent Physical Functioning:   Gross Assessment:                  Posture:  Posture Assessment: Forward head,Rounded shoulders  Balance:  Sitting: Intact; Without support Bed Mobility:  Supine to Sit: Moderate assistance; Additional time;Assist x2  Sit to Supine: Moderate assistance; Additional time;Assist x2  Scooting: Moderate assistance; Additional time;Assist x2  Wheelchair Mobility:     Transfers:  Sit to Stand: Moderate assistance; Additional time;Assist x2  Stand to Sit: Moderate assistance; Additional time;Assist x2  Duration: 30 Minutes            Patient Vitals for the past 6 hrs:   BP BP Patient Position SpO2 Pulse   12/23/21 0720 (!) 140/88 Supine 96 % 73   12/23/21 1146 130/78 Supine 99 % 97       Mental Status  Neurologic State: Alert,Confused  Orientation Level: Disoriented to person,Disoriented to situation,Disoriented to time  Cognition: Decreased attention/concentration,Decreased command following  Perception: Appears intact  Perseveration: No perseveration noted  Safety/Judgement: Fall prevention                          Physical Skills Involved:  1. Balance  2. Strength  3. Activity Tolerance Cognitive Skills Affected (resulting in the inability to perform in a timely and safe manner):  1.  Short Term Recall  2. Long Term Memory  3. Expression Psychosocial Skills Affected:  1. Habits/Routines   Number of elements that affect the Plan of Care: 1-3:  LOW COMPLEXITY   CLINICAL DECISION MAKIN[address] [place] [address] AM-PAC 6 Clicks   Daily Activity Inpatient Short Form  How much help from another person does the patient currently need. .. Total A Lot A Little None   1. Putting on and taking off regular lower body clothing? [] 1   [x] 2   [] 3   [] 4   2. Bathing (including washing, rinsing, drying)? [] 1   [x] 2   [] 3   [] 4   3. Toileting, which includes using toilet, bedpan or urinal?   [] 1   [x] 2   [] 3   [] 4   4. Putting on and taking off regular upper body clothing? [] 1   [] 2   [] 3   [x] 4   5. Taking care of personal grooming such as brushing teeth? [] 1   [] 2   [] 3   [x] 4   6. Eating meals? [] 1   [] 2   [] 3   [x] 4   © , Trustees of [address] [place] [address], under license to [employer]. All rights reserved      Score:  Initial: 18 Most Recent: X (Date: -- )    Interpretation of Tool:  Represents activities that are increasingly more difficult (i.e. Bed mobility, Transfers, Gait). Medical Necessity:     · Patient is expected to demonstrate progress in   · strength, balance, coordination, and functional technique  ·  to   · increase independence with self care and functional mobility  · . Reason for Services/Other Comments:  · Patient continues to require skilled intervention due to   · Decrease self care and functional mobility  · .    Use of outcome tool(s) and clinical judgement create a POC that gives a: LOW COMPLEXITY         TREATMENT:   (In addition to Assessment/Re-Assessment sessions the following treatments were rendered)     Pre-treatment Symptoms/Complaints:  tolerated standing by bag  Pain: Initial:      Post Session:  3     Co-treatment was necessary to improve patient's ability to follow higher level commands, ability to increase activity demands and ability to return to normal functional activity. Self Care: (30 min): Procedure(s) (per grid) utilized to improve and/or restore self-care/home management as related to toileting and functional mobility and transfers. Required moderate visual, verbal, manual and tactile cueing to facilitate activities of daily living skills and compensatory activities. Braces/Orthotics/Lines/Etc:   · O2 Device: None (Room air)  Treatment/Session Assessment:    · Response to Treatment:  tolerated well  · Interdisciplinary Collaboration:   o Physical Therapist  o Occupational Therapist  o Registered Nurse  · After treatment position/precautions:   o Supine in bed  o Bed alarm/tab alert on  o Bed/Chair-wheels locked  o Bed in low position  o Call light within reach  o RN notified  o Side rails x 2   · Compliance with Program/Exercises: Will assess as treatment progresses. · Recommendations/Intent for next treatment session: \"Next visit will focus on advancements to more challenging activities and reduction in assistance provided\".   Total Treatment Duration:        Hannah Haywood, OT

## 2021-12-23 NOTE — PROGRESS NOTES
Problem: Mobility Impaired (Adult and Pediatric)  Goal: *Acute Goals and Plan of Care (Insert Text)  Note: STG:  (1.)Mr. Miko De Oliveira will move from supine to sit and sit to supine  with CONTACT GUARD ASSIST within 7 treatment day(s). (2.)Mr. Miko De Oliveira will transfer from bed to chair and chair to bed with CONTACT GUARD ASSIST using the least restrictive device within 7 treatment day(s). (3.)Mr. Miko De Oliveira will ambulate with CONTACT GUARD ASSIST for 150 feet with the least restrictive device within 7 treatment day(s). (4.)Pt. will increase B LE strength 1/2 grade within 7 days      ________________________________________________________________________________________________      PHYSICAL THERAPY: Daily Note and AM 12/23/2021  INPATIENT: PT Visit Days : 6  Payor: Beverly Martin / Plan: [id number] PPO/PFFS / Product Type: [employer] Medicare /       NAME/AGE/GENDER: Grace Beckett is a 80 y.o. male   PRIMARY DIAGNOSIS: UTI (urinary tract infection) [N39.0] UTI (urinary tract infection) UTI (urinary tract infection)       ICD-10: Treatment Diagnosis:    · Generalized Muscle Weakness (M62.81)  · Other lack of cordination (R27.8)  · Other abnormalities of gait and mobility (R26.89)  · History of falling (Z91.81)   Precaution/Allergies:  Patient has no known allergies. ASSESSMENT:     Mr. Miko De Oliveira was more alert today, partially oriented to place & time, oriented to person but not situation. Pt followed cues but wanted to move at his pace. Pt showed increased gait distance but needed significant assist for all functional mobility. Pt has muscle atrophy throughout, very deconditioned with tenderness in both legs. Pt's T-12 compression fx is reported to be old by hospitalist, no fx's in LE's but degenerative changes interfering with comfortable mobility. This pt will need extensive rehab at [facility] , likely short to long term placement.    12/21  Supine upon arrival.  Work on bed mobility as follows:supine>eob with Mod A and verbal cues to stay on track. Performs B LE exercises while on eob with Mod A and constant support. Return to supine and performs more B LE exercise with verbal.  Pt is confused and having a hard time staying focused. Left in the bed with needs in reach, alarm on and instructed to call for assist.  12/22  Supine upon arrival.  Stated I am on a [employer] truck in [place] right now. Work on bed mobility as follows:supine>eob with Mod A and verbal cues. Decide to lay back down without a warning. Therapist performs PROM on B LE with verbal cues to stay on track. Remind in the bed with needs in reach, alarm on and instructed to call for assist.  12/23 supine upon arrival.  Work on bed mobility as follows:supine>eob with Mod A x 2 with verbal cues. Sit><stand x 2 with verbal cues. Then walk 50 ft using RW with Mod A x 2 with  verbal cues to stay in the walker. Rested then walk another 50 ft back to the room using RW with Mod A x 2 with verbal cues. Then work on rolling from L><R x 3 to get clean up and new brief. Left in the bed with needs in reach,alarm on and instructed to call for assist.    This section established at most recent assessment   PROBLEM LIST (Impairments causing functional limitations):  1. Decreased Strength  2. Decreased ADL/Functional Activities  3. Decreased Transfer Abilities  4. Decreased Ambulation Ability/Technique  5. Decreased Balance  6. Increased Pain  7. Decreased Activity Tolerance  8. Increased Fatigue  9. Decreased Flexibility/Joint Mobility  10. Decreased Cognition   INTERVENTIONS PLANNED: (Benefits and precautions of physical therapy have been discussed with the patient.)  1. Balance Exercise  2. Bed Mobility  3. Gait Training  4. Range of Motion (ROM)  5. Therapeutic Activites  6. Therapeutic Exercise/Strengthening  7.  Transfer Training     TREATMENT PLAN: Frequency/Duration: daily for duration of hospital stay  Rehabilitation Potential For Stated Goals: Good     REHAB RECOMMENDATIONS (at time of discharge pending progress):    Placement: It is my opinion, based on this patient's performance to date, that Mr. Roberto Dawn may benefit from intensive therapy at a [address] [place] [address] after discharge due to the functional deficits listed above that are likely to improve with skilled rehabilitation and concerns that he/she may be unsafe to be unsupervised at home due to the need for 24 hr nursing, and intense PT services on a daily basis. Equipment:    May need RW if he doesn't have one already              HISTORY:   History of Present Injury/Illness (Reason for Referral): Admitted with UTI, T12 compression fx  Past Medical History/Comorbidities:   Mr. Roberto Dawn  has a past medical history of CAD (coronary artery disease) (7/18/2019), Colon cancer ([facility] [address] (01/2012), and Hiatal hernia. Mr. Roberto Dawn  has no past surgical history on file. Social History/Living Environment:   [facility]: Catalina Hurst  # Steps to Enter: 0  One/Two Story Residence: One story  Living Alone: No  Support Systems: Child(margi)  Patient Expects to be Discharged to[de-identified] Assisted living  Current DME Used/Available at Home: None  Tub or Shower Type: Shower  Prior Level of Function/Work/Activity:  Resides in [facility]. Unable to let me kn ow his prior functional level  Dominant Side:         RIGHT   Number of Personal Factors/Comorbidities that affect the Plan of Care: 3+: HIGH COMPLEXITY   EXAMINATION:   Most Recent Physical Functioning:   Gross Assessment: 2/5 to 3-/5 throughout                       Balance:  Sitting: Intact; Without support Bed Mobility:  Supine to Sit: Moderate assistance; Additional time;Assist x2  Sit to Supine: Moderate assistance; Additional time;Assist x2  Scooting: Moderate assistance; Additional time;Assist x2       Transfers:  Sit to Stand: Moderate assistance; Additional time;Assist x2  Stand to Sit: Moderate assistance; Additional time;Assist x2  Duration: 30 Minutes  Gait:     Speed/Estrellita: Pace decreased (<100 feet/min)  Step Length: Left shortened;Right shortened  Gait Abnormalities: Decreased step clearance; Step to gait  Distance (ft): 100 Feet (ft)  Assistive Device: Walker, rolling  Ambulation - Level of Assistance: Minimal assistance; Additional time;Assist x2  Interventions: Safety awareness training      Body Structures Involved:  1. Bones  2. Joints  3. Muscles Body Functions Affected:  1. Genitourinary  2. Neuromusculoskeletal  3. Movement Related Activities and Participation Affected:  1. General Tasks and Demands  2. Mobility  3. Self Care   Number of elements that affect the Plan of Care: 4+: HIGH COMPLEXITY   CLINICAL PRESENTATION:   Presentation: Stable and uncomplicated: LOW COMPLEXITY   CLINICAL DECISION MAKIN[address] [place] [id number] AM-PAC 6 Clicks   Basic Mobility Inpatient Short Form  How much difficulty does the patient currently have. .. Unable A Lot A Little None   1. Turning over in bed (including adjusting bedclothes, sheets and blankets)? [] 1   [] 2   [x] 3   [] 4   2. Sitting down on and standing up from a chair with arms ( e.g., wheelchair, bedside commode, etc.)   [] 1   [] 2   [x] 3   [] 4   3. Moving from lying on back to sitting on the side of the bed? [] 1   [x] 2   [] 3   [] 4   How much help from another person does the patient currently need. .. Total A Lot A Little None   4. Moving to and from a bed to a chair (including a wheelchair)? [] 1   [] 2   [x] 3   [] 4   5. Need to walk in hospital room? [] 1   [x] 2   [] 3   [] 4   6. Climbing 3-5 steps with a railing? [x] 1   [] 2   [] 3   [] 4   © , Trustees of [address] [place] [id number], under license to [employer]. All rights reserved      Score:  Initial: 14 Most Recent: X (Date: -- )    Interpretation of Tool:  Represents activities that are increasingly more difficult (i.e. Bed mobility, Transfers, Gait).     Medical Necessity:     · Patient demonstrates   · good  ·  rehab potential due to higher previous functional level. Reason for Services/Other Comments:  · Patient   · continues to require present interventions due to patient's inability to perform functional mobility at a safe CGA level  · . Use of outcome tool(s) and clinical judgement create a POC that gives a: Clear prediction of patient's progress: LOW COMPLEXITY            TREATMENT:   (In addition to Assessment/Re-Assessment sessions the following treatments were rendered)   Pre-treatment Symptoms/Complaints:  Pt agreeable  Pain: Initial: numeric scale     Post Session:     Therapeutic Activity: (  30 Minutes ):  Therapeutic activities including rolling from L><R x 3 to get clean up, then increased his distance using RW with Mod A x 2. Gait Training ( ):  Gait training to improve and/or restore physical functioning as related to mobility. Ambulated 100 Feet (ft) with Minimal assistance; Additional time;Assist x2 using a Walker, rolling and moderate Safety awareness training    Date:  12/21 Date:  12/22   Date:     Activity/Exercise Parameters Parameters Parameters   Ankle pumps 10 B P 12 B P    heelslides 10 B P 12 B P    Hip abd/add 10 B P 12 B P    SAQ 10 B P 12 B P    SLR 10 B P 12 B P    LAQ in sitting 10 B P     Shoulder flex/ext  12 B P    Elbow flex/ext  12 B P    IR/ext R  12 B P                Braces/Orthotics/Lines/Etc:   · IV  · O2 Device: None (Room air)  Treatment/Session Assessment:    · Response to Treatment:   Followed little more commands, walk further today  · Interdisciplinary Collaboration:   o Physical Therapy Assistant  o Occupational Therapist  o Registered Nurse  · After treatment position/precautions:   o Supine in bed  o Bed alarm/tab alert on  o Bed/Chair-wheels locked  o Bed in low position  o Call light within reach  o RN notified  o Side rails x 3   · Compliance with Program/Exercises:  Will assess as treatment progresses  · Recommendations/Intent for next treatment session: \"Next visit will focus on reduction in assistance provided\".   Total Treatment Duration:  PT Patient Time In/Time Out  Time In: 1100  Time Out: [address] LEON[address] Anand

## 2021-12-23 NOTE — DISCHARGE SUMMARY
Hospitalist Discharge Summary   Admit Date:  12/15/2021  1:59 PM   DC Note date: 2021  Name:  Fang Palomo   Age:  80 y.o. Sex:  male  :  1931   MRN:  [id number]   Room:  [facility]  PCP:  Mehul Costello NP    Presenting Complaint: Fatigue    Initial Admission Diagnosis: UTI (urinary tract infection) [N39.0]     Problem List for this Hospitalization:  Hospital Problems as of 2021 Date Reviewed: 2021          Codes Class Noted - Resolved POA    Mild protein-calorie malnutrition ([facility] ICD-10-CM: E44.1  ICD-9-CM: 263.1  2021 - Present Yes        Dementia ([facility] (Chronic) ICD-10-CM: F03.90  ICD-9-CM: 294.20  2019 - Present Yes        Debility (Chronic) ICD-10-CM: R53.81  ICD-9-CM: 799.3  2019 - Present Yes        * (Principal) RESOLVED: UTI (urinary tract infection) ICD-10-CM: N39.0  ICD-9-CM: 599.0  12/15/2021 - 2021 Yes        RESOLVED: Lactic acidosis ICD-10-CM: E87.2  ICD-9-CM: 276.2  12/15/2021 - 12/15/2021 Yes        RESOLVED: Acute metabolic encephalopathy GIV-32-VU: G93.41  ICD-9-CM: 348.31  2021 - 2021 Yes        RESOLVED: Sepsis ([facility] ICD-10-CM: A41.9  ICD-9-CM: 038.9, 995.91  2019 - 2021 Yes            Did Patient have Sepsis (YES OR NO): YES    Hospital Course:  Mr. Juanita Malagon a 80 y. o. male with medical history of  Recurrent UTIs, dementia, CAD Hiatal hernia who presented to the [facility] a complaint of combativeness with weakness, poor appetite and inability to stand. Symptoms began on Wednesday of last week, per daughter-in-law with patient becoming combative at [facility]. Patient is pleasantly confused and information obtained from daughter-in-law at bedside who states that on Tuesday of this week patient was unable to get OOB or stand. She reports history of multiple falls in July and August but is unaware of any falls at [facility].  Patient noted to be treated for UTI in Oct and November with UCx positive for Serratio.     Sepsis secondary to UTI (urinary tract infection) (12/15/2021)  -Patient has had previous UCx positive for Serratio susceptible to Rocephin.   -With likelihood of BPH, bladder scan q shift   -Urology consulted  12/16 Urology to follow up outpatient. Follow cx and plan for 7 day abx treatment per Urology  Lactic acid cleared  12/17 Urine culture contaminated - will treat with 7 days total  12/18 Continue Ceftriaxone while inpatient - EOT 12/21/21 12/20 Sepsis resolved, continue Ceftriaxone  12/21 Completed Ceftriaxone      Acute toxic metabolic encephalopathy - improved with supportive care. TSH/FT4, Ammonia, folate, b12 wnl. Complicated by baseline dementia. Hx of multiple falls // physical deconditioning   -Per family patient denied dizziness, LOC stating he does not know why he falls   -PT/OT consulted requiring moderate to max assist and frequent verbal cues due to mentation. - rehab facilty     T12 fx  -12/15 Noted on imaging; not seen on XR 7/28/21  - Did appear on imaging from 10/2021  -Ortho spine consulted for recs  -Prn analgesia   12/17 Ortho spine consulted, no intervention recommended.      Hiatal hernia with gerd - pepcid bid     Disposition: Skilled Nursing Facility  Diet: ADULT DIET Regular; No Salt Added (3-4 gm)  ADULT ORAL NUTRITION SUPPLEMENT Breakfast, Lunch, Dinner; Standard High Calorie/High Protein  ADULT DIET Regular  Code Status: Full Code    Follow Up Orders:   Follow-up Appointments   Procedures    FOLLOW UP VISIT Appointment in: Ten Days Follow up with primary care doctor     Follow up with primary care doctor     Standing Status:   Standing     Number of Occurrences:   1     Order Specific Question:   Appointment in     Answer:   Ten Days       Follow-up Information     Follow up With Specialties Details Why Contact Info    [id number] [address] [phone] [facility], Crescencio Palacio NP Nurse Practitioner   [address] [id number] [facility] [address]  [phone]            Follow up labs/diagnostics (ultimately defer to outpatient provider):  N/a     Time spent in patient discharge and coordination 55 minutes. Plan was discussed with family. All questions answered. Patient was stable at time of discharge. Instructions given to call a physician or return if any concerns. Discharge Info:   Current Discharge Medication List      CONTINUE these medications which have NOT CHANGED    Details   atorvastatin (LIPITOR) 80 mg tablet Take 1 Tablet by mouth nightly. Qty: 3 Tablet, Refills: 0      ondansetron (ZOFRAN ODT) 4 mg disintegrating tablet Take 1 Tablet by mouth every eight (8) hours as needed for Nausea. Qty: 8 Tablet, Refills: 2      famotidine (PEPCID) 20 mg tablet Take 1 Tab by mouth daily. Qty: 30 Tab, Refills: 0      nitroglycerin (NITROSTAT) 0.4 mg SL tablet 1 Tab by SubLINGual route every five (5) minutes as needed for Chest Pain. Up to 3 doses. Qty: 1 Bottle, Refills: 3             Procedures done this admission:  * No surgery found *    Consults this admission:  IP CONSULT TO ORTHOPEDIC SURGERY    Echocardiogram/EKG results:  Results from Hospital Encounter encounter on 07/28/21    ECHO ADULT COMPLETE    Interpretation Summary  · LV: Estimated LVEF is 55 - 60%. Normal cavity size and systolic function (ejection fraction normal). Mild concentric hypertrophy. Wall motion: normal. Left ventricular diastolic dysfunction. · Saline contrast was given to evaluate for intracardiac shunt. There was no evidence of intracardiac shunting  · Mild aortic valve stenosis is present. Mild aortic valve regurgitation is present. · LA: Severely dilated left atrium.        EKG Results     Procedure 720 Value Units Date/Time    EKG, 12 LEAD, INITIAL [[id number]] Collected: 12/15/21 1431    Order Status: Completed Updated: 12/15/21 1452     Ventricular Rate 101 BPM      Atrial Rate 101 BPM      P-R Interval 134 ms      QRS Duration 78 ms      Q-T Interval 330 ms      QTC Calculation (Bezet) 427 ms      Calculated P Axis 45 degrees      Calculated R Axis 25 degrees      Calculated T Axis 39 degrees      Diagnosis --     Sinus tachycardia with Premature atrial complexes  Otherwise normal ECG  When compared with ECG of 05-JUL-2021 14:37,  Premature atrial complexes are now Present  Confirmed by Therman Schwab ([id number]) on 12/15/2021 2:45:53 PM            Diagnostic Imaging/Tests:   XR SPINE LUMB 2 OR 3 V    Result Date: 12/15/2021  LUMBAR SPINE SERIES. Clinical Indication: Back pain Findings: A 50% compression fracture deformity is noted at T12. This has developed since the prior chest x-ray from July 2021. Degenerative facet arthropathy with grade 1 degenerative anterolisthesis is noted at L4-L5. The SI joints are intact. 1. 50% compression fracture deformity at T12. 2. Degenerative grade 1 anterolisthesis at L4-L5 from degenerative facet arthropathy. XR HIP RT W OR WO PELV 2-3 VWS    Result Date: 12/16/2021  Pelvis and right hip radiographs, 3 views Right femur radiographs, 2 views INDICATION: Pain status post fall COMPARISON: None. FINDINGS: The bones appear demineralized. Within this limitation, no acute fracture evident. 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 Arteriosclerotic calcifications. No acute radiographic abnormality. XR FEMUR RT 2 VS    Result Date: 12/16/2021  Pelvis and right hip radiographs, 3 views Right femur radiographs, 2 views INDICATION: Pain status post fall COMPARISON: None. FINDINGS: The bones appear demineralized. Within this limitation, no acute fracture evident. 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 Arteriosclerotic calcifications. No acute radiographic abnormality.      XR ABD ACUTE W 1 V CHEST    Result Date: 12/15/2021  Acute abdominal series CLINICAL INDICATION: Worsening fatigue for one week FINDINGS: There is a large hiatal hernia. The lung bases are clear. No free air noted beneath the diaphragm. The bowel gas pattern is unremarkable. The bowel loops are normal in caliber with stool noted distally. No dilated loops of small bowel evident. No abnormal calculi appreciated. 1. Large hiatal hernia. 2. Otherwise, no acute abdominal or pelvic abnormality.       All Micro Results     Procedure Component Value Units Date/Time    CULTURE, BLOOD [[id number]] Collected: 12/15/21 1415    Order Status: Completed Specimen: Blood Updated: 12/20/21 0810     Special Requests: --        LEFT  Antecubital       Culture result: NO GROWTH 5 DAYS       CULTURE, BLOOD [[id number]] Collected: 12/15/21 1439    Order Status: Completed Specimen: Blood Updated: 12/20/21 0810     Special Requests: --        RIGHT  Antecubital       Culture result: NO GROWTH 5 DAYS       CULTURE, URINE [[id number]] Collected: 12/15/21 1439    Order Status: Completed Specimen: Urine Updated: 12/18/21 0744     Special Requests: NO SPECIAL REQUESTS        Culture result:       <10,000 COLONIES/mL MIXED SKIN ROSE ISOLATED                Labs: Results:       BMP, Mg, Phos Recent Labs     12/21/21  1157   *   K 4.2      CO2 32   AGAP 2*   BUN 29*   CREA 1.15   CA 10.5*   *      CBC Recent Labs     12/21/21  1157   WBC 13.5*   RBC 3.70*   HGB 10.8*   HCT 33.9*      GRANS 77   LYMPH 10*   EOS 0*   MONOS 8   BASOS 1   IG 4   ANEU 10.4*   ABL 1.3   RAMIN 0.0   ABM 1.1   ABB 0.1   AIG 0.6*      LFT Recent Labs     12/21/21  1157   ALT 21   AP 96   TP 7.0   ALB 2.4*   GLOB 4.6*   AGRAT 0.5*      Cardiac Testing Lab Results   Component Value Date/Time    CK 98 12/02/2019 07:58 PM    Troponin-I, Qt. <0.02 (L) 12/27/2019 06:15 PM    Troponin-I, Qt. 0.02 12/03/2019 05:40 AM    Troponin-I, Qt. <0.02 (L) 12/03/2019 12:28 AM      Coagulation Tests Lab Results   Component Value Date/Time    aPTT 101.6 (H) 07/16/2019 03:45 PM    aPTT 63.8 (H) 07/16/2019 08:43 AM    aPTT 97.7 (H) 07/16/2019 01:36 AM      A1c Lab Results   Component Value Date/Time    Hemoglobin A1c 5.6 07/29/2021 03:35 AM    Hemoglobin A1c 5.6 12/18/2019 03:46 PM      Lipid Panel Lab Results   Component Value Date/Time    Cholesterol, total 144 07/29/2021 03:35 AM    HDL Cholesterol 23 (L) 07/29/2021 03:35 AM    LDL, calculated 95 07/29/2021 03:35 AM    VLDL, calculated 26 (H) 07/29/2021 03:35 AM    Triglyceride 130 07/29/2021 03:35 AM    CHOL/HDL Ratio 6.3 07/29/2021 03:35 AM      Thyroid Panel Lab Results   Component Value Date/Time    TSH 5.360 12/16/2021 06:45 PM    TSH 1.710 07/29/2021 03:35 AM    T4, Free 1.3 12/08/2019 12:17 PM        Most Recent UA Lab Results   Component Value Date/Time    Color RED 12/15/2021 02:39 PM    Appearance CLOUDY 12/15/2021 02:39 PM    Specific gravity 1.018 12/15/2021 02:39 PM    pH (UA) 6.5 12/15/2021 02:39 PM    Protein 30 (A) 12/15/2021 02:39 PM    Glucose Negative 12/15/2021 02:39 PM    Ketone TRACE (A) 12/15/2021 02:39 PM    Bilirubin Negative 12/15/2021 02:39 PM    Blood LARGE (A) 12/15/2021 02:39 PM    Urobilinogen 1.0 12/15/2021 02:39 PM    Nitrites Negative 12/15/2021 02:39 PM    Leukocyte Esterase SMALL (A) 12/15/2021 02:39 PM    WBC 5-10 12/15/2021 02:39 PM    RBC >100 12/15/2021 02:39 PM    Epithelial cells 0-3 12/15/2021 02:39 PM    Bacteria 0 12/15/2021 02:39 PM    Casts 0-3 11/17/2021 07:50 PM    Other observations RESULTS VERIFIED MANUALLY 12/09/2019 02:41 PM          All Labs from Last 24 Hrs:  No results found for this or any previous visit (from the past 24 hour(s)).     Current Med List in Hospital:   Current Facility-Administered Medications   Medication Dose Route Frequency    sodium chloride (NS) flush 5-40 mL  5-40 mL IntraVENous Q8H    sodium chloride (NS) flush 5-40 mL  5-40 mL IntraVENous PRN    acetaminophen (TYLENOL) tablet 650 mg  650 mg Oral Q6H PRN    polyethylene glycol (MIRALAX) packet 17 g  17 g Oral DAILY PRN    ondansetron (ZOFRAN ODT) tablet 4 mg  4 mg Oral Q8H PRN    Or    ondansetron (ZOFRAN) injection 4 mg  4 mg IntraVENous Q6H PRN    enoxaparin (LOVENOX) injection 30 mg  30 mg SubCUTAneous DAILY    acetaminophen (TYLENOL) tablet 650 mg  650 mg Oral Q6H PRN    famotidine (PEPCID) tablet 20 mg  20 mg Oral Q12H    ondansetron (ZOFRAN ODT) tablet 4 mg  4 mg Oral Q8H PRN    multivitamin, tx-iron-ca-min (THERA-M w/ IRON) tablet 1 Tablet  1 Tablet Oral DAILY    nitroglycerin (NITROSTAT) tablet 0.4 mg  0.4 mg SubLINGual Q5MIN PRN       No Known Allergies  Immunization History   Administered Date(s) Administered    Influenza High Dose Vaccine PF 02/19/2016, 10/09/2018, 10/23/2019    Pneumococcal Polysaccharide (PPSV-23) 05/21/2018    TB Skin Test (PPD) Intradermal 12/02/2019, 12/12/2019, 07/29/2021, 10/02/2021, 12/18/2021       Recent Vital Data:  Patient Vitals for the past 24 hrs:   Temp Pulse Resp BP SpO2   12/23/21 1146 97.9 °F (36.6 °C) 97 18 130/78 99 %   12/23/21 0720 97.5 °F (36.4 °C) 73 20 (!) 140/88 96 %   12/23/21 0249 98.7 °F (37.1 °C) 87 17 103/87 96 %   12/22/21 2240 98.5 °F (36.9 °C) 76 18 136/72 96 %   12/22/21 2005 98.1 °F (36.7 °C) 86 21 133/64 94 %   12/22/21 1540 97.4 °F (36.3 °C) 67 20 (!) 134/91 96 %     Oxygen Therapy  O2 Sat (%): 99 % (12/23/21 1146)  Pulse via Oximetry: 69 beats per minute (12/16/21 0356)  O2 Device: None (Room air) (12/23/21 0800)    Estimated body mass index is 19.31 kg/m² as calculated from the following:    Height as of this encounter: 5' 8\" (1.727 m). Weight as of this encounter: 57.6 kg (127 lb). Intake/Output Summary (Last 24 hours) at 12/23/2021 1346  Last data filed at 12/23/2021 0945  Gross per 24 hour   Intake 600 ml   Output --   Net 600 ml         Physical Exam:    General:    Well nourished. No overt distress  Head:  Normocephalic, atraumatic  Eyes:  Sclerae appear normal.  Pupils equally round. HENT:  Nares appear normal, no drainage.   Moist mucous membranes  Neck:  No restricted ROM. Trachea midline  CV:   RRR. No m/r/g. No JVD  Lungs:   CTAB. No wheezing, rhonchi, or rales. Even, unlabored  Abdomen:   Soft, nontender, nondistended. Extremities: Warm and dry. No cyanosis or clubbing. No edema. Skin:     No rashes. Normal coloration  Neuro:  CN II-XII grossly intact. Disoriented. Speech tangential and intermittently nonsensical   Psych:  Normal mood and affect. Signed:  Dennys Argueta DO    Part of this note may have been written by using a voice dictation software. The note has been proof read but may still contain some grammatical/other typographical errors.

## 2021-12-23 NOTE — PROGRESS NOTES
Care Management Interventions  PCP Verified by CM: Yes  Mode of Transport at Discharge: BLS  Transition of Care Consult (CM Consult): SNF  Partner SNF: Yes  Physical Therapy Consult: Yes  Occupational Therapy Consult: Yes  Support Systems: Child(margi)  Confirm Follow Up Transport: Family  The Plan for Transition of Care is Related to the Following Treatment Goals : STR  The Patient and/or Patient Representative was Provided with a Choice of Provider and Agrees with the Discharge Plan?: Yes  Name of the Patient Representative Who was Provided with a Choice of Provider and Agrees with the Discharge Plan: Nga Juarez of Choice List was Provided with Basic Dialogue that Supports the Patient's Individualized Plan of Care/Goals, Treatment Preferences and Shares the Quality Data Associated with the Providers?: Yes  Discharge Location  Discharge Placement: Skilled nursing facility     Emerson talked with PT and OT this am. Pt made much progress with them today. Once notes are put in Md will need to call [facility] [place] [facility] [place] 1:[place]. If approved, pt can go to [facility] today. Sw will update family Maulik Padilla when more known. Sw to cont to follow and assist.  Azalea Merlin    PT and OT got notes in that were very helpful. Md did peer to peer and pt got approved. Pt going to [facility] today. Emerson called d in law and informed. Maulik Padilla very appreciative and will bring pt clothes, toiletries.  Ambulance to transport when a truck is avail around 4:30-5pm. Azalea Merlin

## 2021-12-23 NOTE — PROGRESS NOTES
END OF SHIFT NOTE:    Intake/Output  12/22 1901 - 12/23 0700  In: 120 [P.O.:120]  Out: -    Voiding: YES  Catheter: NO  Drain:              Stool:  2 occurrences. Stool Assessment  Stool Color: Eleni Quarry (12/23/21 0531)  Stool Appearance: Soft (12/23/21 0531)  Stool Amount: Medium (12/23/21 0531)  Stool Source/Status: Incontinence; Rectum (12/23/21 0531)    Emesis:  0 occurrences. VITAL SIGNS  Patient Vitals for the past 12 hrs:   Temp Pulse Resp BP SpO2   12/23/21 0249 98.7 °F (37.1 °C) 87 17 103/87 96 %   12/22/21 2240 98.5 °F (36.9 °C) 76 18 136/72 96 %   12/22/21 2005 98.1 °F (36.7 °C) 86 21 133/64 94 %       Pain Assessment  Pain 1  Pain Scale 1: Numeric (0 - 10) (12/23/21 0602)  Pain Intensity 1: 0 (12/23/21 0602)  Patient Stated Pain Goal: 0 (12/23/21 0602)  Pain Reassessment 1: Patient resting w/respiratory rate greater than 10 (12/22/21 1321)  Pain Location 1: Leg (12/19/21 1116)  Pain Orientation 1: Left;Right (throughout) (12/19/21 1116)  Pain Description 1: Sharp (12/17/21 0900)  Pain Intervention(s) 1: Ambulation/Increased Activity;Repositioned (12/19/21 1116)    Ambulating  No    Additional Information: pt remained confused throughout shift    Shift report given to oncoming nurse at the bedside.     Erasmo Solorzano RN

## 2021-12-28 ENCOUNTER — APPOINTMENT (OUTPATIENT)
Dept: CT IMAGING | Age: 86
End: 2021-12-28
Attending: EMERGENCY MEDICINE
Payer: MEDICARE

## 2021-12-28 ENCOUNTER — HOSPITAL ENCOUNTER (EMERGENCY)
Age: 86
Discharge: HOME | End: 2021-12-29
Attending: EMERGENCY MEDICINE
Payer: MEDICARE

## 2021-12-28 VITALS
WEIGHT: 127 LBS | RESPIRATION RATE: 18 BRPM | DIASTOLIC BLOOD PRESSURE: 65 MMHG | OXYGEN SATURATION: 98 % | SYSTOLIC BLOOD PRESSURE: 133 MMHG | HEART RATE: 80 BPM | TEMPERATURE: 97.8 F | BODY MASS INDEX: 19.31 KG/M2
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
## 2021-12-28 PROCEDURE — 70450 CT HEAD/BRAIN W/O DYE: CPT

## 2021-12-28 PROCEDURE — 99283 EMERGENCY DEPT VISIT LOW MDM: CPT

## 2021-12-29 NOTE — ED PROVIDER NOTES
Patient is a 80-year-old male with a history of coronary artery disease, colon cancer and dementia who presents from a nursing facility via EMS after a fall. Per EMS, staff reported that he fell into a wall and struck so hard \"he put a dent in the wall\". There was no reported loss of consciousness, no bleeding from his head. The patient currently has no complaints though he has dementia. No other history is available. Past Medical History:   Diagnosis Date    CAD (coronary artery disease) 7/18/2019    Colon cancer ([facility] 01/2012    Hiatal hernia        No past surgical history on file. No family history on file. Social History     Socioeconomic History    Marital status:      Spouse name: Not on file    Number of children: Not on file    Years of education: Not on file    Highest education level: Not on file   Occupational History    Not on file   Tobacco Use    Smoking status: Never Smoker    Smokeless tobacco: Never Used   Substance and Sexual Activity    Alcohol use: Never    Drug use: Never    Sexual activity: Not on file   Other Topics Concern    Not on file   Social History Narrative    Not on file     Social Determinants of Health     Financial Resource Strain:     Difficulty of Paying Living Expenses: Not on file   Food Insecurity:     Worried About Running Out of Food in the Last Year: Not on file    Alexandra of Food in the Last Year: Not on file   Transportation Needs:     Lack of Transportation (Medical): Not on file    Lack of Transportation (Non-Medical):  Not on file   Physical Activity:     Days of Exercise per Week: Not on file    Minutes of Exercise per Session: Not on file   Stress:     Feeling of Stress : Not on file   Social Connections:     Frequency of Communication with Friends and Family: Not on file    Frequency of Social Gatherings with Friends and Family: Not on file    Attends Latter-day Services: Not on file   [employer] of Clubs or Organizations: Not on file    Attends Club or Organization Meetings: Not on file    Marital Status: Not on file   Intimate Partner Violence:     Fear of Current or Ex-Partner: Not on file    Emotionally Abused: Not on file    Physically Abused: Not on file    Sexually Abused: Not on file   Housing Stability:     Unable to Pay for Housing in the Last Year: Not on file    Number of [place] in the Last Year: Not on file    Unstable Housing in the Last Year: Not on file         ALLERGIES: Patient has no known allergies. Review of Systems   Unable to perform ROS: Dementia       Vitals:    12/28/21 2114   BP: 133/65   Pulse: 80   Resp: 18   Temp: 97.8 °F (36.6 °C)   SpO2: 98%   Weight: 57.6 kg (127 lb)            Physical Exam  Vitals and nursing note reviewed. Constitutional:       Appearance: Normal appearance. He is well-developed. HENT:      Head: Normocephalic and atraumatic. Eyes:      Conjunctiva/sclera: Conjunctivae normal.      Pupils: Pupils are equal, round, and reactive to light. Pulmonary:      Effort: Pulmonary effort is normal. No respiratory distress. Musculoskeletal:         General: Normal range of motion. Cervical back: Normal range of motion and neck supple. Skin:     General: Skin is warm and dry. Neurological:      Mental Status: He is alert. MDM  Number of Diagnoses or Management Options  Fall, initial encounter: new and does not require workup  Diagnosis management comments: 11:46 PM discussed results with patient, unremarkable head CT findings.        Amount and/or Complexity of Data Reviewed  Tests in the radiology section of CPT®: ordered and reviewed    Risk of Complications, Morbidity, and/or Mortality  Presenting problems: moderate  Diagnostic procedures: moderate  Management options: moderate    Patient Progress  Patient progress: stable         Procedures

## 2021-12-29 NOTE — ED TRIAGE NOTES
Patient via EMS to room 8. Per report patient from snf and fell tonight unwitnessed and hit is head on the wall. Per EMS report patient with damage to wall. Unknown LOC. Patient confused at baseline.

## 2021-12-29 NOTE — ED NOTES
I have reviewed discharge instructions with the facility. The facility verbalized understanding. Patient left ED via Discharge Method: stretcher to SNF with [facility]). Opportunity for questions and clarification provided. Patient given 0 scripts. To continue your aftercare when you leave the hospital, you may receive an automated call from our care team to check in on how you are doing. This is a free service and part of our promise to provide the best care and service to meet your aftercare needs.  If you have questions, or wish to unsubscribe from this service please call [phone]. Thank you for Choosing our [facility] Emergency Department.

## 2022-01-01 ENCOUNTER — HOSPITAL ENCOUNTER (EMERGENCY)
Dept: CT IMAGING | Age: 87
Discharge: HOME | DRG: 871 | End: 2022-08-04
Payer: MEDICARE

## 2022-01-01 ENCOUNTER — TELEPHONE (OUTPATIENT)
Dept: INTERNAL MEDICINE CLINIC | Facility: CLINIC | Age: 87
End: 2022-01-01

## 2022-01-01 ENCOUNTER — HOSPITAL ENCOUNTER (EMERGENCY)
Dept: GENERAL RADIOLOGY | Age: 87
Discharge: HOME | DRG: 871 | End: 2022-08-04
Payer: MEDICARE

## 2022-01-01 ENCOUNTER — HOSPITAL ENCOUNTER (INPATIENT)
Age: 87
LOS: 1 days | Discharge: HOSPICE | DRG: 871 | End: 2022-08-02
Attending: EMERGENCY MEDICINE | Admitting: FAMILY MEDICINE
Payer: MEDICARE

## 2022-01-01 VITALS
HEIGHT: 70 IN | HEART RATE: 73 BPM | OXYGEN SATURATION: 97 % | TEMPERATURE: 98 F | DIASTOLIC BLOOD PRESSURE: 65 MMHG | WEIGHT: 140 LBS | BODY MASS INDEX: 20.04 KG/M2 | RESPIRATION RATE: 16 BRPM | SYSTOLIC BLOOD PRESSURE: 109 MMHG

## 2022-01-01 DIAGNOSIS — A41.9 SEPSIS, DUE TO UNSPECIFIED ORGANISM, UNSPECIFIED WHETHER ACUTE ORGAN DYSFUNCTION PRESENT (HCC): Primary | ICD-10-CM

## 2022-01-01 DIAGNOSIS — R50.9 FEVER, UNSPECIFIED FEVER CAUSE: ICD-10-CM

## 2022-01-01 LAB
ACCESSION NUMBER, LLC1M: ABNORMAL
ACINETOBACTER CALCOAC BAUMANNII COMPLEX BY PCR: NOT DETECTED
ALBUMIN SERPL-MCNC: 2.5 G/DL (ref 3.2–4.6)
ALBUMIN SERPL-MCNC: 2.9 G/DL (ref 3.2–4.6)
ALBUMIN/GLOB SERPL: 0.7 {RATIO} (ref 1.2–3.5)
ALBUMIN/GLOB SERPL: 0.8 {RATIO} (ref 1.2–3.5)
ALP SERPL-CCNC: 69 U/L (ref 50–136)
ALP SERPL-CCNC: 79 U/L (ref 50–136)
ALT SERPL-CCNC: 13 U/L (ref 12–65)
ALT SERPL-CCNC: 16 U/L (ref 12–65)
ANION GAP SERPL CALC-SCNC: 4 MMOL/L (ref 7–16)
ANION GAP SERPL CALC-SCNC: 7 MMOL/L (ref 7–16)
APPEARANCE UR: CLEAR
ARTERIAL PATENCY WRIST A: ABNORMAL
AST SERPL-CCNC: 15 U/L (ref 15–37)
AST SERPL-CCNC: 53 U/L (ref 15–37)
B PERT DNA SPEC QL NAA+PROBE: NOT DETECTED
BACTERIA SPEC CULT: ABNORMAL
BACTERIA URNS QL MICRO: ABNORMAL /HPF
BACTEROIDES FRAGILIS BY PCR: NOT DETECTED
BASE EXCESS BLDV CALC-SCNC: 0.2 MMOL/L
BASOPHILS # BLD: 0 K/UL (ref 0–0.2)
BASOPHILS # BLD: 0 K/UL (ref 0–0.2)
BASOPHILS NFR BLD: 0 % (ref 0–2)
BASOPHILS NFR BLD: 0 % (ref 0–2)
BILIRUB SERPL-MCNC: 0.9 MG/DL (ref 0.2–1.1)
BILIRUB SERPL-MCNC: 1 MG/DL (ref 0.2–1.1)
BILIRUB UR QL: NEGATIVE
BIOFIRE TEST COMMENT: ABNORMAL
BLACTX-M ISLT/SPM QL: DETECTED
BLAIMP ISLT/SPM QL: NOT DETECTED
BLAKPC BLD POS QL NAA+NON-PROBE: NOT DETECTED
BLAOXA-48-LIKE ISLT/SPM QL: NOT DETECTED
BLAVIM ISLT/SPM QL: NOT DETECTED
BORDETELLA PARAPERTUSSIS BY PCR: NOT DETECTED
BUN SERPL-MCNC: 14 MG/DL (ref 8–23)
BUN SERPL-MCNC: 14 MG/DL (ref 8–23)
C ALBICANS DNA BLD POS QL NAA+NON-PROBE: NOT DETECTED
C GLABRATA DNA BLD POS QL NAA+NON-PROBE: NOT DETECTED
C KRUSEI DNA BLD POS QL NAA+NON-PROBE: NOT DETECTED
C PARAP DNA BLD POS QL NAA+NON-PROBE: NOT DETECTED
C PNEUM DNA SPEC QL NAA+PROBE: NOT DETECTED
C TROPICLS DNA BLD POS QL NAA+NON-PROBE: NOT DETECTED
CALCIUM SERPL-MCNC: 8.7 MG/DL (ref 8.3–10.4)
CALCIUM SERPL-MCNC: 8.7 MG/DL (ref 8.3–10.4)
CANDIDA AURIS BY PCR: NOT DETECTED
CASTS URNS QL MICRO: 0 /LPF
CHLORIDE SERPL-SCNC: 107 MMOL/L (ref 98–107)
CHLORIDE SERPL-SCNC: 110 MMOL/L (ref 98–107)
CO2 SERPL-SCNC: 23 MMOL/L (ref 21–32)
CO2 SERPL-SCNC: 28 MMOL/L (ref 21–32)
COLISTIN RES MCR-1 ISLT/SPM QL: NOT DETECTED
COLOR UR: YELLOW
CREAT SERPL-MCNC: 1.29 MG/DL (ref 0.8–1.5)
CREAT SERPL-MCNC: 1.29 MG/DL (ref 0.8–1.5)
CRYPTOCOCCUS NEOFORMANS/GATTII BY PCR: NOT DETECTED
CRYSTALS URNS QL MICRO: 0 /LPF
D DIMER PPP FEU-MCNC: 1.06 UG/ML(FEU)
DIFFERENTIAL METHOD BLD: ABNORMAL
DIFFERENTIAL METHOD BLD: ABNORMAL
E CLOAC COMP DNA BLD POS NAA+NON-PROBE: NOT DETECTED
E COLI DNA BLD POS QL NAA+NON-PROBE: NOT DETECTED
EKG ATRIAL RATE: 118 BPM
EKG DIAGNOSIS: NORMAL
EKG P AXIS: 66 DEGREES
EKG P-R INTERVAL: 154 MS
EKG Q-T INTERVAL: 324 MS
EKG QRS DURATION: 76 MS
EKG QTC CALCULATION (BAZETT): 454 MS
EKG R AXIS: 51 DEGREES
EKG T AXIS: 58 DEGREES
EKG VENTRICULAR RATE: 118 BPM
ENTEROBACTERALES BY PCR: DETECTED
ENTEROCOCCUS FAECALIS BY PCR: NOT DETECTED
ENTEROCOCCUS FAECIUM BY PCR: NOT DETECTED
EOSINOPHIL # BLD: 0.1 K/UL (ref 0–0.8)
EOSINOPHIL # BLD: 0.2 K/UL (ref 0–0.8)
EOSINOPHIL NFR BLD: 1 % (ref 0.5–7.8)
EOSINOPHIL NFR BLD: 1 % (ref 0.5–7.8)
EPI CELLS #/AREA URNS HPF: ABNORMAL /HPF
ERYTHROCYTE [DISTWIDTH] IN BLOOD BY AUTOMATED COUNT: 17.4 % (ref 11.9–14.6)
ERYTHROCYTE [DISTWIDTH] IN BLOOD BY AUTOMATED COUNT: 17.8 % (ref 11.9–14.6)
FERRITIN SERPL-MCNC: 64 NG/ML (ref 8–388)
FLUAV AG NPH QL IA: NEGATIVE
FLUAV SUBTYP SPEC NAA+PROBE: NOT DETECTED
FLUBV AG NPH QL IA: NEGATIVE
FLUBV RNA SPEC QL NAA+PROBE: NOT DETECTED
FOLATE SERPL-MCNC: 26.5 NG/ML (ref 3.1–17.5)
GAS FLOW.O2 O2 DELIVERY SYS: ABNORMAL L/MIN
GLOBULIN SER CALC-MCNC: 3.6 G/DL (ref 2.3–3.5)
GLOBULIN SER CALC-MCNC: 3.7 G/DL (ref 2.3–3.5)
GLUCOSE SERPL-MCNC: 105 MG/DL (ref 65–100)
GLUCOSE SERPL-MCNC: 88 MG/DL (ref 65–100)
GLUCOSE UR STRIP.AUTO-MCNC: NEGATIVE MG/DL
GP B STREP DNA BLD POS QL NAA+NON-PROBE: NOT DETECTED
GRAM STN SPEC: ABNORMAL
HADV DNA SPEC QL NAA+PROBE: NOT DETECTED
HAEM INFLU DNA BLD POS QL NAA+NON-PROBE: NOT DETECTED
HCO3 BLDV-SCNC: 25 MMOL/L (ref 23–28)
HCOV 229E RNA SPEC QL NAA+PROBE: NOT DETECTED
HCOV HKU1 RNA SPEC QL NAA+PROBE: NOT DETECTED
HCOV NL63 RNA SPEC QL NAA+PROBE: NOT DETECTED
HCOV OC43 RNA SPEC QL NAA+PROBE: NOT DETECTED
HCT VFR BLD AUTO: 31.1 % (ref 41.1–50.3)
HCT VFR BLD AUTO: 35.2 % (ref 41.1–50.3)
HGB BLD-MCNC: 10.8 G/DL (ref 13.6–17.2)
HGB BLD-MCNC: 9.8 G/DL (ref 13.6–17.2)
HGB RETIC QN AUTO: 33 PG (ref 29–35)
HGB UR QL STRIP: ABNORMAL
HMPV RNA SPEC QL NAA+PROBE: NOT DETECTED
HPIV1 RNA SPEC QL NAA+PROBE: NOT DETECTED
HPIV2 RNA SPEC QL NAA+PROBE: NOT DETECTED
HPIV3 RNA SPEC QL NAA+PROBE: NOT DETECTED
HPIV4 RNA SPEC QL NAA+PROBE: NOT DETECTED
IMM GRANULOCYTES # BLD AUTO: 0.1 K/UL (ref 0–0.5)
IMM GRANULOCYTES # BLD AUTO: 0.1 K/UL (ref 0–0.5)
IMM GRANULOCYTES NFR BLD AUTO: 1 % (ref 0–5)
IMM GRANULOCYTES NFR BLD AUTO: 1 % (ref 0–5)
IMM RETICS NFR: 15.6 % (ref 2.3–13.4)
IRON SATN MFR SERPL: 7 %
IRON SERPL-MCNC: 20 UG/DL (ref 35–150)
K OXYTOCA DNA BLD POS QL NAA+NON-PROBE: NOT DETECTED
KETONES UR QL STRIP.AUTO: NEGATIVE MG/DL
KLEBSIELLA AEROGENES BY PCR: NOT DETECTED
KLEBSIELLA PNEUMONIAE GROUP BY PCR: DETECTED
L MONOCYTOG DNA BLD POS QL NAA+NON-PROBE: NOT DETECTED
LACTATE SERPL-SCNC: 4.4 MMOL/L (ref 0.4–2)
LDH SERPL L TO P-CCNC: 122 U/L (ref 110–210)
LEUKOCYTE ESTERASE UR QL STRIP.AUTO: NEGATIVE
LYMPHOCYTES # BLD: 0.7 K/UL (ref 0.5–4.6)
LYMPHOCYTES # BLD: 0.9 K/UL (ref 0.5–4.6)
LYMPHOCYTES NFR BLD: 8 % (ref 13–44)
LYMPHOCYTES NFR BLD: 9 % (ref 13–44)
Lab: NORMAL
Lab: NORMAL
M PNEUMO DNA SPEC QL NAA+PROBE: NOT DETECTED
MCH RBC QN AUTO: 29 PG (ref 26.1–32.9)
MCH RBC QN AUTO: 29.1 PG (ref 26.1–32.9)
MCHC RBC AUTO-ENTMCNC: 30.7 G/DL (ref 31.4–35)
MCHC RBC AUTO-ENTMCNC: 31.5 G/DL (ref 31.4–35)
MCV RBC AUTO: 92.3 FL (ref 79.6–97.8)
MCV RBC AUTO: 94.4 FL (ref 79.6–97.8)
MONOCYTES # BLD: 0.1 K/UL (ref 0.1–1.3)
MONOCYTES # BLD: 0.7 K/UL (ref 0.1–1.3)
MONOCYTES NFR BLD: 1 % (ref 4–12)
MONOCYTES NFR BLD: 6 % (ref 4–12)
MUCOUS THREADS URNS QL MICRO: 0 /LPF
N MEN DNA BLD POS QL NAA+NON-PROBE: NOT DETECTED
NEUTS SEG # BLD: 7.2 K/UL (ref 1.7–8.2)
NEUTS SEG # BLD: 9.8 K/UL (ref 1.7–8.2)
NEUTS SEG NFR BLD: 85 % (ref 43–78)
NEUTS SEG NFR BLD: 89 % (ref 43–78)
NITRITE UR QL STRIP.AUTO: NEGATIVE
NRBC # BLD: 0 K/UL (ref 0–0.2)
NRBC # BLD: 0 K/UL (ref 0–0.2)
P AERUGINOSA DNA BLD POS NAA+NON-PROBE: NOT DETECTED
PCO2 BLDV: 40.4 MMHG (ref 41–51)
PH BLDV: 7.4 [PH] (ref 7.32–7.42)
PH UR STRIP: 6 [PH] (ref 5–9)
PHOSPHATE SERPL-MCNC: 2.1 MG/DL (ref 2.3–3.7)
PLATELET # BLD AUTO: 203 K/UL (ref 150–450)
PLATELET # BLD AUTO: 266 K/UL (ref 150–450)
PMV BLD AUTO: 10.4 FL (ref 9.4–12.3)
PMV BLD AUTO: 10.9 FL (ref 9.4–12.3)
PO2 BLDV: 20 MMHG
POTASSIUM SERPL-SCNC: 4.2 MMOL/L (ref 3.5–5.1)
POTASSIUM SERPL-SCNC: 5.2 MMOL/L (ref 3.5–5.1)
PROCALCITONIN SERPL-MCNC: <0.05 NG/ML (ref 0–0.49)
PROT SERPL-MCNC: 6.1 G/DL (ref 6.3–8.2)
PROT SERPL-MCNC: 6.6 G/DL (ref 6.3–8.2)
PROT UR STRIP-MCNC: NEGATIVE MG/DL
PROTEUS SP DNA BLD POS QL NAA+NON-PROBE: NOT DETECTED
RBC # BLD AUTO: 3.37 M/UL (ref 4.23–5.6)
RBC # BLD AUTO: 3.73 M/UL (ref 4.23–5.6)
RBC #/AREA URNS HPF: ABNORMAL /HPF
REFERENCE LAB: NORMAL
RESISTANT GENE NDM BY PCR: NOT DETECTED
RESISTANT GENE TARGETS: ABNORMAL
RETICS # AUTO: 0.03 M/UL (ref 0.03–0.1)
RETICS/RBC NFR AUTO: 0.9 % (ref 0.3–2)
RSV RNA SPEC QL NAA+PROBE: NOT DETECTED
RV+EV RNA SPEC QL NAA+PROBE: NOT DETECTED
S AUREUS DNA BLD POS QL NAA+NON-PROBE: NOT DETECTED
S AUREUS+CONS DNA BLD POS NAA+NON-PROBE: NOT DETECTED
S MARCESCENS DNA BLD POS NAA+NON-PROBE: NOT DETECTED
S PNEUM DNA BLD POS QL NAA+NON-PROBE: NOT DETECTED
S PYO DNA BLD POS QL NAA+NON-PROBE: NOT DETECTED
SALMONELLA SPECIES BY PCR: NOT DETECTED
SAO2 % BLDV: 31 % (ref 65–88)
SARS-COV-2 RDRP RESP QL NAA+PROBE: NOT DETECTED
SARS-COV-2 RNA RESP QL NAA+PROBE: NOT DETECTED
SERVICE CMNT-IMP: ABNORMAL
SODIUM SERPL-SCNC: 139 MMOL/L (ref 136–145)
SODIUM SERPL-SCNC: 140 MMOL/L (ref 136–145)
SOURCE: NORMAL
SP GR UR REFRACTOMETRY: 1.01 (ref 1–1.02)
SPECIMEN SOURCE: NORMAL
SPECIMEN TYPE: ABNORMAL
STAPHYLOCOCCUS EPIDERMIDIS BY PCR: NOT DETECTED
STAPHYLOCOCCUS LUGDUNENSIS BY PCR: NOT DETECTED
STENOTROPHOMONAS MALTOPHILIA BY PCR: NOT DETECTED
STREPTOCOCCUS DNA BLD POS NAA+NON-PROBE: NOT DETECTED
TIBC SERPL-MCNC: 267 UG/DL (ref 250–450)
UROBILINOGEN UR QL STRIP.AUTO: 0.2 EU/DL (ref 0.2–1)
VIT B12 SERPL-MCNC: 450 PG/ML (ref 193–986)
WBC # BLD AUTO: 11.6 K/UL (ref 4.3–11.1)
WBC # BLD AUTO: 8.1 K/UL (ref 4.3–11.1)
WBC URNS QL MICRO: ABNORMAL /HPF

## 2022-01-01 PROCEDURE — 85025 COMPLETE CBC W/AUTO DIFF WBC: CPT

## 2022-01-01 PROCEDURE — 96374 THER/PROPH/DIAG INJ IV PUSH: CPT

## 2022-01-01 PROCEDURE — 84145 PROCALCITONIN (PCT): CPT

## 2022-01-01 PROCEDURE — 87186 SC STD MICRODIL/AGAR DIL: CPT

## 2022-01-01 PROCEDURE — 97530 THERAPEUTIC ACTIVITIES: CPT

## 2022-01-01 PROCEDURE — 81015 MICROSCOPIC EXAM OF URINE: CPT

## 2022-01-01 PROCEDURE — 2580000003 HC RX 258: Performed by: EMERGENCY MEDICINE

## 2022-01-01 PROCEDURE — 87076 CULTURE ANAEROBE IDENT EACH: CPT

## 2022-01-01 PROCEDURE — 87150 DNA/RNA AMPLIFIED PROBE: CPT

## 2022-01-01 PROCEDURE — 87040 BLOOD CULTURE FOR BACTERIA: CPT

## 2022-01-01 PROCEDURE — 83615 LACTATE (LD) (LDH) ENZYME: CPT

## 2022-01-01 PROCEDURE — 74177 CT ABD & PELVIS W/CONTRAST: CPT

## 2022-01-01 PROCEDURE — 6360000004 HC RX CONTRAST MEDICATION: Performed by: EMERGENCY MEDICINE

## 2022-01-01 PROCEDURE — 85379 FIBRIN DEGRADATION QUANT: CPT

## 2022-01-01 PROCEDURE — 96361 HYDRATE IV INFUSION ADD-ON: CPT

## 2022-01-01 PROCEDURE — 6370000000 HC RX 637 (ALT 250 FOR IP): Performed by: EMERGENCY MEDICINE

## 2022-01-01 PROCEDURE — 6370000000 HC RX 637 (ALT 250 FOR IP): Performed by: FAMILY MEDICINE

## 2022-01-01 PROCEDURE — 82803 BLOOD GASES ANY COMBINATION: CPT

## 2022-01-01 PROCEDURE — 6360000002 HC RX W HCPCS: Performed by: FAMILY MEDICINE

## 2022-01-01 PROCEDURE — 0202U NFCT DS 22 TRGT SARS-COV-2: CPT

## 2022-01-01 PROCEDURE — 97161 PT EVAL LOW COMPLEX 20 MIN: CPT

## 2022-01-01 PROCEDURE — 85046 RETICYTE/HGB CONCENTRATE: CPT

## 2022-01-01 PROCEDURE — 81003 URINALYSIS AUTO W/O SCOPE: CPT

## 2022-01-01 PROCEDURE — 6360000002 HC RX W HCPCS: Performed by: EMERGENCY MEDICINE

## 2022-01-01 PROCEDURE — 82746 ASSAY OF FOLIC ACID SERUM: CPT

## 2022-01-01 PROCEDURE — 87153 DNA/RNA SEQUENCING: CPT

## 2022-01-01 PROCEDURE — 82728 ASSAY OF FERRITIN: CPT

## 2022-01-01 PROCEDURE — 71045 X-RAY EXAM CHEST 1 VIEW: CPT

## 2022-01-01 PROCEDURE — 2700000000 HC OXYGEN THERAPY PER DAY

## 2022-01-01 PROCEDURE — 94760 N-INVAS EAR/PLS OXIMETRY 1: CPT

## 2022-01-01 PROCEDURE — 87205 SMEAR GRAM STAIN: CPT

## 2022-01-01 PROCEDURE — 97535 SELF CARE MNGMENT TRAINING: CPT

## 2022-01-01 PROCEDURE — 87077 CULTURE AEROBIC IDENTIFY: CPT

## 2022-01-01 PROCEDURE — 92610 EVALUATE SWALLOWING FUNCTION: CPT

## 2022-01-01 PROCEDURE — 2580000003 HC RX 258: Performed by: FAMILY MEDICINE

## 2022-01-01 PROCEDURE — 87185 SC STD ENZYME DETCJ PER NZM: CPT

## 2022-01-01 PROCEDURE — 93005 ELECTROCARDIOGRAM TRACING: CPT | Performed by: EMERGENCY MEDICINE

## 2022-01-01 PROCEDURE — 84100 ASSAY OF PHOSPHORUS: CPT

## 2022-01-01 PROCEDURE — 1100000000 HC RM PRIVATE

## 2022-01-01 PROCEDURE — 87635 SARS-COV-2 COVID-19 AMP PRB: CPT

## 2022-01-01 PROCEDURE — 36415 COLL VENOUS BLD VENIPUNCTURE: CPT

## 2022-01-01 PROCEDURE — 99285 EMERGENCY DEPT VISIT HI MDM: CPT

## 2022-01-01 PROCEDURE — 80053 COMPREHEN METABOLIC PANEL: CPT

## 2022-01-01 PROCEDURE — 87804 INFLUENZA ASSAY W/OPTIC: CPT

## 2022-01-01 PROCEDURE — 83550 IRON BINDING TEST: CPT

## 2022-01-01 PROCEDURE — 97165 OT EVAL LOW COMPLEX 30 MIN: CPT

## 2022-01-01 PROCEDURE — 82607 VITAMIN B-12: CPT

## 2022-01-01 PROCEDURE — 83605 ASSAY OF LACTIC ACID: CPT

## 2022-01-01 RX ORDER — ACETAMINOPHEN 650 MG/1
650 SUPPOSITORY RECTAL EVERY 6 HOURS PRN
Status: DISCONTINUED | OUTPATIENT
Start: 2022-01-01 | End: 2022-01-01 | Stop reason: HOSPADM

## 2022-01-01 RX ORDER — ACETAMINOPHEN 500 MG
1000 TABLET ORAL
Status: DISCONTINUED | OUTPATIENT
Start: 2022-01-01 | End: 2022-01-01

## 2022-01-01 RX ORDER — ACETAMINOPHEN 650 MG/1
650 SUPPOSITORY RECTAL
Status: COMPLETED | OUTPATIENT
Start: 2022-01-01 | End: 2022-01-01

## 2022-01-01 RX ORDER — LEVOFLOXACIN 750 MG/1
750 TABLET ORAL DAILY
Qty: 5 TABLET | Refills: 0 | Status: SHIPPED | OUTPATIENT
Start: 2022-01-01 | End: 2022-01-01

## 2022-01-01 RX ORDER — ONDANSETRON 2 MG/ML
4 INJECTION INTRAMUSCULAR; INTRAVENOUS EVERY 6 HOURS PRN
Status: DISCONTINUED | OUTPATIENT
Start: 2022-01-01 | End: 2022-01-01 | Stop reason: HOSPADM

## 2022-01-01 RX ORDER — SODIUM CHLORIDE 9 MG/ML
INJECTION, SOLUTION INTRAVENOUS PRN
Status: DISCONTINUED | OUTPATIENT
Start: 2022-01-01 | End: 2022-01-01 | Stop reason: HOSPADM

## 2022-01-01 RX ORDER — ACETAMINOPHEN 325 MG/1
650 TABLET ORAL EVERY 6 HOURS PRN
Status: DISCONTINUED | OUTPATIENT
Start: 2022-01-01 | End: 2022-01-01 | Stop reason: HOSPADM

## 2022-01-01 RX ORDER — FAMOTIDINE 20 MG/1
20 TABLET, FILM COATED ORAL DAILY
Status: DISCONTINUED | OUTPATIENT
Start: 2022-01-01 | End: 2022-01-01 | Stop reason: HOSPADM

## 2022-01-01 RX ORDER — ATORVASTATIN CALCIUM 40 MG/1
80 TABLET, FILM COATED ORAL NIGHTLY
Status: DISCONTINUED | OUTPATIENT
Start: 2022-01-01 | End: 2022-01-01 | Stop reason: HOSPADM

## 2022-01-01 RX ORDER — SODIUM CHLORIDE 0.9 % (FLUSH) 0.9 %
5-40 SYRINGE (ML) INJECTION PRN
Status: DISCONTINUED | OUTPATIENT
Start: 2022-01-01 | End: 2022-01-01 | Stop reason: HOSPADM

## 2022-01-01 RX ORDER — LEVOFLOXACIN 5 MG/ML
750 INJECTION, SOLUTION INTRAVENOUS
Status: DISCONTINUED | OUTPATIENT
Start: 2022-01-01 | End: 2022-01-01 | Stop reason: HOSPADM

## 2022-01-01 RX ORDER — SODIUM CHLORIDE, SODIUM LACTATE, POTASSIUM CHLORIDE, AND CALCIUM CHLORIDE .6; .31; .03; .02 G/100ML; G/100ML; G/100ML; G/100ML
1000 INJECTION, SOLUTION INTRAVENOUS ONCE
Status: COMPLETED | OUTPATIENT
Start: 2022-01-01 | End: 2022-01-01

## 2022-01-01 RX ORDER — SODIUM CHLORIDE 0.9 % (FLUSH) 0.9 %
5-40 SYRINGE (ML) INJECTION EVERY 12 HOURS SCHEDULED
Status: DISCONTINUED | OUTPATIENT
Start: 2022-01-01 | End: 2022-01-01 | Stop reason: HOSPADM

## 2022-01-01 RX ORDER — ONDANSETRON 4 MG/1
4 TABLET, ORALLY DISINTEGRATING ORAL EVERY 8 HOURS PRN
Status: DISCONTINUED | OUTPATIENT
Start: 2022-01-01 | End: 2022-01-01 | Stop reason: HOSPADM

## 2022-01-01 RX ORDER — ENOXAPARIN SODIUM 100 MG/ML
40 INJECTION SUBCUTANEOUS EVERY 24 HOURS
Status: DISCONTINUED | OUTPATIENT
Start: 2022-01-01 | End: 2022-01-01 | Stop reason: HOSPADM

## 2022-01-01 RX ORDER — POLYETHYLENE GLYCOL 3350 17 G/17G
17 POWDER, FOR SOLUTION ORAL DAILY PRN
Status: DISCONTINUED | OUTPATIENT
Start: 2022-01-01 | End: 2022-01-01 | Stop reason: HOSPADM

## 2022-01-01 RX ADMIN — CEFTRIAXONE 1000 MG: 1 INJECTION, POWDER, FOR SOLUTION INTRAMUSCULAR; INTRAVENOUS at 12:16

## 2022-01-01 RX ADMIN — PIPERACILLIN AND TAZOBACTAM 3.38 MG: 3; .375 INJECTION, POWDER, FOR SOLUTION INTRAVENOUS at 04:13

## 2022-01-01 RX ADMIN — ENOXAPARIN SODIUM 40 MG: 100 INJECTION SUBCUTANEOUS at 14:05

## 2022-01-01 RX ADMIN — DIATRIZOATE MEGLUMINE AND DIATRIZOATE SODIUM 15 ML: 660; 100 LIQUID ORAL; RECTAL at 09:09

## 2022-01-01 RX ADMIN — LEVOFLOXACIN 750 MG: 5 INJECTION, SOLUTION INTRAVENOUS at 11:07

## 2022-01-01 RX ADMIN — FAMOTIDINE 20 MG: 20 TABLET, FILM COATED ORAL at 08:49

## 2022-01-01 RX ADMIN — SODIUM CHLORIDE, SODIUM LACTATE, POTASSIUM CHLORIDE, AND CALCIUM CHLORIDE 1000 ML: 600; 310; 30; 20 INJECTION, SOLUTION INTRAVENOUS at 06:43

## 2022-01-01 RX ADMIN — ACETAMINOPHEN 650 MG: 650 SUPPOSITORY RECTAL at 06:43

## 2022-01-01 RX ADMIN — PIPERACILLIN AND TAZOBACTAM 4500 MG: 4; .5 INJECTION, POWDER, FOR SOLUTION INTRAVENOUS at 21:34

## 2022-01-01 RX ADMIN — IOPAMIDOL 100 ML: 755 INJECTION, SOLUTION INTRAVENOUS at 10:54

## 2022-01-01 RX ADMIN — FAMOTIDINE 20 MG: 20 TABLET, FILM COATED ORAL at 14:05

## 2022-01-01 RX ADMIN — SODIUM CHLORIDE, PRESERVATIVE FREE 10 ML: 5 INJECTION INTRAVENOUS at 08:52

## 2022-01-01 RX ADMIN — ATORVASTATIN CALCIUM 80 MG: 40 TABLET, FILM COATED ORAL at 21:33

## 2022-01-01 ASSESSMENT — ENCOUNTER SYMPTOMS
BACK PAIN: 0
VOMITING: 0
SHORTNESS OF BREATH: 0
COUGH: 0
EYE DISCHARGE: 0
ABDOMINAL PAIN: 0
CHEST TIGHTNESS: 0
DIARRHEA: 0
NAUSEA: 0

## 2022-01-01 ASSESSMENT — PAIN - FUNCTIONAL ASSESSMENT: PAIN_FUNCTIONAL_ASSESSMENT: ADULT NONVERBAL PAIN SCALE (NPVS)

## 2022-01-12 ENCOUNTER — HOSPITAL ENCOUNTER (OUTPATIENT)
Dept: LAB | Age: 87
Discharge: HOME | End: 2022-01-12

## 2022-01-12 LAB
ALBUMIN SERPL-MCNC: 2.8 G/DL (ref 3.2–4.6)
ALBUMIN/GLOB SERPL: 0.8 {RATIO} (ref 1.2–3.5)
ALP SERPL-CCNC: 116 U/L (ref 50–136)
ALT SERPL-CCNC: 19 U/L (ref 12–65)
ANION GAP SERPL CALC-SCNC: 4 MMOL/L (ref 7–16)
AST SERPL-CCNC: 14 U/L (ref 15–37)
BASOPHILS # BLD: 0 K/UL (ref 0–0.2)
BASOPHILS NFR BLD: 1 % (ref 0–2)
BILIRUB SERPL-MCNC: 0.4 MG/DL (ref 0.2–1.1)
BUN SERPL-MCNC: 20 MG/DL (ref 8–23)
CALCIUM SERPL-MCNC: 9.3 MG/DL (ref 8.3–10.4)
CHLORIDE SERPL-SCNC: 107 MMOL/L (ref 98–107)
CO2 SERPL-SCNC: 31 MMOL/L (ref 21–32)
CREAT SERPL-MCNC: 1.06 MG/DL (ref 0.8–1.5)
DIFFERENTIAL METHOD BLD: ABNORMAL
EOSINOPHIL # BLD: 0.2 K/UL (ref 0–0.8)
EOSINOPHIL NFR BLD: 2 % (ref 0.5–7.8)
ERYTHROCYTE [DISTWIDTH] IN BLOOD BY AUTOMATED COUNT: 17.9 % (ref 11.9–14.6)
GLOBULIN SER CALC-MCNC: 3.6 G/DL (ref 2.3–3.5)
GLUCOSE SERPL-MCNC: 94 MG/DL (ref 65–100)
HCT VFR BLD AUTO: 35.2 % (ref 41.1–50.3)
HGB BLD-MCNC: 10.8 G/DL (ref 13.6–17.2)
IMM GRANULOCYTES # BLD AUTO: 0.1 K/UL (ref 0–0.5)
IMM GRANULOCYTES NFR BLD AUTO: 2 % (ref 0–5)
LYMPHOCYTES # BLD: 2 K/UL (ref 0.5–4.6)
LYMPHOCYTES NFR BLD: 25 % (ref 13–44)
MCH RBC QN AUTO: 28.7 PG (ref 26.1–32.9)
MCHC RBC AUTO-ENTMCNC: 30.7 G/DL (ref 31.4–35)
MCV RBC AUTO: 93.6 FL (ref 79.6–97.8)
MONOCYTES # BLD: 0.5 K/UL (ref 0.1–1.3)
MONOCYTES NFR BLD: 6 % (ref 4–12)
NEUTS SEG # BLD: 5.2 K/UL (ref 1.7–8.2)
NEUTS SEG NFR BLD: 65 % (ref 43–78)
NRBC # BLD: 0 K/UL (ref 0–0.2)
PLATELET # BLD AUTO: 253 K/UL (ref 150–450)
PMV BLD AUTO: 10.7 FL (ref 9.4–12.3)
POTASSIUM SERPL-SCNC: 4 MMOL/L (ref 3.5–5.1)
PROT SERPL-MCNC: 6.4 G/DL (ref 6.3–8.2)
RBC # BLD AUTO: 3.76 M/UL (ref 4.23–5.6)
SODIUM SERPL-SCNC: 142 MMOL/L (ref 138–145)
WBC # BLD AUTO: 7.9 K/UL (ref 4.3–11.1)

## 2022-01-12 PROCEDURE — 80053 COMPREHEN METABOLIC PANEL: CPT

## 2022-01-12 PROCEDURE — 85025 COMPLETE CBC W/AUTO DIFF WBC: CPT

## 2022-03-18 PROBLEM — R53.81 DEBILITY: Status: ACTIVE | Noted: 2019-12-02

## 2022-03-18 PROBLEM — E44.1 MILD PROTEIN-CALORIE MALNUTRITION (HCC): Status: ACTIVE | Noted: 2021-01-01

## 2022-03-19 PROBLEM — R29.6 FALLS FREQUENTLY: Status: ACTIVE | Noted: 2021-07-28

## 2022-03-19 PROBLEM — F03.90 DEMENTIA (HCC): Status: ACTIVE | Noted: 2019-12-23

## 2022-04-04 ENCOUNTER — HOSPITAL ENCOUNTER (EMERGENCY)
Age: 87
Discharge: HOME | End: 2022-04-04
Attending: EMERGENCY MEDICINE
Payer: MEDICARE

## 2022-04-04 ENCOUNTER — APPOINTMENT (OUTPATIENT)
Dept: CT IMAGING | Age: 87
End: 2022-04-04
Attending: EMERGENCY MEDICINE
Payer: MEDICARE

## 2022-04-04 VITALS
RESPIRATION RATE: 14 BRPM | HEART RATE: 84 BPM | SYSTOLIC BLOOD PRESSURE: 130 MMHG | TEMPERATURE: 98.3 F | OXYGEN SATURATION: 95 % | WEIGHT: 135 LBS | DIASTOLIC BLOOD PRESSURE: 83 MMHG | BODY MASS INDEX: 19.33 KG/M2 | HEIGHT: 70 IN
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
## 2022-04-04 DIAGNOSIS — S09.90XA INJURY OF HEAD, INITIAL ENCOUNTER: ICD-10-CM

## 2022-04-04 DIAGNOSIS — S01.311A COMPLEX LACERATION OF RIGHT EAR, INITIAL ENCOUNTER: ICD-10-CM

## 2022-04-04 PROCEDURE — 90471 IMMUNIZATION ADMIN: CPT

## 2022-04-04 PROCEDURE — 70450 CT HEAD/BRAIN W/O DYE: CPT

## 2022-04-04 PROCEDURE — 75810000293 HC SIMP/SUPERF WND  RPR

## 2022-04-04 PROCEDURE — 90715 TDAP VACCINE 7 YRS/> IM: CPT | Performed by: NURSE PRACTITIONER

## 2022-04-04 PROCEDURE — 99284 EMERGENCY DEPT VISIT MOD MDM: CPT

## 2022-04-04 PROCEDURE — 93005 ELECTROCARDIOGRAM TRACING: CPT | Performed by: NURSE PRACTITIONER

## 2022-04-04 PROCEDURE — 72125 CT NECK SPINE W/O DYE: CPT

## 2022-04-04 PROCEDURE — 74011250637 HC RX REV CODE- 250/637: Performed by: NURSE PRACTITIONER

## 2022-04-04 PROCEDURE — 74011250636 HC RX REV CODE- 250/636: Performed by: NURSE PRACTITIONER

## 2022-04-04 RX ORDER — ACETAMINOPHEN 325 MG/1
650 TABLET ORAL ONCE
Status: COMPLETED | OUTPATIENT
Start: 2022-04-04 | End: 2022-04-04

## 2022-04-04 RX ADMIN — ACETAMINOPHEN 650 MG: 325 TABLET, FILM COATED ORAL at 23:26

## 2022-04-04 RX ADMIN — TETANUS TOXOID, REDUCED DIPHTHERIA TOXOID AND ACELLULAR PERTUSSIS VACCINE, ADSORBED 0.5 ML: 5; 2.5; 8; 8; 2.5 SUSPENSION INTRAMUSCULAR at 21:42

## 2022-04-04 NOTE — ED TRIAGE NOTES
Pt in via EMS from [facility] with c/o mechanical fall with lac to right ear. Pt reports he tripped over his cane. No LOC and no blood thinners. Hx of dementia.

## 2022-04-05 LAB
ATRIAL RATE: 82 BPM
CALCULATED P AXIS, ECG09: 62 DEGREES
CALCULATED R AXIS, ECG10: 26 DEGREES
CALCULATED T AXIS, ECG11: 34 DEGREES
DIAGNOSIS, 93000: NORMAL
P-R INTERVAL, ECG05: 156 MS
Q-T INTERVAL, ECG07: 354 MS
QRS DURATION, ECG06: 84 MS
QTC CALCULATION (BEZET), ECG08: 413 MS
VENTRICULAR RATE, ECG03: 82 BPM

## 2022-04-05 NOTE — ED PROVIDER NOTES
HPI   72-year-old male patient presents to the ED with complaint of ear laceration. Arrives by EMS and accompanied by his daughter. They state the patient tripped while using his walker at the assisted living facility at which she resides. States he supposed to be assisted with ambulation, but at the time was not. They deny prolonged downtime. They struck his head on his nightstand. Denies loss of consciousness, behavior change, vomiting. Denies all complaint of pain. He is alert and oriented at baseline. He has a large laceration at the right ear. Bleeding controlled. Unknown last tetanus booster. Dizziness or lightheadedness, numbness/tingling or weakness no other medical complaint. Past Medical History:   Diagnosis Date    [facility] ischemic stroke ([facility]     CAD (coronary artery disease) 7/18/2019    Colon cancer ([facility] 01/2012    Dementia ([facility]     Hiatal hernia     Hiatal hernia        History reviewed. No pertinent surgical history. History reviewed. No pertinent family history. Social History     Socioeconomic History    Marital status:      Spouse name: Not on file    Number of children: Not on file    Years of education: Not on file    Highest education level: Not on file   Occupational History    Not on file   Tobacco Use    Smoking status: Never Smoker    Smokeless tobacco: Never Used   Substance and Sexual Activity    Alcohol use: Never    Drug use: Never    Sexual activity: Not on file   Other Topics Concern    Not on file   Social History Narrative    Not on file     Social Determinants of Health     Financial Resource Strain:     Difficulty of Paying Living Expenses: Not on file   Food Insecurity:     Worried About Running Out of Food in the Last Year: Not on file    Alexandra of Food in the Last Year: Not on file   Transportation Needs:     Lack of Transportation (Medical): Not on file    Lack of Transportation (Non-Medical):  Not on file   Physical Activity:     Days of Exercise per Week: Not on file    Minutes of Exercise per Session: Not on file   Stress:     Feeling of Stress : Not on file   Social Connections:     Frequency of Communication with Friends and Family: Not on file    Frequency of Social Gatherings with Friends and Family: Not on file    Attends Restorationism Services: Not on file    Active Member of [address] [place] [address] or Organizations: Not on file    Attends Club or Organization Meetings: Not on file    Marital Status: Not on file   Intimate Partner Violence:     Fear of Current or Ex-Partner: Not on file    Emotionally Abused: Not on file    Physically Abused: Not on file    Sexually Abused: Not on file   Housing Stability:     Unable to Pay for Housing in the Last Year: Not on file    Number of [place] in the Last Year: Not on file    Unstable Housing in the Last Year: Not on file         ALLERGIES: Patient has no known allergies. Review of Systems  Constitutional: Negative for fever. Negative for appetite change, chills, diaphoresis and unexpected weight change. HENT: As in HPI  Eyes: Negative   Respiratory: Negative  Cardiovascular: Negative  Musculoskeletal: Negative   Skin:  As in HPI     Allergic/Immunologic: Negative  Neurological: Negative                          Vitals:    04/04/22 1926 04/04/22 1928 04/04/22 1929   BP:  120/63    Pulse:  93 97   Resp: 17     Temp: 98.3 °F (36.8 °C)     SpO2:  93% 95%   Weight: 61.2 kg (135 lb)     Height: 5' 10\" (1.778 m)              Physical Exam   Constitutional: Oriented to baseline. Pleasant, conversational.. Appears well-developed and well-nourished. No distress. HENT:    Head: 3 Centimeter laceration to the ear. No other site of head injury. No bleeding or drainage of ears or naris. No crepitus, instability, hematoma, raccoon eyes, baltazar signs.   Right Ear: External ear normal.    Left Ear: External ear normal.     Nose: Nose normal.   Mouth/Throat: Mouth normal.    Eyes: Conjunctivae are normal.   Neck: Supple. No tracheal deviation. Cardiovascular: Normal rate, intact distal pulses. Brisk capillary refill intact, less than 2 seconds. Regular rhythm present. No pitting edema. Pulmonary/Chest: Lungs are clear & equal bilaterally. No adventitious sounds. No respiratory distress. Abdominal: Soft. There is no tenderness. Musculoskeletal: No obvious deformity, erythema, edema. Neurological: Alert and oriented to baseline mental status. No numbness/tingling. No loss of sensation. Skin: Skin is warm and dry. Capillary refill takes less than 2 seconds. No abrasion, no lesion, no petechiae and no rash noted. Not diaphoretic. No cyanosis, erythema, or pallor. Psychiatric: Normal mood and affect. Behavior is normal.    Nursing note and vitals reviewed. MDM   19-year-old male in the ED with his daughter for evaluation of right ear laceration subsequent to mechanical fall. HPI. Patient is pleasant, alert and conversational.  He appears no acute distress. He is alert and oriented to baseline per his daughter. States he been acting appropriate. Has been weightbearing and ambulatory at baseline. Denies headache, facial pain, blurred vision, numbness/tingling/weakness, chest pain, back pain, neck pain, abdominal pain, hip pain and all other complaint. Neck is supple has no meningeal signs. Lungs are clear, no respiratory effort. Abdomen soft nontender. Neurovascularly intact. No sign of significant head injury, he has a 3 cm laceration of the ear, through and through cartilage. Bleeding controlled. Tetanus is updated. CT scans with no acute process, EKG was reassuring. Laceration repair performed as in procedure note, antibiotic ointment and dressings placed, antibiotic prophylaxis prescribed. Best to follow-up with PCP in 1 day. Pain is well controlled.   Directed on appropriate therapeutic measures, to keep dry for the next 24 hours, after which time he may wash with clean running water, but not submerge or exposed to natural water sources. Will discharge with antibiotics, instructed on infection prevention and identification. Return to the ED immediately for any new, worsening, concerning symptoms. Antibiotic ointment and dressings applied. All questions were answered. Patient stable for discharge home. Patient is afebrile, hemodynamically stable and in no acute distress. Patient is not ill-appearing. Discussed patient with attending who reviewed the patient's results and collaborated on care planning. All findings and plans were discussed with the patient and his daughter. They verbalized desire to be discharged home at this time. All questions answered. Discussed with the patient that an unremarkable evaluation in the ED does not preclude the development or presence of a serious or life threatening condition. Patient was instructed to return immediately for any worsening or change in current symptoms, or if symptoms do not continue to improve. I instructed them to follow up with their primary care provider, own specialist, or medical provider that I am recommending for him within the next 2-3 days     the patient acknowledged understanding plan of care and affirmed approval. Patient is discharged home, with no further complaint. Procedures:  EKG: Sinus rhythm with sinus arrhythmia. Otherwise normal.  Ventricular rate of 82. Compared to prior EKG. Interpreted by self. Interpreted by ED attending in absence of cardiologist    Wound Repair    Date/Time: 4/5/2022 12:07 AM  Performed by: [address] [place] [address],  [id number] provider: ruby  Preparation: skin prepped with Betadine  Pre-procedure re-eval: Immediately prior to the procedure, the patient was reevaluated and found suitable for the planned procedure and any planned medications.   Time out: Immediately prior to the procedure a time out was called to verify the correct patient, procedure, equipment, staff and marking as appropriate. .  Location: R ear. Wound length:2.6 - 7.5 cm  Anesthesia: local infiltration    Anesthesia:  Local Anesthetic: lidocaine 1% without epinephrine  Anesthetic total: 3 mL  Foreign bodies: no foreign bodies  Irrigation solution: saline  Irrigation method: syringe  Skin closure: Prolene  Subcutaneous closure: Vicryl (1 subcu, simple interrupted)  Number of sutures: 8  Technique: simple  Approximation: close  Dressing: 4x4 and antibiotic ointment  Patient tolerance: patient tolerated the procedure well with no immediate complications  My total time at bedside, performing this procedure was 31-45 minutes.

## 2022-04-05 NOTE — DISCHARGE INSTRUCTIONS
Return to the ED immediately for any new, worsening, or concerning symptoms, or danger signs as we discussed. Otherwise, follow up with your PCP in 1-2 days for reevaluation.    Suture removal -- 5 days

## 2022-04-05 NOTE — ED NOTES
I have reviewed discharge instructions with the caregiver. The caregiver verbalized understanding. Patient left ED via Discharge Method:wheelchair to Home with (i family self). Opportunity for questions and clarification provided. Patient given 0 scripts. To continue your aftercare when you leave the hospital, you may receive an automated call from our care team to check in on how you are doing. This is a free service and part of our promise to provide the best care and service to meet your aftercare needs.  If you have questions, or wish to unsubscribe from this service please call [phone]. Thank you for Choosing our [facility].

## 2022-05-07 NOTE — PROGRESS NOTES
Hospitalist Progress Note    Subjective:   Daily Progress Note: 12/31/2019 6:32 PM     Patient presented to ER 12/2 with complaints of left shoulder, posterior ribs, back and chest pain after falling and striking left side on a cardboard box three days PTA.  Unable to move left arm normally and pain is 10/10.  Tachy to 126, Hypoxic to 88%. WBC: 19, creatinine: 1.58 with baseline of 1.12-1. 2.  CT chest with moderate size hiatal hernia with basilar atelectasis. Found with E coli UTI, sepsis. Treated w rocephin and vantin. Intermittently confused. Atrophy seen on CT head along with severe microvascular disease. A1c: 5.6. Patient lives alone and has two sons to whom both state they cannot take care of him. Looking into rehab with potential long term placement.       12/31:  CM still attempting to secure safe placement. Patient seems to be resigned to going somewhere other than home on discharge. Pleasant and conversant. Oriented to time, place, person but requires a great deal of assist to complete ADLs.      Current Facility-Administered Medications   Medication Dose Route Frequency    aspirin chewable tablet 324 mg  324 mg Oral DAILY    nystatin (MYCOSTATIN) 100,000 unit/gram powder   Topical BID    lidocaine 4 % patch 1 Patch  1 Patch TransDERmal Q24H    famotidine (PEPCID) tablet 20 mg  20 mg Oral DAILY    haloperidol lactate (HALDOL) injection 2.5 mg  2.5 mg IntraVENous Q6H PRN    calcium carbonate (TUMS) chewable tablet 200 mg [elemental]  200 mg Oral TID WITH MEALS    sodium chloride (NS) flush 5-40 mL  5-40 mL IntraVENous Q8H    sodium chloride (NS) flush 5-40 mL  5-40 mL IntraVENous PRN    atorvastatin (LIPITOR) tablet 20 mg  20 mg Oral QHS    clopidogrel (PLAVIX) tablet 75 mg  75 mg Oral DAILY    nitroglycerin (NITROSTAT) tablet 0.4 mg  0.4 mg SubLINGual Q5MIN PRN    sodium chloride (NS) flush 5-40 mL  5-40 mL IntraVENous PRN    acetaminophen (TYLENOL) tablet 650 mg  650 mg Oral Q6H PRN    oxyCODONE-acetaminophen (PERCOCET 7.5) 7.5-325 mg per tablet 1 Tab  1 Tab Oral Q6H PRN    naloxone (NARCAN) injection 0.4 mg  0.4 mg IntraVENous PRN    ondansetron (ZOFRAN) injection 4 mg  4 mg IntraVENous Q4H PRN    magnesium hydroxide (MILK OF MAGNESIA) 400 mg/5 mL oral suspension 30 mL  30 mL Oral DAILY PRN    heparin (porcine) injection 5,000 Units  5,000 Units SubCUTAneous Q12H        Review of Systems  Patient is unable to stay on track to complete. Objective:     Visit Vitals  /65 (BP 1 Location: Left arm, BP Patient Position: At rest)   Pulse 80   Temp 98.1 °F (36.7 °C)   Resp 18   Ht 5' 11\" (1.803 m)   Wt 68 kg (149 lb 14.4 oz)   SpO2 96%   BMI 20.91 kg/m²    O2 Flow Rate (L/min): 1 l/min O2 Device: Room air    Temp (24hrs), Av °F (36.7 °C), Min:97.8 °F (36.6 °C), Max:98.2 °F (36.8 °C)       07 -  190  In: 480 [P.O.:480]  Out: -   1901 -  0700  In: 680 [P.O.:680]  Out: 26 [Urine:25]    General appearance: Oriented and alert, cooperative, denies need. Head: Normocephalic, without obvious abnormality, atraumatic  Eyes: conjunctivae/corneas clear. PERRL  Neck: supple, symmetrical, trachea midline, and no JVD  Lungs: clear to auscultation bilaterally  Heart: regular rate and rhythm, S1, S2 normal, no murmur, click, rub or gallop  Abdomen: soft, non-tender.  Bowel sounds normal. No masses,  no organomegaly  Extremities: extremities normal, atraumatic, no cyanosis or edema  Skin: Skin color, texture, turgor normal. No rashes or lesions  Neurologic: Grossly normal    Additional comments: Notes,orders, test results, vitals reviewed      Assessment/Plan:   Sepsis due to E COLI UTI:  Resolved    Completed rocephin and vantin      Altered mental status:  Multifactorial              Baseline mild dementia with severe               microvascular disease on head CT              Not safe to live alone               CM on board:  arduous insurance process              Discharge delay due to above               PPD, OT, PT consults      Dehydration:  Poor nutrition and illness:  Improved               encourage po intake               Nutrition consult     Debility:  As above     Acute renal failure:  improved      Acute cystitis with hematuria:  Resolved      Fall with blunt trauma left upper ribs and left shoulder:  Improved               Xrays negative              lido patches              Follow up with Ortho OP              PT on board     History of colon cancer  History of MI    Discharge when plan in place     Care Plan discussed with: Patient and Nurse    Signed By: Lisa Alatorre NP     December 31, 2019 60.8

## 2022-08-01 PROBLEM — A41.9 SEPSIS (HCC): Status: ACTIVE | Noted: 2022-01-01

## 2022-08-01 PROBLEM — R53.81 DEBILITY: Status: ACTIVE | Noted: 2019-12-02

## 2022-08-01 PROBLEM — N18.9 CHRONIC KIDNEY DISEASE: Status: ACTIVE | Noted: 2018-11-21

## 2022-08-01 PROBLEM — E03.8 SUBCLINICAL HYPOTHYROIDISM: Status: ACTIVE | Noted: 2018-05-23

## 2022-08-01 PROBLEM — R97.20 ELEVATED PSA: Status: ACTIVE | Noted: 2018-11-21

## 2022-08-01 PROBLEM — J96.01 SEPSIS WITH ACUTE HYPOXIC RESPIRATORY FAILURE AND SEPTIC SHOCK (HCC): Status: ACTIVE | Noted: 2022-01-01

## 2022-08-01 PROBLEM — K44.9 HIATAL HERNIA: Status: ACTIVE | Noted: 2018-05-21

## 2022-08-01 PROBLEM — D72.829 LEUKOCYTOSIS: Status: ACTIVE | Noted: 2018-05-23

## 2022-08-01 PROBLEM — N40.0 BENIGN PROSTATIC HYPERPLASIA: Status: ACTIVE | Noted: 2018-05-21

## 2022-08-01 PROBLEM — F03.90 DEMENTIA (HCC): Status: ACTIVE | Noted: 2019-12-23

## 2022-08-01 PROBLEM — D69.2 SENILE PURPURA (HCC): Status: ACTIVE | Noted: 2022-01-01

## 2022-08-01 PROBLEM — E44.1 MILD PROTEIN-CALORIE MALNUTRITION (HCC): Status: ACTIVE | Noted: 2021-01-01

## 2022-08-01 PROBLEM — Z66 DO NOT RESUSCITATE STATUS: Status: ACTIVE | Noted: 2022-01-01

## 2022-08-01 PROBLEM — R65.21 SEPSIS WITH ACUTE HYPOXIC RESPIRATORY FAILURE AND SEPTIC SHOCK (HCC): Status: ACTIVE | Noted: 2022-01-01

## 2022-08-01 PROBLEM — C18.7 MALIGNANT NEOPLASM OF SIGMOID COLON (HCC): Status: ACTIVE | Noted: 2018-05-21

## 2022-08-01 PROBLEM — D64.9 ANEMIA: Status: ACTIVE | Noted: 2018-11-21

## 2022-08-01 NOTE — ED NOTES
Patient resting in no acute distress with eyes closed. Family at bedside.   [id number] ALEENA Card RN  08/01/22 5134

## 2022-08-01 NOTE — PROGRESS NOTES
Patient is laying in bed at this time watching TV, he ate 50% lunch and drinking well. Respiratory panel completed at this time as ordered sent to lab awaiting response. Admission database complete over the phone with son Felton Reese. Overall skin assessment complete. Will monitor for changes.

## 2022-08-01 NOTE — CARE COORDINATION
Signed                    Per notes, pt was brought in via EMS form [facility] with c/o of a fever. Pt is current with [facility], I called Lidia Hagen #[phone] who is pt's RN/CM. She had found out this a.m. that pt was transferred here, they were never contacted prior to sending the pt out. Pt's son/Mich #[phone], was at bedside earlier and is aware of the elevated lactic acid and that we are waiting on CT results for further determination of care. Will stay in contact with Janel Armendariz upon those results. 08/01/22 1502   Service Assessment   Patient Orientation Person;Self   Cognition Dementia / Early Alzheimer's   History Provided By Child/Family   Primary Caregiver Self   Accompanied By/Relationship Son/ Cruz Haskins [place] Spouse/Significant Other   Patient's Healthcare Decision Maker is: Named in Ivonne Dew ACP Document   Prior Functional Level Independent in ADLs/IADLs   Current Functional Level Independent in ADLs/IADLs   Can patient return to prior living arrangement Unknown at present   Ability to make needs known: Fair   Family able to assist with home care needs: Yes   Would you like for me to discuss the discharge plan with any other family members/significant others, and if so, who?  No   Community Resources Assisted Living   Social/Functional History   Lives With Alone   Type of Home Assisted living  ([facility])   Home Equipment Wheelchair-manual;Walker, standard   ADL Assistance Independent   Ambulation Assistance Needs assistance  (with walker or WC)   Active  No   Mode of Transportation Car   Discharge Planning   Type of Residence Assisted living   Current Services Prior To Admission Hospice Services  ([facility])   New Erik Discharge   Transition of Care Consult (CM Consult) Hospice   Internal Hospice No   Reason Outside Agency Chosen Patient already serviced by other home care/hospice agency

## 2022-08-01 NOTE — CARE COORDINATION
Per notes, pt was brought in via EMS form [facility] with c/o of a fever. Pt is current with [facility], I called Tim Makua #[phone] who is pt's RN/CM. She had found out this a.m. that pt was transferred here, they were never contacted prior to sending the pt out. Pt's son/[place] #[phone], was at bedside earlier and is aware of the elevated lactic acid and that we are waiting on CT results for further determination of care. Will stay in contact with Kanwal Pastrana and Aline upon those results.

## 2022-08-01 NOTE — PLAN OF CARE
Problem: Discharge Planning  Goal: Discharge to home or other facility with appropriate resources  Outcome: Progressing     Problem: Skin/Tissue Integrity  Goal: Absence of new skin breakdown  Description: 1. Monitor for areas of redness and/or skin breakdown  2. Assess vascular access sites hourly  3. Every 4-6 hours minimum:  Change oxygen saturation probe site  4. Every 4-6 hours:  If on nasal continuous positive airway pressure, respiratory therapy assess nares and determine need for appliance change or resting period.   Outcome: Progressing     Problem: ABCDS Injury Assessment  Goal: Absence of physical injury  Outcome: Progressing     Problem: Safety - Adult  Goal: Free from fall injury  Outcome: Progressing

## 2022-08-01 NOTE — ED PROVIDER NOTES
EKG: Sinus tachycardia, normal intervals, rate of 118, no STEMI    Patient with fever unknown origin. Persistent tachycardia. We will admit for sepsis.      Eliezer Guerrero MD  08/02/22 6277

## 2022-08-01 NOTE — ED NOTES
Family at bedside.   Patient given PO contrast.  No new complaints of     Phyllis Boyle RN  08/01/22 7356

## 2022-08-01 NOTE — ACP (ADVANCE CARE PLANNING)
Advance Care Planning     Advance Care Planning Clinical Specialist  Conversation Note      Date of ACP Conversation: 8/1/2022    Conversation Conducted with:  Healthcare Decision Maker: Named in Advance Directive or Healthcare Power of  (name) Arianne Gregorio McNair/Son    ACP Clinical Specialist: Tod López RN      Healthcare Decision Maker:     Current Designated Healthcare Decision Maker:   [facility] #[phone]  Click here to complete Healthcare Decision Makers including section of the Healthcare Decision Maker Relationship (ie \"Primary\")  Today we documented Decision Maker(s) consistent with Legal Next of Kin hierarchy. Care Preferences    Hospitalization: \"If your health worsens and it becomes clear that your chance of recovery is unlikely, what would your preference be regarding hospitalization? \"    Choice:  [x] The patient wants hospitalization. [] The patient prefers comfort-focused treatment without hospitalization. Ventilation: \"If you were in your present state of health and suddenly became very ill and were unable to breathe on your own, what would your preference be about the use of a ventilator (breathing machine) if it were available to you? \"      If the patient would desire the use of ventilator (breathing machine), answer \"yes\". If not, \"no\": no    \"If your health worsens and it becomes clear that your chance of recovery is unlikely, what would your preference be about the use of a ventilator (breathing machine) if it were available to you? \"     Would the patient desire the use of ventilator (breathing machine)?: No      Resuscitation  \"CPR works best to restart the heart when there is a sudden event, like a heart attack, in someone who is otherwise healthy. Unfortunately, CPR does not typically restart the heart for people who have serious health conditions or who are very sick. \"    \"In the event your heart stopped as a result of an underlying serious health condition, would you want attempts to be made to restart your heart (answer \"yes\" for attempt to resuscitate) or would you prefer a natural death (answer \"no\" for do not attempt to resuscitate)? \" no       [x] Yes   [] No   Educated Patient / Imani Knight regarding differences between Advance Directives and portable DNR orders.     Length of ACP Conversation in minutes:  5 minutes    Conversation Outcomes:  [x] ACP discussion completed  [] Existing advance directive reviewed with patient; no changes to patient's previously recorded wishes  [] New Advance Directive completed  [] Portable Do Not Rescitate prepared for Provider review and signature  [] POLST/POST/MOLST/MOST prepared for Provider review and signature      Follow-up plan:    [] Schedule follow-up conversation to continue planning  [] Referred individual to Provider for additional questions/concerns   [x] Advised patient/agent/surrogate to review completed ACP document and update if needed with changes in condition, patient preferences or care setting    [] This note routed to one or more involved healthcare providers

## 2022-08-01 NOTE — ED NOTES
TRANSFER - OUT REPORT:    Verbal report given to hernandez on Thad Brooks  being transferred to [address] [place] [address] for routine progression of patient care       Report consisted of patient's Situation, Background, Assessment and   Recommendations(SBAR). Information from the following report(s) ED SBAR was reviewed with the receiving nurse. Lines:   Peripheral IV 08/01/22 Left;Proximal;Anterior Forearm (Active)       Peripheral IV 08/01/22 Left Wrist (Active)   Site Assessment Clean, dry & intact 08/01/22 0645   Line Status Normal saline locked 08/01/22 0645       Peripheral IV 08/01/22 Right Wrist (Active)   Site Assessment Clean, dry & intact 08/01/22 0645   Line Status Normal saline locked 08/01/22 0645        Opportunity for questions and clarification was provided.       Patient transported with:  [email or web address] l/min       Phyllis Boyle RN  08/01/22 3914

## 2022-08-01 NOTE — ED NOTES
Patient resting in no distress with eyes closed awaiting CT scan.        Rossy Castillo RN  08/01/22 1045

## 2022-08-01 NOTE — H&P
Hospitalist History and Physical   Admit Date:  2022  6:15 AM   Name:  Slade Mcwilliams Sr   Age:  80 y.o. Sex:  male  :  1931   MRN:  [id number]   Room:  [facility]/    Presenting Complaint: Fever     Reason(s) for Admission: Sepsis ([facility] [A41.9]  Fever, unspecified fever cause [R50.9]  Sepsis, due to unspecified organism, unspecified whether acute organ dysfunction present ([facility] [A41.9]     History of Present Illness:   Mitra Brambila is a 80 y.o. male with medical history of dementia, colon cancer, malnutrition who presented with altered mental status. He was unable to speak or provide history upon presentation. I report from ER physician he has become progressively less responsive over the past several days in his nursing home. He was on hospice. In the emergency department he was given empiric antibiotics and resuscitated with marked improvement in his interactivity. He is able to tell me his name and the names of his children. He does not know where he lives and cannot explain why he has bruises on his legs. He reports no pain. He is requiring oxygen but he says he does not feel short of breath. He does report being cold. Review of Systems:  10 systems reviewed and negative except as noted in HPI. Assessment & Plan:   Sepsis with acute hypoxic respiratory failure and septic shock  Admission T103  O2 sat 88 LA 3.9 with oxygen requirement 2 L  -Ceftriaxone  -Follow cultures  -Supportive care, maintain O2 sat greater than 92    Anemia  -transfuse Hgb < 7  -anemia labs    Do not resuscitate status  -noted    Mild protein-calorie malnutrition ([facility]  -nutirion    Debility  -PT, OT    Dementia  -normally verbal and ambulatory      Dispo/Discharge Planning:     Back to facility in 1-2d    Diet: ADULT DIET;  Dysphagia - Soft and Bite Sized  VTE ppx: enoxaparin  Code status: DNR    Hospital Problems:  Principal Problem:    Sepsis with acute hypoxic respiratory failure and septic shock ([facility]  Active Problems:    Anemia    Do not resuscitate status    Mild protein-calorie malnutrition ([facility]    Debility    Dementia ([facility]  Resolved Problems:    * No resolved hospital problems. *       Past History:     Past Medical History:   Diagnosis Date    Acute ischemic stroke ([facility]     CAD (coronary artery disease) 7/18/2019    Colon cancer ([facility] 01/2012    Dementia ([facility]     Hiatal hernia     Hiatal hernia        History reviewed. No pertinent surgical history. Social History     Tobacco Use    Smoking status: Never    Smokeless tobacco: Never   Substance Use Topics    Alcohol use: Never      Social History     Substance and Sexual Activity   Drug Use Never       History reviewed. No pertinent family history. Cannot be obtained from patient.       Immunization History   Administered Date(s) Administered    Influenza, High Dose (Fluzone 65 yrs and older) 02/19/2016, 10/09/2018, 10/23/2019    PPD Test 12/02/2019, 12/12/2019, 07/29/2021, 10/02/2021, 12/18/2021    Pneumococcal Polysaccharide ([id number]) 05/21/2018    Tdap (Boostrix, Adacel) 04/04/2022     No Known Allergies  Prior to Admit Medications:  Current Outpatient Medications   Medication Instructions    atorvastatin (LIPITOR) 80 mg, Oral    famotidine (PEPCID) 20 mg, Oral, DAILY    nitroGLYCERIN (NITROSTAT) 0.4 mg, SubLINGual    ondansetron (ZOFRAN-ODT) 4 mg, Oral, EVERY 8 HOURS PRN         Objective:   Patient Vitals for the past 24 hrs:   Temp Pulse Resp BP SpO2   08/01/22 1317 97.9 °F (36.6 °C) 93 18 115/61 95 %   08/01/22 1217 97.2 °F (36.2 °C) 89 20 (!) 119/59 95 %   08/01/22 1147 -- 96 20 (!) 123/56 92 %   08/01/22 1130 -- 96 20 120/63 93 %   08/01/22 1121 -- 98 21 126/65 97 %   08/01/22 1030 -- 99 20 138/78 97 %   08/01/22 0945 97.2 °F (36.2 °C) (!) 102 20 137/76 96 %   08/01/22 0913 -- (!) 107 20 132/75 92 %   08/01/22 0910 -- (!) 108 20 (!) 150/80 93 %   08/01/22 0805 100.2 °F (37.9 °C) (!) 105 18 (!) 152/80 --   08/01/22 2790 -- Val Woods 107 -- -- 93 %   08/01/22 0726 -- (!) 107 -- -- (!) 88 %   08/01/22 0627 (!) 103 °F (39.4 °C) (!) 127 20 (!) 154/86 (!) 89 %       Oxygen Therapy  SpO2: 95 %  Pulse via Oximetry: 96 beats per minute  O2 Device: Nasal cannula  O2 Flow Rate (L/min): 2 L/min  Blood Gas  Performed?: Yes  Joshua's Test #1: Unable to assess  Site #1: Line (IV)  Specimen Status #1: Point of care    Estimated body mass index is 20.09 kg/m² as calculated from the following:    Height as of this encounter: 5' 10\" (1.778 m). Weight as of this encounter: 140 lb (63.5 kg). Intake/Output Summary (Last 24 hours) at 8/1/2022 1518  Last data filed at 8/1/2022 1248  Gross per 24 hour   Intake 1050 ml   Output --   Net 1050 ml       Blood pressure 115/61, pulse 93, temperature 97.9 °F (36.6 °C), temperature source Oral, resp. rate 18, height 5' 10\" (1.778 m), weight 140 lb (63.5 kg), SpO2 95 %. Physical Exam  Vitals and nursing note reviewed. Constitutional:       General: He is not in acute distress. Appearance: He is underweight. He is ill-appearing. He is not diaphoretic. Interventions: Nasal cannula in place. HENT:      Head: Normocephalic and atraumatic. Comments: Temporal wasting  Eyes:      Extraocular Movements: Extraocular movements intact. Cardiovascular:      Rate and Rhythm: Regular rhythm. Tachycardia present. Heart sounds: No murmur heard. No gallop. Pulmonary:      Effort: Respiratory distress (mild) present. Breath sounds: No wheezing or rhonchi. Abdominal:      General: There is no distension. Palpations: Abdomen is soft. Tenderness: There is no abdominal tenderness. There is no guarding. Musculoskeletal:         General: No deformity. Comments: Lumbrical, thenar atrophy   Skin:     Coloration: Skin is not jaundiced or pale. Findings: Bruising present. Neurological:      General: No focal deficit present. Mental Status: He is alert.    Psychiatric:         Mood and Affect: Mood normal.         Behavior: Behavior normal. Behavior is cooperative.          I have personally reviewed labs and tests showing:  Recent Labs:  Recent Results (from the past 24 hour(s))   COVID-19, Rapid    Collection Time: 08/01/22  6:27 AM    Specimen: Nasopharyngeal   Result Value Ref Range    Source Nasopharyngeal      SARS-CoV-2, Rapid Not detected NOTD     Lactic Acid    Collection Time: 08/01/22  6:30 AM   Result Value Ref Range    Lactic Acid, Plasma 4.4 (HH) 0.4 - 2.0 MMOL/L   CBC with Auto Differential    Collection Time: 08/01/22  6:30 AM   Result Value Ref Range    WBC 8.1 4.3 - 11.1 K/uL    RBC 3.73 (L) 4.23 - 5.6 M/uL    Hemoglobin 10.8 (L) 13.6 - 17.2 g/dL    Hematocrit 35.2 (L) 41.1 - 50.3 %    MCV 94.4 79.6 - 97.8 FL    MCH 29.0 26.1 - 32.9 PG    MCHC 30.7 (L) 31.4 - 35.0 g/dL    RDW 17.4 (H) 11.9 - 14.6 %    Platelets 755 065 - 742 K/uL    MPV 10.9 9.4 - 12.3 FL    nRBC 0.00 0.0 - 0.2 K/uL    Differential Type AUTOMATED      Seg Neutrophils 89 (H) 43 - 78 %    Lymphocytes 9 (L) 13 - 44 %    Monocytes 1 (L) 4.0 - 12.0 %    Eosinophils % 1 0.5 - 7.8 %    Basophils 0 0.0 - 2.0 %    Immature Granulocytes 1 0.0 - 5.0 %    Segs Absolute 7.2 1.7 - 8.2 K/UL    Absolute Lymph # 0.7 0.5 - 4.6 K/UL    Absolute Mono # 0.1 0.1 - 1.3 K/UL    Absolute Eos # 0.1 0.0 - 0.8 K/UL    Basophils Absolute 0.0 0.0 - 0.2 K/UL    Absolute Immature Granulocyte 0.1 0.0 - 0.5 K/UL   CMP    Collection Time: 08/01/22  6:30 AM   Result Value Ref Range    Sodium 140 136 - 145 mmol/L    Potassium 5.2 (H) 3.5 - 5.1 mmol/L    Chloride 110 (H) 98 - 107 mmol/L    CO2 23 21 - 32 mmol/L    Anion Gap 7 7 - 16 mmol/L    Glucose 105 (H) 65 - 100 mg/dL    BUN 14 8 - 23 MG/DL    Creatinine 1.29 0.8 - 1.5 MG/DL    GFR African American >60 >60 ml/min/1.73m2    GFR Non- 55 (L) >60 ml/min/1.73m2    Calcium 8.7 8.3 - 10.4 MG/DL    Total Bilirubin 0.9 0.2 - 1.1 MG/DL    ALT 16 12 - 65 U/L    AST 53 (H) 15 - 37 U/L    Alk Phosphatase 79 50 - 136 U/L    Total Protein 6.6 6.3 - 8.2 g/dL    Albumin 2.9 (L) 3.2 - 4.6 g/dL    Globulin 3.7 (H) 2.3 - 3.5 g/dL    Albumin/Globulin Ratio 0.8 (L) 1.2 - 3.5     Procalcitonin    Collection Time: 08/01/22  6:30 AM   Result Value Ref Range    Procalcitonin <0.05 0.00 - 0.49 ng/mL   EKG 12 Lead    Collection Time: 08/01/22  6:45 AM   Result Value Ref Range    Ventricular Rate 118 BPM    Atrial Rate 118 BPM    P-R Interval 154 ms    QRS Duration 76 ms    Q-T Interval 324 ms    QTc Calculation (Bazett) 454 ms    P Axis 66 degrees    R Axis 51 degrees    T Axis 58 degrees    Diagnosis Sinus tachycardia    Urinalysis    Collection Time: 08/01/22  6:48 AM   Result Value Ref Range    Color, UA YELLOW      Appearance CLEAR      Specific Gravity, UA 1.015 1.001 - 1.023      pH, Urine 6.0 5.0 - 9.0      Protein, UA Negative NEG mg/dL    Glucose, UA Negative NEG mg/dL    Ketones, Urine Negative NEG mg/dL    Bilirubin Urine Negative NEG      Blood, Urine SMALL (A) NEG      Urobilinogen, Urine 0.2 0.2 - 1.0 EU/dL    Nitrite, Urine Negative NEG      Leukocyte Esterase, Urine Negative NEG     Urinalysis, Micro    Collection Time: 08/01/22  6:48 AM   Result Value Ref Range    WBC, UA 0-3 0 /hpf    RBC, UA 5-10 0 /hpf    Epithelial Cells UA 0-3 0 /hpf    BACTERIA, URINE 3+ (H) 0 /hpf    Casts 0 0 /lpf    Crystals 0 0 /LPF    Mucus, UA 0 0 /lpf   Venous Blood Gas, POC    Collection Time: 08/01/22  7:24 AM   Result Value Ref Range    DEVICE ROOM AIR      PH, VENOUS (POC) 7.40 7.32 - 7.42      PCO2, Cornelia, POC 40.4 (L) 41 - 51 MMHG    PO2, VENOUS (POC) 20 (LL) mmHg    HCO3, Venous 25.0 23 - 28 MMOL/L    SO2, VENOUS (POC) 31.0 (L) 65 - 88 %    BASE EXCESS, VENOUS (POC) 0.2 mmol/L    POC Joshua's Test NOT APPLICABLE      Specimen type: VENOUS BLOOD      Performed by: SojournerSarahRT    Rapid influenza A/B antigens    Collection Time: 08/01/22  8:11 AM    Specimen: Nasal Washing   Result Value Ref Range    Influenza A Ag Negative NEG      Influenza B Ag Negative NEG      Source Nasopharyngeal         I have personally reviewed imaging studies showing:  CT ABDOMEN PELVIS W IV CONTRAST Additional Contrast? None    Result Date: 8/1/2022  CT OF THE ABDOMEN AND PELVIS INDICATION: Altered mental status, fever. Possible sepsis. Multiple axial images were obtained through the abdomen and pelvis. Oral contrast was used for bowel opacification. 100mL of Isovue 370 intravenous contrast was used for better evaluation of solid organs and vascular structures. Radiation dose reduction techniques were used for this study. All CT scans performed at this facility use one or all of the following: Automated exposure control, adjustment of the mA and/or kVp according to patient's size, iterative reconstruction. COMPARISON: 10/01/2021 FINDINGS: - LUNG BASES: No infiltrates or masses. - LIVER: Normal in size and appearance. - GALLBLADDER/BILE DUCTS: There is cholelithiasis. No gallbladder or bile duct dilatation. - PANCREAS: Multiple stable small cystic lesions in the pancreas, largest 10 mm. No pancreatic duct distention. - SPLEEN: Normal. - ADRENALS: Normal. - KIDNEYS/URETERS: No hydronephrosis or significant mass. - BLADDER: Normal. - REPRODUCTIVE ORGANS: No pelvic masses. - BOWEL: Normal caliber. Mild sigmoid diverticulosis. No inflammatory changes. - LYMPH NODES: No significant retroperitoneal, mesenteric, or pelvic adenopathy. - BONES: Mild compression of T12, probably chronic. - VASCULATURE: Mild atherosclerosis. - OTHER: No ascites. 1.  Stable large hiatal hernia. 2.  Mild cholelithiasis. 3.  Stable small cystic pancreas lesions. If there are any questions about this report, I can be reached on [employer] or at [phone]     XR CHEST PORTABLE    Result Date: 8/1/2022  EXAM: Single view chest radiograph. INDICATION: Febrile patient with dementia. COMPARISON: Chest radiograph dated December 15, 2021.  FINDINGS: Bandlike atelectasis of the right lung base. No pneumothorax or pleural effusion. Heart is normal in size. Trace right pleural effusion. Slight elevation right hemidiaphragm. No evidence of acute osseous abnormality. 1. Mild bandlike atelectasis of the right lung base without a focal consolidation to suggest pneumonia. Echocardiogram:  No results found for this or any previous visit.       Meds previously ordered:  Orders Placed This Encounter   Medications    lactated ringers bolus    DISCONTD: acetaminophen (TYLENOL) tablet 1,000 mg    acetaminophen (TYLENOL) suppository 650 mg    diatrizoate meglumine-sodium (GASTROGRAFIN) 66-10 % solution 15 mL    cefTRIAXone (ROCEPHIN) 1,000 mg in sodium chloride 0.9 % 50 mL IVPB mini-bag     Order Specific Question:   Antimicrobial Indications     Answer:   Sepsis of Unknown Etiology    atorvastatin (LIPITOR) tablet 80 mg    famotidine (PEPCID) tablet 20 mg    sodium chloride flush 0.9 % injection 5-40 mL    sodium chloride flush 0.9 % injection 5-40 mL    0.9 % sodium chloride infusion    enoxaparin (LOVENOX) injection 40 mg     Order Specific Question:   Indication of Use     Answer:   Prophylaxis-DVT/PE    OR Linked Order Group     ondansetron (ZOFRAN-ODT) disintegrating tablet 4 mg     ondansetron (ZOFRAN) injection 4 mg    polyethylene glycol (GLYCOLAX) packet 17 g    OR Linked Order Group     acetaminophen (TYLENOL) tablet 650 mg     acetaminophen (TYLENOL) suppository 650 mg     Signed:  Marizlo Christopher MD

## 2022-08-01 NOTE — ED PROVIDER NOTES
[facility] Emergency Department Provider Note                   PCP:                Lemuel Curran NP               Age: 80 y.o. Sex: male     No diagnosis found. DISPOSITION         MDM  Number of Diagnoses or Management Options  Fever, unspecified fever cause  Sepsis, due to unspecified organism, unspecified whether acute organ dysfunction present [facility])  Diagnosis management comments: 26-year-old male presents emerged department via EMS for shivering and reported fever. Vital signs are reviewed sepsis orders were initiated. We handed over to my colleague  pending lab work. Orders Placed This Encounter   Procedures    Culture, Blood 1    Culture, Blood 1    COVID-19, Rapid    XR CHEST PORTABLE    Lactic Acid    Lactic Acid    CBC with Auto Differential    CMP    Procalcitonin    Urinalysis    Blood Gas, Venous    Cardiac Monitor - ED Only    Straight Cath (Select if patient is unable to provide a sample)    EKG 12 Lead    Saline lock IV        Emily Chalet Sr is a 80 y.o. male who presents to the Emergency Department with chief complaint of    Chief Complaint   Patient presents with    Fever      26-year-old male arrives to the emergency department via EMS for shivering. Patient is a resident at the [facility]. EMS reports that the nursing staff states that he was at his baseline mentation. They state that they found him shivering under 4 blankets last night and had a temperature of 102. Patient has no active complaints here in the emergency department. All other systems reviewed and are negative. Review of Systems   Constitutional:  Positive for chills and fever. Negative for fatigue. HENT:  Negative for congestion, dental problem and drooling. Eyes:  Negative for discharge. Respiratory:  Negative for cough, chest tightness and shortness of breath. Cardiovascular:  Negative for chest pain and palpitations.    Gastrointestinal:  Negative for abdominal pain, diarrhea, nausea and vomiting. Endocrine: Negative for polydipsia. Genitourinary:  Negative for difficulty urinating, dysuria and hematuria. Musculoskeletal:  Negative for back pain. Skin:  Negative for rash and wound. Neurological:  Negative for dizziness, seizures, speech difficulty, light-headedness and headaches. Psychiatric/Behavioral:  Negative for agitation. Past Medical History:   Diagnosis Date    Acute ischemic stroke ([facility]     CAD (coronary artery disease) 7/18/2019    Colon cancer ([facility] 01/2012    Dementia ([facility]     Hiatal hernia     Hiatal hernia         History reviewed. No pertinent surgical history. History reviewed. No pertinent family history. Social History     Socioeconomic History    Marital status:      Spouse name: None    Number of children: None    Years of education: None    Highest education level: None   Tobacco Use    Smoking status: Never    Smokeless tobacco: Never   Substance and Sexual Activity    Alcohol use: Never    Drug use: Never        Allergies: Patient has no known allergies. Previous Medications    ATORVASTATIN (LIPITOR) 80 MG TABLET    Take 80 mg by mouth    FAMOTIDINE (PEPCID) 20 MG TABLET    Take 20 mg by mouth daily    NITROGLYCERIN (NITROSTAT) 0.4 MG SL TABLET    Place 0.4 mg under the tongue    ONDANSETRON (ZOFRAN-ODT) 4 MG DISINTEGRATING TABLET    Take 4 mg by mouth every 8 hours as needed        Vitals signs and nursing note reviewed. Patient Vitals for the past 4 hrs:   Pulse Resp BP SpO2   08/01/22 0627 (!) 127 20 (!) 154/86 (!) 89 %          Physical Exam  Vitals and nursing note reviewed. Constitutional:       Appearance: Normal appearance. HENT:      Head: Normocephalic and atraumatic. Right Ear: Tympanic membrane normal.      Nose: Nose normal.      Mouth/Throat:      Mouth: Mucous membranes are moist.   Eyes:      Extraocular Movements: Extraocular movements intact.       Pupils: Pupils are equal,

## 2022-08-01 NOTE — PROGRESS NOTES
TRANSFER - IN REPORT:    Verbal report received from [facility] on [address]  being received from ED for routine progression of patient care      Report consisted of patient's Situation, Background, Assessment and   Recommendations(SBAR). Information from the following report(s) Nurse Handoff Report was reviewed with the receiving nurse. Opportunity for questions and clarification was provided. Assessment completed upon patient's arrival to unit and care assumed.

## 2022-08-01 NOTE — ED NOTES
Return from CT. Patient with Large BM soft stool. Linen changed and patient cleaned. Vitals stable.   Awaiting re-eval.      Cole Reyes RN  08/01/22 0942

## 2022-08-01 NOTE — H&P
Medical Student History & Physical   Admit Date:  2022  6:15 AM   Name:  Radha Villarreal Sr   Age:  80 y.o. Sex:  male  :  1931   MRN:  [id number]     Presenting Complaint: Fever    Reason(s) for Admission: Sepsis ([facility] [A41.9]  Fever, unspecified fever cause [R50.9]  Sepsis, due to unspecified organism, unspecified whether acute organ dysfunction present ([facility] [A41.9]     History of Present Illness:   Rosey Magdaleno is a 80 y.o. male with medical history of dementia who presented to the ER with fever and confusion. He was brought to the ER via EMS after found \"shivering with four blankets on him\". Upon admission, the patient appears to be in no acute distress. The patient appears confused and is unable to answer questions. Review of Systems:  10 systems reviewed and negative except as noted in HPI. Assessment & Plan:       Sepsis with acute hypoxic respiratory failure and septic shock:  -Vancomycin + Ceftriaxone  -Supplemental oxygen    Anemia:    Mild protein-calorie malnutrition:  -nutrition consult          Diet: ADULT DIET;  Dysphagia - Soft and Bite Sized  VTE ppx: Enoxiparan 40mg  Code status: [unfilled]    Active Hospital Problems    Diagnosis Date Noted    Sepsis with acute hypoxic respiratory failure and septic shock ([facility] 2022     Priority: Medium    Do not resuscitate status 2022     Priority: Medium    Anemia 2018     Priority: Medium    Mild protein-calorie malnutrition ([facility] 2021    Debility 2019       Objective:   Patient Vitals for the past 24 hrs:   Temp Pulse Resp BP SpO2   22 1317 97.9 °F (36.6 °C) 93 18 115/61 95 %   22 1217 97.2 °F (36.2 °C) 89 20 (!) 119/59 95 %   22 1147 -- 96 20 (!) 123/56 92 %   22 1130 -- 96 20 120/63 93 %   22 1121 -- 98 21 126/65 97 %   22 1030 -- 99 20 138/78 97 %   22 0945 97.2 °F (36.2 °C) (!) 102 20 137/76 96 %   22 0913 -- (!) 107 20 132/75 92 %   22 0910 -- (!) 108 20 (!) 150/80 93 %   08/01/22 0805 100.2 °F (37.9 °C) (!) 105 18 (!) 152/80 --   08/01/22 0727 -- (!) 107 -- -- 93 %   08/01/22 0726 -- (!) 107 -- -- (!) 88 %   08/01/22 0627 (!) 103 °F (39.4 °C) (!) 127 20 (!) 154/86 (!) 89 %     @BSHSIFLOW(2444:last)@    Estimated body mass index is 20.09 kg/m² as calculated from the following:    Height as of this encounter: 5' 10\" (1.778 m). Weight as of this encounter: 140 lb (63.5 kg). Intake/Output Summary (Last 24 hours) at 8/1/2022 1342  Last data filed at 8/1/2022 1248  Gross per 24 hour   Intake 1050 ml   Output --   Net 1050 ml         Physical Exam:  General:    Poorly nourished. No overt distress  Head:  Normocephalic, atraumatic  Eyes:  Sclerae appear normal.  PERRL.   ENT:  Nares appear normal, no drainage. Moist oral mucosa   Neck:    Trachea midline   CV:   RRR. No m/r/g. No JVD. Lungs:   CTAB. No wheezing, rhonchi, or rales. Abdomen:    Soft, nontender, nondistended. Extremities: Cool extremities. No cyanosis or clubbing. Thenar atrophy noted B/L. Skin:     No rashes. Multiple ecchymoses located on B/L legs. Psych:  Patient is confused. Unable to answer questions.  Dementia+    I have reviewed ordered lab tests and independently visualized imaging below:    Last 24hr Labs:  Recent Results (from the past 24 hour(s))   COVID-19, Rapid    Collection Time: 08/01/22  6:27 AM    Specimen: Nasopharyngeal   Result Value Ref Range    Source Nasopharyngeal      SARS-CoV-2, Rapid Not detected NOTD     Lactic Acid    Collection Time: 08/01/22  6:30 AM   Result Value Ref Range    Lactic Acid, Plasma 4.4 (HH) 0.4 - 2.0 MMOL/L   CBC with Auto Differential    Collection Time: 08/01/22  6:30 AM   Result Value Ref Range    WBC 8.1 4.3 - 11.1 K/uL    RBC 3.73 (L) 4.23 - 5.6 M/uL    Hemoglobin 10.8 (L) 13.6 - 17.2 g/dL    Hematocrit 35.2 (L) 41.1 - 50.3 %    MCV 94.4 79.6 - 97.8 FL    MCH 29.0 26.1 - 32.9 PG    MCHC 30.7 (L) 31.4 - 35.0 g/dL    RDW 17. 4 (H) 11.9 - 14.6 %    Platelets 797 533 - 256 K/uL    MPV 10.9 9.4 - 12.3 FL    nRBC 0.00 0.0 - 0.2 K/uL    Differential Type AUTOMATED      Seg Neutrophils 89 (H) 43 - 78 %    Lymphocytes 9 (L) 13 - 44 %    Monocytes 1 (L) 4.0 - 12.0 %    Eosinophils % 1 0.5 - 7.8 %    Basophils 0 0.0 - 2.0 %    Immature Granulocytes 1 0.0 - 5.0 %    Segs Absolute 7.2 1.7 - 8.2 K/UL    Absolute Lymph # 0.7 0.5 - 4.6 K/UL    Absolute Mono # 0.1 0.1 - 1.3 K/UL    Absolute Eos # 0.1 0.0 - 0.8 K/UL    Basophils Absolute 0.0 0.0 - 0.2 K/UL    Absolute Immature Granulocyte 0.1 0.0 - 0.5 K/UL   CMP    Collection Time: 08/01/22  6:30 AM   Result Value Ref Range    Sodium 140 136 - 145 mmol/L    Potassium 5.2 (H) 3.5 - 5.1 mmol/L    Chloride 110 (H) 98 - 107 mmol/L    CO2 23 21 - 32 mmol/L    Anion Gap 7 7 - 16 mmol/L    Glucose 105 (H) 65 - 100 mg/dL    BUN 14 8 - 23 MG/DL    Creatinine 1.29 0.8 - 1.5 MG/DL    GFR African American >60 >60 ml/min/1.73m2    GFR Non- 55 (L) >60 ml/min/1.73m2    Calcium 8.7 8.3 - 10.4 MG/DL    Total Bilirubin 0.9 0.2 - 1.1 MG/DL    ALT 16 12 - 65 U/L    AST 53 (H) 15 - 37 U/L    Alk Phosphatase 79 50 - 136 U/L    Total Protein 6.6 6.3 - 8.2 g/dL    Albumin 2.9 (L) 3.2 - 4.6 g/dL    Globulin 3.7 (H) 2.3 - 3.5 g/dL    Albumin/Globulin Ratio 0.8 (L) 1.2 - 3.5     Procalcitonin    Collection Time: 08/01/22  6:30 AM   Result Value Ref Range    Procalcitonin <0.05 0.00 - 0.49 ng/mL   EKG 12 Lead    Collection Time: 08/01/22  6:45 AM   Result Value Ref Range    Ventricular Rate 118 BPM    Atrial Rate 118 BPM    P-R Interval 154 ms    QRS Duration 76 ms    Q-T Interval 324 ms    QTc Calculation (Bazett) 454 ms    P Axis 66 degrees    R Axis 51 degrees    T Axis 58 degrees    Diagnosis Sinus tachycardia    Urinalysis    Collection Time: 08/01/22  6:48 AM   Result Value Ref Range    Color, UA YELLOW      Appearance CLEAR      Specific Gravity, UA 1.015 1.001 - 1.023      pH, Urine 6.0 5.0 - 9.0 Protein, UA Negative NEG mg/dL    Glucose, UA Negative NEG mg/dL    Ketones, Urine Negative NEG mg/dL    Bilirubin Urine Negative NEG      Blood, Urine SMALL (A) NEG      Urobilinogen, Urine 0.2 0.2 - 1.0 EU/dL    Nitrite, Urine Negative NEG      Leukocyte Esterase, Urine Negative NEG     Urinalysis, Micro    Collection Time: 08/01/22  6:48 AM   Result Value Ref Range    WBC, UA 0-3 0 /hpf    RBC, UA 5-10 0 /hpf    Epithelial Cells UA 0-3 0 /hpf    BACTERIA, URINE 3+ (H) 0 /hpf    Casts 0 0 /lpf    Crystals 0 0 /LPF    Mucus, UA 0 0 /lpf   Venous Blood Gas, POC    Collection Time: 08/01/22  7:24 AM   Result Value Ref Range    DEVICE ROOM AIR      PH, VENOUS (POC) 7.40 7.32 - 7.42      PCO2, [place], POC 40.4 (L) 41 - 51 MMHG    PO2, VENOUS (POC) 20 (LL) mmHg    HCO3, Venous 25.0 23 - 28 MMOL/L    SO2, VENOUS (POC) 31.0 (L) 65 - 88 %    BASE EXCESS, VENOUS (POC) 0.2 mmol/L    POC Joshua's Test NOT APPLICABLE      Specimen type: VENOUS BLOOD      Performed by: Brenna    Rapid influenza A/B antigens    Collection Time: 08/01/22  8:11 AM    Specimen: Nasal Washing   Result Value Ref Range    Influenza A Ag Negative NEG      Influenza B Ag Negative NEG      Source Nasopharyngeal         [unfilled]    Other Studies:  [unfilled]      [unfilled]      Signed:  Ariana Diaz    Part of this note may have been written by using a voice dictation software. The note has been proof read but may still contain some grammatical/other typographical errors.

## 2022-08-01 NOTE — ED TRIAGE NOTES
Pt arrived via EMS. Per EMS, pt was found \"shivering with four blankets on him\". Pt has a hx of dementia and is unable to answer questions in triage. Axillary 101F en route to ED. Pt's O2 sat in high 80s during triage. 2L NC applied to pt.

## 2022-08-02 PROBLEM — E77.8 HYPOPROTEINEMIA (HCC): Status: ACTIVE | Noted: 2022-01-01

## 2022-08-02 PROBLEM — A41.9 SEPSIS WITH ACUTE HYPOXIC RESPIRATORY FAILURE AND SEPTIC SHOCK (HCC): Status: RESOLVED | Noted: 2022-01-01 | Resolved: 2022-01-01

## 2022-08-02 PROBLEM — J96.01 SEPSIS WITH ACUTE HYPOXIC RESPIRATORY FAILURE AND SEPTIC SHOCK (HCC): Status: RESOLVED | Noted: 2022-01-01 | Resolved: 2022-01-01

## 2022-08-02 PROBLEM — R65.21 SEPSIS WITH ACUTE HYPOXIC RESPIRATORY FAILURE AND SEPTIC SHOCK (HCC): Status: RESOLVED | Noted: 2022-01-01 | Resolved: 2022-01-01

## 2022-08-02 NOTE — PROGRESS NOTES
Medical Student Progress Note   Admit Date:  2022  6:15 AM   Name:  Dm Gould Sr   Age:  80 y.o. Sex:  male  :  1931   MRN:  [id number]     Presenting Complaint: Fever    Reason(s) for Admission: Sepsis ([facility] [A41.9]  Fever, unspecified fever cause [R50.9]  Sepsis, due to unspecified organism, unspecified whether acute organ dysfunction present [facility]) [A41.9]       Hospital Course & Interval History:   Carloz Banks is a 80 y.o. male with medical history of dementia who presented with altered mental status. He was unable to answer questions upon presentation. In the ED he was given empiric antibiotics and fluids. His interactivity improved after fluid resuscitation. The patient was admitted with continued fluids and empiric antibiotics while waiting for culture results. Culture results returned as GNR. Antibiotic treatment transitioned to appropriate coverage. Subjective (22): Patient's mental status is much improved from admission. Patient is able to answer questions about medical history, his name, and his children. Patient says he is feeling much better than day before, and clarifies that he has no pain. Patient hopes he is able to return to his nursing home soon. Assessment & Plan:   Sepsis Recovery:  Current temperature 98.1  -Ceftriaxone  -Supportive care, maintain O2 sat greater than 92  -NS fluids     Anemia:  -Hg 9.8 (22)  -transfuse if Hgb < 7     Do not resuscitate status  -noted     Mild protein-calorie malnutrition (HCC)  -albumin 2.5; total protein 6.1  -nutrition consult          Diet:  ADULT DIET;  Dysphagia - Soft and Bite Sized  DVT PPx: Enoxiparan; ambulatory with assistance  Code status: [facility]    Active Hospital Problems    Diagnosis Date Noted    Hypoproteinemia ([facility] 2022     Priority: Medium    Sepsis with acute hypoxic respiratory failure and septic shock ([facility] 2022     Priority: Medium    Do not resuscitate status 2022 Priority: Medium    Senile purpura ([facility] 08/01/2022     Priority: Medium    Anemia 11/21/2018     Priority: Medium    Mild protein-calorie malnutrition ([facility] 12/20/2021    Dementia ([facility] 12/23/2019     - Currently only oriented to person      Debility 12/02/2019     Objective:   Patient Vitals for the past 24 hrs:   Temp Pulse Resp BP SpO2   08/02/22 0838 -- 74 16 -- 91 %   08/02/22 0740 98.1 °F (36.7 °C) 70 18 119/71 97 %   08/02/22 0325 97.9 °F (36.6 °C) 73 18 114/67 91 %   08/01/22 2343 98.2 °F (36.8 °C) 72 18 (!) 96/59 92 %   08/01/22 2023 -- 84 -- -- --   08/01/22 1948 97.7 °F (36.5 °C) 76 18 99/65 99 %   08/01/22 1610 98.1 °F (36.7 °C) 82 -- 119/60 94 %   08/01/22 1541 -- 83 -- -- 95 %   08/01/22 1317 97.9 °F (36.6 °C) 93 18 115/61 95 %   08/01/22 1217 97.2 °F (36.2 °C) 89 20 (!) 119/59 95 %   08/01/22 1147 -- 96 20 (!) 123/56 92 %   08/01/22 1130 -- 96 20 120/63 93 %   08/01/22 1121 -- 98 21 126/65 97 %   08/01/22 1030 -- 99 20 138/78 97 %     @BSHSIFLOW(2444:last)@    Estimated body mass index is 20.09 kg/m² as calculated from the following:    Height as of this encounter: 5' 10\" (1.778 m). Weight as of this encounter: 140 lb (63.5 kg). Intake/Output Summary (Last 24 hours) at 8/2/2022 0945  Last data filed at 8/1/2022 1248  Gross per 24 hour   Intake 50 ml   Output --   Net 50 ml         Physical Exam:   General:    Poorly nourished. No overt distress. Head:  Normocephalic, atraumatic  Eyes:  Sclerae appear normal.  Pupils equally round. ENT:  Nares appear normal, no drainage. Moist oral mucosa  Neck:  No restricted ROM. Trachea midline   CV:   RRR. No m/r/g. No jugular venous distension. Lungs:   CTAB. No wheezing, rhonchi, or rales. Respirations even, unlabored  Abdomen: Bowel sounds present. Soft, nontender, nondistended. Extremities: No cyanosis or clubbing. Thenar atrophy noted. Skin:     No rashes and normal coloration. Diffuse purpura of varying sizes located on BLE. Neuro: Sensation intact  Psych:  Normal mood and affect. Oriented to person.      I have reviewed ordered lab tests and independently visualized imaging below:    Last 24hr Labs:  Recent Results (from the past 24 hour(s))   Respiratory Panel, Molecular, with COVID-19 (Restricted: peds pts or suitable admitted adults)    Collection Time: 08/01/22  4:21 PM    Specimen: Nasopharyngeal   Result Value Ref Range    Adenovirus by PCR NOT DETECTED NOTDET      Coronavirus 229E by PCR NOT DETECTED NOTDET      Coronavirus HKU1 by PCR NOT DETECTED NOTDET      Coronavirus NL63 by PCR NOT DETECTED NOTDET      Coronavirus OC43 by PCR NOT DETECTED NOTDET      SARS-CoV-2, PCR NOT DETECTED NOTDET      Human Metapneumovirus by PCR NOT DETECTED NOTDET      Rhinovirus Enterovirus PCR NOT DETECTED NOTDET      Influenza A by PCR NOT DETECTED NOTDET      INFLUENZA B PCR NOT DETECTED NOTDET      Parainfluenza 1 PCR NOT DETECTED NOTDET      Parainfluenza 2 PCR NOT DETECTED NOTDET      Parainfluenza 3 PCR NOT DETECTED NOTDET      Parainfluenza 4 PCR NOT DETECTED NOTDET      Respiratory Syncytial Virus by PCR NOT DETECTED NOTDET      Bordetella parapertussis by PCR NOT DETECTED NOTDET      Bordetella pertussis by PCR NOT DETECTED NOTDET      Chlamydophila Pneumonia PCR NOT DETECTED NOTDET      Mycoplasma pneumo by PCR NOT DETECTED NOTDET     Transferrin Saturation    Collection Time: 08/02/22  6:12 AM   Result Value Ref Range    Iron 20 (L) 35 - 150 ug/dL    TIBC 267 250 - 450 ug/dL    TRANSFERRIN SATURATION 7 %   Ferritin    Collection Time: 08/02/22  6:12 AM   Result Value Ref Range    Ferritin 64 8 - 388 NG/ML   Reticulocytes    Collection Time: 08/02/22  6:12 AM   Result Value Ref Range    Reticulocyte Count,Automated 0.9 0.3 - 2.0 %    Absolute Retic # 0.0303 0.026 - 0.095 M/ul    Immature Retic Fraction 15.6 (H) 2.3 - 13.4 %    Retic Hemoglobin conc. 33 29 - 35 pg   Lactate Dehydrogenase    Collection Time: 08/02/22  6:12 AM Result Value Ref Range     110 - 210 U/L   D-Dimer, Quantitative    Collection Time: 08/02/22  6:12 AM   Result Value Ref Range    D-Dimer, Quant 1.06 (H) <0.56 ug/ml(FEU)   Comprehensive Metabolic Panel w/ Reflex to MG    Collection Time: 08/02/22  6:12 AM   Result Value Ref Range    Sodium 139 136 - 145 mmol/L    Potassium 4.2 3.5 - 5.1 mmol/L    Chloride 107 98 - 107 mmol/L    CO2 28 21 - 32 mmol/L    Anion Gap 4 (L) 7 - 16 mmol/L    Glucose 88 65 - 100 mg/dL    BUN 14 8 - 23 MG/DL    Creatinine 1.29 0.8 - 1.5 MG/DL    GFR African American >60 >60 ml/min/1.73m2    GFR Non- 55 (L) >60 ml/min/1.73m2    Calcium 8.7 8.3 - 10.4 MG/DL    Total Bilirubin 1.0 0.2 - 1.1 MG/DL    ALT 13 12 - 65 U/L    AST 15 15 - 37 U/L    Alk Phosphatase 69 50 - 136 U/L    Total Protein 6.1 (L) 6.3 - 8.2 g/dL    Albumin 2.5 (L) 3.2 - 4.6 g/dL    Globulin 3.6 (H) 2.3 - 3.5 g/dL    Albumin/Globulin Ratio 0.7 (L) 1.2 - 3.5     CBC with Auto Differential    Collection Time: 08/02/22  6:12 AM   Result Value Ref Range    WBC 11.6 (H) 4.3 - 11.1 K/uL    RBC 3.37 (L) 4.23 - 5.6 M/uL    Hemoglobin 9.8 (L) 13.6 - 17.2 g/dL    Hematocrit 31.1 (L) 41.1 - 50.3 %    MCV 92.3 79.6 - 97.8 FL    MCH 29.1 26.1 - 32.9 PG    MCHC 31.5 31.4 - 35.0 g/dL    RDW 17.8 (H) 11.9 - 14.6 %    Platelets 466 511 - 153 K/uL    MPV 10.4 9.4 - 12.3 FL    nRBC 0.00 0.0 - 0.2 K/uL    Differential Type AUTOMATED      Seg Neutrophils 85 (H) 43 - 78 %    Lymphocytes 8 (L) 13 - 44 %    Monocytes 6 4.0 - 12.0 %    Eosinophils % 1 0.5 - 7.8 %    Basophils 0 0.0 - 2.0 %    Immature Granulocytes 1 0.0 - 5.0 %    Segs Absolute 9.8 (H) 1.7 - 8.2 K/UL    Absolute Lymph # 0.9 0.5 - 4.6 K/UL    Absolute Mono # 0.7 0.1 - 1.3 K/UL    Absolute Eos # 0.2 0.0 - 0.8 K/UL    Basophils Absolute 0.0 0.0 - 0.2 K/UL    Absolute Immature Granulocyte 0.1 0.0 - 0.5 K/UL   Phosphorus    Collection Time: 08/02/22  6:12 AM   Result Value Ref Range    Phosphorus 2.1 (L) 2.3 - 3.7 MG/DL       [unfilled]    Other Studies:  [unfilled]    Current Meds:  Current Facility-Administered Medications   Medication Dose Route Frequency    atorvastatin (LIPITOR) tablet 80 mg  80 mg Oral Nightly    famotidine (PEPCID) tablet 20 mg  20 mg Oral Daily    sodium chloride flush 0.9 % injection 5-40 mL  5-40 mL IntraVENous 2 times per day    sodium chloride flush 0.9 % injection 5-40 mL  5-40 mL IntraVENous PRN    0.9 % sodium chloride infusion   IntraVENous PRN    enoxaparin (LOVENOX) injection 40 mg  40 mg SubCUTAneous Q24H    ondansetron (ZOFRAN-ODT) disintegrating tablet 4 mg  4 mg Oral Q8H PRN    Or    ondansetron (ZOFRAN) injection 4 mg  4 mg IntraVENous Q6H PRN    polyethylene glycol (GLYCOLAX) packet 17 g  17 g Oral Daily PRN    acetaminophen (TYLENOL) tablet 650 mg  650 mg Oral Q6H PRN    Or    acetaminophen (TYLENOL) suppository 650 mg  650 mg Rectal Q6H PRN    piperacillin-tazobactam (ZOSYN) 3.375 mg in sodium chloride 0.9 % 50 mL IVPB (mini-bag)  3.375 mg IntraVENous q8h       Signed:  Rei Caceres    Part of this note may have been written by using a voice dictation software. The note has been proof read but may still contain some grammatical/other typographical errors.

## 2022-08-02 NOTE — PROGRESS NOTES
SPEECH LANGUAGE PATHOLOGY: DYSPHAGIA  Initial Assessment and Discharge    NAME: Vera Beltran Sr  : 1931  MRN: [id number]    ADMISSION DATE: 2022  PRIMARY DIAGNOSIS: Sepsis with acute hypoxic respiratory failure and septic shock ([facility]  Sepsis ([facility] [A41.9]  Fever, unspecified fever cause [R50.9]  Sepsis, due to unspecified organism, unspecified whether acute organ dysfunction present ([facility] [A41.9]    ICD-10: Treatment Diagnosis: R13.12 Dysphagia, Oropharyngeal Phase    RECOMMENDATIONS   Diet:  Diet Solids Recommendation: Regular  Liquid Consistency Recommendation: Thin    Medications: PO     Recommendations: Setup assist   Compensatory Swallowing Strategies:Small bites/sips;Upright as possible for all oral intake;Remain upright for 30-45 minutes after meals;Eat/Feed slowly   Therapeutic Intervention:    Patient continues to require skilled intervention: No  D/C Recommendations: No follow up therapy recommended post discharge     ASSESSMENT    Dysphagia Diagnosis: Swallow function appears grossly [facility]  Patient with only mild oral delay with coarse chewables. Adequate oral clearing with all textures. No pharyngeal dysphagia symptoms idenitifed. GENERAL    History of Present Injury/Illness: Mr. Marybel Harris  has a past medical history of Acute ischemic stroke [facility]), CAD (coronary artery disease), Colon cancer ([facility] Dementia ([facility] Hiatal hernia, and Hiatal hernia. Vearl Clarence He also  has no past surgical history on file. Chart Reviewed: Yes  Subjective: Pleasantly confused. Talkative and friendly. Son at bedside  Behavior/Cognition: Alert; Cooperative  Communication Observation:  (Speech is tangential and off topic.  Nonsensical at times.)  Follows Directions: Simple  Respiratory Status: Room air     O2 Device: None (Room air)              Current Diet : Soft and Bite-Sized  Current Liquid Diet : Thin  Pain:   Patient does not c/o pain, Patient does not appear in pain                   Vision: Within Functional Limits            Hearing: Exceptions to [facility]    Hearing Exceptions: Hard of hearing/hearing concerns  OBJECTIVE    Patient Positioning: Upright in bed   Oral Motor   Labial: No impairment  Dentition: Natural;Intact  Oral Hygiene: Moist  Lingual: No impairment  Velum: No Impairment  Mandible: No impairment  Dentition: Adequate        Baseline Vocal Quality: Normal  Oropharyngeal Phase:  Patient self fed trials of thin liquid via cup and straw, mixed, and solid consistencies. Slowed, but functional oral prep with coarse fruit. Timely swallow initiation, and single swallows upon palpation. Adequate oral clearing. No overt signs or symptoms of airway compromise observed with liquid or solid textures. PLAN    Duration/Frequency: No treatment indicated at this time    Dysphagia Outcome and Severity Scale (YAIMA)  Dysphagia Outcome Severity Scale: Level 6: Within functional limits/Modified independence  Interpretation of Tool: The Dysphagia Outcome and Severity Scale (YAIMA) is a simple, easy-to-use, 7-point scale developed to systematically rate the functional severity of dysphagia based on objective assessment and make recommendations for diet level, independence level, and type of nutrition. Normal(7), Functional(6), Mild(5), Mild-Moderate(4), Moderate(3), Moderate-Severe(2), Severe(1)    Speech Therapy Prognosis  Prognosis: Good    Education: Patient, RN, Family member  Patient Education: Safe swallowing precautions  Patient Education Response: Verbalizes understanding    Current Medications:   No current facility-administered medications on file prior to encounter.      Current Outpatient Medications on File Prior to Encounter   Medication Sig Dispense Refill    atorvastatin (LIPITOR) 80 MG tablet Take 80 mg by mouth      famotidine (PEPCID) 20 MG tablet Take 20 mg by mouth daily      nitroGLYCERIN (NITROSTAT) 0.4 MG SL tablet Place 0.4 mg under the tongue      ondansetron (ZOFRAN-ODT) 4 MG disintegrating tablet Take 4 mg by mouth every 8 hours as needed         PRECAUTIONS/ALLERGIES: Patient has no known allergies.    Safety Devices in place: Yes  Type of devices: Nurse notified, Left in bed (Family at bedside)  Restraints Initially in Place: No    Therapy Time  SLP Individual Minutes  Time In: 1028  Time Out: [address]  Minutes: [address] [place], [place]  8/2/2022 11:09 AM

## 2022-08-02 NOTE — CARE COORDINATION
Patient care plan reviewed in interdisciplinary rounds with the following disciplines: MD, nursing, case management, therapy, and nutrition services. Patient is medically stable and ready for discharge today. CM spoke with son, Shama Mccartney, and with facility staff to confirm plan for return to [facility]. CM inquired about discharge paperwork for the facility. Representative (Leigha) stated that no paperwork is needed other than the AVS and any new prescriptions. Son will provide transportation back to the facility. No additional need noted. 08/01/22 2961   Service Assessment   Patient Orientation Person;Self   Cognition Dementia / Early Alzheimer's   History Provided By Child/Family   Primary Caregiver Self   Accompanied By/Relationship Son/ [address] [place] [address] Spouse/Significant Other   Patient's Healthcare Decision Maker is: Named in [facility] ACP Document   Prior Functional Level Independent in ADLs/IADLs   Current Functional Level Independent in ADLs/IADLs   Can patient return to prior living arrangement Unknown at present   Ability to make needs known: Fair   Family able to assist with home care needs: Yes   Would you like for me to discuss the discharge plan with any other family members/significant others, and if so, who?  No   Community Resources Assisted Living   Social/Functional History   Lives With Alone   Type of Home Assisted living  ([facility])   Home Equipment Wheelchair-manual;Walker, standard   ADL Assistance Independent   Ambulation Assistance Needs assistance  (with walker or WC)   Active  No   Mode of Transportation Car   Discharge Planning   Type of Residence Assisted living   Current Services Prior To Admission Hospice Services  ([facility])   [facility] Discharge   Transition of Care Consult (CM Consult) Hospice   Internal Hospice No   Reason Outside Agency Chosen Patient already serviced by other home care/hospice agency   Services At/After Discharge Assisted living   Condition of Participation: Discharge Planning   Freedom of Choice list was provided with basic dialogue that supports the patient's individualized plan of care/goals, treatment preferences, and shares the quality data associated with the providers?   Yes

## 2022-08-02 NOTE — PROGRESS NOTES
Blood cultures growing gram negative rods. As patient was admitted with sepsis from a nursing home, will transition to zosyn 4.5g q6h for empiric coverage.

## 2022-08-02 NOTE — PROGRESS NOTES
OCCUPATIONAL THERAPY Initial Assessment and AM       OT Visit Days: 1  Acknowledge Orders  Time  OT Charge Capture  Rehab Caseload Tracker      Carloz Banks is a 80 y.o. male   PRIMARY DIAGNOSIS: Sepsis with acute hypoxic respiratory failure and septic shock ([facility]  Sepsis ([facility] [address] [A41.9]  Fever, unspecified fever cause [R50.9]  Sepsis, due to unspecified organism, unspecified whether acute organ dysfunction present ([facility] [address] [A41.9]       Reason for Referral: Generalized Muscle Weakness (M62.81)  Other lack of cordination (R27.8)  Difficulty in walking, Not elsewhere classified (R26.2)  Inpatient: Payor: Saqib Solano / Plan: Sami Cisneros / Product Type: *No Product type* /     ASSESSMENT:     REHAB RECOMMENDATIONS:   Recommendation to date pending progress:  Setting:  No further skilled therapy after discharge from hospital    Equipment:    To Be Determined     ASSESSMENT:  Mr. Nabil Lawrence Sr admitted with above diagnosis. Pt supine on arrival.  Pt completed dressing with min assist and ambulated in hallway with min assist and RW. Pt needed verbal cues to slow down. Pt lives at [facility] where they assist with his bathing and dressing. Pt uses wheelchair to get around there secondary to falls per dtg in law. Pt would benefit from skilled OT increase independence.       [address] [facility] [address] [address] [facility] 6 Clicks Daily Activity Inpatient Short Form:    AM-PAC Daily Activity Inpatient   How much help for putting on and taking off regular lower body clothing?: A Lot  How much help for Bathing?: A Lot  How much help for Toileting?: A Lot  How much help for putting on and taking off regular upper body clothing?: A Lot  How much help for taking care of personal grooming?: A Little  How much help for eating meals?: A Little  [facility] Inpatient Daily Activity Raw Score: 14  AM-PAC Inpatient ADL T-Scale Score : 33.39  ADL Inpatient CMS 0-100% Score: 59.67  ADL Inpatient CMS G-Code Modifier : CK           SUBJECTIVE: Mr. Anshu Bahena states, \"I am 46years old\"     Social/Functional Lives With: Alone  Type of Home: Assisted living ([facility])  Home Equipment: Melodye Desean, standard  ADL Assistance: Independent  Ambulation Assistance: Needs assistance (with walker or WC)  Active : No  Mode of Transportation: Car    OBJECTIVE:     Yuli Sours / Green Abbie / Joyce Fillers: NA    RESTRICTIONS/PRECAUTIONS:  Restrictions/Precautions: Fall Risk    PAIN: Norris Bough / O2:   Pre Treatment:   Pain Assessment: None - Denies Pain      Post Treatment: 0       Vitals          Oxygen            GROSS EVALUATION: INTACT IMPAIRED   (See Comments)   UE AROM [x] []   UE PROM [] []   Strength [x]       Posture / Balance []  Fair + standing   Sensation [x]     Coordination [x]       Tone [x]       Edema []    Activity Tolerance [x]       Hand Dominance R [] L []      COGNITION/  PERCEPTION: INTACT IMPAIRED   (See Comments)   Orientation [] Oriented to person, Disoriented to time, Disoriented to placeOriented to person only   Vision [x]     Hearing [x]     Cognition  [] Safety Judgement: Decreased awareness of need for assistance, Decreased awareness of need for safety   Perception []       MOBILITY: I Mod I S SBA CGA Min Mod Max Total  NT x2 Comments:   Bed Mobility    Rolling [] [] [] [] [] [] [] [] [] [] []    Supine to Sit [] [] [] [] [] [x] [] [] [] [] []    Scooting [] [] [] [] [x] [] [] [] [] [] []    Sit to Supine [] [] [] [] [] [x] [] [] [] [] []    Transfers    Sit to Stand [] [] [] [] [] [x] [] [] [] [] []    Bed to Chair [] [] [] [] [] [x] [] [] [] [] []    Stand to Sit [] [] [] [] [] [x] [] [] [] [] []    Tub/Shower [] [] [] [] [] [] [] [] [] [] []     Toilet [] [] [] [] [] [] [] [] [] [] []      [] [] [] [] [] [] [] [] [] [] []    I=Independent, Mod I=Modified Independent, S=Supervision/Setup, SBA=Standby Assistance, CGA=Contact Guard Assistance, Min=Minimal Assistance, Mod=Moderate Assistance, Max=Maximal Assistance, Total=Total Assistance, NT=Not Tested    ACTIVITIES OF DAILY LIVING: I Mod I S SBA CGA Min Mod Max Total NT Comments   BASIC ADLs:              Upper Body Bathing  [] [] [] [] [] [] [] [] [] []    Lower Body Bathing [] [] [] [] [] [] [] [] [] []    Toileting [] [] [] [] [] [] [] [] [] []    Upper Body Dressing [] [] [] [] [] [x] [] [] [] [] Pecola Curly   Lower Body Dressing [] [] [] [] [] [] [] [] [x] [] Lawerence Naegeli  socks   Feeding [] [] [] [] [] [] [] [] [] []    Grooming [] [] [] [] [] [] [] [] [] []    Personal Device Care [] [] [] [] [] [] [] [] [] []    Functional Mobility [] [] [] [] [] [x] [] [] [] [] With RW   I=Independent, Mod I=Modified Independent, S=Supervision/Setup, SBA=Standby Assistance, CGA=Contact Guard Assistance, Min=Minimal Assistance, Mod=Moderate Assistance, Max=Maximal Assistance, Total=Total Assistance, NT=Not Tested    PLAN:     FREQUENCY/DURATION   OT Plan of Care: 3 times/week for duration of hospital stay or until stated goals are met, whichever comes first.    ACUTE OCCUPATIONAL THERAPY GOALS:   (Developed with and agreed upon by patient and/or caregiver.)  1. Patient will perform grooming with supervision. 2. Patient will perform upper body dressing with CGA  3. Patient will perform toileting and toilet transfer with min assist.   4. Patient will perform ADL functional mobility and tranfers in room with CGA. Goals to be achieved in 7 days.        PROBLEM LIST:   (Skilled intervention is medically necessary to address:)  Decreased ADL/Functional Activities  Decreased Activity Tolerance  Decreased AROM/PROM  Decreased Balance  Decreased Strength  Decreased Transfer Abilities   INTERVENTIONS PLANNED:  (Benefits and precautions of occupational therapy have been discussed with the patient.)  Self Care Training  Therapeutic Activity  Therapeutic Exercise/HEP  Neuromuscular Re-education  Manual Therapy  Education         TREATMENT:     EVALUATION: LOW COMPLEXITY: (Untimed Charge)    TREATMENT:   Self Care (15 minutes): Patient participated in upper body dressing and lower body dressing ADLs in unsupported sitting with moderate verbal and tactile cueing to increase independence. Patient also participated in functional mobility training to increase activity tolerance.      TREATMENT GRID:  N/A    AFTER TREATMENT PRECAUTIONS: Bed, Bed/Chair Locked, Call light within reach, Needs within reach, Side rails x3, and Visitors at bedside    INTERDISCIPLINARY COLLABORATION:  RN/ PCT, PT/ PTA, and OT/ HERNANDEZ    EDUCATION:  Education Given To: Patient  Education Provided: Role of Therapy  Education Method: Verbal  Barriers to Learning: Cognition  Education Outcome: Continued education needed    TOTAL TREATMENT DURATION AND TIME:  Time In: 0940  Time Out: 1000  Minutes: [facility]

## 2022-08-02 NOTE — DISCHARGE SUMMARY
Hospitalist Discharge Summary   Admit Date:  2022  6:15 AM   DC Note date: 2022  Name:  Haritha Andrews Sr   Age:  80 y.o. Sex:  male  :  1931   MRN:  [id number]   Room:  [facility]  PCP:  Mik Curran NP    Presenting Complaint: Fever     Initial Admission Diagnosis: Sepsis ([address] [A41.9]  Fever, unspecified fever cause [R50.9]  Sepsis, due to unspecified organism, unspecified whether acute organ dysfunction present ([address] [A41.9]     Problem List for this Hospitalization (present on admission):    Principal Problem (Resolved):    Sepsis with acute hypoxic respiratory failure and septic shock ([address]  Active Problems:    Anemia    Do not resuscitate status    Senile purpura ([address]    Hypoproteinemia ([address]    Mild protein-calorie malnutrition ([address]    Debility    Dementia [facility])      Hospital Course:  89M PMHx dementia, colon cancer, malnutrition who presented with altered mental status. He was unable to speak or provide history upon presentation. I report from ER physician he has become progressively less responsive over the prior several days in his nursing home. He was on hospice. In the emergency department he was given empiric antibiotics and resuscitated with marked improvement in his interactivity. He was able to tell me his name and the names of his children. He did not know where he lived and could not explain why he has bruises on his legs. He reported no pain. He was requiring oxygen but he said he did not feel short of breath. He reported being cold. He was admitted for sepsis. Cultures grew GNR. His antibiotic coverage was adjusted. He was determined to be safe for discharge back to his facility on . Disposition: assisted living facility with hospice  Diet: ADULT DIET; Regular  Code Status: DNR    Follow Ups:   Follow-up Information     [facility], APRN - NP. Call in 1 week(s).     Specialty: Nurse Practitioner  Why: Transition of Care Management  Contact information:  [address] [place]  [phone]                       Time spent in patient discharge and coordination 37 minutes. Follow up labs/diagnostics (ultimately defer to outpatient provider): Completion of antibiotics    Plan was discussed with patient, nurse, . All questions answered. Patient was stable at time of discharge. Instructions given to call a physician or return if any concerns. Current Discharge Medication List        START taking these medications    Details   levoFLOXacin (LEVAQUIN) 750 MG tablet Take 1 tablet by mouth in the morning for 5 days. Qty: 5 tablet, Refills: 0           CONTINUE these medications which have NOT CHANGED    Details   atorvastatin (LIPITOR) 80 MG tablet Take 80 mg by mouth      famotidine (PEPCID) 20 MG tablet Take 20 mg by mouth daily      nitroGLYCERIN (NITROSTAT) 0.4 MG SL tablet Place 0.4 mg under the tongue      ondansetron (ZOFRAN-ODT) 4 MG disintegrating tablet Take 4 mg by mouth every 8 hours as needed             Procedures done this admission:  * No surgery found *    Consults this admission:  None    Echocardiogram results:  No results found for this or any previous visit. Diagnostic Imaging/Tests:   CT ABDOMEN PELVIS W IV CONTRAST Additional Contrast? None    Result Date: 8/1/2022  1. Stable large hiatal hernia. 2.  Mild cholelithiasis. 3.  Stable small cystic pancreas lesions. If there are any questions about this report, I can be reached on [employer] or at [phone]     XR CHEST PORTABLE    Result Date: 8/1/2022  1. Mild bandlike atelectasis of the right lung base without a focal consolidation to suggest pneumonia.         Recent Labs     08/01/22  0651   CULTURE NO GROWTH 1 DAY  CULTURE IN PROGRESS,FURTHER UPDATES TO FOLLOW  Refer to Blood Culture ID Panel Accession [id number]       Labs: Results:       BMP, Mg, Phos Recent Labs     08/01/22  0630 08/02/22  0612    139   K 5.2* 4.2   * 107   CO2 23 28   ANIONGAP 7 4*   BUN 14 14   CREATININE 1.29 1.29   LABGLOM 55* 55*   GFRAA >60 >60   CALCIUM 8.7 8.7   GLUCOSE 105* 88   PHOS  --  2.1*      CBC Recent Labs     08/01/22  0630 08/02/22  0612   WBC 8.1 11.6*   RBC 3.73* 3.37*   HGB 10.8* 9.8*   HCT 35.2* 31.1*   MCV 94.4 92.3   MCH 29.0 29.1   MCHC 30.7* 31.5   RDW 17.4* 17.8*    203   MPV 10.9 10.4   NRBC 0.00 0.00   SEGS 89* 85*   LYMPHOPCT 9* 8*   EOSRELPCT 1 1   MONOPCT 1* 6   BASOPCT 0 0   IMMGRAN 1 1   SEGSABS 7.2 9.8*   LYMPHSABS 0.7 0.9   EOSABS 0.1 0.2   MONOSABS 0.1 0.7   BASOSABS 0.0 0.0   ABSIMMGRAN 0.1 0.1      LFT Recent Labs     08/01/22  0630 08/02/22  0612   BILITOT 0.9 1.0   ALKPHOS 79 69   AST 53* 15   ALT 16 13   PROT 6.6 6.1*   LABALBU 2.9* 2.5*   GLOB 3.7* 3.6*      Cardiac  Lab Results   Component Value Date/Time    TROPHS 12.6 07/05/2021 04:35 PM    TROPHS 13.8 07/05/2021 02:41 PM      Coags Lab Results   Component Value Date/Time    APTT 101.6 07/16/2019 03:45 PM    APTT 63.8 07/16/2019 08:43 AM    APTT 97.7 07/16/2019 01:36 AM      A1c Lab Results   Component Value Date/Time    LABA1C 5.6 07/29/2021 03:35 AM    LABA1C 5.6 12/18/2019 03:46 PM     07/29/2021 03:35 AM     12/18/2019 03:46 PM      Lipids Lab Results   Component Value Date/Time    CHOL 144 07/29/2021 03:35 AM    LDLCALC 95 07/29/2021 03:35 AM    LABVLDL 26 07/29/2021 03:35 AM    HDL 23 07/29/2021 03:35 AM    CHOLHDLRATIO 6.3 07/29/2021 03:35 AM    TRIG 130 07/29/2021 03:35 AM      Thyroid  No results found for: Devoria Joel     Most Recent UA Lab Results   Component Value Date/Time    COLORU YELLOW 08/01/2022 06:48 AM    APPEARANCE CLEAR 08/01/2022 06:48 AM    SPECGRAV 1.015 08/01/2022 06:48 AM    LABPH 6.0 08/01/2022 06:48 AM    PROTEINU Negative 08/01/2022 06:48 AM    GLUCOSEU Negative 08/01/2022 06:48 AM    KETUA Negative 08/01/2022 06:48 AM    BILIRUBINUR Negative 08/01/2022 06:48 AM    BILIRUBINUR Negative 12/15/2021 02:39 PM    BLOODU SMALL 08/01/2022 06:48 AM    UROBILINOGEN 0.2 08/01/2022 06:48 AM    NITRU Negative 08/01/2022 06:48 AM    LEUKOCYTESUR Negative 08/01/2022 06:48 AM    WBCUA 0-3 08/01/2022 06:48 AM    RBCUA 5-10 08/01/2022 06:48 AM    EPITHUA 0-3 08/01/2022 06:48 AM    BACTERIA 3+ 08/01/2022 06:48 AM    LABCAST 0 08/01/2022 06:48 AM    MUCUS 0 08/01/2022 06:48 AM          All Labs from Last 24 Hrs:  Recent Results (from the past 24 hour(s))   Respiratory Panel, Molecular, with COVID-19 (Restricted: peds pts or suitable admitted adults)    Collection Time: 08/01/22  4:21 PM    Specimen: Nasopharyngeal   Result Value Ref Range    Adenovirus by PCR NOT DETECTED NOTDET      Coronavirus 229E by PCR NOT DETECTED NOTDET      Coronavirus HKU1 by PCR NOT DETECTED NOTDET      Coronavirus NL63 by PCR NOT DETECTED NOTDET      Coronavirus OC43 by PCR NOT DETECTED NOTDET      SARS-CoV-2, PCR NOT DETECTED NOTDET      Human Metapneumovirus by PCR NOT DETECTED NOTDET      Rhinovirus Enterovirus PCR NOT DETECTED NOTDET      Influenza A by PCR NOT DETECTED NOTDET      INFLUENZA B PCR NOT DETECTED NOTDET      Parainfluenza 1 PCR NOT DETECTED NOTDET      Parainfluenza 2 PCR NOT DETECTED NOTDET      Parainfluenza 3 PCR NOT DETECTED NOTDET      Parainfluenza 4 PCR NOT DETECTED NOTDET      Respiratory Syncytial Virus by PCR NOT DETECTED NOTDET      Bordetella parapertussis by PCR NOT DETECTED NOTDET      Bordetella pertussis by PCR NOT DETECTED NOTDET      Chlamydophila Pneumonia PCR NOT DETECTED NOTDET      Mycoplasma pneumo by PCR NOT DETECTED NOTDET     Transferrin Saturation    Collection Time: 08/02/22  6:12 AM   Result Value Ref Range    Iron 20 (L) 35 - 150 ug/dL    TIBC 267 250 - 450 ug/dL    TRANSFERRIN SATURATION 7 %   Ferritin    Collection Time: 08/02/22  6:12 AM   Result Value Ref Range    Ferritin 64 8 - 388 NG/ML   Reticulocytes    Collection Time: 08/02/22  6:12 AM   Result Value Ref Range    Reticulocyte Count,Automated 0.9 0.3 - 2.0 % Absolute Retic # 0.0303 0.026 - 0.095 M/ul    Immature Retic Fraction 15.6 (H) 2.3 - 13.4 %    Retic Hemoglobin conc. 33 29 - 35 pg   Lactate Dehydrogenase    Collection Time: 08/02/22  6:12 AM   Result Value Ref Range     110 - 210 U/L   D-Dimer, Quantitative    Collection Time: 08/02/22  6:12 AM   Result Value Ref Range    D-Dimer, Quant 1.06 (H) <0.56 ug/ml(FEU)   Vitamin B12    Collection Time: 08/02/22  6:12 AM   Result Value Ref Range    Vitamin B-12 450 193 - 986 pg/mL   Folate    Collection Time: 08/02/22  6:12 AM   Result Value Ref Range    Folate 26.5 (H) 3.1 - 17.5 ng/mL   Comprehensive Metabolic Panel w/ Reflex to MG    Collection Time: 08/02/22  6:12 AM   Result Value Ref Range    Sodium 139 136 - 145 mmol/L    Potassium 4.2 3.5 - 5.1 mmol/L    Chloride 107 98 - 107 mmol/L    CO2 28 21 - 32 mmol/L    Anion Gap 4 (L) 7 - 16 mmol/L    Glucose 88 65 - 100 mg/dL    BUN 14 8 - 23 MG/DL    Creatinine 1.29 0.8 - 1.5 MG/DL    GFR African American >60 >60 ml/min/1.73m2    GFR Non- 55 (L) >60 ml/min/1.73m2    Calcium 8.7 8.3 - 10.4 MG/DL    Total Bilirubin 1.0 0.2 - 1.1 MG/DL    ALT 13 12 - 65 U/L    AST 15 15 - 37 U/L    Alk Phosphatase 69 50 - 136 U/L    Total Protein 6.1 (L) 6.3 - 8.2 g/dL    Albumin 2.5 (L) 3.2 - 4.6 g/dL    Globulin 3.6 (H) 2.3 - 3.5 g/dL    Albumin/Globulin Ratio 0.7 (L) 1.2 - 3.5     CBC with Auto Differential    Collection Time: 08/02/22  6:12 AM   Result Value Ref Range    WBC 11.6 (H) 4.3 - 11.1 K/uL    RBC 3.37 (L) 4.23 - 5.6 M/uL    Hemoglobin 9.8 (L) 13.6 - 17.2 g/dL    Hematocrit 31.1 (L) 41.1 - 50.3 %    MCV 92.3 79.6 - 97.8 FL    MCH 29.1 26.1 - 32.9 PG    MCHC 31.5 31.4 - 35.0 g/dL    RDW 17.8 (H) 11.9 - 14.6 %    Platelets 639 964 - 608 K/uL    MPV 10.4 9.4 - 12.3 FL    nRBC 0.00 0.0 - 0.2 K/uL    Differential Type AUTOMATED      Seg Neutrophils 85 (H) 43 - 78 %    Lymphocytes 8 (L) 13 - 44 %    Monocytes 6 4.0 - 12.0 %    Eosinophils % 1 0.5 - 7.8 % Basophils 0 0.0 - 2.0 %    Immature Granulocytes 1 0.0 - 5.0 %    Segs Absolute 9.8 (H) 1.7 - 8.2 K/UL    Absolute Lymph # 0.9 0.5 - 4.6 K/UL    Absolute Mono # 0.7 0.1 - 1.3 K/UL    Absolute Eos # 0.2 0.0 - 0.8 K/UL    Basophils Absolute 0.0 0.0 - 0.2 K/UL    Absolute Immature Granulocyte 0.1 0.0 - 0.5 K/UL   Phosphorus    Collection Time: 08/02/22  6:12 AM   Result Value Ref Range    Phosphorus 2.1 (L) 2.3 - 3.7 MG/DL       No Known Allergies  Immunization History   Administered Date(s) Administered    Influenza, High Dose (Fluzone 65 yrs and older) 02/19/2016, 10/09/2018, 10/23/2019    PPD Test 12/02/2019, 12/12/2019, 07/29/2021, 10/02/2021, 12/18/2021    Pneumococcal Polysaccharide ([id number]) 05/21/2018    Tdap (Boostrix, Adacel) 04/04/2022       Recent Vital Data:  Patient Vitals for the past 24 hrs:   Temp Pulse Resp BP SpO2   08/02/22 1108 98 °F (36.7 °C) 73 16 109/65 97 %   08/02/22 0838 -- 74 16 -- 91 %   08/02/22 0740 98.1 °F (36.7 °C) 70 18 119/71 97 %   08/02/22 0325 97.9 °F (36.6 °C) 73 18 114/67 91 %   08/01/22 2343 98.2 °F (36.8 °C) 72 18 (!) 96/59 92 %   08/01/22 2023 -- 84 -- -- --   08/01/22 1948 97.7 °F (36.5 °C) 76 18 99/65 99 %   08/01/22 1610 98.1 °F (36.7 °C) 82 -- 119/60 94 %   08/01/22 1541 -- 83 -- -- 95 %   08/01/22 1317 97.9 °F (36.6 °C) 93 18 115/61 95 %   08/01/22 1217 97.2 °F (36.2 °C) 89 20 (!) 119/59 95 %       Oxygen Therapy  SpO2: 97 %  Pulse via Oximetry: 96 beats per minute  Pulse Oximeter Device Mode: Intermittent  Pulse Oximeter Device Location: Finger  O2 Device: None (Room air)  O2 Flow Rate (L/min): 0 L/min  Blood Gas  Performed?: Yes  Joshua's Test #1: Unable to assess  Site #1: Line (IV)  Specimen Status #1: Point of care    Estimated body mass index is 20.09 kg/m² as calculated from the following:    Height as of this encounter: 5' 10\" (1.778 m). Weight as of this encounter: 140 lb (63.5 kg).     Intake/Output Summary (Last 24 hours) at 8/2/2022 1215  Last data filed

## 2022-08-02 NOTE — PROGRESS NOTES
PHYSICAL THERAPY Initial Assessment, Daily Note, and AM  (Link to Caseload Tracking: PT Visit Days : 1  Acknowledge Orders  Time In/Out  PT Charge Capture  Rehab Caseload Tracker    Juancarlos Olmos is a 80 y.o. male   PRIMARY DIAGNOSIS: Sepsis with acute hypoxic respiratory failure and septic shock ([facility] [address]  Sepsis ([facility] [address] [A41.9]  Fever, unspecified fever cause [R50.9]  Sepsis, due to unspecified organism, unspecified whether acute organ dysfunction present ([facility] [address] [A41.9]       Reason for Referral: Difficulty in walking, Not elsewhere classified (R26.2)  Other abnormalities of gait and mobility (R26.89)  Inpatient: Payor: Zana Galeano / Plan: Eduardo Salmeron / Product Type: *No Product type* /     ASSESSMENT:     REHAB RECOMMENDATIONS:   Recommendation to date pending progress:  Setting:  No further skilled therapy after discharge from hospital    Equipment:    None     ASSESSMENT:  Mr. Jacky Nair is admitted with above diagnosis. Pt supine on arrival. Pt performed supine to sit to stand. Pt ambulated in room and hallway. Pt requires verbal and manual cues to slow gait. Pt has had falls in the past due to going to fast and having pt's walker too far out in front. Pt will benefit from PT services to maximize functional mobility.      [address] [facility] [address] [address] AM-PAC 6 Clicks Basic Mobility Inpatient Short Form  AM-PAC Mobility Inpatient   How much difficulty turning over in bed?: None  How much difficulty sitting down on / standing up from a chair with arms?: None  How much difficulty moving from lying on back to sitting on side of bed?: None  How much help from another person moving to and from a bed to a chair?: A Little  How much help from another person needed to walk in hospital room?: A Little  How much help from another person for climbing 3-5 steps with a railing?: A Lot  -PAC Inpatient Mobility Raw Score : 20  AM-PAC Inpatient T-Scale Score : 47.67  Mobility Inpatient CMS 0-100% Score: 35.83  Mobility Inpatient CMS G-Code Modifier : CJ    SUBJECTIVE:   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     Social/Functional Lives With: Alone  Type of Home: Assisted living ([facility] x1 yr)  Home Equipment: Euna Merl, standard  ADL Assistance: Independent  Ambulation Assistance: Needs assistance (with walker or WC)  Active : No  Mode of Transportation: Car    OBJECTIVE:     PAIN: [place] Most / O2: [place] Severe / Gisella Pies / Oleksandr Lacks:   Pre Treatment:   Pain Assessment: None - Denies Pain      Post Treatment: no complaints Vitals        Oxygen      None    RESTRICTIONS/PRECAUTIONS:  Restrictions/Precautions: Fall Risk                 GROSS EVALUATION: Intact Impaired (Comments):   AROM [x]     PROM []    Strength [x]     Balance [] Standing - Dynamic: Fair, +   Posture [] N/A   Sensation [x]     Coordination [x]      Tone [x]     Edema []    Activity Tolerance [x]      []      COGNITION/  PERCEPTION: Intact Impaired (Comments):   Orientation [] Oriented to person   Vision [x]     Hearing [x]     Cognition  [] Safety Judgement: Decreased awareness of need for safety, Decreased awareness of need for assistance  Cognition Comment: dementia     MOBILITY: I Mod I S SBA CGA Min Mod Max Total  NT x2 Comments:   Bed Mobility    Rolling [] [] [] [] [] [] [] [] [] [] []    Supine to Sit [] [] [] [] [] [x] [] [] [] [] []    Scooting [] [] [] [] [] [] [] [] [] [] []    Sit to Supine [] [] [] [] [] [x] [] [] [] [] []    Transfers    Sit to Stand [] [] [] [] [] [x] [] [] [] [] []    Bed to Chair [] [] [] [] [] [] [] [] [] [] []    Stand to Sit [] [] [] [] [] [x] [] [] [] [] []     [] [] [] [] [] [] [] [] [] [] []    I=Independent, Mod I=Modified Independent, S=Supervision, SBA=Standby Assistance, CGA=Contact Guard Assistance,   Min=Minimal Assistance, Mod=Moderate Assistance, Max=Maximal Assistance, Total=Total Assistance, NT=Not Tested    GAIT: I Mod I S SBA CGA Min Mod Max Total  NT x2 Comments: Level of Assistance [] [] [] [] [] [x] [] [] [] [] []    Distance 650 feet    DME Rolling Walker    Gait Quality Walks too fast with walker too far out in front    Weightbearing Status Restrictions/Precautions  Restrictions/Precautions: Fall Risk    Stairs      I=Independent, Mod I=Modified Independent, S=Supervision, SBA=Standby Assistance, CGA=Contact Guard Assistance,   Min=Minimal Assistance, Mod=Moderate Assistance, Max=Maximal Assistance, Total=Total Assistance, NT=Not Tested    PLAN:   ACUTE PHYSICAL THERAPY GOALS:   (Developed with and agreed upon by patient and/or caregiver.)  STG:  (1.)Mr. Consuelo Adams will move from supine to sit and sit to supine  with INDEPENDENT within 1-2 treatment day(s). (2.)Mr. Consuelo Adams will transfer from bed to chair and chair to bed with INDEPENDENT using the least restrictive device within 1-2 treatment day(s). (3.)Mr. Consuelo Adams will ambulate with MINIMAL ASSIST for 650 feet with the least restrictive device within 1-2 treatment day(s). LTG:  (1.)Mr. Consuelo Adams will ambulate with MINIMAL ASSIST-CGA for 650+ feet with the least restrictive device within 3-7 treatment day(s). ________________________________________________________________________________________________      FREQUENCY AND DURATION: Daily for duration of hospital stay or until stated goals are met, whichever comes first.    THERAPY PROGNOSIS: Good    PROBLEM LIST:   (Skilled intervention is medically necessary to address:)  Decreased Safety Awareness INTERVENTIONS PLANNED:   (Benefits and precautions of physical therapy have been discussed with the patient.)  Therapeutic Activity  Gait Training  Education       TREATMENT:   EVALUATION: LOW COMPLEXITY: (Untimed Charge)    TREATMENT:   Therapeutic Activity (15  Minutes):  Therapeutic activity included Supine to Sit, Sit to Supine, Transfer Training, Ambulation on level ground, Sitting balance , and Standing balance to improve functional Activity tolerance, Balance, Coordination, Mobility, and Strength. TREATMENT GRID:  N/A    AFTER TREATMENT PRECAUTIONS: Bed, Bed/Chair Locked, Call light within reach, Needs within reach, RN notified, and daughter in law bedside    INTERDISCIPLINARY COLLABORATION:  PT/ PTA, OT/ HERNANDEZ, and RN Case Manager/      EDUCATION: Education Given To: Patient; Family  Education Provided: Role of Therapy;Plan of Care  Education Method: Demonstration;Verbal  Barriers to Learning: Cognition  Education Outcome: Verbalized understanding;Demonstrated understanding    TIME IN/OUT:  Time In: 0940  Time Out: 1000  Minutes: BERT Bhatt

## 2022-08-03 NOTE — TELEPHONE ENCOUNTER
Called patient to schedule TCV appt following discharge from [facility] 08/02/2022. Dx Sepsis.     Patient is a resident at [facility]

## 2022-08-30 NOTE — PROGRESS NOTES
Hourly rounds done. Pt denies pain, nausea, vomiting. All needs met at this time. FAMILY HISTORY:  Father  Still living? No  Family history of heart disease, Age at diagnosis: Age Unknown    Mother  Still living? Yes, Estimated age: Age Unknown  Family history of cardiomegaly, Age at diagnosis: Age Unknown    Aunt  Still living? No  Diabetes mellitus, type 2, Age at diagnosis: Age Unknown